# Patient Record
Sex: FEMALE | Race: WHITE | NOT HISPANIC OR LATINO | Employment: OTHER | URBAN - METROPOLITAN AREA
[De-identification: names, ages, dates, MRNs, and addresses within clinical notes are randomized per-mention and may not be internally consistent; named-entity substitution may affect disease eponyms.]

---

## 2017-01-02 ENCOUNTER — HOSPITAL ENCOUNTER (OUTPATIENT)
Dept: NUTRITION | Facility: CLINIC | Age: 62
Discharge: HOME | End: 2017-01-02
Attending: DIETITIAN, REGISTERED | Admitting: DIETITIAN, REGISTERED
Payer: COMMERCIAL

## 2017-01-02 PROCEDURE — 97802 MEDICAL NUTRITION INDIV IN: CPT | Performed by: DIETITIAN, REGISTERED

## 2017-01-03 NOTE — PROGRESS NOTES
"OUTPATIENT NUTRITION ASSESSMENT   REASON FOR ASSESSMENT  Mili Padilla referred by Dr. Márquez for MNT related to overweight.    Patient accompanied by self.      ASSESSMENT   Nutrition History: - Information obtained from patient.  Patient with history of long struggle with her weight.  Patient had sleeve gastrectomy 3 years ago.  Patient states she was able to maintain her 65 lbs weight loss for 2 years years.  Then patient struggled with depression and regained her weight.  Patient has been tracking her food intake since surgery.  Patient's calorie intake varies from 8421-8669 calories and 61-91 grams of protein.  Patient with history of multiple sclerosis which makes it difficult to prepare meals due to fatigue.  Patient has her groceries delivered from [employer].  Patient may grocery shop if she is not able to order specific foods from the grocery store.  Patient struggles with her eating pattern and is working with therapist at [facility].  Patient states she and her therapist developed list of alternatives to eating when not hungry.        Diet Recall:  Breakfast: 1/2 cup cottage cheese and 2 cantaloupe stoner   Lunch: 2 slices genoa salami, 1 sliced bread, 1 tsp I can't Believe it's not Butter, 3 watermelon stoner; 6\" BLT Subway sandwich; 4 oz pulled chicken   Dinner: 2 cup penne marinara with chicken, 14 oz cranberry juice, 2 oz bottled water  Snack: pear, chocolate mint and M&M's, 1 turkey jerky, 1 slice bernard, 1 serving roasted almonds (snacks all in one day); 1 fruit bag fruit snacks, 6 sour candies, 1/2 large macadamia cookie (snacks on another day)   Beverages: 12 oz Mayen's grape juice, water, 14 oz cranberry grape juice; diet Pepsi  Dining out: limited     PHYSICAL FINDINGS  Observed:  No nutrition-related physical findings observed  Obtained from Chart/Interdisciplinary Team:  None noted    LABS  Labs reviewed    MEDICATIONS  Medications reviewed    ANTHROPOMETRICS " "  Height: 5'7\"  Weight: 298 lbs  BMI (kg/m2): 46.6 kg/m2  Weight Status:  Obesity Grade III BMI >40  %IBW: 220%  Weight History:   Wt Readings from Last 5 Encounters:   12/07/16 135.263 kg (298 lb 3.2 oz)   10/31/16 137.667 kg (303 lb 8 oz)   08/26/16 142.021 kg (313 lb 1.6 oz)   08/16/16 143.201 kg (315 lb 11.2 oz)   05/31/16 146.194 kg (322 lb 4.8 oz)     ASSESSED NUTRITION NEEDS  Estimated Energy Needs: 8053-6474 kcals/day (11-14 Kcal/Kg) for weight loss  Estimated Protein Needs: 61-74 grams protein/day (1-1.2 g pro/Kg) for maintenance  Estimated Fluid Needs: 1023-3804 mL/day (25-30 mL/kg)    ASSESSED MALNUTRITION STATUS  % Weight Loss:  None noted  % Intake:  No decreased intake noted  Subcutaneous Fat Loss:  None observed  Loss of Muscle Mass:  None observed  Fluid Retention:  None noted    Malnutrition Diagnosis:  Patient does not meet two of the above criteria necessary for diagnosing malnutrition    DIAGNOSIS   Nutrition Diagnosis:  Disordered eating pattern related to excessive snacking as evidenced by weight regain after sleeve gastrectomy and BMI of 46.6 kg/m2      INTERVENTIONS   Nutrition Prescription   Recommend avoiding liquids with meals to reduce volume of meals for intended weight loss; practice alternatives to snacking    IMPLEMENTATION   Meals and Snacks: 3 meals + snacks if hungry  Diet Education:  Provided education on sleeve gastrectomy diet, weight loss strategies  Nutrition Education (Content):   a)  Discussed current eating pattern and patient's need for snacking.  Reviewed diet guidelines for sleeve gastrectomy.  Patient knows she is not following diet guidelines and is willing to re-evaluate her eating behaviors.  Supported patient in her struggle with food.     b)  Provided written meal plan for evening meals.  Nutrition Education (Application):   a)  Discussed current eating plans and recommended ways to reduce monthly budget.    b)  Patient verbalizes understanding of diet by stating " will change her timing of when she eats popcorn.   Anticipate fair compliance     GOALS  Change eating popcorn to after dinner  Choose frozen entree for meal when having appointments during the day    FOLLOW UP/MONITORING   Progress towards goals will be monitored and evaluated per protocol and Practice Guidelines  Patient to follow up in 2 weeks  RD name and number    Time Spent with Patient  55 minutes    Filiberto Becerril, RD, LD  [facility] Outpatient Dietitian  [phone] (office phone)

## 2017-01-11 ENCOUNTER — OFFICE VISIT (OUTPATIENT)
Dept: PSYCHOLOGY | Facility: CLINIC | Age: 62
End: 2017-01-11

## 2017-01-11 VITALS — WEIGHT: 293 LBS | BODY MASS INDEX: 46.04 KG/M2

## 2017-01-11 DIAGNOSIS — F33.41 RECURRENT MAJOR DEPRESSIVE DISORDER, IN PARTIAL REMISSION (H): Primary | ICD-10-CM

## 2017-01-11 DIAGNOSIS — E66.01 PSYCHOLOGICAL FACTORS AFFECTING MORBID OBESITY (H): ICD-10-CM

## 2017-01-11 DIAGNOSIS — F54 PSYCHOLOGICAL FACTORS AFFECTING MORBID OBESITY (H): ICD-10-CM

## 2017-01-11 NOTE — PROGRESS NOTES
Health Psychology                  Clinic    Department of Medicine  Sherrie Crowe, Ph.D., L.P. [phone]                          Clinics and Surgery Center  [facility] Vin Ward, Ph.D.,  L.P. [phone]                 3rd [facility] Mail Code 741   Ramin Olivo, Ph.D., WALTER, L.P. [phone]     [address] [place] [address] Kathie Galvan, Ph.D., L.P. [phone]  [place] [address]       Health Psychology Follow-Up Note     Ms. Padilla is a 61-year-old woman self-referred for psychological consultation because her therapist of the last 24 years is retiring.  She is seen for problem-solving and supportive therapy for depression and multiple health issues.     HISTORY OF PRESENTING CONCERN:  Ms. Padilla reports a lengthy history of depression.  She currently sees a psychiatrist, Ban Coleman at [facility], and is taking Abilify 7.5 mg, trazodone 500 mg, ripazepam 75 mg each day at bedtime, Tegretol 400 mg at bedtime and methylphenidate 10 mg b.i.d.  She has a history of hospitalizations including 3 at [facility] in the late 1990s, early 2000s when she had 2 courses of ECT lasting 7-10 sessions and approximately 3 other single session ECTs.  She stopped due to memory problems.  She kept with Dr. Coleman who she first met as an inpatient and has seen her for the past 18 years.  She had also been hospitalized psychiatrically at [facility] at age 23.  She previously worked with psychiatrist, Doug Sarabia for three years, stopping, in part, because she didn't feel he took her suicide attempt sufficiently seriously.  She reports her depression tends to vary.  Currently it is moderate, though it was more severe within the past year especially when she was having additional health problems.      MEDICAL HISTORY:  Ms. Padilla has a complex medical history.  She was  diagnosed with MS in 1985.  Her psychiatrist at [facility] in [place], Dr. 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  She has used a scooter since 1992, using it more in the last 6 months than she had previously.  She also can use a walker.  She was diagnosed with fibromyalgia in 1997.   She is also seen at the [facility] for her eye care.  She began to see an eating disorder therapist weekly and is losing weight.    WEIGHT at last session 298;  This session 294    Wt Readings from Last 4 Encounters:   01/11/17 133.358 kg (294 lb)   12/07/16 135.263 kg (298 lb 3.2 oz)   10/31/16 137.667 kg (303 lb 8 oz)   08/26/16 142.021 kg (313 lb 1.6 oz)     Past Medical History   Diagnosis Date     Multiple sclerosis, secondary progressive (H)      Fibromyalgia syndrome      Depression, major, in partial remission (H)      Vocal cord paralysis, unilateral complete      Sleep apnea      Gastro-oesophageal reflux disease      Diabetes mellitus (H)      pre-diabetic     Hyperlipemia      Lymphedema      Multiple sclerosis (H)      tremors with MS, all four limbs and head        Past Surgical History   Procedure Laterality Date     Orthopedic surgery       R wrist 2010     Ent surgery       throat 1969, vocal cord surgery with skin grafting     Laparoscopic gastric sleeve  5/30/2013     Procedure: LAPAROSCOPIC GASTRIC SLEEVE;  LAPAROSCOPIC SLEEVE GASTRECTOMY/ LAPARSCOPIC  APPENDECTOMY /LIVER BIOPSIES/LIVER CYST DRAINAGE;  Surgeon: Salvador Morris MD;  Location:  OR     Laparoscopic appendectomy  5/30/2013     Procedure: LAPAROSCOPIC APPENDECTOMY;;  Surgeon: Salvador Morris MD;  Location:  OR     Laparoscopic biopsy liver  5/30/2013     Procedure: LAPAROSCOPIC BIOPSY LIVER;;  Surgeon: Salvador Morris MD;  Location:  OR     Esophagoscopy, gastroscopy, duodenoscopy (egd), combined N/A 12/16/2014     Procedure: COMBINED ENDOSCOPIC ULTRASOUND, ESOPHAGOSCOPY, GASTROSCOPY, DUODENOSCOPY  (EGD), FINE NEEDLE ASPIRATE/BIOPSY;  Surgeon: Yessica Santana MD;  Location:  GI     Esophagoscopy, gastroscopy, duodenoscopy (egd), combined N/A 12/16/2014     Procedure: COMBINED ESOPHAGOSCOPY, GASTROSCOPY, DUODENOSCOPY (EGD), BIOPSY SINGLE OR MULTIPLE;  Surgeon: Yessica Santana MD;  Location:  GI     Current Outpatient Prescriptions   Medication     tolterodine (DETROL LA) 4 MG 24 hr capsule     furosemide (LASIX) 20 MG tablet     metroNIDAZOLE (METROGEL) 1 % gel     ranitidine (ZANTAC) 150 MG tablet     ketoconazole (NIZORAL) 2 % cream     Dextromethorphan-Guaifenesin (MUCINEX DM PO)     Melatonin 10 MG TABS     budesonide (PULMICORT) 0.25 MG/2ML nebulizer solution     Mirabegron (MYRBETRIQ PO)     docusate sodium 100 MG tablet     methylphenidate (RITALIN) 10 MG tablet     B Complex Vitamins (B-COMPLEX/B-12 SL)     carBAMazepine (TEGRETOL) 200 MG tablet     carBAMazepine (TEGRETOL) 200 MG tablet     Calcium Citrate-Vitamin D (CALCITRATE/VITAMIN D PO)     Cholecalciferol (VITAMIN D3 PO)     Cholecalciferol (VITAMIN D3 PO)     Mirtazapine (REMERON PO)     TRAZODONE HCL PO     Multiple Vitamin (MULTIVITAMINS PO)     atorvastatin (LIPITOR) 20 MG tablet     ARIPiprazole (ABILIFY) 5 MG tablet     escitalopram (LEXAPRO) 10 MG tablet     No current facility-administered medications for this visit.     Facility-Administered Medications Ordered in Other Visits   Medication     ondansetron (ZOFRAN) injection     Ms. Padilla does not smoke, use street drugs or caffeine.  She acknowledges a glass of vodka about twice a year.  She is not currently exercising.  She has gone to a program in [place] in the past for people with MS but has stopped going and would find it difficult to go there.  She is willing to consider it, however.  She has a history of trauma to the larynx at age 13 from a skiing accident.      SOCIAL HISTORY:  Ms. Padilla grew up in the [place] and is the oldest of 5 children in  her family of origin.  Her father was a dentist who she believes was bipolar.  He  of lung cancer at age 72.  Her mother  of sepsis at age 80.  She had been diagnosed with breast cancer when Ms. Padilla was 8.  She describes the marriage between her parents as misael.  She is 5 years older than her closest-aged sister and they are all within 4 years of each other.   Her daughter  12/3 and her mother   She tends to feel more depressed in winter.      Ms. Padilla attended [employer] for a year and later went to [employer] at the [employer].  She felt that she could not continue her education to the point of graduation because she was working full time and raising her daughter.  Her daughter  in  at age 31 of liver failure secondary to an accidental Tylenol overdose.  She had been recovering from a hysterectomy.      Ms. 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  She had to retire at the time secondary to her MS.  She has never .  She has not dated,  is interested in dating, and states that if she were she would be interested in a relationship with a woman.  There is no history of  service or legal problems.  She expresses concern about her increasing social isolation.  She does have at least 1 friend, Jael, who she met at a therapy group in .  She is active in facilitating groups for people with MS both in [place] and [place].  She lives alone and currently gets help from a home health aide, as well as home health nurse.      SESSION:  She had a positive experience with family the  and is looking forward to [facility]. She reports feeling more tired, having unusul sleep pattern of  Falling asleep on couch, then going to bed around 4.  We reviewed information about sleep to persuade her to better regulate her  sleep.  We discussed her weight loss, which is going well.  Thoughts are linear. We focused on her sense of more depression around the holidays.  She is getting a light box to try  We reinforced her for her weight loss.   Sheparticipated fully and appeared to derive benefit.  Rapport was excellent.  The treatment plan was reviewed with the patient at today s visit. The treatment plan remains current based on the patient s status and progress to date.  Extended session due to complexity of case and length of interval.  Time in:   4:02        Time out:   4:5       DIAGNOSES:   Major depression, recurrent, moderate (F33.1).   Behavioral factors affecting morbid obesity (E66.01).     PLAN:  Ms. Padilla will return to clinic on 2/1  @ 3 for problem-solving and supportive therapy consistent with treatment plan..   Tx plan 8/26/16;  Next review due  3/7          Ramin Olivo, PhD, A.B.P.P., L.P.   Director, Health Psychology

## 2017-01-16 ENCOUNTER — HOSPITAL ENCOUNTER (OUTPATIENT)
Dept: NUTRITION | Facility: CLINIC | Age: 62
Discharge: HOME | End: 2017-01-16
Attending: DIETITIAN, REGISTERED | Admitting: DIETITIAN, REGISTERED
Payer: COMMERCIAL

## 2017-01-16 PROCEDURE — 97803 MED NUTRITION INDIV SUBSEQ: CPT | Performed by: DIETITIAN, REGISTERED

## 2017-01-17 NOTE — PROGRESS NOTES
NUTRITION REASSESSMENT NOTE     REASON FOR ASSESSMENT  Mili Padilla referred by Dr. Márquez for MNT related to overweight.    Patient accompanied by self.      ASSESSMENT   Nutrition History:- Information obtained from patient.  Patient with history of long struggle with her weight.  Patient had sleeve gastrectomy 3 years ago.  Patient states she was able to maintain her 65 lbs weight loss for 2 years years.  Then patient struggled with depression and regained her weight.  Patient has been tracking her food intake since surgery.  Patient's calorie intake varies from 1771-3932 calories and  grams of protein.  Patient with history of multiple sclerosis which makes it difficult to prepare meals due to fatigue.  Patient has her groceries delivered from [employer].  Patient may grocery shop if she is not able to order specific foods from the grocery store.  Patient went to [employer] and was able to receive more foods.  Patient will be signing up for [employer] meals that she will receive 5 meals per week.  Patient struggles with her eating pattern and is working with therapist at [facility].  Patient states therapist suggested tallie system for reducing intake.      Diet Recall:  Breakfast: 1/2 cup cottage cheese and 2 cantaloupe stoner   Lunch: 1 serving Nut Thins, 3 chocolate muffins, 5 oz grapefruit pop; 2 cups capillini con lori, 1/16 slice key lime pie  Dinner: 4 oz pulled chicken, 1 banana, 2 chocolate chip cookies  Snack: pear, chocolate mint and M&M's, 1 turkey jerky, 1 slice bernard, 1 serving roasted almonds (snacks all in one day); 6 cups popped popcorn, 2 servings dark chocolate mint M&M's, 2 slices cheddar cheese, 10 slices bernard, Nut Thins crackers (snacks on another day)   Beverages: 12 oz Mayen's grape juice, water, 14 oz cranberry grape juice; diet Pepsi  Dining out: limited     PHYSICAL FINDINGS  Observed:  No nutrition-related physical findings observed  Obtained from  "Chart/Interdisciplinary Team:  None noted    LABS  Labs reviewed    MEDICATIONS  Medications reviewed    ANTHROPOMETRICS   Height: 5'7\"  Weight: 294 lbs  BMI (kg/m2): 46.0 kg/m2  Weight Status:  Obesity Grade III BMI >40  %IBW: 217%  Weight History:   Wt Readings from Last 5 Encounters:   01/11/17 133.358 kg (294 lb)   12/07/16 135.263 kg (298 lb 3.2 oz)   10/31/16 137.667 kg (303 lb 8 oz)   08/26/16 142.021 kg (313 lb 1.6 oz)   08/16/16 143.201 kg (315 lb 11.2 oz)     ASSESSED NUTRITION NEEDS  Estimated Energy Needs: 4353-2341 kcals/day (11-14 Kcal/Kg) for weight loss  Estimated Protein Needs: 61-74 grams protein/day (1-1.2 g pro/Kg) for maintenance  Estimated Fluid Needs: 5666-7272 mL/day (25-30 mL/kg)    EVALUATION/PROGRESS TOWARDS GOALS   Previous Goals:   Change eating popcorn to after dinner-not met  Choose frozen entree for meal when having appointments during the day-improving    Previous Nutrition Diagnosis: Disordered eating pattern related to excessive snacking as evidenced by weight regain after sleeve gastrectomy and BMI of 46.6 kg/m2 -no change    Current Nutrition Diagnosis: Disordered eating pattern related to excessive snacking as evidenced by weight regain after sleeve gastrectomy and BMI of 46.0 kg/m2      INTERVENTIONS   Nutrition Prescription   Recommend avoiding liquids with meals to reduce volume of meals for intended weight loss; practice alternatives to snacking    IMPLEMENTATION   Meals and Snacks: 3 meals + snacks if hungry  Nutrition Education (Content):   a)  Discussed progress towards goals.  Patient appears frustrated with herself since she did not change when she eats popcorn as she feels that makes her snack more during the day.  Reviewed patient's food logs and congratulated patient on her consistent intake of protein.  Suggested may be able to reduce tracking of calories as know patient consuming too many calories.  Brainstormed ways to reduce intake.  Patient feels she could try " eating one item at snack to reduce intake.  Supported patient in her struggle with food.    Nutrition Education (Application):   a)  Discussed current eating plans and recommended ways to reduce snacking.      b)  Patient verbalizes understanding of diet by stating will change her timing of when she eats popcorn.   Anticipate fair compliance     GOALS  Change eating popcorn to after dinner  Reduce snacks to 1 food item     FOLLOW UP/MONITORING   Progress towards goals will be monitored and evaluated per protocol and Practice Guidelines  Patient to follow up in 3 weeks  RD name and number    Time Spent with Patient  30 minutes    Filiberto Becerril, RD, LD  [facility] Outpatient Dietitian  [phone] (office phone)

## 2017-01-26 ENCOUNTER — HOSPITAL ENCOUNTER (OUTPATIENT)
Dept: OCCUPATIONAL THERAPY | Facility: CLINIC | Age: 62
Setting detail: THERAPIES SERIES
End: 2017-01-26
Attending: FAMILY MEDICINE
Payer: COMMERCIAL

## 2017-01-26 PROCEDURE — 40000445 ZZHC STATISTIC OT VISIT, LYMPHEDEMA: Performed by: OCCUPATIONAL THERAPIST

## 2017-01-26 PROCEDURE — 97140 MANUAL THERAPY 1/> REGIONS: CPT | Mod: GO | Performed by: OCCUPATIONAL THERAPIST

## 2017-01-26 NOTE — PROGRESS NOTES
"   01/26/17 1554   Signing Clinician's Name / Credentials   Signing clinician's name / credentials Diana Briseno, OTR/L CLT-[facility]  (DISCHARGE NOTE)   Session Number   Session Number 13   Goal 1   Goal identifier BLE volume   Goal description For decreased risk infection, skin breakdown/wounds and progressive soft-tissue fibrosis volume will be reduced at least 250ml in ea LE.  (Goal exceeded)   Target date 12/16/16   Date met 12/02/16  (Goal exceeded)   Goal 2   Goal identifier GCB   Goal description To reduce volume of lymphedema and soft-tissue fibrosis pt will tolerate up to 23hr/day wear gradient compression bandaging (GCB) BLE.   Target date 12/16/16   Date met 01/02/17  (Goal met)   Goal 3   Goal identifier Home program   Goal description For long-term home mgmt chronic lymphedema pt/caregiver independent in home program a. GCB/GCB alternative garment for night wear b. compression garment for day wear c. ex to incr lymph flow/self-MLD.   Target date 01/31/17   Date met (Goal met)   Subjective Report   Subjective Report \"I have had any leaking since I saw you last.   I've been wearing my binders at night and my socks during the day.\"   Pt approved for [employer] for meal delivery, cites no other changes vs 12.2.16.   Objective Measure 1   Objective Measure BLE msmts   Details vs last day intensive CDT RLE incr 508ml & LLE decr 135ml, vs prior to CDT RLE decr 1239ml & LLE decr 1169ml.   Manual Therapy   Minutes 37 Minutes   Skilled Intervention BLE msmts & exam   Patient Response Pt reports improved tolerance of wearing BBK velcro-strap extremity compression binders, 'I've figured out how to arrange the pillows, and how to get my legs up.\"   Treatment Detail Took BLE msmts, reviewed home program recommendations & assessement of BLE volume.   Multi diffusely scabbed non-draining lesions do not appear infected, hemosidarin staining BBK, 2+ pitting edema BBK with skin tight/shiny.   Progress Goals met   Plan "   Updates to plan of care Discharge Edema Treatment to home program.   Pt to return with new MD order if exacerbation of sxs or problems with home program; pt is in agreement with this POC.   Comments   Comments Mild rebound BLE volume, however BLE volume much reduced vs prior to intensive CDT.

## 2017-02-01 ENCOUNTER — OFFICE VISIT (OUTPATIENT)
Dept: PSYCHOLOGY | Facility: CLINIC | Age: 62
End: 2017-02-01

## 2017-02-01 VITALS — BODY MASS INDEX: 45.52 KG/M2 | WEIGHT: 290.7 LBS

## 2017-02-01 DIAGNOSIS — F54 PSYCHOLOGICAL FACTORS AFFECTING MORBID OBESITY (H): Primary | ICD-10-CM

## 2017-02-01 DIAGNOSIS — E66.01 PSYCHOLOGICAL FACTORS AFFECTING MORBID OBESITY (H): Primary | ICD-10-CM

## 2017-02-01 DIAGNOSIS — F33.1 MAJOR DEPRESSIVE DISORDER, RECURRENT EPISODE, MODERATE (H): ICD-10-CM

## 2017-02-01 NOTE — PROGRESS NOTES
Health Psychology                  Clinic    Department of Medicine  Sherrie Crowe, Ph.D., L.P. [phone]                          Clinics [facility] Center  [facility] Vin Ward, Ph.D.,  L.P. [phone]                 3rd [facility] Mail Code 741   Ramin Olivo, Ph.D., WALTER, L.P. [phone]     [address] [place] [address] Kathie Galvan, Ph.D., L.P. [phone]  [place] [address]       Health Psychology Follow-Up Note     Ms. Padilla is a 61-year-old woman self-referred for psychological consultation because her therapist of the last 24 years is retiring.  She is seen for problem-solving and supportive therapy for depression and multiple health issues.     HISTORY OF PRESENTING CONCERN:  Ms. Padilla reports a lengthy history of depression.  She currently sees a psychiatrist, Ban Coleman at [facility], and is taking Abilify 7.5 mg, trazodone 500 mg, ripazepam 75 mg each day at bedtime, Tegretol 400 mg at bedtime and methylphenidate 10 mg b.i.d.  She has a history of hospitalizations including 3 at [facility] in the late 1990s, early 2000s when she had 2 courses of ECT lasting 7-10 sessions and approximately 3 other single session ECTs.  She stopped due to memory problems.  She kept with Dr. Coleman who she first met as an inpatient and has seen her for the past 18 years.  She had also been hospitalized psychiatrically at [facility] at age 23.  She previously worked with psychiatrist, Doug Sarabia for three years, stopping, in part, because she didn't feel he took her suicide attempt sufficiently seriously.  She reports her depression tends to vary.  Currently it is moderate, though it was more severe within the past year especially when she was having additional health problems.      MEDICAL HISTORY:  Ms. Padilla has a complex medical history.  She was  diagnosed with MS in 1985.  Her psychiatrist at [facility] in [place], Dr. 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  She has used a scooter since 1992, using it more in the last 6 months than she had previously.  She also can use a walker.  She was diagnosed with fibromyalgia in 1997.   She is also seen at the [facility] for her eye care.  She began to see an eating disorder therapist weekly and is losing weight.    WEIGHT at last session 294;  This session 290.7.  She is attending .      Wt Readings from Last 4 Encounters:   02/01/17 131.861 kg (290 lb 11.2 oz)   01/11/17 133.358 kg (294 lb)   12/07/16 135.263 kg (298 lb 3.2 oz)   10/31/16 137.667 kg (303 lb 8 oz)     Past Medical History   Diagnosis Date     Multiple sclerosis, secondary progressive (H)      Fibromyalgia syndrome      Depression, major, in partial remission (H)      Vocal cord paralysis, unilateral complete      Sleep apnea      Gastro-oesophageal reflux disease      Diabetes mellitus (H)      pre-diabetic     Hyperlipemia      Lymphedema      Multiple sclerosis (H)      tremors with MS, all four limbs and head        Past Surgical History   Procedure Laterality Date     Orthopedic surgery       R wrist 2010     Ent surgery       throat 1969, vocal cord surgery with skin grafting     Laparoscopic gastric sleeve  5/30/2013     Procedure: LAPAROSCOPIC GASTRIC SLEEVE;  LAPAROSCOPIC SLEEVE GASTRECTOMY/ LAPARSCOPIC  APPENDECTOMY /LIVER BIOPSIES/LIVER CYST DRAINAGE;  Surgeon: Salvador Morris MD;  Location:  OR     Laparoscopic appendectomy  5/30/2013     Procedure: LAPAROSCOPIC APPENDECTOMY;;  Surgeon: Salvador Morris MD;  Location:  OR     Laparoscopic biopsy liver  5/30/2013     Procedure: LAPAROSCOPIC BIOPSY LIVER;;  Surgeon: Salvador Morris MD;  Location:  OR     Esophagoscopy, gastroscopy, duodenoscopy (egd), combined N/A 12/16/2014     Procedure: COMBINED ENDOSCOPIC ULTRASOUND,  ESOPHAGOSCOPY, GASTROSCOPY, DUODENOSCOPY (EGD), FINE NEEDLE ASPIRATE/BIOPSY;  Surgeon: Yessica Santana MD;  Location:  GI     Esophagoscopy, gastroscopy, duodenoscopy (egd), combined N/A 12/16/2014     Procedure: COMBINED ESOPHAGOSCOPY, GASTROSCOPY, DUODENOSCOPY (EGD), BIOPSY SINGLE OR MULTIPLE;  Surgeon: Yessica Santana MD;  Location:  GI     Current Outpatient Prescriptions   Medication     tolterodine (DETROL LA) 4 MG 24 hr capsule     furosemide (LASIX) 20 MG tablet     metroNIDAZOLE (METROGEL) 1 % gel     ranitidine (ZANTAC) 150 MG tablet     ketoconazole (NIZORAL) 2 % cream     Dextromethorphan-Guaifenesin (MUCINEX DM PO)     Melatonin 10 MG TABS     budesonide (PULMICORT) 0.25 MG/2ML nebulizer solution     Mirabegron (MYRBETRIQ PO)     docusate sodium 100 MG tablet     methylphenidate (RITALIN) 10 MG tablet     B Complex Vitamins (B-COMPLEX/B-12 SL)     carBAMazepine (TEGRETOL) 200 MG tablet     carBAMazepine (TEGRETOL) 200 MG tablet     Calcium Citrate-Vitamin D (CALCITRATE/VITAMIN D PO)     Cholecalciferol (VITAMIN D3 PO)     Cholecalciferol (VITAMIN D3 PO)     Mirtazapine (REMERON PO)     TRAZODONE HCL PO     Multiple Vitamin (MULTIVITAMINS PO)     atorvastatin (LIPITOR) 20 MG tablet     ARIPiprazole (ABILIFY) 5 MG tablet     escitalopram (LEXAPRO) 10 MG tablet     No current facility-administered medications for this visit.     Facility-Administered Medications Ordered in Other Visits   Medication     ondansetron (ZOFRAN) injection     Ms. Padilla does not smoke, use street drugs or caffeine.  She acknowledges a glass of vodka about twice a year.  She is not currently exercising.  She has gone to a program in [employer] in the past for people with MS but has stopped going and would find it difficult to go there.  She is willing to consider it, however.  She has a history of trauma to the larynx at age 13 from a skiing accident.      SOCIAL HISTORY:  Ms. Padilla grew up in the [place]  area and is the oldest of 5 children in her family of origin.  Her father was a dentist who she believes was bipolar.  He  of lung cancer at age 72.  Her mother  of sepsis at age 80.  She had been diagnosed with breast cancer when Ms. Padilla was 8.  She describes the marriage between her parents as misael.  She is 5 years older than her closest-aged sister and they are all within 4 years of each other.   Her daughter  12/3 and her mother   She tends to feel more depressed in winter.      Ms. Padilla attended [employer] for a year and later went to [employer] at the [employer].  She felt that she could not continue her education to the point of graduation because she was working full time and raising her daughter.  Her daughter  in  at age 31 of liver failure secondary to an accidental Tylenol overdose.  She had been recovering from a hysterectomy.      Ms. 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  She had to retire at the time secondary to her MS.  She has never .  She has not dated,  is interested in dating, and states that if she were she would be interested in a relationship with a woman.  There is no history of  service or legal problems.  She expresses concern about her increasing social isolation.  She does have at least 1 friend, Jael, who she met at a therapy group in .  She is active in facilitating groups for people with MS both in [place] and [place].  She lives alone and currently gets help from a home health aide, as well as home health nurse.      SESSION: We discussed her weight loss, which is going well.  She is at 1500 calories 2-3 days per week and about 34232 the rest.  We discussed gradual decrements.  She still is unregulated in her sleep and unstructured in her time awake.  We discussed  gradually harmonizing the hour of sleep and making it earlier.  She thinks it is reasonable.  She is now getting [facility].  Thoughts are linear. We focused on her sense of more depression despite medication changes.  She is getting a light box to try  We reinforced her for her weight loss.   She participated fully and appeared to derive benefit.  Rapport was excellent.   Extended session due to complexity of case and length of interval.  Time in:   3:00        Time out:   3:58       DIAGNOSES:   Major depression, recurrent, moderate (F33.1).   Behavioral factors affecting morbid obesity (E66.01).     PLAN:  Ms. Padilla will return to clinic on 2/27  @ 1 for problem-solving and supportive therapy consistent with treatment plan..   Tx plan 8/26/16;  Next review due  3/7          Ramin Olivo, PhD, A.B.P.P., L.P.   Director, Health Psychology

## 2017-02-06 ENCOUNTER — HOSPITAL ENCOUNTER (OUTPATIENT)
Dept: NUTRITION | Facility: CLINIC | Age: 62
Discharge: HOME | End: 2017-02-06
Attending: FAMILY MEDICINE | Admitting: FAMILY MEDICINE
Payer: COMMERCIAL

## 2017-02-06 DIAGNOSIS — E66.01 OBESITY, MORBID (MORE THAN 100 LBS OVER IDEAL WEIGHT OR BMI > 40) (H): Primary | ICD-10-CM

## 2017-02-06 PROCEDURE — 97803 MED NUTRITION INDIV SUBSEQ: CPT | Performed by: DIETITIAN, REGISTERED

## 2017-02-08 NOTE — PROGRESS NOTES
NUTRITION REASSESSMENT NOTE     REASON FOR ASSESSMENT  Mili Padilla referred by Dr. Márquez for MNT related to overweight.    Patient accompanied by self.      ASSESSMENT   Nutrition History:- Information obtained from patient.  Patient with long history of weight loss struggle.  Patient had sleeve gastrectomy 3 years ago.  Patient states she was able to maintain her 65 lbs weight loss for 2 years years.  Then patient struggled with depression and regained her weight.  Patient has been tracking her food intake since surgery.  Patient's calorie intake varies from 2007-7437 calories and  grams of protein.  Patient with history of multiple sclerosis which makes it difficult to prepare meals due to fatigue.  Patient has her groceries delivered from [employer].  Patient may grocery shop if she is not able to order specific foods from the grocery store.  Patient went to [employer] and was able to receive more foods.  Patient will be receiving 5 [employer] meals per week.  Patient struggles with her eating pattern and is working with therapist at [facility].    Diet Recall:  Breakfast: 1/2 cup cottage cheese and 2 cantaloupe stoner   Lunch: 1 serving Nut Thins, 3 chocolate muffins, 5 oz grapefruit pop; 2 cups capillini con lori, 1/16 slice key lime pie  Dinner: 4 oz pulled chicken, 1 banana, 2 chocolate chip cookies  Snack: pear, chocolate mint and M&M's, 1 turkey jerky, 1 slice bernard, 1 serving roasted almonds (snacks all in one day); 6 cups popped popcorn, 2 servings dark chocolate mint M&M's, 2 slices cheddar cheese, 10 slices bernard, Nut Thins crackers (snacks on another day)   Beverages: 12 oz Mayen's grape juice, water, 14 oz cranberry grape juice; diet Pepsi  Dining out: limited     PHYSICAL FINDINGS  Observed:  No nutrition-related physical findings observed  Obtained from Chart/Interdisciplinary Team:  None noted    LABS  Labs reviewed    MEDICATIONS  Medications  "reviewed    ANTHROPOMETRICS   Height: 5'7\"  Weight: 290 lbs  BMI (kg/m2): 45.4 kg/m2  Weight Status:  Obesity Grade III BMI >40  %IBW: 214%  Weight History:   Wt Readings from Last 5 Encounters:   02/01/17 131.861 kg (290 lb 11.2 oz)   01/11/17 133.358 kg (294 lb)   12/07/16 135.263 kg (298 lb 3.2 oz)   10/31/16 137.667 kg (303 lb 8 oz)   08/26/16 142.021 kg (313 lb 1.6 oz)     ASSESSED NUTRITION NEEDS  Estimated Energy Needs: 9961-9182 kcals/day (11-14 Kcal/Kg) for weight loss  Estimated Protein Needs: 61-74 grams protein/day (1-1.2 g pro/Kg) for maintenance  Estimated Fluid Needs: 0888-8176 mL/day (25-30 mL/kg)    EVALUATION/PROGRESS TOWARDS GOALS   Previous Goals:   Change eating popcorn to after dinner-not met  Reduce snacks to 1 food item-not met     Previous Nutrition Diagnosis: Disordered eating pattern related to excessive snacking as evidenced by weight regain after sleeve gastrectomy and BMI of 46 kg/m2 -no change    Current Nutrition Diagnosis: Disordered eating pattern related to excessive snacking as evidenced by weight regain after sleeve gastrectomy and BMI of 46.0 kg/m2      INTERVENTIONS   Nutrition Prescription   Recommend avoiding liquids with meals to reduce volume of meals for intended weight loss; practice alternatives to snacking    IMPLEMENTATION   Meals and Snacks: 3 meals + snacks if hungry  Nutrition Education (Content):   a)  Discussed progress towards goals.  Patient appears frustrated with herself since she did not change when she eats popcorn as she feels that makes her snack more during the day.  Reviewed patient's food logs and congratulated patient on her consistent intake of protein.  Brainstormed ways to reduce intake.  Supported patient in her struggle with food.    Nutrition Education (Application):   a)  Discussed current eating plans and recommended ways to reduce snacking.  Patient feels she cannot eliminate chocolate but was willing to reduce chocolate intake.      b)  " Patient verbalizes understanding of diet by stating will reduce M&M serving to 1/2 small bag.     Anticipate fair compliance     GOALS  Change eating popcorn to after dinner  Reduce frequency of buying M&M's to 3 small bags per week  Eat 1/2 small bag M&M portion per day  Trial of 8 oz. chocolate mid morning     FOLLOW UP/MONITORING   Progress towards goals will be monitored and evaluated per protocol and Practice Guidelines  Patient to follow up in 2 weeks  RD name and number    Time Spent with Patient  30 minutes    Filiberto Becerril, RD, LD  [facility] Outpatient Dietitian  [phone] (office phone)

## 2017-02-20 ENCOUNTER — HOSPITAL ENCOUNTER (OUTPATIENT)
Dept: NUTRITION | Facility: CLINIC | Age: 62
Discharge: HOME | End: 2017-02-20
Attending: PHYSICIAN ASSISTANT | Admitting: PHYSICIAN ASSISTANT
Payer: COMMERCIAL

## 2017-02-20 PROCEDURE — 97803 MED NUTRITION INDIV SUBSEQ: CPT | Performed by: DIETITIAN, REGISTERED

## 2017-02-20 NOTE — PROGRESS NOTES
"NUTRITION REASSESSMENT NOTE     REASON FOR ASSESSMENT  Mili Padilla referred by Dr. Márquez for MNT related to overweight.    Patient accompanied by self.      ASSESSMENT   Nutrition History:- Information obtained from patient.  Patient with long history of weight loss struggle.  Patient had sleeve gastrectomy 3 years ago.  Patient states she was able to maintain her 65 lbs weight loss for 2 years years.  Then patient struggled with depression and regained her weight.  Patient has been tracking her food intake since surgery.  Patient's calorie intake varies from 8641-8929 calories and  grams of protein.  Patient with history of multiple sclerosis which makes it difficult to prepare meals due to fatigue.  Patient has her groceries delivered from [employer].  Patient received 5 meals from [employer] in the past 2 weeks.  Patient has liked most meals but did not prefer the vegetarian options.       24 Diet Recall:  Breakfast: 1/2 cup cottage cheese and 2 cantaloupe stoner   Lunch: 4 oz chicken meatloaf, 1/2 cup mashed potatoes, 1/2 cup green beans  Dinner: 2 slices ham, 1 slice cheese, 1 t sharpe, salad with cherry tomatoes with 1 T dressing   Snack: 3 cups popcorn, 1 serving dark chocolate mint M&M's, 1 piece blueberry coffee cake, 3 cups popcorn, 1 chocolate cookie, 1 M&M cookie, 10 slices bernard, 1 servings Nut Thins crackers, 1 servings dark chocolate mint M&M's (snacks all in one day);  Beverages: water, Diet Dr. Pepper   Dining out: limited     PHYSICAL FINDINGS  Observed:  No nutrition-related physical findings observed  Obtained from Chart/Interdisciplinary Team:  None noted    LABS  Labs reviewed    MEDICATIONS  Medications reviewed    ANTHROPOMETRICS   Height: 5'7\"  Weight: 290 lbs  BMI (kg/m2): 45.4 kg/m2  Weight Status:  Obesity Grade III BMI >40  %IBW: 214%  Weight History:   Wt Readings from Last 5 Encounters:   08/16/16 (!) 143.2 kg (315 lb 11.2 oz)   05/31/16 (!) 146.2 kg (322 lb 4.8 oz)   08/06/15 (!) " 138.8 kg (306 lb)   12/25/14 133.8 kg (294 lb 15.6 oz)   12/16/14 133.8 kg (295 lb)     ASSESSED NUTRITION NEEDS  Estimated Energy Needs: 6172-0623 kcals/day (11-14 Kcal/Kg) for weight loss  Estimated Protein Needs: 61-74 grams protein/day (1-1.2 g pro/Kg) for maintenance  Estimated Fluid Needs: 1420-9159 mL/day (25-30 mL/kg)    EVALUATION/PROGRESS TOWARDS GOALS   Previous Goals:   Change eating popcorn to after dinner-not met  Reduce frequency of buying M&M's to 3 small bags per week-improving  Eat 1/2 small bag M&M portion per day-met   Trial of 8 oz. Chocolate milk mid morning-met     Previous Nutrition Diagnosis: Disordered eating pattern related to excessive snacking as evidenced by weight regain after sleeve gastrectomy and BMI of 46 kg/m2 -no change    Current Nutrition Diagnosis: Disordered eating pattern related to excessive snacking as evidenced by weight regain after sleeve gastrectomy and BMI of 46.0 kg/m2      INTERVENTIONS   Nutrition Prescription   Recommend avoiding liquids with meals to reduce volume of meals for intended weight loss; practice alternatives to snacking    IMPLEMENTATION   Meals and Snacks: 3 meals + snacks if hungry  Nutrition Education (Content):   a)  Discussed progress towards goals.  Patient's eating pattern consists mainly of snacks vs meals.  Patient consumes more calories from snacks than her meals.  Reviewed patient's food logs and congratulated patient on reduced intake of M&M's.  Supported patient in her struggle with food.    Nutrition Education (Application):   a)  Discussed current eating plans and recommended ways to plan meals.  Patient focused on needing to prepare new meals.  Suggest patient use week of meals that patient and RD determined in previous appointment which patient has not tried.          b)  Patient verbalizes understanding of diet by stating will eat meals at the dining table.      Anticipate fair compliance     GOALS  Eat meals and snacks at the dining  table  Reduce intake of M&M's to 1/2 small bag per day  Trial of 4 oz. chocolate milk and 4 oz skim milk mid morning   Eat 1 new meal per week    FOLLOW UP/MONITORING   Progress towards goals will be monitored and evaluated per protocol and Practice Guidelines  Patient to follow up in 2 weeks  RD name and number    Time Spent with Patient  30 minutes    Filiberto Becerril, RD, LD  [facility] Outpatient Dietitian  [phone] (office phone)

## 2017-02-27 ENCOUNTER — OFFICE VISIT (OUTPATIENT)
Dept: PSYCHOLOGY | Facility: CLINIC | Age: 62
End: 2017-02-27

## 2017-02-27 VITALS — WEIGHT: 290 LBS | BODY MASS INDEX: 45.42 KG/M2

## 2017-02-27 DIAGNOSIS — F54 PSYCHOLOGICAL FACTORS AFFECTING MORBID OBESITY (H): ICD-10-CM

## 2017-02-27 DIAGNOSIS — F33.1 MAJOR DEPRESSIVE DISORDER, RECURRENT EPISODE, MODERATE (H): Primary | ICD-10-CM

## 2017-02-27 DIAGNOSIS — E66.01 PSYCHOLOGICAL FACTORS AFFECTING MORBID OBESITY (H): ICD-10-CM

## 2017-02-27 NOTE — MR AVS SNAPSHOT
After Visit Summary   2/27/2017    Mili Padilla    MRN: [id number]           Patient Information     Date Of Birth          1955        Visit Information        Provider Department      2/27/2017 1:00 PM Ramin Olivo, PhD [facility]        Today's Diagnoses     Major depressive disorder, recurrent episode, moderate (H)    -  1    Psychological factors affecting morbid obesity (H)           Follow-ups after your visit        Your next 10 appointments already scheduled     Mar 08, 2017  1:30 PM CST   Follow Up with Neha Valadez RD, ONEL   [facility] ([facility])    [address]  [facility] [address]   [phone]            Mar 16, 2017 11:30 AM CDT   Evaluation with Radha Feliciano, PT   [facility] ([facility])    [address] [place] [address]  [address]  [facility] [address]   [phone]            Mar 20, 2017  5:00 PM CDT   Follow Up with Neha Valadez RD, ONEL   [facility] ([facility])    [address]  [facility] [address]   [phone]            Dec 12, 2017 11:15 AM CST   (Arrive by 10:45 AM)   RETURN THROAT with Clint Still MD   [facility] ([facility])    [address] [place] [id number] [phone]           - This is a multidisciplinary care team visit with an ENT physician and may include a speech language pathologist. - It is important to know that if you are evaluated and/or treated by both a physician and a speech pathologist during your visit, your billing will reflect the input that you receive from both providers as separate professionals. Although most insurance plans do cover these services, we encourage you to contact your insurance company prior to your visit to determine whether there are any  coverage limitations that might affect you financially. - Billing/procedure codes that are frequently associated with visits to our clinic include (but are not limited to) the ones listed below. Most patients will not need all of these items, but it may be useful to ask your insurance company's patient . 55869: Flexible fiberoptic laryngoscopy, 12788: Laryngoscopy; flexible or rigid fiberoptic, with stroboscopy, 41063: Flexible endoscopic evaluation of swallowing, 58384: Speech pathology evaluation, 28364: Speech pathology treatment for voice, speech, communication, 11511: Speech pathology treatment for swallowing/oral function for feeding - If you have any concerns or questions, or if you would prefer not to have a speech pathologist involved in your visit, please contact our Clinic Coordinator at [phone], at least 24 hours prior to your appointment.              Who to contact     Please call your clinic at [phone] to:    Ask questions about your health    Make or cancel appointments    Discuss your medicines    Learn about your test results    Speak to your doctor   If you have compliments or concerns about an experience at your clinic, or if you wish to file a complaint, please contact [facility] Patient Relations at [phone] or email us at [email or web address]         Additional Information About Your Visit        [employer] Information     [employer] gives you secure access to your electronic health record. If you see a primary care provider, you can also send messages to your care team and make appointments. If you have questions, please call your primary care clinic.  If you do not have a primary care provider, please call [phone] and they will assist you.      [employer] is an electronic gateway that provides easy, online access to your medical records. With [employer], you can request a clinic appointment, read your test results, renew a  prescription or communicate with your care team.     To access your existing account, please contact your [facility] or call [phone] for assistance.        Care EveryWhere ID     This is your Care EveryWhere ID. This could be used by other organizations to access your [facility] medical records  [id number]        Your Vitals Were     Last Period BMI (Body Mass Index)                12/10/2010 45.42 kg/m2           Blood Pressure from Last 3 Encounters:   08/16/16 135/63   05/31/16 130/67   08/06/15 153/67    Weight from Last 3 Encounters:   02/27/17 131.5 kg (290 lb)   02/01/17 131.9 kg (290 lb 11.2 oz)   01/11/17 133.4 kg (294 lb)              Today, you had the following     No orders found for display       Primary Care Provider Office Phone # Fax #    Jasmyn Márquez [phone]       [place] [employer] [facility] [address] [facility] [address]  [facility] [address]        Thank you!     Thank you for choosing [facility]  for your care. Our goal is always to provide you with excellent care. Hearing back from our patients is one way we can continue to improve our services. Please take a few minutes to complete the written survey that you may receive in the mail after your visit with us. Thank you!             Your Updated Medication List - Protect others around you: Learn how to safely use, store and throw away your medicines at www.disposemymeds.org.          This list is accurate as of: 2/27/17  2:02 PM.  Always use your most recent med list.                   Brand Name Dispense Instructions for use    ABILIFY 5 MG tablet   Generic drug:  ARIPiprazole      Take 1 tablet by mouth daily.       atorvastatin 20 MG tablet    LIPITOR     Take 20 mg by mouth daily.       B-COMPLEX/B-12 SL      Place 2,500 mcg under the tongue every other day       budesonide 0.25 MG/2ML neb solution    PULMICORT     Take 0.25 mg by nebulization 2 times daily        CALCITRATE/VITAMIN D PO      2 tabs w/meals.       * carBAMazepine 200 MG tablet    TEGretol     Take 300 mg by mouth every morning. 300mg = 1.5 tablets.       * carBAMazepine 200 MG tablet    TEGretol     Take 400 mg by mouth At Bedtime.       docusate sodium 100 MG tablet    COLACE     Take 100 mg by mouth 2 times daily       furosemide 20 MG tablet    LASIX         ketoconazole 2 % cream    NIZORAL     Apply topically daily       LEXAPRO 10 MG tablet   Generic drug:  escitalopram      Take 20 mg by mouth 2 times daily       Melatonin 10 MG Tabs tablet      Take 10 mg by mouth At Bedtime       methylphenidate 10 MG tablet    RITALIN     Take 10 mg by mouth 2 times daily       metroNIDAZOLE 1 % gel    METROGEL         MUCINEX DM PO      1200mg daily       MULTIVITAMINS PO      Take 2 tablets by mouth every evening. WITH IRON       MYRBETRIQ PO      Take 100 mg by mouth daily       ranitidine 150 MG tablet    ZANTAC    180 tablet    TAKE 1 TABLET BY MOUTH TWICE DAILY       REMERON PO      Take 75 mg by mouth At Bedtime. Takes ( 5)    15mg tablets=75mg       tolterodine 4 MG 24 hr capsule    DETROL LA         TRAZODONE HCL PO      Take 500 mg by mouth At Bedtime       * VITAMIN D3 PO      Take 3,000 Units by mouth daily In a.m.       * VITAMIN D3 PO      Take 2,000 Units by mouth At Bedtime.       * Notice:  This list has 4 medication(s) that are the same as other medications prescribed for you. Read the directions carefully, and ask your doctor or other care provider to review them with you.

## 2017-02-27 NOTE — PROGRESS NOTES
Health Psychology                  Clinic    Department of Medicine  Sherrie Crowe, Ph.D., L.P. [phone]                          Clinics and Surgery Center  [facility] Vin Ward, Ph.D.,  L.P. [phone]                 3rd [facility] Mail Code 741   Ramin Olivo, Ph.D., WALTER, L.P. [phone]     [address] [place] [address] Kathie Galvan, Ph.D., L.P. [phone]  [place] [address]       Health Psychology Follow-Up Note     Ms. Padilla is a 61-year-old woman self-referred for psychological consultation because her therapist of the last 24 years retired.  She is seen for problem-solving and supportive therapy for depression and multiple health issues.     HISTORY OF PRESENTING CONCERN:  Ms. Padilla reports a lengthy history of depression.  She currently sees a psychiatrist, Ban Coleman at [facility], and is taking Abilify 7.5 mg, trazodone 500 mg, ripazepam 75 mg each day at bedtime, Tegretol 400 mg at bedtime and methylphenidate 10 mg b.i.d.  She discontineud ability and is now taking Rexulti 2 mg.  She has a history of hospitalizations including 3 at [facility] in the late 1990s, early 2000s when she had 2 courses of ECT lasting 7-10 sessions and approximately 3 other single session ECTs.  She stopped due to memory problems.  She kept with Dr. Coleman who she first met as an inpatient and has seen her for the past 18 years.  She had also been hospitalized psychiatrically at [facility] at age 23.  She previously worked with psychiatrist, Doug Sarabia for three years, stopping, in part, because she didn't feel he took her suicide attempt sufficiently seriously.  She reports her depression tends to vary.  Currently it is moderate, though it was more severe within the past year especially when she was having additional health problems.      MEDICAL HISTORY:  Ms.  Randy has a complex medical history.  She was diagnosed with MS in 1985.  Her psychiatrist at [facility] in [place], Dr. 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  She has used a scooter since 1992, using it more in the last 6 months than she had previously.  She also can use a walker.  She was diagnosed with fibromyalgia in 1997.   She is also seen at the [facility] for her eye care.  She began to see an eating disorder therapist weekly and has been somewhat erratically losing weight.    WEIGHT at last session  290.7. Today is 290.    She is attending OA.    Wt Readings from Last 4 Encounters:   02/27/17 131.5 kg (290 lb)   02/01/17 131.9 kg (290 lb 11.2 oz)   01/11/17 133.4 kg (294 lb)   12/07/16 135.3 kg (298 lb 3.2 oz)       Past Medical History   Diagnosis Date     Depression, major, in partial remission (H)      Diabetes mellitus (H)      pre-diabetic     Fibromyalgia syndrome      Gastro-oesophageal reflux disease      Hyperlipemia      Lymphedema      Multiple sclerosis (H)      tremors with MS, all four limbs and head     Multiple sclerosis, secondary progressive (H)      Sleep apnea      Vocal cord paralysis, unilateral complete         Past Surgical History   Procedure Laterality Date     Orthopedic surgery       R wrist 2010     Ent surgery       throat 1969, vocal cord surgery with skin grafting     Laparoscopic gastric sleeve  5/30/2013     Procedure: LAPAROSCOPIC GASTRIC SLEEVE;  LAPAROSCOPIC SLEEVE GASTRECTOMY/ LAPARSCOPIC  APPENDECTOMY /LIVER BIOPSIES/LIVER CYST DRAINAGE;  Surgeon: Salvador Morris MD;  Location:  OR     Laparoscopic appendectomy  5/30/2013     Procedure: LAPAROSCOPIC APPENDECTOMY;;  Surgeon: Salvador Morris MD;  Location:  OR     Laparoscopic biopsy liver  5/30/2013     Procedure: LAPAROSCOPIC BIOPSY LIVER;;  Surgeon: Salvador Morris MD;  Location:  OR     Esophagoscopy, gastroscopy, duodenoscopy (egd), combined N/A 12/16/2014      Procedure: COMBINED ENDOSCOPIC ULTRASOUND, ESOPHAGOSCOPY, GASTROSCOPY, DUODENOSCOPY (EGD), FINE NEEDLE ASPIRATE/BIOPSY;  Surgeon: Yessica Santana MD;  Location:  GI     Esophagoscopy, gastroscopy, duodenoscopy (egd), combined N/A 12/16/2014     Procedure: COMBINED ESOPHAGOSCOPY, GASTROSCOPY, DUODENOSCOPY (EGD), BIOPSY SINGLE OR MULTIPLE;  Surgeon: Yessica Santana MD;  Location:  GI     Current Outpatient Prescriptions   Medication     tolterodine (DETROL LA) 4 MG 24 hr capsule     furosemide (LASIX) 20 MG tablet     metroNIDAZOLE (METROGEL) 1 % gel     ranitidine (ZANTAC) 150 MG tablet     ketoconazole (NIZORAL) 2 % cream     Dextromethorphan-Guaifenesin (MUCINEX DM PO)     Melatonin 10 MG TABS     budesonide (PULMICORT) 0.25 MG/2ML nebulizer solution     Mirabegron (MYRBETRIQ PO)     docusate sodium 100 MG tablet     methylphenidate (RITALIN) 10 MG tablet     B Complex Vitamins (B-COMPLEX/B-12 SL)     carBAMazepine (TEGRETOL) 200 MG tablet     carBAMazepine (TEGRETOL) 200 MG tablet     Calcium Citrate-Vitamin D (CALCITRATE/VITAMIN D PO)     Cholecalciferol (VITAMIN D3 PO)     Cholecalciferol (VITAMIN D3 PO)     Mirtazapine (REMERON PO)     TRAZODONE HCL PO     Multiple Vitamin (MULTIVITAMINS PO)     atorvastatin (LIPITOR) 20 MG tablet     ARIPiprazole (ABILIFY) 5 MG tablet     escitalopram (LEXAPRO) 10 MG tablet     No current facility-administered medications for this visit.      Facility-Administered Medications Ordered in Other Visits   Medication     ondansetron (ZOFRAN) injection     Ms. Padilla does not smoke, use street drugs or caffeine.  She acknowledges a glass of vodka about twice a year.  She is not currently exercising.  She has gone to a program in [place] in the past for people with MS but has stopped going and would find it difficult to go there.  She is willing to consider it, however.  She has a history of trauma to the larynx at age 13 from a skiing accident.       SOCIAL HISTORY:  Ms. Padilla grew up in the [facility] [facility] and is the oldest of 5 children in her family of origin.  Her father was a dentist who she believes was bipolar.  He  of lung cancer at age 72.  Her mother  of sepsis at age 80.  She had been diagnosed with breast cancer when Ms. Padilla was 8.  She describes the marriage between her parents as misael.  She is 5 years older than her closest-aged sister and they are all within 4 years of each other.   Her daughter  12/3 and her mother   She tends to feel more depressed in winter.      Ms. Padilla attended [employer] for a year and later went to [employer] at the [employer].  She felt that she could not continue her education to the point of graduation because she was working full time and raising her daughter.  Her daughter  in  at age 31 of liver failure secondary to an accidental Tylenol overdose.  She had been recovering from a hysterectomy.      Ms. 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  She had to retire at the time secondary to her MS.  She has never .  She has not dated,  is interested in dating, and states that if she were she would be interested in a relationship with a woman.  There is no history of  service or legal problems.  She expresses concern about her increasing social isolation.  She does have at least 1 friend, Jael, who she met at a therapy group in .  She is active in facilitating groups for people with MS both in [place] and [place].  She lives alone and currently gets help from a home health aide, as well as home health nurse.      SESSION: We discussed her weight loss, which is going less well due to increased snacking.  She is intending to be at 1500 calories 2-3 days per week and about 2000 the rest.  We discussed gradual  decrements.  She still is unregulated in her sleep and unstructured in her time awake.   For example, she has gone to bed at 4 AM x2 in the past week. We discussed gradually harmonizing the hour of sleep and making it earlier.  She thinks it is reasonable.  She is now getting [facility].  Thoughts are linear. We focused on her sense of more depression despite medication changes.  She is getting a light box to try  We reinforced her for her intention for weight loss.   She participated fully and appeared to derive benefit.  Rapport was excellent.   Extended session due to complexity of case and length of interval.  Time in:   3:00        Time out:   3:57       DIAGNOSES:   Major depression, recurrent, moderate (F33.1).   Behavioral factors affecting morbid obesity (E66.01).     PLAN:  Ms. Padilla will return to clinic on 3/29  @ 2  for problem-solving and supportive therapy consistent with treatment plan..   Tx plan 8/26/16;  Next review due  3/7 (next session)          Ramin Olivo, PhD, A.B.P.P., L.P.   Director, Health Psychology

## 2017-03-08 ENCOUNTER — HOSPITAL ENCOUNTER (OUTPATIENT)
Dept: NUTRITION | Facility: CLINIC | Age: 62
Discharge: HOME | End: 2017-03-08
Attending: DIETITIAN, REGISTERED | Admitting: DIETITIAN, REGISTERED
Payer: COMMERCIAL

## 2017-03-08 PROCEDURE — 97803 MED NUTRITION INDIV SUBSEQ: CPT | Performed by: DIETITIAN, REGISTERED

## 2017-03-08 NOTE — PROGRESS NOTES
"NUTRITION REASSESSMENT NOTE     REASON FOR ASSESSMENT  Mili Padilla referred by Dr. Márquez for MNT related to overweight.    Patient accompanied by self.      ASSESSMENT   Nutrition History:- Information obtained from patient.  Patient with long history of weight loss struggle.  Patient had sleeve gastrectomy 3 years ago.  Patient states she was able to maintain her 65 lbs weight loss for 2 years years.  Then patient struggled with depression and regained her weight.  Patient has been tracking her food intake since surgery.  Patient states if she tries to reduce calories to 9874-5474 calories per day, she will lose weight.  Patient with history of multiple sclerosis which makes it difficult to prepare meals due to fatigue.  Patient has her groceries delivered from [employer].  Patient has requested her [employer] contain meat and potatoes as did not like heart healthy meal as often had vegetarian or vegan options.         24 Diet Recall:  Breakfast: 1/2 cup cottage cheese and 2 cantaloupe stoner   Lunch: 2 cups Capillini Al Pom[place]oro, 1 oz grilled chicken, 2 slices Italian bread with 2 tsp I can't Believe it's not Butter  Dinner: 3 oz deli turkey, 1 cup coleslaw, 1/2 bad dark chocolate mint M&M's    Snack: 3 cups popcorn, 1 serving dark chocolate mint M&M's, 1 piece blueberry coffee cake, 3 cups popcorn, 1 chocolate cookie, 1 M&M cookie, 10 slices bernard, 1 servings Nut Thins crackers, 1 servings dark chocolate mint M&M's (snacks all in one day);  Beverages: water, Diet Dr. Pepper   Dining out: limited     PHYSICAL FINDINGS  Observed:  No nutrition-related physical findings observed  Obtained from Chart/Interdisciplinary Team:  None noted    LABS  Labs reviewed    MEDICATIONS  Medications reviewed    ANTHROPOMETRICS   Height: 5'7\"  Weight: 290 lbs  BMI (kg/m2): 45.4 kg/m2  Weight Status:  Obesity Grade III BMI >40  %IBW: 214%  Weight History:   Wt Readings from Last 5 Encounters:   08/16/16 (!) 143.2 kg (315 lb 11.2 " oz)   05/31/16 (!) 146.2 kg (322 lb 4.8 oz)   08/06/15 (!) 138.8 kg (306 lb)   12/25/14 133.8 kg (294 lb 15.6 oz)   12/16/14 133.8 kg (295 lb)     ASSESSED NUTRITION NEEDS  Estimated Energy Needs: 4760-4657 kcals/day (11-14 Kcal/Kg) for weight loss  Estimated Protein Needs: 61-74 grams protein/day (1-1.2 g pro/Kg) for maintenance  Estimated Fluid Needs: 1891-1610 mL/day (25-30 mL/kg)    EVALUATION/PROGRESS TOWARDS GOALS   Previous Goals:   Eat meals and snacks at the dining table-not met  Reduce intake of M&M's to 1/2 small bag per day-improving  Trial of 4 oz. chocolate milk and 4 oz skim milk mid morning-not met  Eat 1 new meal per week-not met    Previous Nutrition Diagnosis: Disordered eating pattern related to excessive snacking as evidenced by weight regain after sleeve gastrectomy and BMI of 46 kg/m2 -no change    Current Nutrition Diagnosis: Disordered eating pattern related to excessive snacking as evidenced by weight regain after sleeve gastrectomy and BMI of 46.0 kg/m2      INTERVENTIONS   Nutrition Prescription   Recommend avoiding liquids with meals to reduce volume of meals for intended weight loss; practice alternatives to snacking    IMPLEMENTATION   Meals and Snacks: 3 meals + snacks if hungry  Nutrition Education (Content):   a)  Discussed progress towards goals.  Reviewed patient's food logs.  Encouraged reduction in snacking to reduce calories for weight loss as patient consumes more calories from snacking than from meals.  Congratulated patient on avoiding liquids with breakfast and lunch.  Supported patient in her struggle with food.    Nutrition Education (Application):   a)  Discussed current eating plans and recommended ways to plan meals by using week of meals that patient and RD determined in previous appointment which patient has not tried.          b)  Patient verbalizes understanding of diet by stating will focus on reducing snacking.     Anticipate fair compliance     GOALS  Ad 1/2  english muffin at breakfast  Eliminate morning snack  Reduce evening snacks to 2 per evening    FOLLOW UP/MONITORING   Progress towards goals will be monitored and evaluated per protocol and Practice Guidelines  Patient to follow up in 2 weeks  RD name and number    Time Spent with Patient  30 minutes    Filiberto Becerril, RD, LD  [facility] Outpatient Dietitian  [phone] (office phone)

## 2017-03-09 ENCOUNTER — TRANSFERRED RECORDS (OUTPATIENT)
Dept: HEALTH INFORMATION MANAGEMENT | Facility: CLINIC | Age: 62
End: 2017-03-09

## 2017-03-20 ENCOUNTER — HOSPITAL ENCOUNTER (OUTPATIENT)
Dept: NUTRITION | Facility: CLINIC | Age: 62
Discharge: HOME | End: 2017-03-20
Attending: DIETITIAN, REGISTERED | Admitting: DIETITIAN, REGISTERED
Payer: COMMERCIAL

## 2017-03-20 PROCEDURE — 97803 MED NUTRITION INDIV SUBSEQ: CPT | Performed by: DIETITIAN, REGISTERED

## 2017-03-21 NOTE — PROGRESS NOTES
"NUTRITION REASSESSMENT NOTE     REASON FOR ASSESSMENT  Mili Padilla referred by Dr. Márquez for MNT related to overweight.    Patient accompanied by self.      ASSESSMENT   Nutrition History:- Information obtained from patient.  Patient with long history of weight loss struggle.  Patient had sleeve gastrectomy 3 years ago.  Patient states she was able to maintain her 65 lb weight loss for 2 years. Then patient struggled with depression and regained her weight.  Patient has been tracking her food intake since surgery.  Patient attempted to reduce calories to <2000 per day to help with weight loss.  Patient with history of multiple sclerosis which makes it difficult to prepare meals due to fatigue.  Patient has her groceries delivered from [employer].  Patient continues to receive 5 [employer] meals per week.           24 Diet Recall:  Breakfast: 1/2 cup cottage cheese and 2 cantaloupe stoner   Lunch: 1/8 pizza ans 1 marble brownie; lasagna, green beans, banana; 4 oz pulled chicken + 2 clementines   Dinner: 3 oz deli turkey, 1 cup coleslaw, 1/2 bad dark chocolate mint M&M's    Snack: 6 cups popcorn, 1 serving dark chocolate mint M&M's, 4 servings Nut Thins cheese crackers + 3 chocolate cookies + 7 chocolate doughnuts (snacks all in one day)  Beverages: water, Diet Dr. Pepper   Dining out: limited     PHYSICAL FINDINGS  Observed:  No nutrition-related physical findings observed  Obtained from Chart/Interdisciplinary Team:  None noted    LABS  Labs reviewed    MEDICATIONS  Medications reviewed    ANTHROPOMETRICS   Height: 5'7\"  Weight: 289.6 lbs  BMI (kg/m2): 45.4 kg/m2  Weight Status:  Obesity Grade III BMI >40  %IBW: 214%  Weight History:   Wt Readings from Last 5 Encounters:   08/16/16 (!) 143.2 kg (315 lb 11.2 oz)   05/31/16 (!) 146.2 kg (322 lb 4.8 oz)   08/06/15 (!) 138.8 kg (306 lb)   12/25/14 133.8 kg (294 lb 15.6 oz)   12/16/14 133.8 kg (295 lb)     ASSESSED NUTRITION NEEDS  Estimated Energy Needs: 9473-2353 " kcals/day (11-14 Kcal/Kg) for weight loss  Estimated Protein Needs: 61-74 grams protein/day (1-1.2 g pro/Kg) for maintenance  Estimated Fluid Needs: 8268-6557 mL/day (25-30 mL/kg)    EVALUATION/PROGRESS TOWARDS GOALS   Previous Goals:   Add 1/2 english muffin at breakfast-not met  Eliminate morning snack-not met  Reduce evening snacks to 2 per evening-not met    Previous Nutrition Diagnosis: Disordered eating pattern related to excessive snacking as evidenced by weight regain after sleeve gastrectomy and BMI of 46 kg/m2 -no change    Current Nutrition Diagnosis: Disordered eating pattern related to excessive snacking as evidenced by weight regain after sleeve gastrectomy and BMI of 46.0 kg/m2      INTERVENTIONS   Nutrition Prescription   Recommend avoiding liquids with meals to reduce volume of meals for intended weight loss; practice alternatives to snacking    IMPLEMENTATION   Meals and Snacks: 3 meals + snacks if hungry  Nutrition Education (Content):   a)  Discussed progress towards goals.  Reviewed patient's food logs.  Again, encouraged reduction in snacking to reduce calories for weight loss as patient consumed 2000 calories in snacks in one day which is entire calories patient is aiming for the whole day.  Congratulated patient on reducing calories to 2000 calories 8 of 14 days.  Supported patient in her struggle with food.    Nutrition Education (Application):   a)  Discussed current eating plans and recommended ways to reduce snacking by trying alternatives to emotional eating.            b)  Patient verbalizes understanding of diet by stating will focus on reducing snacking.     Anticipate fair compliance     GOALS  Limit calories < 2000 per day   Add calories mid day to assess remaining intake for the day   Track feelings when snacking   Practice alternatives to emotional eating    FOLLOW UP/MONITORING   Progress towards goals will be monitored and evaluated per protocol and Practice Guidelines  Patient  to follow up in 2 weeks  RD name and number    Time Spent with Patient  25 minutes    Filiberto Becerril, RD, LD  [facility] Outpatient Dietitian  [phone] (office phone)

## 2017-03-29 ENCOUNTER — OFFICE VISIT (OUTPATIENT)
Dept: PSYCHOLOGY | Facility: CLINIC | Age: 62
End: 2017-03-29

## 2017-03-29 VITALS — WEIGHT: 291.3 LBS | BODY MASS INDEX: 45.62 KG/M2

## 2017-03-29 DIAGNOSIS — F33.1 MAJOR DEPRESSIVE DISORDER, RECURRENT EPISODE, MODERATE (H): Primary | ICD-10-CM

## 2017-03-29 DIAGNOSIS — E66.01 PSYCHOLOGICAL FACTORS AFFECTING MORBID OBESITY (H): ICD-10-CM

## 2017-03-29 DIAGNOSIS — F54 PSYCHOLOGICAL FACTORS AFFECTING MORBID OBESITY (H): ICD-10-CM

## 2017-03-29 NOTE — MR AVS SNAPSHOT
After Visit Summary   3/29/2017    Mili Padilla    MRN: [id number]           Patient Information     Date Of Birth          1955        Visit Information        Provider Department      3/29/2017 2:00 PM Ramin Olivo, PhD [facility]        Today's Diagnoses     Major depressive disorder, recurrent episode, moderate (H)    -  1    Psychological factors affecting morbid obesity (H)           Follow-ups after your visit        Your next 10 appointments already scheduled     Apr 05, 2017  1:30 PM CDT   Follow Up with Neha Valadez RD, LD   [facility] ([facility])    [address]  [facility] [address]   [phone]            Dec 12, 2017 11:15 AM CST   (Arrive by 10:45 AM)   RETURN THROAT with Clint Still MD   [facility] ([facility])    [address] [place] [id number] [phone]           - This is a multidisciplinary care team visit with an ENT physician and may include a speech language pathologist. - It is important to know that if you are evaluated and/or treated by both a physician and a speech pathologist during your visit, your billing will reflect the input that you receive from both providers as separate professionals. Although most insurance plans do cover these services, we encourage you to contact your insurance company prior to your visit to determine whether there are any coverage limitations that might affect you financially. - Billing/procedure codes that are frequently associated with visits to our clinic include (but are not limited to) the ones listed below. Most patients will not need all of these items, but it may be useful to ask your insurance company's patient . 15477: Flexible fiberoptic laryngoscopy, 11394: Laryngoscopy; flexible or rigid fiberoptic, with  stroboscopy, 96767: Flexible endoscopic evaluation of swallowing, 83717: Evaluation of Voice and Resonance, 57870: Speech pathology treatment for voice, speech, communication, 34429: Speech pathology treatment for swallowing/oral function for feeding - If you have any concerns or questions, or if you would prefer not to have a speech pathologist involved in your visit, please contact our Clinic Coordinator at [phone], at least 24 hours prior to your appointment.              Who to contact     Please call your clinic at [phone] to:    Ask questions about your health    Make or cancel appointments    Discuss your medicines    Learn about your test results    Speak to your doctor   If you have compliments or concerns about an experience at your clinic, or if you wish to file a complaint, please contact [facility] Patient Relations at [phone] or email us at [email or web address]         Additional Information About Your Visit        Ticket ABCharSetred Information     [employer] gives you secure access to your electronic health record. If you see a primary care provider, you can also send messages to your care team and make appointments. If you have questions, please call your primary care clinic.  If you do not have a primary care provider, please call [phone] and they will assist you.      [employer] is an electronic gateway that provides easy, online access to your medical records. With [facility] [employer], you can request a clinic appointment, read your test results, renew a prescription or communicate with your care team.     To access your existing account, please contact your [facility] Clinic or call [phone] for assistance.        Care EveryWhere ID     This is your Care EveryWhere ID. This could be used by other organizations to access your [place] medical records  [id number]        Your Vitals Were     Last Period BMI (Body Mass Index)                 12/10/2010 45.62 kg/m2           Blood Pressure from Last 3 Encounters:   08/16/16 135/63   05/31/16 130/67   08/06/15 153/67    Weight from Last 3 Encounters:   03/29/17 132.1 kg (291 lb 4.8 oz)   02/27/17 131.5 kg (290 lb)   02/01/17 131.9 kg (290 lb 11.2 oz)              Today, you had the following     No orders found for display       Primary Care Provider Office Phone # Fax #    Jasmyn Márquez [phone]       [place] [facility] [address] [facility] [address]  [facility] [address]        Thank you!     Thank you for choosing [facility]  for your care. Our goal is always to provide you with excellent care. Hearing back from our patients is one way we can continue to improve our services. Please take a few minutes to complete the written survey that you may receive in the mail after your visit with us. Thank you!             Your Updated Medication List - Protect others around you: Learn how to safely use, store and throw away your medicines at www.disposemymeds.org.          This list is accurate as of: 3/29/17  2:58 PM.  Always use your most recent med list.                   Brand Name Dispense Instructions for use    ABILIFY 5 MG tablet   Generic drug:  ARIPiprazole      Take 1 tablet by mouth daily.       atorvastatin 20 MG tablet    LIPITOR     Take 20 mg by mouth daily.       B-COMPLEX/B-12 SL      Place 2,500 mcg under the tongue every other day       budesonide 0.25 MG/2ML neb solution    PULMICORT     Take 0.25 mg by nebulization 2 times daily       CALCITRATE/VITAMIN D PO      2 tabs w/meals.       * carBAMazepine 200 MG tablet    TEGretol     Take 300 mg by mouth every morning. 300mg = 1.5 tablets.       * carBAMazepine 200 MG tablet    TEGretol     Take 400 mg by mouth At Bedtime.       docusate sodium 100 MG tablet    COLACE     Take 100 mg by mouth 2 times daily       furosemide 20 MG tablet    LASIX         ketoconazole 2 % cream    NIZORAL     Apply topically  daily       LEXAPRO 10 MG tablet   Generic drug:  escitalopram      Take 20 mg by mouth 2 times daily       Melatonin 10 MG Tabs tablet      Take 10 mg by mouth At Bedtime       methylphenidate 10 MG tablet    RITALIN     Take 10 mg by mouth 2 times daily       metroNIDAZOLE 1 % gel    METROGEL         MUCINEX DM PO      1200mg daily       MULTIVITAMINS PO      Take 2 tablets by mouth every evening. WITH IRON       MYRBETRIQ PO      Take 100 mg by mouth daily       ranitidine 150 MG tablet    ZANTAC    180 tablet    TAKE 1 TABLET BY MOUTH TWICE DAILY       REMERON PO      Take 75 mg by mouth At Bedtime. Takes ( 5)    15mg tablets=75mg       tolterodine 4 MG 24 hr capsule    DETROL LA         TRAZODONE HCL PO      Take 500 mg by mouth At Bedtime       * VITAMIN D3 PO      Take 3,000 Units by mouth daily In a.m.       * VITAMIN D3 PO      Take 2,000 Units by mouth At Bedtime.       * Notice:  This list has 4 medication(s) that are the same as other medications prescribed for you. Read the directions carefully, and ask your doctor or other care provider to review them with you.

## 2017-03-29 NOTE — PROGRESS NOTES
Health Psychology                  Clinic    Department of Medicine  Sherrie Crowe, Ph.D., L.P. [phone]                          Clinics and Surgery Center  [facility] Vin Ward, Ph.D.,  L.P. [phone]                 [facility] Mail Code 741   Ramin Olivo, Ph.D., WALTER, L.P. [phone]     [address] [place] [address] Kahtie Galvan, Ph.D., L.P. [phone]  [place] [address]       Health Psychology Follow-Up Note     Ms. Padilla is a 61-year-old woman self-referred for psychological consultation because her therapist of the last 24 years retired.  She is seen for problem-solving and supportive therapy for depression and multiple health issues.     HISTORY OF PRESENTING CONCERN:  Ms. Padilla reports a lengthy history of depression.  She currently sees a psychiatrist, Ban Coleman at [facility], and is taking Abilify 7.5 mg, trazodone 500 mg, ripazepam 75 mg each day at bedtime, Tegretol 400 mg at bedtime and methylphenidate 10 mg b.i.d.  She discontineud ability and is now taking Rexulti 2 mg.  She has a history of hospitalizations including 3 at [facility] in the late 1990s, early 2000s when she had 2 courses of ECT lasting 7-10 sessions and approximately 3 other single session ECTs.  She stopped due to memory problems.  She kept with Dr. Coleman who she first met as an inpatient and has seen her for the past 18 years.  She had also been hospitalized psychiatrically at [facility] at age 23.  She previously worked with psychiatrist, Doug Sarabia for three years, stopping, in part, because she didn't feel he took her suicide attempt sufficiently seriously.  She reports her depression tends to vary.  Currently it is moderate, though it was more severe within the past year especially when she was having additional health problems.      MEDICAL HISTORY:  Ms.  Randy has a complex medical history.  She was diagnosed with MS in 1985.  Her psychiatrist at [facility] in [place], Dr. 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  She has used a scooter since 1992, using it more in the last 6 months than she had previously.  She also can use a walker.  She was diagnosed with fibromyalgia in 1997.   She is also seen at the [facility] for her eye care.  She began to see an eating disorder therapist weekly and has been somewhat erratically losing weight.    WEIGH Today is 291.2.    She is attending OA.    Wt Readings from Last 4 Encounters:   03/29/17 132.1 kg (291 lb 4.8 oz)   02/27/17 131.5 kg (290 lb)   02/01/17 131.9 kg (290 lb 11.2 oz)   01/11/17 133.4 kg (294 lb)     Past Medical History:   Diagnosis Date     Depression, major, in partial remission (H)      Diabetes mellitus (H)     pre-diabetic     Fibromyalgia syndrome      Gastro-oesophageal reflux disease      Hyperlipemia      Lymphedema      Multiple sclerosis (H)     tremors with MS, all four limbs and head     Multiple sclerosis, secondary progressive (H)      Sleep apnea      Vocal cord paralysis, unilateral complete         Past Surgical History:   Procedure Laterality Date     ENT SURGERY      throat 1969, vocal cord surgery with skin grafting     ESOPHAGOSCOPY, GASTROSCOPY, DUODENOSCOPY (EGD), COMBINED N/A 12/16/2014    Procedure: COMBINED ENDOSCOPIC ULTRASOUND, ESOPHAGOSCOPY, GASTROSCOPY, DUODENOSCOPY (EGD), FINE NEEDLE ASPIRATE/BIOPSY;  Surgeon: Yessica Santana MD;  Location:  GI     ESOPHAGOSCOPY, GASTROSCOPY, DUODENOSCOPY (EGD), COMBINED N/A 12/16/2014    Procedure: COMBINED ESOPHAGOSCOPY, GASTROSCOPY, DUODENOSCOPY (EGD), BIOPSY SINGLE OR MULTIPLE;  Surgeon: Yessica Santana MD;  Location:  GI     LAPAROSCOPIC APPENDECTOMY  5/30/2013    Procedure: LAPAROSCOPIC APPENDECTOMY;;  Surgeon: Salvador Morris MD;  Location:  OR     LAPAROSCOPIC  BIOPSY LIVER  2013    Procedure: LAPAROSCOPIC BIOPSY LIVER;;  Surgeon: Salvador Morris MD;  Location:  OR     LAPAROSCOPIC GASTRIC SLEEVE  2013    Procedure: LAPAROSCOPIC GASTRIC SLEEVE;  LAPAROSCOPIC SLEEVE GASTRECTOMY/ LAPARSCOPIC  APPENDECTOMY /LIVER BIOPSIES/LIVER CYST DRAINAGE;  Surgeon: Salvador Morris MD;  Location:  OR     ORTHOPEDIC SURGERY      R wrist      Current Outpatient Prescriptions   Medication     tolterodine (DETROL LA) 4 MG 24 hr capsule     furosemide (LASIX) 20 MG tablet     metroNIDAZOLE (METROGEL) 1 % gel     ranitidine (ZANTAC) 150 MG tablet     ketoconazole (NIZORAL) 2 % cream     Dextromethorphan-Guaifenesin (MUCINEX DM PO)     Melatonin 10 MG TABS     budesonide (PULMICORT) 0.25 MG/2ML nebulizer solution     Mirabegron (MYRBETRIQ PO)     docusate sodium 100 MG tablet     methylphenidate (RITALIN) 10 MG tablet     B Complex Vitamins (B-COMPLEX/B-12 SL)     carBAMazepine (TEGRETOL) 200 MG tablet     carBAMazepine (TEGRETOL) 200 MG tablet     Calcium Citrate-Vitamin D (CALCITRATE/VITAMIN D PO)     Cholecalciferol (VITAMIN D3 PO)     Cholecalciferol (VITAMIN D3 PO)     Mirtazapine (REMERON PO)     TRAZODONE HCL PO     Multiple Vitamin (MULTIVITAMINS PO)     atorvastatin (LIPITOR) 20 MG tablet     ARIPiprazole (ABILIFY) 5 MG tablet     escitalopram (LEXAPRO) 10 MG tablet     No current facility-administered medications for this visit.      Facility-Administered Medications Ordered in Other Visits   Medication     ondansetron (ZOFRAN) injection       SOCIAL HISTORY:  Ms. Padilla grew up in the [facility] and is the oldest of 5 children in her family of origin.  Her father was a dentist who she believes was bipolar.  He  of lung cancer at age 72.  Her mother  of sepsis at age 80.  She had been diagnosed with breast cancer when Ms. Padilla was 8.  She describes the marriage between her parents as misael.  She is 5 years older than her closest-aged sister and they are all  within 4 years of each other.   Her daughter  12/3 and her mother   She tends to feel more depressed in winter.      Ms. Padilla attended [employer] for a year and later went to [employer] at the [employer].  She felt that she could not continue her education to the point of graduation because she was working full time and raising her daughter.  Her daughter  in  at age 31 of liver failure secondary to an accidental Tylenol overdose.  She had been recovering from a hysterectomy.      Ms. 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  She had to retire at the time secondary to her MS.  She has never .  She has not dated,  is interested in dating, and states that if she were she would be interested in a relationship with a woman.  There is no history of  service or legal problems.  She expresses concern about her increasing social isolation.  She does have at least 1 friend, Jael, who she met at a therapy group in .  She is active in facilitating groups for people with MS both in [place] and [place].  She lives alone and currently gets help from a home health aide, as well as home health nurse.      SESSION: We discussed her weight loss, which was not successful during the interval. We discussed decreasing the goal to 1500 janice/day from 1700.  She is tracking calories, which have been as high as 2800 in a day.  She  is going less well due to increased snacking.  She feels demoralized, more depressed.  She read the [place] [facility] by Dr. Elias.  We discussed adherence to regimen at length. Thoughts are linear. We focused on her sense of more depression despite medication changes.  She is starting to use alight box but hasn't used it long enough to see results  She thinks her n current antidepressant is not helpful, so will discuss  with her psychiatrist.      We reinforced her for her intention for weight loss.   She participated fully and appeared to derive benefit.  Rapport was excellent.  Extended session due to complexity of case and length of interval.The treatment plan was reviewed with the patient at today s visit. The treatment plan remains current based on the patient s status and progress to date.  Time in:   2:00        Time out:   2:53     DIAGNOSES:   Major depression, recurrent, moderate (F33.1).   Behavioral factors affecting morbid obesity (E66.01).     PLAN:  Ms. Padilla will return to clinic on 5/3  @ 2  for problem-solving and supportive therapy consistent with treatment plan..   Tx plan 8/26/16;  Next review due  6/29        Ramin Olivo, PhD, A.B.P.P., L.P.   Director, Health Psychology

## 2017-04-05 ENCOUNTER — HOSPITAL ENCOUNTER (OUTPATIENT)
Dept: NUTRITION | Facility: CLINIC | Age: 62
Discharge: HOME | End: 2017-04-05
Attending: DIETITIAN, REGISTERED | Admitting: FAMILY MEDICINE
Payer: COMMERCIAL

## 2017-04-05 PROCEDURE — 97803 MED NUTRITION INDIV SUBSEQ: CPT | Performed by: DIETITIAN, REGISTERED

## 2017-04-05 NOTE — PROGRESS NOTES
"NUTRITION REASSESSMENT NOTE     REASON FOR ASSESSMENT  Mili Padilla referred by Dr. Márquez for MNT related to overweight.    Patient accompanied by self.      ASSESSMENT   Nutrition History:- Information obtained from patient.  Patient with long history of weight loss struggle.  Patient had sleeve gastrectomy 3 years ago.  Patient states she was able to maintain her 65 lb weight loss for 2 years. Then patient struggled with depression and regained her weight.  Patient has been tracking her food intake since surgery.  Patient states from last MD appointment that he wants her to limit calories to 1500 calories per day with no snacking.  Patient has not been able to reduce calories to 1500.  Patient feels she can reduce calories to 1700 calories per day.   Patient with history of multiple sclerosis which makes it difficult to prepare meals due to fatigue.  Patient continues to receive 5 [facility] meals per week.           24 Diet Recall:  Breakfast: 1/2 cup cottage cheese and 2 cantaloupe stoner   Lunch: 1/8 pizza , 1 marble brownie; lasagna, green beans, banana; 4 oz pulled chicken + 2 clementines   Dinner: 1 cheeseburger with no bun with dill pickle and 1 bag baked Lays potato chips    Snack: 6 cups popcorn, 1 serving dark chocolate mint M&M's, 3 Jacobsen nut roll bites, [place]'s dark chocolate bar, 2 Abdallah candies, 1 banana, 6 cups popcorn, 1 bag peanut M&M's (snacks all in one day)  Beverages: water, Diet Dr. Pepper   Dining out: limited     PHYSICAL FINDINGS  Observed:  No nutrition-related physical findings observed  Obtained from Chart/Interdisciplinary Team:  None noted    LABS  Labs reviewed    MEDICATIONS  Medications reviewed    ANTHROPOMETRICS   Height: 5'7\"  Weight: 289.6 lbs  BMI (kg/m2): 45.4 kg/m2  Weight Status:  Obesity Grade III BMI >40  %IBW: 214%  Weight History:   Wt Readings from Last 5 Encounters:   08/16/16 (!) 143.2 kg (315 lb 11.2 oz)   05/31/16 (!) 146.2 kg (322 lb 4.8 oz)   08/06/15 (!) " 138.8 kg (306 lb)   12/25/14 133.8 kg (294 lb 15.6 oz)   12/16/14 133.8 kg (295 lb)     ASSESSED NUTRITION NEEDS  Estimated Energy Needs: 9428-5442 kcals/day (11-14 Kcal/Kg) for weight loss  Estimated Protein Needs: 61-74 grams protein/day (1-1.2 g pro/Kg) for maintenance  Estimated Fluid Needs: 5006-6668 mL/day (25-30 mL/kg)    EVALUATION/PROGRESS TOWARDS GOALS   Previous Goals:   Limit calories < 2000 per day-improving  Add calories mid day to assess remaining intake for the day-improving  Track feelings when snacking-not met  Practice alternatives to emotional eating-not met     Previous Nutrition Diagnosis: Disordered eating pattern related to excessive snacking as evidenced by weight regain after sleeve gastrectomy and BMI of 46 kg/m2 -no change    Current Nutrition Diagnosis: Disordered eating pattern related to excessive snacking as evidenced by weight regain after sleeve gastrectomy and BMI of 46 kg/m2      INTERVENTIONS   Nutrition Prescription   Recommend avoiding liquids with meals to reduce volume of meals for intended weight loss; practice alternatives to snacking    IMPLEMENTATION   Meals and Snacks: 3 meals + snacks if hungry  Nutrition Education (Content):   a)  Discussed progress towards goals.  Reviewed patient's food logs which indicate 5088-4423 calories per day.  Encouraged reduction in snacking to reduce calories for weight loss as patient consumes more calories in snacks than she does at meal.  Congratulated patient on her continued effort to track food intake. Long discussion on realistic goals that patient can set.  Supported patient in her struggle with food.    Nutrition Education (Application):   a)  Discussed current eating plans and recommended ways to reduce snacking by trying alternatives to emotional eating.            b)  Patient verbalizes understanding of diet by stating will focus on reducing snacking.     Anticipate fair compliance     GOALS  Limit calories < 1700 per day   Add  calories midday to assess remaining intake for the day   Avoid liquids with snacks  Practice alternatives to emotional eating    FOLLOW UP/MONITORING   Progress towards goals will be monitored and evaluated per protocol and Practice Guidelines  Patient to follow up in 2 weeks  Patient has RD name and contact information    Time Spent with Patient  25 minutes    Filiberto Becerril, RD, LD  [facility] Outpatient Dietitian  [phone] (office phone)

## 2017-04-19 ENCOUNTER — HOSPITAL ENCOUNTER (OUTPATIENT)
Dept: NUTRITION | Facility: CLINIC | Age: 62
Discharge: HOME | End: 2017-04-19
Attending: DIETITIAN, REGISTERED | Admitting: DIETITIAN, REGISTERED
Payer: COMMERCIAL

## 2017-04-19 VITALS — BODY MASS INDEX: 45 KG/M2 | WEIGHT: 287.3 LBS

## 2017-04-19 PROCEDURE — 97803 MED NUTRITION INDIV SUBSEQ: CPT | Performed by: DIETITIAN, REGISTERED

## 2017-04-19 NOTE — PROGRESS NOTES
"NUTRITION REASSESSMENT NOTE     REASON FOR ASSESSMENT  Mili Padilla referred by Dr. Márquez for MNT related to overweight.    Patient accompanied by self.      ASSESSMENT   Nutrition History:- Information obtained from patient.  Patient with long history of weight loss struggle.  Patient had sleeve gastrectomy 3 years ago.  Patient states she was able to maintain her 65 lb weight loss for 2 years. Then patient struggled with depression and regained her weight.  Patient has been tracking her food intake since surgery.  Patient has been trying to reduce calorie intake to <1700 calories per day but has difficulty doing so.  Patient recognizes that if she went to bed earlier, she would reduce her calorie intake.  Patient with history of multiple sclerosis which makes it difficult to prepare meals due to fatigue.  Patient continues to receive 5 [facility] meals per week.  Patient states she often stress eats.           24 Diet Recall:  Breakfast: 1/2 cup cottage cheese and 2 cantaloupe stoner   Lunch: 1/8 pizza , 1 marble brownie; lasagna, green beans, banana; 4 oz pulled chicken + 2 clementines   Dinner: 2 chicken fingers with BBQ sauce and 3 salted nutroll bites; penne parma Amalia chicken     Snack: 6 cups popcorn, 1 serving dark chocolate mint M&M's, 3 Jacobsen nut roll bites, Marilou's dark chocolate bar, 2 Abdallah candies, 1 banana, 6 cups popcorn, 1 bag peanut M&M's (snacks all in one day)  Beverages: water, Diet Dr. Pepper   Dining out: limited     PHYSICAL FINDINGS  Observed:  No nutrition-related physical findings observed  Obtained from Chart/Interdisciplinary Team:  None noted    LABS  Labs reviewed    MEDICATIONS  Medications reviewed    ANTHROPOMETRICS   Height: 5'7\"  Weight: 287.3 lbs  BMI (kg/m2): 45 kg/m2  Weight Status:  Obesity Grade III BMI >40  %IBW: 212%  Weight History:   Wt Readings from Last 5 Encounters:   08/16/16 (!) 143.2 kg (315 lb 11.2 oz)   05/31/16 (!) 146.2 kg (322 lb 4.8 oz)   08/06/15 (!) " 138.8 kg (306 lb)   12/25/14 133.8 kg (294 lb 15.6 oz)   12/16/14 133.8 kg (295 lb)     ASSESSED NUTRITION NEEDS  Estimated Energy Needs: 3594-1694 kcals/day (11-14 Kcal/Kg) for weight loss  Estimated Protein Needs: 61-74 grams protein/day (1-1.2 g pro/Kg) for maintenance  Estimated Fluid Needs: 6378-2283 mL/day (25-30 mL/kg)    EVALUATION/PROGRESS TOWARDS GOALS   Previous Goals:   Limit calories < 1700 per day-improving  Add calories midday to assess remaining intake for the day-not met  Avoid liquids with snacks-improving  Practice alternatives to emotional eating-not met     Previous Nutrition Diagnosis: Disordered eating pattern related to excessive snacking as evidenced by weight regain after sleeve gastrectomy and BMI of 46 kg/m2 -no change    Current Nutrition Diagnosis: Disordered eating pattern related to excessive snacking as evidenced by weight regain after sleeve gastrectomy and BMI of 45 kg/m2      INTERVENTIONS   Nutrition Prescription   Recommend avoiding liquids with meals to reduce volume of meals for intended weight loss; practice alternatives to snacking    IMPLEMENTATION   Meals and Snacks: 3 meals + snacks if hungry  Nutrition Education (Content):   a)  Discussed progress towards goals.  Reviewed patient's food logs which indicate 1301-6957 calories per day.  Encouraged reduction in snacking to reduce calories for weight loss as patient consumes more calories in snacks than she does at meal.  Congratulated patient on her continued effort to track food intake.  Supported patient in her struggle with food.    Nutrition Education (Application):   a)  Discussed current eating plans and recommended ways to reduce snacking by trying going to bed earlier.        b)  Patient verbalizes understanding of diet by stating will focus on adding of calorie intake at middday to assess remaining calories for the day.       Anticipate fair compliance     GOALS  Add calories midday to assess remaining intake for  the day   Highlight snacks consumed during the day  Go to bed by 12:00 AM    FOLLOW UP/MONITORING   Progress towards goals will be monitored and evaluated per protocol and Practice Guidelines  Patient to follow up in 2 weeks  Patient has RD name and contact information    Time Spent with Patient  25 minutes    Filiberto Becerril, RD, LD  [facility] Outpatient Dietitian  [phone] (office phone)

## 2017-05-01 ENCOUNTER — HOSPITAL ENCOUNTER (OUTPATIENT)
Dept: NUTRITION | Facility: CLINIC | Age: 62
Discharge: HOME | End: 2017-05-01
Attending: FAMILY MEDICINE | Admitting: FAMILY MEDICINE
Payer: COMMERCIAL

## 2017-05-01 ENCOUNTER — HOSPITAL ENCOUNTER (OUTPATIENT)
Dept: PHYSICAL THERAPY | Facility: CLINIC | Age: 62
Setting detail: THERAPIES SERIES
End: 2017-05-01
Attending: FAMILY MEDICINE
Payer: COMMERCIAL

## 2017-05-01 VITALS — BODY MASS INDEX: 45.23 KG/M2 | WEIGHT: 288.8 LBS

## 2017-05-01 PROCEDURE — 40000840 ZZHC STATISTIC PT VESTIBULAR VISIT: Performed by: PHYSICAL THERAPIST

## 2017-05-01 PROCEDURE — 95992 CANALITH REPOSITIONING PROC: CPT | Mod: GP | Performed by: PHYSICAL THERAPIST

## 2017-05-01 PROCEDURE — 97803 MED NUTRITION INDIV SUBSEQ: CPT | Performed by: DIETITIAN, REGISTERED

## 2017-05-01 PROCEDURE — 97161 PT EVAL LOW COMPLEX 20 MIN: CPT | Mod: GP | Performed by: PHYSICAL THERAPIST

## 2017-05-01 NOTE — PROGRESS NOTES
"NUTRITION REASSESSMENT NOTE     REASON FOR ASSESSMENT  Mili Padilla referred by Dr. Márquez for MNT related to overweight.    Patient accompanied by self.      ASSESSMENT   Nutrition History:- Information obtained from patient.  Patient with long history of weight loss struggle.  Patient had sleeve gastrectomy 3 years ago.  Patient states she was able to maintain her 65 lb weight loss for 2 years. Then patient struggled with depression and regained her weight.  Patient has been tracking her food intake since surgery.  Patient has been trying to reduce calorie intake to <1700 calories per day but has difficulty doing so.  Patient recognizes that if she went to bed earlier, she would reduce her calorie intake.  Patient with history of multiple sclerosis which makes it difficult to prepare meals due to fatigue.  Patient continues to receive 5 [facility] meals per week.  Patient states her depression has increased which makes it more difficult to make healthy choices.    24 Diet Recall:  Breakfast: 1/2 cup cottage cheese and 2 cantaloupe stoner   Lunch: 1/8 pizza , 1 marble brownie; lasagna, green beans, banana; 4 oz pulled chicken + 2 clementines   Dinner: 2 chicken fingers with BBQ sauce and 3 salted nutroll bites; penne parma Amalia chicken     Snack: 6 cups popcorn, 1 serving dark chocolate mint M&M's, 3 Jacobsen nut roll bites, Marilou's dark chocolate bar, 2 Abdallah candies, 1 banana, 6 cups popcorn, 1 bag peanut M&M's (snacks all in one day); mini muffins; bars  Beverages: water, Diet Dr. Pepper   Dining out: limited     PHYSICAL FINDINGS  Observed:  No nutrition-related physical findings observed  Obtained from Chart/Interdisciplinary Team:  None noted    LABS  Labs reviewed    MEDICATIONS  Medications reviewed    ANTHROPOMETRICS   Height: 5'7\"  Weight: 288.8 lbs  BMI (kg/m2): 45.2 kg/m2  Weight Status:  Obesity Grade III BMI >40  %IBW: 214%  Weight History:   Wt Readings from Last 5 Encounters:   04/19/17 130.3 kg " (287 lb 4.8 oz)   08/16/16 (!) 143.2 kg (315 lb 11.2 oz)   05/31/16 (!) 146.2 kg (322 lb 4.8 oz)   08/06/15 (!) 138.8 kg (306 lb)   12/25/14 133.8 kg (294 lb 15.6 oz)     ASSESSED NUTRITION NEEDS  Estimated Energy Needs: 3575-3617 kcals/day (11-14 Kcal/Kg) for weight loss  Estimated Protein Needs: 61-74 grams protein/day (1-1.2 g pro/Kg) for maintenance  Estimated Fluid Needs: 8024-2297 mL/day (25-30 mL/kg)    EVALUATION/PROGRESS TOWARDS GOALS   Previous Goals:   Add calories midday to assess remaining intake for the day-improving  Highlight snacks consumed during the day-met  Go to bed by 12:00 AM-not met     Previous Nutrition Diagnosis: Disordered eating pattern related to excessive snacking as evidenced by weight regain after sleeve gastrectomy and BMI of 45 kg/m2 -no change    Current Nutrition Diagnosis: Disordered eating pattern related to excessive snacking as evidenced by weight regain after sleeve gastrectomy and BMI of 45.2 kg/m2      INTERVENTIONS   Nutrition Prescription   Recommend avoiding liquids with meals to reduce volume of meals for intended weight loss; practice alternatives to snacking    IMPLEMENTATION   Meals and Snacks: 3 meals + snacks if hungry  Nutrition Education (Content):   a)  Discussed progress towards goals.  Patient forgot food logs so unable to review.  Patient states her snack intake is greater than her meals.  Discussed snacking behaviors and ways to decrease intake by asking self if she is hungry.  Congratulated patient on her continued effort to track food intake.  Supported patient in her struggle with food.   Nutrition Education (Application):   a)  Discussed current eating plans and recommended ways to reduce intake by avoiding buying sweets at the grocery store when tired.  Also discussed eating all meals and snacks at table to reduce overall intake.       b)  Patient verbalizes understanding of diet by stating will not buy snack foods when she is tired.   Anticipate fair  compliance     GOALS  Highlight snacks consumed during the day on tracking log   Ask self if hungry   Avoid buying snacks when tired     FOLLOW UP/MONITORING   Progress towards goals will be monitored and evaluated per protocol and Practice Guidelines  Patient to follow up in 2 weeks  Patient has RD name and contact information    Time Spent with Patient  25 minutes    Filiberto Becerril, RD, LD  [facility] Outpatient Dietitian  [phone] (office phone)

## 2017-05-01 NOTE — PROGRESS NOTES
05/01/17 1100   Quick Adds   Quick Adds Vestibular Eval   General Information   Start of Care Date 05/01/17   Referring Physician Jasmyn Márquez MD    Orders Evaluate and Treat as Indicated   Additional Orders Vestibular Therapy    Order Date 02/08/17   Medical Diagnosis Vertigo    Onset of illness/injury or Date of Surgery 02/08/17   Surgical/Medical history reviewed Yes   Pertinent history of current problem (include personal factors and/or comorbidities that impact the POC) Patient has dizziness with turning head to left as well as laying in bed on left and looking up, lasts 1-2 minutes, room spinning sensation. Patient states she has experienced this before and it feels similar to last time. Patient having dizziness since last december and it has not getting better, states roughly one episode per day. Patient is not taking any medication or doing anything for the dizziness. PMH significant for major depression, MS (secondary progressive), morbid obesity. Patient was previously treated for L posterior canal BPPV in January 2012.    Pertinent Visual History  No changes in vision   Prior level of function comment Walks short distances with WW. Uses scooter for longer distances. Uses transportation service.    Improvement after PT Significant   Living environment Apartment/condo   Home/Community Accessibility Comments Lives alone   Assistive Devices Comments Use walker in home, uses scooter outside of home, with long distances   Patient/Family Goals Statement clear up dizziness    General Information Comments Patient states she is uninterested in working on balance or walking endurance at this time, would like to get rid of vertigo and focus on that only.    Fall Risk Screen   Fall screen completed by PT   Per patient - Fall 2 or more times in past year? No   Per patient - Fall with injury in past year? No   Timed Up and Go score (seconds) 33.56   Is patient a fall risk? Yes   Fall screen comments performed with 2     System Outcome Measures   Outcome Measures BPPV   Dizziness Handicap Inventory (score out of 100) A decrease in score by 17.18 or greater indicates a clinically significant change in symptoms. 22   Was BPPV resolved at end of session? No  (decreased in duration, however not completely resolved)   Cognitive Status Examination   Level of Consciousness alert   Range of Motion (ROM)   ROM Comment CROM WFL for vestibular testing.    Strength   Strength Comments not tested   Bed Mobility   Bed Mobility Comments Needed assist with bed mobility during vestibular testing.    Gait   Gait Comments ambulates with 2WW, slow gait speed, short step length . Can walk short distances wtihout WW   Balance Special Tests   Balance Special Tests Modified CTSIB Conditions   Balance Special Tests Modified CTSIB Conditions   Condition 1, seconds 30 Seconds   Condition 2, seconds 0 Seconds   Condition 4, seconds 0 Seconds   Condition 5, seconds 0 Seconds   Modified CTSIB Comments Did not feel comfortable trying conditions 2, 4, and 5. Tried to step on foam but was only there for a second and stepped off.    Sensory Examination   Sensory Perception Comments no recent change in sensation, reports numbness in both feet due to MS   Cervicogenic Screen   Neck ROM WNL with no pain    Oculomotor Exam   Smooth Pursuit Normal   Saccades Normal   VOR Normal   Rapid Head Thrust Normal   Infrared Goggle Exam or Frenzel Lense Exam   Vestibular Suppressant in Last 24 Hours? No   Exam completed with Infrared Goggles   Spontaneous Nystagmus Negative   Gaze Evoked Nystagmus Negative   Head Shake Horizontal Nystagmus Negative   Head Shake Horizontal Nystagmus comments Head tremor following head shake, due to MS    Pau-Hallpike (right) Negative   Pau-Hallpike (Left) Upbeating L torsional   [place]-Hallpike (left) comments lasting approx 30-45 seconds   BPPV Canal(s) L Posterior   BPPV Type Canalithasis   Planned Therapy Interventions   Planned Therapy  Interventions other (see comments)   Planned Therapy Interventions Comment Canalith repositioning technique    Clinical Impression   Criteria for Skilled Therapeutic Interventions Met yes, treatment indicated   PT Diagnosis BPPV    Influenced by the following impairments upbeating L torsional nystagmus, vertigo   Functional limitations due to impairments dizziness with turning over in bed, moving head to left and looking up   Clinical Presentation Stable/Uncomplicated   Clinical Presentation Rationale oculomotor exam, positional testing, mCTSIB    Clinical Decision Making (Complexity) Low complexity   Therapy Frequency 1 time/week   Predicted Duration of Therapy Intervention (days/wks) 30 days   Risk & Benefits of therapy have been explained Yes   Patient, Family & other staff in agreement with plan of care Yes   Goal 1   Goal Identifier BPPV   Goal Description Patient will report no symptoms of vertigo with head positions and will present with no nystagmus with positional testing to demonstrate safety and ease with getting out of bed or change in head positions.    Target Date 05/30/17   Total Evaluation Time   Total Evaluation Time (Minutes) 25

## 2017-05-15 ENCOUNTER — HOSPITAL ENCOUNTER (OUTPATIENT)
Dept: NUTRITION | Facility: CLINIC | Age: 62
Discharge: HOME | End: 2017-05-15
Attending: DIETITIAN, REGISTERED | Admitting: DIETITIAN, REGISTERED
Payer: COMMERCIAL

## 2017-05-15 VITALS — BODY MASS INDEX: 45.23 KG/M2 | WEIGHT: 288.8 LBS

## 2017-05-15 PROCEDURE — 97803 MED NUTRITION INDIV SUBSEQ: CPT | Performed by: DIETITIAN, REGISTERED

## 2017-05-15 NOTE — PROGRESS NOTES
"NUTRITION REASSESSMENT NOTE     REASON FOR ASSESSMENT  Mili Padilla referred by Dr. Márquez for MNT related to overweight.    Patient accompanied by self.      ASSESSMENT   Nutrition History:- Information obtained from patient.  Patient with long history of weight loss struggle.  Patient had sleeve gastrectomy 3 years ago.  Patient states she was able to maintain her 65 lb weight loss for 2 years. Then patient struggled with depression and regained her weight.  Patient has been tracking her food intake since surgery.  Patient has been trying to reduce calorie intake to <1700 calories per day but has difficulty doing so.  Patient recognizes that if she went to bed earlier, she would reduce her calorie intake.  Patient with history of multiple sclerosis which makes it difficult to prepare meals due to fatigue.  Patient continues to receive 5 [facility] meals per week.  Patient states her depression has increased which makes it more difficult to make healthy choices.    24 Diet Recall:  Breakfast: 1/2 cup cottage cheese and 2 cantaloupe stoner   Lunch: 1/8 pizza , 1 marble brownie; lasagna, green beans, banana; 4 oz pulled chicken + 2 clementines   Dinner: 2 chicken fingers with BBQ sauce and 3 salted nutroll bites; penne parma Amalia chicken     Snack: 6 cups popcorn, 1 serving dark chocolate mint M&M's, 3 Jacobsen nut roll bites, [place]'s dark chocolate bar, 2 Abdallah candies, 1 banana, 6 cups popcorn, 1 bag peanut M&M's (snacks all in one day); mini muffins; bars  Beverages: water, Diet Dr. Pepper   Dining out: limited     PHYSICAL FINDINGS  Observed:  No nutrition-related physical findings observed  Obtained from Chart/Interdisciplinary Team:  None noted    LABS  Labs reviewed    MEDICATIONS  Medications reviewed    ANTHROPOMETRICS   Height: 5'7\"  Weight: 288.8 lbs  BMI (kg/m2): 45.2 kg/m2  Weight Status:  Obesity Grade III BMI >40  %IBW: 214%  Weight History:   Wt Readings from Last 5 Encounters:   05/15/17 131 kg " (288 lb 12.8 oz)   05/01/17 131 kg (288 lb 12.8 oz)   04/19/17 130.3 kg (287 lb 4.8 oz)   08/16/16 (!) 143.2 kg (315 lb 11.2 oz)   05/31/16 (!) 146.2 kg (322 lb 4.8 oz)     ASSESSED NUTRITION NEEDS  Estimated Energy Needs: 8406-1137 kcals/day (11-14 Kcal/Kg) for weight loss  Estimated Protein Needs: 61-74 grams protein/day (1-1.2 g pro/Kg) for maintenance  Estimated Fluid Needs: 5828-0290 mL/day (25-30 mL/kg)    EVALUATION/PROGRESS TOWARDS GOALS   Previous Goals:   Highlight snacks consumed during the day on tracking log-met  Ask self if hungry-improving  Avoid buying snacks when tired-met     Previous Nutrition Diagnosis: Disordered eating pattern related to excessive snacking as evidenced by weight regain after sleeve gastrectomy and BMI of 45.2 kg/m2 -no change    Current Nutrition Diagnosis: Disordered eating pattern related to excessive snacking as evidenced by weight regain after sleeve gastrectomy and BMI of 45.2 kg/m2      INTERVENTIONS   Nutrition Prescription   Recommend avoiding liquids with meals to reduce volume of meals for intended weight loss; practice alternatives to snacking    IMPLEMENTATION   Meals and Snacks: 3 meals + snacks if hungry  Nutrition Education (Content):   a)  Discussed progress towards goals.  Reviewed food logs.  Discussed highlighted food items and caloric intake.  Patient states her snack intake is greater than her meals.  Discussed snacking behaviors and ways to decrease intake by asking self if she is hungry.  Congratulated patient on her continued effort to track food intake.  Supported patient in her struggle with food.   Nutrition Education (Application):   a)  Discussed current eating plans and recommended ways to reduce intake by tracking foods in the moment.  Also discussed eating all meals and snacks at table to reduce overall intake.       b)  Patient verbalizes understanding of diet by stating will practice alternatives to snacking while multi-tasking.  Anticipate  fair compliance     GOALS  Stop eating by 10:00 PM  Highlight snacks while consuming snack    Ask self if hungry   Practice alternatives to eating when on the phone or computer    FOLLOW UP/MONITORING   Progress towards goals will be monitored and evaluated per protocol and Practice Guidelines  Patient to follow up in 2 weeks  Patient has RD name and contact information    Time Spent with Patient  25 minutes    Filiberto Becerril, RD, LD  [facility] Outpatient Dietitian  [phone] (office phone)

## 2017-05-18 ENCOUNTER — HOSPITAL ENCOUNTER (OUTPATIENT)
Dept: PHYSICAL THERAPY | Facility: CLINIC | Age: 62
Setting detail: THERAPIES SERIES
End: 2017-05-18
Attending: FAMILY MEDICINE
Payer: COMMERCIAL

## 2017-05-18 PROCEDURE — 95992 CANALITH REPOSITIONING PROC: CPT | Mod: GP | Performed by: PHYSICAL THERAPIST

## 2017-05-18 PROCEDURE — 40000840 ZZHC STATISTIC PT VESTIBULAR VISIT: Performed by: PHYSICAL THERAPIST

## 2017-05-31 ENCOUNTER — HOSPITAL ENCOUNTER (OUTPATIENT)
Dept: NUTRITION | Facility: CLINIC | Age: 62
Discharge: HOME | End: 2017-05-31
Attending: DIETITIAN, REGISTERED | Admitting: DIETITIAN, REGISTERED
Payer: COMMERCIAL

## 2017-05-31 VITALS — BODY MASS INDEX: 45.3 KG/M2 | WEIGHT: 289.2 LBS

## 2017-05-31 PROCEDURE — 97803 MED NUTRITION INDIV SUBSEQ: CPT | Performed by: DIETITIAN, REGISTERED

## 2017-05-31 NOTE — PROGRESS NOTES
"NUTRITION REASSESSMENT NOTE     REASON FOR ASSESSMENT  Mili Padilla referred by Dr. Márquez for MNT related to overweight.    Patient accompanied by self.      ASSESSMENT   Nutrition History:- Information obtained from patient.  Patient with long history of weight loss struggle.  Patient had sleeve gastrectomy 3 years ago.  Patient states she was able to maintain her 65 lb weight loss for 2 years. Then patient struggled with depression and regained her weight.  Patient has been tracking her food intake since surgery.  Patient has been trying to reduce calorie intake to <1700 calories per day but has difficulty doing so.  Patient recognizes that if she went to bed earlier, she would reduce her calorie intake.  Patient with history of multiple sclerosis which makes it difficult to prepare meals due to fatigue.  Patient continues to receive 5 [facility] meals per week.  Patient states her depression has increased which makes it more difficult to make healthy choices. Patient states her therapist suggested taking probiotics and to eat meals every 2 hours.    24 Diet Recall:  Breakfast: 1/2 cup cottage cheese and 2 cantaloupe stoner   Lunch: 1/8 pizza , 1 marble brownie; lasagna, green beans, banana; 4 oz pulled chicken + 2 clementines   Dinner: 2 chicken fingers with BBQ sauce and 3 salted nutroll bites; penne parma Amalia chicken     Snack: 6 cups popcorn, 1 serving dark chocolate mint M&M's, 3 Jacobsen nut roll bites, [place]'s dark chocolate bar, 2 Abdallah candies, 1 banana, 6 cups popcorn, 1 bag peanut M&M's (snacks all in one day); mini muffins; bars  Beverages: water, Diet Dr. Pepper   Dining out: limited     PHYSICAL FINDINGS  Observed:  No nutrition-related physical findings observed  Obtained from Chart/Interdisciplinary Team:  None noted    LABS  Labs reviewed    MEDICATIONS  Medications reviewed    ANTHROPOMETRICS   Height: 5'7\"  Weight: 289.2 lbs  BMI (kg/m2): 45.2 kg/m2  Weight Status:  Obesity Grade III BMI " >40  %IBW: 214%  Weight History:   Wt Readings from Last 5 Encounters:   05/31/17 131.2 kg (289 lb 3.2 oz)   05/15/17 131 kg (288 lb 12.8 oz)   05/01/17 131 kg (288 lb 12.8 oz)   04/19/17 130.3 kg (287 lb 4.8 oz)   08/16/16 (!) 143.2 kg (315 lb 11.2 oz)     ASSESSED NUTRITION NEEDS  Estimated Energy Needs: 0768-4265 kcals/day (11-14 Kcal/Kg) for weight loss  Estimated Protein Needs: 61-74 grams protein/day (1-1.2 g pro/Kg) for maintenance  Estimated Fluid Needs: 0466-1245 mL/day (25-30 mL/kg)    EVALUATION/PROGRESS TOWARDS GOALS   Previous Goals:   Stop eating by 10:00 PM-improving  Highlight snacks while consuming snack-met  Ask self if hungry-not met  Practice alternatives to eating when on the phone or computer-not met     Previous Nutrition Diagnosis: Disordered eating pattern related to excessive snacking as evidenced by weight regain after sleeve gastrectomy and BMI of 45.2 kg/m2 -no change    Current Nutrition Diagnosis: Disordered eating pattern related to excessive snacking as evidenced by weight regain after sleeve gastrectomy and BMI of 45.2 kg/m2      INTERVENTIONS   Nutrition Prescription   Recommend avoiding liquids with meals to reduce volume of meals for intended weight loss; practice alternatives to snacking    IMPLEMENTATION   Meals and Snacks: 3 meals + snacks if hungry  Nutrition Education (Content):   a)  Discussed progress towards goals.  Reviewed food logs.  Discussed highlighted food items and caloric intake.  Encouraged patient to eat 3 food groups per meal and focus on nutrient density of food.  Reviewed sleeve gastrectomy diet guidelines with 3 meals per day and liquids into between meals.  Also suggest reducing sugar to reduce inflammation in body.  Congratulated patient on her continued effort to track food intake.  Supported patient in her struggle with food.   Nutrition Education (Application):   a)  Discussed current eating plans and recommended reduce calorie intake by tracking foods  in the moment.        b)  Patient verbalizes understanding of diet by stating will eat 3 food groups per meal.    Anticipate fair compliance     GOALS  Eat 3 food groups per meal  When snacking, choose nutrient dense food options    FOLLOW UP/MONITORING   Progress towards goals will be monitored and evaluated per protocol and Practice Guidelines  Patient to follow up in 2 weeks  Patient has RD name and contact information    Time Spent with Patient  25 minutes    Filiberto Becerril, RD, LD  [facility] Outpatient Dietitian  [phone] (office phone)

## 2017-06-02 ENCOUNTER — OFFICE VISIT (OUTPATIENT)
Dept: PSYCHOLOGY | Facility: CLINIC | Age: 62
End: 2017-06-02

## 2017-06-02 VITALS — BODY MASS INDEX: 45.39 KG/M2 | WEIGHT: 289.8 LBS

## 2017-06-02 DIAGNOSIS — E66.01 PSYCHOLOGICAL FACTORS AFFECTING MORBID OBESITY (H): ICD-10-CM

## 2017-06-02 DIAGNOSIS — F54 PSYCHOLOGICAL FACTORS AFFECTING MORBID OBESITY (H): ICD-10-CM

## 2017-06-02 DIAGNOSIS — F33.1 MAJOR DEPRESSIVE DISORDER, RECURRENT EPISODE, MODERATE (H): Primary | ICD-10-CM

## 2017-06-02 NOTE — PROGRESS NOTES
Health Psychology                  Clinic    Department of Medicine  Sherrie Crowe, Ph.D., L.P. [phone]                          Clinics and Surgery Center  [facility] Vin Ward, Ph.D.,  L.P. [phone]                 3rd [place] Mail Code 741   Ramin Olivo, Ph.D., WALTER, L.P. [phone]     [address] [place] [address] Kathie Galvan, Ph.D., L.P. [phone]  [place] [address]       Health Psychology Follow-Up Note     Ms. Padilla is a 61-year-old woman self-referred for psychological consultation because her therapist of the last 24 years retired.  She is seen for problem-solving and supportive therapy for depression and multiple health issues.     HISTORY OF PRESENTING CONCERN:  Ms. Padilla reports a lengthy history of depression.  She currently sees a psychiatrist, Ban Coleman at [facility], and is taking Abilify 7.5 mg, trazodone 500 mg, ripazepam 75 mg each day at bedtime, Tegretol 400 mg at bedtime and methylphenidate 10 mg b.i.d.  She discontineud ability and is now taking Rexulti 2 mg.  She has a history of hospitalizations including 3 at [facility] in the late 1990s, early 2000s when she had 2 courses of ECT lasting 7-10 sessions and approximately 3 other single session ECTs.  She stopped due to memory problems.  She kept with Dr. Coleman who she first met as an inpatient and has seen her for the past 18 years.  She had also been hospitalized psychiatrically at [facility] at age 23.  She previously worked with psychiatrist, Doug Sarabia for three years, stopping, in part, because she didn't feel he took her suicide attempt sufficiently seriously.  She reports her depression tends to vary.  Currently it is moderate, though it was more severe within the past year especially when she was having additional health problems.      MEDICAL HISTORY:  Ms.  Randy has a complex medical history.  She was diagnosed with MS in 1985.  Her psychiatrist at [facility] in [place], Dr. 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  She has used a scooter since 1992, using it more in the last 6 months than she had previously.  She also can use a walker.  She was diagnosed with fibromyalgia in 1997.   She is also seen at the [facility] for her eye care.  She began to see an eating disorder therapist weekly and has been somewhat erratically losing weight.    WEIGH Today is 291.2.    She is attending OA.    Wt Readings from Last 4 Encounters:   06/02/17 131.5 kg (289 lb 12.8 oz)   05/31/17 131.2 kg (289 lb 3.2 oz)   05/15/17 131 kg (288 lb 12.8 oz)   05/01/17 131 kg (288 lb 12.8 oz)     Past Medical History:   Diagnosis Date     Depression, major, in partial remission (H)      Diabetes mellitus (H)     pre-diabetic     Fibromyalgia syndrome      Gastro-oesophageal reflux disease      Hyperlipemia      Lymphedema      Multiple sclerosis (H)     tremors with MS, all four limbs and head     Multiple sclerosis, secondary progressive (H)      Sleep apnea      Vocal cord paralysis, unilateral complete         Past Surgical History:   Procedure Laterality Date     ENT SURGERY      throat 1969, vocal cord surgery with skin grafting     ESOPHAGOSCOPY, GASTROSCOPY, DUODENOSCOPY (EGD), COMBINED N/A 12/16/2014    Procedure: COMBINED ENDOSCOPIC ULTRASOUND, ESOPHAGOSCOPY, GASTROSCOPY, DUODENOSCOPY (EGD), FINE NEEDLE ASPIRATE/BIOPSY;  Surgeon: Yessica Santana MD;  Location:  GI     ESOPHAGOSCOPY, GASTROSCOPY, DUODENOSCOPY (EGD), COMBINED N/A 12/16/2014    Procedure: COMBINED ESOPHAGOSCOPY, GASTROSCOPY, DUODENOSCOPY (EGD), BIOPSY SINGLE OR MULTIPLE;  Surgeon: Yessica Santana MD;  Location:  GI     LAPAROSCOPIC APPENDECTOMY  5/30/2013    Procedure: LAPAROSCOPIC APPENDECTOMY;;  Surgeon: Salvador Morris MD;  Location:  OR      LAPAROSCOPIC BIOPSY LIVER  2013    Procedure: LAPAROSCOPIC BIOPSY LIVER;;  Surgeon: Salvador Morris MD;  Location:  OR     LAPAROSCOPIC GASTRIC SLEEVE  2013    Procedure: LAPAROSCOPIC GASTRIC SLEEVE;  LAPAROSCOPIC SLEEVE GASTRECTOMY/ LAPARSCOPIC  APPENDECTOMY /LIVER BIOPSIES/LIVER CYST DRAINAGE;  Surgeon: Salvador Morris MD;  Location:  OR     ORTHOPEDIC SURGERY      R wrist      Current Outpatient Prescriptions   Medication     ranitidine (ZANTAC) 150 MG tablet     tolterodine (DETROL LA) 4 MG 24 hr capsule     furosemide (LASIX) 20 MG tablet     metroNIDAZOLE (METROGEL) 1 % gel     ketoconazole (NIZORAL) 2 % cream     Dextromethorphan-Guaifenesin (MUCINEX DM PO)     Melatonin 10 MG TABS     budesonide (PULMICORT) 0.25 MG/2ML nebulizer solution     Mirabegron (MYRBETRIQ PO)     docusate sodium 100 MG tablet     methylphenidate (RITALIN) 10 MG tablet     B Complex Vitamins (B-COMPLEX/B-12 SL)     carBAMazepine (TEGRETOL) 200 MG tablet     carBAMazepine (TEGRETOL) 200 MG tablet     Calcium Citrate-Vitamin D (CALCITRATE/VITAMIN D PO)     Cholecalciferol (VITAMIN D3 PO)     Cholecalciferol (VITAMIN D3 PO)     Mirtazapine (REMERON PO)     TRAZODONE HCL PO     Multiple Vitamin (MULTIVITAMINS PO)     atorvastatin (LIPITOR) 20 MG tablet     ARIPiprazole (ABILIFY) 5 MG tablet     escitalopram (LEXAPRO) 10 MG tablet     No current facility-administered medications for this visit.      Facility-Administered Medications Ordered in Other Visits   Medication     ondansetron (ZOFRAN) injection       SOCIAL HISTORY:  Ms. Padilla grew up in the [facility] and is the oldest of 5 children in her family of origin.  Her father was a dentist who she believes was bipolar.  He  of lung cancer at age 72.  Her mother  of sepsis at age 80.  She had been diagnosed with breast cancer when Ms. Padilla was 8.  She describes the marriage between her parents as misael.  She is 5 years older than her closest-aged sister and  they are all within 4 years of each other.   Her daughter  12/3 and her mother   She tends to feel more depressed in winter.      Ms. Padilla attended [employer] for a year and later went to [employer] at the [employer].  She felt that she could not continue her education to the point of graduation because she was working full time and raising her daughter.  Her daughter  in  at age 31 of liver failure secondary to an accidental Tylenol overdose.  She had been recovering from a hysterectomy.      Ms. 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  She had to retire at the time secondary to her MS.  She has never .  She has not dated,  is interested in dating, and states that if she were she would be interested in a relationship with a woman.  There is no history of  service or legal problems.  She expresses concern about her increasing social isolation.  She does have at least 1 friend, Jael, who she met at a therapy group in .  She is active in facilitating groups for people with MS both in [place] and [place].  She lives alone and currently gets help from a home health aide, as well as home health nurse.      SESSION: We discussed her weight loss, which was not successful during the interval. We discussed decreasing the goal to 1500 janice/day from 1700 or 2000.  She states she is more depressed the last 2 months, tending to get more depressed in summer and winter.  As we discuss it, it seems that the heat may be more of a problem due to the MS>  We discussed ways of cooling herself but also the importance of getting out a bit.  She is tracking calories, which have been as high as 2800 in a day.  She  is doing less well due to increased snacking.  She feels demoralized, more depressed.  We reinforced her for her intention for  weight loss.   She participated fully and appeared to derive benefit.  Rapport was excellent.  Extended session due to complexity of case and length of interval.The treatment plan was reviewed with the patient at today s visit. The treatment plan remains current based on the patient s status and progress to date.  Time in:   3:03       Time out:   3:56     DIAGNOSES:   Major depression, recurrent, moderate (F33.1).   Behavioral factors affecting morbid obesity (E66.01).     PLAN:  Ms. Padilla will return to clinic on 7/10  @ 3  for problem-solving and supportive therapy consistent with treatment plan..   Tx plan 8/26/16;  Next review due  6/29 (next session)        Ramin Olivo, PhD, A.B.P.P., L.P.   Director, Health Psychology

## 2017-06-05 ENCOUNTER — OFFICE VISIT (OUTPATIENT)
Dept: SURGERY | Facility: CLINIC | Age: 62
End: 2017-06-05
Payer: COMMERCIAL

## 2017-06-05 ENCOUNTER — HOSPITAL ENCOUNTER (OUTPATIENT)
Dept: LAB | Facility: CLINIC | Age: 62
Discharge: HOME | End: 2017-06-05
Attending: PHYSICIAN ASSISTANT | Admitting: PHYSICIAN ASSISTANT
Payer: COMMERCIAL

## 2017-06-05 VITALS
BODY MASS INDEX: 45.31 KG/M2 | WEIGHT: 289.3 LBS | SYSTOLIC BLOOD PRESSURE: 110 MMHG | HEART RATE: 67 BPM | DIASTOLIC BLOOD PRESSURE: 62 MMHG

## 2017-06-05 DIAGNOSIS — R79.89 ELEVATED VITAMIN B12 LEVEL: ICD-10-CM

## 2017-06-05 DIAGNOSIS — Z98.84 S/P BARIATRIC SURGERY: ICD-10-CM

## 2017-06-05 DIAGNOSIS — M85.80 OSTEOPENIA DETERMINED BY X-RAY: ICD-10-CM

## 2017-06-05 DIAGNOSIS — Z98.84 BARIATRIC SURGERY STATUS: Primary | ICD-10-CM

## 2017-06-05 DIAGNOSIS — K21.9 GERD WITHOUT ESOPHAGITIS: ICD-10-CM

## 2017-06-05 DIAGNOSIS — K91.2 MALNUTRITION FOLLOWING GASTROINTESTINAL SURGERY: ICD-10-CM

## 2017-06-05 DIAGNOSIS — R63.2 BINGE EATING: ICD-10-CM

## 2017-06-05 DIAGNOSIS — E66.01 MORBID OBESITY WITH BMI OF 45.0-49.9, ADULT (H): ICD-10-CM

## 2017-06-05 DIAGNOSIS — K91.2 POSTSURGICAL NONABSORPTION: ICD-10-CM

## 2017-06-05 DIAGNOSIS — Z98.84 BARIATRIC SURGERY STATUS: ICD-10-CM

## 2017-06-05 DIAGNOSIS — R79.89 ELEVATED VITAMIN B12 LEVEL: Primary | ICD-10-CM

## 2017-06-05 LAB
ERYTHROCYTE [DISTWIDTH] IN BLOOD BY AUTOMATED COUNT: 12.7 % (ref 10–15)
FERRITIN SERPL-MCNC: 172 NG/ML (ref 8–252)
HCT VFR BLD AUTO: 39.4 % (ref 35–47)
HGB BLD-MCNC: 13.2 G/DL (ref 11.7–15.7)
IRON SATN MFR SERPL: 16 % (ref 15–46)
IRON SERPL-MCNC: 36 UG/DL (ref 35–180)
MCH RBC QN AUTO: 32.1 PG (ref 26.5–33)
MCHC RBC AUTO-ENTMCNC: 33.5 G/DL (ref 31.5–36.5)
MCV RBC AUTO: 96 FL (ref 78–100)
PLATELET # BLD AUTO: 131 10E9/L (ref 150–450)
PTH-INTACT SERPL-MCNC: 34 PG/ML (ref 12–72)
RBC # BLD AUTO: 4.11 10E12/L (ref 3.8–5.2)
TIBC SERPL-MCNC: 224 UG/DL (ref 240–430)
VIT B12 SERPL-MCNC: 3022 PG/ML (ref 193–986)
WBC # BLD AUTO: 9.7 10E9/L (ref 4–11)

## 2017-06-05 PROCEDURE — 85027 COMPLETE CBC AUTOMATED: CPT | Performed by: PHYSICIAN ASSISTANT

## 2017-06-05 PROCEDURE — 83540 ASSAY OF IRON: CPT | Performed by: PHYSICIAN ASSISTANT

## 2017-06-05 PROCEDURE — 82306 VITAMIN D 25 HYDROXY: CPT | Performed by: PHYSICIAN ASSISTANT

## 2017-06-05 PROCEDURE — 97803 MED NUTRITION INDIV SUBSEQ: CPT | Performed by: DIETITIAN, REGISTERED

## 2017-06-05 PROCEDURE — 83970 ASSAY OF PARATHORMONE: CPT | Performed by: PHYSICIAN ASSISTANT

## 2017-06-05 PROCEDURE — 36415 COLL VENOUS BLD VENIPUNCTURE: CPT | Performed by: PHYSICIAN ASSISTANT

## 2017-06-05 PROCEDURE — 99214 OFFICE O/P EST MOD 30 MIN: CPT | Performed by: PHYSICIAN ASSISTANT

## 2017-06-05 PROCEDURE — 82607 VITAMIN B-12: CPT | Performed by: PHYSICIAN ASSISTANT

## 2017-06-05 PROCEDURE — 82728 ASSAY OF FERRITIN: CPT | Performed by: PHYSICIAN ASSISTANT

## 2017-06-05 PROCEDURE — 83550 IRON BINDING TEST: CPT | Performed by: PHYSICIAN ASSISTANT

## 2017-06-05 RX ORDER — CYANOCOBALAMIN (VITAMIN B-12) 2500 MCG
2500 TABLET, SUBLINGUAL SUBLINGUAL EVERY OTHER DAY
COMMUNITY
End: 2019-01-08

## 2017-06-05 RX ORDER — TROSPIUM CHLORIDE 20 MG/1
20 TABLET, FILM COATED ORAL
COMMUNITY
End: 2020-02-04

## 2017-06-05 NOTE — MR AVS SNAPSHOT
MRN:[id number]                      After Visit Summary   6/5/2017    Mili Padilla    MRN: [id number]           Visit Information        Provider Department      6/5/2017 3:30 PM 2, Sh Rahel Guan RD [facility]      Your next 10 appointments already scheduled     Jun 07, 2017  2:00 PM CDT   MA SCREENING BILATERAL W/ SHIRLEY with SHBCMA2   [facility] ([facility])    [address], [address]  [place] [address]   [phone]           Do not use any powder, lotion or deodorant under your arms or on your breast. If you do, we will ask you to remove it before your exam.  Wear comfortable, two-piece clothing.  If you have any allergies, tell your care team.  Bring any previous mammograms from other facilities or have them mailed to the breast center.            Jun 14, 2017  1:30 PM CDT   Follow Up with Neha Valadez RD, LD   [facility] ([facility])    [address], [address]  [place] [address] [phone]            Jul 10, 2017  3:00 PM CDT   (Arrive by 2:45 PM)   Return Visit with Ramin Olivo, PhD [facility] ([facility] [place])    [address]  3rd Floor  [place] [id number] [phone]            Jul 17, 2017   Procedure with Wong Beaulieu MD   [facility] Endoscopy ([facility])    [address] [address]   [phone]           [facility] is located at [address] [place]            Dec 12, 2017 11:15 AM CST   (Arrive by 10:45 AM)   RETURN THROAT with Clint Still MD   [facility] ([facility] [place])    [address]  4th Floor  [place] [id number] [phone]           - This is a multidisciplinary care team visit  with an ENT physician and may include a speech language pathologist. - It is important to know that if you are evaluated and/or treated by both a physician and a speech pathologist during your visit, your billing will reflect the input that you receive from both providers as separate professionals. Although most insurance plans do cover these services, we encourage you to contact your insurance company prior to your visit to determine whether there are any coverage limitations that might affect you financially. - Billing/procedure codes that are frequently associated with visits to our clinic include (but are not limited to) the ones listed below. Most patients will not need all of these items, but it may be useful to ask your insurance company's patient . 91478: Flexible fiberoptic laryngoscopy, 07981: Laryngoscopy; flexible or rigid fiberoptic, with stroboscopy, 56233: Flexible endoscopic evaluation of swallowing, 89831: Evaluation of Voice and Resonance, 56615: Speech pathology treatment for voice, speech, communication, 16171: Speech pathology treatment for swallowing/oral function for feeding - If you have any concerns or questions, or if you would prefer not to have a speech pathologist involved in your visit, please contact our Clinic Coordinator at [phone], at least 24 hours prior to your appointment.              [employer] Information     [employer] gives you secure access to your electronic health record. If you see a primary care provider, you can also send messages to your care team and make appointments. If you have questions, please call your primary care clinic.  If you do not have a primary care provider, please call [phone] and they will assist you.        Care EveryWhere ID     This is your Care EveryWhere ID. This could be used by other organizations to access your [place] medical records  [id number]

## 2017-06-05 NOTE — PROGRESS NOTES
2017    RE: JEANNIE VILLARREAL  MR#: [id number]  : 1955    HISTORY OF PRESENT ILLNESS: JEANNIE VILLARREAL returns today for her follow-up appointment status post Laparoscopic Sleeve Gastrectomy surgery. She is down 14.7 lbs since starting our program.  She has been working very hard the past year.  She is 26.4 lbs down since her last visit a year ago.      She is seeing Venita Rodriguez, therapist, at [facility] and has been since Oct 2017. See is also seeing Neha Valadez RD regularly in the outpatient setting.  She continue to snack and can sometimes eat 3000 calories a day but her binging has improved.     She is doing the following exercise(s): none. Not ready commit to exercise at this time. (has done water aerobics for MS pts in the past.         Patient is taking the following bariatric postoperative vitamins:  2 Complete multivitamins with minerals (at different times than calcium)  5000 Int Units of Vitamin D daily   8068-6633 mg of Calcium daily in divided doses  2500 mcg of Vitamin B12 every other day    OBESITY RELATED CONDITIONS:    High Cholesterol: improved  GERD: present  OVIDIO: improved  Knee Pain: present  Back Pain: present  Incontinence: worsened  LE Edema: improved She is now wearing compression stockings daily. Lymphedema is better than last year.  Ulcers has been healed since lymphedema treatment.   Depression: worsened, still struggling with her mood. Basically winter and summer are her two bad times of the year.  Winter is dark and Summer is hot and she cant be outside without relapses of MS.  No suicidal ideation, intent, plan.  Has psychologist and psychiatrist.      SOCIAL HISTORY:  She does not smoke. She does not drink alcohol. Still involved in OA.  She feels safe in current environment.    REVIEW OF SYSTEMS:  GI:  Nausea-never  Vomiting-never  Diarrhea-seldom  Constipation-none, has BM daily, stool softener is working. Dysphagia-occasionally  Abdominal  Pains-never  Heartburn-rare  Skin:  Intertriginous Irritation-seldom    Psychiatric:  Depression frequently    Musc: Was dx with osteopenia three years ago following dexa scan.  Previously had osteoporosis and was treated with bisphosphonate's.    PHYSICAL EXAMINATION:   /69  Pulse 67  Wt 289 lb 4.8 oz (131.2 kg)  LMP 12/10/2010  BMI 45.31 kg/m2   /62 manual 1/2 hour later.    GENERAL: Alert and oriented x3. NAD  HEART: No murmurs, rubs or gallops, Regular rate and rhythm  LUNGS: Lung sounds clear to auscultation bilaterally  ABDOMEN: No hernias, Incisions well healed, soft and nontender  EXTREMITIES: Bilateral lower leg edema improved since last visit  Wearing compression socks today.   SKIN: no rash  ASSESSMENT:   4 years s/p Laparoscopic Sleeve Gastrectomy  Post surgical malabsorption  Lymphedema- improved  Venous Stasis- improved  Osteopenia  Binge Eating  Elevated vitamin B12  GERD    PLAN:   Ordered CBC, vitamin B12, vitamin D, PTH, ferritin, TIBC, and iron labs.  Ordered Dexa Scan.   Refilled ranitidine for 1 year.  Discussed a trial off of it to see if she still needs this.   Follow food plan per dietitian recommendations.  Continue taking recommended post-op vitamins.  Continue seeing therapist at [facility] and Neha Valadez outpatient dietician regularly.  Activity goal: Walk to get mail everyday instead of using scooter.   Return to clinic in 1 year.

## 2017-06-05 NOTE — PROGRESS NOTES
"POST-OPERATIVE NUTRITION APPOINTMENT  DATE OF VISIT: 2017  Name: JEANNIE VILLARREAL  : 1955  Gender: Female  MRN: [id number]  Age: 61    ASSESSMENT:  REASON FOR VISIT:  JEANNIE VILLARREAL is a 61 year old Female presents today for 4 years PO nutrition follow-up appointment.  DIAGNOSIS:  Status post Laparoscopic Sleeve Gastrectomy surgery.  Obesity Class III  ANTHROPOMETRICS:  Height: 67 inches  Weight: 289 lbs  BMI: 45.3 kg/m2  VITAMINS AND MINERALS:  2 flinstones chewable complete MVI,bed time  500 mg Calcium citrate+ vitamin D, 3 times per day with meals  5000 international units vitamin D  2500 mcg Vitamin B-12 oral every other day  No iron needed per clinic guidelines  NUTRITION HISTORY:  Breakfast: 1/2 cup 1% cottage cheese, cantaloupe (within 1 hour of waking)   Lunch: sliced turkey and miriam; boneless chicken (fried, from [place]), banana   Supper: Lean Cuisine [place]; Open Arms meals; 4 oz. pulled pork from [employer]; [employer]'s pizza and brownie  Snacks: mid morning: popcorn (6 cups) + 2 x chocolate cookies; 1/2 bag M&M's; mini box of sugary cereal + 1/2 cup half and half, mini pretzels; sweets/chocolate/ice cream (grazes all throughout the day)  Beverages:water, 32 oz diet decaf pop, occasional fruit juice (dilutes with water, 1x/week max), low fat chocolate milk   Consuming liquid calories: Fruit juice  Protein intake: 60-90 grams/day  Tolerate regular texture food: Yes  Any foods not tolerated details: none  Portion size: 1 cup or more  Take 30 minutes to consume each meal: No  Eat protein foods first: No  Fluids and meals separate by at least 30 minutes: No  Chew foods 20 plus times: No  Tolerating diet: bariatric regular diet  Drinking high protein supplements/shakes: No  Consuming meals per day: 3  Consuming snacks per day: 3  Comment: Patient states she has josette struggling with worse depression over the past 6 months. She also has had more stress which has triggered a daily \"grazing pattern\" and " not eating regular meals. Despite her irratic eating pattern pt admits she diligently keep track of her calories (using Calorie Marty) daily and is eating ~ 8559-4682 kcal /day.   6/6/17 Patient has been working with ED therapist and [facility] OP RD on her eating disorder. Patient continues to track intake daily. Patient consumes >calories from snacks than she does meals. Patient receives Open Arms meals due to MS which makes making meals difficult. Patient states she does not feel she was ready to have surgery when she did as she felt she was too focused on her food addiction at that time.     PHYSICAL ACTIVITY:  None  DIAGNOSIS:  Previous Nutrition Diagnosis: Altered gastrointestinal function related to alteration in gastrointestinal structure as evidenced by history of Laparoscopic Sleeve Gastrectomy surgery.-no change  Previous goals:  Follow up with psychology re: disordered eating/potential eating disorder (info provided and discussed, pt states she understands and feels this is an appropriate plan)-met  Plan ahead for 3 meals/day-not met  Drink 48-64 oz./day-not met  Avoid liquids 30 min before and after meals-not met  Eliminate carbonation-not met   Current Nutrition Diagnosis: Altered gastrointestinal function related to alteration in gastrointestinal structure as evidenced by history of Laparoscopic Sleeve Gastrectomy  INTERVENTION:  Nutrition Prescription: Eat 3 meals a day at regular intervals. Consume 60-90 grams of protein daily. Follow post-surgical vitamins and minerals protocol.  Goals:  Reduce carbonation with goal of eliminating  Decrease chocolate intake to 1/4 of current consumption   Implementation: Discussed progress toward previous goals; reinforced importance of following bariatric lifestyle changes  NUTRITION MONITORING AND EVALUATION:  Anticipated compliance: Fair  Verbalized understanding by will reduce carbonation     Follow up:  Patient to follow up in 1 year for 5 year post op  appointment  TIME SPENT WITH PATIENT:  15 minutes  Filiberto Becerril, RD, LD  [facility] Outpatient Dietitian  [phone] (office phone)

## 2017-06-05 NOTE — MR AVS SNAPSHOT
After Visit Summary   6/5/2017    Mili Padilla    MRN: [id number]           Patient Information     Date Of Birth          1955        Visit Information        Provider Department      6/5/2017 3:00 PM Adwoa Hollins PA-C [facility]      Today's Diagnoses     Bariatric surgery status    -  1    Morbid obesity with BMI of 45.0-49.9, adult (H)        GERD without esophagitis        Osteopenia determined by x-ray        Elevated vitamin B12 level        Binge eating        Postsurgical nonabsorption           Follow-ups after your visit        Follow-up notes from your care team     Return in 1 year (on 6/5/2018).      Your next 10 appointments already scheduled     Jun 07, 2017  2:00 PM CDT   MA SCREENING BILATERAL W/ SHIRLEY with SHBCMA2   [facility] ([facility])    [address] [place] [address], [address]  [place] [address] [phone]           Do not use any powder, lotion or deodorant under your arms or on your breast. If you do, we will ask you to remove it before your exam.  Wear comfortable, two-piece clothing.  If you have any allergies, tell your care team.  Bring any previous mammograms from other facilities or have them mailed to the breast center.            Jun 14, 2017  1:30 PM CDT   Follow Up with Neha Valadez RD, LD   [facility] ([facility])    [address]  [place] [address] [phone]            Jul 10, 2017  3:00 PM CDT   (Arrive by 2:45 PM)   Return Visit with Ramin Olivo, PhD [facility] ([facility])    [address] [facility]  3rd Floor  [facility] [address] [phone]            Jul 17, 2017   Procedure with Wong Beaulieu MD   [facility] ([facility])    [address]  [place] [address]   [phone]           [facility] is located at [address] [place]            Dec 12, 2017 11:15 AM CST   (Arrive by 10:45 AM)   RETURN THROAT with Clint Still MD   [facility] ([facility])    [address]  4th Floor  [facility] [id number] [phone]           - This is a multidisciplinary care team visit with an ENT physician and may include a speech language pathologist. - It is important to know that if you are evaluated and/or treated by both a physician and a speech pathologist during your visit, your billing will reflect the input that you receive from both providers as separate professionals. Although most insurance plans do cover these services, we encourage you to contact your insurance company prior to your visit to determine whether there are any coverage limitations that might affect you financially. - Billing/procedure codes that are frequently associated with visits to our clinic include (but are not limited to) the ones listed below. Most patients will not need all of these items, but it may be useful to ask your insurance company's patient . 67258: Flexible fiberoptic laryngoscopy, 31017: Laryngoscopy; flexible or rigid fiberoptic, with stroboscopy, 74054: Flexible endoscopic evaluation of swallowing, 24634: Evaluation of Voice and Resonance, 17182: Speech pathology treatment for voice, speech, communication, 52313: Speech pathology treatment for swallowing/oral function for feeding - If you have any concerns or questions, or if you would prefer not to have a speech pathologist involved in your visit, please contact our Clinic Coordinator at [phone], at least 24 hours prior to your appointment.              Future tests that were ordered for you today     Open Future Orders        Priority Expected Expires Ordered    Dexa Scan Routine  6/5/2018  6/5/2017    CBC with platelets Routine 6/5/2017 6/5/2018 6/5/2017    Vitamin B12 Routine 6/5/2017 6/5/2018 6/5/2017    Vitamin D Screen Routine 6/5/2017 6/5/2018 6/5/2017    Parathyroid Hormone Intact Routine 6/5/2017 6/5/2018 6/5/2017    Iron and Iron Binding Capacity Routine 6/5/2017 6/5/2018 6/5/2017    Ferritin Routine 6/5/2017 6/5/2018 6/5/2017            Who to contact     If you have questions or need follow up information about today's clinic visit or your schedule please contact [facility] directly at [phone].  Normal or non-critical lab and imaging results will be communicated to you by [facility], letter or phone within 4 business days after the clinic has received the results. If you do not hear from us within 7 days, please contact the clinic through [facility] or phone. If you have a critical or abnormal lab result, we will notify you by phone as soon as possible.  Submit refill requests through [employer] or call your pharmacy and they will forward the refill request to us. Please allow 3 business days for your refill to be completed.          Additional Information About Your Visit        [employer] Information     [employer] gives you secure access to your electronic health record. If you see a primary care provider, you can also send messages to your care team and make appointments. If you have questions, please call your primary care clinic.  If you do not have a primary care provider, please call [phone] and they will assist you.        Care EveryWhere ID     This is your Care EveryWhere ID. This could be used by other organizations to access your [facility] medical records  [id number]        Your Vitals Were     Pulse Last Period BMI (Body Mass Index)             67 12/10/2010 45.31 kg/m2          Blood Pressure from Last 3 Encounters:   06/05/17 110/62   08/16/16 135/63   05/31/16 130/67    Weight from Last 3 Encounters:   06/05/17 289 lb 4.8 oz (131.2 kg)    05/31/17 289 lb 3.2 oz (131.2 kg)   05/15/17 288 lb 12.8 oz (131 kg)              We Performed the Following     OP ROOMING NOTE TO RAUL          Today's Medication Changes          These changes are accurate as of: 6/5/17  4:45 PM.  If you have any questions, ask your nurse or doctor.               Stop taking these medicines if you haven't already. Please contact your care team if you have questions.     B-COMPLEX/B-12 SL   Stopped by:  Adwoa Hollins PA-C                Where to get your medicines      These medications were sent to [facility] [id number] [place] [address] [address] AT [facility] [address], [facility] [address]     Phone:  [phone]     ranitidine 150 MG tablet                Primary Care Provider Office Phone # Fax #    Jasmyn Márquez [phone]       [facility] [address]  [facility] [address]        Thank you!     Thank you for choosing [facility]  for your care. Our goal is always to provide you with excellent care. Hearing back from our patients is one way we can continue to improve our services. Please take a few minutes to complete the written survey that you may receive in the mail after your visit with us. Thank you!             Your Updated Medication List - Protect others around you: Learn how to safely use, store and throw away your medicines at www.disposemymeds.org.          This list is accurate as of: 6/5/17  4:45 PM.  Always use your most recent med list.                   Brand Name Dispense Instructions for use    ABILIFY 5 MG tablet   Generic drug:  ARIPiprazole      Take 1 tablet by mouth daily.       atorvastatin 20 MG tablet    LIPITOR     Take 20 mg by mouth daily.       brexpiprazole 2 MG tablet    REXULTI     Take 1 mg by mouth daily       budesonide 0.25 MG/2ML neb solution    PULMICORT     Take 0.25 mg by nebulization 2 times daily        CALCITRATE/VITAMIN D PO      2 tabs w/meals.       * carBAMazepine 200 MG tablet    TEGretol     Take 300 mg by mouth every morning. 300mg = 1.5 tablets.       * carBAMazepine 200 MG tablet    TEGretol     Take 400 mg by mouth At Bedtime.       cyabnocobalamin 2500 MCG sublingual tablet    VITAMIN B-12     Place 2,500 mcg under the tongue every other day       docusate sodium 100 MG tablet    COLACE     Take 100 mg by mouth daily       furosemide 20 MG tablet    LASIX         ketoconazole 2 % cream    NIZORAL     Apply topically daily       LEXAPRO 10 MG tablet   Generic drug:  escitalopram      Take 20 mg by mouth 2 times daily       Melatonin 10 MG Tabs tablet      Take 10 mg by mouth At Bedtime       methylphenidate 10 MG tablet    RITALIN     Take 10 mg by mouth 2 times daily       metroNIDAZOLE 1 % gel    METROGEL         MUCINEX DM PO      1200mg daily       MULTIVITAMINS PO      Take 2 tablets by mouth every evening. WITH IRON       MYRBETRIQ PO      Take 100 mg by mouth daily       ranitidine 150 MG tablet    ZANTAC    180 tablet    Take 1 tablet (150 mg) by mouth 2 times daily       REMERON PO      Take 75 mg by mouth At Bedtime. Takes ( 5)    15mg tablets=75mg       tolterodine 4 MG 24 hr capsule    DETROL LA         TRAZODONE HCL PO      Take 500 mg by mouth At Bedtime       trospium 20 MG tablet    SANCTURA     Take 20 mg by mouth 2 times daily       * VITAMIN D3 PO      Take 3,000 Units by mouth daily In a.m.       * VITAMIN D3 PO      Take 2,000 Units by mouth At Bedtime.       * Notice:  This list has 4 medication(s) that are the same as other medications prescribed for you. Read the directions carefully, and ask your doctor or other care provider to review them with you.

## 2017-06-06 LAB — DEPRECATED CALCIDIOL+CALCIFEROL SERPL-MC: 61 UG/L (ref 20–75)

## 2017-06-14 ENCOUNTER — HOSPITAL ENCOUNTER (OUTPATIENT)
Dept: NUTRITION | Facility: CLINIC | Age: 62
Discharge: HOME | End: 2017-06-14
Attending: DIETITIAN, REGISTERED | Admitting: DIETITIAN, REGISTERED
Payer: COMMERCIAL

## 2017-06-14 PROCEDURE — 97803 MED NUTRITION INDIV SUBSEQ: CPT | Performed by: DIETITIAN, REGISTERED

## 2017-06-14 NOTE — PROGRESS NOTES
"NUTRITION REASSESSMENT NOTE     REASON FOR ASSESSMENT  Mili Padilla referred by Dr. Márquez for MNT related to overweight.    Patient accompanied by self.      ASSESSMENT   Nutrition History:- Information obtained from patient.  Patient with long history of weight loss struggle.  Patient had sleeve gastrectomy 3 years ago.  Patient states she was able to maintain her 65 lb weight loss for 2 years. Then patient struggled with depression and regained her weight.  Patient has been tracking her food intake since surgery.  Patient has been trying to reduce calorie intake to <1700 calories per day but has difficulty doing so.  Patient recognizes that if she went to bed earlier, she would reduce her calorie intake.  Patient with history of multiple sclerosis which makes it difficult to prepare meals due to fatigue.  Patient continues to receive 5 Open Arms meals per week.  Patient states her depression has increased which makes it more difficult to make healthy choices. Patient states her therapist suggested taking probiotics and to eat meals every 2 hours.    24 Diet Recall:  Breakfast: 1/2 cup cottage cheese and 2 cantaloupe stoner; has been skipping breakfast is sleeping late   Lunch: 1/8 pizza , 1 marble brownie; lasagna, green beans, banana; 4 oz pulled chicken + 2 clementines   Dinner: 2 chicken fingers with BBQ sauce and 3 salted nutroll bites; penne parma Amalia chicken     Snack: 6 cups popcorn, 1 serving dark chocolate mint M&M's, 3 Jacobsen nut roll bites, McClelland's dark chocolate bar, 2 Abdallah candies, 1 banana, 6 cups popcorn, 1 bag peanut M&M's (snacks all in one day); mini muffins; bars  Beverages: water, Diet Dr. Pepper, Izze's drink    Dining out: limited     PHYSICAL FINDINGS  Observed:  No nutrition-related physical findings observed  Obtained from Chart/Interdisciplinary Team:  None noted    LABS  Labs reviewed    MEDICATIONS  Medications reviewed    ANTHROPOMETRICS   Height: 5'7\"  Weight: 289.4 lbs  BMI " (kg/m2): 45.2 kg/m2  Weight Status:  Obesity Grade III BMI >40  %IBW: 214%  Weight History:   Wt Readings from Last 5 Encounters:   06/05/17 131.2 kg (289 lb 4.8 oz)   05/31/17 131.2 kg (289 lb 3.2 oz)   05/15/17 131 kg (288 lb 12.8 oz)   05/01/17 131 kg (288 lb 12.8 oz)   04/19/17 130.3 kg (287 lb 4.8 oz)     ASSESSED NUTRITION NEEDS  Estimated Energy Needs: 8132-6628 kcals/day (11-14 Kcal/Kg) for weight loss  Estimated Protein Needs: 61-74 grams protein/day (1-1.2 g pro/Kg) for maintenance  Estimated Fluid Needs: 4894-4456 mL/day (25-30 mL/kg)    EVALUATION/PROGRESS TOWARDS GOALS   Previous Goals:   Eat 3 food groups per meal-not met   When snacking, choose nutrient dense food options-not met     Previous Nutrition Diagnosis: Disordered eating pattern related to excessive snacking as evidenced by weight regain after sleeve gastrectomy and BMI of 45.2 kg/m2 -no change    Current Nutrition Diagnosis: Disordered eating pattern related to excessive snacking as evidenced by weight regain after sleeve gastrectomy and BMI of 45.2 kg/m2      INTERVENTIONS   Nutrition Prescription   Recommend avoiding liquids with meals to reduce volume of meals for intended weight loss; practice alternatives to snacking    IMPLEMENTATION   Meals and Snacks: 3 meals + snacks if hungry  Nutrition Education (Content):   a)  Discussed progress towards goals.  Reviewed food logs.  Discussed highlighted food items and caloric intake.  Encouraged patient to eat 3 food groups per meal and focus on nutrient density of food.  Congratulated patient on her continued effort to track food intake.  Supported patient in her struggle with food.   Nutrition Education (Application):   a)  Discussed current eating plans and recommended ways to increase nutrient density of meals and snacks.        b)  Patient verbalizes understanding of diet by stating will eat 3 food groups per meal.    Anticipate fair compliance     GOALS  Eat 3 food groups per meal  When  snacking, choose nutrient dense food options    FOLLOW UP/MONITORING   Progress towards goals will be monitored and evaluated per protocol and Practice Guidelines  Patient to follow up in 2 weeks  Patient has RD name and contact information    Time Spent with Patient  25 minutes    Filiberto Becerril, RD, LD  [facility] Outpatient Dietitian  [phone] (office phone)

## 2017-06-19 ENCOUNTER — HOSPITAL ENCOUNTER (OUTPATIENT)
Dept: MAMMOGRAPHY | Facility: CLINIC | Age: 62
Discharge: HOME | End: 2017-06-19
Attending: FAMILY MEDICINE | Admitting: FAMILY MEDICINE
Payer: COMMERCIAL

## 2017-06-19 DIAGNOSIS — Z12.31 VISIT FOR SCREENING MAMMOGRAM: ICD-10-CM

## 2017-06-19 PROCEDURE — G0202 SCR MAMMO BI INCL CAD: HCPCS

## 2017-07-07 ASSESSMENT — PATIENT HEALTH QUESTIONNAIRE - PHQ9
SUM OF ALL RESPONSES TO PHQ QUESTIONS 1-9: 8
SUM OF ALL RESPONSES TO PHQ QUESTIONS 1-9: 8
10. IF YOU CHECKED OFF ANY PROBLEMS, HOW DIFFICULT HAVE THESE PROBLEMS MADE IT FOR YOU TO DO YOUR WORK, TAKE CARE OF THINGS AT HOME, OR GET ALONG WITH OTHER PEOPLE: SOMEWHAT DIFFICULT

## 2017-07-08 ASSESSMENT — PATIENT HEALTH QUESTIONNAIRE - PHQ9: SUM OF ALL RESPONSES TO PHQ QUESTIONS 1-9: 8

## 2017-07-10 ENCOUNTER — MYC MEDICAL ADVICE (OUTPATIENT)
Dept: SURGERY | Facility: CLINIC | Age: 62
End: 2017-07-10

## 2017-07-10 ENCOUNTER — OFFICE VISIT (OUTPATIENT)
Dept: PSYCHOLOGY | Facility: CLINIC | Age: 62
End: 2017-07-10

## 2017-07-10 VITALS — WEIGHT: 292.7 LBS | BODY MASS INDEX: 45.84 KG/M2

## 2017-07-10 DIAGNOSIS — R45.89 ANXIETY ABOUT HEALTH: ICD-10-CM

## 2017-07-10 DIAGNOSIS — Z56.0 UNEMPLOYMENT: ICD-10-CM

## 2017-07-10 DIAGNOSIS — F33.0 MAJOR DEPRESSIVE DISORDER, RECURRENT EPISODE, MILD (H): Primary | ICD-10-CM

## 2017-07-10 SDOH — ECONOMIC STABILITY - INCOME SECURITY: UNEMPLOYMENT, UNSPECIFIED: Z56.0

## 2017-07-10 NOTE — PROGRESS NOTES
Health Psychology                  Clinic    Department of Medicine  Sherrie Crowe, Ph.D., L.P. [phone]                          Clinics and Surgery Center  [facility] Vin Ward, Ph.D.,  L.P. [phone]                 [address] [facility] Mail Code 741   Ramin Olivo, Ph.D., WALTER, L.P. [phone]     [address] [place] [address] Kathie Galvan, Ph.D., L.P. [phone]  [place] [address]       Health Psychology Follow-Up Note     Ms. Padilla is a 62-year-old woman self-referred for psychological consultation because her therapist of the last 24 years retired.  She is seen for problem-solving and supportive therapy for depression and multiple health issues.     HISTORY OF PRESENTING CONCERN:  Ms. Padilla reports a lengthy history of depression.  She currently sees a psychiatrist, Ban Coleman at [facility], and is taking Abilify 7.5 mg, trazodone 500 mg, ripazepam 75 mg each day at bedtime, Tegretol 400 mg at bedtime and methylphenidate 10 mg b.i.d.  She discontineud ability and is now taking Rexulti 2 mg.  She has a history of hospitalizations including 3 at [facility] in the late 1990s, early 2000s when she had 2 courses of ECT lasting 7-10 sessions and approximately 3 other single session ECTs.  She stopped due to memory problems.  She kept with Dr. Coleman who she first met as an inpatient and has seen her for the past 18 years.  She had also been hospitalized psychiatrically at [facility] at age 23.  She previously worked with psychiatrist, Doug Sarabia for three years, stopping, in part, because she didn't feel he took her suicide attempt sufficiently seriously.  She reports her depression tends to vary.  Currently it is moderate, though it was more severe within the past year especially when she was having additional health problems.      MEDICAL HISTORY:  Ms.  Randy has a complex medical history.  She was diagnosed with MS in 1985.  Her psychiatrist at [facility] in [place], Dr. 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  She has used a scooter since 1992, using it more in the last 6 months than she had previously.  She also can use a walker.  She was diagnosed with fibromyalgia in 1997.   She is also seen at [facility] for her [facility] care.  She began to see an eating disorder therapist weekly and has been somewhat erratically losing weight.    WEIGH Today is 292.7 lbs.   She is attending OA.    Wt Readings from Last 4 Encounters:   07/10/17 132.8 kg (292 lb 11.2 oz)   06/05/17 131.2 kg (289 lb 4.8 oz)   06/02/17 131.5 kg (289 lb 12.8 oz)   05/31/17 131.2 kg (289 lb 3.2 oz)     Past Medical History:   Diagnosis Date     Depression, major, in partial remission (H)      Diabetes mellitus (H)     pre-diabetic     Fibromyalgia syndrome      Gastro-oesophageal reflux disease      Hyperlipemia      Lymphedema      Multiple sclerosis (H)     tremors with MS, all four limbs and head     Multiple sclerosis, secondary progressive (H)      Sleep apnea      Vocal cord paralysis, unilateral complete         Past Surgical History:   Procedure Laterality Date     ENT SURGERY      throat 1969, vocal cord surgery with skin grafting     ESOPHAGOSCOPY, GASTROSCOPY, DUODENOSCOPY (EGD), COMBINED N/A 12/16/2014    Procedure: COMBINED ENDOSCOPIC ULTRASOUND, ESOPHAGOSCOPY, GASTROSCOPY, DUODENOSCOPY (EGD), FINE NEEDLE ASPIRATE/BIOPSY;  Surgeon: Yessica Santana MD;  Location:  GI     ESOPHAGOSCOPY, GASTROSCOPY, DUODENOSCOPY (EGD), COMBINED N/A 12/16/2014    Procedure: COMBINED ESOPHAGOSCOPY, GASTROSCOPY, DUODENOSCOPY (EGD), BIOPSY SINGLE OR MULTIPLE;  Surgeon: Yessica Santana MD;  Location:  GI     LAPAROSCOPIC APPENDECTOMY  5/30/2013    Procedure: LAPAROSCOPIC APPENDECTOMY;;  Surgeon: Salvador Morris MD;  Location:  OR      LAPAROSCOPIC BIOPSY LIVER  2013    Procedure: LAPAROSCOPIC BIOPSY LIVER;;  Surgeon: Salvador Morris MD;  Location:  OR     LAPAROSCOPIC GASTRIC SLEEVE  2013    Procedure: LAPAROSCOPIC GASTRIC SLEEVE;  LAPAROSCOPIC SLEEVE GASTRECTOMY/ LAPARSCOPIC  APPENDECTOMY /LIVER BIOPSIES/LIVER CYST DRAINAGE;  Surgeon: Salvador Morris MD;  Location:  OR     ORTHOPEDIC SURGERY      R wrist      Current Outpatient Prescriptions   Medication     brexpiprazole (REXULTI) 2 MG tablet     trospium (SANCTURA) 20 MG tablet     cyabnocobalamin (VITAMIN B-12) 2500 MCG sublingual tablet     ranitidine (ZANTAC) 150 MG tablet     tolterodine (DETROL LA) 4 MG 24 hr capsule     furosemide (LASIX) 20 MG tablet     metroNIDAZOLE (METROGEL) 1 % gel     ketoconazole (NIZORAL) 2 % cream     Dextromethorphan-Guaifenesin (MUCINEX DM PO)     Melatonin 10 MG TABS     budesonide (PULMICORT) 0.25 MG/2ML nebulizer solution     Mirabegron (MYRBETRIQ PO)     docusate sodium 100 MG tablet     methylphenidate (RITALIN) 10 MG tablet     carBAMazepine (TEGRETOL) 200 MG tablet     carBAMazepine (TEGRETOL) 200 MG tablet     Calcium Citrate-Vitamin D (CALCITRATE/VITAMIN D PO)     Cholecalciferol (VITAMIN D3 PO)     Cholecalciferol (VITAMIN D3 PO)     Mirtazapine (REMERON PO)     TRAZODONE HCL PO     Multiple Vitamin (MULTIVITAMINS PO)     atorvastatin (LIPITOR) 20 MG tablet     ARIPiprazole (ABILIFY) 5 MG tablet     escitalopram (LEXAPRO) 10 MG tablet     No current facility-administered medications for this visit.      Facility-Administered Medications Ordered in Other Visits   Medication     ondansetron (ZOFRAN) injection     SOCIAL HISTORY:  Ms. Padilla grew up in the [place] and is the oldest of 5 children in her family of origin.  Her father was a dentist who she believes was bipolar.  He  of lung cancer at age 72.  Her mother  of sepsis at age 80.  She had been diagnosed with breast cancer when Ms. Padilla was 8.  She describes  the marriage between her parents as misael.  She is 5 years older than her closest-aged sister and they are all within 4 years of each other.   Her daughter  12/3 and her mother   She tends to feel more depressed in winter.      Ms. Padilla attended [employer] for a year and later went to regist[employer]ija vo[employer] school at the [employer].  She felt that she could not continue her education to the point of graduation because she was working full time and raising her daughter.  Her daughter  in  at age 31 of liver failure secondary to an accidental Tylenol overdose.  She had been recovering from a hysterectomy.      Ms. 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  She had to retire at the time secondary to her MS.  She has never .  She has not dated,  is interested in dating, and states that if she were she would be interested in a relationship with a woman.  There is no history of  service or legal problems.  She expresses concern about her increasing social isolation.  She does have at least 1 friend, Jael, who she met at a therapy group in .  She is active in facilitating groups for people with MS both in [place] and [place].  She lives alone and currently gets help from a home health aide, as well as home health nurse.      SESSION: We discussed her weight loss, which was not successful during the interval. We discussed decreasing the goal to 1500 janice/day but it has been about 2000.  When she was at 1700 she was losing. .  She states she is more depressed since Februrary, which might be due to inconsistent sleep.  We discussed gong to sleep by 11 pm.  Otherwise, she falls asleep watching TV, and often snacking for several more hours.   We pinpointed M&Ms and wheat thins as foods she could reduce from daily to 1/week.   A She feels a bit less  demoralized and less depressed the last 3 weeks, but is uncertain why. .  We reinforced her for her intention for weight loss.   She participated fully and appeared to derive benefit.  Rapport was excellent.  Extended session due to complexity of case and length of interval. The treatment plan was reviewed with the patient at today s visit. The treatment plan remains current based on the patient s status and progress to date.  Time in:  2:42   Time out:   3:45     DIAGNOSES:   Major depression, recurrent, mild (F33.o).   Behavioral factors affecting morbid obesity (E66.01).     PLAN:  Ms. Padilla will return to clinic on 8/11 @ 2  for problem-solving and supportive therapy consistent with treatment plan..   Tx plan 8/26/16;  Next review due  8/26/17        Ramin Olivo, PhD, A.B.P.P., L.P.   Director, Health Psychology         Answers for HPI/ROS submitted by the patient on 7/7/2017   If you checked off any problems, how difficult have these problems made it for you to do your work, take care of things at home, or get along with other people?: Somewhat difficult  PHQ9 TOTAL SCORE: 8

## 2017-07-10 NOTE — MR AVS SNAPSHOT
After Visit Summary   7/10/2017    Mili Padilla    MRN: [id number]           Patient Information     Date Of Birth          1955        Visit Information        Provider Department      7/10/2017 3:00 PM Ramin Olivo, PhD [facility]        Today's Diagnoses     Major depressive disorder, recurrent episode, mild (H)    -  1    Unemployment        Anxiety about health           Follow-ups after your visit        Your next 10 appointments already scheduled     Jul 17, 2017   Procedure with Wong Beaulieu MD   [facility] ([facility])    [address]  [place] [address]   [phone]           [facility] is located at [address] [place]            Jul 19, 2017 12:00 PM CDT   Follow Up with Neha Valadez RD, LD   [facility] ([facility])    [address]  [facility] [address]   [phone]            Dec 12, 2017 11:15 AM CST   (Arrive by 10:45 AM)   RETURN THROAT with Clint Still MD   [facility] ([facility])    [address] [place] [address] [phone]           - This is a multidisciplinary care team visit with an ENT physician and may include a speech language pathologist. - It is important to know that if you are evaluated and/or treated by both a physician and a speech pathologist during your visit, your billing will reflect the input that you receive from both providers as separate professionals. Although most insurance plans do cover these services, we encourage you to contact your insurance company prior to your visit to determine whether there are any coverage limitations that might affect you financially. - Billing/procedure codes that are frequently associated with visits to our clinic include (but are not limited to) the ones listed  below. Most patients will not need all of these items, but it may be useful to ask your insurance company's patient . 46713: Flexible fiberoptic laryngoscopy, 84635: Laryngoscopy; flexible or rigid fiberoptic, with stroboscopy, 42088: Flexible endoscopic evaluation of swallowing, 88252: Evaluation of Voice and Resonance, 87001: Speech pathology treatment for voice, speech, communication, 64557: Speech pathology treatment for swallowing/oral function for feeding - If you have any concerns or questions, or if you would prefer not to have a speech pathologist involved in your visit, please contact our Clinic Coordinator at [phone], at least 24 hours prior to your appointment.              Who to contact     Please call your clinic at [phone] to:    Ask questions about your health    Make or cancel appointments    Discuss your medicines    Learn about your test results    Speak to your doctor   If you have compliments or concerns about an experience at your clinic, or if you wish to file a complaint, please contact [facility] Patient Relations at [phone] or email us at [email or web address]         Additional Information About Your Visit        [employer] Information     gauzz gives you secure access to your electronic health record. If you see a primary care provider, you can also send messages to your care team and make appointments. If you have questions, please call your primary care clinic.  If you do not have a primary care provider, please call [phone] and they will assist you.      gauzz is an electronic gateway that provides easy, online access to your medical records. With gauzz, you can request a clinic appointment, read your test results, renew a prescription or communicate with your care team.     To access your existing account, please contact your [facility] or call [phone] for  assistance.        Care EveryWhere ID     This is your Care EveryWhere ID. This could be used by other organizations to access your [facility] medical records  [id number]        Your Vitals Were     Last Period BMI (Body Mass Index)                12/10/2010 45.84 kg/m2           Blood Pressure from Last 3 Encounters:   06/05/17 110/62   08/16/16 135/63   05/31/16 130/67    Weight from Last 3 Encounters:   07/10/17 132.8 kg (292 lb 11.2 oz)   06/05/17 131.2 kg (289 lb 4.8 oz)   06/02/17 131.5 kg (289 lb 12.8 oz)              Today, you had the following     No orders found for display       Primary Care Provider Office Phone # Fax #    Jasmyn Márquez [phone]       [facility] [address]  [facility] [address]        Equal Access to Services     [facility]: Hadii aad ku hadasho Soomaali, waaxda luqadaha, qaybta kaalmada adeegyada, waxay bayin haymaryn giovanny alberts . So [facility] [phone].    ATENCIÓN: Si habla español, tiene a abel disposición servicios gratuitos de asistencia lingüística. Rin al [phone].    We comply with applicable federal civil rights laws and [place] laws. We do not discriminate on the basis of race, color, national origin, age, disability sex, sexual orientation or gender identity.            Thank you!     Thank you for choosing [facility]  for your care. Our goal is always to provide you with excellent care. Hearing back from our patients is one way we can continue to improve our services. Please take a few minutes to complete the written survey that you may receive in the mail after your visit with us. Thank you!             Your Updated Medication List - Protect others around you: Learn how to safely use, store and throw away your medicines at www.disposemymeds.org.          This list is accurate as of: 7/10/17  3:49 PM.  Always use your most recent med list.                   Brand Name Dispense Instructions for  use Diagnosis    ABILIFY 5 MG tablet   Generic drug:  ARIPiprazole      Take 1 tablet by mouth daily.        atorvastatin 20 MG tablet    LIPITOR     Take 20 mg by mouth daily.        brexpiprazole 2 MG tablet    REXULTI     Take 1 mg by mouth daily        budesonide 0.25 MG/2ML neb solution    PULMICORT     Take 0.25 mg by nebulization 2 times daily        CALCITRATE/VITAMIN D PO      2 tabs w/meals.        * carBAMazepine 200 MG tablet    TEGretol     Take 300 mg by mouth every morning. 300mg = 1.5 tablets.        * carBAMazepine 200 MG tablet    TEGretol     Take 400 mg by mouth At Bedtime.        cyabnocobalamin 2500 MCG sublingual tablet    VITAMIN B-12     Place 2,500 mcg under the tongue every other day        docusate sodium 100 MG tablet    COLACE     Take 100 mg by mouth daily        furosemide 20 MG tablet    LASIX          ketoconazole 2 % cream    NIZORAL     Apply topically daily    Bariatric surgery status, Obesity, Class III, BMI 40-49.9 (morbid obesity) (H)       LEXAPRO 10 MG tablet   Generic drug:  escitalopram      Take 20 mg by mouth 2 times daily        Melatonin 10 MG Tabs tablet      Take 10 mg by mouth At Bedtime        methylphenidate 10 MG tablet    RITALIN     Take 10 mg by mouth 2 times daily        metroNIDAZOLE 1 % gel    METROGEL          MUCINEX DM PO      1200mg daily        MULTIVITAMINS PO      Take 2 tablets by mouth every evening. WITH IRON        MYRBETRIQ PO      Take 100 mg by mouth daily        ranitidine 150 MG tablet    ZANTAC    180 tablet    Take 1 tablet (150 mg) by mouth 2 times daily    GERD without esophagitis, Bariatric surgery status       REMERON PO      Take 75 mg by mouth At Bedtime. Takes ( 5)    15mg tablets=75mg        tolterodine 4 MG 24 hr capsule    DETROL LA          TRAZODONE HCL PO      Take 500 mg by mouth At Bedtime        trospium 20 MG tablet    SANCTURA     Take 20 mg by mouth 2 times daily        * VITAMIN D3 PO      Take 3,000 Units by mouth  daily In a.m.        * VITAMIN D3 PO      Take 2,000 Units by mouth At Bedtime.        * Notice:  This list has 4 medication(s) that are the same as other medications prescribed for you. Read the directions carefully, and ask your doctor or other care provider to review them with you.

## 2017-07-17 ENCOUNTER — HOSPITAL ENCOUNTER (OUTPATIENT)
Facility: CLINIC | Age: 62
Discharge: HOME | End: 2017-07-17
Attending: COLON & RECTAL SURGERY | Admitting: COLON & RECTAL SURGERY
Payer: COMMERCIAL

## 2017-07-17 ENCOUNTER — SURGERY (OUTPATIENT)
Age: 62
End: 2017-07-17

## 2017-07-17 VITALS
HEART RATE: 75 BPM | SYSTOLIC BLOOD PRESSURE: 114 MMHG | RESPIRATION RATE: 15 BRPM | HEIGHT: 67 IN | BODY MASS INDEX: 45.52 KG/M2 | DIASTOLIC BLOOD PRESSURE: 63 MMHG | WEIGHT: 290 LBS | OXYGEN SATURATION: 96 %

## 2017-07-17 LAB — COLONOSCOPY: NORMAL

## 2017-07-17 PROCEDURE — 88305 TISSUE EXAM BY PATHOLOGIST: CPT | Mod: 26 | Performed by: COLON & RECTAL SURGERY

## 2017-07-17 PROCEDURE — 45385 COLONOSCOPY W/LESION REMOVAL: CPT | Performed by: COLON & RECTAL SURGERY

## 2017-07-17 PROCEDURE — 45380 COLONOSCOPY AND BIOPSY: CPT | Performed by: COLON & RECTAL SURGERY

## 2017-07-17 PROCEDURE — 99153 MOD SED SAME PHYS/QHP EA: CPT | Performed by: COLON & RECTAL SURGERY

## 2017-07-17 PROCEDURE — 88305 TISSUE EXAM BY PATHOLOGIST: CPT | Performed by: COLON & RECTAL SURGERY

## 2017-07-17 PROCEDURE — 25000128 H RX IP 250 OP 636: Performed by: COLON & RECTAL SURGERY

## 2017-07-17 PROCEDURE — G0500 MOD SEDAT ENDO SERVICE >5YRS: HCPCS | Performed by: COLON & RECTAL SURGERY

## 2017-07-17 RX ORDER — FENTANYL CITRATE 50 UG/ML
INJECTION, SOLUTION INTRAMUSCULAR; INTRAVENOUS PRN
Status: DISCONTINUED | OUTPATIENT
Start: 2017-07-17 | End: 2017-07-17 | Stop reason: HOSPADM

## 2017-07-17 RX ORDER — LIDOCAINE 40 MG/G
CREAM TOPICAL
Status: DISCONTINUED | OUTPATIENT
Start: 2017-07-17 | End: 2017-07-17 | Stop reason: HOSPADM

## 2017-07-17 RX ORDER — DIPHENHYDRAMINE HYDROCHLORIDE 50 MG/ML
25 INJECTION INTRAMUSCULAR; INTRAVENOUS EVERY 4 HOURS PRN
Status: DISCONTINUED | OUTPATIENT
Start: 2017-07-17 | End: 2017-07-17 | Stop reason: HOSPADM

## 2017-07-17 RX ORDER — ONDANSETRON 2 MG/ML
4 INJECTION INTRAMUSCULAR; INTRAVENOUS
Status: DISCONTINUED | OUTPATIENT
Start: 2017-07-17 | End: 2017-07-17 | Stop reason: HOSPADM

## 2017-07-17 RX ORDER — ONDANSETRON 2 MG/ML
4 INJECTION INTRAMUSCULAR; INTRAVENOUS EVERY 6 HOURS PRN
Status: DISCONTINUED | OUTPATIENT
Start: 2017-07-17 | End: 2017-07-17 | Stop reason: HOSPADM

## 2017-07-17 RX ORDER — NALOXONE HYDROCHLORIDE 0.4 MG/ML
.1-.4 INJECTION, SOLUTION INTRAMUSCULAR; INTRAVENOUS; SUBCUTANEOUS
Status: DISCONTINUED | OUTPATIENT
Start: 2017-07-17 | End: 2017-07-17 | Stop reason: HOSPADM

## 2017-07-17 RX ORDER — DIPHENHYDRAMINE HCL 25 MG
25 CAPSULE ORAL EVERY 4 HOURS PRN
Status: DISCONTINUED | OUTPATIENT
Start: 2017-07-17 | End: 2017-07-17 | Stop reason: HOSPADM

## 2017-07-17 RX ORDER — PROCHLORPERAZINE MALEATE 5 MG
5-10 TABLET ORAL EVERY 6 HOURS PRN
Status: DISCONTINUED | OUTPATIENT
Start: 2017-07-17 | End: 2017-07-17 | Stop reason: HOSPADM

## 2017-07-17 RX ORDER — ONDANSETRON 4 MG/1
4 TABLET, ORALLY DISINTEGRATING ORAL EVERY 6 HOURS PRN
Status: DISCONTINUED | OUTPATIENT
Start: 2017-07-17 | End: 2017-07-17 | Stop reason: HOSPADM

## 2017-07-17 RX ORDER — FLUMAZENIL 0.1 MG/ML
0.2 INJECTION, SOLUTION INTRAVENOUS
Status: DISCONTINUED | OUTPATIENT
Start: 2017-07-17 | End: 2017-07-17 | Stop reason: HOSPADM

## 2017-07-17 RX ORDER — TRIAMCINOLONE ACETONIDE 5 MG/G
OINTMENT TOPICAL 2 TIMES DAILY PRN
COMMUNITY
End: 2022-10-03

## 2017-07-17 RX ADMIN — FENTANYL CITRATE 100 MCG: 50 INJECTION, SOLUTION INTRAMUSCULAR; INTRAVENOUS at 11:21

## 2017-07-17 RX ADMIN — MIDAZOLAM HYDROCHLORIDE 2 MG: 1 INJECTION, SOLUTION INTRAMUSCULAR; INTRAVENOUS at 11:22

## 2017-07-17 NOTE — H&P
Pre-Endoscopy History and Physical     Mili Padilla MRN# [id number]   YOB: 1955 Age: 62 year old     Date of Procedure: 7/17/2017  Primary care provider: Jasmyn Márquez  Type of Endoscopy: colonoscopy  Reason for Procedure: screening  Type of Anesthesia Anticipated: Moderate Sedation    HPI:    Mili is a 62 year old female who will be undergoing the above procedure.      A history and physical has been performed. The patient's medications and allergies have been reviewed. The risks and benefits of the procedure including the risk of bleeding, perforation, and missed lesions as well as the sedation options and risks were discussed with the patient.  All questions were answered and informed consent was obtained.      Allergies   Allergen Reactions     Augmentin      Sensitive,  Able to tolerate a few doses, otherwise hives on hands     Betaseron [Interferon Beta-1b]      Necrosis of skin at injection sites     Dust Mite Extract Unknown     Mold Unknown        Current Facility-Administered Medications   Medication     lidocaine 1 % 1 mL     lidocaine (LMX4) cream     sodium chloride (PF) 0.9% PF flush 3 mL     sodium chloride (PF) 0.9% PF flush 3 mL     sodium chloride (PF) 0.9% PF flush 3 mL     ondansetron (ZOFRAN) injection 4 mg     Facility-Administered Medications Ordered in Other Encounters   Medication     ondansetron (ZOFRAN) injection       Prescriptions Prior to Admission   Medication Sig Dispense Refill Last Dose     brexpiprazole (REXULTI) 3 MG tablet Take 1 mg by mouth daily   7/17/2017     MINOCYCLINE HCL PO    7/17/2017     triamcinolone (KENALOG) 0.5 % OINT ointment Apply topically 2 times daily   7/16/2017     trospium (SANCTURA) 20 MG tablet Take 20 mg by mouth 2 times daily   7/17/2017     ranitidine (ZANTAC) 150 MG tablet Take 1 tablet (150 mg) by mouth 2 times daily 180 tablet 3 7/17/2017     metroNIDAZOLE (METROGEL) 1 % gel    7/17/2017     ketoconazole (NIZORAL) 2 % cream Apply  topically daily   7/17/2017     Dextromethorphan-Guaifenesin (MUCINEX DM PO) 1200mg daily   7/16/2017     Melatonin 10 MG TABS Take 10 mg by mouth At Bedtime   Past Week     docusate sodium 100 MG tablet Take 100 mg by mouth daily    Past Week     methylphenidate (RITALIN) 10 MG tablet Take 10 mg by mouth 2 times daily   7/17/2017     carBAMazepine (TEGRETOL) 200 MG tablet Take 300 mg by mouth every morning. 300mg = 1.5 tablets.   7/17/2017     carBAMazepine (TEGRETOL) 200 MG tablet Take 400 mg by mouth At Bedtime.   7/17/2017     Calcium Citrate-Vitamin D (CALCITRATE/VITAMIN D PO) 2 tabs w/meals.    Past Week     Cholecalciferol (VITAMIN D3 PO) Take 3,000 Units by mouth daily In a.m.   Past Week     Cholecalciferol (VITAMIN D3 PO) Take 2,000 Units by mouth At Bedtime.   Past Week     Mirtazapine (REMERON PO) Take 75 mg by mouth At Bedtime. Takes ( 5)    15mg tablets=75mg   7/16/2017     TRAZODONE HCL PO Take 500 mg by mouth At Bedtime    7/16/2017     Multiple Vitamin (MULTIVITAMINS PO) Take 2 tablets by mouth every evening. WITH IRON   Past Week     atorvastatin (LIPITOR) 20 MG tablet Take 20 mg by mouth daily.   7/17/2017     escitalopram (LEXAPRO) 10 MG tablet Take 20 mg by mouth 2 times daily    7/17/2017     cyabnocobalamin (VITAMIN B-12) 2500 MCG sublingual tablet Place 2,500 mcg under the tongue every other day   Unknown       Patient Active Problem List   Diagnosis     Multiple sclerosis, secondary progressive (H)     Fibromyalgia syndrome     Depression, major, in partial remission (H)     Vocal cord paralysis, unilateral complete     S/P bariatric surgery     Candidiasis, intertrigo     Psychological factors affecting morbid obesity (H)     Major depressive disorder, recurrent episode, moderate (H)     Morbid obesity with BMI of 45.0-49.9, adult (H)        Past Medical History:   Diagnosis Date     Depression, major, in partial remission (H)      Diabetes mellitus (H)     pre-diabetic     Fibromyalgia  syndrome      Gastro-oesophageal reflux disease      Hyperlipemia      Lymphedema      Multiple sclerosis (H)     tremors with MS, all four limbs and head     Multiple sclerosis, secondary progressive (H)      Sleep apnea      Vocal cord paralysis, unilateral complete         Past Surgical History:   Procedure Laterality Date     ENT SURGERY      throat 1969, vocal cord surgery with skin grafting     ESOPHAGOSCOPY, GASTROSCOPY, DUODENOSCOPY (EGD), COMBINED N/A 12/16/2014    Procedure: COMBINED ENDOSCOPIC ULTRASOUND, ESOPHAGOSCOPY, GASTROSCOPY, DUODENOSCOPY (EGD), FINE NEEDLE ASPIRATE/BIOPSY;  Surgeon: Yessica Santana MD;  Location:  GI     ESOPHAGOSCOPY, GASTROSCOPY, DUODENOSCOPY (EGD), COMBINED N/A 12/16/2014    Procedure: COMBINED ESOPHAGOSCOPY, GASTROSCOPY, DUODENOSCOPY (EGD), BIOPSY SINGLE OR MULTIPLE;  Surgeon: Yessica Santana MD;  Location: [facility]     LAPAROSCOPIC APPENDECTOMY  5/30/2013    Procedure: LAPAROSCOPIC APPENDECTOMY;;  Surgeon: Salvador Morris MD;  Location:  OR     LAPAROSCOPIC BIOPSY LIVER  5/30/2013    Procedure: LAPAROSCOPIC BIOPSY LIVER;;  Surgeon: Salvador Morris MD;  Location:  OR     LAPAROSCOPIC GASTRIC SLEEVE  5/30/2013    Procedure: LAPAROSCOPIC GASTRIC SLEEVE;  LAPAROSCOPIC SLEEVE GASTRECTOMY/ LAPARSCOPIC  APPENDECTOMY /LIVER BIOPSIES/LIVER CYST DRAINAGE;  Surgeon: Salvador Morris MD;  Location:  OR     ORTHOPEDIC SURGERY      R wrist 2010       Social History   Substance Use Topics     Smoking status: Former Smoker     Types: Cigarettes     Quit date: 9/27/1974     Smokeless tobacco: Never Used     Alcohol use 0.0 oz/week     0 Standard drinks or equivalent per week      Comment: Once a month.       Family History   Problem Relation Age of Onset     Breast Cancer Mother      Other Cancer Father      Breast Cancer Maternal Aunt      Breast Cancer Maternal Aunt      Breast Cancer Maternal Aunt      Breast Cancer Maternal Aunt      Glaucoma No family hx of       "Macular Degeneration No family hx of      Amblyopia No family hx of          PHYSICAL EXAM:   /66  Pulse 75  Resp 18  Ht 1.689 m (5' 6.5\")  Wt 131.5 kg (290 lb)  LMP 12/10/2010  SpO2 94%  BMI 46.11 kg/m2 Estimated body mass index is 46.11 kg/(m^2) as calculated from the following:    Height as of this encounter: 1.689 m (5' 6.5\").    Weight as of this encounter: 131.5 kg (290 lb).   Mental status - alert and oriented  RESP: lungs clear  CV: RRR  AIRWAY EXAM: Mallampatti Class III (visualization of the soft palate and base of uvula)    IMPRESSION   ASA Class 2 - Mild systemic disease      Signed Electronically by: Wong Beaulieu  July 17, 2017    Colorectal Surgery  [phone] (office)  [phone] (pager)  [email or web address]          "

## 2017-07-18 LAB — COPATH REPORT: NORMAL

## 2017-08-07 ENCOUNTER — HOSPITAL ENCOUNTER (OUTPATIENT)
Dept: NUTRITION | Facility: CLINIC | Age: 62
Discharge: HOME | End: 2017-08-07
Attending: DIETITIAN, REGISTERED | Admitting: DIETITIAN, REGISTERED
Payer: COMMERCIAL

## 2017-08-07 VITALS — BODY MASS INDEX: 46.3 KG/M2 | WEIGHT: 291.2 LBS

## 2017-08-07 PROCEDURE — 97803 MED NUTRITION INDIV SUBSEQ: CPT | Performed by: DIETITIAN, REGISTERED

## 2017-08-07 NOTE — PROGRESS NOTES
"NUTRITION REASSESSMENT NOTE     REASON FOR ASSESSMENT  Mili Padilla referred by Dr. Márquez for MNT related to overweight.    Patient accompanied by self.      ASSESSMENT   Nutrition History:- Information obtained from patient.  Patient with long history of weight loss struggle.  Patient had sleeve gastrectomy 3 years ago.  Patient states she was able to maintain her 65 lb weight loss for 2 years. Then patient struggled with depression and regained her weight.  Patient has been tracking her food intake since surgery.  Patient has been trying to reduce calorie intake to <1700 calories per day but has difficulty doing so.  Patient recognizes that if she went to bed earlier, she would reduce her calorie intake.  Patient with history of multiple sclerosis which makes it difficult to prepare meals due to fatigue.  Patient continues to receive 5 [employer] meals per week.  Patient states her depression has increased which makes it more difficult to make healthy choices.     24 Diet Recall:  Breakfast: 1/2 cup cottage cheese and 2 cantaloupe stoner; has been skipping breakfast is sleeping late   Lunch: 1/8 pizza , 1 marble brownie; lasagna, green beans, banana; 4 oz pulled chicken + 2 clementines   Dinner: Macanese toast and sausage; chicken stew with broccoli florets ([employer] meals)  Snack: 6 cups popcorn, 1 serving dark chocolate mint M&M's, 3 Jacobsen nut roll bites, [place] dark chocolate bar, beef jerky  Beverages: water, Diet Dr. Pepper, Izze's drink    Dining out: limited     PHYSICAL FINDINGS  Observed:  No nutrition-related physical findings observed  Obtained from Chart/Interdisciplinary Team:  None noted    LABS  Labs reviewed    MEDICATIONS  Medications reviewed    ANTHROPOMETRICS   Height: 5'7\"  Weight: 291.2 lbs  BMI (kg/m2): 45.6 kg/m2  Weight Status:  Obesity Grade III BMI >40  %IBW: 215%  Weight History:   Wt Readings from Last 5 Encounters:   07/17/17 131.5 kg (290 lb)   06/05/17 131.2 kg (289 lb 4.8 oz) "   05/31/17 131.2 kg (289 lb 3.2 oz)   05/15/17 131 kg (288 lb 12.8 oz)   05/01/17 131 kg (288 lb 12.8 oz)     ASSESSED NUTRITION NEEDS  Estimated Energy Needs: 1916-3946 kcals/day (11-14 Kcal/Kg) for weight loss  Estimated Protein Needs: 61-74 grams protein/day (1-1.2 g pro/Kg) for maintenance  Estimated Fluid Needs: 0424-2236 mL/day (25-30 mL/kg)    EVALUATION/PROGRESS TOWARDS GOALS   Previous Goals:   Eat 3 food groups per meal-not met   When snacking, choose nutrient dense food options-not met     Previous Nutrition Diagnosis: Disordered eating pattern related to excessive snacking as evidenced by weight regain after sleeve gastrectomy and BMI of 45.2 kg/m2 -no change    Current Nutrition Diagnosis: Disordered eating pattern related to excessive snacking as evidenced by weight regain after sleeve gastrectomy and BMI of 45.6 kg/m2      INTERVENTIONS   Nutrition Prescription   Recommend avoiding liquids with meals to reduce volume of meals for intended weight loss; practice alternatives to snacking    IMPLEMENTATION   Meals and Snacks: 3 meals + snacks if hungry  Nutrition Education (Content):   a)  Discussed progress towards goals.  Reviewed food logs.  Discussed highlighted food items and caloric intake.  Encouraged patient to eat 3 food groups per meal and focus on nutrient density of food.  Congratulated patient on her continued effort to track food intake.  Supported patient in her struggle with food.   Nutrition Education (Application):   a)  Discussed current eating pattern and determined ways to eat at consistent times by establishing regular sleep pattern.          b)  Patient verbalizes understanding of diet by stating will eat leave food packages in the kitchen to prevent overeating.      Anticipate fair compliance     GOALS  Open food packages in the kitchen and leave package in the kitchen  Increase cottage cheese to 3/4 cup in the morning  Aim for regular sleep pattern on Friday night     FOLLOW  UP/MONITORING   Progress towards goals will be monitored and evaluated per protocol and Practice Guidelines  Patient to follow up in 3 weeks  Patient has RD name and contact information    Time Spent with Patient  30 minutes    Filiberto Becerril, RD, LD  [facility] Outpatient Dietitian  [phone] (office phone)

## 2017-08-09 ASSESSMENT — PATIENT HEALTH QUESTIONNAIRE - PHQ9: SUM OF ALL RESPONSES TO PHQ QUESTIONS 1-9: 8

## 2017-08-11 ENCOUNTER — OFFICE VISIT (OUTPATIENT)
Dept: PSYCHOLOGY | Facility: CLINIC | Age: 62
End: 2017-08-11

## 2017-08-11 VITALS — WEIGHT: 290.7 LBS | BODY MASS INDEX: 46.22 KG/M2

## 2017-08-11 DIAGNOSIS — E66.01 PSYCHOLOGICAL FACTORS AFFECTING MORBID OBESITY (H): ICD-10-CM

## 2017-08-11 DIAGNOSIS — Z56.0 UNEMPLOYMENT: ICD-10-CM

## 2017-08-11 DIAGNOSIS — F33.0 MAJOR DEPRESSIVE DISORDER, RECURRENT EPISODE, MILD (H): Primary | ICD-10-CM

## 2017-08-11 DIAGNOSIS — F54 PSYCHOLOGICAL FACTORS AFFECTING MORBID OBESITY (H): ICD-10-CM

## 2017-08-11 DIAGNOSIS — R45.89 ANXIETY ABOUT HEALTH: ICD-10-CM

## 2017-08-11 SDOH — ECONOMIC STABILITY - INCOME SECURITY: UNEMPLOYMENT, UNSPECIFIED: Z56.0

## 2017-08-11 NOTE — PROGRESS NOTES
Health Psychology                  Clinic    Department of Medicine  Sherrie Crowe, Ph.D., L.P. [phone]                          Clinics and Surgery Center  [facility] Vin Ward, Ph.D.,  L.P. [phone]                 [facility] Mail Code 741   Ramin Olivo, Ph.D., WALTER, L.P. [phone]     [address] [place] [address] Kathie Galvan, Ph.D., L.P. [phone]  [place] [address]       Health Psychology Follow-Up Note     Ms. Padilla is a 62-year-old woman self-referred for psychological consultation because her therapist of the last 24 years retired.  She is seen for problem-solving and supportive therapy for depression and multiple health issues.     HISTORY OF PRESENTING CONCERN:  Ms. Padilla reports a lengthy history of depression.  She currently sees a psychiatrist, Ban Coleman at [facility], and is taking Abilify 7.5 mg, trazodone 500 mg, ripazepam 75 mg each day at bedtime, Tegretol 400 mg at bedtime and methylphenidate 10 mg b.i.d.  She discontineud ability and is now taking Rexulti 2 mg.  She has a history of hospitalizations including 3 at [facility] in the late 1990s, early 2000s when she had 2 courses of ECT lasting 7-10 sessions and approximately 3 other single session ECTs.  She stopped due to memory problems.  She kept with Dr. Coleman who she first met as an inpatient and has seen her for the past 18 years.  She had also been hospitalized psychiatrically at [facility] at age 23.  She previously worked with psychiatrist, Doug Sarabia for three years, stopping, in part, because she didn't feel he took her suicide attempt sufficiently seriously.  She reports her depression tends to vary.  Currently it is moderate, though it was more severe within the past year especially when she was having additional health problems.      MEDICAL HISTORY:  Ms.  Randy has a complex medical history.  She was diagnosed with MS in 1985.  Her psychiatrist at [facility] in [place], Dr. 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  She has used a scooter since 1992, using it more in the last 6 months than she had previously.  She also can use a walker.  She was diagnosed with fibromyalgia in 1997.   She is also seen at the [facility] for her eye care.  She began to see an eating disorder therapist weekly and has been somewhat erratically losing weight.    WEIGH Today is 290.7 lbs.   She is attending OA.    Wt Readings from Last 4 Encounters:   08/11/17 131.9 kg (290 lb 11.2 oz)   08/07/17 132.1 kg (291 lb 3.2 oz)   07/17/17 131.5 kg (290 lb)   07/10/17 132.8 kg (292 lb 11.2 oz)     Past Medical History:   Diagnosis Date     Depression, major, in partial remission (H)      Diabetes mellitus (H)     pre-diabetic     Fibromyalgia syndrome      Gastro-oesophageal reflux disease      Hyperlipemia      Lymphedema      Multiple sclerosis (H)     tremors with MS, all four limbs and head     Multiple sclerosis, secondary progressive (H)      Sleep apnea      Vocal cord paralysis, unilateral complete         Past Surgical History:   Procedure Laterality Date     COLONOSCOPY N/A 7/17/2017    Procedure: COMBINED COLONOSCOPY, SINGLE OR MULTIPLE BIOPSY/POLYPECTOMY BY BIOPSY;;  Surgeon: Wong Beaulieu MD;  Location:  GI     ENT SURGERY      throat 1969, vocal cord surgery with skin grafting     ESOPHAGOSCOPY, GASTROSCOPY, DUODENOSCOPY (EGD), COMBINED N/A 12/16/2014    Procedure: COMBINED ENDOSCOPIC ULTRASOUND, ESOPHAGOSCOPY, GASTROSCOPY, DUODENOSCOPY (EGD), FINE NEEDLE ASPIRATE/BIOPSY;  Surgeon: Yessica Santana MD;  Location:  GI     ESOPHAGOSCOPY, GASTROSCOPY, DUODENOSCOPY (EGD), COMBINED N/A 12/16/2014    Procedure: COMBINED ESOPHAGOSCOPY, GASTROSCOPY, DUODENOSCOPY (EGD), BIOPSY SINGLE OR MULTIPLE;  Surgeon: Yessica Santana  MD Ria;  Location:  GI     LAPAROSCOPIC APPENDECTOMY  2013    Procedure: LAPAROSCOPIC APPENDECTOMY;;  Surgeon: Salvador Morris MD;  Location:  OR     LAPAROSCOPIC BIOPSY LIVER  2013    Procedure: LAPAROSCOPIC BIOPSY LIVER;;  Surgeon: Salvador Morris MD;  Location:  OR     LAPAROSCOPIC GASTRIC SLEEVE  2013    Procedure: LAPAROSCOPIC GASTRIC SLEEVE;  LAPAROSCOPIC SLEEVE GASTRECTOMY/ LAPARSCOPIC  APPENDECTOMY /LIVER BIOPSIES/LIVER CYST DRAINAGE;  Surgeon: Salvador Morris MD;  Location:  OR     ORTHOPEDIC SURGERY      R wrist      Current Outpatient Prescriptions   Medication     brexpiprazole (REXULTI) 3 MG tablet     MINOCYCLINE HCL PO     triamcinolone (KENALOG) 0.5 % OINT ointment     trospium (SANCTURA) 20 MG tablet     cyabnocobalamin (VITAMIN B-12) 2500 MCG sublingual tablet     ranitidine (ZANTAC) 150 MG tablet     metroNIDAZOLE (METROGEL) 1 % gel     ketoconazole (NIZORAL) 2 % cream     Dextromethorphan-Guaifenesin (MUCINEX DM PO)     Melatonin 10 MG TABS     docusate sodium 100 MG tablet     methylphenidate (RITALIN) 10 MG tablet     carBAMazepine (TEGRETOL) 200 MG tablet     carBAMazepine (TEGRETOL) 200 MG tablet     Calcium Citrate-Vitamin D (CALCITRATE/VITAMIN D PO)     Cholecalciferol (VITAMIN D3 PO)     Cholecalciferol (VITAMIN D3 PO)     Mirtazapine (REMERON PO)     TRAZODONE HCL PO     Multiple Vitamin (MULTIVITAMINS PO)     atorvastatin (LIPITOR) 20 MG tablet     escitalopram (LEXAPRO) 10 MG tablet     No current facility-administered medications for this visit.      Facility-Administered Medications Ordered in Other Visits   Medication     ondansetron (ZOFRAN) injection     SOCIAL HISTORY:  Ms. Padilla grew up in the [facility] and is the oldest of 5 children in her family of origin.  Her father was a dentist who she believes was bipolar.  He  of lung cancer at age 72.  Her mother  of sepsis at age 80.  She had been diagnosed with breast cancer when Ms. Padilla  was 8.  She describes the marriage between her parents as misael.  She is 5 years older than her closest-aged sister and they are all within 4 years of each other.   Her daughter  12/3 and her mother   She tends to feel more depressed in winter.      Ms. Padilla attended [employer] for a year and later went to night school at the [employer].  She felt that she could not continue her education to the point of graduation because she was working full time and raising her daughter.  Her daughter  in  at age 31 of liver failure secondary to an accidental Tylenol overdose.  She had been recovering from a hysterectomy.      Ms. 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  She had to retire at the time secondary to her MS.  She has never .  She has not dated,  is interested in dating, and states that if she were she would be interested in a relationship with a woman.  There is no history of  service or legal problems.  She expresses concern about her increasing social isolation.  She does have at least 1 friend, Jael, who she met at a therapy group in .  She is active in facilitating groups for people with MS both in [place] and [place].  She lives alone and currently gets help from a home health aide, as well as home health nurse.      SESSION:  Mili arrived late due to her transportation company and the general road constructions which is contributing to many patients arriving late. We discussed her weight loss, which was mildly successful during the interval. We discussed decreasing the goal to 1500 janice/day but it has been about .  We discussed adherence.  She acknowledges poor sleep hygiene with considerable variation in terms of the time she goes to bed.   We reinforced her for her intention for weight loss.   She  participated fully and appeared to derive benefit. She had a coloscopy and had some polyps removed with a second one scheduled for 3 months from the first.  She acknowledged delaying this firs tcolonoscopy by 13 years.  Rapport was excellent.  The treatment plan was reviewed with the patient at today s visit. The treatment plan remains current based on the patient s status and progress to date.  Time in:  2:15  Time out: 3:00     DIAGNOSES:   Major depression, recurrent, mild (F33.o).   Behavioral factors affecting morbid obesity (E66.01).     PLAN:  Ms. Padilla will return to clinic on  9/27@ 2  for problem-solving and supportive therapy consistent with treatment plan..   Tx plan 8/26/16;  Next review due  8/26/17  (next session)       Ramin Olivo, PhD, A.B.P.P., L.P.   Director, Health Psychology     Answers for HPI/ROS submitted by the patient on 8/8/2017   If you checked off any problems, how difficult have these problems made it for you to do your work, take care of things at home, or get along with other people?: Somewhat difficult  PHQ9 TOTAL SCORE: 8

## 2017-08-11 NOTE — MR AVS SNAPSHOT
After Visit Summary   8/11/2017    Mili Padilla    MRN: [id number]           Patient Information     Date Of Birth          1955        Visit Information        Provider Department      8/11/2017 2:00 PM Ramin Olivo, PhD [facility]        Today's Diagnoses     Major depressive disorder, recurrent episode, mild (H)    -  1    Anxiety about health        Psychological factors affecting morbid obesity (H)        Unemployment           Follow-ups after your visit        Your next 10 appointments already scheduled     Aug 30, 2017 12:00 PM CDT   Follow Up with Neha Valadez RD, LD   [facility] Services ([facility] [place])    [address]  [facility] [address]   [phone]            Sep 25, 2017   Procedure with Wong Beaulieu MD   [facility] Endoscopy ([facility])    [address]  [place] [address]   [phone]           [facility] is located at [address] [place]            Sep 28, 2017  2:30 PM CDT   RETURN NEURO with Zach Pak MD   Eye Clinic ([facility])    [address]  [address]  [address]  [place] [facility] [id number] [phone]            Dec 12, 2017 11:15 AM CST   (Arrive by 10:45 AM)   RETURN THROAT with Clint Still MD   [facility] Ear Nose and Throat ([facility])    [address]  4th Floor  [place] [id number] [phone]           - This is a multidisciplinary care team visit with an ENT physician and may include a speech language pathologist. - It is important to know that if you are evaluated and/or treated by both a physician and a speech pathologist during your visit, your billing will reflect the input that you receive from both providers as separate professionals. Although most insurance plans do cover these services, we encourage you to  contact your insurance company prior to your visit to determine whether there are any coverage limitations that might affect you financially. - Billing/procedure codes that are frequently associated with visits to our clinic include (but are not limited to) the ones listed below. Most patients will not need all of these items, but it may be useful to ask your insurance company's patient . 65624: Flexible fiberoptic laryngoscopy, 65876: Laryngoscopy; flexible or rigid fiberoptic, with stroboscopy, 75290: Flexible endoscopic evaluation of swallowing, 46947: Evaluation of Voice and Resonance, 08927: Speech pathology treatment for voice, speech, communication, 06393: Speech pathology treatment for swallowing/oral function for feeding - If you have any concerns or questions, or if you would prefer not to have a speech pathologist involved in your visit, please contact our Clinic Coordinator at [phone], at least 24 hours prior to your appointment.              Who to contact     Please call your clinic at [phone] to:    Ask questions about your health    Make or cancel appointments    Discuss your medicines    Learn about your test results    Speak to your doctor   If you have compliments or concerns about an experience at your clinic, or if you wish to file a complaint, please contact [facility] Patient Relations at [phone] or email us at [email or web address]         Additional Information About Your Visit        MyChart Information     [employer] gives you secure access to your electronic health record. If you see a primary care provider, you can also send messages to your care team and make appointments. If you have questions, please call your primary care clinic.  If you do not have a primary care provider, please call [phone] and they will assist you.      J[employer]note is an electronic gateway that provides easy, online access to your medical  records. With [employer], you can request a clinic appointment, read your test results, renew a prescription or communicate with your care team.     To access your existing account, please contact your [facility] or call [phone] for assistance.        Care EveryWhere ID     This is your Care EveryWhere ID. This could be used by other organizations to access your [place] medical records  [id number]        Your Vitals Were     Last Period BMI (Body Mass Index)                12/10/2010 46.22 kg/m2           Blood Pressure from Last 3 Encounters:   07/17/17 114/63   06/05/17 110/62   08/16/16 135/63    Weight from Last 3 Encounters:   08/11/17 131.9 kg (290 lb 11.2 oz)   08/07/17 132.1 kg (291 lb 3.2 oz)   07/17/17 131.5 kg (290 lb)              Today, you had the following     No orders found for display       Primary Care Provider Office Phone # Fax #    Jasmyn Márquez [phone]       [email or web address] [facility] [address]  [facility] [address]        Equal Access to Services     [facility]: Hadii aad ku hadasho Soomaali, waaxda luqadaha, qaybta kaalmada adethiernoyada, marcos alberts . So [facility] [phone].    ATENCIÓN: Si habla español, tiene a abel disposición servicios gratuitos de asistencia lingüística. [facility] [phone].    We comply with applicable federal civil rights laws and [place] laws. We do not discriminate on the basis of race, color, national origin, age, disability sex, sexual orientation or gender identity.            Thank you!     Thank you for choosing [facility]  for your care. Our goal is always to provide you with excellent care. Hearing back from our patients is one way we can continue to improve our services. Please take a few minutes to complete the written survey that you may receive in the mail after your visit with us. Thank you!             Your Updated Medication List -  Protect others around you: Learn how to safely use, store and throw away your medicines at www.disposemymeds.org.          This list is accurate as of: 8/11/17  4:06 PM.  Always use your most recent med list.                   Brand Name Dispense Instructions for use Diagnosis    atorvastatin 20 MG tablet    LIPITOR     Take 20 mg by mouth daily.        CALCITRATE/VITAMIN D PO      2 tabs w/meals.        * carBAMazepine 200 MG tablet    TEGretol     Take 300 mg by mouth every morning. 300mg = 1.5 tablets.        * carBAMazepine 200 MG tablet    TEGretol     Take 400 mg by mouth At Bedtime.        cyabnocobalamin 2500 MCG sublingual tablet    VITAMIN B-12     Place 2,500 mcg under the tongue every other day        docusate sodium 100 MG tablet    COLACE     Take 100 mg by mouth daily        ketoconazole 2 % cream    NIZORAL     Apply topically daily    Bariatric surgery status, Obesity, Class III, BMI 40-49.9 (morbid obesity) (H)       LEXAPRO 10 MG tablet   Generic drug:  escitalopram      Take 20 mg by mouth 2 times daily        Melatonin 10 MG Tabs tablet      Take 10 mg by mouth At Bedtime        methylphenidate 10 MG tablet    RITALIN     Take 10 mg by mouth 2 times daily        metroNIDAZOLE 1 % gel    METROGEL          MINOCYCLINE HCL PO           MUCINEX DM PO      1200mg daily        MULTIVITAMINS PO      Take 2 tablets by mouth every evening. WITH IRON        ranitidine 150 MG tablet    ZANTAC    180 tablet    Take 1 tablet (150 mg) by mouth 2 times daily    GERD without esophagitis, Bariatric surgery status       REMERON PO      Take 75 mg by mouth At Bedtime. Takes ( 5)    15mg tablets=75mg        REXULTI 3 MG tablet   Generic drug:  brexpiprazole      Take 1 mg by mouth daily        TRAZODONE HCL PO      Take 500 mg by mouth At Bedtime        triamcinolone 0.5 % Oint ointment    KENALOG     Apply topically 2 times daily        trospium 20 MG tablet    SANCTURA     Take 20 mg by mouth 2 times daily         * VITAMIN D3 PO      Take 3,000 Units by mouth daily In a.m.        * VITAMIN D3 PO      Take 2,000 Units by mouth At Bedtime.        * Notice:  This list has 4 medication(s) that are the same as other medications prescribed for you. Read the directions carefully, and ask your doctor or other care provider to review them with you.

## 2017-08-19 ENCOUNTER — MEDICAL CORRESPONDENCE (OUTPATIENT)
Dept: HEALTH INFORMATION MANAGEMENT | Facility: CLINIC | Age: 62
End: 2017-08-19

## 2017-09-06 ENCOUNTER — HOSPITAL ENCOUNTER (OUTPATIENT)
Dept: NUTRITION | Facility: CLINIC | Age: 62
Discharge: HOME | End: 2017-09-06
Attending: DIETITIAN, REGISTERED | Admitting: DIETITIAN, REGISTERED
Payer: COMMERCIAL

## 2017-09-06 PROCEDURE — 97803 MED NUTRITION INDIV SUBSEQ: CPT | Performed by: DIETITIAN, REGISTERED

## 2017-09-08 NOTE — PROGRESS NOTES
"NUTRITION REASSESSMENT NOTE     REASON FOR ASSESSMENT  Mili Padilla referred by Dr. Márquez for MNT related to overweight.    Patient accompanied by self.      ASSESSMENT   Nutrition History:- Information obtained from patient.  Patient with long history of weight loss struggle.  Patient had sleeve gastrectomy 3 years ago.  Patient states she was able to maintain her 65 lb weight loss for 2 years. Then patient struggled with depression and regained her weight.  Patient has been tracking her food intake since surgery.  Patient has been trying to reduce calorie intake to <1700 calories per day but has difficulty doing so.  Patient recognizes that if she went to bed earlier, she would reduce her calorie intake.  Patient with history of multiple sclerosis which makes it difficult to prepare meals due to fatigue.  Patient continues to receive 5 [facility] meals per week.  Patient states her depression has increased which makes it more difficult to make healthy choices.     24 Diet Recall:  Breakfast: 1/2-3/4 cup cottage cheese and 2 cantaloupe stoner (Patient feels she does not notice any difference in fullness if eats 1/2 cup or 3/4 cup cottage cheese)  Lunch: 1/8 pizza , 1 marble brownie; lasagna, green beans, banana; 4 oz pulled chicken + 2 clementines   Dinner: Salvadorean toast and sausage; chicken stew with broccoli florets ([facility] meals)  Snack: 6 cups popcorn, 1 serving dark chocolate mint M&M's, 3 Jacobsen nut roll bites, Ponce's dark chocolate bar, beef jerky (1 package per day)  Beverages: water, Diet Dr. Pepper, Izze's drink    Dining out: limited     PHYSICAL FINDINGS  Observed:  No nutrition-related physical findings observed  Obtained from Chart/Interdisciplinary Team:  None noted    LABS  Labs reviewed    MEDICATIONS  Medications reviewed    ANTHROPOMETRICS   Height: 5'7\"  Weight: 295.3 lbs  BMI (kg/m2): 46.2 kg/m2  Weight Status:  Obesity Grade III BMI >40  %IBW: 219%  Weight History:   Wt Readings from " Last 5 Encounters:   08/07/17 132.1 kg (291 lb 3.2 oz)   07/17/17 131.5 kg (290 lb)   06/05/17 131.2 kg (289 lb 4.8 oz)   05/31/17 131.2 kg (289 lb 3.2 oz)   05/15/17 131 kg (288 lb 12.8 oz)     ASSESSED NUTRITION NEEDS  Estimated Energy Needs: 1626-7433 kcals/day (11-14 Kcal/Kg) for weight loss  Estimated Protein Needs: 61-74 grams protein/day (1-1.2 g pro/Kg) for maintenance  Estimated Fluid Needs: 4203-1947 mL/day (25-30 mL/kg)    EVALUATION/PROGRESS TOWARDS GOALS   Previous Goals:   Open food packages in the kitchen and leave package in the kitchen-improving   Increase cottage cheese to 3/4 cup in the morning-improving  Aim for regular sleep pattern on Friday night-not met     Previous Nutrition Diagnosis: Disordered eating pattern related to excessive snacking as evidenced by weight regain after sleeve gastrectomy and BMI of 45.6 kg/m2 -no change    Current Nutrition Diagnosis: Disordered eating pattern related to excessive snacking as evidenced by weight regain after sleeve gastrectomy and BMI of 46.2 kg/m2      INTERVENTIONS   Nutrition Prescription   Recommend avoiding liquids with meals to reduce volume of meals for intended weight loss; determine alternatives to emotional eating     IMPLEMENTATION   Meals and Snacks: 3 meals + snacks if hungry  Nutrition Education (Content):   a)  Discussed progress towards goals.  Reviewed food logs.  Discussed highlighted food items and caloric intake.  Encouraged patient to eat 3 food groups per meal and focus on nutrient density of food.  Patient is not eating consistent meals which has increased her snacking on non-nutritive foods.  Congratulated patient on her continued effort to track food intake.  Supported patient in her struggle with food.   Nutrition Education (Application):   a)  Discussed current eating pattern and determined ways to eat at consistent times by establishing regular sleep pattern.          b)  Patient verbalizes understanding of diet by stating  will eat leave food packages in the kitchen to prevent overeating.      Anticipate fair compliance     GOALS  Open food packages in the kitchen and leave package in the kitchen  Eat dinner every night   Aim for 11:30 PM bedtime nightly     FOLLOW UP/MONITORING   Progress towards goals will be monitored and evaluated per protocol and Practice Guidelines  Patient to follow up in 2 weeks  Patient has RD name and contact information    Time Spent with Patient  25 minutes    Filiberto Becerril, RD, LD  [facility] Outpatient Dietitian  [phone] (office phone)

## 2017-09-20 ENCOUNTER — HOSPITAL ENCOUNTER (OUTPATIENT)
Dept: NUTRITION | Facility: CLINIC | Age: 62
Discharge: HOME | End: 2017-09-20
Attending: NURSE ANESTHETIST, CERTIFIED REGISTERED | Admitting: NURSE ANESTHETIST, CERTIFIED REGISTERED
Payer: COMMERCIAL

## 2017-09-20 PROCEDURE — 97803 MED NUTRITION INDIV SUBSEQ: CPT | Performed by: DIETITIAN, REGISTERED

## 2017-09-22 NOTE — PROGRESS NOTES
"NUTRITION REASSESSMENT NOTE     REASON FOR ASSESSMENT  Mili Padilla referred by Dr. Márquez for MNT related to overweight.    Patient accompanied by self.      ASSESSMENT   Nutrition History:- Information obtained from patient.  Patient with long history of weight loss struggle.  Patient had sleeve gastrectomy 3 years ago.  Patient states she was able to maintain her 65 lb weight loss for 2 years. Then patient struggled with depression and regained her weight.  Patient has been tracking her food intake since surgery.  Patient aiming to keep her calories to 2000 per day.    Patient recognizes that if she went to bed earlier, she would reduce her calorie intake which patient has been able to go to bed by midnight.  Patient with history of multiple sclerosis which makes it difficult to prepare meals due to fatigue.  Patient continues to receive 5 [facility] meals per week which she dislikes some meals so does not eat them.     24 Diet Recall:  Breakfast: 1/2 cup cottage cheese and 2 cantaloupe stoner  Lunch: 4 oz pulled chicken + 2 clementines   Dinner: chicken stew with broccoli florets ([facility] meals)  Snack: 6 cups popcorn, 1 serving dark chocolate mint M&M's, 6 chocolate crinkle cookies, beef jerky (1 package per day)  Beverages: water, Diet Dr. Pepper, Izze's drink    Dining out: [employer] 1 time per week (fish filet); Vince's -cinnamon twists      PHYSICAL FINDINGS  Observed:  No nutrition-related physical findings observed  Obtained from Chart/Interdisciplinary Team:  None noted    LABS  Labs reviewed    MEDICATIONS  Medications reviewed    ANTHROPOMETRICS   Height: 5'7\"  Weight: 294 lbs  BMI (kg/m2): 46 kg/m2  Weight Status:  Obesity Grade III BMI >40  %IBW: 219%  Weight History:   Wt Readings from Last 5 Encounters:   08/07/17 132.1 kg (291 lb 3.2 oz)   07/17/17 131.5 kg (290 lb)   06/05/17 131.2 kg (289 lb 4.8 oz)   05/31/17 131.2 kg (289 lb 3.2 oz)   05/15/17 131 kg (288 lb 12.8 oz)     ASSESSED NUTRITION " NEEDS  Estimated Energy Needs: 5863-4166 kcals/day (11-14 Kcal/Kg) for weight loss  Estimated Protein Needs: 61-74 grams protein/day (1-1.2 g pro/Kg) for maintenance  Estimated Fluid Needs: 5314-8176 mL/day (25-30 mL/kg)    EVALUATION/PROGRESS TOWARDS GOALS   Previous Goals:   Open food packages in the kitchen and leave package in the kitchen-not met  Eat dinner every night-improving   Aim for 11:30 PM bedtime nightly-not met     Previous Nutrition Diagnosis: Disordered eating pattern related to excessive snacking as evidenced by weight regain after sleeve gastrectomy and BMI of 46.2 kg/m2 -no change    Current Nutrition Diagnosis: Disordered eating pattern related to excessive snacking as evidenced by weight regain after sleeve gastrectomy and BMI of 46 kg/m2      INTERVENTIONS   Nutrition Prescription   Recommend avoiding liquids with meals to reduce volume of meals for intended weight loss; determine alternatives to emotional eating     IMPLEMENTATION   Meals and Snacks: 3 meals + snacks if hungry  Nutrition Education (Content):   a)  Discussed progress towards goals.  Reviewed food logs.  Discussed highlighted food items and caloric intake.  Patient eating 0780-7652 calories per day.  Encouraged patient to eat 3 food groups per meal and focus on nutrient density of food.  Patient is not eating consistent meals which has increased her snacking on non-nutritive foods.  Patient's meals can consist of dessert item.  Congratulated patient on her continued effort to track food intake.  Supported patient in her struggle with food.   Nutrition Education (Application):   a)  Discussed current eating pattern and determined ways to reduce portion sizes of snacks.  Also discussed calling [facility] for alternative food choices to replace foods she dislikes.            b)  Patient verbalizes understanding of diet by stating will buy 1 package of snack item.        Anticipate fair compliance     GOALS  Buy smaller bag of  M&M's  Reduce cookie intake by buying 1 cookie at a time  Choose lower calorie option when dining at [employer]   Call [facility] to change food items that she dislikes    FOLLOW UP/MONITORING   Progress towards goals will be monitored and evaluated per protocol and Practice Guidelines  Patient to follow up in 2 weeks  Patient has RD name and contact information    Time Spent with Patient  25 minutes    Filiberto Becerril, RD, LD  [facility] Outpatient Dietitian  [phone] (office phone)

## 2017-09-23 DIAGNOSIS — H53.10 SUBJECTIVE VISUAL DISTURBANCE: Primary | ICD-10-CM

## 2017-09-25 ENCOUNTER — HOSPITAL ENCOUNTER (OUTPATIENT)
Facility: CLINIC | Age: 62
Discharge: HOME | End: 2017-09-25
Attending: COLON & RECTAL SURGERY | Admitting: COLON & RECTAL SURGERY
Payer: COMMERCIAL

## 2017-09-25 ENCOUNTER — SURGERY (OUTPATIENT)
Age: 62
End: 2017-09-25

## 2017-09-25 VITALS
SYSTOLIC BLOOD PRESSURE: 111 MMHG | DIASTOLIC BLOOD PRESSURE: 61 MMHG | BODY MASS INDEX: 45.99 KG/M2 | HEIGHT: 67 IN | RESPIRATION RATE: 18 BRPM | WEIGHT: 293 LBS | OXYGEN SATURATION: 97 %

## 2017-09-25 LAB — COLONOSCOPY: NORMAL

## 2017-09-25 PROCEDURE — 99153 MOD SED SAME PHYS/QHP EA: CPT | Performed by: COLON & RECTAL SURGERY

## 2017-09-25 PROCEDURE — 88305 TISSUE EXAM BY PATHOLOGIST: CPT | Performed by: COLON & RECTAL SURGERY

## 2017-09-25 PROCEDURE — 45381 COLONOSCOPY SUBMUCOUS NJX: CPT | Performed by: COLON & RECTAL SURGERY

## 2017-09-25 PROCEDURE — 25000128 H RX IP 250 OP 636: Performed by: COLON & RECTAL SURGERY

## 2017-09-25 PROCEDURE — 45385 COLONOSCOPY W/LESION REMOVAL: CPT | Performed by: COLON & RECTAL SURGERY

## 2017-09-25 PROCEDURE — 88305 TISSUE EXAM BY PATHOLOGIST: CPT | Mod: 26 | Performed by: COLON & RECTAL SURGERY

## 2017-09-25 RX ORDER — LIDOCAINE 40 MG/G
CREAM TOPICAL
Status: DISCONTINUED | OUTPATIENT
Start: 2017-09-25 | End: 2017-09-25 | Stop reason: HOSPADM

## 2017-09-25 RX ORDER — ONDANSETRON 2 MG/ML
4 INJECTION INTRAMUSCULAR; INTRAVENOUS
Status: DISCONTINUED | OUTPATIENT
Start: 2017-09-25 | End: 2017-09-25 | Stop reason: HOSPADM

## 2017-09-25 RX ORDER — FENTANYL CITRATE 50 UG/ML
INJECTION, SOLUTION INTRAMUSCULAR; INTRAVENOUS PRN
Status: DISCONTINUED | OUTPATIENT
Start: 2017-09-25 | End: 2017-09-25 | Stop reason: HOSPADM

## 2017-09-25 RX ADMIN — MIDAZOLAM HYDROCHLORIDE 2 MG: 1 INJECTION, SOLUTION INTRAMUSCULAR; INTRAVENOUS at 11:24

## 2017-09-25 RX ADMIN — FENTANYL CITRATE 100 MCG: 50 INJECTION, SOLUTION INTRAMUSCULAR; INTRAVENOUS at 11:23

## 2017-09-25 RX ADMIN — MIDAZOLAM HYDROCHLORIDE 1 MG: 1 INJECTION, SOLUTION INTRAMUSCULAR; INTRAVENOUS at 12:10

## 2017-09-25 NOTE — H&P
Pre-Endoscopy History and Physical     Mili Padilla MRN# [id number]   YOB: 1955 Age: 62 year old     Date of Procedure: 9/25/2017  Primary care provider: Jasmyn Márquez  Type of Endoscopy: colonoscopy  Reason for Procedure: polypectomy  Type of Anesthesia Anticipated: Moderate Sedation    HPI:    Mili is a 62 year old female who will be undergoing the above procedure.      A history and physical has been performed. The patient's medications and allergies have been reviewed. The risks and benefits of the procedure including the risk of bleeding, perforation, and missed lesions as well as the sedation options and risks were discussed with the patient.  All questions were answered and informed consent was obtained.      Allergies   Allergen Reactions     Augmentin      Sensitive,  Able to tolerate a few doses, otherwise hives on hands     Betaseron [Interferon Beta-1b]      Necrosis of skin at injection sites     Dust Mite Extract Unknown     Mold Unknown        Current Facility-Administered Medications   Medication     lidocaine 1 % 1 mL     lidocaine (LMX4) cream     sodium chloride (PF) 0.9% PF flush 3 mL     sodium chloride (PF) 0.9% PF flush 3 mL     sodium chloride (PF) 0.9% PF flush 3 mL     ondansetron (ZOFRAN) injection 4 mg     Facility-Administered Medications Ordered in Other Encounters   Medication     ondansetron (ZOFRAN) injection       Prescriptions Prior to Admission   Medication Sig Dispense Refill Last Dose     brexpiprazole (REXULTI) 3 MG tablet Take 1 mg by mouth daily   9/25/2017     trospium (SANCTURA) 20 MG tablet Take 20 mg by mouth 2 times daily   9/25/2017     ranitidine (ZANTAC) 150 MG tablet Take 1 tablet (150 mg) by mouth 2 times daily 180 tablet 3 9/25/2017     Melatonin 10 MG TABS Take 10 mg by mouth At Bedtime   9/24/2017     docusate sodium 100 MG tablet Take 100 mg by mouth daily    9/24/2017     methylphenidate (RITALIN) 10 MG tablet Take 10 mg by mouth 2 times daily    9/25/2017     carBAMazepine (TEGRETOL) 200 MG tablet Take 300 mg by mouth every morning. 300mg = 1.5 tablets.   9/25/2017     carBAMazepine (TEGRETOL) 200 MG tablet Take 400 mg by mouth At Bedtime.   9/25/2017     Mirtazapine (REMERON PO) Take 75 mg by mouth At Bedtime. Takes ( 5)    15mg tablets=75mg   9/24/2017     TRAZODONE HCL PO Take 500 mg by mouth At Bedtime    9/24/2017     atorvastatin (LIPITOR) 20 MG tablet Take 20 mg by mouth daily.   9/25/2017     escitalopram (LEXAPRO) 10 MG tablet Take 20 mg by mouth 2 times daily    9/25/2017     MINOCYCLINE HCL PO    7/17/2017     triamcinolone (KENALOG) 0.5 % OINT ointment Apply topically 2 times daily   7/16/2017     cyabnocobalamin (VITAMIN B-12) 2500 MCG sublingual tablet Place 2,500 mcg under the tongue every other day   Unknown     metroNIDAZOLE (METROGEL) 1 % gel    7/17/2017     ketoconazole (NIZORAL) 2 % cream Apply topically daily   7/17/2017     Dextromethorphan-Guaifenesin (MUCINEX DM PO) 1200mg daily   7/16/2017     Calcium Citrate-Vitamin D (CALCITRATE/VITAMIN D PO) 2 tabs w/meals.    Past Week     Cholecalciferol (VITAMIN D3 PO) Take 3,000 Units by mouth daily In a.m.   Past Week     Cholecalciferol (VITAMIN D3 PO) Take 2,000 Units by mouth At Bedtime.   Past Week     Multiple Vitamin (MULTIVITAMINS PO) Take 2 tablets by mouth every evening. WITH IRON   Past Week       Patient Active Problem List   Diagnosis     Multiple sclerosis, secondary progressive (H)     Fibromyalgia syndrome     Depression, major, in partial remission (H)     Vocal cord paralysis, unilateral complete     S/P bariatric surgery     Candidiasis, intertrigo     Psychological factors affecting morbid obesity (H)     Major depressive disorder, recurrent episode, moderate (H)     Morbid obesity with BMI of 45.0-49.9, adult (H)        Past Medical History:   Diagnosis Date     Depression, major, in partial remission (H)      Fibromyalgia syndrome      Gastro-oesophageal reflux  disease      Hyperlipemia      Lymphedema      Multiple sclerosis (H)     tremors with MS, all four limbs and head     Multiple sclerosis, secondary progressive (H)      Sleep apnea      Vocal cord paralysis, unilateral complete         Past Surgical History:   Procedure Laterality Date     COLONOSCOPY N/A 7/17/2017    Procedure: COMBINED COLONOSCOPY, SINGLE OR MULTIPLE BIOPSY/POLYPECTOMY BY BIOPSY;;  Surgeon: Wong Beaulieu MD;  Location:  GI     ENT SURGERY      throat 1969, vocal cord surgery with skin grafting     ESOPHAGOSCOPY, GASTROSCOPY, DUODENOSCOPY (EGD), COMBINED N/A 12/16/2014    Procedure: COMBINED ENDOSCOPIC ULTRASOUND, ESOPHAGOSCOPY, GASTROSCOPY, DUODENOSCOPY (EGD), FINE NEEDLE ASPIRATE/BIOPSY;  Surgeon: Yessica Santana MD;  Location:  GI     ESOPHAGOSCOPY, GASTROSCOPY, DUODENOSCOPY (EGD), COMBINED N/A 12/16/2014    Procedure: COMBINED ESOPHAGOSCOPY, GASTROSCOPY, DUODENOSCOPY (EGD), BIOPSY SINGLE OR MULTIPLE;  Surgeon: Yessica Santana MD;  Location: [facility]     LAPAROSCOPIC APPENDECTOMY  5/30/2013    Procedure: LAPAROSCOPIC APPENDECTOMY;;  Surgeon: Salvador Morris MD;  Location:  OR     LAPAROSCOPIC BIOPSY LIVER  5/30/2013    Procedure: LAPAROSCOPIC BIOPSY LIVER;;  Surgeon: Salvador Morris MD;  Location:  OR     LAPAROSCOPIC GASTRIC SLEEVE  5/30/2013    Procedure: LAPAROSCOPIC GASTRIC SLEEVE;  LAPAROSCOPIC SLEEVE GASTRECTOMY/ LAPARSCOPIC  APPENDECTOMY /LIVER BIOPSIES/LIVER CYST DRAINAGE;  Surgeon: Salvador Morris MD;  Location:  OR     ORTHOPEDIC SURGERY      R wrist 2010       Social History   Substance Use Topics     Smoking status: Former Smoker     Types: Cigarettes     Quit date: 9/27/1974     Smokeless tobacco: Never Used     Alcohol use 0.0 oz/week     0 Standard drinks or equivalent per week      Comment: Once a month.       Family History   Problem Relation Age of Onset     Breast Cancer Mother      Other Cancer Father      Breast Cancer Maternal Aunt      Breast  "Cancer Maternal Aunt      Breast Cancer Maternal Aunt      Breast Cancer Maternal Aunt      Glaucoma No family hx of      Macular Degeneration No family hx of      Amblyopia No family hx of          PHYSICAL EXAM:   /58  Ht 1.689 m (5' 6.5\")  Wt 133.4 kg (294 lb)  LMP 12/10/2010  SpO2 96%  BMI 46.74 kg/m2 Estimated body mass index is 46.74 kg/(m^2) as calculated from the following:    Height as of this encounter: 1.689 m (5' 6.5\").    Weight as of this encounter: 133.4 kg (294 lb).   Mental status - alert and oriented  RESP: lungs clear  CV: RRR  AIRWAY EXAM: Mallampatti Class II (visualization of the soft palate, fauces, and uvula)    IMPRESSION   ASA Class 3 - Severe systemic disease, but not incapacitating      Signed Electronically by: Wong Beaulieu  September 25, 2017    Colorectal Surgery  [phone] (office)  [phone] (pager)  [email or web address]          "

## 2017-09-26 LAB — COPATH REPORT: NORMAL

## 2017-09-27 ENCOUNTER — OFFICE VISIT (OUTPATIENT)
Dept: PSYCHOLOGY | Facility: CLINIC | Age: 62
End: 2017-09-27

## 2017-09-27 VITALS — WEIGHT: 290.3 LBS | BODY MASS INDEX: 46.15 KG/M2

## 2017-09-27 DIAGNOSIS — Z56.0 UNEMPLOYMENT: ICD-10-CM

## 2017-09-27 DIAGNOSIS — F54 PSYCHOLOGICAL FACTORS AFFECTING MORBID OBESITY (H): ICD-10-CM

## 2017-09-27 DIAGNOSIS — R45.89 ANXIETY ABOUT HEALTH: ICD-10-CM

## 2017-09-27 DIAGNOSIS — E66.01 PSYCHOLOGICAL FACTORS AFFECTING MORBID OBESITY (H): ICD-10-CM

## 2017-09-27 DIAGNOSIS — F33.0 MAJOR DEPRESSIVE DISORDER, RECURRENT EPISODE, MILD (H): Primary | ICD-10-CM

## 2017-09-27 SDOH — ECONOMIC STABILITY - INCOME SECURITY: UNEMPLOYMENT, UNSPECIFIED: Z56.0

## 2017-09-27 NOTE — MR AVS SNAPSHOT
After Visit Summary   9/27/2017    Mili Padilla    MRN: [id number]           Patient Information     Date Of Birth          1955        Visit Information        Provider Department      9/27/2017 2:00 PM Ramin Olivo, PhD [facility]        Today's Diagnoses     Major depressive disorder, recurrent episode, mild (H)    -  1    Psychological factors affecting morbid obesity (H)        Anxiety about health        Unemployment           Follow-ups after your visit        Your next 10 appointments already scheduled     Sep 28, 2017  2:30 PM CDT   RETURN NEURO with Zach Pak MD   [facility] ([facility])    [address]  [address]  [address]  [place] [address]   [phone]            Oct 04, 2017  1:30 PM CDT   Follow Up with Neha Valadez RD, LD   [facility] ([facility])    [address], [address]  [facility] [id number] [phone]              Who to contact     Please call your clinic at [phone] to:    Ask questions about your health    Make or cancel appointments    Discuss your medicines    Learn about your test results    Speak to your doctor   If you have compliments or concerns about an experience at your clinic, or if you wish to file a complaint, please contact [facility] Patient Relations at [phone] or email us at [email or web address]         Additional Information About Your Visit        [facility] [employer] Information     [employer] gives you secure access to your electronic health record. If you see a primary care provider, you can also send messages to your care team and make appointments. If you have questions, please call your primary care clinic.  If you do not have a primary care provider, please call [phone] and they will assist you.      [employer] is an electronic gateway that provides easy, online  access to your medical records. With [email or web address], you can request a clinic appointment, read your test results, renew a prescription or communicate with your care team.     To access your existing account, please contact your [facility] or call [phone] for assistance.        Care EveryWhere ID     This is your Care EveryWhere ID. This could be used by other organizations to access your [facility] medical records  [id number]        Your Vitals Were     Last Period BMI (Body Mass Index)                12/10/2010 46.15 kg/m2           Blood Pressure from Last 3 Encounters:   09/25/17 111/61   07/17/17 114/63   06/05/17 110/62    Weight from Last 3 Encounters:   09/27/17 131.7 kg (290 lb 4.8 oz)   09/25/17 133.4 kg (294 lb)   08/11/17 131.9 kg (290 lb 11.2 oz)              Today, you had the following     No orders found for display       Primary Care Provider Office Phone # Fax #    Jasmyn Márquez [phone]       [place] [employer] Pending sale to [facility] [address]  [facility] [address]        Equal Access to Services     [facility]: Hadii aad ku hadasho Soomaali, waaxda luqadaha, qaybta kaalmada adeegyada, marcos singh haymaryn giovanny alberts . So [facility] [phone].    ATENCIÓN: Si habla español, tiene a abel disposición servicios gratuitos de asistencia lingüística. Llame al [phone].    We comply with applicable federal civil rights laws and [place] laws. We do not discriminate on the basis of race, color, national origin, age, disability sex, sexual orientation or gender identity.            Thank you!     Thank you for choosing [facility]  for your care. Our goal is always to provide you with excellent care. Hearing back from our patients is one way we can continue to improve our services. Please take a few minutes to complete the written survey that you may receive in the mail after your visit with us. Thank you!             Your Updated  Medication List - Protect others around you: Learn how to safely use, store and throw away your medicines at www.disposemymeds.org.          This list is accurate as of: 9/27/17  3:07 PM.  Always use your most recent med list.                   Brand Name Dispense Instructions for use Diagnosis    atorvastatin 20 MG tablet    LIPITOR     Take 20 mg by mouth daily.        CALCITRATE/VITAMIN D PO      2 tabs w/meals.        * carBAMazepine 200 MG tablet    TEGretol     Take 300 mg by mouth every morning. 300mg = 1.5 tablets.        * carBAMazepine 200 MG tablet    TEGretol     Take 400 mg by mouth At Bedtime.        cyabnocobalamin 2500 MCG sublingual tablet    VITAMIN B-12     Place 2,500 mcg under the tongue every other day        docusate sodium 100 MG tablet    COLACE     Take 100 mg by mouth daily        ketoconazole 2 % cream    NIZORAL     Apply topically daily    Bariatric surgery status, Obesity, Class III, BMI 40-49.9 (morbid obesity) (H)       LEXAPRO 10 MG tablet   Generic drug:  escitalopram      Take 20 mg by mouth 2 times daily        Melatonin 10 MG Tabs tablet      Take 10 mg by mouth At Bedtime        methylphenidate 10 MG tablet    RITALIN     Take 10 mg by mouth 2 times daily        metroNIDAZOLE 1 % gel    METROGEL          MINOCYCLINE HCL PO           MUCINEX DM PO      1200mg daily        MULTIVITAMINS PO      Take 2 tablets by mouth every evening. WITH IRON        ranitidine 150 MG tablet    ZANTAC    180 tablet    Take 1 tablet (150 mg) by mouth 2 times daily    GERD without esophagitis, Bariatric surgery status       REMERON PO      Take 75 mg by mouth At Bedtime. Takes ( 5)    15mg tablets=75mg        REXULTI 3 MG tablet   Generic drug:  brexpiprazole      Take 1 mg by mouth daily        TRAZODONE HCL PO      Take 500 mg by mouth At Bedtime        triamcinolone 0.5 % Oint ointment    KENALOG     Apply topically 2 times daily        trospium 20 MG tablet    SANCTURA     Take 20 mg by mouth 2  times daily        * VITAMIN D3 PO      Take 3,000 Units by mouth daily In a.m.        * VITAMIN D3 PO      Take 2,000 Units by mouth At Bedtime.        * Notice:  This list has 4 medication(s) that are the same as other medications prescribed for you. Read the directions carefully, and ask your doctor or other care provider to review them with you.

## 2017-09-27 NOTE — PROGRESS NOTES
Health Psychology                  Clinic    Department of Medicine  Sherrie Crowe, Ph.D., L.P. [phone]                          Clinics and Surgery Center  [facility] Vin Ward, Ph.D.,  L.P. [phone]                 3rd [facility] Mail Code 741   Ramin Olivo, Ph.D., WALTER, L.P. [phone]     [address] [place] [address] Kathie Galvan, Ph.D., L.P. [phone]  [place] [address]       Health Psychology Follow-Up Note     Ms. Padilla is a 62-year-old woman self-referred for psychological consultation because her therapist of the last 24 years retired.  She is seen for problem-solving and supportive therapy for depression and multiple health issues.     HISTORY OF PRESENTING CONCERN:  Ms. Padilla reports a lengthy history of depression.  She currently sees a psychiatrist, Ban Coleman at [facility], and is taking Abilify 7.5 mg, trazodone 500 mg, ripazepam 75 mg each day at bedtime, Tegretol 400 mg at bedtime and methylphenidate 10 mg b.i.d.  She discontineud ability and is now taking Rexulti 2 mg.  She has a history of hospitalizations including 3 at [facility] in the late 1990s, early 2000s when she had 2 courses of ECT lasting 7-10 sessions and approximately 3 other single session ECTs.  She stopped due to memory problems.  She kept with Dr. Coleman who she first met as an inpatient and has seen her for the past 18 years.  She had also been hospitalized psychiatrically at [facility] at age 23.  She previously worked with psychiatrist, Doug Sarabia for three years, stopping, in part, because she didn't feel he took her suicide attempt sufficiently seriously.  She reports her depression tends to vary.  Currently it is moderate, though it was more severe within the past year especially when she was having additional health problems.      MEDICAL HISTORY:  Ms.  Randy has a complex medical history.  She was diagnosed with MS in 1985.  Her psychiatrist at [facility] in [place], Dr. 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  She has used a scooter since 1992, using it more in the last 6 months than she had previously.  She also can use a walker.  She was diagnosed with fibromyalgia in 1997.   She is also seen at the [facility] for her eye care.  She began to see an eating disorder therapist weekly and has been somewhat erratically losing weight.    WEIGH Today is 290.7 lbs.   She is attending OA.    Wt Readings from Last 4 Encounters:   09/27/17 131.7 kg (290 lb 4.8 oz)   09/25/17 133.4 kg (294 lb)   08/11/17 131.9 kg (290 lb 11.2 oz)   08/07/17 132.1 kg (291 lb 3.2 oz)     Past Medical History:   Diagnosis Date     Depression, major, in partial remission (H)      Fibromyalgia syndrome      Gastro-oesophageal reflux disease      Hyperlipemia      Lymphedema      Multiple sclerosis (H)     tremors with MS, all four limbs and head     Multiple sclerosis, secondary progressive (H)      Sleep apnea      Vocal cord paralysis, unilateral complete         Past Surgical History:   Procedure Laterality Date     COLONOSCOPY N/A 7/17/2017    Procedure: COMBINED COLONOSCOPY, SINGLE OR MULTIPLE BIOPSY/POLYPECTOMY BY BIOPSY;;  Surgeon: Wong Beaulieu MD;  Location:  GI     ENT SURGERY      throat 1969, vocal cord surgery with skin grafting     ESOPHAGOSCOPY, GASTROSCOPY, DUODENOSCOPY (EGD), COMBINED N/A 12/16/2014    Procedure: COMBINED ENDOSCOPIC ULTRASOUND, ESOPHAGOSCOPY, GASTROSCOPY, DUODENOSCOPY (EGD), FINE NEEDLE ASPIRATE/BIOPSY;  Surgeon: Yessica Santana MD;  Location:  GI     ESOPHAGOSCOPY, GASTROSCOPY, DUODENOSCOPY (EGD), COMBINED N/A 12/16/2014    Procedure: COMBINED ESOPHAGOSCOPY, GASTROSCOPY, DUODENOSCOPY (EGD), BIOPSY SINGLE OR MULTIPLE;  Surgeon: Yessica Santana MD;  Location: [facility]     LAPAROSCOPIC  APPENDECTOMY  2013    Procedure: LAPAROSCOPIC APPENDECTOMY;;  Surgeon: Salvador Morris MD;  Location:  OR     LAPAROSCOPIC BIOPSY LIVER  2013    Procedure: LAPAROSCOPIC BIOPSY LIVER;;  Surgeon: Salvador Morris MD;  Location:  OR     LAPAROSCOPIC GASTRIC SLEEVE  2013    Procedure: LAPAROSCOPIC GASTRIC SLEEVE;  LAPAROSCOPIC SLEEVE GASTRECTOMY/ LAPARSCOPIC  APPENDECTOMY /LIVER BIOPSIES/LIVER CYST DRAINAGE;  Surgeon: Salvador Morris MD;  Location:  OR     ORTHOPEDIC SURGERY      R wrist      Current Outpatient Prescriptions   Medication     brexpiprazole (REXULTI) 3 MG tablet     MINOCYCLINE HCL PO     triamcinolone (KENALOG) 0.5 % OINT ointment     trospium (SANCTURA) 20 MG tablet     cyabnocobalamin (VITAMIN B-12) 2500 MCG sublingual tablet     ranitidine (ZANTAC) 150 MG tablet     metroNIDAZOLE (METROGEL) 1 % gel     ketoconazole (NIZORAL) 2 % cream     Dextromethorphan-Guaifenesin (MUCINEX DM PO)     Melatonin 10 MG TABS     docusate sodium 100 MG tablet     methylphenidate (RITALIN) 10 MG tablet     carBAMazepine (TEGRETOL) 200 MG tablet     carBAMazepine (TEGRETOL) 200 MG tablet     Calcium Citrate-Vitamin D (CALCITRATE/VITAMIN D PO)     Cholecalciferol (VITAMIN D3 PO)     Cholecalciferol (VITAMIN D3 PO)     Mirtazapine (REMERON PO)     TRAZODONE HCL PO     Multiple Vitamin (MULTIVITAMINS PO)     atorvastatin (LIPITOR) 20 MG tablet     escitalopram (LEXAPRO) 10 MG tablet     No current facility-administered medications for this visit.      Facility-Administered Medications Ordered in Other Visits   Medication     ondansetron (ZOFRAN) injection     SOCIAL HISTORY:  Ms. Padilla grew up in the [facility] and is the oldest of 5 children in her family of origin.  Her father was a dentist who she believes was bipolar.  He  of lung cancer at age 72.  Her mother  of sepsis at age 80.  She had been diagnosed with breast cancer when Ms. Padilla was 8.  She describes the marriage between her  parents as misael.  She is 5 years older than her closest-aged sister and they are all within 4 years of each other.   Her daughter  12/3 and her mother   She tends to feel more depressed in winter.      Ms. Padilla attended [employer] for a year and later went to [employer] at the [employer].  She felt that she could not continue her education to the point of graduation because she was working full time and raising her daughter.  Her daughter  in  at age 31 of liver failure secondary to an accidental Tylenol overdose.  She had been recovering from a hysterectomy.      Ms. 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  She had to retire at the time secondary to her MS.  She has never .  She has not dated,  is interested in dating, and states that if she were she would be interested in a relationship with a woman.  There is no history of  service or legal problems.  She expresses concern about her increasing social isolation.  She does have at least 1 friend, Jael, who she met at a therapy group in .  She is active in facilitating groups for people with MS both in [employer] and [place].  She lives alone and currently gets help from a home health aide, as well as home health nurse.      SESSION:  Mili arrived punctually for today's meeting. We discussed her weight loss, which was trivial during the interval.  I encouraged her to use MyFitnessPal, with goal of 1500 calories/day and gave her a reading.  She wished to discuss her antidepressants.  She had a genetic test which suggested that Lexapro wasn't helpful.  She will discuss with her psychiatrist.  She is frustrated by now having had 2 recent colonoscopies which revealed polyps.  She now is considering a CT.  We discussed her frustartion with the health situation.  She would  like to be seen more frequently.  She participated fully and appeared to derive benefit.  Rapport was excellent.  The treatment plan was signed.  Time in:  1:58  Time out: 2:55     DIAGNOSES:   Major depression, recurrent, mild (F33.o).   Behavioral factors affecting morbid obesity (E66.01).     PLAN:  Ms. Padilla will return to clinic on  10/24 @ 3  for problem-solving and supportive therapy consistent with treatment plan..   Tx plan 8/26/16;  Next review due  12/27/17     Ramin Olivo, PhD, A.B.P.P., L.P.   Director, Health Psychology

## 2017-10-04 ENCOUNTER — HOSPITAL ENCOUNTER (OUTPATIENT)
Dept: NUTRITION | Facility: CLINIC | Age: 62
Discharge: HOME | End: 2017-10-04
Attending: DIETITIAN, REGISTERED | Admitting: DIETITIAN, REGISTERED
Payer: COMMERCIAL

## 2017-10-04 VITALS — BODY MASS INDEX: 46.28 KG/M2 | WEIGHT: 291.1 LBS

## 2017-10-04 PROCEDURE — 97803 MED NUTRITION INDIV SUBSEQ: CPT | Performed by: DIETITIAN, REGISTERED

## 2017-10-04 NOTE — PROGRESS NOTES
"NUTRITION REASSESSMENT NOTE     REASON FOR ASSESSMENT  Mili Padilla referred by Dr. Márquez for MNT related to overweight.    Patient accompanied by self.      ASSESSMENT   Nutrition History:- Information obtained from patient.  Patient with long history of weight loss struggle.  Patient had sleeve gastrectomy 3 years ago.  Patient states she was able to maintain her 65 lb weight loss for 2 years. Then patient struggled with depression and regained her weight.  Patient has been tracking her food intake since surgery.  Patient was able to keep her calories closer to 2000 per day than she has in the past few weeks.  Patient recognizes that if she went to bed earlier, she would reduce her calorie intake.  Patient with history of multiple sclerosis which makes it difficult to prepare meals due to fatigue.  Patient continues to receive 5 [facility] meals per week.      24 Diet Recall:  Breakfast: 1/2 cup cottage cheese and 2 cantaloupe stoner  Lunch: 4 oz pulled chicken + 2 clementines   Dinner: chicken stew with broccoli florets ([facility] meals)  Snack: 6 cups popcorn, 1 serving dark chocolate mint M&M's, 6 chocolate crinkle cookies, beef jerky (1 package per day)  Beverages: water, Diet Dr. Pepper, Izze's drink    Dining out: [employer] 1 time per week (fish filet); [place] -cinnamon twists      PHYSICAL FINDINGS  Observed:  No nutrition-related physical findings observed  Obtained from Chart/Interdisciplinary Team:  None noted    LABS  Labs reviewed    MEDICATIONS  Medications reviewed    ANTHROPOMETRICS   Height: 5'7\"  Weight: 291.1 lbs  BMI (kg/m2): 45.5 kg/m2  Weight Status:  Obesity Grade III BMI >40  %IBW: 215%  Weight History:   Wt Readings from Last 5 Encounters:   10/04/17 132 kg (291 lb 1.6 oz)   09/25/17 133.4 kg (294 lb)   08/07/17 132.1 kg (291 lb 3.2 oz)   07/17/17 131.5 kg (290 lb)   06/05/17 131.2 kg (289 lb 4.8 oz)       ASSESSED NUTRITION NEEDS  Estimated Energy Needs: 6499-5197 kcals/day (11-14 " Kcal/Kg) for weight loss  Estimated Protein Needs: 61-74 grams protein/day (1-1.2 g pro/Kg) for maintenance  Estimated Fluid Needs: 9448-0493 mL/day (25-30 mL/kg)    EVALUATION/PROGRESS TOWARDS GOALS   Previous Goals:   Buy smaller bag of M&M's-not met   Reduce cookie intake by buying 1 cookie at a time-not met   Choose lower calorie option when dining at [employer]-improving  Call [facility] to change food items that she dislikes-not met     Previous Nutrition Diagnosis: Disordered eating pattern related to excessive snacking as evidenced by weight regain after sleeve gastrectomy and BMI of 46.2 kg/m2 -no change    Current Nutrition Diagnosis: Disordered eating pattern related to excessive snacking as evidenced by weight regain after sleeve gastrectomy and BMI of 46 kg/m2      INTERVENTIONS   Nutrition Prescription   Recommend avoiding liquids with meals to reduce volume of meals for intended weight loss; determine alternatives to emotional eating     IMPLEMENTATION   Meals and Snacks: 3 meals + snacks if hungry  Nutrition Education (Content):   a)  Discussed progress towards goals.  Reviewed food logs.  Discussed highlighted food items and caloric intake.  Patient eating 4479-5652 calories per day.  Encouraged patient to eat 3 food groups per meal and focus on nutrient density of food.  Patient is not eating consistent meals which has increased her snacking on non-nutritive foods.  Congratulated patient on her continued effort to track food intake.  Supported patient in her struggle with food.   Nutrition Education (Application):   a)  Discussed current eating pattern and determined ways to reduce portion sizes of snacks and meal consistency.  Also discussed calling [facility] for alternative food choices to replace foods she dislikes.            b)  Patient verbalizes understanding of diet by stating will eat lunch earlier.          Anticipate fair compliance     GOALS  Eat lunch between 11:00-11:30 when has  appointments in the afternoon  Aim for 1900 calories per day  Make space in the kitchen to allow for portioning snacks     FOLLOW UP/MONITORING   Progress towards goals will be monitored and evaluated per protocol and Practice Guidelines  Patient to follow up in 2 weeks  Patient has RD name and contact information    Time Spent with Patient  25 minutes    Filiberto Becerril, RD, LD  [facility] Outpatient Dietitian  [phone] (office phone)

## 2017-10-20 ENCOUNTER — HOSPITAL ENCOUNTER (OUTPATIENT)
Dept: NUTRITION | Facility: CLINIC | Age: 62
Discharge: HOME | End: 2017-10-20
Attending: DIETITIAN, REGISTERED | Admitting: DIETITIAN, REGISTERED
Payer: COMMERCIAL

## 2017-10-20 PROCEDURE — 97803 MED NUTRITION INDIV SUBSEQ: CPT | Performed by: DIETITIAN, REGISTERED

## 2017-10-20 NOTE — PROGRESS NOTES
"NUTRITION REASSESSMENT NOTE     REASON FOR ASSESSMENT  Mili Padilla referred by Dr. Márquez for MNT related to overweight.    Patient accompanied by self.      ASSESSMENT   Nutrition History:- Information obtained from patient.  Patient with long history of weight loss struggle.  Patient had sleeve gastrectomy 3 years ago.  Patient states she was able to maintain her 65 lb weight loss for 2 years. Then patient struggled with depression and regained her weight.  Patient has been tracking her food intake since surgery.  Patient aiming to keep her calories to 2000 per day.  Patient recognizes that if she went to bed earlier, she would reduce her calorie intake which patient has been able to go to bed by midnight.  Patient with history of multiple sclerosis which makes it difficult to prepare meals due to fatigue.  Patient continues to receive 5 [facility] meals per week.  Patient states her depression has increased so having more struggles with her food choices.      24 Diet Recall:  Breakfast: 1/2 cup cottage cheese and 2 cantaloupe stoner  Lunch: 4 oz pulled chicken + 2 chocolate crinkle cookies    Dinner: 2 oz salami, 2 oz cheese and crackers   Snack: 6 cups popcorn, 1 serving dark chocolate mint M&M's, 6 chocolate crinkle cookies, beef jerky (1 package per day)  Beverages: water, Diet Dr. Pepper, Izze's drink    Dining out: [employer] 1 time per week (eggs, sausage biscuit); [place]'s -cinnamon twists      PHYSICAL FINDINGS  Observed:  No nutrition-related physical findings observed  Obtained from Chart/Interdisciplinary Team:  None noted    LABS  Labs reviewed    MEDICATIONS  Medications reviewed    ANTHROPOMETRICS   Height: 5'7\"  Weight: 293.7 lbs  BMI (kg/m2): 46 kg/m2  Weight Status:  Obesity Grade III BMI >40  %IBW: 219%  Weight History:   Wt Readings from Last 5 Encounters:   10/04/17 132 kg (291 lb 1.6 oz)   09/25/17 133.4 kg (294 lb)   08/07/17 132.1 kg (291 lb 3.2 oz)   07/17/17 131.5 kg (290 lb)   06/05/17 " 131.2 kg (289 lb 4.8 oz)     ASSESSED NUTRITION NEEDS  Estimated Energy Needs: 5469-0151 kcals/day (11-14 Kcal/Kg) for weight loss  Estimated Protein Needs: 61-74 grams protein/day (1-1.2 g pro/Kg) for maintenance  Estimated Fluid Needs: 2030-9514 mL/day (25-30 mL/kg)    EVALUATION/PROGRESS TOWARDS GOALS   Previous Goals:   Buy smaller bag of M&M's-not met  Reduce cookie intake by buying 1 cookie at a time-not met  Choose lower calorie option when dining at [employer]-not met   Call [facility] to change food items that she dislikes-not met     Previous Nutrition Diagnosis: Disordered eating pattern related to excessive snacking as evidenced by weight regain after sleeve gastrectomy and BMI of 46 kg/m2 -no change    Current Nutrition Diagnosis: Disordered eating pattern related to excessive snacking as evidenced by weight regain after sleeve gastrectomy and BMI of 46 kg/m2      INTERVENTIONS   Nutrition Prescription   Recommend avoiding liquids with meals to reduce volume of meals for intended weight loss; determine alternatives to emotional eating     IMPLEMENTATION   Meals and Snacks: 3 meals + snacks if hungry  Nutrition Education (Content):   a)  Discussed progress towards goals.  Reviewed food logs.  Patient continues to consume more calories through snacks than her meals.  Encouraged patient to eat 3 food groups per meal and focus on nutrient density of food.  Congratulated patient on her continued effort to track food intake.  Supported patient in her struggle with food.   Nutrition Education (Application):   a)  Discussed current eating pattern and determined ways to reduce portion sizes of snacks.  Also discussed calling [facility] for alternative food choices to replace foods she dislikes.            b)  Patient verbalizes understanding of diet by stating will focus on eating balanced meals at lunch and dinner.  Anticipate fair compliance     GOALS  Eat 3 food groups per meal at lunch and dinner   Call [facility] to change food items that she dislikes  Choose lower calorie option at [facility]    FOLLOW UP/MONITORING   Progress towards goals will be monitored and evaluated per protocol and Practice Guidelines  Patient to follow up in 2 weeks  Patient has RD name and contact information    Time Spent with Patient  25 minutes    Filiberto Becerril, RD, LD  [facility] Outpatient Dietitian  [phone] (office phone)

## 2017-11-03 ENCOUNTER — HOSPITAL ENCOUNTER (OUTPATIENT)
Dept: NUTRITION | Facility: CLINIC | Age: 62
Discharge: HOME | End: 2017-11-03
Attending: FAMILY MEDICINE | Admitting: FAMILY MEDICINE
Payer: COMMERCIAL

## 2017-11-03 PROCEDURE — 97803 MED NUTRITION INDIV SUBSEQ: CPT | Performed by: DIETITIAN, REGISTERED

## 2017-11-03 NOTE — PROGRESS NOTES
"NUTRITION REASSESSMENT NOTE     REASON FOR ASSESSMENT  Mili Padilla referred by Dr. Márquez for MNT related to overweight.    Patient accompanied by self.      ASSESSMENT   Nutrition History:- Information obtained from patient.  Patient with long history of weight loss struggle.  Patient had sleeve gastrectomy 3 years ago.  Patient states she was able to maintain her 65 lb weight loss for 2 years. Then patient struggled with depression and regained her weight.  Patient has been tracking her food intake since surgery.  Patient aiming to keep her calories to 2000 per day.  Patient with history of multiple sclerosis which makes it difficult to prepare meals due to fatigue.  Patient continues to receive 5 [facility] meals per week.  Patient states she fell the other day and has made her want to focus on weight loss.    24 Diet Recall:  Breakfast: 1/2 cup cottage cheese and 2 cantaloupe stoner  Lunch: salami, cheese and banana     Dinner: pizza + brownies  Snack: 6 cups popcorn, 1 serving dark chocolate mint M&M's, 6 chocolate crinkle cookies, beef jerky (1 package per day)  Beverages: water, Diet Dr. Pepper, Izze's drink    Dining out: [employer] 1 time per week (eggs, sausage biscuit); [place]'s -cinnamon twists      PHYSICAL FINDINGS  Observed:  No nutrition-related physical findings observed  Obtained from Chart/Interdisciplinary Team:  None noted    LABS  Labs reviewed    MEDICATIONS  Medications reviewed    ANTHROPOMETRICS   Height: 5'7\"  Weight: 291.8 lbs  BMI (kg/m2): 45.6 kg/m2  Weight Status:  Obesity Grade III BMI >40  %IBW: 216%  Weight History:   Wt Readings from Last 5 Encounters:   10/04/17 132 kg (291 lb 1.6 oz)   09/25/17 133.4 kg (294 lb)   08/07/17 132.1 kg (291 lb 3.2 oz)   07/17/17 131.5 kg (290 lb)   06/05/17 131.2 kg (289 lb 4.8 oz)     ASSESSED NUTRITION NEEDS  Estimated Energy Needs: 3988-3128 kcals/day (11-14 Kcal/Kg) for weight loss  Estimated Protein Needs: 61-74 grams protein/day (1-1.2 g " pro/Kg) for maintenance  Estimated Fluid Needs: 8803-7967 mL/day (25-30 mL/kg)    EVALUATION/PROGRESS TOWARDS GOALS   Previous Goals:   Eat 3 food groups per meal at lunch and dinner-not met   Call [facility] to change food items that she dislikes-met  Choose lower calorie option at [facility]-met     Previous Nutrition Diagnosis: Disordered eating pattern related to excessive snacking as evidenced by weight regain after sleeve gastrectomy and BMI of 46 kg/m2 -no change    Current Nutrition Diagnosis: Disordered eating pattern related to excessive snacking as evidenced by weight regain after sleeve gastrectomy and BMI of 45.6 kg/m2      INTERVENTIONS   Nutrition Prescription   Recommend avoiding liquids with meals to reduce volume of meals for intended weight loss; determine alternatives to emotional eating     IMPLEMENTATION   Meals and Snacks: 3 meals + snacks if hungry  Nutrition Education (Content):   a)  Discussed progress towards goals.  Reviewed food logs.  Patient continues to consume more calories through snacks than her meals.  Patient consuming 6029-5411 calories per day.  Encouraged patient to eat 3 food groups per meal and focus on nutrient density of food.  Congratulated patient on her continued effort to track food intake.  Supported patient in her struggle with food.   Nutrition Education (Application):   a)  Discussed current food choices and determined vegetables patient was willing to eat as snacks.              b)  Patient verbalizes understanding of diet by stating will focus on replacing cookies with raw vegetables.  Anticipate fair compliance     GOALS  Avoid buying popcorn and chocolate cookies  Choose raw vegetables as snacks  Use timer for lunch and dinner to reduce speed of meal     FOLLOW UP/MONITORING   Progress towards goals will be monitored and evaluated per protocol and Practice Guidelines  Patient to follow up in 2 weeks  Patient has RD name and contact information    Time Spent with  Patient  25 minutes    Filiberto Becerril, RD, LD  [facility] Outpatient Dietitian  [phone] (office phone)

## 2017-11-17 ENCOUNTER — HOSPITAL ENCOUNTER (OUTPATIENT)
Dept: NUTRITION | Facility: CLINIC | Age: 62
Discharge: HOME | End: 2017-11-17
Attending: DIETITIAN, REGISTERED | Admitting: DIETITIAN, REGISTERED
Payer: COMMERCIAL

## 2017-11-17 VITALS — WEIGHT: 293 LBS | BODY MASS INDEX: 46.74 KG/M2

## 2017-11-17 PROCEDURE — 97803 MED NUTRITION INDIV SUBSEQ: CPT | Performed by: DIETITIAN, REGISTERED

## 2017-11-17 NOTE — PROGRESS NOTES
"NUTRITION REASSESSMENT NOTE     REASON FOR ASSESSMENT  Mili Padilla referred by Dr. Márquez for MNT related to overweight.    Patient accompanied by self.      ASSESSMENT   Nutrition History:- Information obtained from patient.  Patient with long history of weight loss struggle.  Patient had sleeve gastrectomy 3 years ago.  Patient states she was able to maintain her 65 lb weight loss for 2 years. Then patient struggled with depression and regained her weight.  Patient has been tracking her food intake since surgery.  Patient aiming to keep her calories to 2000 per day.  Patient with history of multiple sclerosis which makes it difficult to prepare meals due to fatigue.  Patient continues to receive 5 Open Arms meals per week.  Patient feels her depression has increased and is not motivated.  Patient frustrated that she is not losing weight.       24 Diet Recall:  Breakfast: 1/2 cup cottage cheese and 2 cantaloupe stoner  Lunch: beef jerky, cheese and banana     Dinner: pizza   Snack: 6 cups popcorn, 1 serving dark chocolate mint M&M's, 6 chocolate crinkle cookies, beef jerky (1 package per day)  Beverages: water, Diet Dr. Pepper, Izze's drink    Dining out: Biggs's 1 time per week (eggs, sausage biscuit); Vince's -cinnamon twists      PHYSICAL FINDINGS  Observed:  No nutrition-related physical findings observed  Obtained from Chart/Interdisciplinary Team:  None noted    LABS  Labs reviewed    MEDICATIONS  Medications reviewed    ANTHROPOMETRICS   Height: 5'7\"  Weight: 294 lbs  BMI (kg/m2): 46 kg/m2  Weight Status:  Obesity Grade III BMI >40  %IBW: 217%  Weight History:   Wt Readings from Last 5 Encounters:   11/17/17 133.4 kg (294 lb)   10/04/17 132 kg (291 lb 1.6 oz)   09/25/17 133.4 kg (294 lb)   08/07/17 132.1 kg (291 lb 3.2 oz)   07/17/17 131.5 kg (290 lb)     ASSESSED NUTRITION NEEDS  Estimated Energy Needs: 7569-3663 kcals/day (11-14 Kcal/Kg) for weight loss  Estimated Protein Needs: 61-74 grams " protein/day (1-1.2 g pro/Kg) for maintenance  Estimated Fluid Needs: 5940-2541 mL/day (25-30 mL/kg)    EVALUATION/PROGRESS TOWARDS GOALS   Previous Goals:   Avoid buying popcorn and chocolate cookies-improving  Choose raw vegetables as snacks-improving  Use timer for lunch and dinner to reduce speed of meal-not met     Previous Nutrition Diagnosis: Disordered eating pattern related to excessive snacking as evidenced by weight regain after sleeve gastrectomy and BMI of 45.6 kg/m2 -no change    Current Nutrition Diagnosis: Disordered eating pattern related to excessive snacking as evidenced by weight regain after sleeve gastrectomy and BMI of 46 kg/m2      INTERVENTIONS   Nutrition Prescription   Recommend avoiding liquids with meals to reduce volume of meals for intended weight loss; determine alternatives to emotional eating     IMPLEMENTATION   Meals and Snacks: 3 meals + snacks if hungry  Nutrition Education (Content):   a)  Discussed progress towards goals.  Reviewed food logs.  Patient continues to consume more calories through snacks than her meals.  Patient consuming 6229-6405 calories per day.  Patient wants to refocus on losing weight.  Congratulated patient on her continued effort to track food intake.  Supported patient in her struggle with food.   Nutrition Education (Application):   a)  Discussed current food choices and suggested hot entree options as patient has frozen meals she could use for quick meal prep.              b)  Patient verbalizes understanding of diet by stating will try eating hot meals for lunch and dinner.    Anticipate fair compliance     GOALS  Choose Open Arms meal and frozen entrees for lunch and dinner  Choose raw vegetables as snacks  Use timer for lunch and dinner to reduce speed of meal     FOLLOW UP/MONITORING   Progress towards goals will be monitored and evaluated per protocol and Practice Guidelines  Patient to follow up in 4 weeks  Patient has RD name and contact  information    Time Spent with Patient  25 minutes    Filiberto Becerril, RD, LD  [facility] Outpatient Dietitian  [phone] (office phone)

## 2017-11-28 ENCOUNTER — OFFICE VISIT (OUTPATIENT)
Dept: PSYCHOLOGY | Facility: CLINIC | Age: 62
End: 2017-11-28

## 2017-11-28 VITALS — WEIGHT: 293 LBS | BODY MASS INDEX: 46.89 KG/M2

## 2017-11-28 DIAGNOSIS — F54 PSYCHOLOGICAL FACTORS AFFECTING MORBID OBESITY (H): ICD-10-CM

## 2017-11-28 DIAGNOSIS — F33.1 MAJOR DEPRESSIVE DISORDER, RECURRENT EPISODE, MODERATE (H): Primary | ICD-10-CM

## 2017-11-28 DIAGNOSIS — E66.01 PSYCHOLOGICAL FACTORS AFFECTING MORBID OBESITY (H): ICD-10-CM

## 2017-11-28 DIAGNOSIS — Z56.0 UNEMPLOYMENT: ICD-10-CM

## 2017-11-28 DIAGNOSIS — R45.89 ANXIETY ABOUT HEALTH: ICD-10-CM

## 2017-11-28 SDOH — ECONOMIC STABILITY - INCOME SECURITY: UNEMPLOYMENT, UNSPECIFIED: Z56.0

## 2017-11-28 NOTE — PROGRESS NOTES
Health Psychology                  Clinic    Department of Medicine  Sherrie Crowe, Ph.D., L.P. [phone]                          Clinics and Surgery Center  [facility] Vin Ward, Ph.D.,  L.P. [phone]                 3rd [facility] Mail Code 741   Ramin Olivo, Ph.D., WALTER, L.P. [phone]     [address] [place] [address] Kathie Galvan, Ph.D., L.P. [phone]  [place] [address]       Health Psychology Follow-Up Note     Ms. Padilla is a 62-year-old woman self-referred for psychological consultation because her therapist of the last 24 years retired.  She is seen for problem-solving and supportive therapy for depression and multiple health issues.     HISTORY OF PRESENTING CONCERN:  Ms. Padilla reports a lengthy history of depression.  She currently sees a psychiatrist, Ban Coleman at [facility], and is taking Abilify 7.5 mg, trazodone 500 mg, ripazepam 75 mg each day at bedtime, Tegretol 400 mg at bedtime and methylphenidate 10 mg b.i.d.  She discontineud ability and is now taking Rexulti 2 mg.  She has a history of hospitalizations including 3 at [facility] in the late 1990s, early 2000s when she had 2 courses of ECT lasting 7-10 sessions and approximately 3 other single session ECTs.  She stopped due to memory problems.  She kept with Dr. Coleman who she first met as an inpatient and has seen her for the past 18 years.  She had also been hospitalized psychiatrically at [facility] at age 23.  She previously worked with psychiatrist, Doug Sarabia for three years, stopping, in part, because she didn't feel he took her suicide attempt sufficiently seriously.  She reports her depression tends to vary.  Currently it is moderate, though it was more severe within the past year especially when she was having additional health problems.      MEDICAL HISTORY:  Ms.  Randy has a complex medical history.  She was diagnosed with MS in 1985.  Her psychiatrist at [facility] in [place], Dr. 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  She has used a scooter since 1992, using it more in the last 6 months than she had previously.  She also can use a walker.  She was diagnosed with fibromyalgia in 1997.   She is also seen at the [facility] for her eye care.  She began to see an eating disorder therapist weekly and has been somewhat erratically losing weight.    WEIGH Today is 294.9 up from  290.7 lbs. last session with me.     She is attending OA.    Wt Readings from Last 4 Encounters:   11/28/17 133.8 kg (294 lb 14.4 oz)   11/17/17 133.4 kg (294 lb)   10/04/17 132 kg (291 lb 1.6 oz)   09/27/17 131.7 kg (290 lb 4.8 oz)     Past Medical History:   Diagnosis Date     Depression, major, in partial remission (H)      Fibromyalgia syndrome      Gastro-oesophageal reflux disease      Hyperlipemia      Lymphedema      Multiple sclerosis (H)     tremors with MS, all four limbs and head     Multiple sclerosis, secondary progressive (H)      Sleep apnea      Vocal cord paralysis, unilateral complete         Past Surgical History:   Procedure Laterality Date     COLONOSCOPY N/A 7/17/2017    Procedure: COMBINED COLONOSCOPY, SINGLE OR MULTIPLE BIOPSY/POLYPECTOMY BY BIOPSY;;  Surgeon: Wong Beaulieu MD;  Location:  GI     ENT SURGERY      throat 1969, vocal cord surgery with skin grafting     ESOPHAGOSCOPY, GASTROSCOPY, DUODENOSCOPY (EGD), COMBINED N/A 12/16/2014    Procedure: COMBINED ENDOSCOPIC ULTRASOUND, ESOPHAGOSCOPY, GASTROSCOPY, DUODENOSCOPY (EGD), FINE NEEDLE ASPIRATE/BIOPSY;  Surgeon: Yessica Santana MD;  Location:  GI     ESOPHAGOSCOPY, GASTROSCOPY, DUODENOSCOPY (EGD), COMBINED N/A 12/16/2014    Procedure: COMBINED ESOPHAGOSCOPY, GASTROSCOPY, DUODENOSCOPY (EGD), BIOPSY SINGLE OR MULTIPLE;  Surgeon: Yessica Santana MD;   Location:  GI     LAPAROSCOPIC APPENDECTOMY  2013    Procedure: LAPAROSCOPIC APPENDECTOMY;;  Surgeon: Salvador Morris MD;  Location:  OR     LAPAROSCOPIC BIOPSY LIVER  2013    Procedure: LAPAROSCOPIC BIOPSY LIVER;;  Surgeon: Salvador Morris MD;  Location:  OR     LAPAROSCOPIC GASTRIC SLEEVE  2013    Procedure: LAPAROSCOPIC GASTRIC SLEEVE;  LAPAROSCOPIC SLEEVE GASTRECTOMY/ LAPARSCOPIC  APPENDECTOMY /LIVER BIOPSIES/LIVER CYST DRAINAGE;  Surgeon: Salvador Morris MD;  Location:  OR     ORTHOPEDIC SURGERY      R wrist      Current Outpatient Prescriptions   Medication     brexpiprazole (REXULTI) 3 MG tablet     MINOCYCLINE HCL PO     triamcinolone (KENALOG) 0.5 % OINT ointment     trospium (SANCTURA) 20 MG tablet     cyabnocobalamin (VITAMIN B-12) 2500 MCG sublingual tablet     ranitidine (ZANTAC) 150 MG tablet     metroNIDAZOLE (METROGEL) 1 % gel     ketoconazole (NIZORAL) 2 % cream     Dextromethorphan-Guaifenesin (MUCINEX DM PO)     Melatonin 10 MG TABS     docusate sodium 100 MG tablet     methylphenidate (RITALIN) 10 MG tablet     carBAMazepine (TEGRETOL) 200 MG tablet     carBAMazepine (TEGRETOL) 200 MG tablet     Calcium Citrate-Vitamin D (CALCITRATE/VITAMIN D PO)     Cholecalciferol (VITAMIN D3 PO)     Cholecalciferol (VITAMIN D3 PO)     Mirtazapine (REMERON PO)     TRAZODONE HCL PO     Multiple Vitamin (MULTIVITAMINS PO)     atorvastatin (LIPITOR) 20 MG tablet     escitalopram (LEXAPRO) 10 MG tablet     No current facility-administered medications for this visit.      Facility-Administered Medications Ordered in Other Visits   Medication     ondansetron (ZOFRAN) injection     SOCIAL HISTORY:  Ms. Padilla grew up in the [place] [facility] and is the oldest of 5 children in her family of origin.  Her father was a dentist who she believes was bipolar.  He  of lung cancer at age 72.  Her mother  of sepsis at age 80.  She had been diagnosed with breast cancer when Ms. Padilla was 8.  She  describes the marriage between her parents as misael.  She is 5 years older than her closest-aged sister and they are all within 4 years of each other.   Her daughter  12/3 and her mother   She tends to feel more depressed in winter.      Ms. Padilla attended [employer] for a year and later went to night school at the [employer].  She felt that she could not continue her education to the point of graduation because she was working full time and raising her daughter.  Her daughter  in  at age 31 of liver failure secondary to an accidental Tylenol overdose.  She had been recovering from a hysterectomy.      Ms. 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  She had to retire at the time secondary to her MS.  She has never .  She has not dated,  is interested in dating, and states that if she were she would be interested in a relationship with a woman.  There is no history of  service or legal problems.  She expresses concern about her increasing social isolation.  She does have at least 1 friend, Jael, who she met at a therapy group in .  She is active in facilitating groups for people with MS both in [place] and [place].  She lives alone and currently gets help from a home health aide, as well as home health nurse.     She sees Dr. Coleman, psychiatrist and is trying to figure out best antidepressant regimen.  She brought in her genetic testing.     SESSION:  Mili arrived punctually for today's meeting. She cancelled our last appointment and her psychiatrist's and her worker's.  We discussed how that doesn't help.  She came behind on her meds as she picks up free meds at her psychiatrist's office.  She has been falling asleep watching tc, and then misses her meds and sleeps in a chair.  We discussed accepting her self-cares that  will enhance her functioning. We discussed her weight loss, which was trivial during the interval.  I again encouraged her to use MyFitnessPal, with goal of 1500 calories/day and gave her a reading. We reviewed her self-sabotaging.   She wished to discuss her antidepressants.  We reviewed the genetic test which suggested that Lexapro wasn't helpful.  She will discuss with her psychiatrist further.  She would like to be seen more frequently, but I don't have availability until January.  Interestingly, despite this belief, she had cancelled the last session.  She participated fully and appeared to derive benefit.  Rapport was excellent.    Time in:  3:04  Time out: 3:57     DIAGNOSES:   Major depression, recurrent, mild (F33.o).   Behavioral factors affecting morbid obesity (E66.01).     PLAN:  Ms. Padilla will return to clinic on  1/3 @ 2  for problem-solving and supportive therapy consistent with treatment plan..   Tx plan 8/26/16;  Next review due  12/27/17 (next session)     Ramin Olivo, PhD, A.B.P.P., L.P.   Director, Health Psychology

## 2017-11-28 NOTE — MR AVS SNAPSHOT
After Visit Summary   11/28/2017    Mili Padilla    MRN: [id number]           Patient Information     Date Of Birth          1955        Visit Information        Provider Department      11/28/2017 3:00 PM Ramin Olivo, PhD [facility]        Today's Diagnoses     Major depressive disorder, recurrent episode, moderate (H)    -  1    Psychological factors affecting morbid obesity (H)        Anxiety about health        Unemployment           Follow-ups after your visit        Your next 10 appointments already scheduled     Dec 13, 2017  1:30 PM CST   Follow Up with Neha Valadez RD, LD   [facility] ([facility])    [address]  [facility] [address]   [phone]            Dec 14, 2017  2:00 PM CST   RETURN NEURO with Zach Pak MD   Eye Clinic ([facility])    [place] [facility]  [address]  [facility] [id number] [phone]            Jan 03, 2018  2:00 PM CST   (Arrive by 1:45 PM)   Return Visit with Ramin Olivo, PhD [facility] ([facility])    [address]  [address] [facility] [address] [phone]              Who to contact     Please call your clinic at [phone] to:    Ask questions about your health    Make or cancel appointments    Discuss your medicines    Learn about your test results    Speak to your doctor   If you have compliments or concerns about an experience at your clinic, or if you wish to file a complaint, please contact [facility] Patient Relations at [phone] or email us at [email or web address]         Additional Information About Your Visit        MyChart Information     [employer]Mate [employer] gives you secure access to your electronic health record. If you see a primary care provider, you can also send messages to your  care team and make appointments. If you have questions, please call your primary care clinic.  If you do not have a primary care provider, please call [phone] and they will assist you.      TYT ([employer]) is an electronic gateway that provides easy, online access to your medical records. With TYT ([employer]), you can request a clinic appointment, read your test results, renew a prescription or communicate with your care team.     To access your existing account, please contact your [facility] or call [phone] for assistance.        Care EveryWhere ID     This is your Care EveryWhere ID. This could be used by other organizations to access your [facility] medical records  [id number]        Your Vitals Were     Last Period BMI (Body Mass Index)                12/10/2010 46.89 kg/m2           Blood Pressure from Last 3 Encounters:   09/25/17 111/61   07/17/17 114/63   06/05/17 110/62    Weight from Last 3 Encounters:   11/28/17 133.8 kg (294 lb 14.4 oz)   11/17/17 133.4 kg (294 lb)   10/04/17 132 kg (291 lb 1.6 oz)              Today, you had the following     No orders found for display       Primary Care Provider Office Phone # Fax #    Jasmyn Márquez [phone]       [facility] [address]  [facility] [address]        Equal Access to Services     MARLYS WAGNER : Hadii aad ku hadasho Soomaali, waaxda luqadaha, qaybta kaalmada mike, marcos loyd. So [facility] [phone].    ATENCIÓN: Si habla español, tiene a abel disposición servicios gratuitos de asistencia lingüística. Llame al [phone].    We comply with applicable federal civil rights laws and [place] laws. We do not discriminate on the basis of race, color, national origin, age, disability, sex, sexual orientation, or gender identity.            Thank you!     Thank you for choosing [facility]  for your care. Our goal is always to provide you  with excellent care. Hearing back from our patients is one way we can continue to improve our services. Please take a few minutes to complete the written survey that you may receive in the mail after your visit with us. Thank you!             Your Updated Medication List - Protect others around you: Learn how to safely use, store and throw away your medicines at www.disposemymeds.org.          This list is accurate as of: 11/28/17  4:01 PM.  Always use your most recent med list.                   Brand Name Dispense Instructions for use Diagnosis    atorvastatin 20 MG tablet    LIPITOR     Take 20 mg by mouth daily.        CALCITRATE/VITAMIN D PO      2 tabs w/meals.        * carBAMazepine 200 MG tablet    TEGretol     Take 300 mg by mouth every morning. 300mg = 1.5 tablets.        * carBAMazepine 200 MG tablet    TEGretol     Take 400 mg by mouth At Bedtime.        cyabnocobalamin 2500 MCG sublingual tablet    VITAMIN B-12     Place 2,500 mcg under the tongue every other day        docusate sodium 100 MG tablet    COLACE     Take 100 mg by mouth daily        ketoconazole 2 % cream    NIZORAL     Apply topically daily    Bariatric surgery status, Obesity, Class III, BMI 40-49.9 (morbid obesity) (H)       LEXAPRO 10 MG tablet   Generic drug:  escitalopram      Take 20 mg by mouth 2 times daily        Melatonin 10 MG Tabs tablet      Take 10 mg by mouth At Bedtime        methylphenidate 10 MG tablet    RITALIN     Take 10 mg by mouth 2 times daily        metroNIDAZOLE 1 % gel    METROGEL          MINOCYCLINE HCL PO           MUCINEX DM PO      1200mg daily        MULTIVITAMINS PO      Take 2 tablets by mouth every evening. WITH IRON        ranitidine 150 MG tablet    ZANTAC    180 tablet    Take 1 tablet (150 mg) by mouth 2 times daily    GERD without esophagitis, Bariatric surgery status       REMERON PO      Take 75 mg by mouth At Bedtime. Takes ( 5)    15mg tablets=75mg        REXULTI 3 MG tablet   Generic drug:   brexpiprazole      Take 1 mg by mouth daily        TRAZODONE HCL PO      Take 500 mg by mouth At Bedtime        triamcinolone 0.5 % Oint ointment    KENALOG     Apply topically 2 times daily        trospium 20 MG tablet    SANCTURA     Take 20 mg by mouth 2 times daily        * VITAMIN D3 PO      Take 3,000 Units by mouth daily In a.m.        * VITAMIN D3 PO      Take 2,000 Units by mouth At Bedtime.        * Notice:  This list has 4 medication(s) that are the same as other medications prescribed for you. Read the directions carefully, and ask your doctor or other care provider to review them with you.

## 2017-12-13 ENCOUNTER — HOSPITAL ENCOUNTER (OUTPATIENT)
Dept: NUTRITION | Facility: CLINIC | Age: 62
Discharge: HOME | End: 2017-12-13
Attending: DIETITIAN, REGISTERED | Admitting: DIETITIAN, REGISTERED
Payer: COMMERCIAL

## 2017-12-13 VITALS — WEIGHT: 293 LBS | BODY MASS INDEX: 46.96 KG/M2

## 2017-12-13 PROCEDURE — 97803 MED NUTRITION INDIV SUBSEQ: CPT | Performed by: DIETITIAN, REGISTERED

## 2017-12-13 NOTE — PROGRESS NOTES
"NUTRITION REASSESSMENT NOTE     REASON FOR ASSESSMENT  Mili Padilla referred by Dr. Márquez for MNT related to overweight.    Patient accompanied by self.      ASSESSMENT   Nutrition History:- Information obtained from patient.  Patient with long history of weight loss struggle.  Patient had sleeve gastrectomy 3 years ago.  Patient states she was able to maintain her 65 lb weight loss for 2 years. Then patient struggled with depression and regained her weight.  Patient has been tracking her food intake since surgery.  Patient aiming to keep her calories to 2000 per day.  Patient with history of multiple sclerosis which makes it difficult to prepare meals due to fatigue.  Patient continues to receive 5 Open Arms meals per week.  Patient feels her depression has increased and is not motivated to prepare meals.  Patient frustrated that she is not losing weight.       24 Diet Recall:  Breakfast: 1/2 cup cottage cheese and 2 cantaloupe stoner  Lunch: beef jerky, cheese and banana     Dinner: pizza   Snack: 6 cups popcorn, 1 serving dark chocolate mint M&M's, 6 chocolate crinkle cookies, beef jerky (1 package per day)  Beverages: water, Diet Dr. Pepper, Izze's drink    Dining out: Biggs's 1 time per week (eggs, sausage biscuit); [place]'s -cinnamon twists      PHYSICAL FINDINGS  Observed:  No nutrition-related physical findings observed  Obtained from Chart/Interdisciplinary Team:  None noted    LABS  Labs reviewed    MEDICATIONS  Medications reviewed    ANTHROPOMETRICS   Height: 5'7\"  Weight: 295.4 lbs  BMI (kg/m2): 46.2 kg/m2  Weight Status:  Obesity Grade III BMI >40  %IBW: 217%  Weight History:   Wt Readings from Last 5 Encounters:   11/17/17 133.4 kg (294 lb)   10/04/17 132 kg (291 lb 1.6 oz)   09/25/17 133.4 kg (294 lb)   08/07/17 132.1 kg (291 lb 3.2 oz)   07/17/17 131.5 kg (290 lb)     ASSESSED NUTRITION NEEDS  Estimated Energy Needs: 9191-9539 kcals/day (11-14 Kcal/Kg) for weight loss  Estimated Protein Needs: " 61-74 grams protein/day (1-1.2 g pro/Kg) for maintenance  Estimated Fluid Needs: 5903-8807 mL/day (25-30 mL/kg)    EVALUATION/PROGRESS TOWARDS GOALS   Previous Goals:   Choose Open Arms meal and frozen entrees for lunch and dinner-improving  Choose raw vegetables as snacks-not met  Use timer for lunch and dinner to reduce speed of meal-not met      Previous Nutrition Diagnosis: Disordered eating pattern related to excessive snacking as evidenced by weight regain after sleeve gastrectomy and BMI of 46 kg/m2 -no change    Current Nutrition Diagnosis: Disordered eating pattern related to excessive snacking as evidenced by weight regain after sleeve gastrectomy and BMI of 46.2 kg/m2      INTERVENTIONS   Nutrition Prescription   Recommend avoiding liquids with meals to reduce volume of meals for intended weight loss; determine alternatives to emotional eating     IMPLEMENTATION   Meals and Snacks: 3 meals + snacks if hungry  Nutrition Education (Content):   a)  Discussed progress towards goals.  Reviewed food logs.  Patient continues to consume more calories through snacks than her meals.  Patient also eating snack in the morning prior to eating breakfast.  Patient consuming 2924-1500 calories per day.  Patient wants to refocus on losing weight and frustrated that her weight increased in the past 4 weeks as patient thought she has lost weight.  Congratulated patient on her desire to lose weight and consistency of tracking food intake.  Supported patient in her struggle with food.   Nutrition Education (Application):   a)  Determined ways to reduce calorie intake by avoiding buying chocolate crinkle cookies. Discussed possibility of only eating Open Arms dessert options for sweets during the week.                b)  Patient verbalizes understanding of diet by stating will stop buying cookies at the grocery store.   Anticipated compliance: fair    Other: Patient to obtain new nutrition referral for the coming year.       GOALS  Avoid having bag of jerky as meal   Skip buying chocolate crinkle cookies at grocery store     FOLLOW UP/MONITORING   Progress towards goals will be monitored and evaluated per protocol and Practice Guidelines  Patient to follow up in 4 weeks  Patient has RD name and contact information    Time Spent with Patient  25 minutes    Filiberto Becerril, RD, LD  [facility] Outpatient Dietitian  [phone] (office phone)

## 2017-12-14 DIAGNOSIS — H53.10 SUBJECTIVE VISION DISTURBANCE: Primary | ICD-10-CM

## 2018-01-03 ENCOUNTER — OFFICE VISIT (OUTPATIENT)
Dept: PSYCHOLOGY | Facility: CLINIC | Age: 63
End: 2018-01-03
Payer: COMMERCIAL

## 2018-01-03 VITALS — WEIGHT: 291.2 LBS | BODY MASS INDEX: 46.3 KG/M2

## 2018-01-03 DIAGNOSIS — F33.1 MAJOR DEPRESSIVE DISORDER, RECURRENT EPISODE, MODERATE (H): Primary | ICD-10-CM

## 2018-01-03 DIAGNOSIS — E66.01 PSYCHOLOGICAL FACTORS AFFECTING MORBID OBESITY (H): ICD-10-CM

## 2018-01-03 DIAGNOSIS — F54 PSYCHOLOGICAL FACTORS AFFECTING MORBID OBESITY (H): ICD-10-CM

## 2018-01-03 DIAGNOSIS — R45.89 ANXIETY ABOUT HEALTH: ICD-10-CM

## 2018-01-03 NOTE — MR AVS SNAPSHOT
After Visit Summary   1/3/2018    Mili Padilla    MRN: [id number]           Patient Information     Date Of Birth          1955        Visit Information        Provider Department      1/3/2018 2:00 PM Ramin Olivo, PhD [facility]        Today's Diagnoses     Major depressive disorder, recurrent episode, moderate (H)    -  1    Psychological factors affecting morbid obesity (H)        Anxiety about health           Follow-ups after your visit        Your next 10 appointments already scheduled     Yury 10, 2018  1:30 PM CST   Follow Up with Neha Valadez RD, LD   [facility] ([facility])    [address]  [facility] [id number] [phone]            Jan 11, 2018  2:30 PM CST   RETURN NEURO with Zach Pak MD   [facility] ([facility])    [address]  [address]  [facility] [id number] [phone]              Who to contact     Please call your clinic at [phone] to:    Ask questions about your health    Make or cancel appointments    Discuss your medicines    Learn about your test results    Speak to your doctor   If you have compliments or concerns about an experience at your clinic, or if you wish to file a complaint, please contact [facility] Patient Relations at [phone] or email us at [email or web address]         Additional Information About Your Visit        MyChart Information     Sensiotechart gives you secure access to your electronic health record. If you see a primary care provider, you can also send messages to your care team and make appointments. If you have questions, please call your primary care clinic.  If you do not have a primary care provider, please call [phone] and they will assist you.      Unomy is an electronic gateway that provides easy, online access to your medical  records. With AGRIMAPS, you can request a clinic appointment, read your test results, renew a prescription or communicate with your care team.     To access your existing account, please contact your [facility] or call [phone] for assistance.        Care EveryWhere ID     This is your Care EveryWhere ID. This could be used by other organizations to access your [facility] medical records  [id number]        Your Vitals Were     Last Period BMI (Body Mass Index)                12/10/2010 46.3 kg/m2           Blood Pressure from Last 3 Encounters:   09/25/17 111/61   07/17/17 114/63   06/05/17 110/62    Weight from Last 3 Encounters:   01/03/18 132.1 kg (291 lb 3.2 oz)   12/13/17 134 kg (295 lb 6.4 oz)   11/28/17 133.8 kg (294 lb 14.4 oz)              Today, you had the following     No orders found for display       Primary Care Provider Office Phone # Fax #    Jasmyn Márquez [phone]       [facility] [address]  [facility] [address]        Equal Access to Services     [facility]: Hadii aad ku hadasho Soomaali, waaxda luqadaha, qaybta kaalmada adeegyada, marcos singh hayelaina alberts . So [facility] [phone].    ATENCIÓN: Si habla español, tiene a abel disposición servicios gratuitos de asistencia lingüística. Llame al [phone].    We comply with applicable federal civil rights laws and [place] laws. We do not discriminate on the basis of race, color, national origin, age, disability, sex, sexual orientation, or gender identity.            Thank you!     Thank you for choosing [facility]  for your care. Our goal is always to provide you with excellent care. Hearing back from our patients is one way we can continue to improve our services. Please take a few minutes to complete the written survey that you may receive in the mail after your visit with us. Thank you!             Your Updated Medication List -  Protect others around you: Learn how to safely use, store and throw away your medicines at www.disposemymeds.org.          This list is accurate as of: 1/3/18  3:00 PM.  Always use your most recent med list.                   Brand Name Dispense Instructions for use Diagnosis    atorvastatin 20 MG tablet    LIPITOR     Take 20 mg by mouth daily.        CALCITRATE/VITAMIN D PO      2 tabs w/meals.        * carBAMazepine 200 MG tablet    TEGretol     Take 300 mg by mouth every morning. 300mg = 1.5 tablets.        * carBAMazepine 200 MG tablet    TEGretol     Take 400 mg by mouth At Bedtime.        cyabnocobalamin 2500 MCG sublingual tablet    VITAMIN B-12     Place 2,500 mcg under the tongue every other day        docusate sodium 100 MG tablet    COLACE     Take 100 mg by mouth daily        ketoconazole 2 % cream    NIZORAL     Apply topically daily    Bariatric surgery status, Obesity, Class III, BMI 40-49.9 (morbid obesity) (H)       LEXAPRO 10 MG tablet   Generic drug:  escitalopram      Take 20 mg by mouth 2 times daily        Melatonin 10 MG Tabs tablet      Take 10 mg by mouth At Bedtime        methylphenidate 10 MG tablet    RITALIN     Take 10 mg by mouth 2 times daily        metroNIDAZOLE 1 % gel    METROGEL          MINOCYCLINE HCL PO           MUCINEX DM PO      1200mg daily        MULTIVITAMINS PO      Take 2 tablets by mouth every evening. WITH IRON        ranitidine 150 MG tablet    ZANTAC    180 tablet    Take 1 tablet (150 mg) by mouth 2 times daily    GERD without esophagitis, Bariatric surgery status       REMERON PO      Take 75 mg by mouth At Bedtime. Takes ( 5)    15mg tablets=75mg        REXULTI 3 MG tablet   Generic drug:  brexpiprazole      Take 1 mg by mouth daily        TRAZODONE HCL PO      Take 500 mg by mouth At Bedtime        triamcinolone 0.5 % Oint ointment    KENALOG     Apply topically 2 times daily        trospium 20 MG tablet    SANCTURA     Take 20 mg by mouth 2 times daily         * VITAMIN D3 PO      Take 3,000 Units by mouth daily In a.m.        * VITAMIN D3 PO      Take 2,000 Units by mouth At Bedtime.        * Notice:  This list has 4 medication(s) that are the same as other medications prescribed for you. Read the directions carefully, and ask your doctor or other care provider to review them with you.

## 2018-01-03 NOTE — PROGRESS NOTES
Health Psychology                  Clinic    Department of Medicine  Sherrie Crowe, Ph.D., L.P. [phone]                          Clinics and Surgery Center  [facility] Vin Ward, Ph.D.,  L.P. [phone]                 [facility] [facility] Mail Code 741   Ramin Olivo, Ph.D., WALTER, L.P. [phone]     [address] [place] [address] Kathie Galvan, Ph.D., L.P. [phone]  [place] [address]       Health Psychology Follow-Up Note     Ms. Padilla is a 62-year-old woman self-referred for psychological consultation because her therapist of the last 24 years retired.  She is seen for problem-solving and supportive therapy for depression and multiple health issues.     HISTORY OF PRESENTING CONCERN:  Ms. Padilla reports a lengthy history of depression.  She currently sees a psychiatrist, Ban Coleman at [facility], and is taking Abilify 7.5 mg, trazodone 500 mg, ripazepam 75 mg each day at bedtime, Tegretol 400 mg at bedtime and methylphenidate 10 mg b.i.d.  She discontineud ability and is now taking Rexulti 2 mg.  She has a history of hospitalizations including 3 at [facility] in the late 1990s, early 2000s when she had 2 courses of ECT lasting 7-10 sessions and approximately 3 other single session ECTs.  She stopped due to memory problems.  She kept with Dr. Coleman who she first met as an inpatient and has seen her for the past 18 years.  She had also been hospitalized psychiatrically at [facility] at age 23.  She previously worked with psychiatrist, Doug Sarabia for three years, stopping, in part, because she didn't feel he took her suicide attempt sufficiently seriously.  She reports her depression tends to vary.  Currently it is moderate, though it was more severe within the past year especially when she was having additional health problems.      MEDICAL HISTORY:  Ms.  Randy has a complex medical history.  She was diagnosed with MS in 1985.  Her psychiatrist at [facility] in [place], Dr. 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  She has used a scooter since 1992, using it more in the last 6 months than she had previously.  She also can use a walker.  She was diagnosed with fibromyalgia in 1997.   She is also seen at the [facility] for her eye care.  She began to see an eating disorder therapist weekly and has been somewhat erratically losing weight.    WEIGH Today is 291.2 down from  294.9 last session with me.     She is attending OA.    Wt Readings from Last 4 Encounters:   01/03/18 132.1 kg (291 lb 3.2 oz)   12/13/17 134 kg (295 lb 6.4 oz)   11/28/17 133.8 kg (294 lb 14.4 oz)   11/17/17 133.4 kg (294 lb)     Past Medical History:   Diagnosis Date     Depression, major, in partial remission (H)      Fibromyalgia syndrome      Gastro-oesophageal reflux disease      Hyperlipemia      Lymphedema      Multiple sclerosis (H)     tremors with MS, all four limbs and head     Multiple sclerosis, secondary progressive (H)      Sleep apnea      Vocal cord paralysis, unilateral complete         Past Surgical History:   Procedure Laterality Date     COLONOSCOPY N/A 7/17/2017    Procedure: COMBINED COLONOSCOPY, SINGLE OR MULTIPLE BIOPSY/POLYPECTOMY BY BIOPSY;;  Surgeon: Wong Beaulieu MD;  Location:  GI     ENT SURGERY      throat 1969, vocal cord surgery with skin grafting     ESOPHAGOSCOPY, GASTROSCOPY, DUODENOSCOPY (EGD), COMBINED N/A 12/16/2014    Procedure: COMBINED ENDOSCOPIC ULTRASOUND, ESOPHAGOSCOPY, GASTROSCOPY, DUODENOSCOPY (EGD), FINE NEEDLE ASPIRATE/BIOPSY;  Surgeon: Yessica Santana MD;  Location:  GI     ESOPHAGOSCOPY, GASTROSCOPY, DUODENOSCOPY (EGD), COMBINED N/A 12/16/2014    Procedure: COMBINED ESOPHAGOSCOPY, GASTROSCOPY, DUODENOSCOPY (EGD), BIOPSY SINGLE OR MULTIPLE;  Surgeon: Yessica Santana MD;   Location:  GI     LAPAROSCOPIC APPENDECTOMY  2013    Procedure: LAPAROSCOPIC APPENDECTOMY;;  Surgeon: Salvador Morris MD;  Location:  OR     LAPAROSCOPIC BIOPSY LIVER  2013    Procedure: LAPAROSCOPIC BIOPSY LIVER;;  Surgeon: Salvador Morris MD;  Location:  OR     LAPAROSCOPIC GASTRIC SLEEVE  2013    Procedure: LAPAROSCOPIC GASTRIC SLEEVE;  LAPAROSCOPIC SLEEVE GASTRECTOMY/ LAPARSCOPIC  APPENDECTOMY /LIVER BIOPSIES/LIVER CYST DRAINAGE;  Surgeon: Salvador Morris MD;  Location:  OR     ORTHOPEDIC SURGERY      R wrist      Current Outpatient Prescriptions   Medication     brexpiprazole (REXULTI) 3 MG tablet     MINOCYCLINE HCL PO     triamcinolone (KENALOG) 0.5 % OINT ointment     trospium (SANCTURA) 20 MG tablet     cyabnocobalamin (VITAMIN B-12) 2500 MCG sublingual tablet     ranitidine (ZANTAC) 150 MG tablet     metroNIDAZOLE (METROGEL) 1 % gel     ketoconazole (NIZORAL) 2 % cream     Dextromethorphan-Guaifenesin (MUCINEX DM PO)     Melatonin 10 MG TABS     docusate sodium 100 MG tablet     methylphenidate (RITALIN) 10 MG tablet     carBAMazepine (TEGRETOL) 200 MG tablet     carBAMazepine (TEGRETOL) 200 MG tablet     Calcium Citrate-Vitamin D (CALCITRATE/VITAMIN D PO)     Cholecalciferol (VITAMIN D3 PO)     Cholecalciferol (VITAMIN D3 PO)     Mirtazapine (REMERON PO)     TRAZODONE HCL PO     Multiple Vitamin (MULTIVITAMINS PO)     atorvastatin (LIPITOR) 20 MG tablet     escitalopram (LEXAPRO) 10 MG tablet     No current facility-administered medications for this visit.      Facility-Administered Medications Ordered in Other Visits   Medication     ondansetron (ZOFRAN) injection     New medication: Effexor 300 mon 17  per Dr. Marquise Coleman, her psychiatrist.  She discontinued Lexapro 17.    SOCIAL HISTORY:  Ms. Padilla grew up in the [facility] [place] [facility] and is the oldest of 5 children in her family of origin.  Her father was a dentist who she believes was bipolar.  He  of lung cancer  at age 72.  Her mother  of sepsis at age 80.  She had been diagnosed with breast cancer when Ms. Padilla was 8.  She describes the marriage between her parents as misael.  She is 5 years older than her closest-aged sister and they are all within 4 years of each other.   Her daughter  12/3 and her mother   She tends to feel more depressed in winter.      Ms. Padilla attended [employer] for a year and later went to [employer] at the [facility] [employer].  She felt that she could not continue her education to the point of graduation because she was working full time and raising her daughter.  Her daughter  in  at age 31 of liver failure secondary to an accidental Tylenol overdose.  She had been recovering from a hysterectomy.      Ms. 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  She had to retire at the time secondary to her MS.  She has never .  She has not dated,  is interested in dating, and states that if she were she would be interested in a relationship with a woman.  There is no history of  service or legal problems.  She expresses concern about her increasing social isolation.  She does have at least 1 friend, Jael, who she met at a therapy group in .  She is active in facilitating groups for people with MS both in [place] and [place].  She lives alone and currently gets help from a home health aide, as well as home health nurse.     She sees Dr. Coleman, psychiatrist and is trying to figure out best antidepressant regimen.  She brought in her genetic testing.     SESSION:  Mili arrived punctually for today's meeting. She is most concerned today about her 59 year old sister who was just diagnosed with breast cancer.  We discussed resources for her and ways she can be supportive.  She plans to get genetic testing.  She has still  been falling asleep watching tc, and then misses her meds and sleeps in a chair.  She reports awakening more when she sleeps in her bed.  We discussed positioning additional pillows.  We also discussed cutting back on water, especially at night so that she needs to go to the bathroom less frequently.  We discussed accepting her self-cares that will enhance her functioning. We discussed her weight loss, which was somewhat better.  She remains motivated.  The goal is 1500 calories/day while using MyFitnessPal.. We reviewed her self-sabotaging.  She would like to be seen more frequently, but I don't have availability until early February.   She participated fully and appeared to derive benefit.  Rapport was excellent.  Extended session due to complexity of case and length of interval.    The treatment plan was reviewed with the patient at today s visit. The treatment plan remains current based on the patient s status and progress to date.  Time in:  2:04  Time out: 2:57     DIAGNOSES:   Major depression, recurrent, mild (F33.o).   Behavioral factors affecting morbid obesity (E66.01).     PLAN:  Ms. Padilla will return to clinic on  1/3 @ 2  for problem-solving and supportive therapy consistent with treatment plan..   Tx plan 9/27/17;  Next review due  4/2/1     Ramin Olivo, PhD, A.B.P.P., L.P.   Director, Health Psychology

## 2018-01-10 ENCOUNTER — HOSPITAL ENCOUNTER (OUTPATIENT)
Dept: NUTRITION | Facility: CLINIC | Age: 63
Discharge: HOME | End: 2018-01-10
Attending: DIETITIAN, REGISTERED | Admitting: DIETITIAN, REGISTERED
Payer: COMMERCIAL

## 2018-01-10 VITALS — WEIGHT: 290 LBS | BODY MASS INDEX: 46.11 KG/M2

## 2018-01-10 PROCEDURE — 97803 MED NUTRITION INDIV SUBSEQ: CPT | Performed by: DIETITIAN, REGISTERED

## 2018-01-10 NOTE — PROGRESS NOTES
"NUTRITION REASSESSMENT NOTE     REASON FOR ASSESSMENT  Mili Padilla referred by Dr. Márquez for MNT related to overweight.    Patient accompanied by self.      ASSESSMENT   Nutrition History:- Information obtained from patient.  Patient with long history of weight loss struggle.  Patient had sleeve gastrectomy 3 years ago.  Patient states she was able to maintain her 65 lb weight loss for 2 years. Then patient struggled with depression and regained her weight.  Patient has been tracking her food intake since surgery.  Patient aiming to keep her calories to 2000 per day.  Patient with history of multiple sclerosis which makes it difficult to prepare meals due to fatigue.  Patient continues to receive 5 [employer] meals per week.  Patient pleased with her weight loss.  Patient trying to consume <1900 calories per day.       24 Diet Recall:  Breakfast: 1/2 cup cottage cheese and 2 cantaloupe stoner  Lunch: beef jerky, cheese and banana     Dinner: pizza   Snack: 6 cups popcorn, 1 serving dark chocolate mint M&M's, brownie, beef jerky (1 package per day)  Beverages: water, Diet Dr. Pepper, Izze's drink    Dining out: Biggs's 1 time per week (eggs, sausage biscuit); Vince's -cinnamon twists ; hamburger and french fries with lime phosphate    PHYSICAL FINDINGS  Observed:  No nutrition-related physical findings observed  Obtained from Chart/Interdisciplinary Team:  None noted    LABS  Labs reviewed    MEDICATIONS  Medications reviewed    ANTHROPOMETRICS   Height: 5'7\"  Weight: 290 lbs  BMI (kg/m2): 45.4 kg/m2  Weight Status:  Obesity Grade III BMI >40  %IBW: 214%  Weight History:   Wt Readings from Last 5 Encounters:   01/10/18 131.5 kg (290 lb)   12/13/17 134 kg (295 lb 6.4 oz)   11/17/17 133.4 kg (294 lb)   10/04/17 132 kg (291 lb 1.6 oz)   09/25/17 133.4 kg (294 lb)     ASSESSED NUTRITION NEEDS  Estimated Energy Needs: 3839-2864 kcals/day (11-14 Kcal/Kg) for weight loss  Estimated Protein Needs: 61-74 grams " protein/day (1-1.2 g pro/Kg) for maintenance  Estimated Fluid Needs: 9341-0557 mL/day (25-30 mL/kg)    EVALUATION/PROGRESS TOWARDS GOALS   Previous Goals:   Avoid having bag of jerky as meal-not met   Skip buying chocolate crinkle cookies at grocery store-met     Previous Nutrition Diagnosis: Disordered eating pattern related to excessive snacking as evidenced by weight regain after sleeve gastrectomy and BMI of 46.2 kg/m2 -no change    Current Nutrition Diagnosis: Disordered eating pattern related to excessive snacking as evidenced by weight regain after sleeve gastrectomy and BMI of 45.4 kg/m2      INTERVENTIONS   Nutrition Prescription   Recommend avoiding liquids with meals to reduce volume of meals for intended weight loss; determine alternatives to emotional eating     IMPLEMENTATION   Meals and Snacks: 3 meals + snacks if hungry  Nutrition Education (Content):   a)  Discussed progress towards goals.  Reviewed food logs.  Patient continues to consume more calories through snacks than her meals.  Patient also eating snack in the morning prior to eating breakfast.  Patient consuming 3326-1374 calories per day.  Patient feels she has increased how many days she consumes less than 1900 calories per day.  Congratulated patient on her weight loss and consistency of tracking food intake.  Supported patient in her struggle with food.   Nutrition Education (Application):   a)  Determined ways to continue reducing calories consumed.                  b)  Patient verbalizes understanding of diet by stating will continue avoiding buying cookies at the grocery store.   Anticipated compliance: fair    Other: Patient called health insurance company while in RD office.  Informed referral can take 30 days before getting authorization and may need to pay co-pay per insurance.      GOALS  Avoid having bag of jerky as meal   Skip buying chocolate crinkle cookies at grocery store   Consume <1900 calories per day  Calculate calories  consumed first half of day to make adjustments if needed to consume <1900 calories per day     FOLLOW UP/MONITORING   Progress towards goals will be monitored and evaluated per protocol and Practice Guidelines  Patient to follow up in 2 weeks  Patient has RD name and contact information    Time Spent with Patient  25 minutes    Filiberto Becerril, RD, LD  [facility] Outpatient Dietitian  [phone] (office phone)

## 2018-01-31 ENCOUNTER — HOSPITAL ENCOUNTER (OUTPATIENT)
Dept: NUTRITION | Facility: CLINIC | Age: 63
Discharge: HOME | End: 2018-01-31
Attending: FAMILY MEDICINE | Admitting: FAMILY MEDICINE
Payer: COMMERCIAL

## 2018-01-31 VITALS — WEIGHT: 289.2 LBS | BODY MASS INDEX: 45.98 KG/M2

## 2018-01-31 PROCEDURE — 97803 MED NUTRITION INDIV SUBSEQ: CPT | Mod: GA | Performed by: DIETITIAN, REGISTERED

## 2018-01-31 NOTE — PROGRESS NOTES
"NUTRITION REASSESSMENT NOTE     REASON FOR ASSESSMENT  Mili Padilla referred by Dr. Márquez for MNT related to overweight.    Patient accompanied by self.      ASSESSMENT   Nutrition History:- Information obtained from patient.  Patient with long history of weight loss struggle.  Patient had sleeve gastrectomy 3 years ago.  Patient states she was able to maintain her 65 lb weight loss for 2 years. Then patient struggled with depression and regained her weight.  Patient has been tracking her food intake since surgery.  Patient aiming to keep her calories to 2000 per day.  Patient with history of multiple sclerosis which makes it difficult to prepare meals due to fatigue.  Patient feels less satisfied with [employer] meals so has not been eating her meals.  Patient contacted [employer] to send low sodium ,low fat meal options which patient feels she may prefer the meals.  Patient states has not received referral for RD appointments so  will follow up with her insurance company.  Patient concerned with receiving any out of pocket expenses for nutrition coverage.       24 Diet Recall:  Breakfast: 1/2 cup cottage cheese and 2 cantaloupe sotner  Lunch: beef jerky, cheese and banana     Dinner: 2/8 slices pepperlee pizza + marble brownie   Snack: 6 cups popcorn, 1 serving dark chocolate mint M&M's, brownie, beef jerky (1 package per day)  Beverages: water, Diet Dr. Pepper, Izze's drink    Dining out: Biggs[employer] 1 time per week (eggs, sausage biscuit); [employer] -cinnamon twists ; hamburger and french fries with lime phosphate    PHYSICAL FINDINGS  Observed:  No nutrition-related physical findings observed  Obtained from Chart/Interdisciplinary Team:  None noted    LABS  Labs reviewed    MEDICATIONS  Medications reviewed    ANTHROPOMETRICS   Height: 5'7\"  Weight: 289.2 lbs  BMI (kg/m2): 45.2 kg/m2  Weight Status:  Obesity Grade III BMI >40  %IBW: 214%  Weight History:   Wt Readings from Last 5 Encounters:   01/31/18 " 131.2 kg (289 lb 3.2 oz)   01/10/18 131.5 kg (290 lb)   12/13/17 134 kg (295 lb 6.4 oz)   11/17/17 133.4 kg (294 lb)   10/04/17 132 kg (291 lb 1.6 oz)     ASSESSED NUTRITION NEEDS  Estimated Energy Needs: 6535-8430 kcals/day (11-14 Kcal/Kg) for weight loss  Estimated Protein Needs: 61-74 grams protein/day (1-1.2 g pro/Kg) for maintenance  Estimated Fluid Needs: 8061-4335 mL/day (25-30 mL/kg)    EVALUATION/PROGRESS TOWARDS GOALS   Previous Goals:   Avoid having bag of jerky as meal-improving  Skip buying chocolate crinkle cookies at grocery store-met  Consume <1900 calories per day-not met  Calculate calories consumed first half of day to make adjustments if needed to consume <1900 calories per day-not met     Previous Nutrition Diagnosis: Disordered eating pattern related to excessive snacking as evidenced by weight regain after sleeve gastrectomy and BMI of 45.4 kg/m2 -no change    Current Nutrition Diagnosis: Disordered eating pattern related to excessive snacking as evidenced by weight regain after sleeve gastrectomy and BMI of 45.2 kg/m2      INTERVENTIONS   Nutrition Prescription   Recommend avoiding liquids with meals to reduce volume of meals for intended weight loss; determine alternatives to emotional eating     IMPLEMENTATION   Meals and Snacks: 3 meals + snacks if hungry  Nutrition Education (Content):   a)  Discussed progress towards goals.  Reviewed food logs.  Patient continues to consume more calories through snacks than her meals.  Encouraged patient to determine nutrition goals for the year which patient wants to lose weight.  Determined ways patient can reduce calories.  Congratulated patient on her weight loss and consistency of tracking food intake.  Supported patient in her struggle with food.   Nutrition Education (Application):   a)  Determined ways to continue reducing calories consumed by limiting pizza to 1 time per month.  Suggest reduce M&M intake by choosing 3 squares chocolate per day  vs 1.5 bags of M&Ms.                 b)  Patient verbalizes understanding of diet by stating will limit pizza to 1 time per week.     Anticipated compliance: fair      GOALS  Reduce pizza to 1 time per month  Eat vegetables 3-4 times per week   If eating salami for lunch, make sandwich and add fruit    FOLLOW UP/MONITORING   Progress towards goals will be monitored and evaluated per protocol and Practice Guidelines  Patient to follow up in 3 weeks  Patient has RD name and contact information    Time Spent with Patient  25 minutes    Filiberto Becerril, RD, LD  [facility] Outpatient Dietitian  [phone] (office phone)

## 2018-02-07 ENCOUNTER — OFFICE VISIT (OUTPATIENT)
Dept: PSYCHOLOGY | Facility: CLINIC | Age: 63
End: 2018-02-07
Payer: COMMERCIAL

## 2018-02-07 VITALS — WEIGHT: 291.3 LBS | BODY MASS INDEX: 46.31 KG/M2

## 2018-02-07 DIAGNOSIS — E66.01 PSYCHOLOGICAL FACTORS AFFECTING MORBID OBESITY (H): ICD-10-CM

## 2018-02-07 DIAGNOSIS — R45.89 ANXIETY ABOUT HEALTH: ICD-10-CM

## 2018-02-07 DIAGNOSIS — F54 PSYCHOLOGICAL FACTORS AFFECTING MORBID OBESITY (H): ICD-10-CM

## 2018-02-07 DIAGNOSIS — F33.1 MAJOR DEPRESSIVE DISORDER, RECURRENT EPISODE, MODERATE (H): Primary | ICD-10-CM

## 2018-02-07 DIAGNOSIS — Z56.0 UNEMPLOYMENT: ICD-10-CM

## 2018-02-07 SDOH — ECONOMIC STABILITY - INCOME SECURITY: UNEMPLOYMENT, UNSPECIFIED: Z56.0

## 2018-02-07 NOTE — MR AVS SNAPSHOT
After Visit Summary   2/7/2018    Mili Padilla    MRN: [id number]           Patient Information     Date Of Birth          1955        Visit Information        Provider Department      2/7/2018 2:00 PM Ramin Olivo, PhD [facility]        Today's Diagnoses     Major depressive disorder, recurrent episode, moderate (H)    -  1    Psychological factors affecting morbid obesity (H)        Anxiety about health        Unemployment           Follow-ups after your visit        Your next 10 appointments already scheduled     Feb 19, 2018  4:30 PM CST   Follow Up with Neha Valadez RD, LD   [facility] ([facility] [place])    [address]  [place] [address]   [phone]            Feb 23, 2018   Procedure with Yessica Santana MD   [facility] ([facility] [place])    [address]  [place] [address]   [phone]           [facility] is located at [address] [place]            Mar 15, 2018  2:00 PM CDT   RETURN NEURO with Zach Pak MD   [facility] ([facility])    [address]  [facility] [address] [phone]              Who to contact     Please call your clinic at [phone] to:    Ask questions about your health    Make or cancel appointments    Discuss your medicines    Learn about your test results    Speak to your doctor   If you have compliments or concerns about an experience at your clinic, or if you wish to file a complaint, please contact [facility] Patient Relations at [phone] or email us at [email or web address]         Additional Information About Your Visit        MyChart Information     [employer] gives you secure access to your electronic health record. If you see a primary care provider, you can also send  messages to your care team and make appointments. If you have questions, please call your primary care clinic.  If you do not have a primary care provider, please call [phone] and they will assist you.      [employer] is an electronic gateway that provides easy, online access to your medical records. With [employer], you can request a clinic appointment, read your test results, renew a prescription or communicate with your care team.     To access your existing account, please contact your [facility] or call [phone] for assistance.        Care EveryWhere ID     This is your Care EveryWhere ID. This could be used by other organizations to access your [place] medical records  [id number]        Your Vitals Were     Last Period BMI (Body Mass Index)                12/10/2010 46.31 kg/m2           Blood Pressure from Last 3 Encounters:   09/25/17 111/61   07/17/17 114/63   06/05/17 110/62    Weight from Last 3 Encounters:   02/07/18 132.1 kg (291 lb 4.8 oz)   01/31/18 131.2 kg (289 lb 3.2 oz)   01/10/18 131.5 kg (290 lb)              Today, you had the following     No orders found for display       Primary Care Provider Office Phone # Fax #    Jasmyn Márquez [phone]       [facility] [address]  [facility] [address]        Equal Access to Services     [facility]: Hadii aad ku hadasho Soarikali, waaxda luqadaha, qaybta kaalmada giovannyyada, marcos alberts . So [facility] [phone].    ATENCIÓN: Si habla español, tiene a abel disposición servicios gratuitos de asistencia lingüística. Llame al [phone].    We comply with applicable federal civil rights laws and [place] laws. We do not discriminate on the basis of race, color, national origin, age, disability, sex, sexual orientation, or gender identity.            Thank you!     Thank you for choosing [facility]  for your care. Our goal is always  to provide you with excellent care. Hearing back from our patients is one way we can continue to improve our services. Please take a few minutes to complete the written survey that you may receive in the mail after your visit with us. Thank you!             Your Updated Medication List - Protect others around you: Learn how to safely use, store and throw away your medicines at www.disposemymeds.org.          This list is accurate as of 2/7/18  3:04 PM.  Always use your most recent med list.                   Brand Name Dispense Instructions for use Diagnosis    atorvastatin 20 MG tablet    LIPITOR     Take 20 mg by mouth daily.        CALCITRATE/VITAMIN D PO      2 tabs w/meals.        * carBAMazepine 200 MG tablet    TEGretol     Take 300 mg by mouth every morning. 300mg = 1.5 tablets.        * carBAMazepine 200 MG tablet    TEGretol     Take 400 mg by mouth At Bedtime.        cyanocobalamin 2500 MCG sublingual tablet    VITAMIN B-12     Place 2,500 mcg under the tongue every other day        docusate sodium 100 MG tablet    COLACE     Take 100 mg by mouth daily        ketoconazole 2 % cream    NIZORAL     Apply topically daily    Bariatric surgery status, Obesity, Class III, BMI 40-49.9 (morbid obesity) (H)       LEXAPRO 10 MG tablet   Generic drug:  escitalopram      Take 20 mg by mouth 2 times daily        Melatonin 10 MG Tabs tablet      Take 10 mg by mouth At Bedtime        methylphenidate 10 MG tablet    RITALIN     Take 10 mg by mouth 2 times daily        metroNIDAZOLE 1 % gel    METROGEL          MINOCYCLINE HCL PO           MUCINEX DM PO      1200mg daily        MULTIVITAMINS PO      Take 2 tablets by mouth every evening. WITH IRON        ranitidine 150 MG tablet    ZANTAC    180 tablet    Take 1 tablet (150 mg) by mouth 2 times daily    GERD without esophagitis, Bariatric surgery status       REMERON PO      Take 75 mg by mouth At Bedtime. Takes ( 5)    15mg tablets=75mg        REXULTI 3 MG tablet    Generic drug:  brexpiprazole      Take 1 mg by mouth daily        TRAZODONE HCL PO      Take 500 mg by mouth At Bedtime        triamcinolone 0.5 % Oint ointment    KENALOG     Apply topically 2 times daily        trospium 20 MG tablet    SANCTURA     Take 20 mg by mouth 2 times daily        * VITAMIN D3 PO      Take 3,000 Units by mouth daily In a.m.        * VITAMIN D3 PO      Take 2,000 Units by mouth At Bedtime.        * Notice:  This list has 4 medication(s) that are the same as other medications prescribed for you. Read the directions carefully, and ask your doctor or other care provider to review them with you.

## 2018-02-23 ENCOUNTER — ANESTHESIA (OUTPATIENT)
Dept: GASTROENTEROLOGY | Facility: CLINIC | Age: 63
End: 2018-02-23
Payer: COMMERCIAL

## 2018-02-23 ENCOUNTER — HOSPITAL ENCOUNTER (OUTPATIENT)
Facility: CLINIC | Age: 63
Discharge: HOME | End: 2018-02-23
Attending: INTERNAL MEDICINE | Admitting: INTERNAL MEDICINE
Payer: COMMERCIAL

## 2018-02-23 ENCOUNTER — ANESTHESIA EVENT (OUTPATIENT)
Dept: GASTROENTEROLOGY | Facility: CLINIC | Age: 63
End: 2018-02-23
Payer: COMMERCIAL

## 2018-02-23 ENCOUNTER — SURGERY (OUTPATIENT)
Age: 63
End: 2018-02-23

## 2018-02-23 VITALS
BODY MASS INDEX: 45.52 KG/M2 | DIASTOLIC BLOOD PRESSURE: 92 MMHG | SYSTOLIC BLOOD PRESSURE: 109 MMHG | RESPIRATION RATE: 16 BRPM | HEIGHT: 67 IN | WEIGHT: 290 LBS | OXYGEN SATURATION: 97 %

## 2018-02-23 LAB — COLONOSCOPY: NORMAL

## 2018-02-23 PROCEDURE — 88305 TISSUE EXAM BY PATHOLOGIST: CPT | Performed by: INTERNAL MEDICINE

## 2018-02-23 PROCEDURE — 88305 TISSUE EXAM BY PATHOLOGIST: CPT | Mod: 26 | Performed by: INTERNAL MEDICINE

## 2018-02-23 PROCEDURE — 37000009 ZZH ANESTHESIA TECHNICAL FEE, EACH ADDTL 15 MIN: Performed by: INTERNAL MEDICINE

## 2018-02-23 PROCEDURE — 25000128 H RX IP 250 OP 636: Performed by: NURSE ANESTHETIST, CERTIFIED REGISTERED

## 2018-02-23 PROCEDURE — 45385 COLONOSCOPY W/LESION REMOVAL: CPT | Performed by: INTERNAL MEDICINE

## 2018-02-23 PROCEDURE — 40000010 ZZH STATISTIC ANES STAT CODE-CRNA PER MINUTE: Performed by: INTERNAL MEDICINE

## 2018-02-23 PROCEDURE — 45380 COLONOSCOPY AND BIOPSY: CPT | Performed by: INTERNAL MEDICINE

## 2018-02-23 PROCEDURE — 37000008 ZZH ANESTHESIA TECHNICAL FEE, 1ST 30 MIN: Performed by: INTERNAL MEDICINE

## 2018-02-23 PROCEDURE — 25000125 ZZHC RX 250: Performed by: NURSE ANESTHETIST, CERTIFIED REGISTERED

## 2018-02-23 RX ORDER — FENTANYL CITRATE 50 UG/ML
INJECTION, SOLUTION INTRAMUSCULAR; INTRAVENOUS PRN
Status: DISCONTINUED | OUTPATIENT
Start: 2018-02-23 | End: 2018-02-23

## 2018-02-23 RX ORDER — ONDANSETRON 2 MG/ML
4 INJECTION INTRAMUSCULAR; INTRAVENOUS EVERY 6 HOURS PRN
Status: DISCONTINUED | OUTPATIENT
Start: 2018-02-23 | End: 2018-02-23 | Stop reason: HOSPADM

## 2018-02-23 RX ORDER — LIDOCAINE 40 MG/G
CREAM TOPICAL
Status: DISCONTINUED | OUTPATIENT
Start: 2018-02-23 | End: 2018-02-23 | Stop reason: HOSPADM

## 2018-02-23 RX ORDER — LIDOCAINE HYDROCHLORIDE 20 MG/ML
INJECTION, SOLUTION INFILTRATION; PERINEURAL PRN
Status: DISCONTINUED | OUTPATIENT
Start: 2018-02-23 | End: 2018-02-23

## 2018-02-23 RX ORDER — FLUMAZENIL 0.1 MG/ML
0.2 INJECTION, SOLUTION INTRAVENOUS
Status: DISCONTINUED | OUTPATIENT
Start: 2018-02-23 | End: 2018-02-23 | Stop reason: HOSPADM

## 2018-02-23 RX ORDER — PROPOFOL 10 MG/ML
INJECTION, EMULSION INTRAVENOUS CONTINUOUS PRN
Status: DISCONTINUED | OUTPATIENT
Start: 2018-02-23 | End: 2018-02-23

## 2018-02-23 RX ORDER — ONDANSETRON 2 MG/ML
INJECTION INTRAMUSCULAR; INTRAVENOUS PRN
Status: DISCONTINUED | OUTPATIENT
Start: 2018-02-23 | End: 2018-02-23

## 2018-02-23 RX ORDER — ONDANSETRON 4 MG/1
4 TABLET, ORALLY DISINTEGRATING ORAL EVERY 6 HOURS PRN
Status: DISCONTINUED | OUTPATIENT
Start: 2018-02-23 | End: 2018-02-23 | Stop reason: HOSPADM

## 2018-02-23 RX ORDER — NALOXONE HYDROCHLORIDE 0.4 MG/ML
.1-.4 INJECTION, SOLUTION INTRAMUSCULAR; INTRAVENOUS; SUBCUTANEOUS
Status: DISCONTINUED | OUTPATIENT
Start: 2018-02-23 | End: 2018-02-23 | Stop reason: HOSPADM

## 2018-02-23 RX ORDER — SODIUM CHLORIDE, SODIUM LACTATE, POTASSIUM CHLORIDE, CALCIUM CHLORIDE 600; 310; 30; 20 MG/100ML; MG/100ML; MG/100ML; MG/100ML
INJECTION, SOLUTION INTRAVENOUS CONTINUOUS PRN
Status: DISCONTINUED | OUTPATIENT
Start: 2018-02-23 | End: 2018-02-23

## 2018-02-23 RX ADMIN — FENTANYL CITRATE 25 MCG: 50 INJECTION, SOLUTION INTRAMUSCULAR; INTRAVENOUS at 10:03

## 2018-02-23 RX ADMIN — LIDOCAINE HYDROCHLORIDE 50 MG: 20 INJECTION, SOLUTION INFILTRATION; PERINEURAL at 10:03

## 2018-02-23 RX ADMIN — SODIUM CHLORIDE, POTASSIUM CHLORIDE, SODIUM LACTATE AND CALCIUM CHLORIDE: 600; 310; 30; 20 INJECTION, SOLUTION INTRAVENOUS at 10:01

## 2018-02-23 RX ADMIN — FENTANYL CITRATE 25 MCG: 50 INJECTION, SOLUTION INTRAMUSCULAR; INTRAVENOUS at 10:40

## 2018-02-23 RX ADMIN — FENTANYL CITRATE 25 MCG: 50 INJECTION, SOLUTION INTRAMUSCULAR; INTRAVENOUS at 10:53

## 2018-02-23 RX ADMIN — MIDAZOLAM 2 MG: 1 INJECTION INTRAMUSCULAR; INTRAVENOUS at 10:01

## 2018-02-23 RX ADMIN — ONDANSETRON 4 MG: 2 INJECTION INTRAMUSCULAR; INTRAVENOUS at 10:08

## 2018-02-23 RX ADMIN — PROPOFOL 100 MCG/KG/MIN: 10 INJECTION, EMULSION INTRAVENOUS at 10:05

## 2018-02-23 NOTE — ANESTHESIA CARE TRANSFER NOTE
Patient: Mili Padilla    Procedure(s):  COLONOSCOPY  - Wound Class: II-Clean Contaminated   - Wound Class: II-Clean Contaminated    Diagnosis: SCREENING  Diagnosis Additional Information: No value filed.    Anesthesia Type:   MAC     Note:  Airway :Nasal Cannula  Patient transferred to:Phase II  Comments: At end of procedure, spontaneous respirations, patient alert to voice, able to follow commands. Oxygen via nasal cannula at 2 liters per minute to PACU.  Oxygen tubing connected to wall O2 in PACU, SpO2, NiBP, and EKG monitors and alarms on and functioning, report on patient's clinical status given to PACU RN, RN questions answered.      Vitals: (Last set prior to Anesthesia Care Transfer)    CRNA VITALS  2/23/2018 1049 - 2/23/2018 1128      2/23/2018             Resp Rate (set): 10                Electronically Signed By: CHANCE Adame CRNA  February 23, 2018  11:28 AM

## 2018-02-23 NOTE — ANESTHESIA POSTPROCEDURE EVALUATION
Patient: Mili Padilla    Procedure(s):  COLONOSCOPY  - Wound Class: II-Clean Contaminated   - Wound Class: II-Clean Contaminated    Diagnosis:SCREENING  Diagnosis Additional Information: No value filed.    Anesthesia Type:  MAC    Note:  Anesthesia Post Evaluation    Patient location during evaluation: PACU  Patient participation: Able to fully participate in evaluation  Level of consciousness: awake  Pain management: adequate  Airway patency: patent  Cardiovascular status: acceptable  Respiratory status: acceptable  Hydration status: acceptable  PONV: none     Anesthetic complications: None          Last vitals:  Vitals:    02/23/18 1150 02/23/18 1200 02/23/18 1204   BP: 115/59 (!) 109/92    Resp: 10 19 16   SpO2: 99% 97% 97%         Electronically Signed By: Kizzy Ferreira  February 23, 2018  12:38 PM

## 2018-02-23 NOTE — ANESTHESIA PREPROCEDURE EVALUATION
Anesthesia Evaluation     . Pt has had prior anesthetic.     No history of anesthetic complications          ROS/MED HX    ENT/Pulmonary:     (+)sleep apnea, doesn't use CPAP , . .    Neurologic:     (+)Multiple Sclerosis limitations: bilateral lower extremity weakness,     Cardiovascular:         METS/Exercise Tolerance:     Hematologic:         Musculoskeletal:         GI/Hepatic:     (+) GERD Asymptomatic on medication,       Renal/Genitourinary:         Endo:     (+) Obesity, .      Psychiatric:         Infectious Disease:         Malignancy:         Other: Comment: Hx of larynx injury.  Vocal cord paralysis  Intubated in past with [id number]                    Physical Exam  Normal systems: dental    Airway   Mallampati: II  TM distance: >3 FB  Neck ROM: full    Dental   (+) implants    Cardiovascular   Rhythm and rate: regular      Pulmonary    breath sounds clear to auscultation                    Anesthesia Plan      History & Physical Review  History and physical reviewed and following examination; no interval change.    ASA Status:  3 .        Plan for MAC with Intravenous induction.   PONV prophylaxis:  Ondansetron (or other 5HT-3)       Postoperative Care  Postoperative pain management:  IV analgesics.      Consents  Anesthetic plan, risks, benefits and alternatives discussed with:  Patient..                          .

## 2018-02-23 NOTE — ANESTHESIA POSTPROCEDURE EVALUATION
Patient: Mili Padilla    Procedure(s):  COLONOSCOPY  - Wound Class: II-Clean Contaminated   - Wound Class: II-Clean Contaminated    Diagnosis:SCREENING  Diagnosis Additional Information: No value filed.    Anesthesia Type:  MAC    Note:  Anesthesia Post Evaluation    Patient location during evaluation: PACU  Patient participation: Able to fully participate in evaluation  Level of consciousness: awake  Pain management: adequate  Airway patency: patent  Cardiovascular status: acceptable  Respiratory status: acceptable  Hydration status: acceptable  PONV: none     Anesthetic complications: None          Last vitals:  Vitals:    02/23/18 0912   BP: 129/70   Resp: 16   SpO2: 98%         Electronically Signed By: Kizzy Ferreira  February 23, 2018  11:24 AM

## 2018-02-26 ENCOUNTER — HOSPITAL ENCOUNTER (OUTPATIENT)
Dept: NUTRITION | Facility: CLINIC | Age: 63
Discharge: HOME | End: 2018-02-26
Attending: DIETITIAN, REGISTERED | Admitting: DIETITIAN, REGISTERED
Payer: COMMERCIAL

## 2018-02-26 VITALS — WEIGHT: 287.5 LBS | BODY MASS INDEX: 45.71 KG/M2

## 2018-02-26 PROCEDURE — 97803 MED NUTRITION INDIV SUBSEQ: CPT | Performed by: DIETITIAN, REGISTERED

## 2018-02-26 NOTE — PROGRESS NOTES
"NUTRITION REASSESSMENT NOTE     REASON FOR ASSESSMENT  Mili Padilla referred by Dr. Márquez for MNT related to overweight.    Patient accompanied by self.      ASSESSMENT   Nutrition History:- Information obtained from patient.  Patient with long history of weight loss struggle.  Patient had sleeve gastrectomy 3 years ago.  Patient states she was able to maintain her 65 lb weight loss for 2 years. Then patient struggled with depression and regained her weight.  Patient has been tracking her food intake since surgery.  Patient aiming to keep her calories to 2000 per day.  Patient with history of multiple sclerosis which makes it difficult to prepare meals due to fatigue.  Patient feels less satisfied with [employer] meals so has not been eating her meals.  Patient contacted [employer] to send low sodium ,low fat meal options which patient feels she may prefer the meals.  Patient  has not received referral for RD appointments so  will follow up with her insurance company.  Patient concerned with receiving any out of pocket expenses for nutrition coverage.       24 Diet Recall:  Breakfast: 1/2 cup cottage cheese and 2 cantaloupe stoner  Lunch: beef jerky, cheese and banana     Dinner: 2/8 slices pepperlee pizza + marble brownie   Snack: 6 cups popcorn, 1 serving dark chocolate mint M&M's, brownie, beef jerky (1 package per day)  Beverages: water, Diet Dr. Pepper, Izze's drink    Dining out: [employer] 1 time per week (eggs, sausage biscuit); [employer] -cinnamon twists ; hamburger and french fries with lime phosphate    PHYSICAL FINDINGS  Observed:  No nutrition-related physical findings observed  Obtained from Chart/Interdisciplinary Team:  None noted    LABS  Labs reviewed    MEDICATIONS  Medications reviewed    ANTHROPOMETRICS   Height: 5'7\"  Weight: 287.5 lbs  BMI (kg/m2): 45 kg/m2  Weight Status:  Obesity Grade III BMI >40  %IBW: 213%  Weight History:   Wt Readings from Last 5 Encounters:   02/26/18 " 130.4 kg (287 lb 8 oz)   02/23/18 131.5 kg (290 lb)   01/31/18 131.2 kg (289 lb 3.2 oz)   01/10/18 131.5 kg (290 lb)   12/13/17 134 kg (295 lb 6.4 oz)     ASSESSED NUTRITION NEEDS  Estimated Energy Needs: 6518-8762 kcals/day (11-14 Kcal/Kg) for weight loss  Estimated Protein Needs: 61-74 grams protein/day (1-1.2 g pro/Kg) for maintenance  Estimated Fluid Needs: 9018-3862 mL/day (25-30 mL/kg)    EVALUATION/PROGRESS TOWARDS GOALS   Previous Goals:   Reduce pizza to 1 time per month-met  Eat vegetables 3-4 times per week-not met  If eating salami for lunch, make sandwich and add fruit-not met     Previous Nutrition Diagnosis: Disordered eating pattern related to excessive snacking as evidenced by weight regain after sleeve gastrectomy and BMI of 45.2 kg/m2 -no change    Current Nutrition Diagnosis: Disordered eating pattern related to excessive snacking as evidenced by weight regain after sleeve gastrectomy and BMI of 45 kg/m2      INTERVENTIONS   Nutrition Prescription   Recommend avoiding liquids with meals to reduce volume of meals for intended weight loss; determine alternatives to emotional eating     IMPLEMENTATION   Meals and Snacks: 3 meals + snacks if hungry  Nutrition Education (Content):   a)  Discussed progress towards goals.  Reviewed food logs.  Patient continues to consume more calories through snacks than her meals.  Encouraged patient to determine nutrition goals for the year which patient wants to lose weight.  Determined ways patient can reduce calories.  Congratulated patient on her weight loss and consistency of tracking food intake.  Supported patient in her struggle with food.   Nutrition Education (Application):   a)  Determined ways to continue reducing calories consumed by continuing to limit pizza to 1 time per month.                 b)  Patient verbalizes understanding of diet by stating will limit pizza to 1 time per week.     Anticipated compliance: fair      GOALS  Reduce pizza to 1 time  per month  Eat vegetables daily  Avoid eating jerky as meal     FOLLOW UP/MONITORING   Progress towards goals will be monitored and evaluated per protocol and Practice Guidelines  Patient to follow up in 3 weeks  Patient has RD name and contact information    Time Spent with Patient  25 minutes    Filiberto Becerril, RD, LD  [facility] Outpatient Dietitian  [phone] (office phone)

## 2018-02-27 LAB — COPATH REPORT: NORMAL

## 2018-03-05 ENCOUNTER — DOCUMENTATION ONLY (OUTPATIENT)
Dept: SURGERY | Facility: CLINIC | Age: 63
End: 2018-03-05

## 2018-03-07 ENCOUNTER — OFFICE VISIT (OUTPATIENT)
Dept: PSYCHOLOGY | Facility: CLINIC | Age: 63
End: 2018-03-07
Payer: COMMERCIAL

## 2018-03-07 VITALS — WEIGHT: 290.8 LBS | BODY MASS INDEX: 46.23 KG/M2

## 2018-03-07 DIAGNOSIS — R45.89 ANXIETY ABOUT HEALTH: ICD-10-CM

## 2018-03-07 DIAGNOSIS — F54 PSYCHOLOGICAL FACTORS AFFECTING MORBID OBESITY (H): ICD-10-CM

## 2018-03-07 DIAGNOSIS — Z56.0 UNEMPLOYMENT: ICD-10-CM

## 2018-03-07 DIAGNOSIS — E66.01 PSYCHOLOGICAL FACTORS AFFECTING MORBID OBESITY (H): ICD-10-CM

## 2018-03-07 DIAGNOSIS — F33.1 MAJOR DEPRESSIVE DISORDER, RECURRENT EPISODE, MODERATE (H): Primary | ICD-10-CM

## 2018-03-07 SDOH — ECONOMIC STABILITY - INCOME SECURITY: UNEMPLOYMENT, UNSPECIFIED: Z56.0

## 2018-03-07 NOTE — MR AVS SNAPSHOT
After Visit Summary   3/7/2018    Mili Padilla    MRN: [id number]           Patient Information     Date Of Birth          1955        Visit Information        Provider Department      3/7/2018 2:00 PM Ramin Olivo, PhD [facility]        Today's Diagnoses     Major depressive disorder, recurrent episode, moderate (H)    -  1    Psychological factors affecting morbid obesity (H)        Anxiety about health        Unemployment           Follow-ups after your visit        Your next 10 appointments already scheduled     Mar 15, 2018  2:00 PM CDT   RETURN NEURO with Zach Pak MD   Eye Clinic ([facility])    [place] [address]  [facility] [address]   [phone]            Mar 21, 2018  2:30 PM CDT   Follow Up with Neha Valadez RD, LD   [facility] ([facility])    [address], [address]  [facility] [address] [phone]              Who to contact     Please call your clinic at [phone] to:    Ask questions about your health    Make or cancel appointments    Discuss your medicines    Learn about your test results    Speak to your doctor            Additional Information About Your Visit        [employer] Information     Care[employer] gives you secure access to your electronic health record. If you see a primary care provider, you can also send messages to your care team and make appointments. If you have questions, please call your primary care clinic.  If you do not have a primary care provider, please call [phone] and they will assist you.      CareFlash is an electronic gateway that provides easy, online access to your medical records. With CareFlash, you can request a clinic appointment, read your test results, renew a prescription or communicate with your care team.     To access your existing account, please contact your [facility]  [facility] [facility] or call [phone] for assistance.        Care EveryWhere ID     This is your Care EveryWhere ID. This could be used by other organizations to access your [place] medical records  [id number]        Your Vitals Were     Last Period BMI (Body Mass Index)                12/10/2010 46.23 kg/m2           Blood Pressure from Last 3 Encounters:   02/23/18 (!) 109/92   09/25/17 111/61   07/17/17 114/63    Weight from Last 3 Encounters:   03/07/18 131.9 kg (290 lb 12.8 oz)   02/26/18 130.4 kg (287 lb 8 oz)   02/23/18 131.5 kg (290 lb)              Today, you had the following     No orders found for display       Primary Care Provider Office Phone # Fax #    Jasmyn Márquez [phone]       [place] [employer] [facility] [address] [facility] [address]  [facility] [address]        Equal Access to Services     [facility]: Hadii aad ku hadasho Soomaali, waaxda luqadaha, qaybta kaalmada adeegyada, waxay idiin haymaryn giovanny alberts . So [facility] [phone].    ATENCIÓN: Si iwonala espdiane, tiene a abel disposición servicios gratuitos de asistencia lingüística. Llame al [phone].    We comply with applicable federal civil rights laws and [place] laws. We do not discriminate on the basis of race, color, national origin, age, disability, sex, sexual orientation, or gender identity.            Thank you!     Thank you for choosing [facility]  for your care. Our goal is always to provide you with excellent care. Hearing back from our patients is one way we can continue to improve our services. Please take a few minutes to complete the written survey that you may receive in the mail after your visit with us. Thank you!             Your Updated Medication List - Protect others around you: Learn how to safely use, store and throw away your medicines at www.disposemymeds.org.          This list is accurate as of 3/7/18  3:02 PM.  Always use your most recent med list.                    Brand Name Dispense Instructions for use Diagnosis    atorvastatin 20 MG tablet    LIPITOR     Take 20 mg by mouth daily.        CALCITRATE/VITAMIN D PO      2 tabs w/meals.        * carBAMazepine 200 MG tablet    TEGretol     Take 300 mg by mouth every morning. 300mg = 1.5 tablets.        * carBAMazepine 200 MG tablet    TEGretol     Take 400 mg by mouth At Bedtime.        cyanocobalamin 2500 MCG sublingual tablet    VITAMIN B-12     Place 2,500 mcg under the tongue every other day        docusate sodium 100 MG tablet    COLACE     Take 100 mg by mouth daily        EFFEXOR PO      Take by mouth 3 times daily        ketoconazole 2 % cream    NIZORAL     Apply topically daily    Bariatric surgery status, Obesity, Class III, BMI 40-49.9 (morbid obesity) (H)       Melatonin 10 MG Tabs tablet      Take 10 mg by mouth At Bedtime        methylphenidate 10 MG tablet    RITALIN     Take 10 mg by mouth 2 times daily        metroNIDAZOLE 1 % gel    METROGEL          MINOCYCLINE HCL PO           MUCINEX DM PO      1200mg daily        MULTIVITAMINS PO      Take 2 tablets by mouth every evening. WITH IRON        ranitidine 150 MG tablet    ZANTAC    180 tablet    Take 1 tablet (150 mg) by mouth 2 times daily    GERD without esophagitis, Bariatric surgery status       REMERON PO      Take 75 mg by mouth At Bedtime. Takes ( 5)    15mg tablets=75mg        REXULTI 3 MG tablet   Generic drug:  brexpiprazole      Take 1 mg by mouth daily        TRAZODONE HCL PO      Take 500 mg by mouth At Bedtime        triamcinolone 0.5 % Oint ointment    KENALOG     Apply topically 2 times daily        trospium 20 MG tablet    SANCTURA     Take 20 mg by mouth 2 times daily        * VITAMIN D3 PO      Take 3,000 Units by mouth daily In a.m.        * VITAMIN D3 PO      Take 2,000 Units by mouth At Bedtime.        * Notice:  This list has 4 medication(s) that are the same as other medications prescribed for you. Read the directions carefully,  and ask your doctor or other care provider to review them with you.

## 2018-03-07 NOTE — PROGRESS NOTES
Health Psychology                  Clinic    Department of Medicine  Sherrie Crowe, Ph.D., L.P. [phone]                          Clinics and Surgery Center  [facility] Vin Ward, Ph.D.,  L.P. [phone]                 [facility] Mail Code 741   Ramin Olivo, Ph.D., WALTER, L.P. [phone]     [address] [place] [address] Kathie Galvan, Ph.D., L.P. [phone]  [place] [address]       Health Psychology Follow-Up Note     Ms. Padilla is a 62-year-old woman self-referred for psychological consultation because her therapist of the last 24 years retired.  She is seen for problem-solving and supportive therapy for depression and multiple health issues.     HISTORY OF PRESENTING CONCERN:  Ms. Padilla reports a lengthy history of depression.  She currently sees a psychiatrist, Ban Coleman at [facility], and is taking Abilify 7.5 mg, trazodone 500 mg, ripazepam 75 mg each day at bedtime, Tegretol 400 mg at bedtime and methylphenidate 10 mg b.i.d.  She discontineud ability and is now taking Rexulti 2 mg.  She has a history of hospitalizations including 3 at [facility] in the late 1990s, early 2000s when she had 2 courses of ECT lasting 7-10 sessions and approximately 3 other single session ECTs.  She stopped due to memory problems.  She kept with Dr. Coleman who she first met as an inpatient and has seen her for the past 18 years.  She had also been hospitalized psychiatrically at [facility] at age 23.  She previously worked with psychiatrist, Doug Sarabia for three years, stopping, in part, because she didn't feel he took her suicide attempt sufficiently seriously.  She reports her depression tends to vary.  Currently it is moderate, though it was more severe within the past year especially when she was having additional health problems.      MEDICAL HISTORY:  Ms.  Randy has a complex medical history.  She was diagnosed with MS in 1985.  Her psychiatrist at [facility] in [place], Dr. 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  She has used a scooter since 1992, using it more in the last 6 months than she had previously.  She also can use a walker.  She was diagnosed with fibromyalgia in 1997.   She is also seen at the [facility] for her eye care.  She began to see an eating disorder therapist weekly and has been somewhat erratically losing weight.    WEIGH Today is 291.2 down from  294.9 last session with me.     She is attending OA.    Wt Readings from Last 4 Encounters:   03/07/18 131.9 kg (290 lb 12.8 oz)   02/26/18 130.4 kg (287 lb 8 oz)   02/23/18 131.5 kg (290 lb)   02/07/18 132.1 kg (291 lb 4.8 oz)     Past Medical History:   Diagnosis Date     Depression, major, in partial remission (H)      Fibromyalgia syndrome      Gastro-oesophageal reflux disease      Hyperlipemia      Lymphedema      Multiple sclerosis (H)     tremors with MS, all four limbs and head     Multiple sclerosis, secondary progressive (H)      Sleep apnea      Vocal cord paralysis, unilateral complete         Past Surgical History:   Procedure Laterality Date     COLONOSCOPY N/A 7/17/2017    Procedure: COMBINED COLONOSCOPY, SINGLE OR MULTIPLE BIOPSY/POLYPECTOMY BY BIOPSY;;  Surgeon: Wong Beaulieu MD;  Location:  GI     COLONOSCOPY N/A 2/23/2018    Procedure: COMBINED COLONOSCOPY, SINGLE OR MULTIPLE BIOPSY/POLYPECTOMY BY BIOPSY;  COLONOSCOPY ;  Surgeon: Yessica Santana MD;  Location:  GI     ENT SURGERY      throat 1969, vocal cord surgery with skin grafting     ESOPHAGOSCOPY, GASTROSCOPY, DUODENOSCOPY (EGD), COMBINED N/A 12/16/2014    Procedure: COMBINED ENDOSCOPIC ULTRASOUND, ESOPHAGOSCOPY, GASTROSCOPY, DUODENOSCOPY (EGD), FINE NEEDLE ASPIRATE/BIOPSY;  Surgeon: Yessica Santana MD;  Location: [facility]     ESOPHAGOSCOPY,  GASTROSCOPY, DUODENOSCOPY (EGD), COMBINED N/A 12/16/2014    Procedure: COMBINED ESOPHAGOSCOPY, GASTROSCOPY, DUODENOSCOPY (EGD), BIOPSY SINGLE OR MULTIPLE;  Surgeon: Yessica Santana MD;  Location:  GI     LAPAROSCOPIC APPENDECTOMY  5/30/2013    Procedure: LAPAROSCOPIC APPENDECTOMY;;  Surgeon: Salvador Morris MD;  Location:  OR     LAPAROSCOPIC BIOPSY LIVER  5/30/2013    Procedure: LAPAROSCOPIC BIOPSY LIVER;;  Surgeon: Salvador Morris MD;  Location:  OR     LAPAROSCOPIC GASTRIC SLEEVE  5/30/2013    Procedure: LAPAROSCOPIC GASTRIC SLEEVE;  LAPAROSCOPIC SLEEVE GASTRECTOMY/ LAPARSCOPIC  APPENDECTOMY /LIVER BIOPSIES/LIVER CYST DRAINAGE;  Surgeon: Salvador Morris MD;  Location:  OR     ORTHOPEDIC SURGERY      R wrist 2010     Current Outpatient Prescriptions   Medication     Venlafaxine HCl (EFFEXOR PO)     brexpiprazole (REXULTI) 3 MG tablet     MINOCYCLINE HCL PO     triamcinolone (KENALOG) 0.5 % OINT ointment     trospium (SANCTURA) 20 MG tablet     cyabnocobalamin (VITAMIN B-12) 2500 MCG sublingual tablet     ranitidine (ZANTAC) 150 MG tablet     metroNIDAZOLE (METROGEL) 1 % gel     ketoconazole (NIZORAL) 2 % cream     Dextromethorphan-Guaifenesin (MUCINEX DM PO)     Melatonin 10 MG TABS     docusate sodium 100 MG tablet     methylphenidate (RITALIN) 10 MG tablet     carBAMazepine (TEGRETOL) 200 MG tablet     carBAMazepine (TEGRETOL) 200 MG tablet     Calcium Citrate-Vitamin D (CALCITRATE/VITAMIN D PO)     Cholecalciferol (VITAMIN D3 PO)     Cholecalciferol (VITAMIN D3 PO)     Mirtazapine (REMERON PO)     TRAZODONE HCL PO     Multiple Vitamin (MULTIVITAMINS PO)     atorvastatin (LIPITOR) 20 MG tablet     No current facility-administered medications for this visit.      Facility-Administered Medications Ordered in Other Visits   Medication     ondansetron (ZOFRAN) injection     New medication: Effexor 300 mon 12/17/17  per Dr. Marquise Coleman, her psychiatrist.  She discontinued Lexapro  17.    SOCIAL HISTORY:  Ms. Padilla grew up in the [facility] and is the oldest of 5 children in her family of origin.  Her father was a dentist who she believes was bipolar.  He  of lung cancer at age 72.  Her mother  of sepsis at age 80.  She had been diagnosed with breast cancer when Ms. Padilla was 8.  She describes the marriage between her parents as misael.  She is 5 years older than her closest-aged sister and they are all within 4 years of each other.   Her daughter  12/3 and her mother   She tends to feel more depressed in winter.      Ms. Padilla attended [employer] for a year and later went to [employer] school at the [employer].  She felt that she could not continue her education to the point of graduation because she was working full time and raising her daughter.  Her daughter  in  at age 31 of liver failure secondary to an accidental Tylenol overdose.  She had been recovering from a hysterectomy.      Ms. 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  She had to retire at the time secondary to her MS.  She has never .  She has not dated,  is interested in dating, and states that if she were she would be interested in a relationship with a woman.  There is no history of  service or legal problems.  She expresses concern about her increasing social isolation.  She does have at least 1 friend, Jael, who she met at a therapy group in .  She is active in facilitating groups for people with MS both in [place] and [place].  She lives alone and currently gets help from a home health aide, as well as home health nurse.     She sees Dr. Coleman, psychiatrist and is trying to figure out best antidepressant regimen.  She brought in her genetic testing.Effexor reduced to 300 per Dr. Coleman.     SESSION:  Mili lebron  punctually for today's meeting  And is greeted in the waiting room. She is  concerned today about her 59 year old sister who is being treated for breast cancer.  She will have genetic testing done this week.  She is frustrated by the results of her recent colonoscopy.  It will be repeated in 2-3 months and it, like the other recent ones, resulted in removal of multiple polyps (8).    We discussed her progress with weight and going to bed earlier.  She typically falls asleep in her chair 1 night/week without taking her medications.  The rest of the focus was on medication adherence.   She viewed a video about it. The goal is 1500 calories/day while using [employer]. We reviewed her self-sabotaging.    She participated fully and appeared to derive benefit.  Rapport was excellent.  Extended session due to complexity of case and length of interval.  Extended session due to complexity of case and length of interval.  Time in:  2:05  Time out: 2:59     DIAGNOSES:   Major depression, recurrent, mild (F33.o).   Behavioral factors affecting morbid obesity (E66.01).     PLAN:  Ms. Padilla will return to clinic on  4/4 @ 2  for problem-solving and supportive therapy consistent with treatment plan..   Tx plan 9/27/17;  Next review due  4/2/1     Ramin Olivo, PhD, A.B.P.P., L.P.   Director, Health Psychology

## 2018-03-14 DIAGNOSIS — H53.10 SUBJECTIVE VISUAL DISTURBANCE: Primary | ICD-10-CM

## 2018-04-04 ENCOUNTER — OFFICE VISIT (OUTPATIENT)
Dept: PSYCHOLOGY | Facility: CLINIC | Age: 63
End: 2018-04-04
Payer: COMMERCIAL

## 2018-04-04 VITALS — BODY MASS INDEX: 46.87 KG/M2 | WEIGHT: 293 LBS

## 2018-04-04 DIAGNOSIS — R45.89 ANXIETY ABOUT HEALTH: ICD-10-CM

## 2018-04-04 DIAGNOSIS — F54 PSYCHOLOGICAL FACTORS AFFECTING MORBID OBESITY (H): ICD-10-CM

## 2018-04-04 DIAGNOSIS — Z56.0 UNEMPLOYMENT: ICD-10-CM

## 2018-04-04 DIAGNOSIS — F33.1 MAJOR DEPRESSIVE DISORDER, RECURRENT EPISODE, MODERATE (H): Primary | ICD-10-CM

## 2018-04-04 DIAGNOSIS — E66.01 PSYCHOLOGICAL FACTORS AFFECTING MORBID OBESITY (H): ICD-10-CM

## 2018-04-04 SDOH — ECONOMIC STABILITY - INCOME SECURITY: UNEMPLOYMENT, UNSPECIFIED: Z56.0

## 2018-04-04 NOTE — PROGRESS NOTES
Health Psychology                  Clinic    Department of Medicine  Sherrie Crowe, Ph.D., L.P. [phone]                          Clinics and Surgery Center  [facility] Vin Ward, Ph.D.,  L.P. [phone]                 3rd [facility] Mail Code 741   Ramin Olivo, Ph.D., WALTER, L.P. [phone]     [address] [place] [address] Kathie Galvan, Ph.D., L.P. [phone]  [place] [address]       Health Psychology Follow-Up Note     Ms. Padilla is a 62-year-old woman self-referred for psychological consultation because her therapist of the last 24 years retired.  She is seen for problem-solving and supportive therapy for depression and multiple health issues.     HISTORY OF PRESENTING CONCERN:  Ms. Padilla reports a lengthy history of depression.  She currently sees a psychiatrist, Ban Coleman at [facility], and is taking Abilify 7.5 mg, trazodone 500 mg, ripazepam 75 mg each day at bedtime, Tegretol 400 mg at bedtime and methylphenidate 10 mg b.i.d.  She discontineud ability and is now taking Rexulti 2 mg.  She has a history of hospitalizations including 3 at [facility] in the late 1990s, early 2000s when she had 2 courses of ECT lasting 7-10 sessions and approximately 3 other single session ECTs.  She stopped due to memory problems.  She kept with Dr. Coleman who she first met as an inpatient and has seen her for the past 18 years.  She had also been hospitalized psychiatrically at [facility] at age 23.  She previously worked with psychiatrist, Doug Sarabia for three years, stopping, in part, because she didn't feel he took her suicide attempt sufficiently seriously.  She reports her depression tends to vary.  Currently it is moderate, though it was more severe within the past year especially when she was having additional health problems.      MEDICAL HISTORY:  Ms.  Randy has a complex medical history.  She was diagnosed with MS in 1985.  Her psychiatrist at [facility] in [place], Dr. 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  She has used a scooter since 1992, using it more in the last 6 months than she had previously.  She also can use a walker.  She was diagnosed with fibromyalgia in 1997.   [facility].  She began to see an eating disorder therapist weekly and has been somewhat erratically losing weight.    WEIGH Today is 294.8down from  290.8 last session with me.     She is attending OA.    Wt Readings from Last 4 Encounters:   04/04/18 133.7 kg (294 lb 12.8 oz)   03/07/18 131.9 kg (290 lb 12.8 oz)   02/26/18 130.4 kg (287 lb 8 oz)   02/23/18 131.5 kg (290 lb)     Past Medical History:   Diagnosis Date     Depression, major, in partial remission (H)      Fibromyalgia syndrome      Gastro-oesophageal reflux disease      Hyperlipemia      Lymphedema      Multiple sclerosis (H)     tremors with MS, all four limbs and head     Multiple sclerosis, secondary progressive (H)      Sleep apnea      Vocal cord paralysis, unilateral complete         Past Surgical History:   Procedure Laterality Date     COLONOSCOPY N/A 7/17/2017    Procedure: COMBINED COLONOSCOPY, SINGLE OR MULTIPLE BIOPSY/POLYPECTOMY BY BIOPSY;;  Surgeon: Wong Beaulieu MD;  Location:  GI     COLONOSCOPY N/A 2/23/2018    Procedure: COMBINED COLONOSCOPY, SINGLE OR MULTIPLE BIOPSY/POLYPECTOMY BY BIOPSY;  COLONOSCOPY ;  Surgeon: Yessica Santana MD;  Location:  GI     ENT SURGERY      throat 1969, vocal cord surgery with skin grafting     ESOPHAGOSCOPY, GASTROSCOPY, DUODENOSCOPY (EGD), COMBINED N/A 12/16/2014    Procedure: COMBINED ENDOSCOPIC ULTRASOUND, ESOPHAGOSCOPY, GASTROSCOPY, DUODENOSCOPY (EGD), FINE NEEDLE ASPIRATE/BIOPSY;  Surgeon: Yessica Santana MD;  Location: [facility]     ESOPHAGOSCOPY,  GASTROSCOPY, DUODENOSCOPY (EGD), COMBINED N/A 12/16/2014    Procedure: COMBINED ESOPHAGOSCOPY, GASTROSCOPY, DUODENOSCOPY (EGD), BIOPSY SINGLE OR MULTIPLE;  Surgeon: Yessica Santana MD;  Location:  GI     LAPAROSCOPIC APPENDECTOMY  5/30/2013    Procedure: LAPAROSCOPIC APPENDECTOMY;;  Surgeon: Salvador Morris MD;  Location:  OR     LAPAROSCOPIC BIOPSY LIVER  5/30/2013    Procedure: LAPAROSCOPIC BIOPSY LIVER;;  Surgeon: Salvadro Morris MD;  Location:  OR     LAPAROSCOPIC GASTRIC SLEEVE  5/30/2013    Procedure: LAPAROSCOPIC GASTRIC SLEEVE;  LAPAROSCOPIC SLEEVE GASTRECTOMY/ LAPARSCOPIC  APPENDECTOMY /LIVER BIOPSIES/LIVER CYST DRAINAGE;  Surgeon: Salvador Morris MD;  Location:  OR     ORTHOPEDIC SURGERY      R wrist 2010     Current Outpatient Prescriptions   Medication     Venlafaxine HCl (EFFEXOR PO)     brexpiprazole (REXULTI) 3 MG tablet     MINOCYCLINE HCL PO     triamcinolone (KENALOG) 0.5 % OINT ointment     trospium (SANCTURA) 20 MG tablet     cyabnocobalamin (VITAMIN B-12) 2500 MCG sublingual tablet     ranitidine (ZANTAC) 150 MG tablet     metroNIDAZOLE (METROGEL) 1 % gel     ketoconazole (NIZORAL) 2 % cream     Dextromethorphan-Guaifenesin (MUCINEX DM PO)     Melatonin 10 MG TABS     docusate sodium 100 MG tablet     methylphenidate (RITALIN) 10 MG tablet     carBAMazepine (TEGRETOL) 200 MG tablet     carBAMazepine (TEGRETOL) 200 MG tablet     Calcium Citrate-Vitamin D (CALCITRATE/VITAMIN D PO)     Cholecalciferol (VITAMIN D3 PO)     Cholecalciferol (VITAMIN D3 PO)     Mirtazapine (REMERON PO)     TRAZODONE HCL PO     Multiple Vitamin (MULTIVITAMINS PO)     atorvastatin (LIPITOR) 20 MG tablet     No current facility-administered medications for this visit.      Facility-Administered Medications Ordered in Other Visits   Medication     ondansetron (ZOFRAN) injection     New medication: Effexor 300 mon 12/17/17  per Dr. Marquise Coleman, her psychiatrist.  She discontinued Lexapro  17.    SOCIAL HISTORY:  Ms. Padilla grew up in the [facility] [place] and is the oldest of 5 children in her family of origin.  Her father was a dentist who she believes was bipolar.  He  of lung cancer at age 72.  Her mother  of sepsis at age 80.  She had been diagnosed with breast cancer when Ms. Padilla was 8.  She describes the marriage between her parents as misael.  She is 5 years older than her closest-aged sister and they are all within 4 years of each other.   Her daughter  12/3 and her mother   She tends to feel more depressed in winter.      Ms. Padilla attended [employer] for a year and later went to [employer] school at the [employer].  She felt that she could not continue her education to the point of graduation because she was working full time and raising her daughter.  Her daughter  in  at age 31 of liver failure secondary to an accidental Tylenol overdose.  She had been recovering from a hysterectomy.      Ms. 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  She had to retire at the time secondary to her MS.  She has never .  She has not dated,  is interested in dating, and states that if she were she would be interested in a relationship with a woman.  There is no history of  service or legal problems.  She expresses concern about her increasing social isolation.  She does have at least 1 friend, Jael, who she met at a therapy group in .  She is active in facilitating groups for people with MS both in [place] and [place].  She lives alone and currently gets help from a home health aide, as well as home health nurse.     She sees Dr. Coleman, psychiatrist and is trying to figure out best antidepressant regimen.  She brought in her genetic testing.Effexor reduced to 300 per Dr. Coleman and is now substituting  Cymbalta for it.   She feels she has been more depressed since the Fall, but doesn't think it is SAD.       SESSION:  Mili arrived punctually for today's meeting and is greeted in the waiting room. She is  concerned today about her 59 year old sister who is being treated for breast cancer.  She is pleased with the results of her genetic testing which revealed she didn't have an elevated breast cancer risk.   She is frustrated by the results of her recent colonoscopy.  It will be repeated in May.  We discussed her progress with weight and going to bed earlier.  She has back tracked on both of these.  We discussed at length.  She is making progress on falling asleep in bed.  She states she is taking her medications properly.   We reviewed her self-sabotaging.  She is upset she is losing her home health aid.  We discussed contacting the [facility] to see if other mechanisms of support are possible.  She doesn't feel she could afford the spend down on Medicare.   She participated fully and appeared to derive benefit.  Rapport was excellent.  Extended session due to complexity of case and length of interval.  Extended session due to complexity of case and length of interval.The treatment plan was reviewed with the patient at today s visit. The treatment plan remains current based on the patient s status and progress to date.  Time in:  2:04  Time out: 2:58     DIAGNOSES:   Major depression, recurrent, mild (F33.o).   Behavioral factors affecting morbid obesity (E66.01).     PLAN:  Ms. Padilla will return to clinic on  5/2 @ 3  for problem-solving and supportive therapy consistent with treatment plan..   Tx plan 9/27/17;  Next review due  7/4/18     Ramin Olivo, PhD, A.B.P.P., L.P.   Director, Health Psychology

## 2018-04-04 NOTE — MR AVS SNAPSHOT
After Visit Summary   4/4/2018    Mili Padilla    MRN: [id number]           Patient Information     Date Of Birth          1955        Visit Information        Provider Department      4/4/2018 2:00 PM Ramin Olivo, PhD [facility]        Today's Diagnoses     Major depressive disorder, recurrent episode, moderate (H)    -  1    Psychological factors affecting morbid obesity (H)        Anxiety about health        Unemployment           Follow-ups after your visit        Your next 10 appointments already scheduled     Apr 18, 2018  2:30 PM CDT   Follow Up with Neha Valadez RD, LD   [facility] ([facility])    [address] [place] [address], [address]  [facility] [id number] [phone]            Apr 19, 2018  2:30 PM CDT   RETURN NEURO with Zach Pak MD   Eye Clinic ([facility])    [address]  [facility] [address]   [phone]            Jun 07, 2018  1:00 PM CDT   Annual Visit with Adwoa Hollins PA-C   [facility] ([facility])    [address]  [place] [id number] [phone]            Jun 07, 2018  1:30 PM CDT   Annual Visit with Mitch Richmond, MARLYN   [facility] ([facility])    [address]  [address]  [facility] [id number] [phone]              Who to contact     Please call your clinic at [phone] to:    Ask questions about your health    Make or cancel appointments    Discuss your medicines    Learn about your test results    Speak to your doctor            Additional Information About Your Visit        MyChart Information     [employer] gives you secure access to your electronic health record. If you see a primary care provider, you can also send messages to your care  team and make appointments. If you have questions, please call your primary care clinic.  If you do not have a primary care provider, please call [phone] and they will assist you.      [employer] is an electronic gateway that provides easy, online access to your medical records. With [employer], you can request a clinic appointment, read your test results, renew a prescription or communicate with your care team.     To access your existing account, please contact your [facility] or call [phone] for assistance.        Care EveryWhere ID     This is your Care EveryWhere ID. This could be used by other organizations to access your [place] medical records  [id number]        Your Vitals Were     Last Period BMI (Body Mass Index)                12/10/2010 46.87 kg/m2           Blood Pressure from Last 3 Encounters:   02/23/18 (!) 109/92   09/25/17 111/61   07/17/17 114/63    Weight from Last 3 Encounters:   04/04/18 133.7 kg (294 lb 12.8 oz)   03/07/18 131.9 kg (290 lb 12.8 oz)   02/26/18 130.4 kg (287 lb 8 oz)              Today, you had the following     No orders found for display       Primary Care Provider Office Phone # Fax #    Jasmyn Márquez [phone]       [facility] [address]  [facility] [address]        Equal Access to Services     [facility]: Hadii aad ku hadasho Soarikali, waaxda luqadaha, qaybta kaalmada adethiernoyada, marcos alberts . So [facility] [phone].    ATENCIÓN: Si habla español, tiene a abel disposición servicios gratuitos de asistencia lingüística. Llame al [phone].    We comply with applicable federal civil rights laws and [place] laws. We do not discriminate on the basis of race, color, national origin, age, disability, sex, sexual orientation, or gender identity.            Thank you!     Thank you for choosing [facility] PRIMARY CARE CLINIC  for your care. Our goal is always to provide  you with excellent care. Hearing back from our patients is one way we can continue to improve our services. Please take a few minutes to complete the written survey that you may receive in the mail after your visit with us. Thank you!             Your Updated Medication List - Protect others around you: Learn how to safely use, store and throw away your medicines at www.disposemymeds.org.          This list is accurate as of 4/4/18  3:03 PM.  Always use your most recent med list.                   Brand Name Dispense Instructions for use Diagnosis    atorvastatin 20 MG tablet    LIPITOR     Take 20 mg by mouth daily.        CALCITRATE/VITAMIN D PO      2 tabs w/meals.        * carBAMazepine 200 MG tablet    TEGretol     Take 300 mg by mouth every morning. 300mg = 1.5 tablets.        * carBAMazepine 200 MG tablet    TEGretol     Take 400 mg by mouth At Bedtime.        cyanocobalamin 2500 MCG sublingual tablet    VITAMIN B-12     Place 2,500 mcg under the tongue every other day        docusate sodium 100 MG tablet    COLACE     Take 100 mg by mouth daily        EFFEXOR PO      Take by mouth 3 times daily        ketoconazole 2 % cream    NIZORAL     Apply topically daily    Bariatric surgery status, Obesity, Class III, BMI 40-49.9 (morbid obesity) (H)       Melatonin 10 MG Tabs tablet      Take 10 mg by mouth At Bedtime        methylphenidate 10 MG tablet    RITALIN     Take 10 mg by mouth 2 times daily        metroNIDAZOLE 1 % gel    METROGEL          MINOCYCLINE HCL PO           MUCINEX DM PO      1200mg daily        MULTIVITAMINS PO      Take 2 tablets by mouth every evening. WITH IRON        ranitidine 150 MG tablet    ZANTAC    180 tablet    Take 1 tablet (150 mg) by mouth 2 times daily    GERD without esophagitis, Bariatric surgery status       REMERON PO      Take 75 mg by mouth At Bedtime. Takes ( 5)    15mg tablets=75mg        REXULTI 3 MG tablet   Generic drug:  brexpiprazole      Take 1 mg by mouth daily         TRAZODONE HCL PO      Take 500 mg by mouth At Bedtime        triamcinolone 0.5 % Oint ointment    KENALOG     Apply topically 2 times daily        trospium 20 MG tablet    SANCTURA     Take 20 mg by mouth 2 times daily        * VITAMIN D3 PO      Take 3,000 Units by mouth daily In a.m.        * VITAMIN D3 PO      Take 2,000 Units by mouth At Bedtime.        * Notice:  This list has 4 medication(s) that are the same as other medications prescribed for you. Read the directions carefully, and ask your doctor or other care provider to review them with you.

## 2018-04-29 ASSESSMENT — PATIENT HEALTH QUESTIONNAIRE - PHQ9
SUM OF ALL RESPONSES TO PHQ QUESTIONS 1-9: 10
10. IF YOU CHECKED OFF ANY PROBLEMS, HOW DIFFICULT HAVE THESE PROBLEMS MADE IT FOR YOU TO DO YOUR WORK, TAKE CARE OF THINGS AT HOME, OR GET ALONG WITH OTHER PEOPLE: VERY DIFFICULT
SUM OF ALL RESPONSES TO PHQ QUESTIONS 1-9: 10

## 2018-04-30 ASSESSMENT — PATIENT HEALTH QUESTIONNAIRE - PHQ9: SUM OF ALL RESPONSES TO PHQ QUESTIONS 1-9: 10

## 2018-05-02 ENCOUNTER — OFFICE VISIT (OUTPATIENT)
Dept: PSYCHOLOGY | Facility: CLINIC | Age: 63
End: 2018-05-02
Payer: COMMERCIAL

## 2018-05-02 VITALS — WEIGHT: 293 LBS | BODY MASS INDEX: 46.82 KG/M2

## 2018-05-02 DIAGNOSIS — E66.01 PSYCHOLOGICAL FACTORS AFFECTING MORBID OBESITY (H): ICD-10-CM

## 2018-05-02 DIAGNOSIS — R45.89 ANXIETY ABOUT HEALTH: ICD-10-CM

## 2018-05-02 DIAGNOSIS — F33.1 MAJOR DEPRESSIVE DISORDER, RECURRENT EPISODE, MODERATE (H): Primary | ICD-10-CM

## 2018-05-02 DIAGNOSIS — F54 PSYCHOLOGICAL FACTORS AFFECTING MORBID OBESITY (H): ICD-10-CM

## 2018-05-02 NOTE — MR AVS SNAPSHOT
After Visit Summary   5/2/2018    Mili Padilla    MRN: [id number]           Patient Information     Date Of Birth          1955        Visit Information        Provider Department      5/2/2018 3:00 PM Ramin Olivo, PhD [facility]        Today's Diagnoses     Major depressive disorder, recurrent episode, moderate (H)    -  1    Psychological factors affecting morbid obesity (H)        Anxiety about health           Follow-ups after your visit        Your next 10 appointments already scheduled     May 16, 2018  2:30 PM CDT   Follow Up with Neha Valadez RD, LD   [facility] ([facility])    [address] [place], [place] [address], [address]  [facility] [address] [phone]            May 17, 2018  2:30 PM CDT   RETURN NEURO with Zach Pak MD   Eye Clinic ([facility])    [address]  [address] [address]  [facility] [address]   [phone]            May 30, 2018  1:00 PM CDT   (Arrive by 12:45 PM)   Return Visit with Ramin Olivo, PhD [facility] ([facility])    [address] [facility] [address]  [address] [facility] [address] [phone]            Jun 07, 2018  1:00 PM CDT   Annual Visit with Adwoa Hollins PA-C   [facility] ([facility])    [address]  [address]  [facility] [address] [phone]            Jun 07, 2018  1:30 PM CDT   Annual Visit with Mitch Guan 3, MARLYN   [facility] ([facility])    [address]  [address]  [facility] [address] [phone]              Who to contact     Please call your clinic at [phone] to:    Ask questions about your health    Make or cancel appointments    Discuss your medicines    Learn about your test results    Speak to your  doctor            Additional Information About Your Visit        [employer] Information     [employer] gives you secure access to your electronic health record. If you see a primary care provider, you can also send messages to your care team and make appointments. If you have questions, please call your primary care clinic.  If you do not have a primary care provider, please call [phone] and they will assist you.      [employer] is an electronic gateway that provides easy, online access to your medical records. With [employer], you can request a clinic appointment, read your test results, renew a prescription or communicate with your care team.     To access your existing account, please contact your [facility] or call [phone] for assistance.        Care EveryWhere ID     This is your Care EveryWhere ID. This could be used by other organizations to access your [place] medical records  [id number]        Your Vitals Were     Last Period BMI (Body Mass Index)                12/10/2010 46.82 kg/m2           Blood Pressure from Last 3 Encounters:   02/23/18 (!) 109/92   09/25/17 111/61   07/17/17 114/63    Weight from Last 3 Encounters:   05/02/18 133.6 kg (294 lb 8 oz)   04/04/18 133.7 kg (294 lb 12.8 oz)   03/07/18 131.9 kg (290 lb 12.8 oz)              Today, you had the following     No orders found for display       Primary Care Provider Office Phone # Fax #    Jasmyn Márquez [phone]       [facility] [address] [facility] [address]  [facility] [address]        Equal Access to Services     MARL[facility]AG[facility] : Hadii aad ku hadasho Soomaali, waaxda luqadaha, qaybta kaalmada adeegyada, marcos alberts . So [facility] [phone].    ATENCIÓN: Si habla español, tiene a abel disposición servicios gratuitos de asistencia lingüística. Llame al [phone].    We comply with applicable federal civil rights laws and [place] laws. We do not discriminate  on the basis of race, color, national origin, age, disability, sex, sexual orientation, or gender identity.            Thank you!     Thank you for choosing [facility] CL[facility]  for your care. Our goal is always to provide you with excellent care. Hearing back from our patients is one way we can continue to improve our services. Please take a few minutes to complete the written survey that you may receive in the mail after your visit with us. Thank you!             Your Updated Medication List - Protect others around you: Learn how to safely use, store and throw away your medicines at www.disposemymeds.org.          This list is accurate as of 5/2/18  4:04 PM.  Always use your most recent med list.                   Brand Name Dispense Instructions for use Diagnosis    atorvastatin 20 MG tablet    LIPITOR     Take 20 mg by mouth daily.        CALCITRATE/VITAMIN D PO      2 tabs w/meals.        * carBAMazepine 200 MG tablet    TEGretol     Take 300 mg by mouth every morning. 300mg = 1.5 tablets.        * carBAMazepine 200 MG tablet    TEGretol     Take 400 mg by mouth At Bedtime.        cyanocobalamin 2500 MCG sublingual tablet    VITAMIN B-12     Place 2,500 mcg under the tongue every other day        docusate sodium 100 MG tablet    COLACE     Take 100 mg by mouth daily        EFFEXOR PO      Take by mouth 3 times daily        ketoconazole 2 % cream    NIZORAL     Apply topically daily    Bariatric surgery status, Obesity, Class III, BMI 40-49.9 (morbid obesity) (H)       Melatonin 10 MG Tabs tablet      Take 10 mg by mouth At Bedtime        methylphenidate 10 MG tablet    RITALIN     Take 10 mg by mouth 2 times daily        metroNIDAZOLE 1 % gel    METROGEL          MINOCYCLINE HCL PO           MUCINEX DM PO      1200mg daily        MULTIVITAMINS PO      Take 2 tablets by mouth every evening. WITH IRON        ranitidine 150 MG tablet    ZANTAC    180 tablet    Take 1 tablet (150 mg) by mouth 2 times daily     GERD without esophagitis, Bariatric surgery status       REMERON PO      Take 75 mg by mouth At Bedtime. Takes ( 5)    15mg tablets=75mg        REXULTI 3 MG tablet   Generic drug:  brexpiprazole      Take 1 mg by mouth daily        TRAZODONE HCL PO      Take 500 mg by mouth At Bedtime        triamcinolone 0.5 % Oint ointment    KENALOG     Apply topically 2 times daily        trospium 20 MG tablet    SANCTURA     Take 20 mg by mouth 2 times daily        * VITAMIN D3 PO      Take 3,000 Units by mouth daily In a.m.        * VITAMIN D3 PO      Take 2,000 Units by mouth At Bedtime.        * Notice:  This list has 4 medication(s) that are the same as other medications prescribed for you. Read the directions carefully, and ask your doctor or other care provider to review them with you.

## 2018-05-02 NOTE — PROGRESS NOTES
Health Psychology                  Clinic    Department of Medicine  Sherrie Crowe, Ph.D., L.P. [phone]                          Clinics and Surgery Center  [facility] Vin Ward, Ph.D.,  L.P. [phone]                 3rd [facility] Mail Code 741   Ramin Olivo, Ph.D., WALTER, L.P. [phone]     [address] [place] [address] Kathie Galvan, Ph.D., L.P. [phone]  [place] [address]       Health Psychology Follow-Up Note     Ms. Padilla is a 62-year-old woman self-referred for psychological consultation because her therapist of the last 24 years retired.  She is seen for problem-solving and supportive therapy for depression and multiple health issues.     HISTORY OF PRESENTING CONCERN:  Ms. Padilla reports a lengthy history of depression.  She currently sees a psychiatrist, Ban Coleman at [facility], and is taking Abilify 7.5 mg, trazodone 500 mg, ripazepam 75 mg each day at bedtime, Tegretol 400 mg at bedtime and methylphenidate 10 mg b.i.d.  She discontineud ability and is now taking Rexulti 2 mg.  She has a history of hospitalizations including 3 at [facility] in the late 1990s, early 2000s when she had 2 courses of ECT lasting 7-10 sessions and approximately 3 other single session ECTs.  She stopped due to memory problems.  She kept with Dr. Coleman who she first met as an inpatient and has seen her for the past 18 years.  She had also been hospitalized psychiatrically at [facility] at age 23.  She previously worked with psychiatrist, Doug Sarabia for three years, stopping, in part, because she didn't feel he took her suicide attempt sufficiently seriously.  She reports her depression tends to vary.  Currently it is moderate, though it was more severe within the past year especially when she was having additional health problems.      MEDICAL HISTORY:  Ms.  Randy has a complex medical history.  She was diagnosed with MS in 1985.  Her psychiatrist at [facility] in [place], Dr. 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  She has used a scooter since 1992, using it more in the last 6 months than she had previously.  She also can use a walker.  She was diagnosed with fibromyalgia in 1997.   She is also seen at the [facility] for her eye care.  She began to see an eating disorder therapist weekly and has been somewhat erratically losing weight.    WEIGH Today is 294.8down from  290.8 last session with me.     She is attending OA.    Wt Readings from Last 4 Encounters:   05/02/18 133.6 kg (294 lb 8 oz)   04/04/18 133.7 kg (294 lb 12.8 oz)   03/07/18 131.9 kg (290 lb 12.8 oz)   02/26/18 130.4 kg (287 lb 8 oz)     Past Medical History:   Diagnosis Date     Depression, major, in partial remission (H)      Fibromyalgia syndrome      Gastro-oesophageal reflux disease      Hyperlipemia      Lymphedema      Multiple sclerosis (H)     tremors with MS, all four limbs and head     Multiple sclerosis, secondary progressive (H)      Sleep apnea      Vocal cord paralysis, unilateral complete         Past Surgical History:   Procedure Laterality Date     COLONOSCOPY N/A 7/17/2017    Procedure: COMBINED COLONOSCOPY, SINGLE OR MULTIPLE BIOPSY/POLYPECTOMY BY BIOPSY;;  Surgeon: Wong Beaulieu MD;  Location:  GI     COLONOSCOPY N/A 2/23/2018    Procedure: COMBINED COLONOSCOPY, SINGLE OR MULTIPLE BIOPSY/POLYPECTOMY BY BIOPSY;  COLONOSCOPY ;  Surgeon: Yessica Santana MD;  Location:  GI     ENT SURGERY      throat 1969, vocal cord surgery with skin grafting     ESOPHAGOSCOPY, GASTROSCOPY, DUODENOSCOPY (EGD), COMBINED N/A 12/16/2014    Procedure: COMBINED ENDOSCOPIC ULTRASOUND, ESOPHAGOSCOPY, GASTROSCOPY, DUODENOSCOPY (EGD), FINE NEEDLE ASPIRATE/BIOPSY;  Surgeon: Yessica Santana MD;  Location: [facility]     ESOPHAGOSCOPY,  GASTROSCOPY, DUODENOSCOPY (EGD), COMBINED N/A 12/16/2014    Procedure: COMBINED ESOPHAGOSCOPY, GASTROSCOPY, DUODENOSCOPY (EGD), BIOPSY SINGLE OR MULTIPLE;  Surgeon: Yessica Santana MD;  Location:  GI     LAPAROSCOPIC APPENDECTOMY  5/30/2013    Procedure: LAPAROSCOPIC APPENDECTOMY;;  Surgeon: Salvador Morris MD;  Location:  OR     LAPAROSCOPIC BIOPSY LIVER  5/30/2013    Procedure: LAPAROSCOPIC BIOPSY LIVER;;  Surgeon: Salvador Morris MD;  Location:  OR     LAPAROSCOPIC GASTRIC SLEEVE  5/30/2013    Procedure: LAPAROSCOPIC GASTRIC SLEEVE;  LAPAROSCOPIC SLEEVE GASTRECTOMY/ LAPARSCOPIC  APPENDECTOMY /LIVER BIOPSIES/LIVER CYST DRAINAGE;  Surgeon: Salvador Morris MD;  Location:  OR     ORTHOPEDIC SURGERY      R wrist 2010     Current Outpatient Prescriptions   Medication     atorvastatin (LIPITOR) 20 MG tablet     brexpiprazole (REXULTI) 3 MG tablet     Calcium Citrate-Vitamin D (CALCITRATE/VITAMIN D PO)     carBAMazepine (TEGRETOL) 200 MG tablet     carBAMazepine (TEGRETOL) 200 MG tablet     Cholecalciferol (VITAMIN D3 PO)     Cholecalciferol (VITAMIN D3 PO)     cyabnocobalamin (VITAMIN B-12) 2500 MCG sublingual tablet     Dextromethorphan-Guaifenesin (MUCINEX DM PO)     docusate sodium 100 MG tablet     ketoconazole (NIZORAL) 2 % cream     Melatonin 10 MG TABS     methylphenidate (RITALIN) 10 MG tablet     metroNIDAZOLE (METROGEL) 1 % gel     MINOCYCLINE HCL PO     Mirtazapine (REMERON PO)     Multiple Vitamin (MULTIVITAMINS PO)     ranitidine (ZANTAC) 150 MG tablet     TRAZODONE HCL PO     triamcinolone (KENALOG) 0.5 % OINT ointment     trospium (SANCTURA) 20 MG tablet     Venlafaxine HCl (EFFEXOR PO)     No current facility-administered medications for this visit.      Facility-Administered Medications Ordered in Other Visits   Medication     ondansetron (ZOFRAN) injection     New medication: Effexor 300 mon 12/17/17  per Dr. Marquise Coleman, her psychiatrist.  She discontinued Lexapro  17.    SOCIAL HISTORY:  Ms. Padilla grew up in the [facility] and is the oldest of 5 children in her family of origin.  Her father was a dentist who she believes was bipolar.  He  of lung cancer at age 72.  Her mother  of sepsis at age 80.  She had been diagnosed with breast cancer when Ms. Padilla was 8.  She describes the marriage between her parents as misael.  She is 5 years older than her closest-aged sister and they are all within 4 years of each other.   Her daughter  12/3 and her mother   She tends to feel more depressed in winter.      Ms. Padilla attended [employer] for a year and later went to [employer] school at the [employer].  She felt that she could not continue her education to the point of graduation because she was working full time and raising her daughter.  Her daughter  in  at age 31 of liver failure secondary to an accidental Tylenol overdose.  She had been recovering from a hysterectomy.      Ms. 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  She had to retire at the time secondary to her MS.  She has never .  She has not dated,  is interested in dating, and states that if she were she would be interested in a relationship with a woman.  There is no history of  service or legal problems.  She expresses concern about her increasing social isolation.  She does have at least 1 friend, Jael, who she met at a therapy group in .  She is active in facilitating groups for people with MS both in [place] and [place].  She lives alone and currently gets help from a home health aide, as well as home health nurse.     She sees Dr. Coleman, psychiatrist and is trying to figure out best antidepressant regimen.  She brought in her genetic testing.Effexor reduced to 300 per Dr. Coleman and is now substituting  Cymbalta for it.   She feels she has been more depressed since the Fall, but doesn't think it is SAD.       SESSION:  Mili arrived punctually for today's meeting and is greeted in the waiting room. She is  concerned today about her 59 year old sister who is being treated for breast cancer.  She is pleased with the results of her genetic testing which revealed she didn't have an elevated breast cancer risk.   She is frustrated by the results of her recent colonoscopy.  It will be repeated  Next week using a baloon procedure.  She is mildly apprehensive.  We discussed her progress with weight, which has been negligible.  She confided she is eating 315 janice/day of M&M, and 125 janice/day of popcorn.  We discussed the long term implications of it and she seems willing to change.   We did not discuss sleep.  She reports continuing to be depressed about the termination of her home health aid services.  We discussed the challenges and she will discuss it this Friday with Dr. Márquez to appeal the nurse's decision.  Jovita is concerned about safety, loss of sensitivity in her legs, obesity, MS< fearing falling.  She is attending OA, and has been going during the past year with no real change in weight.  She participated fully and appeared to derive benefit.  Rapport was excellent.  Extended session due to complexity of case and length of interval.  Extended session due to complexity of case and length of interval.  Time in:  3:03  Time out: 4:     DIAGNOSES:   Major depression, recurrent, mild (F33.o).   Behavioral factors affecting morbid obesity (E66.01).     PLAN:  Ms. Padilla will return to clinic on  5/30 @ 1  for problem-solving and supportive therapy consistent with treatment plan..   Tx plan 9/27/17;  Next review due  7/4/18     Ramin Olivo, PhD, A.B.P.P., L.P.   Director, Health Psychology     Answers for HPI/ROS submitted by the patient on 4/29/2018   If you checked off any problems, how difficult have these  problems made it for you to do your work, take care of things at home, or get along with other people?: Very difficult  PHQ9 TOTAL SCORE: 10

## 2018-05-30 ENCOUNTER — OFFICE VISIT (OUTPATIENT)
Dept: PSYCHOLOGY | Facility: CLINIC | Age: 63
End: 2018-05-30
Payer: COMMERCIAL

## 2018-05-30 VITALS — WEIGHT: 290.4 LBS | BODY MASS INDEX: 46.17 KG/M2

## 2018-05-30 DIAGNOSIS — F54 PSYCHOLOGICAL FACTORS AFFECTING MORBID OBESITY (H): ICD-10-CM

## 2018-05-30 DIAGNOSIS — R45.89 ANXIETY ABOUT HEALTH: ICD-10-CM

## 2018-05-30 DIAGNOSIS — Z56.0 UNEMPLOYMENT: ICD-10-CM

## 2018-05-30 DIAGNOSIS — F33.1 MAJOR DEPRESSIVE DISORDER, RECURRENT EPISODE, MODERATE (H): Primary | ICD-10-CM

## 2018-05-30 DIAGNOSIS — E66.01 PSYCHOLOGICAL FACTORS AFFECTING MORBID OBESITY (H): ICD-10-CM

## 2018-05-30 SDOH — ECONOMIC STABILITY - INCOME SECURITY: UNEMPLOYMENT, UNSPECIFIED: Z56.0

## 2018-05-30 NOTE — PROGRESS NOTES
Health Psychology                  Clinic    Department of Medicine  Sherrie Crowe, Ph.D., L.P. [phone]                          Clinics and Surgery Center  [facility] Vin Ward, Ph.D.,  L.P. [phone]                 3rd [facility] Mail Code 741   Ramin Olivo, Ph.D., WALTER, L.P. [phone]     [address] [place] [address] Kathie Galvan, Ph.D., L.P. [phone]  [place] [address]       Health Psychology Follow-Up Note     Ms. Padilla is a 62-year-old woman self-referred for psychological consultation because her therapist of the last 24 years retired.  She is seen for problem-solving and supportive therapy for depression and multiple health issues.     HISTORY OF PRESENTING CONCERN:  Ms. Padilla reports a lengthy history of depression.  She currently sees a psychiatrist, Ban Coleman at [facility], and is taking Abilify 7.5 mg, trazodone 500 mg, ripazepam 75 mg each day at bedtime, Tegretol 400 mg at bedtime and methylphenidate 10 mg b.i.d.  She discontineud ability and is now taking Rexulti 2 mg.  She has a history of hospitalizations including 3 at [facility] in the late 1990s, early 2000s when she had 2 courses of ECT lasting 7-10 sessions and approximately 3 other single session ECTs.  She stopped due to memory problems.  She kept with Dr. Coleman who she first met as an inpatient and has seen her for the past 18 years.  She had also been hospitalized psychiatrically at [facility] at age 23.  She previously worked with psychiatrist, Doug Sarabia for three years, stopping, in part, because she didn't feel he took her suicide attempt sufficiently seriously.  She reports her depression tends to vary.  Currently it is moderate, though it was more severe within the past year especially when she was having additional health problems.      MEDICAL HISTORY:  Ms.  Radny has a complex medical history.  She was diagnosed with MS in 1985.  Her psychiatrist at [facility] in [place], Dr. 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  She has used a scooter since 1992, using it more in the last 6 months than she had previously.  She also can use a walker.  She was diagnosed with fibromyalgia in 1997.   She is also seen at the [facility] for her eye care.  She began to see an eating disorder therapist weekly and has been somewhat erratically losing weight.    WEIGH Today is 294.8down from  290.8 last session with me.     She is attending OA.    Wt Readings from Last 4 Encounters:   05/30/18 131.7 kg (290 lb 6.4 oz)   05/02/18 133.6 kg (294 lb 8 oz)   04/04/18 133.7 kg (294 lb 12.8 oz)   03/07/18 131.9 kg (290 lb 12.8 oz)     Past Medical History:   Diagnosis Date     Depression, major, in partial remission (H)      Fibromyalgia syndrome      Gastro-oesophageal reflux disease      Hyperlipemia      Lymphedema      Multiple sclerosis (H)     tremors with MS, all four limbs and head     Multiple sclerosis, secondary progressive (H)      Sleep apnea      Vocal cord paralysis, unilateral complete         Past Surgical History:   Procedure Laterality Date     COLONOSCOPY N/A 7/17/2017    Procedure: COMBINED COLONOSCOPY, SINGLE OR MULTIPLE BIOPSY/POLYPECTOMY BY BIOPSY;;  Surgeon: Wong Beaulieu MD;  Location: [facility]     COLONOSCOPY N/A 2/23/2018    Procedure: COMBINED COLONOSCOPY, SINGLE OR MULTIPLE BIOPSY/POLYPECTOMY BY BIOPSY;  COLONOSCOPY ;  Surgeon: Yessica Santana MD;  Location:  GI     ENT SURGERY      throat 1969, vocal cord surgery with skin grafting     ESOPHAGOSCOPY, GASTROSCOPY, DUODENOSCOPY (EGD), COMBINED N/A 12/16/2014    Procedure: COMBINED ENDOSCOPIC ULTRASOUND, ESOPHAGOSCOPY, GASTROSCOPY, DUODENOSCOPY (EGD), FINE NEEDLE ASPIRATE/BIOPSY;  Surgeon: Yessica Santana MD;  Location: [facility]     ESOPHAGOSCOPY,  GASTROSCOPY, DUODENOSCOPY (EGD), COMBINED N/A 12/16/2014    Procedure: COMBINED ESOPHAGOSCOPY, GASTROSCOPY, DUODENOSCOPY (EGD), BIOPSY SINGLE OR MULTIPLE;  Surgeon: Yessica Santana MD;  Location:  GI     LAPAROSCOPIC APPENDECTOMY  5/30/2013    Procedure: LAPAROSCOPIC APPENDECTOMY;;  Surgeon: Salvador Morris MD;  Location:  OR     LAPAROSCOPIC BIOPSY LIVER  5/30/2013    Procedure: LAPAROSCOPIC BIOPSY LIVER;;  Surgeon: Salvador Morris MD;  Location:  OR     LAPAROSCOPIC GASTRIC SLEEVE  5/30/2013    Procedure: LAPAROSCOPIC GASTRIC SLEEVE;  LAPAROSCOPIC SLEEVE GASTRECTOMY/ LAPARSCOPIC  APPENDECTOMY /LIVER BIOPSIES/LIVER CYST DRAINAGE;  Surgeon: Salvador Morris MD;  Location:  OR     ORTHOPEDIC SURGERY      R wrist 2010     Current Outpatient Prescriptions   Medication     atorvastatin (LIPITOR) 20 MG tablet     brexpiprazole (REXULTI) 3 MG tablet     Calcium Citrate-Vitamin D (CALCITRATE/VITAMIN D PO)     carBAMazepine (TEGRETOL) 200 MG tablet     carBAMazepine (TEGRETOL) 200 MG tablet     Cholecalciferol (VITAMIN D3 PO)     Cholecalciferol (VITAMIN D3 PO)     cyabnocobalamin (VITAMIN B-12) 2500 MCG sublingual tablet     Dextromethorphan-Guaifenesin (MUCINEX DM PO)     docusate sodium 100 MG tablet     ketoconazole (NIZORAL) 2 % cream     Melatonin 10 MG TABS     methylphenidate (RITALIN) 10 MG tablet     metroNIDAZOLE (METROGEL) 1 % gel     MINOCYCLINE HCL PO     Mirtazapine (REMERON PO)     Multiple Vitamin (MULTIVITAMINS PO)     ranitidine (ZANTAC) 150 MG tablet     TRAZODONE HCL PO     triamcinolone (KENALOG) 0.5 % OINT ointment     trospium (SANCTURA) 20 MG tablet     Venlafaxine HCl (EFFEXOR PO)     No current facility-administered medications for this visit.      Facility-Administered Medications Ordered in Other Visits   Medication     ondansetron (ZOFRAN) injection     New medication: On Duloxetine and Mirtazpine and Trazodone and Ritalin and Rexulti.  She discontinued  Effexor and Abilify   per Dr. Marquise Coleman, her psychiatrist.      SOCIAL HISTORY:  Ms. Padilla grew up in the [facility] and is the oldest of 5 children in her family of origin.  Her father was a dentist who she believes was bipolar.  He  of lung cancer at age 72.  Her mother  of sepsis at age 80.  She had been diagnosed with breast cancer when Ms. Padilla was 8.  She describes the marriage between her parents as misael.  She is 5 years older than her closest-aged sister and they are all within 4 years of each other.   Her daughter  12/3 and her mother   She tends to feel more depressed in winter.      Ms. Padilla attended [employer] for a year and later went to [email or web address] school at the [employer].  She felt that she could not continue her education to the point of graduation because she was working full time and raising her daughter.  Her daughter  in  at age 31 of liver failure secondary to an accidental Tylenol overdose.  She had been recovering from a hysterectomy.      Ms. 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  She had to retire at the time secondary to her MS.  She has never .  She has not dated,  is interested in dating, and states that if she were she would be interested in a relationship with a woman.  There is no history of  service or legal problems.  She expresses concern about her increasing social isolation.  She does have at least 1 friend, Jael, who she met at a therapy group in .  She is active in facilitating groups for people with MS both in [place] and [place].  She lives alone and currently gets help from a home health aide, as well as home health nurse.     She sees Dr. Coleman, psychiatrist and is trying to figure out best antidepressant regimen.  She brought in her genetic testing.Effexor reduced to 300 per Dr. Coleman  and is now substituting Cymbalta for it.   She feels she has been more depressed since the Fall, but doesn't think it is SAD.       SESSION:  Mili arrived punctually for today's meeting and is greeted in the waiting room. She is  concerned today about her 59 year old sister who is being treated for breast cancer.  She is pleased with the results of her genetic testing which revealed she didn't have an elevated breast cancer risk.   She is frustrated by the results of her recent balloon colonoscopy.   She now with have a CT.  She is trying to decide between being seen at [facility], or [facility].  We discussed her progress with weight, which has been about the best we have seen. She is surprised she lost 4 lbs in the past month.    We discussed the long term implications of it and she seems willing to change.   We did not discuss sleep.  She requested that I write a letter to her therapist, Aury Matthew at [facility].  I did so and she will deliver it.   She is attending , and has been going during the past year with no real change in weight.  She participated fully and appeared to derive benefit.  Rapport was excellent.  Extended session due to complexity of case and length of interval.  Time in: 1:16  Time out: 2:03     DIAGNOSES:   Major depression, recurrent, mild (F33.o).   Behavioral factors affecting morbid obesity (E66.01).     PLAN:  Ms. Padilla will return to clinic on  6 /27 @ 1  for problem-solving and supportive therapy consistent with treatment plan..   Tx plan 9/27/17;  Next review due  7/4/18     Ramin Olivo, PhD, A.B.P.P., L.P.   Director, Health Psychology

## 2018-05-30 NOTE — MR AVS SNAPSHOT
After Visit Summary   5/30/2018    Mili Padilla    MRN: [id number]           Patient Information     Date Of Birth          1955        Visit Information        Provider Department      5/30/2018 1:00 PM Ramin Olivo, PhD [facility]        Today's Diagnoses     Major depressive disorder, recurrent episode, moderate (H)    -  1    Psychological factors affecting morbid obesity (H)        Anxiety about health        Unemployment           Follow-ups after your visit        Your next 10 appointments already scheduled     May 31, 2018 10:45 AM CDT   Vestibular Eval with Radha Feliciano, PT   [facility] ([facility])    [address]  [address]  [facility] [address]   [phone]            Jun 01, 2018  1:00 PM CDT   Follow Up with Neha Valadez RD, LD   [facility] ([facility])    [address]  [facility] [address]   [phone]            Jun 07, 2018  1:00 PM CDT   Annual Visit with REUBEN CrooksC   [facility] ([facility])    [address]  [address]  [facility] [address] [phone]            Jun 07, 2018  1:30 PM CDT   Annual Visit with Mitch Guan 3, MARLYN   [facility] ([facility])    [address]  [address]  [facility] [address] [phone]            Jun 25, 2018  1:00 PM CDT   RETURN NEURO with Zach Pak MD   [facility] ([facility])    [address]  [facility] [id number] [phone]              Who to contact     Please call your clinic at [phone] to:    Ask questions about your health    Make or cancel appointments    Discuss your medicines    Learn about your test results    Speak to your doctor             Additional Information About Your Visit        [employer] Information     [employer] gives you secure access to your electronic health record. If you see a primary care provider, you can also send messages to your care team and make appointments. If you have questions, please call your primary care clinic.  If you do not have a primary care provider, please call [phone] and they will assist you.      [employer] is an electronic gateway that provides easy, online access to your medical records. With [employer], you can request a clinic appointment, read your test results, renew a prescription or communicate with your care team.     To access your existing account, please contact your [facility] or call [phone] for assistance.        Care EveryWhere ID     This is your Care EveryWhere ID. This could be used by other organizations to access your [facility] medical records  [id number]        Your Vitals Were     Last Period BMI (Body Mass Index)                12/10/2010 46.17 kg/m2           Blood Pressure from Last 3 Encounters:   02/23/18 (!) 109/92   09/25/17 111/61   07/17/17 114/63    Weight from Last 3 Encounters:   05/30/18 131.7 kg (290 lb 6.4 oz)   05/02/18 133.6 kg (294 lb 8 oz)   04/04/18 133.7 kg (294 lb 12.8 oz)              Today, you had the following     No orders found for display       Primary Care Provider Office Phone # Fax #    Jasmyn Márquez [phone]       [facility] [address]  [facility] [address]        Equal Access to Services     MARLYS WAGNER : Hadii aad ku hadasho Soomaali, waaxda luqadaha, qaybta kaalmada adeegyada, marcos alberts . So [facility] [phone].    ATENCIÓN: Si habla español, tiene a abel disposición servicios gratuitos de asistencia lingüística. Llame al [phone].    We comply with applicable federal civil rights laws and [place] laws. We do not discriminate on the basis of  race, color, national origin, age, disability, sex, sexual orientation, or gender identity.            Thank you!     Thank you for choosing [facility]  for your care. Our goal is always to provide you with excellent care. Hearing back from our patients is one way we can continue to improve our services. Please take a few minutes to complete the written survey that you may receive in the mail after your visit with us. Thank you!             Your Updated Medication List - Protect others around you: Learn how to safely use, store and throw away your medicines at www.disposemymeds.org.          This list is accurate as of 5/30/18  2:10 PM.  Always use your most recent med list.                   Brand Name Dispense Instructions for use Diagnosis    atorvastatin 20 MG tablet    LIPITOR     Take 20 mg by mouth daily.        CALCITRATE/VITAMIN D PO      2 tabs w/meals.        * carBAMazepine 200 MG tablet    TEGretol     Take 300 mg by mouth every morning. 300mg = 1.5 tablets.        * carBAMazepine 200 MG tablet    TEGretol     Take 400 mg by mouth At Bedtime.        cyanocobalamin 2500 MCG sublingual tablet    VITAMIN B-12     Place 2,500 mcg under the tongue every other day        docusate sodium 100 MG tablet    COLACE     Take 100 mg by mouth daily        EFFEXOR PO      Take by mouth 3 times daily        ketoconazole 2 % cream    NIZORAL     Apply topically daily    Bariatric surgery status, Obesity, Class III, BMI 40-49.9 (morbid obesity) (H)       Melatonin 10 MG Tabs tablet      Take 10 mg by mouth At Bedtime        methylphenidate 10 MG tablet    RITALIN     Take 10 mg by mouth 2 times daily        metroNIDAZOLE 1 % gel    METROGEL          MINOCYCLINE HCL PO           MUCINEX DM PO      1200mg daily        MULTIVITAMINS PO      Take 2 tablets by mouth every evening. WITH IRON        ranitidine 150 MG tablet    ZANTAC    180 tablet    Take 1 tablet (150 mg) by mouth 2 times daily    GERD  without esophagitis, Bariatric surgery status       REMERON PO      Take 75 mg by mouth At Bedtime. Takes ( 5)    15mg tablets=75mg        REXULTI 3 MG tablet   Generic drug:  brexpiprazole      Take 1 mg by mouth daily        TRAZODONE HCL PO      Take 500 mg by mouth At Bedtime        triamcinolone 0.5 % Oint ointment    KENALOG     Apply topically 2 times daily        trospium 20 MG tablet    SANCTURA     Take 20 mg by mouth 2 times daily        * VITAMIN D3 PO      Take 3,000 Units by mouth daily In a.m.        * VITAMIN D3 PO      Take 2,000 Units by mouth At Bedtime.        * Notice:  This list has 4 medication(s) that are the same as other medications prescribed for you. Read the directions carefully, and ask your doctor or other care provider to review them with you.

## 2018-05-30 NOTE — LETTER
May 30, 2018    Aury Matthew, EMILY, [facility]  [facility]  [address] [place] [address]      Re: Mili Padilla  [address] [address]  : 1955  MRN:  [id number]       During this Vipul:    It is my understanding that you  are continuing to mental health support services to Ms. Padilla.  It is also my impression that she continues to benefit from the combination of your services and my meetings with her.  Ms. Padilla and I meet approximately once per month, though sometimes more frequently, and that you see her approximately every 2-3 weeks.    We continue to address multiple issues including her major depression and the behavioral factors affecting her obesity, aw well as other health issues. She is making progress and so we will continue to work together for the foreseeable future.    Please let me know if you have any questions or concerns. Thank you for your efforts which also are important in promoting her most positive functioning.        Sincerely,    Ramin Olivo, Ph.D., A.B.P.P., L.P.  Director, Health Psychology  [phone]

## 2018-06-01 ENCOUNTER — HOSPITAL ENCOUNTER (OUTPATIENT)
Dept: NUTRITION | Facility: CLINIC | Age: 63
Discharge: HOME | End: 2018-06-01
Attending: FAMILY MEDICINE | Admitting: FAMILY MEDICINE
Payer: COMMERCIAL

## 2018-06-01 VITALS — WEIGHT: 288.9 LBS | BODY MASS INDEX: 45.93 KG/M2

## 2018-06-01 PROCEDURE — 97803 MED NUTRITION INDIV SUBSEQ: CPT | Performed by: DIETITIAN, REGISTERED

## 2018-06-01 NOTE — PROGRESS NOTES
"NUTRITION REASSESSMENT NOTE     REASON FOR ASSESSMENT  Mili Padilla referred by Dr. Márquez for MNT related to overweight.    Patient accompanied by self.      ASSESSMENT   Nutrition History:- Information obtained from patient.  Patient with long history of weight loss struggle.  Patient had sleeve gastrectomy 3 years ago.  Patient states she was able to maintain her 65 lb weight loss for 2 years. Then patient struggled with depression and regained her weight.  Patient has been tracking her food intake since surgery.  Patient aiming to keep her calories to 2000 per day.  Patient with history of multiple sclerosis which makes it difficult to prepare meals due to fatigue.  Patient has not been eating [employer] meals and had meals changed to low sodium, low fiber diet.  Patient feels she has had binge episodes with Cheetos and potato chips.  Patient feels her weight has gone up during the past 3 months due to stress of having 2 colonoscopies.  Patient also no longer has home health aide.  Patient with limited finances and depends on 4 outside resources for food.    Typical Diet Intake:   Breakfast: 1/2 cup cottage cheese and 2 cantaloupe stoner  Lunch: beef jerky, cheese and banana     Dinner: skipping dinner or beef jerky   Snack: 6 cups popcorn, 1 serving dark chocolate mint M&M's, brownie, beef jerky (1 package per day), mini rolls from [employer], Cheetos and Ruffles potato chips   Beverages: water, Diet Dr. Pepper, Izze's drink    Dining out: Biggs's 1 time per week (eggs, sausage biscuit); Tasley's -cinnamon twists ; hamburger and french fries with lime phosphate    PHYSICAL FINDINGS  Observed:  No nutrition-related physical findings observed  Obtained from Chart/Interdisciplinary Team:  None noted    LABS  Labs reviewed    MEDICATIONS  Medications reviewed    ANTHROPOMETRICS   Height: 5'7\"  Weight: 288.9 lbs  BMI (kg/m2): 459 kg/m2  Weight Status:  Obesity Grade III BMI >40  %IBW: 214%  Weight History:   Wt " Readings from Last 5 Encounters:   06/01/18 131 kg (288 lb 14.4 oz)   02/26/18 130.4 kg (287 lb 8 oz)   02/23/18 131.5 kg (290 lb)   01/31/18 131.2 kg (289 lb 3.2 oz)   01/10/18 131.5 kg (290 lb)     ASSESSED NUTRITION NEEDS  Estimated Energy Needs: 6942-2930 kcals/day (11-14 Kcal/Kg) for weight loss  Estimated Protein Needs: 61-74 grams protein/day (1-1.2 g pro/Kg) for maintenance  Estimated Fluid Needs: 1202-8469 mL/day (25-30 mL/kg)    EVALUATION/PROGRESS TOWARDS GOALS   Previous Goals:  Reduce pizza to 1 time per month-met  Eat vegetables daily-improving  Avoid eating jerky as meal-improving     Previous Nutrition Diagnosis: Disordered eating pattern related to excessive snacking as evidenced by weight regain after sleeve gastrectomy and BMI of 45 kg/m2 -no change    Current Nutrition Diagnosis: Disordered eating pattern related to excessive snacking as evidenced by weight regain after sleeve gastrectomy and BMI of 45.9 kg/m2      INTERVENTIONS   Nutrition Prescription   Recommend avoiding liquids with meals to reduce volume of meals for intended weight loss; determine alternatives to emotional eating     IMPLEMENTATION   Meals and Snacks: 3 meals + snacks if hungry  Nutrition Education (Content):   a)  Discussed progress towards goals.  Reviewed food logs.  Patient continues to consume more calories through snacks than her meals.  Suggest patient consume Open Arms meals for dinner to conserve her energy and use available food resources to reduce food budget.  Discussed eating meals 4-5 hours apart per sleeve gastrectomy diet guidelines.  Congratulated patient on her effort to reduce intake in the past 4 weeks and consistent tracking of food intake.  Supported patient in her struggle with food.   Nutrition Education (Application):   a)  Determined ways to eat consistent meals by utilizing [employer] meals 5 days per week.                   b)  Patient verbalizes understanding of diet by stating will eat dinner  daily.  Anticipated compliance: fair    Other: Patient's health insurance has approved follow up appointments with RD for 1 year.    GOALS  Eat dinner daily  Eat meals 4-5 hours apart    FOLLOW UP/MONITORING   Progress towards goals will be monitored and evaluated per protocol and Practice Guidelines  Patient to follow up in 3 weeks  Patient has RD name and contact information    Time Spent with Patient  25 minutes    Filiberto Becerril, RD, LD  [facility] Outpatient Dietitian  [phone] (office phone)

## 2018-06-18 ENCOUNTER — HOSPITAL ENCOUNTER (OUTPATIENT)
Dept: LAB | Facility: CLINIC | Age: 63
Discharge: HOME | End: 2018-06-18
Attending: PHYSICIAN ASSISTANT | Admitting: PHYSICIAN ASSISTANT
Payer: COMMERCIAL

## 2018-06-18 ENCOUNTER — OFFICE VISIT (OUTPATIENT)
Dept: SURGERY | Facility: CLINIC | Age: 63
End: 2018-06-18
Payer: COMMERCIAL

## 2018-06-18 VITALS
DIASTOLIC BLOOD PRESSURE: 70 MMHG | SYSTOLIC BLOOD PRESSURE: 100 MMHG | RESPIRATION RATE: 12 BRPM | WEIGHT: 290.7 LBS | OXYGEN SATURATION: 96 % | BODY MASS INDEX: 46.22 KG/M2 | HEART RATE: 86 BPM

## 2018-06-18 VITALS — HEIGHT: 67 IN | WEIGHT: 290.7 LBS | BODY MASS INDEX: 45.63 KG/M2

## 2018-06-18 DIAGNOSIS — F50.9 EATING DISORDER: ICD-10-CM

## 2018-06-18 DIAGNOSIS — Z98.84 BARIATRIC SURGERY STATUS: ICD-10-CM

## 2018-06-18 DIAGNOSIS — B37.2 CANDIDIASIS, INTERTRIGO: ICD-10-CM

## 2018-06-18 DIAGNOSIS — K21.9 GASTROESOPHAGEAL REFLUX DISEASE WITHOUT ESOPHAGITIS: ICD-10-CM

## 2018-06-18 DIAGNOSIS — G35 MULTIPLE SCLEROSIS, SECONDARY PROGRESSIVE (H): ICD-10-CM

## 2018-06-18 DIAGNOSIS — R13.19 OTHER DYSPHAGIA: ICD-10-CM

## 2018-06-18 DIAGNOSIS — K91.2 POSTSURGICAL MALABSORPTION: ICD-10-CM

## 2018-06-18 DIAGNOSIS — Z98.84 S/P BARIATRIC SURGERY: ICD-10-CM

## 2018-06-18 DIAGNOSIS — K59.09 OTHER CONSTIPATION: ICD-10-CM

## 2018-06-18 DIAGNOSIS — E66.01 MORBID OBESITY WITH BMI OF 45.0-49.9, ADULT (H): ICD-10-CM

## 2018-06-18 DIAGNOSIS — E66.01 MORBID OBESITY WITH BMI OF 45.0-49.9, ADULT (H): Primary | ICD-10-CM

## 2018-06-18 DIAGNOSIS — J38.01 VOCAL CORD PARALYSIS, UNILATERAL COMPLETE: ICD-10-CM

## 2018-06-18 LAB
ERYTHROCYTE [DISTWIDTH] IN BLOOD BY AUTOMATED COUNT: 12.6 % (ref 10–15)
FERRITIN SERPL-MCNC: 310 NG/ML (ref 8–252)
HCT VFR BLD AUTO: 38.7 % (ref 35–47)
HGB BLD-MCNC: 12.7 G/DL (ref 11.7–15.7)
IRON SATN MFR SERPL: 21 % (ref 15–46)
IRON SERPL-MCNC: 50 UG/DL (ref 35–180)
MCH RBC QN AUTO: 31.4 PG (ref 26.5–33)
MCHC RBC AUTO-ENTMCNC: 32.8 G/DL (ref 31.5–36.5)
MCV RBC AUTO: 96 FL (ref 78–100)
PLATELET # BLD AUTO: 130 10E9/L (ref 150–450)
RBC # BLD AUTO: 4.05 10E12/L (ref 3.8–5.2)
TIBC SERPL-MCNC: 234 UG/DL (ref 240–430)
VIT B12 SERPL-MCNC: 904 PG/ML (ref 193–986)
WBC # BLD AUTO: 10.1 10E9/L (ref 4–11)

## 2018-06-18 PROCEDURE — 36415 COLL VENOUS BLD VENIPUNCTURE: CPT | Performed by: PHYSICIAN ASSISTANT

## 2018-06-18 PROCEDURE — 83970 ASSAY OF PARATHORMONE: CPT | Performed by: PHYSICIAN ASSISTANT

## 2018-06-18 PROCEDURE — 97803 MED NUTRITION INDIV SUBSEQ: CPT | Performed by: DIETITIAN, REGISTERED

## 2018-06-18 PROCEDURE — 83550 IRON BINDING TEST: CPT | Performed by: PHYSICIAN ASSISTANT

## 2018-06-18 PROCEDURE — 85027 COMPLETE CBC AUTOMATED: CPT | Performed by: PHYSICIAN ASSISTANT

## 2018-06-18 PROCEDURE — 99214 OFFICE O/P EST MOD 30 MIN: CPT | Performed by: PHYSICIAN ASSISTANT

## 2018-06-18 PROCEDURE — 82607 VITAMIN B-12: CPT | Performed by: PHYSICIAN ASSISTANT

## 2018-06-18 PROCEDURE — 82728 ASSAY OF FERRITIN: CPT | Performed by: PHYSICIAN ASSISTANT

## 2018-06-18 PROCEDURE — 82306 VITAMIN D 25 HYDROXY: CPT | Performed by: PHYSICIAN ASSISTANT

## 2018-06-18 PROCEDURE — 83540 ASSAY OF IRON: CPT | Performed by: PHYSICIAN ASSISTANT

## 2018-06-18 RX ORDER — ASPIRIN 81 MG
100 TABLET, DELAYED RELEASE (ENTERIC COATED) ORAL DAILY
Qty: 180 TABLET | Refills: 3 | Status: SHIPPED | OUTPATIENT
Start: 2018-06-18 | End: 2020-02-04

## 2018-06-18 NOTE — PROGRESS NOTES
"NUTRITION POST OP APPOINTMENT  DATE OF VISIT: June 18, 2018    Mili Padilla  1955  female  [id number]  62 year old     ASSESSMENT:    REASON FOR VISIT:  Mili is a 62 year old year old female presents today for 5 year PO nutrition follow-up appointment. Patient is accompanied by self.    DIAGNOSIS:  Status post gastric bypass surgery.  Obesity Grade III BMI >40     ANTHROPOMETRICS:  Initial Weight: 137.9 kg (304 lb)  Height: 170.2 cm (5' 7\")  Current Weight: 131.9 kg (290 lb 11.2 oz)   BMI: 45.63 kg/(m^2).    VITAMINS AND MINERALS:  PAYASIR reviewed and made recommendations and prescribed vitamins/ minerals as needed  2 Multivitamin with Minerals  500 mg Calcium With Vitamin D BID  5000 International units Vitamin D  None needed based on labs- Vitamin B-12   No iron needed per clinic guidelines      NUTRITION HISTORY:  Breakfast: 1/2 cup cottage cheese, 6 pieces of cantaloupe  Lunch: 1/2-3/4 cup pulled chicken, fresh fruit  Supper: frozen meals from open arms or 1/3 of medium  Pepperoni pizza or salami, cheddar cheese, fruit  Snacks: microwave light popcorn, 1/2 $1 bag of Delisa M's, 12 Nut Thins or Wheat thins (3-4X per day)  Fluids consumed: Water and Diet Dr. Pepper (15 oz)  Consuming liquid calories: No  Protein intake: 50-55 grams/day  Tolerate regular texture food: Yes  Any foods not tolerated details: Yes  If any food not tolerated: peppers, mushrooms  Portion size: 1 cup or more  Take 20-30 minutes to consume each meal: No   Eat protein foods first: Yes  Fluids and meals separate by at least 30 minutes: No  Chew foods thoroughly: Yes  Tolerating diet: Yes  Drinking high protein supplements: No  Consuming snacks per day: 3-4  Additional Information: Patient works with a  at [employer] and RD at [facility]. Patient also attends Overeaters Anonymous meetings weekly, but does not follow a meal plan. Pateint's biggest concern is her snacking. Patient is having some difficulty with " swallowing (spoke with SHARON about this).        PHYSICAL ACTIVITY:   none    DIAGNOSIS:  Previous Nutrition Diagnosis: Altered gastrointestinal function related to alteration in gastrointestinal structure as evidenced by history of gastric sleeve surgery.- no change    Previous goals:  Follow up with psychology re: disordered eating/potential eating disorder (info provided and discussed, pt states she understands and feels this is an appropriate plan) -met working with RD at [facility] and  at [facility]  Plan ahead for 3 meals/day-met   Drink 48-64 oz./day -met  Avoid liquids 30 min before and after meals-not met   Eliminate carbonation-not met    Current Nutrition Diagnosis: Altered gastrointestinal function related to alteration in gastrointestinal structure as evidenced by history of gastric sleeve surgery.    INTERVENTION:   Nutrition Prescription: Eat 3 meals a day at regular intervals. Consume 60-90 grams of protein daily. Follow post-surgical vitamin and mineral protocol.  Assessed learning needs and learning preferences.    GOALS:  Relating To Eating:  Eat 1 serving of vegetables per day  Reduce snacking to 2-3X per day   Relating to beverages:  Reduce Dr. Pepper intake to 8 oz per day  Relating to activity:  Pateint to do chair exercises 2X per week      Implementation: Discussed progress toward previous goals; reinforced importance of following bariatric lifestyle changes.    NUTRITION MONITORING AND EVALUATION:  Anticipated compliance: fair-poor  Verbalized good understanding.    Follow up: Patient to follow up in 12 months.    TIME SPENT WITH PATIENT:  25 minutes    Ric Clement RD, LD  [facility]  [phone]

## 2018-06-18 NOTE — PROGRESS NOTES
2018    Return Bariatric Surgery Note    RE: Mili Padilla  MR#: [id number]  : 1955  VISIT DATE: 2018    Dear Jasmyn Márquez,    I had the pleasure of seeing your patient, Mili Padilla, in my post-bariatric surgery assessment clinic.    CHIEF COMPLAINT: Post-bariatric surgery follow-up    HISTORY OF PRESENT ILLNESS:  Questions Regarding Prior Weight Loss Surgery Reviewed With Patient 2018   I had the following weight loss procedure: Sleeve Gastrectomy   What year was your surgery?    How has your weight changed since your last visit? I have lost weight   Are you currently taking any weight loss medications? No   Do you currently have any of the following: Sleep Apnea, Heartburn, acid reflux, or GERD (acid reflux disease)?, Hyperlipidemia (high cholesterol, triglycerides)?   Have you been to the Emergency room since your last visit with us? No   Were you in the hospital since your last visit with us? No   Do you have any concerns today? No   Last Sleep consult was before surgery.  Stopped CPAP 1 year after surgery.  Bladder wakes her up at night. Otherwise gets good sleep.     Weight History:     2018   What is your highest lifetime weight? 318   What is your lowest weight since surgery? (In pounds) 245     Initial Weight: 305 lb (138.3 kg)  Current Weight: Weight: 290 lb 11.2 oz (131.9 kg)  Cumulative weight loss (lbs): 14.3     Patient is taking the following bariatric postoperative vitamins:  2 Complete multivitamins with minerals (at different times than calcium)  5000 Int Units of Vitamin D daily   1000 mg of Calcium daily in divided doses States the pills are very big and she has trouble swallowing them.  They get stuck often.     Is working with and ED therapist with [facility] and Neha Valadez for the dietician.   Questions Regarding Co-Morbidities and Health Concerns Reviewed With Patient 2018   Pre-diabetes: Gone away   Diabetes II: Never   High Blood  Pressure: Never   High cholesterol: Improved   Heartburn/Reflux: Improved   Are you taking daily medication for heartburn, acid reflux, or GERD (acid reflux disease)? Yes   Sleep apnea: Improved   Do you use a CPAP? No   PCOS: Never   Back pain: Stayed the same   Joint pain: Stayed the same   Lower leg swelling: Worsened   On Ranitidine 150 PO BID has no heartburn.    Eating Habits 6/18/2018   How many meals do you eat per day? 3   Do you snack between meals? Yes   How much food are you eating at each meal? 1/2 cup to 1 cup   Are you able to separate your meals and liquids by at least 30 minutes? Sometimes   Are you able to avoid liquid calories? Sometimes       Exercise Questions Reviewed With Patient 6/18/2018   How often do you exercise? Never   What keeps you from being more active?  Pain, My ability to walk or move around is limited, Shortness of breath, Too tired   Ability to stand and walk is limited due to MS and fatigue related depression.    Social History:      6/18/2018   Are you smoking? No   Are you drinking alcohol? No   Is on fixed income.  Docusate sodium and supplements can get costly.      Medications:  Current Outpatient Prescriptions   Medication     atorvastatin (LIPITOR) 20 MG tablet     brexpiprazole (REXULTI) 3 MG tablet     Calcium Citrate-Vitamin D (CALCITRATE/VITAMIN D PO)     carBAMazepine (TEGRETOL) 200 MG tablet     carBAMazepine (TEGRETOL) 200 MG tablet     Cholecalciferol (VITAMIN D3 PO)     Cholecalciferol (VITAMIN D3 PO)     denosumab (PROLIA) 60 MG/ML SOLN injection     Dextromethorphan-Guaifenesin (MUCINEX DM PO)     docusate sodium 100 MG tablet     DULOXETINE HCL PO     FOLIC ACID PO     ketoconazole (NIZORAL) 2 % cream     Melatonin 10 MG TABS     methylphenidate (RITALIN) 10 MG tablet     Mirtazapine (REMERON PO)     Multiple Vitamin (MULTIVITAMINS PO)     ranitidine (ZANTAC) 150 MG tablet     TRAZODONE HCL PO     triamcinolone (KENALOG) 0.5 % OINT ointment     trospium  (SANCTURA) 20 MG tablet     cyabnocobalamin (VITAMIN B-12) 2500 MCG sublingual tablet     metroNIDAZOLE (METROGEL) 1 % gel     MINOCYCLINE HCL PO     Venlafaxine HCl (EFFEXOR PO)     No current facility-administered medications for this visit.      Facility-Administered Medications Ordered in Other Visits   Medication     ondansetron (ZOFRAN) injection         6/18/2018   Do you avoid NSAIDs such as (Ibuprofen, Aleve, Naproxen, Advil)?   Yes       ROS:  GI:      6/18/2018   Vomiting: No   Diarrhea: No   Constipation: Yes   Swallowing trouble: Yes   Abdominal pain: No   Heartburn: No   Rash in skin folds: Yes   Depression: Yes   Stress urinary incontinence Yes   On Ranitidine 150 PO BID has no heartburn.    Having dysphagia with pills that is progressively worsening. Used to only be with large pills.  Pt has paralyzed vocal cord.  Sees ENT physician once a year.   If she turns to the right when swallowing then she is able to swallow better. However, notices even now when turning to right smaller pills are starting to get stuck.     Has BM every 3 or 4 days if she is monitoring her symptoms.  Takes docusate sodium 100 mg daily.     Skin:   ANNE-MARIE KUMAR ROS - SKIN 6/18/2018   Rash in skin folds: Yes   Genital folds gets irritated.  Uses ketoconazole cream as needed.     Psych:      6/18/2018   Depression: Yes   Anxiety: Yes   Depression improved since starting Cymbalta about 2 months ago.     Female Only:   BAR RBS ROS - FEMALE ONLY 6/18/2018   Female only: Post-menopausal       PHYSICAL EXAMINATION:  /73 (BP Location: Left arm, Patient Position: Sitting, Cuff Size: Adult Large)  Pulse 86  Resp 12  Wt 290 lb 11.2 oz (131.9 kg)  LMP 12/10/2010  SpO2 96%  BMI 46.22 kg/m2   GENERAL Pt in NAD.  HEART: No JVD  LUNGS: Breathing unlabored.  MUSC: Gait normal  NEURO: Alert and oriented x3.     ASSESSMENT AND PLAN:      5 years status laparoscopic gastric sleeve  Morbid Obesity III current BMI: Body mass index is 46.22  kg/(m^2).  Post surgical malabsorption:   Labs ordered per protocol.   Follow food plan per dietitian recommendations.   Continue taking recommended post-op vitamins. Start calcium 600 mg chew twice daily. Ordered calcium and vitamin D to pharmacy.  Constipation   Increase docusate sodium to twice daily for constipation Ordered to pharmacy.  GERD   Continue ranitidine  150 mg twice daily for GERD  Dysphagia/vocal cord paralysis   Have GI consult for progressively worsening dysphagia.   Eating Disorder, NOS   Continue working with therapist at [employer] [facility] and Dietician Neha Valadez  Candidiasis   Continue using nystatin cream twice daily prn rash in groin.   Follow up in 3 months to check in regarding dysphagia.      Sincerely,    Adwoa Hollins PA-C    I spent a total of 25 minutes face to face with Mili during today's office visit. Over 50% of this time was spent counseling the patient and/or coordinating care.

## 2018-06-18 NOTE — PATIENT INSTRUCTIONS
Patient to follow up with Primary Care provider regarding elevated blood pressure if still elevated after MA checks it manually after visit today.   Increase docusate sodium to twice daily for constipation  Continue ranitidine  150 mg twice daily for GERD  Start calcium 600 mg chew twice daily.  Have GI consult for dysphagia.   Have labs drawn.  Follow up in 3 months to check in regarding dysphagia.

## 2018-06-18 NOTE — MR AVS SNAPSHOT
"                  MRN:[id number]                      After Visit Summary   6/18/2018    Mili Padilla    MRN: [id number]           Visit Information        Provider Department      6/18/2018 2:30 PM 1, Mitch Guan RD [facility] - [facility]      Your next 10 appointments already scheduled     Jun 20, 2018 11:30 AM CDT   Follow Up with Neha Valadez RD, LD   [facility] ([facility])    [address], [address]  [facility] [address]   [phone]            Jun 25, 2018  1:00 PM CDT   RETURN NEURO with Zach Pak MD   Eye Clinic ([facility])    [address]  [facility] [address]   [phone]            Jun 27, 2018  1:00 PM CDT   (Arrive by 12:45 PM)   Return Visit with Ramin Olivo, PhD [facility] ([facility])    [address]  3rd Floor  [facility] [address] [phone]            Jun 29, 2018  2:45 PM CDT   MA SCREENING BILATERAL W/ SHIRLEY with SHBCMA2   [facility] ([facility])    [address]  [place] [facility] [address]   [phone]           Three-dimensional (3D) mammograms are available at [facility] locations in [facility], [place], [place], [facility], [place], [place], and [place]. [facility] locations include [place] and Clinic [facility] Center in [place]. Benefits of 3D mammograms include: - Improved rate of cancer detection - Decreases your chance of having to go back for more tests, which means fewer: - \"False-positive\" results (This means that there is an abnormal area but it isn't cancer.) - Invasive testing procedures, such as a biopsy or surgery - Can provide clearer images of the breast if you have dense breast tissue. 3D mammography is an optional exam " that anyone can have with a 2D mammogram. It doesn't replace or take the place of a 2D mammogram. 2D mammograms remain an effective screening test for all women.  Not all insurance companies cover the cost of a 3D mammogram. Check with your insurance.              [employer] Information     [employer] gives you secure access to your electronic health record. If you see a primary care provider, you can also send messages to your care team and make appointments. If you have questions, please call your primary care clinic.  If you do not have a primary care provider, please call [phone] and they will assist you.        Care EveryWhere ID     This is your Care EveryWhere ID. This could be used by other organizations to access your [place] medical records  [id number]        Equal Access to Services     MARLYS WAGNER : Lupis Torres, dg rico, calvin mckeon, marcos loyd. So [facility] [phone].    ATENCIÓN: Si habla español, tiene a abel disposición servicios gratuitos de asistencia lingüística. Llame al [phone].    We comply with applicable federal civil rights laws and [place] laws. We do not discriminate on the basis of race, color, national origin, age, disability, sex, sexual orientation, or gender identity.

## 2018-06-18 NOTE — MR AVS SNAPSHOT
After Visit Summary   6/18/2018    Mili Padilla    MRN: [id number]           Patient Information     Date Of Birth          1955        Visit Information        Provider Department      6/18/2018 2:00 PM Adwoa Hollins PA-C [facility] - [facility]      Today's Diagnoses     Other constipation    -  1    Bariatric surgery status        Malnutrition following gastrointestinal surgery        Other dysphagia        Postsurgical malabsorption        Multiple sclerosis, secondary progressive (H)        Morbid obesity with BMI of 45.0-49.9, adult (H)        Candidiasis, intertrigo          Care Instructions    Patient to follow up with Primary Care provider regarding elevated blood pressure if still elevated after MA checks it manually after visit today.   Increase docusate sodium to twice daily for constipation  Continue ranitidine  150 mg twice daily for GERD  Start calcium 600 mg chew twice daily.  Have GI consult for dysphagia.   Have labs drawn.  Follow up in 3 months to check in regarding dysphagia.              Follow-ups after your visit        Additional Services     GASTROENTEROLOGY ADULT REF CONSULT ONLY       Preferred Location: [facility] [phone]  Pt is s/p sleeve 2013 and has MS and Left vocal cord paralysis   Having dysphagia with pills that is progressively worsening.     Please be aware that coverage of these services is subject to the terms and limitations of your health insurance plan.  Call member services at your health plan with any benefit or coverage questions.  Any procedures must be performed at a [facility] facility OR coordinated by your clinic's referral office.    Please bring the following with you to your appointment:    (1) Any X-Rays, CTs or MRIs which have been performed.  Contact the facility where they were done to arrange for  prior to your scheduled appointment.    (2) List  "of current medications   (3) This referral request   (4) Any documents/labs given to you for this referral                  Follow-up notes from your care team     Return in 1 year (on 6/18/2019).      Your next 10 appointments already scheduled     Jun 20, 2018 11:30 AM CDT   Follow Up with Neha Valadez RD, LD   [facility] ([facility])    [address]  [facility] [address] [phone]            Jun 25, 2018  1:00 PM CDT   RETURN NEURO with Zach Pak MD   Eye Clinic ([facility])    [facility]  [address] [place] [address]  [address]  [facility] [address] [phone]            Jun 27, 2018  1:00 PM CDT   (Arrive by 12:45 PM)   Return Visit with Ramin Olivo, PhD [facility] ([facility])    [address]  3rd Floor  [facility] [id number] [phone]            Jun 29, 2018  2:45 PM CDT   MA SCREENING BILATERAL W/ SHIRLEY with SHBCMA2   [facility] ([facility])    [address]  [facility] [address] [phone]           Three-dimensional (3D) mammograms are available at [facility] locations in [place], [place], [place], [place], [facility], [place], [place], and [place]. [facility] locations include [place] and Clinic & Surgery Center in [place]. Benefits of 3D mammograms include: - Improved rate of cancer detection - Decreases your chance of having to go back for more tests, which means fewer: - \"False-positive\" results (This means that there is an abnormal area but it isn't cancer.) - Invasive testing procedures, such as a biopsy or surgery - Can provide clearer images of the breast if you have dense breast tissue. 3D mammography is an optional exam that anyone can have with a 2D mammogram. It doesn't replace or take the place of a 2D mammogram. 2D mammograms " remain an effective screening test for all women.  Not all insurance companies cover the cost of a 3D mammogram. Check with your insurance.              Future tests that were ordered for you today     Open Future Orders        Priority Expected Expires Ordered    Iron and Iron Binding Capacity Routine 6/18/2018 6/18/2019 6/18/2018    Ferritin Routine 6/18/2018 6/18/2019 6/18/2018    CBC with platelets Routine 6/18/2018 6/18/2019 6/18/2018    Vitamin B12 Routine 6/18/2018 6/18/2019 6/18/2018    Vitamin D Screen Routine 6/18/2018 6/18/2019 6/18/2018    Parathyroid Hormone Intact Routine 6/18/2018 6/18/2019 6/18/2018            Who to contact     If you have questions or need follow up information about today's clinic visit or your schedule please contact [facility] directly at [phone].  Normal or non-critical lab and imaging results will be communicated to you by [employer], letter or phone within 4 business days after the clinic has received the results. If you do not hear from us within 7 days, please contact the clinic through [facility] or phone. If you have a critical or abnormal lab result, we will notify you by phone as soon as possible.  Submit refill requests through [employer] or call your pharmacy and they will forward the refill request to us. Please allow 3 business days for your refill to be completed.          Additional Information About Your Visit        [employer] Information     [employer] gives you secure access to your electronic health record. If you see a primary care provider, you can also send messages to your care team and make appointments. If you have questions, please call your primary care clinic.  If you do not have a primary care provider, please call [phone] and they will assist you.        Care EveryWhere ID     This is your Care EveryWhere ID. This could be used by other organizations to access your [facility] medical records  [id number]        Your  Vitals Were     Pulse Respirations Last Period Pulse Oximetry BMI (Body Mass Index)       86 12 12/10/2010 96% 46.22 kg/m2        Blood Pressure from Last 3 Encounters:   06/18/18 155/73   02/23/18 (!) 109/92   09/25/17 111/61    Weight from Last 3 Encounters:   06/18/18 290 lb 11.2 oz (131.9 kg)   06/18/18 290 lb 11.2 oz (131.9 kg)   06/01/18 288 lb 14.4 oz (131 kg)              We Performed the Following     GASTROENTEROLOGY ADULT REF CONSULT ONLY          Today's Medication Changes          These changes are accurate as of 6/18/18  2:55 PM.  If you have any questions, ask your nurse or doctor.               Start taking these medicines.        Dose/Directions    calcium carbonate-vitamin D 600-400 MG-UNIT Chew   Commonly known as:  CALCIUM 600/VITAMIN D   Used for:  Bariatric surgery status, Postsurgical malabsorption   Started by:  Adwoa Hollins PA-C        Take one twice daily and at least 2 hours apart from iron.   Quantity:  180 tablet   Refills:  3         These medicines have changed or have updated prescriptions.        Dose/Directions    * docusate sodium 100 MG tablet   Commonly known as:  COLACE   This may have changed:  Another medication with the same name was added. Make sure you understand how and when to take each.   Changed by:  Adwoa Hollins PA-C        Dose:  100 mg   Take 100 mg by mouth daily   Refills:  0       * docusate sodium 100 MG tablet   Commonly known as:  COLACE   This may have changed:  You were already taking a medication with the same name, and this prescription was added. Make sure you understand how and when to take each.   Used for:  Other constipation, Bariatric surgery status, Malnutrition following gastrointestinal surgery   Changed by:  Adwoa Hollins PA-C        Dose:  100 mg   Take 100 mg by mouth daily   Quantity:  180 tablet   Refills:  3       * VITAMIN D3 PO   This may have changed:  Another medication with the same name was added.  Make sure you understand how and when to take each.   Changed by:  Adwoa Hollins PA-C        Dose:  3000 Units   Take 3,000 Units by mouth daily In a.m.   Refills:  0       * VITAMIN D3 PO   This may have changed:  Another medication with the same name was added. Make sure you understand how and when to take each.   Changed by:  Adwoa Hollins PA-C        Dose:  2000 Units   Take 2,000 Units by mouth At Bedtime.   Refills:  0       * cholecalciferol 5000 units Caps   This may have changed:  You were already taking a medication with the same name, and this prescription was added. Make sure you understand how and when to take each.   Used for:  Bariatric surgery status, Postsurgical malabsorption   Changed by:  Adwoa Hollins PA-C        Dose:  1 capsule   Take 1 capsule (5,000 Units) by mouth daily   Quantity:  90 capsule   Refills:  3       * Notice:  This list has 5 medication(s) that are the same as other medications prescribed for you. Read the directions carefully, and ask your doctor or other care provider to review them with you.         Where to get your medicines      These medications were sent to [email or web address] [address] [place] [address] [address] AT [facility] [address], [facility] [address]     Phone:  [phone]     calcium carbonate-vitamin D 600-400 MG-UNIT Chew    cholecalciferol 5000 units Caps    docusate sodium 100 MG tablet                Primary Care Provider Office Phone # Fax #    Jasmyn Márquez [phone]       [facility] [address]  [facility] [address]        Equal Access to Services     ARUNA WAGNER AH: Hadii darion purvis Solarissa, waaxda luqadaha, qaybta kaalmada mike, marcos loyd. So [facility] [phone].    ATENCIÓN: Si habla español, tiene a abel disposición servicios gratuitos de asistencia lingüística. Llame al [phone].    We  comply with applicable federal civil rights laws and [place] laws. We do not discriminate on the basis of race, color, national origin, age, disability, sex, sexual orientation, or gender identity.            Thank you!     Thank you for choosing [facility]  for your care. Our goal is always to provide you with excellent care. Hearing back from our patients is one way we can continue to improve our services. Please take a few minutes to complete the written survey that you may receive in the mail after your visit with us. Thank you!             Your Updated Medication List - Protect others around you: Learn how to safely use, store and throw away your medicines at www.disposemymeds.org.          This list is accurate as of 6/18/18  2:55 PM.  Always use your most recent med list.                   Brand Name Dispense Instructions for use Diagnosis    atorvastatin 20 MG tablet    LIPITOR     Take 20 mg by mouth daily.        CALCITRATE/VITAMIN D PO      2 tabs w/meals.        calcium carbonate-vitamin D 600-400 MG-UNIT Chew    CALCIUM 600/VITAMIN D    180 tablet    Take one twice daily and at least 2 hours apart from iron.    Bariatric surgery status, Postsurgical malabsorption       * carBAMazepine 200 MG tablet    TEGretol     Take 300 mg by mouth every morning. 300mg = 1.5 tablets.        * carBAMazepine 200 MG tablet    TEGretol     Take 400 mg by mouth At Bedtime.        cyanocobalamin 2500 MCG sublingual tablet    VITAMIN B-12     Place 2,500 mcg under the tongue every other day        denosumab 60 MG/ML Soln injection    PROLIA     Inject 60 mg Subcutaneous every 6 months        * docusate sodium 100 MG tablet    COLACE     Take 100 mg by mouth daily        * docusate sodium 100 MG tablet    COLACE    180 tablet    Take 100 mg by mouth daily    Other constipation, Bariatric surgery status, Malnutrition following gastrointestinal surgery       DULOXETINE HCL PO      Take 60 mg  by mouth 2 times daily        EFFEXOR PO      Take by mouth 3 times daily        FOLIC ACID PO      Take 1 mg by mouth 2 times daily        ketoconazole 2 % cream    NIZORAL     Apply topically daily    Bariatric surgery status, Obesity, Class III, BMI 40-49.9 (morbid obesity) (H)       Melatonin 10 MG Tabs tablet      Take 10 mg by mouth At Bedtime        methylphenidate 10 MG tablet    RITALIN     Take 10 mg by mouth 2 times daily        metroNIDAZOLE 1 % gel    METROGEL          MINOCYCLINE HCL PO           MUCINEX DM PO      1200mg daily        MULTIVITAMINS PO      Take 2 tablets by mouth every evening. WITH IRON        ranitidine 150 MG tablet    ZANTAC    180 tablet    Take 1 tablet (150 mg) by mouth 2 times daily    GERD without esophagitis, Bariatric surgery status       REMERON PO      Take 75 mg by mouth At Bedtime. Takes ( 5)    15mg tablets=75mg        REXULTI 3 MG tablet   Generic drug:  brexpiprazole      Take 1 mg by mouth daily        TRAZODONE HCL PO      Take 500 mg by mouth At Bedtime        triamcinolone 0.5 % Oint ointment    KENALOG     Apply topically 2 times daily        trospium 20 MG tablet    SANCTURA     Take 20 mg by mouth 2 times daily        * VITAMIN D3 PO      Take 3,000 Units by mouth daily In a.m.        * VITAMIN D3 PO      Take 2,000 Units by mouth At Bedtime.        * cholecalciferol 5000 units Caps     90 capsule    Take 1 capsule (5,000 Units) by mouth daily    Bariatric surgery status, Postsurgical malabsorption       * Notice:  This list has 7 medication(s) that are the same as other medications prescribed for you. Read the directions carefully, and ask your doctor or other care provider to review them with you.

## 2018-06-19 LAB
DEPRECATED CALCIDIOL+CALCIFEROL SERPL-MC: 61 UG/L (ref 20–75)
PTH-INTACT SERPL-MCNC: 23 PG/ML (ref 18–80)

## 2018-06-25 ENCOUNTER — PATIENT OUTREACH (OUTPATIENT)
Dept: CARE COORDINATION | Facility: CLINIC | Age: 63
End: 2018-06-25

## 2018-06-25 DIAGNOSIS — Z53.9 ERRONEOUS ENCOUNTER--DISREGARD: Primary | ICD-10-CM

## 2018-06-25 NOTE — PROGRESS NOTES
Clinic Care Coordination Contact  Care Team Conversations    [facility] [employer] Referral:     NP that filled out referral: Anne Walker    Reason for referral: Member no longer has home health aid. She reports difficulty with baths/showers due to multiple sclerosis with weakness and peripheral neuropathy. Has poor balance. Fall risk assessment indicates she is at risk for falls. Would like to know if she qualifies for a PCA.    Writer will send to CC PHILIPPE Mayorga and have her reach out to the patient.     ARTURO Herzog  Care Navigator  [facility]  [phone]

## 2018-06-26 NOTE — PROGRESS NOTES
Clinic Care Coordination Contact  [facility]/Voicemail    Clinical Data: Care Coordinator Outreach - [employer] referral  Outreach attempted x 1.  Left message on voicemail with call back information and requested return call.  Plan: Care Coordinator will try to reach patient again in 1-2 business days.    Denia Mayorga, [facility]  Social Work Care Coordinator   [facility]  [phone]

## 2018-07-12 DIAGNOSIS — H46.9 OPTIC NEURITIS: Primary | ICD-10-CM

## 2018-07-27 ENCOUNTER — OFFICE VISIT (OUTPATIENT)
Dept: PSYCHOLOGY | Facility: CLINIC | Age: 63
End: 2018-07-27
Payer: COMMERCIAL

## 2018-07-27 VITALS — WEIGHT: 293 LBS | BODY MASS INDEX: 45.91 KG/M2

## 2018-07-27 DIAGNOSIS — F33.1 MAJOR DEPRESSIVE DISORDER, RECURRENT EPISODE, MODERATE (H): Primary | ICD-10-CM

## 2018-07-27 DIAGNOSIS — F54 PSYCHOLOGICAL FACTORS AFFECTING MORBID OBESITY (H): ICD-10-CM

## 2018-07-27 DIAGNOSIS — E66.01 PSYCHOLOGICAL FACTORS AFFECTING MORBID OBESITY (H): ICD-10-CM

## 2018-07-27 NOTE — PROGRESS NOTES
Health Psychology                  Clinic    Department of Medicine  Sherrie Crowe, Ph.D., L.P. [phone]                          Clinics and Surgery Center  [facility] Vin Ward, Ph.D.,  L.P. [phone]                 [address] [facility] Mail Code 741   Ramin Olivo, Ph.D., WALTER, L.P. [phone]     [address] [place] [address] Kathie Galvan, Ph.D., L.P. [phone]  [place] [address]       Health Psychology Follow-Up Note     Ms. Padilla is a 62-year-old woman self-referred for psychological consultation because her therapist of the last 24 years retired.  She is seen for problem-solving and supportive therapy for depression and multiple health issues.     HISTORY OF PRESENTING CONCERN:  Ms. Padilla reports a lengthy history of depression.  She currently sees a psychiatrist, Ban Coleman at [facility], and is taking Abilify 7.5 mg, trazodone 500 mg, ripazepam 75 mg each day at bedtime, Tegretol 400 mg at bedtime and methylphenidate 10 mg b.i.d.  She discontineud ability and is now taking Rexulti 2 mg.  She has a history of hospitalizations including 3 at [facility] in the late 1990s, early 2000s when she had 2 courses of ECT lasting 7-10 sessions and approximately 3 other single session ECTs.  She stopped due to memory problems.  She kept with Dr. Coleman who she first met as an inpatient and has seen her for the past 18 years.  She had also been hospitalized psychiatrically at [facility] at age 23.  She previously worked with psychiatrist, Doug Sarabia for three years, stopping, in part, because she didn't feel he took her suicide attempt sufficiently seriously.  She reports her depression tends to vary.  Currently it is moderate, though it was more severe within the past year especially when she was having additional health problems.      MEDICAL HISTORY:  Ms.  Randy has a complex medical history.  She was diagnosed with MS in 1985.  Her psychiatrist at [facility] in [place], Dr. 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  She has used a scooter since 1992, using it more in the last 6 months than she had previously.  She also can use a walker.  She was diagnosed with fibromyalgia in 1997.   She is also seen at the [facility] for her eye care.  She began to see an eating disorder therapist weekly and has been somewhat erratically losing weight.    WEIGH Today is 293.1     She is attending OA.    Wt Readings from Last 4 Encounters:   07/27/18 132.9 kg (293 lb 1.6 oz)   06/18/18 131.9 kg (290 lb 11.2 oz)   06/18/18 131.9 kg (290 lb 11.2 oz)   06/01/18 131 kg (288 lb 14.4 oz)     Past Medical History:   Diagnosis Date     Depression, major, in partial remission (H)      Fibromyalgia syndrome      Gastro-oesophageal reflux disease      Hyperlipemia      Lymphedema      Multiple sclerosis (H)     tremors with MS, all four limbs and head     Multiple sclerosis, secondary progressive (H)      Sleep apnea      Vocal cord paralysis, unilateral complete         Past Surgical History:   Procedure Laterality Date     COLONOSCOPY N/A 7/17/2017    Procedure: COMBINED COLONOSCOPY, SINGLE OR MULTIPLE BIOPSY/POLYPECTOMY BY BIOPSY;;  Surgeon: Wong Beaulieu MD;  Location:  GI     COLONOSCOPY N/A 2/23/2018    Procedure: COMBINED COLONOSCOPY, SINGLE OR MULTIPLE BIOPSY/POLYPECTOMY BY BIOPSY;  COLONOSCOPY ;  Surgeon: Yessica Santana MD;  Location:  GI     ENT SURGERY      throat 1969, vocal cord surgery with skin grafting     ESOPHAGOSCOPY, GASTROSCOPY, DUODENOSCOPY (EGD), COMBINED N/A 12/16/2014    Procedure: COMBINED ENDOSCOPIC ULTRASOUND, ESOPHAGOSCOPY, GASTROSCOPY, DUODENOSCOPY (EGD), FINE NEEDLE ASPIRATE/BIOPSY;  Surgeon: Yessica Santana MD;  Location: [facility]     ESOPHAGOSCOPY, GASTROSCOPY, DUODENOSCOPY (EGD),  COMBINED N/A 12/16/2014    Procedure: COMBINED ESOPHAGOSCOPY, GASTROSCOPY, DUODENOSCOPY (EGD), BIOPSY SINGLE OR MULTIPLE;  Surgeon: Yessica Santana MD;  Location:  GI     LAPAROSCOPIC APPENDECTOMY  5/30/2013    Procedure: LAPAROSCOPIC APPENDECTOMY;;  Surgeon: Salvador Morris MD;  Location:  OR     LAPAROSCOPIC BIOPSY LIVER  5/30/2013    Procedure: LAPAROSCOPIC BIOPSY LIVER;;  Surgeon: Salvador Morris MD;  Location:  OR     LAPAROSCOPIC GASTRIC SLEEVE  5/30/2013    Procedure: LAPAROSCOPIC GASTRIC SLEEVE;  LAPAROSCOPIC SLEEVE GASTRECTOMY/ LAPARSCOPIC  APPENDECTOMY /LIVER BIOPSIES/LIVER CYST DRAINAGE;  Surgeon: Salvador Morris MD;  Location:  OR     ORTHOPEDIC SURGERY      R wrist 2010     Current Outpatient Prescriptions   Medication     atorvastatin (LIPITOR) 20 MG tablet     brexpiprazole (REXULTI) 3 MG tablet     calcium carbonate-vitamin D (CALCIUM 600/VITAMIN D) 600-400 MG-UNIT CHEW     Calcium Citrate-Vitamin D (CALCITRATE/VITAMIN D PO)     carBAMazepine (TEGRETOL) 200 MG tablet     carBAMazepine (TEGRETOL) 200 MG tablet     Cholecalciferol (VITAMIN D3 PO)     Cholecalciferol (VITAMIN D3 PO)     cholecalciferol 5000 units CAPS     cyabnocobalamin (VITAMIN B-12) 2500 MCG sublingual tablet     denosumab (PROLIA) 60 MG/ML SOLN injection     Dextromethorphan-Guaifenesin (MUCINEX DM PO)     docusate sodium (COLACE) 100 MG tablet     docusate sodium 100 MG tablet     DULOXETINE HCL PO     FOLIC ACID PO     ketoconazole (NIZORAL) 2 % cream     Melatonin 10 MG TABS     methylphenidate (RITALIN) 10 MG tablet     metroNIDAZOLE (METROGEL) 1 % gel     MINOCYCLINE HCL PO     Mirtazapine (REMERON PO)     Multiple Vitamin (MULTIVITAMINS PO)     ranitidine (ZANTAC) 150 MG tablet     TRAZODONE HCL PO     triamcinolone (KENALOG) 0.5 % OINT ointment     trospium (SANCTURA) 20 MG tablet     Venlafaxine HCl (EFFEXOR PO)     No current facility-administered medications for this visit.      Facility-Administered  Medications Ordered in Other Visits   Medication     ondansetron (ZOFRAN) injection     New medication: On Duloxetine and Mirtazpine and Trazodone and Ritalin and Rexulti.  She discontinued  Effexor and Abilify  per Dr. Marquise Coleman, her psychiatrist.      SOCIAL HISTORY:  Ms. Padilla grew up in the [place] and is the oldest of 5 children in her family of origin.  Her father was a dentist who she believes was bipolar.  He  of lung cancer at age 72.  Her mother  of sepsis at age 80.  She had been diagnosed with breast cancer when Ms. Padilla was 8.  She describes the marriage between her parents as misael.  She is 5 years older than her closest-aged sister and they are all within 4 years of each other.   Her daughter  12/3 and her mother   She tends to feel more depressed in winter.      Ms. Padilla attended [employer] for a year and later went to night school at the [employer].  She felt that she could not continue her education to the point of graduation because she was working full time and raising her daughter.  Her daughter  in  at age 31 of liver failure secondary to an accidental Tylenol overdose.  She had been recovering from a hysterectomy.      Ms. 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  She had to retire at the time secondary to her MS.  She has never .  She has not dated,  is interested in dating, and states that if she were she would be interested in a relationship with a woman.  There is no history of  service or legal problems.  She expresses concern about her increasing social isolation.  She does have at least 1 friend, Jael, who she met at a therapy group in .  She is active in facilitating groups for people with MS both in [place] and [place].  She lives alone and currently gets help from a  home health aide, as well as home health nurse.     She sees Dr. Coleman, psychiatrist and is trying to figure out best antidepressant regimen.  She brought in her genetic testing.Effexor reduced to 300 per Dr. Coleman and is now substituting Cymbalta for it.   She feels she has been more depressed since the Fall, but doesn't think it is SAD.       SESSION:  Mili arrived punctually for today's meeting and is greeted in the waiting room.  We discussed her regression with weight management, since last seen. She is frustrated and thinks when she is stressed she eats to soothe herself.  We discussed it at length.    We discussed the long term implications of it and she seems willing to change.   We did not discuss sleep.  She has not been attending OA, and has been going during the past year with no real change in weight.  She discussed multiple pain points (knnees, should, arm, back).  I asked her to speak with her physicians about what kindo f exercise she might be able to do and whether a referral to PT might help.  She takes notes and will f/u.  She participated fully and appeared to derive benefit.  Rapport was excellent.  Extended session due to complexity of case and length of interval.  The treatment plan was reviewed with the patient at today s visit. The treatment plan remains current based on the patient s status and progress to date.  Time in: 1:03  Time out: 1:56     DIAGNOSES:   Major depression, recurrent, mild (F33.o).   Behavioral factors affecting morbid obesity (E66.01).     PLAN:  Ms. Padilla will return to clinic on  8/29 @ 2  for problem-solving and supportive therapy consistent with treatment plan..   Tx plan 9/27/17;  Next review due  9/27/18     Ramin Olivo, PhD, A.B.P.P., L.P.   Director, Health Psychology

## 2018-07-27 NOTE — MR AVS SNAPSHOT
"              After Visit Summary   7/27/2018    Mili Padilla    MRN: [id number]           Patient Information     Date Of Birth          1955        Visit Information        Provider Department      7/27/2018 1:00 PM Ramin Olivo, PhD [facility]        Today's Diagnoses     Major depressive disorder, recurrent episode, moderate (H)    -  1    Psychological factors affecting morbid obesity (H)           Follow-ups after your visit        Your next 10 appointments already scheduled     Jul 30, 2018  4:30 PM CDT   Follow Up with Neha Valadez RD, LD   [facility] ([facility])    [address], [address]  [facility] [id number] [phone]            Aug 02, 2018  2:30 PM CDT   MA SCREENING BILATERAL W/ SHIRLEY with [id number]   [facility] ([facility])    [address], [address]  [place] [address] [phone]           Three-dimensional (3D) mammograms are available at [place] locations in [facility], [place], [place], [facility], [place], [place], and [place]. [facility] locations include [place] and [facility] in [place]. Benefits of 3D mammograms include: - Improved rate of cancer detection - Decreases your chance of having to go back for more tests, which means fewer: - \"False-positive\" results (This means that there is an abnormal area but it isn't cancer.) - Invasive testing procedures, such as a biopsy or surgery - Can provide clearer images of the breast if you have dense breast tissue. 3D mammography is an optional exam that anyone can have with a 2D mammogram. It doesn't replace or take the place of a 2D mammogram. 2D mammograms remain an effective screening test for all women.  Not all insurance companies cover the cost of a 3D mammogram. Check with your insurance.            Aug 23, 2018  2:30 PM CDT   RETURN NEURO with Zach" Valencia Pak MD   [facility] ([facility])    [place] [address]  9[address] Clin [address]  [facility] [id number] [phone]            Oct 17, 2018  1:40 PM CDT   (Arrive by 1:25 PM)   New Patient Visit with Iris Flores MD   [facility] ([facility] [place])    [address]  4th Floor  [facility] [id number] [phone]              Who to contact     Please call your clinic at [phone] to:    Ask questions about your health    Make or cancel appointments    Discuss your medicines    Learn about your test results    Speak to your doctor            Additional Information About Your Visit        Leeo Information     Leeo gives you secure access to your electronic health record. If you see a primary care provider, you can also send messages to your care team and make appointments. If you have questions, please call your primary care clinic.  If you do not have a primary care provider, please call [phone] and they will assist you.      Leeo is an electronic gateway that provides easy, online access to your medical records. With Leeo, you can request a clinic appointment, read your test results, renew a prescription or communicate with your care team.     To access your existing account, please contact your [facility] or call [phone] for assistance.        Care EveryWhere ID     This is your Care EveryWhere ID. This could be used by other organizations to access your [facility] medical records  [id number]        Your Vitals Were     Last Period BMI (Body Mass Index)                12/10/2010 45.91 kg/m2           Blood Pressure from Last 3 Encounters:   06/18/18 100/70   02/23/18 (!) 109/92   09/25/17 111/61    Weight from Last 3 Encounters:   07/27/18 132.9 kg (293 lb 1.6 oz)   06/18/18 131.9 kg (290 lb 11.2 oz)   06/18/18 131.9 kg (290 lb 11.2 oz)               Today, you had the following     No orders found for display       Primary Care Provider Office Phone # Fax #    Jasmyn Márquez [phone]       [facility] [address]  [facility] [address]        Equal Access to Services     DAXARUNA BERNARD : Hadii aad ku hadashleeo Soomaali, waaxda luqadaha, qaybta kaalmada adeegyada, waxbirgit francisco xiomarajennifer chairezthierno macias aristeo . So [facility] [phone].    ATENCIÓN: Si habla español, tiene a abel disposición servicios gratuitos de asistencia lingüística. Llame al [phone].    We comply with applicable federal civil rights laws and [place] laws. We do not discriminate on the basis of race, color, national origin, age, disability, sex, sexual orientation, or gender identity.            Thank you!     Thank you for choosing [facility]  for your care. Our goal is always to provide you with excellent care. Hearing back from our patients is one way we can continue to improve our services. Please take a few minutes to complete the written survey that you may receive in the mail after your visit with us. Thank you!             Your Updated Medication List - Protect others around you: Learn how to safely use, store and throw away your medicines at www.disposemymeds.org.          This list is accurate as of 7/27/18  2:04 PM.  Always use your most recent med list.                   Brand Name Dispense Instructions for use Diagnosis    atorvastatin 20 MG tablet    LIPITOR     Take 20 mg by mouth daily.        CALCITRATE/VITAMIN D PO      2 tabs w/meals.        calcium carbonate-vitamin D 600-400 MG-UNIT Chew    CALCIUM 600/VITAMIN D    180 tablet    Take one twice daily and at least 2 hours apart from iron.    Bariatric surgery status, Postsurgical malabsorption       * carBAMazepine 200 MG tablet    TEGretol     Take 300 mg by mouth every morning. 300mg = 1.5 tablets.        * carBAMazepine 200 MG tablet    TEGretol     Take 400  mg by mouth At Bedtime.        cyanocobalamin 2500 MCG sublingual tablet    VITAMIN B-12     Place 2,500 mcg under the tongue every other day        denosumab 60 MG/ML Soln injection    PROLIA     Inject 60 mg Subcutaneous every 6 months        * docusate sodium 100 MG tablet    COLACE     Take 100 mg by mouth daily        * docusate sodium 100 MG tablet    COLACE    180 tablet    Take 100 mg by mouth daily    Other constipation, Bariatric surgery status       DULOXETINE HCL PO      Take 60 mg by mouth 2 times daily        EFFEXOR PO      Take by mouth 3 times daily        FOLIC ACID PO      Take 1 mg by mouth 2 times daily        ketoconazole 2 % cream    NIZORAL     Apply topically daily    Bariatric surgery status, Obesity, Class III, BMI 40-49.9 (morbid obesity) (H)       Melatonin 10 MG Tabs tablet      Take 10 mg by mouth At Bedtime        methylphenidate 10 MG tablet    RITALIN     Take 10 mg by mouth 2 times daily        metroNIDAZOLE 1 % gel    METROGEL          MINOCYCLINE HCL PO           MUCINEX DM PO      1200mg daily        MULTIVITAMINS PO      Take 2 tablets by mouth every evening. WITH IRON        ranitidine 150 MG tablet    ZANTAC    180 tablet    Take 1 tablet (150 mg) by mouth 2 times daily    GERD without esophagitis, Bariatric surgery status       REMERON PO      Take 75 mg by mouth At Bedtime. Takes ( 5)    15mg tablets=75mg        REXULTI 3 MG tablet   Generic drug:  brexpiprazole      Take 1 mg by mouth daily        TRAZODONE HCL PO      Take 500 mg by mouth At Bedtime        triamcinolone 0.5 % Oint ointment    KENALOG     Apply topically 2 times daily        trospium 20 MG tablet    SANCTURA     Take 20 mg by mouth 2 times daily        * VITAMIN D3 PO      Take 3,000 Units by mouth daily In a.m.        * VITAMIN D3 PO      Take 2,000 Units by mouth At Bedtime.        * cholecalciferol 5000 units Caps     90 capsule    Take 1 capsule (5,000 Units) by mouth daily    Bariatric surgery status,  Postsurgical malabsorption       * Notice:  This list has 7 medication(s) that are the same as other medications prescribed for you. Read the directions carefully, and ask your doctor or other care provider to review them with you.

## 2018-07-30 ENCOUNTER — HOSPITAL ENCOUNTER (OUTPATIENT)
Dept: NUTRITION | Facility: CLINIC | Age: 63
Discharge: HOME | End: 2018-07-30
Attending: DIETITIAN, REGISTERED | Admitting: DIETITIAN, REGISTERED
Payer: COMMERCIAL

## 2018-07-30 PROCEDURE — 97803 MED NUTRITION INDIV SUBSEQ: CPT | Performed by: DIETITIAN, REGISTERED

## 2018-07-31 VITALS — WEIGHT: 293 LBS | BODY MASS INDEX: 46.02 KG/M2

## 2018-07-31 NOTE — PROGRESS NOTES
NUTRITION REASSESSMENT NOTE     REASON FOR ASSESSMENT  Mili Padilla referred by Dr. Márquez for MNT related to overweight.    Patient accompanied by self.      ASSESSMENT   Nutrition History:- Information obtained from patient.  Patient with long history of weight loss struggle.  Patient had sleeve gastrectomy 3 years ago.  Patient states she was able to maintain her 65 lb weight loss for 2 years. Then patient struggled with depression and regained her weight.  Patient has been tracking her food intake since surgery.  Patient aiming to keep her calories to 2000 per day.  Patient with history of multiple sclerosis which makes it difficult to prepare meals due to fatigue.  Patient has not been eating [employer] meals and had meals changed to low sodium, low fiber diet.  [employer] meals provide 5 meals, 1 ham or turkey sandwich,1 container potato/deli salad, salad + salad fixing, 2 pureed soups and 5 pieces of fruit per week.  Patient with limited finances and depends on outside resources for food.  Patient feels her frustration from lack of funds makes her choose poor food choices.  Patient states she had nurse assess her home situation and she wrote that patient was malnourished.  Patient rides scooter to [employer] and [employer] for grocery shopping as both stores are 2-3 blocks away from her home.  Patient states is unable to order items online for delivery as states she could not carry large box using her scooter.      Typical Diet Intake:   Breakfast: 1/2 cup cottage cheese and 2 cantaloupe stoner  Lunch: beef jerky, cheese and banana; salami and cheese sandwich   Dinner: skipping dinner or beef jerky or cereal   Snack: 6 cups popcorn, 1 serving dark chocolate mint M&M's, brownie, beef jerky (1 package per day), mini rolls from [employer], Cheetos and [employer] potato chips   Beverages: water, Diet Dr. Pepper, Izze's drink    Dining out: [employer] 1 time per week (eggs, sausage biscuit); [employer] -cinnamon twists ;  "hamburger and french fries with lime phosphate    PHYSICAL FINDINGS  Observed:  No nutrition-related physical findings observed  Obtained from Chart/Interdisciplinary Team:  None noted    LABS  Labs reviewed    MEDICATIONS  Medications reviewed    ANTHROPOMETRICS   Height: 5'7\"  Weight: 293.8 lbs  BMI (kg/m2): 46 kg/m2  Weight Status:  Obesity Grade III BMI >40  %IBW: 217%  Weight History:   Wt Readings from Last 5 Encounters:   07/30/18 133.3 kg (293 lb 12.8 oz)   06/18/18 131.9 kg (290 lb 11.2 oz)   06/18/18 131.9 kg (290 lb 11.2 oz)   06/01/18 131 kg (288 lb 14.4 oz)   02/26/18 130.4 kg (287 lb 8 oz)     ASSESSED NUTRITION NEEDS  Estimated Energy Needs: 3068-8909 kcals/day (11-14 Kcal/Kg) for weight loss  Estimated Protein Needs: 61-74 grams protein/day (1-1.2 g pro/Kg) for maintenance  Estimated Fluid Needs: 8189-1673 mL/day (25-30 mL/kg)    EVALUATION/PROGRESS TOWARDS GOALS   Previous Goals:  Eat dinner daily-not met  Eat meals 4-5 hours apart-not met     Previous Nutrition Diagnosis: Disordered eating pattern related to excessive snacking as evidenced by weight regain after sleeve gastrectomy and BMI of 45.9 kg/m2 -no change    Current Nutrition Diagnosis: Disordered eating pattern related to excessive snacking as evidenced by weight regain after sleeve gastrectomy and BMI of 46 kg/m2      INTERVENTIONS   Nutrition Prescription   Recommend avoiding liquids with meals to reduce volume of meals for intended weight loss; determine alternatives to emotional eating     IMPLEMENTATION   Meals and Snacks: 3 meals + snacks if hungry  Nutrition Education (Content):   a)  Discussed progress towards goals.  Reviewed food logs.  Patient continues to consume more calories through snacks than her meals.  Suggest patient consume [employer] meals for dinner to conserve her energy and use available food resources to reduce food budget.   Congratulated patient on her continued effort to track intake.  Patient has noticed that " she has very few days in which she consumed less than 2000 calories per day.  Supported patient in her struggle with food.   Nutrition Education (Application):   a)  Determined ways to reduce food budget by reducing snack items and by utilizing [employer] meals 5 days per week.  Discussed additional budget friendly food options.  Reviewed typical groceries purchased and price.                    b)  Patient verbalizes understanding of diet by stating will eat [employer] meals.  Anticipated compliance: fair    Other: Patient's health insurance has approved follow up appointments with RD for 1 year.    GOALS  Call [employer] to change to regular diet  Inquire about Fare for All through [employer]   Look at grocery store ad for sale items  Reduce snacking items when shopping by $5 per week     FOLLOW UP/MONITORING   Progress towards goals will be monitored and evaluated per protocol and Practice Guidelines  Patient to follow up in 3 weeks  Patient has RD name and contact information    Time Spent with Patient  30 minutes    Filiberto Becerril, RD, LD  [facility] Outpatient Dietitian  [phone] (office phone)

## 2018-08-22 ENCOUNTER — HOSPITAL ENCOUNTER (OUTPATIENT)
Dept: NUTRITION | Facility: CLINIC | Age: 63
Discharge: HOME | End: 2018-08-22
Attending: DIETITIAN, REGISTERED | Admitting: DIETITIAN, REGISTERED
Payer: COMMERCIAL

## 2018-08-22 VITALS — BODY MASS INDEX: 46.14 KG/M2 | WEIGHT: 293 LBS

## 2018-08-22 PROCEDURE — 97803 MED NUTRITION INDIV SUBSEQ: CPT | Performed by: DIETITIAN, REGISTERED

## 2018-08-22 NOTE — PROGRESS NOTES
NUTRITION REASSESSMENT NOTE     REASON FOR ASSESSMENT  Mili Padilla referred by Dr. Márquez for MNT related to overweight.    Patient accompanied by self.      ASSESSMENT   Nutrition History:- Information obtained from patient.  Patient with long history of weight loss struggle.  Patient had sleeve gastrectomy 3 years ago.  Patient states she was able to maintain her 65 lb weight loss for 2 years. Then patient struggled with depression and regained her weight.  Patient has been tracking her food intake since surgery.  Patient aiming to keep her calories to 2000 per day.  Patient with history of multiple sclerosis which makes it difficult to prepare meals due to fatigue.  Patient has not been eating [facility] meals and had meals changed to low sodium, low fiber diet.  [facility] meals provide 5 meals, 1 ham or turkey sandwich,1 container potato/deli salad, salad + salad fixing, 2 pureed soups and 5 pieces of fruit per week.  Patient with limited finances and depends on outside resources for food.  Patient feels her frustration from lack of funds makes her choose poor food choices.  Patient rides scooter to [employer] and [employer] for grocery shopping as both stores are 2-3 blocks away from her home.  Patient states is unable to order items online for delivery as states she could not carry large box using her scooter.      Typical Diet Intake:   Breakfast: 1/2 cup cottage cheese and 2 cantaloupe stoner  Lunch: beef jerky, cheese and banana; salami and cheese sandwich   Dinner: skipping dinner or beef jerky or cereal; [employer] pizza   Snack: 6 cups popcorn, 1 serving dark chocolate mint M&M's, brownie, beef jerky (1 package per day), mini rolls from [employer], Cheetos and [employer]uffles potato chips   Beverages: water, Diet Marlena Quiroz's drink (not willing to reduce)  Dining out: [employer] 1 time per week (eggs, sausage biscuit); [employer] -cinnamon twists ; hamburger and french fries with lime phosphate    PHYSICAL  "FINDINGS  Observed:  No nutrition-related physical findings observed  Obtained from Chart/Interdisciplinary Team:  None noted    LABS  Labs reviewed    MEDICATIONS  Medications reviewed    ANTHROPOMETRICS   Height: 5'7\"  Weight: 294.6  lbs  BMI (kg/m2): 46.1 kg/m2  Weight Status:  Obesity Grade III BMI >40  %IBW: 217%  Weight History:   Wt Readings from Last 5 Encounters:   08/22/18 133.6 kg (294 lb 9.6 oz)   07/30/18 133.3 kg (293 lb 12.8 oz)   06/18/18 131.9 kg (290 lb 11.2 oz)   06/18/18 131.9 kg (290 lb 11.2 oz)   06/01/18 131 kg (288 lb 14.4 oz)     ASSESSED NUTRITION NEEDS  Estimated Energy Needs: 7071-4852 kcals/day (11-14 Kcal/Kg) for weight loss  Estimated Protein Needs: 61-74 grams protein/day (1-1.2 g pro/Kg) for maintenance  Estimated Fluid Needs: 5501-2310 mL/day (25-30 mL/kg)    EVALUATION/PROGRESS TOWARDS GOALS   Previous Goals:  Call [facility] to change to regular diet-met  Inquire about Fare for All through [facility]-not met   Look at grocery store ad for sale items-met   Reduce snacking items when shopping by $5 per week-not met      Previous Nutrition Diagnosis: Disordered eating pattern related to excessive snacking as evidenced by weight regain after sleeve gastrectomy and BMI of 46 kg/m2 -no change    Current Nutrition Diagnosis: Disordered eating pattern related to excessive snacking as evidenced by weight regain after sleeve gastrectomy and BMI of 46.1 kg/m2      INTERVENTIONS   Nutrition Prescription   Recommend avoiding liquids with meals to reduce volume of meals for intended weight loss; determine alternatives to emotional eating     IMPLEMENTATION   Meals and Snacks: 3 meals + snacks if hungry  Nutrition Education (Content):   a)  Discussed progress towards goals.  Reviewed food logs.  Patient continues to consume more calories through snacks than her meals.  Suggest patient consume [facility] meals for dinner to conserve her energy and use available food resources to reduce food budget.  "  Congratulated patient on her continued effort to track intake.  Patient has noticed that she has very few days in which she consumed less than 2000 calories per day.  Supported patient in her struggle with food.   Nutrition Education (Application):   a)  Determined ways to reduce food budget by reducing snack items and by utilizing [facility] meals 5 days per week.  Discussed additional budget friendly food options.                   b)  Patient verbalizes understanding of diet by stating will eat [facility] meals.  Anticipated compliance: fair    Other: Patient's health insurance has approved follow up appointments with RD for 1 year.    GOALS  Eat 5 [facility] meals per week   Use different aisle at [employer] to avoid bakery items    FOLLOW UP/MONITORING   Progress towards goals will be monitored and evaluated per protocol and Practice Guidelines  Patient to follow up in 3 weeks  Patient has RD name and contact information    Time Spent with Patient  25 minutes    Filiberto Becerril, RD, LD  [facility] Outpatient Dietitian  [phone] (office phone)

## 2018-08-23 DIAGNOSIS — G35 MULTIPLE SCLEROSIS (H): Primary | ICD-10-CM

## 2018-08-29 ENCOUNTER — OFFICE VISIT (OUTPATIENT)
Dept: PSYCHOLOGY | Facility: CLINIC | Age: 63
End: 2018-08-29
Payer: COMMERCIAL

## 2018-08-29 VITALS — BODY MASS INDEX: 45.76 KG/M2 | WEIGHT: 292.2 LBS

## 2018-08-29 DIAGNOSIS — F54 PSYCHOLOGICAL FACTORS AFFECTING MORBID OBESITY (H): ICD-10-CM

## 2018-08-29 DIAGNOSIS — E66.01 PSYCHOLOGICAL FACTORS AFFECTING MORBID OBESITY (H): ICD-10-CM

## 2018-08-29 DIAGNOSIS — Z56.0 UNEMPLOYMENT: ICD-10-CM

## 2018-08-29 DIAGNOSIS — F33.1 MAJOR DEPRESSIVE DISORDER, RECURRENT EPISODE, MODERATE (H): Primary | ICD-10-CM

## 2018-08-29 SDOH — ECONOMIC STABILITY - INCOME SECURITY: UNEMPLOYMENT, UNSPECIFIED: Z56.0

## 2018-08-29 NOTE — PROGRESS NOTES
Health Psychology                  Clinic    Department of Medicine  Sherrie Crowe, Ph.D., L.P. [phone]                          Cl[facility] Center  [facility] Vin Ward, Ph.D.,  L.P. [phone]                 3rd [facility] Mail Code 741   Ramin Olivo, Ph.D., WALTER, L.P. [phone]     [address] [place] [address] Kathie Galvan, Ph.D., L.P. [phone]  [place] [address]       Health Psychology Follow-Up Note     Ms. Padilla is a 62-year-old woman self-referred for psychological consultation because her therapist of the last 24 years retired.  She is seen for problem-solving and supportive therapy for depression and multiple health issues.     HISTORY OF PRESENTING CONCERN:  Ms. Padilla reports a lengthy history of depression.  She currently sees a psychiatrist, Ban Coleman at [facility], and is taking Abilify 7.5 mg, trazodone 500 mg, ripazepam 75 mg each day at bedtime, Tegretol 400 mg at bedtime and methylphenidate 10 mg b.i.d.  She discontineud ability and is now taking Rexulti 2 mg.  She has a history of hospitalizations including 3 at [facility] in the late 1990s, early 2000s when she had 2 courses of ECT lasting 7-10 sessions and approximately 3 other single session ECTs.  She stopped due to memory problems.  She kept with Dr. Coleman who she first met as an inpatient and has seen her for the past 18 years.  She had also been hospitalized psychiatrically at [facility] at age 23.  She previously worked with psychiatrist, Dogu Sarabia for three years, stopping, in part, because she didn't feel he took her suicide attempt sufficiently seriously.  She reports her depression tends to vary.  Currently it is moderate, though it was more severe within the past year especially when she was having additional health problems.      MEDICAL HISTORY:  Ms.  Randy has a complex medical history.  She was diagnosed with MS in 1985.  Her psychiatrist at [facility] in [place], Dr. 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  She has used a scooter since 1992, using it more in the last 6 months than she had previously.  She also can use a walker.  She was diagnosed with fibromyalgia in 1997.   She is also seen at the [facility] for her eye care.  She began to see an eating disorder therapist weekly and has been somewhat erratically losing weight.    WEIGH Today is 292.2 down from 293.1 last month.     She is attending OA.    Wt Readings from Last 4 Encounters:   08/29/18 132.5 kg (292 lb 3.2 oz)   08/22/18 133.6 kg (294 lb 9.6 oz)   07/30/18 133.3 kg (293 lb 12.8 oz)   07/27/18 132.9 kg (293 lb 1.6 oz)     Past Medical History:   Diagnosis Date     Depression, major, in partial remission (H)      Fibromyalgia syndrome      Gastro-oesophageal reflux disease      Hyperlipemia      Lymphedema      Multiple sclerosis (H)     tremors with MS, all four limbs and head     Multiple sclerosis, secondary progressive (H)      Sleep apnea      Vocal cord paralysis, unilateral complete         Past Surgical History:   Procedure Laterality Date     COLONOSCOPY N/A 7/17/2017    Procedure: COMBINED COLONOSCOPY, SINGLE OR MULTIPLE BIOPSY/POLYPECTOMY BY BIOPSY;;  Surgeon: Wong Beaulieu MD;  Location:  GI     COLONOSCOPY N/A 2/23/2018    Procedure: COMBINED COLONOSCOPY, SINGLE OR MULTIPLE BIOPSY/POLYPECTOMY BY BIOPSY;  COLONOSCOPY ;  Surgeon: Yessica Santana MD;  Location:  GI     ENT SURGERY      throat 1969, vocal cord surgery with skin grafting     ESOPHAGOSCOPY, GASTROSCOPY, DUODENOSCOPY (EGD), COMBINED N/A 12/16/2014    Procedure: COMBINED ENDOSCOPIC ULTRASOUND, ESOPHAGOSCOPY, GASTROSCOPY, DUODENOSCOPY (EGD), FINE NEEDLE ASPIRATE/BIOPSY;  Surgeon: Yessica Santana MD;  Location: [facility]     ESOPHAGOSCOPY, GASTROSCOPY,  DUODENOSCOPY (EGD), COMBINED N/A 12/16/2014    Procedure: COMBINED ESOPHAGOSCOPY, GASTROSCOPY, DUODENOSCOPY (EGD), BIOPSY SINGLE OR MULTIPLE;  Surgeon: Yessica Santana MD;  Location:  GI     LAPAROSCOPIC APPENDECTOMY  5/30/2013    Procedure: LAPAROSCOPIC APPENDECTOMY;;  Surgeon: Salvador Morris MD;  Location:  OR     LAPAROSCOPIC BIOPSY LIVER  5/30/2013    Procedure: LAPAROSCOPIC BIOPSY LIVER;;  Surgeon: Salvador Morris MD;  Location:  OR     LAPAROSCOPIC GASTRIC SLEEVE  5/30/2013    Procedure: LAPAROSCOPIC GASTRIC SLEEVE;  LAPAROSCOPIC SLEEVE GASTRECTOMY/ LAPARSCOPIC  APPENDECTOMY /LIVER BIOPSIES/LIVER CYST DRAINAGE;  Surgeon: Salvador Mroris MD;  Location:  OR     ORTHOPEDIC SURGERY      R wrist 2010     Current Outpatient Prescriptions   Medication     atorvastatin (LIPITOR) 20 MG tablet     brexpiprazole (REXULTI) 3 MG tablet     calcium carbonate-vitamin D (CALCIUM 600/VITAMIN D) 600-400 MG-UNIT CHEW     Calcium Citrate-Vitamin D (CALCITRATE/VITAMIN D PO)     carBAMazepine (TEGRETOL) 200 MG tablet     carBAMazepine (TEGRETOL) 200 MG tablet     Cholecalciferol (VITAMIN D3 PO)     Cholecalciferol (VITAMIN D3 PO)     cholecalciferol 5000 units CAPS     cyabnocobalamin (VITAMIN B-12) 2500 MCG sublingual tablet     denosumab (PROLIA) 60 MG/ML SOLN injection     Dextromethorphan-Guaifenesin (MUCINEX DM PO)     docusate sodium (COLACE) 100 MG tablet     docusate sodium 100 MG tablet     DULOXETINE HCL PO     FOLIC ACID PO     ketoconazole (NIZORAL) 2 % cream     Melatonin 10 MG TABS     methylphenidate (RITALIN) 10 MG tablet     metroNIDAZOLE (METROGEL) 1 % gel     MINOCYCLINE HCL PO     Mirtazapine (REMERON PO)     Multiple Vitamin (MULTIVITAMINS PO)     ranitidine (ZANTAC) 150 MG tablet     TRAZODONE HCL PO     triamcinolone (KENALOG) 0.5 % OINT ointment     trospium (SANCTURA) 20 MG tablet     Venlafaxine HCl (EFFEXOR PO)     No current facility-administered medications for this visit.       Facility-Administered Medications Ordered in Other Visits   Medication     ondansetron (ZOFRAN) injection     New medication: On Duloxetine and Mirtazpine and Trazodone and Ritalin and Rexulti.  She discontinued  Effexor and Abilify  per Dr. Marquise Coleman, her psychiatrist.      SOCIAL HISTORY:  Ms. Padilla grew up in the [facility] and is the oldest of 5 children in her family of origin.  Her father was a dentist who she believes was bipolar.  He  of lung cancer at age 72.  Her mother  of sepsis at age 80.  She had been diagnosed with breast cancer when Ms. Padilla was 8.  She describes the marriage between her parents as misael.  She is 5 years older than her closest-aged sister and they are all within 4 years of each other.   Her daughter  12/3 and her mother   She tends to feel more depressed in winter.      Ms. Padilla attended [employer] for a year and later went to night school at the [employer].  She felt that she could not continue her education to the point of graduation because she was working full time and raising her daughter.  Her daughter  in  at age 31 of liver failure secondary to an accidental Tylenol overdose.  She had been recovering from a hysterectomy.      Ms. 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  She had to retire at the time secondary to her MS.  She has never .  She has not dated,  is interested in dating, and states that if she were she would be interested in a relationship with a woman.  There is no history of  service or legal problems.  She expresses concern about her increasing social isolation.  She does have at least 1 friend, Jael, who she met at a therapy group in .  She is active in facilitating groups for people with MS both in [place] and [place].  She lives alone and  currently gets help from a home health aide, as well as home health nurse.     She sees Dr. Coleman, psychiatrist and is trying to figure out best antidepressant regimen.  She brought in her genetic testing.Effexor reduced to 300 per Dr. Coleman and is now substituting Cymbalta for it.   She feels she has been more depressed since the Fall, but doesn't think it is SAD.       SESSION:  Mili arrived punctually for today's meeting and is greeted in the waiting room.  We discussed weight management, since last seen, losing about a pound. She is frustrated and thinks when she is stressed she eats to soothe herself.  She is aware she needs to keep working hard at making better choices.      We did not discussed sleep.  She wakes at night and then worries.  We reviewed slides and I emailed them to her so she can understand sleep architecture better.  She has good sleep hygiene.  She is now going to bed around midnight.  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  She is wondering about how to conserve her funds (e.g., get rid of cable, land line, car).  We discussed at length given her anxiety about it.  She participated fully and appeared to derive benefit. Affect is positive.  Rapport was excellent.  Extended session due to complexity of case and length of interval.  The treatment plan was reviewed with the patient at today s visit. The treatment plan remains current based on the patient s status and progress to date.  Time in: 2:01  Time out 2:54     DIAGNOSES:   Major depression, recurrent, mild (F33.0).   Behavioral factors affecting morbid obesity (E66.01).     PLAN:  Ms. Padilla will return to clinic on  9/26 @ 3  for problem-solving and supportive therapy consistent with treatment plan..   Tx plan 9/27/17;  Next review due  9/27/18     Ramin Olivo, PhD, A.B.P.P., L.P.   Director, Health  Psychology

## 2018-08-29 NOTE — MR AVS SNAPSHOT
"              After Visit Summary   8/29/2018    Mili Padilla    MRN: [id number]           Patient Information     Date Of Birth          1955        Visit Information        Provider Department      8/29/2018 2:00 PM Ramin Olivo, PhD [facility]        Today's Diagnoses     Major depressive disorder, recurrent episode, moderate (H)    -  1    Psychological factors affecting morbid obesity (H)        Unemployment           Follow-ups after your visit        Your next 10 appointments already scheduled     Aug 30, 2018  3:00 PM CDT   MA SCREENING BILATERAL W/ SHIRLEY with SHBCMA2   [facility] ([facility])    [address] [place] [address], [address]  [facility] [address]   [phone]           Three-dimensional (3D) mammograms are available at [place] locations in [facility], [place], [facility], [place], [place], and [place]. [facility] locations include [place] [facility] in [place]. Benefits of 3D mammograms include: - Improved rate of cancer detection - Decreases your chance of having to go back for more tests, which means fewer: - \"False-positive\" results (This means that there is an abnormal area but it isn't cancer.) - Invasive testing procedures, such as a biopsy or surgery - Can provide clearer images of the breast if you have dense breast tissue. 3D mammography is an optional exam that anyone can have with a 2D mammogram. It doesn't replace or take the place of a 2D mammogram. 2D mammograms remain an effective screening test for all women.  Not all insurance companies cover the cost of a 3D mammogram. Check with your insurance.            Sep 05, 2018  3:30 PM CDT   (Arrive by 3:15 PM)   RODY Extremity with Aníbal Crooks PT   [facility] ([facility] [place]  )    [address]  [facility] [address] [phone]            Sep 11, 2018  4:30 PM " CDT   RODY Extremity with Aníbal Crooks PT   [facility] ([facility] [place]  )    [address]  [place] [address]   [phone]            Sep 14, 2018  3:00 PM CDT   Follow Up with Neha Valadez RD, LD   [facility] ([facility])    [address]  [facility] [address]   [phone]            Sep 20, 2018  2:00 PM CDT   RETURN NEURO with Zach Pak MD   [facility] ([facility] [address] [facility])    [address] [address]   [phone]            Oct 03, 2018  1:30 PM CDT   Follow Up with Neha Valadez RD, LD   [facility] ([facility])    [address]  [place] [address]   [phone]            Oct 17, 2018  1:40 PM CDT   (Arrive by 1:25 PM)   New Patient Visit with Iris lFores MD   [facility] ([facility])    [address] [place] [address] [phone]            Dec 13, 2018  2:30 PM CST   (Arrive by 2:15 PM)   Return Visit with Clint Still MD   [facility] ([facility])    [address] [place] [id number] [phone]           This is a multi-disciplinary care team visit as patients with your type of problem are usually seen by a team of an MD and a Speech-Language Pathologist (who is a specialist in disorders of the voice, throat, and breathing).  Please plan about 2 hours for your visit, which will likely include Laryngeal Function Studies, a Voice/Swallow/Breathing Evaluation, and an Endoscopic Laryngeal Examination to provide a comprehensive evaluation.  Please check with your insurance company to ensure you are covered for these services. - It is  important to know that if you are evaluated and/or treated by both a physician and a speech pathologist during your visit, your billing will reflect the input that you receive from both providers as separate professionals. Although most insurance plans do cover these services, we encourage you to contact your insurance company prior to your visit to determine whether there are any coverage limitations that might affect you financially. - Billing/procedure codes that are frequently associated with visits to our clinic include (but are not limited to) the ones listed below. Most patients will not need all of these items, but it may be useful to ask your insurance company's patient . 04447: Flexible fiberoptic laryngoscopy, 44777: Laryngoscopy; flexible or rigid fiberoptic, with stroboscopy, 29562: Flexible endoscopic evaluation of swallowing, 20561: Laryngeal function aerodynamic evaluation, 68605: Evaluation of Voice and Resonance, 41444: Speech pathology treatment for voice, speech, communication, 90413: Speech pathology treatment for swallowing/oral function for feeding - If you have any concerns or questions, or if you would prefer not to have a speech pathologist involved in your visit, please contact our Clinic Coordinator at [phone], at least 24 hours prior to your appointment.              Who to contact     Please call your clinic at [phone] to:    Ask questions about your health    Make or cancel appointments    Discuss your medicines    Learn about your test results    Speak to your doctor            Additional Information About Your Visit        [employer] Information     [employer] gives you secure access to your electronic health record. If you see a primary care provider, you can also send messages to your care team and make appointments. If you have questions, please call your primary care clinic.  If you do not have a primary care provider, please call [phone] and  they will assist you.      [employer] is an electronic gateway that provides easy, online access to your medical records. With [employer], you can request a clinic appointment, read your test results, renew a prescription or communicate with your care team.     To access your existing account, please contact your [facility] or call [phone] for assistance.        Care EveryWhere ID     This is your Care EveryWhere ID. This could be used by other organizations to access your [place] medical records  [id number]        Your Vitals Were     Last Period BMI (Body Mass Index)                12/10/2010 45.76 kg/m2           Blood Pressure from Last 3 Encounters:   06/18/18 100/70   02/23/18 (!) 109/92   09/25/17 111/61    Weight from Last 3 Encounters:   08/29/18 132.5 kg (292 lb 3.2 oz)   08/22/18 133.6 kg (294 lb 9.6 oz)   07/30/18 133.3 kg (293 lb 12.8 oz)              Today, you had the following     No orders found for display       Primary Care Provider Office Phone # Fax #    Jasmyn Márquez [phone]       [place] [facility] [address]  [facility] [address]        Equal Access to Services     MARLYS WAGNER : Hadii aad ku hadasho Soomaali, waaxda luqadaha, qaybta kaalmada adeegyada, waxay bayin haymaryn giovanny alberts . So [facility] [phone].    ATENCIÓN: Si habla español, tiene a abel disposición servicios gratuitos de asistencia lingüística. Llame al [phone].    We comply with applicable federal civil rights laws and [place] laws. We do not discriminate on the basis of race, color, national origin, age, disability, sex, sexual orientation, or gender identity.            Thank you!     Thank you for choosing [facility]  for your care. Our goal is always to provide you with excellent care. Hearing back from our patients is one way we can continue to improve our services. Please take a few minutes to complete the written  survey that you may receive in the mail after your visit with us. Thank you!             Your Updated Medication List - Protect others around you: Learn how to safely use, store and throw away your medicines at www.disposemymeds.org.          This list is accurate as of 8/29/18  2:58 PM.  Always use your most recent med list.                   Brand Name Dispense Instructions for use Diagnosis    atorvastatin 20 MG tablet    LIPITOR     Take 20 mg by mouth daily.        CALCITRATE/VITAMIN D PO      2 tabs w/meals.        calcium carbonate-vitamin D 600-400 MG-UNIT Chew    CALCIUM 600/VITAMIN D    180 tablet    Take one twice daily and at least 2 hours apart from iron.    Bariatric surgery status, Postsurgical malabsorption       * carBAMazepine 200 MG tablet    TEGretol     Take 300 mg by mouth every morning. 300mg = 1.5 tablets.        * carBAMazepine 200 MG tablet    TEGretol     Take 400 mg by mouth At Bedtime.        cyanocobalamin 2500 MCG sublingual tablet    VITAMIN B-12     Place 2,500 mcg under the tongue every other day        denosumab 60 MG/ML Soln injection    PROLIA     Inject 60 mg Subcutaneous every 6 months        * docusate sodium 100 MG tablet    COLACE     Take 100 mg by mouth daily        * docusate sodium 100 MG tablet    COLACE    180 tablet    Take 100 mg by mouth daily    Other constipation, Bariatric surgery status       DULOXETINE HCL PO      Take 60 mg by mouth 2 times daily        EFFEXOR PO      Take by mouth 3 times daily        FOLIC ACID PO      Take 1 mg by mouth 2 times daily        ketoconazole 2 % cream    NIZORAL     Apply topically daily    Bariatric surgery status, Obesity, Class III, BMI 40-49.9 (morbid obesity) (H)       Melatonin 10 MG Tabs tablet      Take 10 mg by mouth At Bedtime        methylphenidate 10 MG tablet    RITALIN     Take 10 mg by mouth 2 times daily        metroNIDAZOLE 1 % gel    METROGEL          MINOCYCLINE HCL PO           MUCINEX DM PO      1200mg  daily        MULTIVITAMINS PO      Take 2 tablets by mouth every evening. WITH IRON        ranitidine 150 MG tablet    ZANTAC    180 tablet    Take 1 tablet (150 mg) by mouth 2 times daily    GERD without esophagitis, Bariatric surgery status       REMERON PO      Take 75 mg by mouth At Bedtime. Takes ( 5)    15mg tablets=75mg        REXULTI 3 MG tablet   Generic drug:  brexpiprazole      Take 1 mg by mouth daily        TRAZODONE HCL PO      Take 500 mg by mouth At Bedtime        triamcinolone 0.5 % Oint ointment    KENALOG     Apply topically 2 times daily        trospium 20 MG tablet    SANCTURA     Take 20 mg by mouth 2 times daily        * VITAMIN D3 PO      Take 3,000 Units by mouth daily In a.m.        * VITAMIN D3 PO      Take 2,000 Units by mouth At Bedtime.        * cholecalciferol 5000 units Caps     90 capsule    Take 1 capsule (5,000 Units) by mouth daily    Bariatric surgery status, Postsurgical malabsorption       * Notice:  This list has 7 medication(s) that are the same as other medications prescribed for you. Read the directions carefully, and ask your doctor or other care provider to review them with you.

## 2018-08-30 ENCOUNTER — HOSPITAL ENCOUNTER (OUTPATIENT)
Dept: MAMMOGRAPHY | Facility: CLINIC | Age: 63
Discharge: HOME | End: 2018-08-30
Attending: FAMILY MEDICINE | Admitting: FAMILY MEDICINE
Payer: COMMERCIAL

## 2018-08-30 DIAGNOSIS — Z12.31 VISIT FOR SCREENING MAMMOGRAM: ICD-10-CM

## 2018-08-30 PROCEDURE — 77063 BREAST TOMOSYNTHESIS BI: CPT

## 2018-09-05 ENCOUNTER — THERAPY VISIT (OUTPATIENT)
Dept: PHYSICAL THERAPY | Facility: CLINIC | Age: 63
End: 2018-09-05
Payer: COMMERCIAL

## 2018-09-05 DIAGNOSIS — M25.511 ACUTE PAIN OF RIGHT SHOULDER: ICD-10-CM

## 2018-09-05 PROCEDURE — 97161 PT EVAL LOW COMPLEX 20 MIN: CPT | Mod: GP | Performed by: PHYSICAL THERAPIST

## 2018-09-05 PROCEDURE — 97110 THERAPEUTIC EXERCISES: CPT | Mod: GP | Performed by: PHYSICAL THERAPIST

## 2018-09-05 PROCEDURE — G8985 CARRY GOAL STATUS: HCPCS | Mod: GP | Performed by: PHYSICAL THERAPIST

## 2018-09-05 PROCEDURE — G8984 CARRY CURRENT STATUS: HCPCS | Mod: GP | Performed by: PHYSICAL THERAPIST

## 2018-09-05 NOTE — PROGRESS NOTES
[facility] Initial Evaluation    Subjective:  Mili Padilla is a 63 year old female.    Patient s chief complaints: R shoulder and arm pain, occasionally feels pain in the right neck.  Condition occurred due to no particular event.  Date of Onset: May 2018  Location of symptoms is lateral deltoid into upper arm, occasionally right neck.  Symptoms other than pain include: None   Quality of pain is sharp and aching and frequency is intermittent.    Pain dependence on time of day is: worse in evening.   Pain rating is: 4/10.    Symptoms are exacerbated by: excessive movement and lifting.    Symptoms are relieved by:  Rest and sleeping in a good position.    Progression of symptoms is that symptoms are:  Has been improving since July.  Imaging/Special tests include: Imaging revealed arthritis and that the GH joint is flattened.   Previous treatments include: None.   Patient reports that general health is: Fair, affected by MS.   Pertinent medical history includes:  MS, Fibromyalgia, depression, lymphedema, vocal cord paralysis, difficulty walking.  Surgical history includes: None pertinent to therapy.  Current medications include: None pertinent to therapy  Current occupation is: Does not work, on disability.  Primary job tasks include: Has help with ADLs and chores, meals delivered.  Barriers include: Standing for long periods of time is difficult, exercises to be modified to seated position.  Patient is R handed.    Patient's expectations for therapy include: Reduce overall pain and reduce pain with lifting.    HPI                    Objective:  SHOULDER:    Cervical Screen: Discomfort with extension, left side bending.    Spurling's: Negative bilaterally    Shoulder: * indicates pain   PROM L PROM R AROM L AROM R MMT L MMT R   Flex  Approx. 125 * 5 5   Abd   114* 142* 5 4*   Full Can   Approx. 140 Approx. 110 5 5   Empty Can   Approx. 140 Approx 100 5 4*   IR   T10 L1* 5 5   ER   WNL 55 5  5*   Ext/IR           Scapulothoraic Rhythm: Decreased upward rotation in right scapula with shoulder flexion.    Palpation: Moderate tenderness to palpation in right AC joint, right deltoid, anterior right shoulder.  Increased tenderness to palpation in posterior right shoulder.    Special tests:  Velazquez Gregg: + on L  AC Shear: + on L  Horizontal Abduction: + on L  Empty Can: + on L      System    Physical Exam    General     ROS    Assessment/Plan:    Patient is a 63 year old female with right side shoulder complaints.    Patient has the following significant findings with corresponding treatment plan.                Diagnosis 1:  Right Shoulder Pain    Pain -  hot/cold therapy, manual therapy and home program  Decreased ROM/flexibility - manual therapy and therapeutic exercise  Decreased strength - therapeutic exercise and therapeutic activities  Decreased proprioception - neuro re-education and therapeutic activities  Impaired muscle performance - neuro re-education  Decreased function - therapeutic activities  Impaired posture - neuro re-education    Therapy Evaluation Codes:   1) History comprised of:   Personal factors that impact the plan of care:      None.    Comorbidity factors that impact the plan of care are:      Depression, Fibromyalgia, Multiple sclerosis and Lymphedema and Tremors.     Medications impacting care: None.  2) Examination of Body Systems comprised of:   Body structures and functions that impact the plan of care:      Shoulder.   Activity limitations that impact the plan of care are:      Bathing, Cooking, Dressing, Lifting and Reading/Computer work.  3) Clinical presentation characteristics are:   Stable/Uncomplicated.  4) Decision-Making    Low complexity using standardized patient assessment instrument and/or measureable assessment of functional outcome.  Cumulative Therapy Evaluation is: Low complexity.    Previous and current functional limitations:  (See Goal Flow Sheet for  this information)    Short term and Long term goals: (See Goal Flow Sheet for this information)     Communication ability:  Patient appears to be able to clearly communicate and understand verbal and written communication and follow directions correctly.  Treatment Explanation - The following has been discussed with the patient:   RX ordered/plan of care  Anticipated outcomes  Possible risks and side effects  This patient would benefit from PT intervention to resume normal activities.   Rehab potential is good.    Frequency:  1 X week, once daily  Duration:  for 6 weeks  Discharge Plan:  Achieve all LTG.  Independent in home treatment program.  Reach maximal therapeutic benefit.    Please refer to the daily flowsheet for treatment today, total treatment time and time spent performing 1:1 timed codes.

## 2018-09-05 NOTE — LETTER
[facility]  [address]  [facility] [address]  [phone]    2018    Re: Mili Padilla   :   1955  MRN:  [id number]   REFERRING PHYSICIAN:   Jasmyn Márquez    [facility]    Date of Initial Evaluation:  18  Visits:  Rxs Used: 1  Reason for Referral:  Acute pain of right shoulder    EVALUATION SUMMARY    [facility] Initial Evaluation    Subjective:  Mili Padilla is a 63 year old female.    Patient s chief complaints: R shoulder and arm pain, occasionally feels pain in the right neck.  Condition occurred due to no particular event.  Date of Onset: May 2018  Location of symptoms is lateral deltoid into upper arm, occasionally right neck.  Symptoms other than pain include: None   Quality of pain is sharp and aching and frequency is intermittent.    Pain dependence on time of day is: worse in evening.   Pain rating is: 4/10.    Symptoms are exacerbated by: excessive movement and lifting.    Symptoms are relieved by:  Rest and sleeping in a good position.    Progression of symptoms is that symptoms are:  Has been improving since July.  Imaging/Special tests include: Imaging revealed arthritis and that the GH joint is flattened.   Previous treatments include: None.   Patient reports that general health is: Fair, affected by MS.   Pertinent medical history includes:  MS, Fibromyalgia, depression, lymphedema, vocal cord paralysis, difficulty walking.  Surgical history includes: None pertinent to therapy.  Current medications include: None pertinent to therapy  Current occupation is: Does not work, on disability.  Primary job tasks include: Has help with ADLs and chores, meals delivered.  Barriers include: Standing for long periods of time is difficult, exercises to be modified to seated position.  Patient is R handed.    Patient's expectations for therapy include:  Reduce overall pain and reduce pain with lifting.    HPI                  Re: Mili Padilla   :   1955    Objective:  SHOULDER:    Cervical Screen: Discomfort with extension, left side bending.    Spurling's: Negative bilaterally    Shoulder: * indicates pain   PROM L PROM R AROM L AROM R MMT L MMT R   Flex  Approx. 125 * 5 5   Abd   114* 142* 5 4*   Full Can   Approx. 140 Approx. 110 5 5   Empty Can   Approx. 140 Approx 100 5 4*   IR   T10 L1* 5 5   ER   WNL 55 5 5*   Ext/IR           Scapulothoraic Rhythm: Decreased upward rotation in right scapula with shoulder flexion.    Palpation: Moderate tenderness to palpation in right AC joint, right deltoid, anterior right shoulder.  Increased tenderness to palpation in posterior right shoulder.    Special tests:  Marcus Gregg: + on L  AC Shear: + on L  Horizontal Abduction: + on L  Empty Can: + on L  System  Physical Exam  General   ROS    Assessment/Plan:    Patient is a 63 year old female with right side shoulder complaints.    Patient has the following significant findings with corresponding treatment plan.                Diagnosis 1:  Right Shoulder Pain    Pain -  hot/cold therapy, manual therapy and home program  Decreased ROM/flexibility - manual therapy and therapeutic exercise  Decreased strength - therapeutic exercise and therapeutic activities  Decreased proprioception - neuro re-education and therapeutic activities  Impaired muscle performance - neuro re-education  Decreased function - therapeutic activities  Impaired posture - neuro re-education                Re: Mili Padilla   :   1955    Therapy Evaluation Codes:   1) History comprised of:   Personal factors that impact the plan of care:      None.    Comorbidity factors that impact the plan of care are:      Depression, Fibromyalgia, Multiple sclerosis and Lymphedema and Tremors.     Medications impacting care: None.  2) Examination of Body Systems comprised of:   Body  structures and functions that impact the plan of care:      Shoulder.   Activity limitations that impact the plan of care are:      Bathing, Cooking, Dressing, Lifting and Reading/Computer work.  3) Clinical presentation characteristics are:   Stable/Uncomplicated.  4) Decision-Making    Low complexity using standardized patient assessment instrument and/or measureable assessment of functional outcome.  Cumulative Therapy Evaluation is: Low complexity.    Communication ability:  Patient appears to be able to clearly communicate and understand verbal and written communication and follow directions correctly.  Treatment Explanation - The following has been discussed with the patient:   RX ordered/plan of care  Anticipated outcomes  Possible risks and side effects  This patient would benefit from PT intervention to resume normal activities.   Rehab potential is good.  Frequency:  1 X week, once daily  Duration:  for 6 weeks  Discharge Plan:  Achieve all LTG.  Independent in home treatment program.  Reach maximal therapeutic benefit.    Thank you for your referral.    INQUIRIES  Therapist: Jaron Crooks, DPT, SCS, Cert, MDT   [facility]  [address] [place] [address]  Phone: [phone]  Fax: [phone]

## 2018-09-17 ENCOUNTER — TELEPHONE (OUTPATIENT)
Dept: GASTROENTEROLOGY | Facility: CLINIC | Age: 63
End: 2018-09-17

## 2018-09-17 NOTE — TELEPHONE ENCOUNTER
LVM for patient in regards to rescheduling upcoming appointment with Dr. Flores on 10/17/18. Left call back number so patient can call and reschedule that appointment.

## 2018-09-26 ENCOUNTER — OFFICE VISIT (OUTPATIENT)
Dept: PSYCHOLOGY | Facility: CLINIC | Age: 63
End: 2018-09-26
Payer: COMMERCIAL

## 2018-09-26 VITALS — WEIGHT: 292.9 LBS | BODY MASS INDEX: 45.87 KG/M2

## 2018-09-26 DIAGNOSIS — F54 PSYCHOLOGICAL FACTORS AFFECTING MORBID OBESITY (H): ICD-10-CM

## 2018-09-26 DIAGNOSIS — F33.1 MAJOR DEPRESSIVE DISORDER, RECURRENT EPISODE, MODERATE (H): Primary | ICD-10-CM

## 2018-09-26 DIAGNOSIS — E66.01 PSYCHOLOGICAL FACTORS AFFECTING MORBID OBESITY (H): ICD-10-CM

## 2018-09-26 DIAGNOSIS — Z56.0 UNEMPLOYMENT: ICD-10-CM

## 2018-09-26 SDOH — ECONOMIC STABILITY - INCOME SECURITY: UNEMPLOYMENT, UNSPECIFIED: Z56.0

## 2018-09-26 NOTE — PROGRESS NOTES
Health Psychology                  Clinic    Department of Medicine  Sherrie Crowe, Ph.D., L.P. [phone]                          Clinics and Surgery Center  [facility] Vin Ward, Ph.D.,  L.P. [phone]                 3rd [facility] Mail Code 741   Ramin Olivo, Ph.D., WALTER, L.P. [phone]     [address] [place] [address] Kathie Galvan, Ph.D., L.P. [phone]  [place] [address]       Health Psychology Follow-Up Note     Ms. Padilla is a 62-year-old woman self-referred for psychological consultation because her therapist of the last 24 years retired.  She is seen for problem-solving and supportive therapy for depression and multiple health issues.     HISTORY OF PRESENTING CONCERN:  Ms. Padilla reports a lengthy history of depression.  She currently sees a psychiatrist, Ban Coleman at [facility], and is taking Abilify 7.5 mg, trazodone 500 mg, ripazepam 75 mg each day at bedtime, Tegretol 400 mg at bedtime and methylphenidate 10 mg b.i.d.  She discontineud ability and is now taking Rexulti 2 mg.  She has a history of hospitalizations including 3 at [facility] in the late 1990s, early 2000s when she had 2 courses of ECT lasting 7-10 sessions and approximately 3 other single session ECTs.  She stopped due to memory problems.  She kept with Dr. Coleman who she first met as an inpatient and has seen her for the past 18 years.  She had also been hospitalized psychiatrically at [facility] at age 23.  She previously worked with psychiatrist, Doug Sarabia for three years, stopping, in part, because she didn't feel he took her suicide attempt sufficiently seriously.  She reports her depression tends to vary.  Currently it is moderate, though it was more severe within the past year especially when she was having additional health problems.      MEDICAL HISTORY:  Ms.  Randy has a complex medical history.  She was diagnosed with MS in 1985.  Her psychiatrist at [facility] in [place], Dr. 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  She has used a scooter since 1992, using it more in the last 6 months than she had previously.  She also can use a walker.  She was diagnosed with fibromyalgia in 1997.   She is also seen at the [facility] for her eye care.  She began to see an eating disorder therapist weekly and has been somewhat erratically losing weight.    WEIGH Today is 292.9 up from 292.2 last month.     She is attending OA.    Wt Readings from Last 4 Encounters:   09/26/18 132.9 kg (292 lb 14.4 oz)   08/29/18 132.5 kg (292 lb 3.2 oz)   08/22/18 133.6 kg (294 lb 9.6 oz)   07/30/18 133.3 kg (293 lb 12.8 oz)     Past Medical History:   Diagnosis Date     Depression, major, in partial remission (H)      Fibromyalgia syndrome      Gastro-oesophageal reflux disease      Hyperlipemia      Lymphedema      Multiple sclerosis (H)     tremors with MS, all four limbs and head     Multiple sclerosis, secondary progressive (H)      Sleep apnea      Vocal cord paralysis, unilateral complete         Past Surgical History:   Procedure Laterality Date     COLONOSCOPY N/A 7/17/2017    Procedure: COMBINED COLONOSCOPY, SINGLE OR MULTIPLE BIOPSY/POLYPECTOMY BY BIOPSY;;  Surgeon: Wong Beaulieu MD;  Location:  GI     COLONOSCOPY N/A 2/23/2018    Procedure: COMBINED COLONOSCOPY, SINGLE OR MULTIPLE BIOPSY/POLYPECTOMY BY BIOPSY;  COLONOSCOPY ;  Surgeon: Yessica Santana MD;  Location:  GI     ENT SURGERY      throat 1969, vocal cord surgery with skin grafting     ESOPHAGOSCOPY, GASTROSCOPY, DUODENOSCOPY (EGD), COMBINED N/A 12/16/2014    Procedure: COMBINED ENDOSCOPIC ULTRASOUND, ESOPHAGOSCOPY, GASTROSCOPY, DUODENOSCOPY (EGD), FINE NEEDLE ASPIRATE/BIOPSY;  Surgeon: Yessica Santana MD;  Location: [facility]     ESOPHAGOSCOPY, GASTROSCOPY,  DUODENOSCOPY (EGD), COMBINED N/A 12/16/2014    Procedure: COMBINED ESOPHAGOSCOPY, GASTROSCOPY, DUODENOSCOPY (EGD), BIOPSY SINGLE OR MULTIPLE;  Surgeon: Yessica Santana MD;  Location:  GI     LAPAROSCOPIC APPENDECTOMY  5/30/2013    Procedure: LAPAROSCOPIC APPENDECTOMY;;  Surgeon: Salvador Morris MD;  Location:  OR     LAPAROSCOPIC BIOPSY LIVER  5/30/2013    Procedure: LAPAROSCOPIC BIOPSY LIVER;;  Surgeon: Salvador Morris MD;  Location:  OR     LAPAROSCOPIC GASTRIC SLEEVE  5/30/2013    Procedure: LAPAROSCOPIC GASTRIC SLEEVE;  LAPAROSCOPIC SLEEVE GASTRECTOMY/ LAPARSCOPIC  APPENDECTOMY /LIVER BIOPSIES/LIVER CYST DRAINAGE;  Surgeon: Salvador Morris MD;  Location:  OR     ORTHOPEDIC SURGERY      R wrist 2010     Current Outpatient Prescriptions   Medication     atorvastatin (LIPITOR) 20 MG tablet     brexpiprazole (REXULTI) 3 MG tablet     calcium carbonate-vitamin D (CALCIUM 600/VITAMIN D) 600-400 MG-UNIT CHEW     Calcium Citrate-Vitamin D (CALCITRATE/VITAMIN D PO)     carBAMazepine (TEGRETOL) 200 MG tablet     carBAMazepine (TEGRETOL) 200 MG tablet     Cholecalciferol (VITAMIN D3 PO)     Cholecalciferol (VITAMIN D3 PO)     cholecalciferol 5000 units CAPS     cyabnocobalamin (VITAMIN B-12) 2500 MCG sublingual tablet     denosumab (PROLIA) 60 MG/ML SOLN injection     Dextromethorphan-Guaifenesin (MUCINEX DM PO)     docusate sodium (COLACE) 100 MG tablet     docusate sodium 100 MG tablet     DULOXETINE HCL PO     FOLIC ACID PO     ketoconazole (NIZORAL) 2 % cream     Melatonin 10 MG TABS     methylphenidate (RITALIN) 10 MG tablet     metroNIDAZOLE (METROGEL) 1 % gel     MINOCYCLINE HCL PO     Mirtazapine (REMERON PO)     Multiple Vitamin (MULTIVITAMINS PO)     ranitidine (ZANTAC) 150 MG tablet     TRAZODONE HCL PO     triamcinolone (KENALOG) 0.5 % OINT ointment     trospium (SANCTURA) 20 MG tablet     Venlafaxine HCl (EFFEXOR PO)     No current facility-administered medications for this visit.       Facility-Administered Medications Ordered in Other Visits   Medication     ondansetron (ZOFRAN) injection     New medication: On Duloxetine and Mirtazpine and Trazodone and Ritalin and Rexulti.  She discontinued  Effexor and Abilify  per Dr. Marquise Coleman, her psychiatrist.      SOCIAL HISTORY:  Ms. Padilla grew up in the [place] [facility] and is the oldest of 5 children in her family of origin.  Her father was a dentist who she believes was bipolar.  He  of lung cancer at age 72.  Her mother  of sepsis at age 80.  She had been diagnosed with breast cancer when Ms. Padilla was 8.  She describes the marriage between her parents as misael.  She is 5 years older than her closest-aged sister and they are all within 4 years of each other.   Her daughter  12/3 and her mother   She tends to feel more depressed in winter.      Ms. Padilla attended [employer] for a year and later went to night school at the [employer].  She felt that she could not continue her education to the point of graduation because she was working full time and raising her daughter.  Her daughter  in  at age 31 of liver failure secondary to an accidental Tylenol overdose.  She had been recovering from a hysterectomy.      Ms. 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  She had to retire at the time secondary to her MS.  She has never .  She has not dated,  is interested in dating, and states that if she were she would be interested in a relationship with a woman.  There is no history of  service or legal problems.  She expresses concern about her increasing social isolation.  She does have at least 1 friend, Jael, who she met at a therapy group in .  She is active in facilitating groups for people with MS both in [place] and [place].  She lives alone and  currently gets help from a home health aide, as well as home health nurse.     She sees Dr. Coleman, psychiatrist and is trying to figure out best antidepressant regimen.  She brought in her genetic testing.Effexor reduced to 300 per Dr. Coleman and is now substituting Cymbalta for it.   She feels she has been more depressed since the Fall, but doesn't think it is SAD.       SESSION:  Mili arrived late for today's meeting and is greeted in the waiting room.  Her biggest concern is incteased depression, with isolating, staying in [facility] and in her apartment.    It is not clear what is precipitating it other than the fact she did not get one of her medications.  She states she will go to pick it up tomorrow from her psychiatrist.  It is not sure why the pharmacy states they didn't get the prescription.  She will address with her psychiatrist.  She took a shower yesterday, did some chores, and feels a bit better today.  We discussed weight management, since last seen basically stable.   She is frustrated and thinks when she is stressed she eats to soothe herself.  She is aware she needs to keep working hard at making better choices.      We discussed sleep. She is sometimes going to bed as late as 6 AM.  We discussed the need for more discipline.  She thinks the sleep is in part a function of increased depression. 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  She is wondering about how to conserve her funds (e.g., get rid of cable, land line, car).  We discussed at length given her anxiety about it.  She participated fully and appeared to derive benefit. Affect is positive.  Rapport was excellent.    Time in: 3:12  Time out 3:55     DIAGNOSES:   Major depression, recurrent, mild (F33.0).   Behavioral factors affecting morbid obesity (E66.01).     PLAN:  Ms. Padilla will return to clinic on  10/22 @ 1  for problem-solving and supportive therapy consistent with treatment plan..   Tx plan 9/27/17;   Next review due  9/27/18 (next session)     Ramin Olivo, PhD, A.B.P.P., L.P.   Director, Health Psychology

## 2018-09-26 NOTE — MR AVS SNAPSHOT
After Visit Summary   9/26/2018    Mili Padilla    MRN: [id number]           Patient Information     Date Of Birth          1955        Visit Information        Provider Department      9/26/2018 3:00 PM Ramin Olivo, PhD [facility]        Today's Diagnoses     Major depressive disorder, recurrent episode, moderate (H)    -  1    Psychological factors affecting morbid obesity (H)        Unemployment           Follow-ups after your visit        Your next 10 appointments already scheduled     Oct 01, 2018  1:30 PM CDT   RODY Extremity with Aníbal Crooks, PT   [facility] ([facility]  )    [address]  [place] [address]   [phone]            Oct 03, 2018  1:30 PM CDT   Follow Up with Neha Valadez RD, LD   [facility] ([facility])    [address]  [place] [address]   [phone]            Oct 16, 2018  4:00 PM CDT   (Arrive by 3:45 PM)   New Patient Visit with Louisa Richmond MD   [facility] ([facility])    [address] [place] [address]  [address] [facility] [address]   [phone]            Oct 22, 2018  1:00 PM CDT   (Arrive by 12:45 PM)   Return Visit with Ramin Olivo, PhD [facility] ([facility])    [address]  [address] [facility] [address]   [phone]            Nov 15, 2018  2:00 PM CST   RETURN NEURO with Zach Pak MD   [facility] ([facility])    [address]  [address] [address]  [place] [address]   [phone]            Dec 13, 2018  2:30 PM CST   (Arrive by 2:15 PM)   Return Visit with Clint Still MD   [facility] ([facility])    [address] [place] [address]  [address] [facility]  [place] [id number] [phone]           This is a multi-disciplinary care team visit as patients with your type of problem are usually seen by a team of an MD and a Speech-Language Pathologist (who is a specialist in disorders of the voice, throat, and breathing).  Please plan about 2 hours for your visit, which will likely include Laryngeal Function Studies, a Voice/Swallow/Breathing Evaluation, and an Endoscopic Laryngeal Examination to provide a comprehensive evaluation.  Please check with your insurance company to ensure you are covered for these services. - It is important to know that if you are evaluated and/or treated by both a physician and a speech pathologist during your visit, your billing will reflect the input that you receive from both providers as separate professionals. Although most insurance plans do cover these services, we encourage you to contact your insurance company prior to your visit to determine whether there are any coverage limitations that might affect you financially. - Billing/procedure codes that are frequently associated with visits to our clinic include (but are not limited to) the ones listed below. Most patients will not need all of these items, but it may be useful to ask your insurance company's patient . 54102: Flexible fiberoptic laryngoscopy, 07772: Laryngoscopy; flexible or rigid fiberoptic, with stroboscopy, 42918: Flexible endoscopic evaluation of swallowing, 15211: Laryngeal function aerodynamic evaluation, 56814: Evaluation of Voice and Resonance, 20857: Speech pathology treatment for voice, speech, communication, 67919: Speech pathology treatment for swallowing/oral function for feeding - If you have any concerns or questions, or if you would prefer not to have a speech pathologist involved in your visit, please contact our Clinic Coordinator at [phone], at least 24 hours prior to your appointment.              Who to contact     Please  call your clinic at [phone] to:    Ask questions about your health    Make or cancel appointments    Discuss your medicines    Learn about your test results    Speak to your doctor            Additional Information About Your Visit        [place] [facility] Information     [employer] gives you secure access to your electronic health record. If you see a primary care provider, you can also send messages to your care team and make appointments. If you have questions, please call your primary care clinic.  If you do not have a primary care provider, please call [phone] and they will assist you.      [employer] is an electronic gateway that provides easy, online access to your medical records. With [employer], you can request a clinic appointment, read your test results, renew a prescription or communicate with your care team.     To access your existing account, please contact your [facility] or call [phone] for assistance.        Care EveryWhere ID     This is your Care EveryWhere ID. This could be used by other organizations to access your [facility] medical records  [id number]        Your Vitals Were     Last Period BMI (Body Mass Index)                12/10/2010 45.87 kg/m2           Blood Pressure from Last 3 Encounters:   06/18/18 100/70   02/23/18 (!) 109/92   09/25/17 111/61    Weight from Last 3 Encounters:   09/26/18 132.9 kg (292 lb 14.4 oz)   08/29/18 132.5 kg (292 lb 3.2 oz)   08/22/18 133.6 kg (294 lb 9.6 oz)              Today, you had the following     No orders found for display       Primary Care Provider Office Phone # Fax #    Jasmyn Márquez [phone]       [place] [facility] [address]  [facility] [address]        Equal Access to Services     ARUNA WAGNER : Hadii darion Torres, dg rico, qaybmarcos wong. So [facility] [phone].    ATENCIÓN: Si maribel lockhart abel  disposición servicios gratuitos de asistencia lingüística. Rin velasquez [phone].    We comply with applicable federal civil rights laws and [place] laws. We do not discriminate on the basis of race, color, national origin, age, disability, sex, sexual orientation, or gender identity.            Thank you!     Thank you for choosing [facility]  for your care. Our goal is always to provide you with excellent care. Hearing back from our patients is one way we can continue to improve our services. Please take a few minutes to complete the written survey that you may receive in the mail after your visit with us. Thank you!             Your Updated Medication List - Protect others around you: Learn how to safely use, store and throw away your medicines at www.disposemymeds.org.          This list is accurate as of 9/26/18  5:19 PM.  Always use your most recent med list.                   Brand Name Dispense Instructions for use Diagnosis    atorvastatin 20 MG tablet    LIPITOR     Take 20 mg by mouth daily.        CALCITRATE/VITAMIN D PO      2 tabs w/meals.        calcium carbonate-vitamin D 600-400 MG-UNIT Chew    CALCIUM 600/VITAMIN D    180 tablet    Take one twice daily and at least 2 hours apart from iron.    Bariatric surgery status, Postsurgical malabsorption       * carBAMazepine 200 MG tablet    TEGretol     Take 300 mg by mouth every morning. 300mg = 1.5 tablets.        * carBAMazepine 200 MG tablet    TEGretol     Take 400 mg by mouth At Bedtime.        cyanocobalamin 2500 MCG sublingual tablet    VITAMIN B-12     Place 2,500 mcg under the tongue every other day        denosumab 60 MG/ML Soln injection    PROLIA     Inject 60 mg Subcutaneous every 6 months        * docusate sodium 100 MG tablet    COLACE     Take 100 mg by mouth daily        * docusate sodium 100 MG tablet    COLACE    180 tablet    Take 100 mg by mouth daily    Other constipation, Bariatric surgery status       DULOXETINE  HCL PO      Take 60 mg by mouth 2 times daily        EFFEXOR PO      Take by mouth 3 times daily        FOLIC ACID PO      Take 1 mg by mouth 2 times daily        ketoconazole 2 % cream    NIZORAL     Apply topically daily    Bariatric surgery status, Obesity, Class III, BMI 40-49.9 (morbid obesity) (H)       Melatonin 10 MG Tabs tablet      Take 10 mg by mouth At Bedtime        methylphenidate 10 MG tablet    RITALIN     Take 10 mg by mouth 2 times daily        metroNIDAZOLE 1 % gel    METROGEL          MINOCYCLINE HCL PO           MUCINEX DM PO      1200mg daily        MULTIVITAMINS PO      Take 2 tablets by mouth every evening. WITH IRON        ranitidine 150 MG tablet    ZANTAC    180 tablet    TAKE 1 TABLET(150 MG) BY MOUTH TWICE DAILY    GERD without esophagitis, Bariatric surgery status       REMERON PO      Take 75 mg by mouth At Bedtime. Takes ( 5)    15mg tablets=75mg        REXULTI 3 MG tablet   Generic drug:  brexpiprazole      Take 1 mg by mouth daily        TRAZODONE HCL PO      Take 500 mg by mouth At Bedtime        triamcinolone 0.5 % Oint ointment    KENALOG     Apply topically 2 times daily        trospium 20 MG tablet    SANCTURA     Take 20 mg by mouth 2 times daily        * VITAMIN D3 PO      Take 3,000 Units by mouth daily In a.m.        * VITAMIN D3 PO      Take 2,000 Units by mouth At Bedtime.        * cholecalciferol 5000 units Caps     90 capsule    Take 1 capsule (5,000 Units) by mouth daily    Bariatric surgery status, Postsurgical malabsorption       * Notice:  This list has 7 medication(s) that are the same as other medications prescribed for you. Read the directions carefully, and ask your doctor or other care provider to review them with you.

## 2018-10-03 ENCOUNTER — HOSPITAL ENCOUNTER (OUTPATIENT)
Dept: NUTRITION | Facility: CLINIC | Age: 63
Discharge: HOME | End: 2018-10-03
Attending: DIETITIAN, REGISTERED | Admitting: DIETITIAN, REGISTERED
Payer: COMMERCIAL

## 2018-10-03 PROCEDURE — 97803 MED NUTRITION INDIV SUBSEQ: CPT | Performed by: DIETITIAN, REGISTERED

## 2018-10-04 NOTE — PROGRESS NOTES
NUTRITION REASSESSMENT NOTE     REASON FOR ASSESSMENT  Mili Padilla referred by Dr. Márquez for MNT related to overweight.    Patient accompanied by self.      ASSESSMENT   Nutrition History:- Information obtained from patient.  Patient with long history of weight loss struggle.  Patient had sleeve gastrectomy 3 years ago.  Patient states she was able to maintain her 65 lb weight loss for 2 years. Then patient struggled with depression and regained her weight.  Patient has been tracking her food intake since surgery.  Patient aiming to keep her calories to 2000 per day.  Patient with history of multiple sclerosis which makes it difficult to prepare meals due to fatigue.  Patient has not been eating [employer] Arms meals and had meals changed to low sodium, low fiber diet.  Open Arms meals provide 5 meals, 1 ham or turkey sandwich,1 container potato/deli salad, salad + salad fixing, 2 pureed soups and 5 pieces of fruit per week.  Patient with limited finances and depends on outside resources for food.  Patient feels her frustration from lack of funds makes her choose poor food choices.  Patient rides scooter to [employer] and [employer] for grocery shopping as both stores are 2-3 blocks away from her home.  Patient states is unable to order items online for delivery as states she could not carry large box using her scooter.  Patient states her depression has been severe and has not been able to focus on her nutrition.      Typical Diet Intake:   Breakfast: 1/2 cup cottage cheese and 2 cantaloupe stoner  Lunch: ham on bun (switched from salami sandwich)  Dinner: skipping dinner or beef jerky or cereal; [employer] pizza; pulled chicken   Snack: 6 cups popcorn, 1 serving dark chocolate mint M&M's, brownie, beef jerky (1 package per day), mini rolls from [employer], Cheetos and Ruffles potato chips   Beverages: water, Diet Marlena Quiroz's drink (not willing to reduce)  Dining out: [employer] 1 time per week (eggs, sausage  "biscuit); Vince's -cinnamon twists ; hamburger and french fries with lime phosphate    PHYSICAL FINDINGS  Observed:  No nutrition-related physical findings observed  Obtained from Chart/Interdisciplinary Team:  None noted    LABS  Labs reviewed    MEDICATIONS  Medications reviewed    ANTHROPOMETRICS   Height: 5'7\"  Weight: 294.7  lbs  BMI (kg/m2): 46.1 kg/m2  Weight Status:  Obesity Grade III BMI >40  %IBW: 217%  Weight History:   Wt Readings from Last 5 Encounters:   08/22/18 133.6 kg (294 lb 9.6 oz)   07/30/18 133.3 kg (293 lb 12.8 oz)   06/18/18 131.9 kg (290 lb 11.2 oz)   06/18/18 131.9 kg (290 lb 11.2 oz)   06/01/18 131 kg (288 lb 14.4 oz)     ASSESSED NUTRITION NEEDS  Estimated Energy Needs: 1429-1647 kcals/day (11-14 Kcal/Kg) for weight loss  Estimated Protein Needs: 61-74 grams protein/day (1-1.2 g pro/Kg) for maintenance  Estimated Fluid Needs: 5783-4933 mL/day (25-30 mL/kg)    EVALUATION/PROGRESS TOWARDS GOALS   Previous Goals:  Eat 5 Open Arms meals per week-not met (eating 2-3 per week)   Use different aisle at [employer] to avoid bakery items-not met     Previous Nutrition Diagnosis: Disordered eating pattern related to excessive snacking as evidenced by weight regain after sleeve gastrectomy and BMI of 46 kg/m2 -no change    Current Nutrition Diagnosis: Disordered eating pattern related to excessive snacking as evidenced by weight regain after sleeve gastrectomy and BMI of 46.1 kg/m2      INTERVENTIONS   Nutrition Prescription   Recommend avoiding liquids with meals to reduce volume of meals for intended weight loss; determine alternatives to emotional eating     IMPLEMENTATION   Meals and Snacks: 3 meals + snacks if hungry  Nutrition Education (Content):   a)  Discussed progress towards goals.  Reviewed food logs.  Patient continues to consume more calories through snacks than her meals.  Suggest patient consume Open Arms meals for dinner to conserve her energy and use available food resources to " reduce food budget.  Congratulated patient on switching to lower calorie protein at lunch.  Supported patient in her struggle with food.   Nutrition Education (Application):   a)  Determined ways to reduce food budget by reducing snack items and by utilizing Open Arms meals 5 days per week.  Discussed additional budget friendly food options.                   b)  Patient verbalizes understanding of diet by stating will eat dinner daily by utilizing Open Arms meals.  Anticipated compliance: fair    Other: Patient's health insurance has approved follow up appointments with RD for 1 year.    GOALS  Eat dinner daily    Pay attention with snacks in the evening     FOLLOW UP/MONITORING   Progress towards goals will be monitored and evaluated per protocol and Practice Guidelines  Patient to follow up in 2 weeks  Patient has RD name and contact information    Time Spent with Patient  25 minutes    Filiberto Becerril, RD, LD  [facility] Outpatient Dietitian  [phone] (office phone)

## 2018-10-17 ENCOUNTER — HOSPITAL ENCOUNTER (OUTPATIENT)
Dept: NUTRITION | Facility: CLINIC | Age: 63
Discharge: HOME | End: 2018-10-17
Attending: DIETITIAN, REGISTERED | Admitting: DIETITIAN, REGISTERED
Payer: COMMERCIAL

## 2018-10-17 VITALS — BODY MASS INDEX: 45.83 KG/M2 | WEIGHT: 292.6 LBS

## 2018-10-17 PROCEDURE — 97803 MED NUTRITION INDIV SUBSEQ: CPT | Performed by: DIETITIAN, REGISTERED

## 2018-10-17 NOTE — PROGRESS NOTES
NUTRITION REASSESSMENT NOTE     REASON FOR ASSESSMENT  Mili Padilla referred by Dr. Márquez for MNT related to overweight.    Patient accompanied by self.      ASSESSMENT   Nutrition History:- Information obtained from patient.  Patient with long history of weight loss struggle.  Patient had sleeve gastrectomy 3 years ago.  Patient states she was able to maintain her 65 lb weight loss for 2 years. Then patient struggled with depression and regained her weight.  Patient has been tracking her food intake since surgery.  Patient aiming to keep her calories to 2000 per day.  Patient with history of multiple sclerosis which makes it difficult to prepare meals due to fatigue.  Patient has not been eating Open Arms meals and had meals changed to low sodium, low fiber diet.  Open Arms meals provide 5 meals, 1 ham or turkey sandwich,1 container potato/deli salad, salad + salad fixing, 2 pureed soups and 5 pieces of fruit per week.  Patient with limited finances and depends on outside resources for food.  Patient feels her frustration from lack of funds makes her choose poor food choices.  Patient rides scooter to [employer] and [employer] for grocery shopping as both stores are 2-3 blocks away from her home.  Patient states is unable to order items online for delivery as states she could not carry large box using her scooter.  Patient states her depression has been severe and has not been able to focus on her nutrition.  Patient struggling with funds for food.      Typical Diet Intake:   Breakfast: 1/2 cup cottage cheese and 2 cantaloupe stoner; [employer]ani yogurt and canned pears   Lunch: ham on bun (switched from salami sandwich)  Dinner: Open Arms meals or beef jerky or cereal; [employer]'s pizza; pulled chicken   Snack: 6 cups popcorn, 1 serving dark chocolate mint M&M's, brownie, beef jerky (1 package per day), mini rolls from [employer], Cheetos and Ruffles potato chips   Beverages: water, Diet Marlena Quiroz's drink (not  "willing to reduce)  Dining out: [employer] 1 time per week (eggs, sausage biscuit); [employer]'s -cinnamon twists ; hamburger and french fries with lime phosphate    PHYSICAL FINDINGS  Observed:  No nutrition-related physical findings observed  Obtained from Chart/Interdisciplinary Team:  None noted    LABS  Labs reviewed    MEDICATIONS  Medications reviewed    ANTHROPOMETRICS   Height: 5'7\"  Weight: 292.6 lbs  BMI (kg/m2): 45.8 kg/m2  Weight Status:  Obesity Grade III BMI >40  %IBW: 217%  Weight History:   Wt Readings from Last 5 Encounters:   10/17/18 132.7 kg (292 lb 9.6 oz)   08/22/18 133.6 kg (294 lb 9.6 oz)   07/30/18 133.3 kg (293 lb 12.8 oz)   06/18/18 131.9 kg (290 lb 11.2 oz)   06/18/18 131.9 kg (290 lb 11.2 oz)     ASSESSED NUTRITION NEEDS  Estimated Energy Needs: 1194-2061 kcals/day (11-14 Kcal/Kg) for weight loss  Estimated Protein Needs: 61-74 grams protein/day (1-1.2 g pro/Kg) for maintenance  Estimated Fluid Needs: 0287-5166 mL/day (25-30 mL/kg)    EVALUATION/PROGRESS TOWARDS GOALS   Previous Goals:  Eat dinner daily-improving    Pay attention to evening snacking-improving      Previous Nutrition Diagnosis: Disordered eating pattern related to excessive snacking as evidenced by weight regain after sleeve gastrectomy and BMI of 46.1 kg/m2 -no change    Current Nutrition Diagnosis: Disordered eating pattern related to excessive snacking as evidenced by weight regain after sleeve gastrectomy and BMI of 45.8 kg/m2      INTERVENTIONS   Nutrition Prescription   Recommend avoiding liquids with meals to reduce volume of meals for intended weight loss; determine alternatives to emotional eating     IMPLEMENTATION   Meals and Snacks: 3 meals + snacks if hungry  Nutrition Education (Content):   a)  Discussed progress towards goals.  Reviewed food logs.  Patient continues to consume more calories through snacks than her meals.  Congratulated patient on reducing snacks in evening and choosing Open Arms meals for " dinner to conserve her energy and use available food resources to reduce food budget.  Supported patient in her struggle with food.   Nutrition Education (Application):   a)  Determined ways to reduce food budget by reducing snack items and by utilizing Open Arms meals 5 days per week.  Discussed additional budget friendly food options.  Offered resources available for patient to contact for food sources.                   b)  Patient verbalizes understanding of diet by stating will limit snacks to 2 times per evening.    Anticipated compliance: fair    Other: Patient's health insurance has approved follow up appointments with RD for 1 year.    GOALS  Eat dinner daily    Pay attention with snacks in the evening by reducing to 2 times per night     FOLLOW UP/MONITORING   Progress towards goals will be monitored and evaluated per protocol and Practice Guidelines  Patient to follow up in 2 weeks  Patient has RD name and contact information    Time Spent with Patient  30 minutes    Filiberto Becerril, RD, LD  [facility] Outpatient Dietitian  [phone] (office phone)

## 2018-10-22 ENCOUNTER — TELEPHONE (OUTPATIENT)
Dept: GASTROENTEROLOGY | Facility: CLINIC | Age: 63
End: 2018-10-22

## 2018-10-22 ENCOUNTER — OFFICE VISIT (OUTPATIENT)
Dept: PSYCHOLOGY | Facility: CLINIC | Age: 63
End: 2018-10-22
Payer: COMMERCIAL

## 2018-10-22 VITALS — BODY MASS INDEX: 46.11 KG/M2 | WEIGHT: 293 LBS

## 2018-10-22 DIAGNOSIS — F33.1 MAJOR DEPRESSIVE DISORDER, RECURRENT EPISODE, MODERATE (H): Primary | ICD-10-CM

## 2018-10-22 DIAGNOSIS — Z56.0 UNEMPLOYMENT: ICD-10-CM

## 2018-10-22 DIAGNOSIS — F54 PSYCHOLOGICAL FACTORS AFFECTING MORBID OBESITY (H): ICD-10-CM

## 2018-10-22 DIAGNOSIS — E66.01 PSYCHOLOGICAL FACTORS AFFECTING MORBID OBESITY (H): ICD-10-CM

## 2018-10-22 SDOH — ECONOMIC STABILITY - INCOME SECURITY: UNEMPLOYMENT, UNSPECIFIED: Z56.0

## 2018-10-22 NOTE — TELEPHONE ENCOUNTER
LVM for patient in regards to rescheduling with Dr. Richmond that patient had to cancel. Left call back number for patient to call and schedule appointment.

## 2018-10-22 NOTE — PROGRESS NOTES
Health Psychology                  Clinic    Department of Medicine  Sherrie Crowe, Ph.D., L.P. [phone]                          Clinics and Surgery Center  [facility] Vin Ward, Ph.D.,  L.P. [phone]                 [facility] Mail Code 741   Raimn Olivo, Ph.D., WALTER, L.P. [phone]     [address] [place] [address] Kathie Galvan, Ph.D., L.P. [phone]  [place] [address]       Health Psychology Follow-Up Note     Ms. Padilla is a 62-year-old woman self-referred for psychological consultation because her therapist of the last 24 years retired.  She is seen for problem-solving and supportive therapy for depression and multiple health issues.     HISTORY OF PRESENTING CONCERN:  Ms. Padilla reports a lengthy history of depression.  She currently sees a psychiatrist, Ban Colemna at [facility], and is taking Abilify 7.5 mg, trazodone 500 mg, ripazepam 75 mg each day at bedtime, Tegretol 400 mg at bedtime and methylphenidate 10 mg b.i.d.  She discontineud ability and is now taking Rexulti 2 mg.  She has a history of hospitalizations including 3 at [facility] in the late 1990s, early 2000s when she had 2 courses of ECT lasting 7-10 sessions and approximately 3 other single session ECTs.  She stopped due to memory problems.  She kept with Dr. Coleman who she first met as an inpatient and has seen her for the past 18 years.  She had also been hospitalized psychiatrically at [facility] at age 23.  She previously worked with psychiatrist, Doug Sarabia for three years, stopping, in part, because she didn't feel he took her suicide attempt sufficiently seriously.  She reports her depression tends to vary.  Currently it is moderate, though it was more severe within the past year especially when she was having additional health problems.      MEDICAL HISTORY:  Ms.  Randy has a complex medical history.  She was diagnosed with MS in 1985.  Her psychiatrist at [facility] in [place], Dr. 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  She has used a scooter since 1992, using it more in the last 6 months than she had previously.  She also can use a walker.  She was diagnosed with fibromyalgia in 1997.   She is also seen at the [facility] for her eye care.  She began to see an eating disorder therapist weekly and has been somewhat erratically losing weight.    WEIGH Today is 294.4 up from  292.9 last month.     She is attending OA.    Wt Readings from Last 4 Encounters:   10/22/18 133.5 kg (294 lb 6.4 oz)   10/17/18 132.7 kg (292 lb 9.6 oz)   09/26/18 132.9 kg (292 lb 14.4 oz)   08/29/18 132.5 kg (292 lb 3.2 oz)     Past Medical History:   Diagnosis Date     Depression, major, in partial remission (H)      Fibromyalgia syndrome      Gastro-oesophageal reflux disease      Hyperlipemia      Lymphedema      Multiple sclerosis (H)     tremors with MS, all four limbs and head     Multiple sclerosis, secondary progressive (H)      Sleep apnea      Vocal cord paralysis, unilateral complete         Past Surgical History:   Procedure Laterality Date     COLONOSCOPY N/A 7/17/2017    Procedure: COMBINED COLONOSCOPY, SINGLE OR MULTIPLE BIOPSY/POLYPECTOMY BY BIOPSY;;  Surgeon: Wong Beaulieu MD;  Location:  GI     COLONOSCOPY N/A 2/23/2018    Procedure: COMBINED COLONOSCOPY, SINGLE OR MULTIPLE BIOPSY/POLYPECTOMY BY BIOPSY;  COLONOSCOPY ;  Surgeon: Yessica Santana MD;  Location:  GI     ENT SURGERY      throat 1969, vocal cord surgery with skin grafting     ESOPHAGOSCOPY, GASTROSCOPY, DUODENOSCOPY (EGD), COMBINED N/A 12/16/2014    Procedure: COMBINED ENDOSCOPIC ULTRASOUND, ESOPHAGOSCOPY, GASTROSCOPY, DUODENOSCOPY (EGD), FINE NEEDLE ASPIRATE/BIOPSY;  Surgeon: Yessica Santana MD;  Location: [facility]     ESOPHAGOSCOPY, GASTROSCOPY,  DUODENOSCOPY (EGD), COMBINED N/A 12/16/2014    Procedure: COMBINED ESOPHAGOSCOPY, GASTROSCOPY, DUODENOSCOPY (EGD), BIOPSY SINGLE OR MULTIPLE;  Surgeon: Yessica Santana MD;  Location:  GI     LAPAROSCOPIC APPENDECTOMY  5/30/2013    Procedure: LAPAROSCOPIC APPENDECTOMY;;  Surgeon: Salvador Morris MD;  Location:  OR     LAPAROSCOPIC BIOPSY LIVER  5/30/2013    Procedure: LAPAROSCOPIC BIOPSY LIVER;;  Surgeon: Salvador Morris MD;  Location:  OR     LAPAROSCOPIC GASTRIC SLEEVE  5/30/2013    Procedure: LAPAROSCOPIC GASTRIC SLEEVE;  LAPAROSCOPIC SLEEVE GASTRECTOMY/ LAPARSCOPIC  APPENDECTOMY /LIVER BIOPSIES/LIVER CYST DRAINAGE;  Surgeon: Salvador Morris MD;  Location:  OR     ORTHOPEDIC SURGERY      R wrist 2010     Current Outpatient Prescriptions   Medication     atorvastatin (LIPITOR) 20 MG tablet     brexpiprazole (REXULTI) 3 MG tablet     calcium carbonate-vitamin D (CALCIUM 600/VITAMIN D) 600-400 MG-UNIT CHEW     Calcium Citrate-Vitamin D (CALCITRATE/VITAMIN D PO)     carBAMazepine (TEGRETOL) 200 MG tablet     carBAMazepine (TEGRETOL) 200 MG tablet     Cholecalciferol (VITAMIN D3 PO)     Cholecalciferol (VITAMIN D3 PO)     cholecalciferol 5000 units CAPS     cyabnocobalamin (VITAMIN B-12) 2500 MCG sublingual tablet     denosumab (PROLIA) 60 MG/ML SOLN injection     Dextromethorphan-Guaifenesin (MUCINEX DM PO)     docusate sodium (COLACE) 100 MG tablet     docusate sodium 100 MG tablet     DULOXETINE HCL PO     FOLIC ACID PO     ketoconazole (NIZORAL) 2 % cream     Melatonin 10 MG TABS     methylphenidate (RITALIN) 10 MG tablet     metroNIDAZOLE (METROGEL) 1 % gel     MINOCYCLINE HCL PO     Mirtazapine (REMERON PO)     Multiple Vitamin (MULTIVITAMINS PO)     ranitidine (ZANTAC) 150 MG tablet     TRAZODONE HCL PO     triamcinolone (KENALOG) 0.5 % OINT ointment     trospium (SANCTURA) 20 MG tablet     Venlafaxine HCl (EFFEXOR PO)     No current facility-administered medications for this visit.       Facility-Administered Medications Ordered in Other Visits   Medication     ondansetron (ZOFRAN) injection     New medication: On Duloxetine and Mirtazpine and Trazodone and Ritalin and Rexulti.  She discontinued  Effexor and Abilify  per Dr. Marquise Coleman, her psychiatrist.      SOCIAL HISTORY:  Ms. Padilla grew up in the [facility] and is the oldest of 5 children in her family of origin.  Her father was a dentist who she believes was bipolar.  He  of lung cancer at age 72.  Her mother  of sepsis at age 80.  She had been diagnosed with breast cancer when Ms. Padilla was 8.  She describes the marriage between her parents as misael.  She is 5 years older than her closest-aged sister and they are all within 4 years of each other.   Her daughter  12/3 and her mother   She tends to feel more depressed in winter.      Ms. Padilla attended [employer] for a year and later went to night school at the [employer].  She felt that she could not continue her education to the point of graduation because she was working full time and raising her daughter.  Her daughter  in  at age 31 of liver failure secondary to an accidental Tylenol overdose.  She had been recovering from a hysterectomy.      Ms. 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  She had to retire at the time secondary to her MS.  She has never .  She has not dated,  is interested in dating, and states that if she were she would be interested in a relationship with a woman.  There is no history of  service or legal problems.  She expresses concern about her increasing social isolation.  She does have at least 1 friend, Jael, who she met at a therapy group in .  She is active in facilitating groups for people with MS both in [place] and [place].  She lives alone and  currently gets help from a home health aide, as well as home health nurse.     She sees Dr.Sushila Coleman, psychiatrist and is trying to figure out best antidepressant regimen.  She brought in her genetic testing.Effexor reduced to 300 per Dr. Coleman and is now substituting Cymbalta for it.   She feels she has been more depressed since the Fall, but doesn't think it is SAD.       SESSION:  Mili arrived on time for today's meeting and is greeted in the waiting room.  Her biggest concern is incteased depression, with isolating,.  She is making more of an effort to get out of the house, and spent a few days at a meeting related to MS, which energized her.  She has been depressed for a few months.  We discussed weight management, since last seen basically stable, slightly up.   She is frustrated and thinks when she is stressed she eats to soothe herself.  She is aware she needs to keep working hard at making better choices.      We discussed sleep. She is going to sleep more regularly around 11.  We discussed the need for more discipline.  She thinks the sleep is in part a function of increased depression.  Wakes up and has difficulty getting back to sleep. She informs me she is taking 500 mg of trazodone and will discuss with Dr. Coleman. She participated fully and appeared to derive benefit. Affect is positive.  Rapport was excellent.    Time in: 1:02  Time out 1:58     DIAGNOSES:   Major depression, recurrent, mild (F33.0).   Behavioral factors affecting morbid obesity (E66.01).     PLAN:  Ms. Padilla will return to clinic on  11/28 @ 1  for problem-solving and supportive therapy consistent with treatment plan..     Last treatment plan signed:10/22/2018  Treatment plan review due: 1/22/2019                             Ramin Olivo, PhD, A.B.P.P., L.P.   Director, Health Psychology

## 2018-10-22 NOTE — MR AVS SNAPSHOT
After Visit Summary   10/22/2018    Mili Padilla    MRN: [id number]           Patient Information     Date Of Birth          1955        Visit Information        Provider Department      10/22/2018 1:00 PM Ramin Olivo, PhD [facility]        Today's Diagnoses     Major depressive disorder, recurrent episode, moderate (H)    -  1    Psychological factors affecting morbid obesity (H)        Unemployment           Follow-ups after your visit        Your next 10 appointments already scheduled     Nov 02, 2018  2:00 PM CDT   Follow Up with Neha Valadez RD, LD   [facility] ([facility])    [address], [address]  [facility] [address]   [phone]            Nov 14, 2018 12:15 PM CST   CT COLONOGRAPHY W IMAGE PROC DIAGNOSTIC W/O with SHCT1   [facility] CT ([facility])    [address] [facility] [address] [phone]           How do I prepare for my exam? (Food and drink instructions) 2 days prior: Light breakfast (clear, fat-free soups, small portions skinless chicken/turkey, fish, white bread (no butter) Light lunch (same as above) Light supper at 6pm (same as above)  Day before: Drink clear liquids throughout morning (water, no-pulp juices, clear broth/bouillon, coffee/tea (no cream/milk), Gatorade, soft drinks (carbonated or not), Yvon-Aid, plain Jell-O, popsicles Noon: Drink 1/2 bottle Redi-Cat 1-5 pm: Each hour, on the hour, drink at least 8 oz clear liquids 5:30 pm: Drink 10 oz bottle Magnesium Citrate (expect bowel movement in 30 min to 6 hours) 6 pm: Drink 1/2 bottle Redi-Cat plus clear liquids 7 pm: Take 4 Bisacodyl tablets 8 pm: Drink at least 8 oz clear liquids 9 pm: Drink both bottles of Gastroview (DO NOT DILUTE) After 9:30 pm until after procedure: No food or drinks  How do I prepare for my exam? (Other instructions) To prepare for your exam you will be  given 2 - 30mL bottles of Gastroview, Bisacodyl tablets   4, as well as detailed preparation instructions from the Imaging department. You will need to visit the pharmacy at either the [facility] or the [facility] to obtain 1 container of MiraLAX , 450mL bottle of Readi-CAT, and Gatorade.  What should I wear: Please wear loose clothing, such as a sweat suit or jogging clothes. Avoid snaps, zippers and other metal. We may ask you to undress and put on a hospital gown. How long does the exam take: The entire exam will take about 30 minutes or less.  What should I bring: Please bring any scans or X-rays taken at other hospitals, if similar tests were done. Also bring a list of your medicines, including vitamins, minerals and over-the-counter drugs. It is safest to leave personal items at home.  Do I need a : No  is needed.  What do I need to tell my doctor? Be sure to tell your doctor: * If you have any allergies. * If there s any chance you are pregnant. * If you are breastfeeding.  What should I do after the exam: No restrictions, You may resume normal activities.  What is this test: Computed tomography (CT) colonography or virtual colonoscopy uses CT to examine the large intestine for cancer and growths called polyps. During the exam, a small tube is inserted a short distance into the rectum to allow for inflation with gas while CT images of the colon and the rectum are taken.  Who should I call with questions: If you have any questions, please call the Imaging Department where you will have your exam. Directions, parking instructions, and other information is available on our website, [email or web address].            Nov 15, 2018  2:00 PM CST   RETURN NEURO with Zach Pak MD   [facility] ([facility])    [address] [address]   [phone]            Dec  13, 2018  2:30 PM CST   (Arrive by 2:15 PM)   Return Visit with Clint Still MD   [facility] ([facility])    [address] [address] [phone]           This is a multi-disciplinary care team visit as patients with your type of problem are usually seen by a team of an MD and a Speech-Language Pathologist (who is a specialist in disorders of the voice, throat, and breathing).  Please plan about 2 hours for your visit, which will likely include Laryngeal Function Studies, a Voice/Swallow/Breathing Evaluation, and an Endoscopic Laryngeal Examination to provide a comprehensive evaluation.  Please check with your insurance company to ensure you are covered for these services. - It is important to know that if you are evaluated and/or treated by both a physician and a speech pathologist during your visit, your billing will reflect the input that you receive from both providers as separate professionals. Although most insurance plans do cover these services, we encourage you to contact your insurance company prior to your visit to determine whether there are any coverage limitations that might affect you financially. - Billing/procedure codes that are frequently associated with visits to our clinic include (but are not limited to) the ones listed below. Most patients will not need all of these items, but it may be useful to ask your insurance company's patient . 02409: Flexible fiberoptic laryngoscopy, 72397: Laryngoscopy; flexible or rigid fiberoptic, with stroboscopy, 41452: Flexible endoscopic evaluation of swallowing, 34448: Laryngeal function aerodynamic evaluation, 30997: Evaluation of Voice and Resonance, 42756: Speech pathology treatment for voice, speech, communication, 91339: Speech pathology treatment for swallowing/oral function for feeding - If you have any concerns or questions, or if you  would prefer not to have a speech pathologist involved in your visit, please contact our Clinic Coordinator at [phone], at least 24 hours prior to your appointment.              Who to contact     Please call your clinic at [phone] to:    Ask questions about your health    Make or cancel appointments    Discuss your medicines    Learn about your test results    Speak to your doctor            Additional Information About Your Visit        [employer] Information     [employer] gives you secure access to your electronic health record. If you see a primary care provider, you can also send messages to your care team and make appointments. If you have questions, please call your primary care clinic.  If you do not have a primary care provider, please call [phone] and they will assist you.      [employer] is an electronic gateway that provides easy, online access to your medical records. With [employer], you can request a clinic appointment, read your test results, renew a prescription or communicate with your care team.     To access your existing account, please contact your [facility] or call [phone] for assistance.        Care EveryWhere ID     This is your Care EveryWhere ID. This could be used by other organizations to access your [place] medical records  [id number]        Your Vitals Were     Last Period BMI (Body Mass Index)                12/10/2010 46.11 kg/m2           Blood Pressure from Last 3 Encounters:   06/18/18 100/70   02/23/18 (!) 109/92   09/25/17 111/61    Weight from Last 3 Encounters:   10/22/18 133.5 kg (294 lb 6.4 oz)   10/17/18 132.7 kg (292 lb 9.6 oz)   09/26/18 132.9 kg (292 lb 14.4 oz)              Today, you had the following     No orders found for display       Primary Care Provider Office Phone # Fax #    Jasmyn Márquez [phone]       [facility] [address] [facility] [address]  [facility] [address]        Equal Access to  Services     [facility]: Hadii aad ku hadashleejames Torres, wabernyda luqadaha, qaybta kaeladioadela mckeon, marcos alberts . So [facility] [phone].    ATENCIÓN: Si iwonala jhon, tiene a abel disposición servicios gratuitos de asistencia lingüística. Llame al [phone].    We comply with applicable federal civil rights laws and [place] laws. We do not discriminate on the basis of race, color, national origin, age, disability, sex, sexual orientation, or gender identity.            Thank you!     Thank you for choosing [facility]  for your care. Our goal is always to provide you with excellent care. Hearing back from our patients is one way we can continue to improve our services. Please take a few minutes to complete the written survey that you may receive in the mail after your visit with us. Thank you!             Your Updated Medication List - Protect others around you: Learn how to safely use, store and throw away your medicines at www.disposemymeds.org.          This list is accurate as of 10/22/18  2:12 PM.  Always use your most recent med list.                   Brand Name Dispense Instructions for use Diagnosis    atorvastatin 20 MG tablet    LIPITOR     Take 20 mg by mouth daily.        CALCITRATE/VITAMIN D PO      2 tabs w/meals.        calcium carbonate-vitamin D 600-400 MG-UNIT Chew    CALCIUM 600/VITAMIN D    180 tablet    Take one twice daily and at least 2 hours apart from iron.    Bariatric surgery status, Postsurgical malabsorption       * carBAMazepine 200 MG tablet    TEGretol     Take 300 mg by mouth every morning. 300mg = 1.5 tablets.        * carBAMazepine 200 MG tablet    TEGretol     Take 400 mg by mouth At Bedtime.        cyanocobalamin 2500 MCG sublingual tablet    VITAMIN B-12     Place 2,500 mcg under the tongue every other day        denosumab 60 MG/ML Soln injection    PROLIA     Inject 60 mg Subcutaneous every 6 months        * docusate sodium  100 MG tablet    COLACE     Take 100 mg by mouth daily        * docusate sodium 100 MG tablet    COLACE    180 tablet    Take 100 mg by mouth daily    Other constipation, Bariatric surgery status       DULOXETINE HCL PO      Take 60 mg by mouth 2 times daily        EFFEXOR PO      Take by mouth 3 times daily        FOLIC ACID PO      Take 1 mg by mouth 2 times daily        ketoconazole 2 % cream    NIZORAL     Apply topically daily    Bariatric surgery status, Obesity, Class III, BMI 40-49.9 (morbid obesity) (H)       Melatonin 10 MG Tabs tablet      Take 10 mg by mouth At Bedtime        methylphenidate 10 MG tablet    RITALIN     Take 10 mg by mouth 2 times daily        metroNIDAZOLE 1 % gel    METROGEL          MINOCYCLINE HCL PO           MUCINEX DM PO      1200mg daily        MULTIVITAMINS PO      Take 2 tablets by mouth every evening. WITH IRON        ranitidine 150 MG tablet    ZANTAC    180 tablet    TAKE 1 TABLET(150 MG) BY MOUTH TWICE DAILY    GERD without esophagitis, Bariatric surgery status       REMERON PO      Take 75 mg by mouth At Bedtime. Takes ( 5)    15mg tablets=75mg        REXULTI 3 MG tablet   Generic drug:  brexpiprazole      Take 1 mg by mouth daily        TRAZODONE HCL PO      Take 500 mg by mouth At Bedtime        triamcinolone 0.5 % Oint ointment    KENALOG     Apply topically 2 times daily        trospium 20 MG tablet    SANCTURA     Take 20 mg by mouth 2 times daily        * VITAMIN D3 PO      Take 3,000 Units by mouth daily In a.m.        * VITAMIN D3 PO      Take 2,000 Units by mouth At Bedtime.        * cholecalciferol 5000 units Caps     90 capsule    Take 1 capsule (5,000 Units) by mouth daily    Bariatric surgery status, Postsurgical malabsorption       * Notice:  This list has 7 medication(s) that are the same as other medications prescribed for you. Read the directions carefully, and ask your doctor or other care provider to review them with you.

## 2018-11-02 ENCOUNTER — HOSPITAL ENCOUNTER (OUTPATIENT)
Dept: NUTRITION | Facility: CLINIC | Age: 63
Discharge: HOME | End: 2018-11-02
Attending: DIETITIAN, REGISTERED | Admitting: DIETITIAN, REGISTERED
Payer: COMMERCIAL

## 2018-11-02 VITALS — WEIGHT: 293 LBS | BODY MASS INDEX: 46.17 KG/M2

## 2018-11-02 PROCEDURE — 97803 MED NUTRITION INDIV SUBSEQ: CPT | Performed by: DIETITIAN, REGISTERED

## 2018-11-02 NOTE — PROGRESS NOTES
NUTRITION REASSESSMENT NOTE     REASON FOR ASSESSMENT  Mili Padilla referred by Dr. Márquez for MNT related to overweight.    Patient accompanied by self.      ASSESSMENT   Nutrition History:- Information obtained from patient.  Patient with long history of weight loss struggle.  Patient had sleeve gastrectomy 3 years ago.  Patient states she was able to maintain her 65 lb weight loss for 2 years. Then patient struggled with depression and regained her weight.  Patient has been tracking her food intake since surgery.  Patient aiming to keep her calories to 2000 per day.  Patient with history of multiple sclerosis which makes it difficult to prepare meals due to fatigue.  Patient has not been eating [employer] meals and had meals changed to low sodium, low fiber diet.  [employer] meals provide 5 meals, 1 ham or turkey sandwich,1 container potato/deli salad, salad + salad fixing, 2 pureed soups and 5 pieces of fruit per week.  Patient with limited finances and depends on outside resources for food.  Patient feels her frustration from lack of funds makes her choose poor food choices.  Patient rides scooter to [employer] and [employer] for grocery shopping as both stores are 2-3 blocks away from her home.  Patient states is unable to order items online for delivery as states she could not carry large box using her scooter.  Patient states her depression has been severe and has not been able to focus on her nutrition.  Patient struggling with chocolate cravings.       Typical Diet Intake:   Breakfast: 1/2 cup cottage cheese and 2 cantaloupe stoner; Ch[employer]ani yogurt and canned pears   Lunch: ham on bun (switched from salami sandwich)  Dinner: [employer] meals or beef jerky or cereal; [employer] pizza; pulled chicken   Snack: 6 cups popcorn, 1 serving dark chocolate mint M&M's, brownie, beef jerky (1 package per day), mini rolls from [employer], [employer] and [employer] potato chips   Beverages: water, Diet Dr. Pepper, Izze's drink  "(not willing to reduce)  Dining out: [employer] 1 time per week (eggs, sausage biscuit); [employer] -cinnamon twists ; hamburger and french fries with lime phosphate    PHYSICAL FINDINGS  Observed:  No nutrition-related physical findings observed  Obtained from Chart/Interdisciplinary Team:  None noted    LABS  Labs reviewed    MEDICATIONS  Medications reviewed    ANTHROPOMETRICS   Height: 5'7\"  Weight: 294.8 lbs  BMI (kg/m2): 45.8 kg/m2  Weight Status:  Obesity Grade III BMI >40  %IBW: 217%  Weight History:   Wt Readings from Last 5 Encounters:   11/02/18 133.7 kg (294 lb 12.8 oz)   10/17/18 132.7 kg (292 lb 9.6 oz)   08/22/18 133.6 kg (294 lb 9.6 oz)   07/30/18 133.3 kg (293 lb 12.8 oz)   06/18/18 131.9 kg (290 lb 11.2 oz)     ASSESSED NUTRITION NEEDS  Estimated Energy Needs: 4853-7129 kcals/day (11-14 Kcal/Kg) for weight loss  Estimated Protein Needs: 61-74 grams protein/day (1-1.2 g pro/Kg) for maintenance  Estimated Fluid Needs: 4083-1563 mL/day (25-30 mL/kg)    EVALUATION/PROGRESS TOWARDS GOALS   Previous Goals:  Eat dinner daily-improving   Pay attention with snacks in the evening by reducing to 2 times per night-improving     Previous Nutrition Diagnosis: Disordered eating pattern related to excessive snacking as evidenced by weight regain after sleeve gastrectomy and BMI of 45.8 kg/m2 -no change    Current Nutrition Diagnosis: Disordered eating pattern related to excessive snacking as evidenced by weight regain after sleeve gastrectomy and BMI of 46.1 kg/m2      INTERVENTIONS   Nutrition Prescription   Recommend avoiding liquids with meals to reduce volume of meals for intended weight loss; determine alternatives to emotional eating     IMPLEMENTATION   Meals and Snacks: 3 meals + snacks if hungry  Nutrition Education (Content):   a)  Discussed progress towards goals.  Reviewed food logs.  Patient with increased urge for chocolate.  Reviewed timing of meals and encouraged consistent meals to reduce food " cravings.  Conngratulated patient on attempting to reduce snacking in the evening.  Supported patient in her struggle with food.   Nutrition Education (Application):   a)  Determined ways to reduce snacking at night by limiting snacks to 2 per night and to stop eating by 10:30 PM. Also determined healthy menu choices when dining out.                     b)  Patient verbalizes understanding of diet by stating will limit snacks to 2 times per evening.    Anticipated compliance: fair    Other: Patient's health insurance has approved follow up appointments with RD for 1 year.    GOALS  Eat 3 meals per day     Limit evening snacking to 1-2 snacks per night  Stop eating by 10:30 PM  Choose healthy options when dining out      FOLLOW UP/MONITORING   Progress towards goals will be monitored and evaluated per protocol and Practice Guidelines  Patient to follow up in 4 weeks  Patient has RD name and contact information    Time Spent with Patient  25 minutes    Filiberto Becerril, RD, LD  [facility] Outpatient Dietitian  [phone] (office phone)

## 2018-11-06 ENCOUNTER — DOCUMENTATION ONLY (OUTPATIENT)
Dept: OTOLARYNGOLOGY | Facility: CLINIC | Age: 63
End: 2018-11-06

## 2018-11-13 NOTE — PROGRESS NOTES
Clinic Care Coordination Contact    Situation: Patient chart reviewed by care coordinator.    Background: SW had received a [employer] referral as patient was wondering about a home health aide. SW second outreach is overdue.    Assessment: SW completed chart review and in provider notes it indicated that she is currently receiving home health aide services.    Plan/Recommendations: SW will do no further outreach or follow-up at this time as main reason for referral has been resolved.    Denia Mayorga [facility]  Social Work Care Coordinator   [facility]  [phone]

## 2018-11-15 ENCOUNTER — OFFICE VISIT (OUTPATIENT)
Dept: OPHTHALMOLOGY | Facility: CLINIC | Age: 63
End: 2018-11-15
Attending: OPHTHALMOLOGY
Payer: COMMERCIAL

## 2018-11-15 DIAGNOSIS — G35 SECONDARY PROGRESSIVE MULTIPLE SCLEROSIS (H): ICD-10-CM

## 2018-11-15 DIAGNOSIS — H47.20 OPTIC ATROPHY: ICD-10-CM

## 2018-11-15 DIAGNOSIS — G35 SECONDARY PROGRESSIVE MULTIPLE SCLEROSIS (H): Primary | ICD-10-CM

## 2018-11-15 PROCEDURE — 92015 DETERMINE REFRACTIVE STATE: CPT | Mod: ZF

## 2018-11-15 PROCEDURE — 92133 CPTRZD OPH DX IMG PST SGM ON: CPT | Mod: ZF | Performed by: OPHTHALMOLOGY

## 2018-11-15 PROCEDURE — 92083 EXTENDED VISUAL FIELD XM: CPT | Mod: ZF | Performed by: OPHTHALMOLOGY

## 2018-11-15 PROCEDURE — G0463 HOSPITAL OUTPT CLINIC VISIT: HCPCS | Mod: 25,ZF

## 2018-11-15 ASSESSMENT — REFRACTION_WEARINGRX
OS_SPHERE: -5.25
OD_ADD: +2.25
OS_AXIS: 103
OD_AXIS: 085
OS_CYLINDER: +0.50
OD_CYLINDER: +2.00
OS_ADD: +2.25
OD_SPHERE: -6.50

## 2018-11-15 ASSESSMENT — CUP TO DISC RATIO
OD_RATIO: 0.7
OS_RATIO: 0.6

## 2018-11-15 ASSESSMENT — TONOMETRY
OS_IOP_MMHG: 12
OD_IOP_MMHG: 11
IOP_METHOD: ICARE

## 2018-11-15 ASSESSMENT — CONF VISUAL FIELD
OS_NORMAL: 1
OD_NORMAL: 1

## 2018-11-15 ASSESSMENT — SLIT LAMP EXAM - LIDS
COMMENTS: NORMAL
COMMENTS: NORMAL

## 2018-11-15 ASSESSMENT — REFRACTION_MANIFEST
OD_SPHERE: -6.25
OS_SPHERE: -4.75
OS_AXIS: 004
OS_ADD: +1.75
OD_AXIS: 095
OD_ADD: +1.75
OS_CYLINDER: +0.50
OD_CYLINDER: +1.75

## 2018-11-15 ASSESSMENT — VISUAL ACUITY
OS_CC: 20/30
METHOD: SNELLEN - LINEAR
OD_CC: 20/25
OS_CC+: +2
OD_CC+: +2

## 2018-11-15 ASSESSMENT — EXTERNAL EXAM - LEFT EYE: OS_EXAM: NORMAL

## 2018-11-15 ASSESSMENT — EXTERNAL EXAM - RIGHT EYE: OD_EXAM: NORMAL

## 2018-11-15 NOTE — MR AVS SNAPSHOT
After Visit Summary   11/15/2018    Mili Padilla    MRN: [id number]           Patient Information     Date Of Birth          1955        Visit Information        Provider Department      11/15/2018 2:00 PM [facility]        Today's Diagnoses     Optic atrophy        Secondary progressive multiple sclerosis (H)          Care Instructions    Future Appointments  Date Time Provider Department Center   11/30/2018 2:00 PM Neha Milner RD, LD [facility]   12/13/2018 2:30 PM Clint Still MD [facility]   1/22/2019 3:00 PM Louisa Richmond MD [facility]   2/4/2019 11:30 AM SHCT1 SHCT [facility]   11/21/2019 2:30 PM Zach Pak MD [facility]               Follow-ups after your visit        Your next 10 appointments already scheduled     Nov 30, 2018  2:00 PM CST   Follow Up with Neha Valadez RD, LD   [facility] ([facility])    [address], [address]  [facility] [address]   [phone]            Dec 13, 2018  2:30 PM CST   (Arrive by 2:15 PM)   Return Visit with Clint Still MD   [facility] ([facility])    [address] [place] [id number] [phone]           This is a multi-disciplinary care team visit as patients with your type of problem are usually seen by a team of an MD and a Speech-Language Pathologist (who is a specialist in disorders of the voice, throat, and breathing).  Please plan about 2 hours for your visit, which will likely include Laryngeal Function Studies, a Voice/Swallow/Breathing Evaluation, and an Endoscopic Laryngeal Examination to provide a comprehensive evaluation.  Please check with your insurance company to ensure you are covered for these services. - It is important to know that if you are evaluated and/or treated by both a  physician and a speech pathologist during your visit, your billing will reflect the input that you receive from both providers as separate professionals. Although most insurance plans do cover these services, we encourage you to contact your insurance company prior to your visit to determine whether there are any coverage limitations that might affect you financially. - Billing/procedure codes that are frequently associated with visits to our clinic include (but are not limited to) the ones listed below. Most patients will not need all of these items, but it may be useful to ask your insurance company's patient . 76176: Flexible fiberoptic laryngoscopy, 82042: Laryngoscopy; flexible or rigid fiberoptic, with stroboscopy, 44885: Flexible endoscopic evaluation of swallowing, 05454: Laryngeal function aerodynamic evaluation, 18224: Evaluation of Voice and Resonance, 06942: Speech pathology treatment for voice, speech, communication, 25780: Speech pathology treatment for swallowing/oral function for feeding - If you have any concerns or questions, or if you would prefer not to have a speech pathologist involved in your visit, please contact our Clinic Coordinator at [phone], at least 24 hours prior to your appointment.            Jan 22, 2019  3:00 PM CST   (Arrive by 2:45 PM)   New Patient Visit with Louisa Richmond MD   [facility] ([facility])    [address] [place] [id number] [phone]            Feb 04, 2019 11:30 AM CST   CT COLONOGRAPHY W IMAGE PROC DIAGNOSTIC W/O with SHCT1   [facility] ([facility])    [address] [place] [id number] [phone]           How do I prepare for my exam? (Food and drink instructions) 2 days prior: Light breakfast (clear, fat-free soups, small portions skinless chicken/turkey, fish, white bread (no butter) Light lunch  (same as above) Light supper at 6pm (same as above)  Day before: Drink clear liquids throughout morning (water, no-pulp juices, clear broth/bouillon, coffee/tea (no cream/milk), Gatorade, soft drinks (carbonated or not), Yvon-Aid, plain Jell-O, popsicles Noon: Drink 1/2 bottle Redi-Cat 1-5 pm: Each hour, on the hour, drink at least 8 oz clear liquids 5:30 pm: Drink 10 oz bottle Magnesium Citrate (expect bowel movement in 30 min to 6 hours) 6 pm: Drink 1/2 bottle Redi-Cat plus clear liquids 7 pm: Take 4 Bisacodyl tablets 8 pm: Drink at least 8 oz clear liquids 9 pm: Drink both bottles of Gastroview (DO NOT DILUTE) After 9:30 pm until after procedure: No food or drinks  How do I prepare for my exam? (Other instructions) To prepare for your exam you will be given 2 - 30mL bottles of Gastroview, Bisacodyl tablets   4, as well as detailed preparation instructions from the Imaging department. You will need to visit the pharmacy at either the [facility] or the [facility] to obtain 1 container of MiraLAX , 450mL bottle of Readi-CAT, and Gatorade.  What should I wear: Please wear loose clothing, such as a sweat suit or jogging clothes. Avoid snaps, zippers and other metal. We may ask you to undress and put on a hospital gown. How long does the exam take: The entire exam will take about 30 minutes or less.  What should I bring: Please bring any scans or X-rays taken at other hospitals, if similar tests were done. Also bring a list of your medicines, including vitamins, minerals and over-the-counter drugs. It is safest to leave personal items at home.  Do I need a : No  is needed.  What do I need to tell my doctor? Be sure to tell your doctor: * If you have any allergies. * If there s any chance you are pregnant. * If you are breastfeeding.  What should I do after the exam: No restrictions, You may resume normal activities.  What is this test: Computed  tomography (CT) colonography or virtual colonoscopy uses CT to examine the large intestine for cancer and growths called polyps. During the exam, a small tube is inserted a short distance into the rectum to allow for inflation with gas while CT images of the colon and the rectum are taken.  Who should I call with questions: If you have any questions, please call the Imaging Department where you will have your exam. Directions, parking instructions, and other information is available on our website, [email or web address].              Who to contact     Please call your clinic at [phone] to:    Ask questions about your health    Make or cancel appointments    Discuss your medicines    Learn about your test results    Speak to your doctor            Additional Information About Your Visit        [email or web address] Information     [employer] gives you secure access to your electronic health record. If you see a primary care provider, you can also send messages to your care team and make appointments. If you have questions, please call your primary care clinic.  If you do not have a primary care provider, please call [phone] and they will assist you.      [employer] is an electronic gateway that provides easy, online access to your medical records. With [employer], you can request a clinic appointment, read your test results, renew a prescription or communicate with your care team.     To access your existing account, please contact your [facility] or call [phone] for assistance.        Care EveryWhere ID     This is your Care EveryWhere ID. This could be used by other organizations to access your [facility] medical records  [id number]        Your Vitals Were     Last Period                   12/10/2010            Blood Pressure from Last 3 Encounters:   06/18/18 100/70   02/23/18 (!) 109/92   09/25/17 111/61    Weight from Last 3 Encounters:   11/02/18 133.7 kg (294 lb 12.8 oz)   10/17/18 132.7  kg (292 lb 9.6 oz)   08/22/18 133.6 kg (294 lb 9.6 oz)              We Performed the Following     DILATED FUNDUS EXAM     Glaucoma Top OU     IOP Measurement     OCT Optic Nerve RNFL Spectralis OU (both eyes)        Primary Care Provider Office Phone # Fax #    Jasmyn Márquez [phone]       [facility] [address]  [facility] [address]        Equal Access to Services     ARUNA [facility]AN[facility] : Hadii aad ku hadasho Soomaali, waaxda luqadaha, qaybta kaalmada adeegyada, waxay idiin hayaan adeeg khjujujhoana ladiandra . So [facility] [phone].    ATENCIÓN: Si arlette ferreira, tiene a abel disposición servicios gratuitos de asistencia lingüística. [facility] [phone].    We comply with applicable federal civil rights laws and [place] laws. We do not discriminate on the basis of race, color, national origin, age, disability, sex, sexual orientation, or gender identity.            Thank you!     Thank you for choosing [facility]  for your care. Our goal is always to provide you with excellent care. Hearing back from our patients is one way we can continue to improve our services. Please take a few minutes to complete the written survey that you may receive in the mail after your visit with us. Thank you!             Your Updated Medication List - Protect others around you: Learn how to safely use, store and throw away your medicines at www.disposemymeds.org.          This list is accurate as of 11/15/18  3:36 PM.  Always use your most recent med list.                   Brand Name Dispense Instructions for use Diagnosis    atorvastatin 20 MG tablet    LIPITOR     Take 20 mg by mouth daily.        CALCITRATE/VITAMIN D PO      2 tabs w/meals.        calcium carbonate-vitamin D 600-400 MG-UNIT Chew    CALCIUM 600/VITAMIN D    180 tablet    Take one twice daily and at least 2 hours apart from iron.    Bariatric surgery status, Postsurgical malabsorption       * carBAMazepine 200 MG tablet    TEGretol      Take 300 mg by mouth every morning. 300mg = 1.5 tablets.        * carBAMazepine 200 MG tablet    TEGretol     Take 400 mg by mouth At Bedtime.        cyanocobalamin 2500 MCG sublingual tablet    VITAMIN B-12     Place 2,500 mcg under the tongue every other day        denosumab 60 MG/ML Soln injection    PROLIA     Inject 60 mg Subcutaneous every 6 months        * docusate sodium 100 MG tablet    COLACE     Take 100 mg by mouth daily        * docusate sodium 100 MG tablet    COLACE    180 tablet    Take 100 mg by mouth daily    Other constipation, Bariatric surgery status       DULOXETINE HCL PO      Take 60 mg by mouth 2 times daily        EFFEXOR PO      Take by mouth 3 times daily        FOLIC ACID PO      Take 1 mg by mouth 2 times daily        ketoconazole 2 % cream    NIZORAL     Apply topically daily    Bariatric surgery status, Obesity, Class III, BMI 40-49.9 (morbid obesity) (H)       Melatonin 10 MG Tabs tablet      Take 10 mg by mouth At Bedtime        methylphenidate 10 MG tablet    RITALIN     Take 10 mg by mouth 2 times daily        metroNIDAZOLE 1 % gel    METROGEL          MINOCYCLINE HCL PO           MUCINEX DM PO      1200mg daily        MULTIVITAMINS PO      Take 2 tablets by mouth every evening. WITH IRON        ranitidine 150 MG tablet    ZANTAC    180 tablet    TAKE 1 TABLET(150 MG) BY MOUTH TWICE DAILY    GERD without esophagitis, Bariatric surgery status       REMERON PO      Take 75 mg by mouth At Bedtime. Takes ( 5)    15mg tablets=75mg        REXULTI 3 MG tablet   Generic drug:  brexpiprazole      Take 1 mg by mouth daily        TRAZODONE HCL PO      Take 500 mg by mouth At Bedtime        triamcinolone 0.5 % Oint ointment    KENALOG     Apply topically 2 times daily        trospium 20 MG tablet    SANCTURA     Take 20 mg by mouth 2 times daily        * VITAMIN D3 PO      Take 3,000 Units by mouth daily In a.m.        * VITAMIN D3 PO      Take 2,000 Units by mouth At Bedtime.        *  cholecalciferol 5000 units Caps     90 capsule    Take 1 capsule (5,000 Units) by mouth daily    Bariatric surgery status, Postsurgical malabsorption       * Notice:  This list has 7 medication(s) that are the same as other medications prescribed for you. Read the directions carefully, and ask your doctor or other care provider to review them with you.

## 2018-11-15 NOTE — NURSING NOTE
Chief Complaints and History of Present Illnesses   Patient presents with     Neurologic Problem     f/u optic neuropathy      HPI    Symptoms:              Comments:  Mili Padilla is a 63 year old female who presents today for    1. Secondary progressive multiple sclerosis (H)   2. Optic neuropathy - Left Eye   3. Subjective visual disturbance   4. Optic atrophy     C/o increased blur since the last visit.   Did not update Rx since the last visit.     Yuly WAITE 2:12 PM November 15, 2018

## 2018-11-15 NOTE — PATIENT INSTRUCTIONS
Future Appointments  Date Time Provider Department Center   11/30/2018 2:00 PM Neha Milner RD, LD [facility]   12/13/2018 2:30 PM Clint Still MD [facility]   1/22/2019 3:00 PM Louisa Richmond MD [facility]   2/4/2019 11:30 AM SHCT1 SHCT [facility]   11/21/2019 2:30 PM Zach Pak MD [facility]

## 2018-11-28 ENCOUNTER — OFFICE VISIT (OUTPATIENT)
Dept: PSYCHOLOGY | Facility: CLINIC | Age: 63
End: 2018-11-28
Payer: COMMERCIAL

## 2018-11-28 VITALS — WEIGHT: 293 LBS | BODY MASS INDEX: 46.09 KG/M2

## 2018-11-28 DIAGNOSIS — Z56.0 UNEMPLOYMENT: ICD-10-CM

## 2018-11-28 DIAGNOSIS — F54 PSYCHOLOGICAL FACTORS AFFECTING MORBID OBESITY (H): ICD-10-CM

## 2018-11-28 DIAGNOSIS — F33.1 MAJOR DEPRESSIVE DISORDER, RECURRENT EPISODE, MODERATE (H): Primary | ICD-10-CM

## 2018-11-28 DIAGNOSIS — E66.01 PSYCHOLOGICAL FACTORS AFFECTING MORBID OBESITY (H): ICD-10-CM

## 2018-11-28 SDOH — ECONOMIC STABILITY - INCOME SECURITY: UNEMPLOYMENT, UNSPECIFIED: Z56.0

## 2018-11-28 NOTE — MR AVS SNAPSHOT
After Visit Summary   11/28/2018    Mili Padilla    MRN: [id number]           Patient Information     Date Of Birth          1955        Visit Information        Provider Department      11/28/2018 1:00 PM Ramin Olivo, PhD [facility]        Today's Diagnoses     Major depressive disorder, recurrent episode, moderate (H)    -  1    Psychological factors affecting morbid obesity (H)        Unemployment           Follow-ups after your visit        Your next 10 appointments already scheduled     Nov 30, 2018  2:00 PM CST   Follow Up with Neha Valadez RD, LD   [facility] ([facility])    [address]  [facility] [address]   [phone]            Dec 13, 2018  2:30 PM CST   (Arrive by 2:15 PM)   Return Visit with Clint Still MD   [facility] ([facility])    [address] [address] [phone]           This is a multi-disciplinary care team visit as patients with your type of problem are usually seen by a team of an MD and a Speech-Language Pathologist (who is a specialist in disorders of the voice, throat, and breathing).  Please plan about 2 hours for your visit, which will likely include Laryngeal Function Studies, a Voice/Swallow/Breathing Evaluation, and an Endoscopic Laryngeal Examination to provide a comprehensive evaluation.  Please check with your insurance company to ensure you are covered for these services. - It is important to know that if you are evaluated and/or treated by both a physician and a speech pathologist during your visit, your billing will reflect the input that you receive from both providers as separate professionals. Although most insurance plans do cover these services, we encourage you to contact your insurance company prior to your visit to determine whether there are any  coverage limitations that might affect you financially. - Billing/procedure codes that are frequently associated with visits to our clinic include (but are not limited to) the ones listed below. Most patients will not need all of these items, but it may be useful to ask your insurance company's patient . 59975: Flexible fiberoptic laryngoscopy, 88062: Laryngoscopy; flexible or rigid fiberoptic, with stroboscopy, 82696: Flexible endoscopic evaluation of swallowing, 48034: Laryngeal function aerodynamic evaluation, 10227: Evaluation of Voice and Resonance, 15500: Speech pathology treatment for voice, speech, communication, 67745: Speech pathology treatment for swallowing/oral function for feeding - If you have any concerns or questions, or if you would prefer not to have a speech pathologist involved in your visit, please contact our Clinic Coordinator at [phone], at least 24 hours prior to your appointment.            Jan 02, 2019  2:00 PM CST   (Arrive by 1:45 PM)   Return Visit with Ramin Olivo, PhD [facility] ([facility])    [address] [place] [address]  3rd [facility] [address] [phone]            Jan 22, 2019  3:00 PM CST   (Arrive by 2:45 PM)   New Patient Visit with Louisa Richmond MD   [facility] ([facility])    [address] [place] [address]  4th [facility] [id number] [phone]            Feb 04, 2019 11:30 AM CST   CT COLONOGRAPHY W IMAGE PROC DIAGNOSTIC W/O with SHCT1   [facility] ([facility])    [address] [place] [address] [phone]           How do I prepare for my exam? (Food and drink instructions) 2 days prior: Light breakfast (clear, fat-free soups, small portions skinless chicken/turkey, fish, white bread (no butter) Light lunch (same as above) Light supper at 6pm (same as above)  Day before:  Drink clear liquids throughout morning (water, no-pulp juices, clear broth/bouillon, coffee/tea (no cream/milk), Gatorade, soft drinks (carbonated or not), Yvon-Aid, plain Jell-O, popsicles Noon: Drink 1/2 bottle Redi-Cat 1-5 pm: Each hour, on the hour, drink at least 8 oz clear liquids 5:30 pm: Drink 10 oz bottle Magnesium Citrate (expect bowel movement in 30 min to 6 hours) 6 pm: Drink 1/2 bottle Redi-Cat plus clear liquids 7 pm: Take 4 Bisacodyl tablets 8 pm: Drink at least 8 oz clear liquids 9 pm: Drink both bottles of Gastroview (DO NOT DILUTE) After 9:30 pm until after procedure: No food or drinks  How do I prepare for my exam? (Other instructions) To prepare for your exam you will be given 2 - 30mL bottles of Gastroview, Bisacodyl tablets   4, as well as detailed preparation instructions from the Imaging department. You will need to visit the pharmacy at either the [facility] or the [facility] to obtain 1 container of MiraLAX , 450mL bottle of Readi-CAT, and Gatorade.  What should I wear: Please wear loose clothing, such as a sweat suit or jogging clothes. Avoid snaps, zippers and other metal. We may ask you to undress and put on a hospital gown. How long does the exam take: The entire exam will take about 30 minutes or less.  What should I bring: Please bring any scans or X-rays taken at other hospitals, if similar tests were done. Also bring a list of your medicines, including vitamins, minerals and over-the-counter drugs. It is safest to leave personal items at home.  Do I need a : No  is needed.  What do I need to tell my doctor? Be sure to tell your doctor: * If you have any allergies. * If there s any chance you are pregnant. * If you are breastfeeding.  What should I do after the exam: No restrictions, You may resume normal activities.  What is this test: Computed tomography (CT) colonography or virtual colonoscopy uses CT to  examine the large intestine for cancer and growths called polyps. During the exam, a small tube is inserted a short distance into the rectum to allow for inflation with gas while CT images of the colon and the rectum are taken.  Who should I call with questions: If you have any questions, please call the Imaging Department where you will have your exam. Directions, parking instructions, and other information is available on our website, [email or web address].            Nov 21, 2019  2:30 PM CST   RETURN NEURO with Zach Pak MD   [facility] Clinic ([facility])    [address]  [facility] [id number] [phone]              Who to contact     Please call your clinic at [phone] to:    Ask questions about your health    Make or cancel appointments    Discuss your medicines    Learn about your test results    Speak to your doctor            Additional Information About Your Visit        [employer] Information     [employer] gives you secure access to your electronic health record. If you see a primary care provider, you can also send messages to your care team and make appointments. If you have questions, please call your primary care clinic.  If you do not have a primary care provider, please call [phone] and they will assist you.      [employer] is an electronic gateway that provides easy, online access to your medical records. With [employer], you can request a clinic appointment, read your test results, renew a prescription or communicate with your care team.     To access your existing account, please contact your [facility] or call [phone] for assistance.        Care EveryWhere ID     This is your Care EveryWhere ID. This could be used by other organizations to access your [place] medical records  [id number]        Your Vitals Were     Last Period BMI (Body Mass Index)                12/10/2010 46.09 kg/m2            Blood Pressure from Last 3 Encounters:   06/18/18 100/70   02/23/18 (!) 109/92   09/25/17 111/61    Weight from Last 3 Encounters:   11/28/18 133.5 kg (294 lb 4.8 oz)   11/02/18 133.7 kg (294 lb 12.8 oz)   10/22/18 133.5 kg (294 lb 6.4 oz)              Today, you had the following     No orders found for display       Primary Care Provider Office Phone # Fax #    Jasmyn Márquez [phone]       [place] [employer] [facility] [address] [facility] [address]  [facility] [address]        Equal Access to Services     [facility] Center[facility]AN[facility]FO : Hadii darion Torres, wafede rico, qajevon kaalmada mike, marcos loyd. So [facility] [phone].    ATENCIÓN: Si habla español, tiene a abel disposición servicios gratuitos de asistencia lingüística. Llame al [phone].    We comply with applicable federal civil rights laws and [place] laws. We do not discriminate on the basis of race, color, national origin, age, disability, sex, sexual orientation, or gender identity.            Thank you!     Thank you for choosing [facility]  for your care. Our goal is always to provide you with excellent care. Hearing back from our patients is one way we can continue to improve our services. Please take a few minutes to complete the written survey that you may receive in the mail after your visit with us. Thank you!             Your Updated Medication List - Protect others around you: Learn how to safely use, store and throw away your medicines at www.disposemymeds.org.          This list is accurate as of 11/28/18  2:00 PM.  Always use your most recent med list.                   Brand Name Dispense Instructions for use Diagnosis    atorvastatin 20 MG tablet    LIPITOR     Take 20 mg by mouth daily.        CALCITRATE/VITAMIN D PO      2 tabs w/meals.        calcium carbonate-vitamin D 600-400 MG-UNIT chewable tablet    CALCIUM 600/VITAMIN D    180 tablet    Take one twice daily  and at least 2 hours apart from iron.    Bariatric surgery status, Postsurgical malabsorption       * carBAMazepine 200 MG tablet    TEGretol     Take 300 mg by mouth every morning. 300mg = 1.5 tablets.        * carBAMazepine 200 MG tablet    TEGretol     Take 400 mg by mouth At Bedtime.        denosumab 60 MG/ML Soln injection    PROLIA     Inject 60 mg Subcutaneous every 6 months        * docusate sodium 100 MG tablet    COLACE     Take 100 mg by mouth daily        * docusate sodium 100 MG tablet    COLACE    180 tablet    Take 100 mg by mouth daily    Other constipation, Bariatric surgery status       DULOXETINE HCL PO      Take 60 mg by mouth 2 times daily        EFFEXOR PO      Take by mouth 3 times daily        FOLIC ACID PO      Take 1 mg by mouth 2 times daily        ketoconazole 2 % external cream    NIZORAL     Apply topically daily    Bariatric surgery status, Obesity, Class III, BMI 40-49.9 (morbid obesity) (H)       Melatonin 10 MG Tabs tablet      Take 10 mg by mouth At Bedtime        methylphenidate 10 MG tablet    RITALIN     Take 10 mg by mouth 2 times daily        metroNIDAZOLE 1 % external gel    METROGEL          MINOCYCLINE HCL PO           MUCINEX DM PO      1200mg daily        MULTIVITAMINS PO      Take 2 tablets by mouth every evening. WITH IRON        ranitidine 150 MG tablet    ZANTAC    180 tablet    TAKE 1 TABLET(150 MG) BY MOUTH TWICE DAILY    GERD without esophagitis, Bariatric surgery status       REMERON PO      Take 75 mg by mouth At Bedtime. Takes ( 5)    15mg tablets=75mg        REXULTI 3 MG tablet   Generic drug:  brexpiprazole      Take 1 mg by mouth daily        TRAZODONE HCL PO      Take 500 mg by mouth At Bedtime        triamcinolone 0.5 % external ointment    KENALOG     Apply topically 2 times daily        trospium 20 MG tablet    SANCTURA     Take 20 mg by mouth 2 times daily        vitamin B-12 2500 MCG sublingual tablet    CYANOCOBALAMIN     Place 2,500 mcg under the  tongue every other day        * VITAMIN D3 PO      Take 3,000 Units by mouth daily In a.m.        * VITAMIN D3 PO      Take 2,000 Units by mouth At Bedtime.        * cholecalciferol 5000 units Caps     90 capsule    Take 1 capsule (5,000 Units) by mouth daily    Bariatric surgery status, Postsurgical malabsorption       * Notice:  This list has 7 medication(s) that are the same as other medications prescribed for you. Read the directions carefully, and ask your doctor or other care provider to review them with you.

## 2018-11-28 NOTE — PROGRESS NOTES
Health Psychology                  Clinic    Department of Medicine  Sherrie Crowe, Ph.D., L.P. [phone]                          Clinics and Surgery Center  [facility] Vin Ward, Ph.D.,  L.P. [phone]                 3rd [facility] Mail Code 741   Ramin Olivo, Ph.D., WALTER, L.P. [phone]     [address] [place] [address] Kathie Galvan, Ph.D., L.P. [phone]  [place] [address]       Health Psychology Follow-Up Note     Ms. Padilla is a 62-year-old woman self-referred for psychological consultation because her therapist of the last 24 years retired.  She is seen for problem-solving and supportive therapy for depression and multiple health issues.     HISTORY OF PRESENTING CONCERN:  Ms. Padilla reports a lengthy history of depression.  She currently sees a psychiatrist, Ban Coleman at [facility], and is taking Abilify 7.5 mg, trazodone 500 mg, ripazepam 75 mg each day at bedtime, Tegretol 400 mg at bedtime and methylphenidate 10 mg b.i.d.  She discontineud ability and is now taking Rexulti 2 mg.  She has a history of hospitalizations including 3 at [facility] in the late 1990s, early 2000s when she had 2 courses of ECT lasting 7-10 sessions and approximately 3 other single session ECTs.  She stopped due to memory problems.  She kept with Dr. Coleman who she first met as an inpatient and has seen her for the past 18 years.  She had also been hospitalized psychiatrically at [facility] at age 23.  She previously worked with psychiatrist, Doug Sarabia for three years, stopping, in part, because she didn't feel he took her suicide attempt sufficiently seriously.  She reports her depression tends to vary.  Currently it is moderate, though it was more severe within the past year especially when she was having additional health problems.      MEDICAL HISTORY:  Ms.  Randy has a complex medical history.  She was diagnosed with MS in 1985.  Her psychiatrist at [facility] in [place], Dr. 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  She has used a scooter since 1992, using it more in the last 6 months than she had previously.  She also can use a walker.  She was diagnosed with fibromyalgia in 1997.   She is also seen at the [facility] for her eye care.  She began to see an eating disorder therapist weekly and has been somewhat erratically losing weight.    WEIGH Today is 294.3 down from  294.4     She is attending OA.    Wt Readings from Last 4 Encounters:   11/28/18 133.5 kg (294 lb 4.8 oz)   11/02/18 133.7 kg (294 lb 12.8 oz)   10/22/18 133.5 kg (294 lb 6.4 oz)   10/17/18 132.7 kg (292 lb 9.6 oz)     Past Medical History:   Diagnosis Date     Depression, major, in partial remission (H)      Fibromyalgia syndrome      Gastro-oesophageal reflux disease      Hyperlipemia      Lymphedema      Multiple sclerosis (H)     tremors with MS, all four limbs and head     Multiple sclerosis, secondary progressive (H)      Sleep apnea      Vocal cord paralysis, unilateral complete         Past Surgical History:   Procedure Laterality Date     COLONOSCOPY N/A 7/17/2017    Procedure: COMBINED COLONOSCOPY, SINGLE OR MULTIPLE BIOPSY/POLYPECTOMY BY BIOPSY;;  Surgeon: Wong Beaulieu MD;  Location: [facility]     COLONOSCOPY N/A 2/23/2018    Procedure: COMBINED COLONOSCOPY, SINGLE OR MULTIPLE BIOPSY/POLYPECTOMY BY BIOPSY;  COLONOSCOPY ;  Surgeon: Yessica Santana MD;  Location:  GI     ENT SURGERY      throat 1969, vocal cord surgery with skin grafting     ESOPHAGOSCOPY, GASTROSCOPY, DUODENOSCOPY (EGD), COMBINED N/A 12/16/2014    Procedure: COMBINED ENDOSCOPIC ULTRASOUND, ESOPHAGOSCOPY, GASTROSCOPY, DUODENOSCOPY (EGD), FINE NEEDLE ASPIRATE/BIOPSY;  Surgeon: Yessica Santana MD;  Location: [facility]     ESOPHAGOSCOPY, GASTROSCOPY,  DUODENOSCOPY (EGD), COMBINED N/A 12/16/2014    Procedure: COMBINED ESOPHAGOSCOPY, GASTROSCOPY, DUODENOSCOPY (EGD), BIOPSY SINGLE OR MULTIPLE;  Surgeon: Yessica Santana MD;  Location:  GI     LAPAROSCOPIC APPENDECTOMY  5/30/2013    Procedure: LAPAROSCOPIC APPENDECTOMY;;  Surgeon: Salvador Morris MD;  Location:  OR     LAPAROSCOPIC BIOPSY LIVER  5/30/2013    Procedure: LAPAROSCOPIC BIOPSY LIVER;;  Surgeon: Salvador Morris MD;  Location:  OR     LAPAROSCOPIC GASTRIC SLEEVE  5/30/2013    Procedure: LAPAROSCOPIC GASTRIC SLEEVE;  LAPAROSCOPIC SLEEVE GASTRECTOMY/ LAPARSCOPIC  APPENDECTOMY /LIVER BIOPSIES/LIVER CYST DRAINAGE;  Surgeon: Salvador Morris MD;  Location:  OR     ORTHOPEDIC SURGERY      R wrist 2010     Current Outpatient Prescriptions   Medication     atorvastatin (LIPITOR) 20 MG tablet     brexpiprazole (REXULTI) 3 MG tablet     calcium carbonate-vitamin D (CALCIUM 600/VITAMIN D) 600-400 MG-UNIT CHEW     Calcium Citrate-Vitamin D (CALCITRATE/VITAMIN D PO)     carBAMazepine (TEGRETOL) 200 MG tablet     carBAMazepine (TEGRETOL) 200 MG tablet     Cholecalciferol (VITAMIN D3 PO)     Cholecalciferol (VITAMIN D3 PO)     cholecalciferol 5000 units CAPS     cyabnocobalamin (VITAMIN B-12) 2500 MCG sublingual tablet     denosumab (PROLIA) 60 MG/ML SOLN injection     Dextromethorphan-Guaifenesin (MUCINEX DM PO)     docusate sodium (COLACE) 100 MG tablet     docusate sodium 100 MG tablet     DULOXETINE HCL PO     FOLIC ACID PO     ketoconazole (NIZORAL) 2 % cream     Melatonin 10 MG TABS     methylphenidate (RITALIN) 10 MG tablet     metroNIDAZOLE (METROGEL) 1 % gel     MINOCYCLINE HCL PO     Mirtazapine (REMERON PO)     Multiple Vitamin (MULTIVITAMINS PO)     ranitidine (ZANTAC) 150 MG tablet     TRAZODONE HCL PO     triamcinolone (KENALOG) 0.5 % OINT ointment     trospium (SANCTURA) 20 MG tablet     Venlafaxine HCl (EFFEXOR PO)     No current facility-administered medications for this visit.       Facility-Administered Medications Ordered in Other Visits   Medication     ondansetron (ZOFRAN) injection     New medication: On Duloxetine and Mirtazpine and Trazodone and Ritalin and Rexulti.  She discontinued  Effexor and Abilify  per Dr. Marquise Coleman, her psychiatrist.      SOCIAL HISTORY:  Ms. Padilla grew up in the [place] and is the oldest of 5 children in her family of origin.  Her father was a dentist who she believes was bipolar.  He  of lung cancer at age 72.  Her mother  of sepsis at age 80.  She had been diagnosed with breast cancer when Ms. Padilla was 8.  She describes the marriage between her parents as misael.  She is 5 years older than her closest-aged sister and they are all within 4 years of each other.   Her daughter  12/3 and her mother   She tends to feel more depressed in winter.      Ms. Padilla attended [employer] for a year and later went to night school at the [employer].  She felt that she could not continue her education to the point of graduation because she was working full time and raising her daughter.  Her daughter  in  at age 31 of liver failure secondary to an accidental Tylenol overdose.  She had been recovering from a hysterectomy.      Ms. 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  She had to retire at the time secondary to her MS.  She has never .  She has not dated,  is interested in dating, and states that if she were she would be interested in a relationship with a woman.  There is no history of  service or legal problems.  She expresses concern about her increasing social isolation.  She does have at least 1 friend, Jael, who she met at a therapy group in .  She is active in facilitating groups for people with MS both in [place] and [place].  She lives alone and  currently gets help from a home health aide, as well as home health nurse.     She sees Dr.Sushila Coleman, psychiatrist and is trying to figure out best antidepressant regimen.  She brought in her genetic testing.Effexor reduced to 300 per Dr. Coleman and is now substituting Cymbalta for it.   She feels she has been more depressed since the Fall, but doesn't think it is SAD.       SESSION:  Mili arrived on time for today's meeting and is greeted in the waiting room.  Her depression is easing.  She is making more of an effort to get out of the house, and spent a few days at a meeting related to MS, which energized her.  She has been depressed for a few months.  We discussed weight management, since last seen basically stable, slightly down   She is frustrated and thinks when she is stressed she eats to soothe herself.  She is aware she needs to keep working hard at making better choices.      We discussed strategies for eating less, the importance of  Spreading out junk food (e.g., chips q2/month), getting smaller fruits (e.g., bananas), limiting herself to one miriam/day.  She found it helpful to be this granular.  She would like to look at a gym near her home or the [facility], wondering about the MS program there.  She brought up her guilti raising her daughter (e.g., leaving her alone at age 1 to be able to go to work.).  The anniversary of  Her daughter's and her mother's deaths are approaching next week.  We discussed her plans for  Celebrating life and dealing with loss.  We discussed sleep. She is going to sleep more regularly around 11, but still is variable.  She is wiling to keep working on it..  We discussed the need for more discipline.  She increased her antidepressant, which she thinks is helping.  She will get it updated in the EHR.She participated fully and appeared to derive benefit. Affect is positive.  Rapport was excellent.    Time in: 1:02  Time out 1:58     DIAGNOSES:   Major depression,  recurrent, mild (F33.0).   Behavioral factors affecting morbid obesity (E66.01).     PLAN:  Ms. Padilla will return to clinic on  1/2 @ 2  for problem-solving and supportive therapy consistent with treatment plan..     Last treatment plan signed:10/22/2018  Treatment plan review due: 1/22/2019                             Ramin Olivo, PhD, A.B.P.P., L.P.   Director, Health Psychology

## 2018-11-30 ENCOUNTER — HOSPITAL ENCOUNTER (OUTPATIENT)
Dept: NUTRITION | Facility: CLINIC | Age: 63
Discharge: HOME | End: 2018-11-30
Admitting: DIETITIAN, REGISTERED
Payer: COMMERCIAL

## 2018-11-30 VITALS — WEIGHT: 292.8 LBS | BODY MASS INDEX: 45.86 KG/M2

## 2018-11-30 PROCEDURE — 97803 MED NUTRITION INDIV SUBSEQ: CPT | Performed by: DIETITIAN, REGISTERED

## 2018-11-30 NOTE — ADDENDUM NOTE
Encounter addended by: Neha Milner, RD, LD on: 11/30/2018  2:59 PM<BR>     Actions taken: Charge Capture section accepted

## 2018-11-30 NOTE — PROGRESS NOTES
NUTRITION REASSESSMENT NOTE     REASON FOR ASSESSMENT  Mili Padilla referred by Dr. Márquez for MNT related to overweight.    Patient accompanied by self.      ASSESSMENT   Nutrition History:- Information obtained from patient.  Patient with long history of weight loss struggle.  Patient had sleeve gastrectomy 3 years ago.  Patient states she was able to maintain her 65 lb weight loss for 2 years. Then patient struggled with depression and regained her weight.  Patient has been tracking her food intake since surgery.  Patient aiming to keep her calories to 2000 per day.  Patient with history of multiple sclerosis which makes it difficult to prepare meals due to fatigue.  Patient eating 2 of 5 meals from [employer].  [employer] meals provide 5 meals, 1 ham or turkey sandwich,1 container potato/deli salad, salad + salad fixing, 2 pureed soups and 5 pieces of fruit per week.  Patient with limited finances and depends on outside resources for food.  Patient feels her frustration from lack of funds makes her choose poor food choices.  Patient rides scooter to [employer] and [employer] for grocery shopping as both stores are 2-3 blocks away from her home.  Patient states is unable to order items online for delivery as states she could not carry large box using her scooter.  Patient continues to struggle with chocolate cravings.       Typical Diet Intake:   Breakfast: 1/2 cup cottage cheese and 2 cantaloupe stoner; Chobani yogurt and canned pears   Lunch: ham on bun (switched from salami sandwich)  Dinner: [employer] meals or beef jerky or cereal; [employer] pizza; pulled chicken   Snack: 6 cups popcorn, 1 serving dark chocolate mint M&M's, brownie, beef jerky (1 package per day), mini rolls from [employer], Cheetos and Ruffles potato chips   Beverages: water, Diet Marlena Quiroz's drink (not willing to reduce)  Dining out: [employer] 1 time per week (eggs, sausage biscuit); [employer] -cinnamon twists ; hamburger and Georgian  "fries with lime phosphate    PHYSICAL FINDINGS  Observed:  No nutrition-related physical findings observed  Obtained from Chart/Interdisciplinary Team:  None noted    LABS  Labs reviewed    MEDICATIONS  Medications reviewed    ANTHROPOMETRICS   Height: 5'7\"  Weight: 292.8 lbs  BMI (kg/m2): 45.8 kg/m2  Weight Status:  Obesity Grade III BMI >40  %IBW: 217%  Weight History:   Wt Readings from Last 5 Encounters:   11/30/18 132.8 kg (292 lb 12.8 oz)   11/02/18 133.7 kg (294 lb 12.8 oz)   10/17/18 132.7 kg (292 lb 9.6 oz)   08/22/18 133.6 kg (294 lb 9.6 oz)   07/30/18 133.3 kg (293 lb 12.8 oz)     ASSESSED NUTRITION NEEDS  Estimated Energy Needs: 0131-2819 kcals/day (11-14 Kcal/Kg) for weight loss  Estimated Protein Needs: 61-74 grams protein/day (1-1.2 g pro/Kg) for maintenance  Estimated Fluid Needs: 9789-5034 mL/day (25-30 mL/kg)    EVALUATION/PROGRESS TOWARDS GOALS   Previous Goals:  Eat 3 meals per day-improving   Limit evening snacking to 1-2 snacks per night-not met  Stop eating by 10:30 PM-improving  Choose healthy options when dining out-not met     Previous Nutrition Diagnosis: Disordered eating pattern related to excessive snacking as evidenced by weight regain after sleeve gastrectomy and BMI of 45.8 kg/m2 -no change    Current Nutrition Diagnosis: Disordered eating pattern related to excessive snacking as evidenced by weight regain after sleeve gastrectomy and BMI of 45.8 kg/m2      INTERVENTIONS   Nutrition Prescription   Recommend avoiding liquids with meals to reduce volume of meals for intended weight loss; determine alternatives to emotional eating     IMPLEMENTATION   Meals and Snacks: 3 meals + snacks if hungry  Nutrition Education (Content):   a)  Discussed progress towards goals.  Reviewed food logs.  Suggest patient rate foods on scale of 1-10 in order to pick treats she enjoys vs treats patient just eats.  Conngratulated patient on attempting to reduce snacking in the evening.  Supported patient in " her struggle with food.   Nutrition Education (Application):   a)  Determined ways to reduce snacking at night by limiting snacks to 2 per night and to stop eating by 10:30 PM.                      b)  Patient verbalizes understanding of diet by stating will limit snacks to 2 times per evening.    Anticipated compliance: fair    Other: Patient's health insurance has approved follow up appointments with RD for 1 year.    GOALS  Buy raw cauliflower and broccoli bag  Eat 3 of 5 [employer] meals per week   Stop eating by 10:30 PM  Eliminate marble brownie    FOLLOW UP/MONITORING   Progress towards goals will be monitored and evaluated per protocol and Practice Guidelines  Patient to follow up in 2 weeks  Patient has RD name and contact information    Time Spent with Patient  30 minutes    Filiberto Becerril, RD, LD  [facility] Outpatient Dietitian  [phone] (office phone)

## 2018-12-28 ENCOUNTER — HOSPITAL ENCOUNTER (OUTPATIENT)
Dept: NUTRITION | Facility: CLINIC | Age: 63
Discharge: HOME | End: 2018-12-28
Attending: FAMILY MEDICINE | Admitting: FAMILY MEDICINE
Payer: COMMERCIAL

## 2018-12-28 VITALS — WEIGHT: 293 LBS | BODY MASS INDEX: 46.5 KG/M2

## 2018-12-28 PROCEDURE — 97803 MED NUTRITION INDIV SUBSEQ: CPT | Mod: GY | Performed by: DIETITIAN, REGISTERED

## 2018-12-28 NOTE — PROGRESS NOTES
NUTRITION REASSESSMENT NOTE      REASON FOR ASSESSMENT  Mili Padilla referred by Dr. Márquez for MNT related to overweight.    Patient accompanied by self.       ASSESSMENT   Nutrition History:- Information obtained from patient.  Patient with long history of weight loss struggle.  Patient had sleeve gastrectomy 3 years ago.  Patient states she was able to maintain her 65 lb weight loss for 2 years. Then patient struggled with depression and regained her weight. Patient has been tracking her food intake since surgery.  Patient aiming to keep her calories to 2000 per day.  Patient with history of multiple sclerosis which makes it difficult to prepare meals due to fatigue.  Patient eating 3 of 5 meals from [employer].  [employer] meals provide 5 meals, 1 ham or turkey sandwich,1 container potato/deli salad, salad + salad fixing, 2 pureed soups and 5 pieces of fruit per week.  Patient with limited finances and depends on outside resources for food.  Patient feels her frustration from lack of funds makes her choose poor food choices.  Patient rides scooter to [employer] and [employer] for grocery shopping as both stores are 2-3 blocks away from her home.  Patient states is unable to order items online for delivery as states she could not carry large box using her scooter.  Patient continues to struggle with chocolate cravings.        Typical Diet Intake:   Breakfast: 1/2 cup cottage cheese and 2 cantaloupe stoner; Chobani yogurt and canned pears   Lunch: ham on bun (switched from salami sandwich)  Dinner: [employer] meals or beef jerky or cereal; [employer] pizza; pulled chicken   Snack: 6 cups popcorn, 1 serving dark chocolate mint M&M's, brownie, beef jerky (1 package per day), mini rolls from [employer], Cheetos and Ruffles potato chips   Beverages: water, Diet Marlena Quiroz's drink (not willing to reduce)  Dining out: [employer] 1 time per week (eggs, sausage biscuit); Vince's -cinnamon twists ; hamburger and Armenian  "fries with lime phosphate     PHYSICAL FINDINGS  Observed:  No nutrition-related physical findings observed  Obtained from Chart/Interdisciplinary Team:  None noted     LABS  Labs reviewed     MEDICATIONS  Medications reviewed     ANTHROPOMETRICS   Height: 5'7\"  Weight: 296.9 lbs  BMI (kg/m2): 46.5 kg/m2  Weight Status:  Obesity Grade III BMI >40  %IBW: 219%  Weight History:   Wt Readings from Last 5 Encounters:   12/28/18 134.7 kg (296 lb 14.4 oz)   11/30/18 132.8 kg (292 lb 12.8 oz)   11/02/18 133.7 kg (294 lb 12.8 oz)   10/17/18 132.7 kg (292 lb 9.6 oz)   08/22/18 133.6 kg (294 lb 9.6 oz)      ASSESSED NUTRITION NEEDS  Estimated Energy Needs: 2921-8791 kcals/day (11-14 Kcal/Kg) for weight loss  Estimated Protein Needs: 61-74 grams protein/day (1-1.2 g pro/Kg) for maintenance  Estimated Fluid Needs: 5463-4557 mL/day (25-30 mL/kg)     EVALUATION/PROGRESS TOWARDS GOALS   Previous Goals:  Buy raw cauliflower and broccoli bag-met  Eat 3 of 5 [employer] meals per week-met  Stop eating by 10:30 PM-not met  Eliminate marble brownie-not met      Previous Nutrition Diagnosis: Disordered eating pattern related to excessive snacking as evidenced by weight regain after sleeve gastrectomy and BMI of 45.8 kg/m2 -no change     Current Nutrition Diagnosis: Disordered eating pattern related to excessive snacking as evidenced by weight regain after sleeve gastrectomy and BMI of 46.5 kg/m2       INTERVENTIONS   Nutrition Prescription   Recommend avoiding liquids with meals to reduce volume of meals for intended weight loss; determine alternatives to emotional eating      IMPLEMENTATION   Meals and Snacks: 3 meals + snacks if hungry  Nutrition Education (Content):              a)  Discussed progress towards goals.  Reviewed food logs and determined achievable goals for the next session.  Conngratulated patient on eating 3 of 5 [employer] meals.  Supported patient in her struggle with food.   Nutrition Education (Application):           "    a)  Determined ways to reduce snacking at night by limiting snacks to 2 per night and to stop eating by 10:30 PM.                             b)  Patient verbalizes understanding of diet by stating will stop snacking by 10:30 PM.      Anticipated compliance: fair    Other: Patient's health insurance has approved follow up appointments with RD for 1 year.     GOALS  Buy smaller bag of chips  Work on sleep pattern   Choose snacks from the refrigerator   Stop eating by 10:30 PM  Resume calorie counting      FOLLOW UP/MONITORING   Progress towards goals will be monitored and evaluated per protocol and Practice Guidelines  Patient to follow up in 3 weeks  Patient has RD name and contact information     Time Spent with Patient  25 minutes     Filiberto Becerril, RD, LD  [facility] Outpatient Dietitian  [phone] (office phone)

## 2019-01-08 ENCOUNTER — OFFICE VISIT (OUTPATIENT)
Dept: SURGERY | Facility: CLINIC | Age: 64
End: 2019-01-08
Payer: COMMERCIAL

## 2019-01-08 VITALS
BODY MASS INDEX: 45.99 KG/M2 | WEIGHT: 293 LBS | HEIGHT: 67 IN | HEART RATE: 85 BPM | DIASTOLIC BLOOD PRESSURE: 67 MMHG | OXYGEN SATURATION: 93 % | SYSTOLIC BLOOD PRESSURE: 130 MMHG

## 2019-01-08 DIAGNOSIS — R13.19 OTHER DYSPHAGIA: ICD-10-CM

## 2019-01-08 DIAGNOSIS — K91.2 MALNUTRITION FOLLOWING GASTROINTESTINAL SURGERY: ICD-10-CM

## 2019-01-08 DIAGNOSIS — K21.9 GERD WITHOUT ESOPHAGITIS: ICD-10-CM

## 2019-01-08 DIAGNOSIS — Z98.84 BARIATRIC SURGERY STATUS: Primary | ICD-10-CM

## 2019-01-08 DIAGNOSIS — E66.01 MORBID OBESITY (H): ICD-10-CM

## 2019-01-08 DIAGNOSIS — M85.80 OSTEOPENIA DETERMINED BY X-RAY: ICD-10-CM

## 2019-01-08 PROCEDURE — 99213 OFFICE O/P EST LOW 20 MIN: CPT | Performed by: PHYSICIAN ASSISTANT

## 2019-01-08 RX ORDER — SULFACETAMIDE SODIUM 100 MG/ML
LOTION TOPICAL
Refills: 0 | COMMUNITY
Start: 2018-12-03 | End: 2020-12-25

## 2019-01-08 ASSESSMENT — MIFFLIN-ST. JEOR: SCORE: 1933.9

## 2019-01-08 NOTE — PROGRESS NOTES
2019    Return Bariatric Surgery Note    RE: Mili Padilla  MR#: [id number]  : 1955  VISIT DATE: 2019    Dear Jasmyn Márquez,    CHIEF COMPLAINT: Post-bariatric surgery follow-up    I had the pleasure of seeing your patient, Mili Padilla, in my post-bariatric surgery assessment clinic.  She has some questions today about her medications and also about if she still should have her swallowing study.    Since seeing her last she has had trouble getting in for an appointment with the [facility].  Has appointment coming up but not sure she needs it.  Dysphagia improved with slowing down and concentrating when swallowing. She always turns her head  To side per ENT as well.  Her trazodone pill increased in size and is easier to swallow.    Saw her PCP in August and at end of visit, PCP prescribed Omeprazole for pt.  She wanted to know why or if she should be taking this instead of the ranitidine.  States her symptoms are well controlled with the ranitidine.  Has occasional heartburn 1x a month.  PCP also wanted her to decrease her vitamin D to 5000 international unit(s) weekly instead of daily.  She has been stable with her labs at 5000 international unit(s) daily for last 2 years.  She brought in labs from PCP clinic today.  No Vit D lab was drawn at visit in August.     HISTORY OF PRESENT ILLNESS:  Questions Regarding Prior Weight Loss Surgery Reviewed With Patient 2018   I had the following weight loss procedure: Sleeve Gastrectomy   What year was your surgery? 2013   How has your weight changed since your last visit? I have stayed about the same   Are you currently taking any weight loss medications? Yes   Do you currently have any of the following: Sleep Apnea, Heartburn, acid reflux, or GERD (acid reflux disease)?, Hyperlipidemia (high cholesterol, triglycerides)?   Have you been to the Emergency room since your last visit with us? No   Were you in the hospital since your last visit  with us? No   Do you have any concerns today? Questions about meds.       Weight History:     12/18/2018   What is your highest lifetime weight? 325   What is your lowest weight since surgery? (In pounds) 238     Initial Weight: 304 lb (137.9 kg)  Current Weight: Weight: 297 lb 14.4 oz (135.1 kg)  Cumulative weight loss (lbs): 6.1  Last Visits Weight: 297 lb 2.2 oz (134.8 kg)    Questions Regarding Co-Morbidities and Health Concerns Reviewed With Patient 12/18/2018   Pre-diabetes: Gone away   Diabetes II: Never   High Blood Pressure: Never   High cholesterol: Improved   Heartburn/Reflux: Improved   Are you taking daily medication for heartburn, acid reflux, or GERD (acid reflux disease)? Yes   Sleep apnea: Worsened   Do you use a CPAP? No   PCOS: Never   Back pain: Stayed the same   Joint pain: Stayed the same   Lower leg swelling: Worsened     Eating Habits 12/18/2018   How many meals do you eat per day? 3   Do you snack between meals? Yes   How much food are you eating at each meal? Greater than 1 cup   Are you able to separate your meals and liquids by at least 30 minutes? Sometimes   Are you able to avoid liquid calories? Yes     Patient is taking the following bariatric postoperative vitamins:  2 Complete multivitamins with minerals (at different times than calcium)  5000 Int Units of Vitamin D daily   630 mg of Calcium BID      Is working with Neha Valadez dietician.    Exercise Questions Reviewed With Patient 12/18/2018   How often do you exercise? Less than 1 time per week   What is the duration of your exercise (in minutes)? 10 Minutes   What types of exercise do you do? walking   What keeps you from being more active?  I am as active as I can possbily be, My ability to walk or move around is limited, Shortness of breath, Too tired       Social History:      12/18/2018   Are you smoking? No   Are you drinking alcohol? No       Medications:  Current Outpatient Medications   Medication     atorvastatin  (LIPITOR) 20 MG tablet     brexpiprazole (REXULTI) 4 MG tablet     calcium carbonate-vitamin D (CALCIUM 600/VITAMIN D) 600-400 MG-UNIT CHEW     Calcium Citrate-Vitamin D (CALCITRATE/VITAMIN D PO)     carBAMazepine (TEGRETOL) 200 MG tablet     carBAMazepine (TEGRETOL) 200 MG tablet     Cholecalciferol (VITAMIN D3 PO)     Cholecalciferol (VITAMIN D3 PO)     denosumab (PROLIA) 60 MG/ML SOLN injection     Dextromethorphan-Guaifenesin (MUCINEX DM PO)     docusate sodium (COLACE) 100 MG tablet     DULOXETINE HCL PO     FOLIC ACID PO     ketoconazole (NIZORAL) 2 % cream     Melatonin 10 MG TABS     methylphenidate (RITALIN) 10 MG tablet     Mirtazapine (REMERON PO)     Multiple Vitamin (MULTIVITAMINS PO)     ranitidine (ZANTAC) 150 MG tablet     sulfacetamide sodium, Acne, 10 % lotion     TRAZODONE HCL PO     triamcinolone (KENALOG) 0.5 % OINT ointment     trospium (SANCTURA) 20 MG tablet     cholecalciferol 5000 units CAPS     cyabnocobalamin (VITAMIN B-12) 2500 MCG sublingual tablet     docusate sodium 100 MG tablet     metroNIDAZOLE (METROGEL) 1 % gel     MINOCYCLINE HCL PO     Venlafaxine HCl (EFFEXOR PO)     No current facility-administered medications for this visit.      Facility-Administered Medications Ordered in Other Visits   Medication     ondansetron (ZOFRAN) injection         12/18/2018   Do you avoid NSAIDs such as (Ibuprofen, Aleve, Naproxen, Advil)?   Yes       ROS:  GI:      12/18/2018   Vomiting: No   Diarrhea: No   Constipation: Yes   Swallowing trouble: Yes   Abdominal pain: No   Heartburn: Yes   Rash in skin folds: No   Depression: Yes   Stress urinary incontinence Yes     Skin:   ANNE-MARIE KUMAR ROS - SKIN 12/18/2018   Rash in skin folds: No     Psych:      12/18/2018   Depression: Yes   Anxiety: No     Female Only:   BAR RBS ROS - FEMALE ONLY 12/18/2018   Female only: Loss of menstrual cycles, Post-menopausal       LABS/IMAGING/MEDICAL RECORDS REVIEW:   Component      Latest Ref Rng & Units 6/5/2017  "6/18/2018   Vitamin D Deficiency screening      20 - 75 ug/L 61 61   Parathyroid Hormone Intact      18 - 80 pg/mL 34 23       PHYSICAL EXAMINATION:  /67 (BP Location: Right arm, Patient Position: Sitting, Cuff Size: Adult Regular)   Pulse 85   Ht 5' 7\" (1.702 m)   Wt 297 lb 14.4 oz (135.1 kg)   LMP 12/10/2010   SpO2 93%   BMI 46.66 kg/m     GENERAL Pt in NAD.  HEART: No JVD  LUNGS: Breathing unlabored.  MUSC: Gait normal  NEURO: Alert and oriented x3.       ASSESSMENT AND PLAN:      1. Bariatric surgery status  5 years status laparoscopic gastric sleeve      2. GERD without esophagitis  Continue ranitidine 150 mg BID since this is working well to control symptoms of GERD.  Can use additional ranitidine or TUMS prn.   Reviewed relative risks with long term use of PPI.     Ordered  - ranitidine (ZANTAC) 150 MG tablet; Take 1 tablet (150 mg) by mouth 2 times daily  Dispense: 180 tablet; Refill: 1    3. Other dysphagia  Dysphagia improved with slowing down and concentrating when swallowing. She always turns her head  To side per ENT as well.  Her trazodone pill increased in size and is easier to swallow.  Will cancel referral  Pt will call to cancel upcoming appt.  Will keep annual ENT appt.      4. Osteopenia determined by x-ray  Continue management of osteopenia through PCP.  Pt had last BM testing 2 years ago.  I will manage vitamin D supplementation.    Continue 5000 Int Units of Vitamin D daily  Will continue to order PTH and Vit D level annually.    5. Malnutrition following gastrointestinal surgery  Continue taking recommended post-op vitamins.  Will order labs ordered per protocol in 6 months.    6. Morbid obesity (H)  Follow food plan per dietitian recommendations.    Follow up: Return to clinic in 6 months    Sincerely,    Adwoa Hollins PA-C    I spent a total of 20 minutes face to face with Mili during today's office visit. Over 50% of this time was spent counseling the patient and/or " coordinating care.

## 2019-01-28 ASSESSMENT — PATIENT HEALTH QUESTIONNAIRE - PHQ9
SUM OF ALL RESPONSES TO PHQ QUESTIONS 1-9: 7
10. IF YOU CHECKED OFF ANY PROBLEMS, HOW DIFFICULT HAVE THESE PROBLEMS MADE IT FOR YOU TO DO YOUR WORK, TAKE CARE OF THINGS AT HOME, OR GET ALONG WITH OTHER PEOPLE: SOMEWHAT DIFFICULT
SUM OF ALL RESPONSES TO PHQ QUESTIONS 1-9: 7

## 2019-01-29 ASSESSMENT — PATIENT HEALTH QUESTIONNAIRE - PHQ9: SUM OF ALL RESPONSES TO PHQ QUESTIONS 1-9: 7

## 2019-01-30 ENCOUNTER — OFFICE VISIT (OUTPATIENT)
Dept: PSYCHOLOGY | Facility: CLINIC | Age: 64
End: 2019-01-30
Payer: COMMERCIAL

## 2019-01-30 VITALS — WEIGHT: 293 LBS | BODY MASS INDEX: 46.06 KG/M2

## 2019-01-30 DIAGNOSIS — F33.1 MAJOR DEPRESSIVE DISORDER, RECURRENT EPISODE, MODERATE (H): Primary | ICD-10-CM

## 2019-01-30 DIAGNOSIS — Z56.0 UNEMPLOYMENT: ICD-10-CM

## 2019-01-30 DIAGNOSIS — E66.01 PSYCHOLOGICAL FACTORS AFFECTING MORBID OBESITY (H): ICD-10-CM

## 2019-01-30 DIAGNOSIS — F54 PSYCHOLOGICAL FACTORS AFFECTING MORBID OBESITY (H): ICD-10-CM

## 2019-01-30 SDOH — ECONOMIC STABILITY - INCOME SECURITY: UNEMPLOYMENT, UNSPECIFIED: Z56.0

## 2019-01-30 NOTE — PROGRESS NOTES
Health Psychology                  Clinic    Department of Medicine  Sherrie Crowe, Ph.D., L.P. [phone]                          Clinics [facility] Center  [facility] Vin Ward, Ph.D.,  L.P. [phone]                 3rd Floor  [place] Mail Code 741   Ramin Olivo, Ph.D., ARIADNA., L.P. [phone]     [address] Enriqueta Sorto, Ph.D., L.P. [phone]  [place] [address] Kathie Galvan, Ph.D., L.P. [phone]      Health Psychology Follow-Up Note     Ms. Padilla is a 63-year-old woman self-referred for psychological consultation because her therapist of the last 24 years retired.  She is seen for problem-solving and supportive therapy for depression and multiple health issues.     HISTORY OF PRESENTING CONCERN:  Ms. Padilla reports a lengthy history of depression.  She currently sees a psychiatrist, Ban Coleman at [facility], and is taking Abilify 7.5 mg, trazodone 500 mg, ripazepam 75 mg each day at bedtime, Tegretol 400 mg at bedtime and methylphenidate 10 mg b.i.d.  She discontineud ability and is now taking Rexulti 2 mg.  She has a history of hospitalizations including 3 at [facility] in the late 1990s, early 2000s when she had 2 courses of ECT lasting 7-10 sessions and approximately 3 other single session ECTs.  She stopped due to memory problems.  She kept with Dr. Coleman who she first met as an inpatient and has seen her for the past 18 years.  She had also been hospitalized psychiatrically at [facility] at age 23.  She previously worked with psychiatrist, Doug Sarabia for three years, stopping, in part, because she didn't feel he took her suicide attempt sufficiently seriously.  She reports her depression tends to vary.  Currently it is moderate, though it was more severe within the past year especially when she was having additional  health problems.      MEDICAL HISTORY:  Ms. Padilla has a complex medical history.  She was diagnosed with MS in 1985.  Her psychiatrist at [facility] in [place], Dr. 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  She has used a scooter since 1992, using it more in the last 6 months than she had previously.  She also can use a walker.  She was diagnosed with fibromyalgia in 1997.   She is also seen at the [facility] for her eye care.  She began to see an eating disorder therapist weekly and has been somewhat erratically losing weight.    WEIGHT Today is 294.1 down from 294.3 down from  294.4     She is attending OA.    Wt Readings from Last 4 Encounters:   01/30/19 133.4 kg (294 lb 1.6 oz)   01/08/19 135.1 kg (297 lb 14.4 oz)   12/28/18 134.7 kg (296 lb 14.4 oz)   11/30/18 132.8 kg (292 lb 12.8 oz)     Past Medical History:   Diagnosis Date     Depression, major, in partial remission (H)      Fibromyalgia syndrome      Gastro-oesophageal reflux disease      Hyperlipemia      Lymphedema      Multiple sclerosis (H)     tremors with MS, all four limbs and head     Multiple sclerosis, secondary progressive (H)      Sleep apnea      Vocal cord paralysis, unilateral complete         Past Surgical History:   Procedure Laterality Date     COLONOSCOPY N/A 7/17/2017    Procedure: COMBINED COLONOSCOPY, SINGLE OR MULTIPLE BIOPSY/POLYPECTOMY BY BIOPSY;;  Surgeon: Wong Beaulieu MD;  Location:  GI     COLONOSCOPY N/A 2/23/2018    Procedure: COMBINED COLONOSCOPY, SINGLE OR MULTIPLE BIOPSY/POLYPECTOMY BY BIOPSY;  COLONOSCOPY ;  Surgeon: Yessica Santana MD;  Location:  GI     ENT SURGERY      throat 1969, vocal cord surgery with skin grafting     ESOPHAGOSCOPY, GASTROSCOPY, DUODENOSCOPY (EGD), COMBINED N/A 12/16/2014    Procedure: COMBINED ENDOSCOPIC ULTRASOUND, ESOPHAGOSCOPY, GASTROSCOPY, DUODENOSCOPY (EGD), FINE NEEDLE ASPIRATE/BIOPSY;  Surgeon: Yessica Santana,  MD;  Location:  GI     ESOPHAGOSCOPY, GASTROSCOPY, DUODENOSCOPY (EGD), COMBINED N/A 12/16/2014    Procedure: COMBINED ESOPHAGOSCOPY, GASTROSCOPY, DUODENOSCOPY (EGD), BIOPSY SINGLE OR MULTIPLE;  Surgeon: Yessica Santana MD;  Location:  GI     LAPAROSCOPIC APPENDECTOMY  5/30/2013    Procedure: LAPAROSCOPIC APPENDECTOMY;;  Surgeon: Salvador Morris MD;  Location:  OR     LAPAROSCOPIC BIOPSY LIVER  5/30/2013    Procedure: LAPAROSCOPIC BIOPSY LIVER;;  Surgeon: Salvador Morris MD;  Location:  OR     LAPAROSCOPIC GASTRIC SLEEVE  5/30/2013    Procedure: LAPAROSCOPIC GASTRIC SLEEVE;  LAPAROSCOPIC SLEEVE GASTRECTOMY/ LAPARSCOPIC  APPENDECTOMY /LIVER BIOPSIES/LIVER CYST DRAINAGE;  Surgeon: Salvador Morris MD;  Location:  OR     ORTHOPEDIC SURGERY      R wrist 2010     Current Outpatient Medications   Medication     atorvastatin (LIPITOR) 20 MG tablet     brexpiprazole (REXULTI) 4 MG tablet     calcium carbonate-vitamin D (CALCIUM 600/VITAMIN D) 600-400 MG-UNIT CHEW     carBAMazepine (TEGRETOL) 200 MG tablet     carBAMazepine (TEGRETOL) 200 MG tablet     Cholecalciferol (VITAMIN D3 PO)     Cholecalciferol (VITAMIN D3 PO)     denosumab (PROLIA) 60 MG/ML SOLN injection     Dextromethorphan-Guaifenesin (MUCINEX DM PO)     docusate sodium (COLACE) 100 MG tablet     DULOXETINE HCL PO     FOLIC ACID PO     ketoconazole (NIZORAL) 2 % cream     Melatonin 10 MG TABS     methylphenidate (RITALIN) 10 MG tablet     metroNIDAZOLE (METROGEL) 1 % gel     MINOCYCLINE HCL PO     Mirtazapine (REMERON PO)     Multiple Vitamin (MULTIVITAMINS PO)     ranitidine (ZANTAC) 150 MG tablet     sulfacetamide sodium, Acne, 10 % lotion     TRAZODONE HCL PO     triamcinolone (KENALOG) 0.5 % OINT ointment     trospium (SANCTURA) 20 MG tablet     Venlafaxine HCl (EFFEXOR PO)     No current facility-administered medications for this visit.      Facility-Administered Medications Ordered in Other Visits   Medication     ondansetron (ZOFRAN)  injection     New medication: On Duloxetine and Mirtazpine and Trazodone and Ritalin and Rexulti.  She discontinued  Effexor and Abilify  per Dr. Marquise Coleman, her psychiatrist.    PHQ-9 SCORE 2017   PHQ-9 Total Score MyChart 8 (Mild depression) 10 (Moderate depression) 7 (Mild depression)   PHQ-9 Total Score 8 10 7     NAS-7 SCORE 2016   Total Score - 6 (mild anxiety)   Total Score 3 -     SOCIAL HISTORY:  Ms. Padilla grew up in the [place] area and is the oldest of 5 children in her family of origin.  Her father was a dentist who she believes was bipolar.  He  of lung cancer at age 72.  Her mother  of sepsis at age 80.  She had been diagnosed with breast cancer when Ms. Padilla was 8.  She describes the marriage between her parents as misael.  She is 5 years older than her closest-aged sister and they are all within 4 years of each other.   Her daughter  12/3 and her mother   She tends to feel more depressed in winter.      Ms. Padilla attended [employer] for a year and later went to night school at the [employer].  She felt that she could not continue her education to the point of graduation because she was working full time and raising her daughter.  Her daughter  in  at age 31 of liver failure secondary to an accidental Tylenol overdose.  She had been recovering from a hysterectomy.      Ms. 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  She had to retire at the time secondary to her MS.  She has never .  She has not dated,  is interested in dating, and states that if she were she would be interested in a relationship with a woman.  There is no history of  service or legal problems.  She expresses concern about her increasing social isolation.  She does have at least 1 friend, Jael, who  she met at a therapy group in 1984.  She is active in facilitating groups for people with MS both in [place] and [place].  She lives alone and currently gets help from a home health aide, as well as home health nurse.     She sees Dr.Sushila Coleman, psychiatrist and is trying to figure out best antidepressant regimen.  She brought in her genetic testing.Effexor reduced to 300 per Dr. Coleman and is now substituting Cymbalta for it.   She feels she has been more depressed since the Fall, but doesn't think it is SAD.       SESSION:  Mili arrived early for today's meeting despite the frigid temperature and is greeted in the waiting room.  Her depression is easing.  She has been depressed for months due to anticipating losing theresa eof oher finances.  Today she donated her car, which willl help with monthly expenses.  We discussed her need to change her eating habits (e.g., popcorn, M&Ms)..  We discussed weight management, since last seen basically stable, so T[place] discontinued her ongoing meetings with her dietitian.   She is frustrated and thinks when she is stressed she eats to soothe herself.   We discussed ppocorn as not being a treatment for depression.  She is aware she needs to keep working hard at making better choices.      We discussed strategies for eating less, the importance of  Spreading out junk food (e.g., chips q2/month), getting smaller fruits (e.g., bananas), limiting herself to one miriam/day.    We did not discuss sleep today. She participated fully and appeared to derive benefit. Affect is positive.  Rapport was excellent.    The treatment plan was reviewed with the patient at today s visit. The treatment plan remains current based on the patient s status and progress to date.    Extended session due to complexity of case and length of interval.    Time in: 3:42  Time out 4:42     DIAGNOSES:   Major depression, recurrent, mild (F33.0).   Behavioral factors affecting morbid obesity (E66.01).      PLAN:  Ms. Padilla will return to clinic on  3/6  @ 3  for problem-solving and supportive therapy consistent with treatment plan..     Last treatment plan signed:10/22/2018  Treatment plan review due: 4/30/2019                             Ramin Olivo, PhD, A.B.P.P., L.P.   Director, Health Psychology     Answers for HPI/ROS submitted by the patient on 1/28/2019   If you checked off any problems, how difficult have these problems made it for you to do your work, take care of things at home, or get along with other people?: Somewhat difficult  PHQ9 TOTAL SCORE: 7

## 2019-01-31 ENCOUNTER — OFFICE VISIT (OUTPATIENT)
Dept: OTOLARYNGOLOGY | Facility: CLINIC | Age: 64
End: 2019-01-31
Payer: COMMERCIAL

## 2019-01-31 DIAGNOSIS — J38.01 VOCAL CORD PARALYSIS, UNILATERAL COMPLETE: Primary | ICD-10-CM

## 2019-01-31 NOTE — PATIENT INSTRUCTIONS
1.  You were seen in the ENT Clinic today by Dr. Still.  If you have any questions or concerns after your appointment, please call [phone].  Press option #1 for scheduling related needs.  Press option #3 for Nurse advice.  2.  Plan is to return to clinic in 1 year.

## 2019-01-31 NOTE — LETTER
1/31/2019       RE: Mili Padilla  [address]  [facility] [address]     Dear Colleague,    Thank you for referring your patient, Mili Padilla, to the [facility] EAR NOSE AND THROAT at [facility]. Please see a copy of my visit note below.      HISTORY OF PRESENT ILLNESS: Mili Padilla is a 63 year old female with a history of trauma to the larynx at age 13 from a skiing accident.  She was reconstructed by Dr. Hammer in the past and subsequently followed by Dr. Day.  I had seen her initially in 2008 and I have been following her since then.  Her airway has been stable since last seen her on 12/8/2016.  She has had no difficulties since her last visit.  She notes that she swallows liquids without any difficulties.  Solid and dry foods sometimes will get slightly increased difficulty but all her swallowing well.  She has no changes in her voice no new breathing issues.  She has a long-standing left vocal fold immobility.    Last 2 Scores for Patient-Answered VHI Questionnaire  VHI Total Score 12/8/2016   VHI Total Score 21       Last 2 Scores for Patient-Answered CSI Questionnaire  CSI Total Score 12/8/2016 1/28/2019   CSI Total Score 7 4         Last 2 Scores for Patient-Answered EAT Questionnaire  EAT Total Score 12/8/2016 1/28/2019   EAT Total Score 10 5           PAST MEDICAL HISTORY:   Past Medical History:   Diagnosis Date     Depression, major, in partial remission (H)      Fibromyalgia syndrome      Gastro-oesophageal reflux disease      Hyperlipemia      Lymphedema      Multiple sclerosis (H)     tremors with MS, all four limbs and head     Multiple sclerosis, secondary progressive (H)      Sleep apnea      Vocal cord paralysis, unilateral complete        PAST SURGICAL HISTORY:   Past Surgical History:   Procedure Laterality Date     COLONOSCOPY N/A 7/17/2017    Procedure: COMBINED COLONOSCOPY, SINGLE OR MULTIPLE BIOPSY/POLYPECTOMY BY BIOPSY;;  Surgeon:  Wong Beaulieu MD;  Location:  GI     COLONOSCOPY N/A 2018    Procedure: COMBINED COLONOSCOPY, SINGLE OR MULTIPLE BIOPSY/POLYPECTOMY BY BIOPSY;  COLONOSCOPY ;  Surgeon: Yessica Santana MD;  Location:  GI     ENT SURGERY      throat 1969, vocal cord surgery with skin grafting     ESOPHAGOSCOPY, GASTROSCOPY, DUODENOSCOPY (EGD), COMBINED N/A 2014    Procedure: COMBINED ENDOSCOPIC ULTRASOUND, ESOPHAGOSCOPY, GASTROSCOPY, DUODENOSCOPY (EGD), FINE NEEDLE ASPIRATE/BIOPSY;  Surgeon: Yessica Santana MD;  Location:  GI     ESOPHAGOSCOPY, GASTROSCOPY, DUODENOSCOPY (EGD), COMBINED N/A 2014    Procedure: COMBINED ESOPHAGOSCOPY, GASTROSCOPY, DUODENOSCOPY (EGD), BIOPSY SINGLE OR MULTIPLE;  Surgeon: Yessica Santana MD;  Location: [facility]     LAPAROSCOPIC APPENDECTOMY  2013    Procedure: LAPAROSCOPIC APPENDECTOMY;;  Surgeon: Salvador Morris MD;  Location:  OR     LAPAROSCOPIC BIOPSY LIVER  2013    Procedure: LAPAROSCOPIC BIOPSY LIVER;;  Surgeon: Salvador Morris MD;  Location:  OR     LAPAROSCOPIC GASTRIC SLEEVE  2013    Procedure: LAPAROSCOPIC GASTRIC SLEEVE;  LAPAROSCOPIC SLEEVE GASTRECTOMY/ LAPARSCOPIC  APPENDECTOMY /LIVER BIOPSIES/LIVER CYST DRAINAGE;  Surgeon: Salvador Morris MD;  Location:  OR     ORTHOPEDIC SURGERY      R wrist        FAMILY HISTORY:   Family History   Problem Relation Age of Onset     Breast Cancer Mother      Other Cancer Father      Breast Cancer Maternal Aunt      Breast Cancer Maternal Aunt      Breast Cancer Maternal Aunt      Breast Cancer Maternal Aunt      Glaucoma No family hx of      Macular Degeneration No family hx of      Amblyopia No family hx of        SOCIAL HISTORY:   Social History     Tobacco Use     Smoking status: Former Smoker     Types: Cigarettes     Last attempt to quit: 1974     Years since quittin.3     Smokeless tobacco: Never Used   Substance Use Topics     Alcohol use: Yes     Alcohol/week: 0.0  oz     Comment: Once a month.       REVIEW OF SYSTEMS: Ten point review of systems was performed and is negative except for:   [facility] ROS 1/28/2019   Constitutional -   Neurology Numbness   Psychology Frequently feeling depressed or sad, Frequently feeling anxious   Eyes Visual loss   Ears, Nose, Throat Trouble swallowing   Cardiopulmonary Breathing problems   Gastrointestinal/Genitourinary Heartburn/indigestion, Constipation   Musculoskeletal Sore or stiff joints, Back pain, Swollen legs/feet   Endocrine Thirst, Frequent urination        ALLERGIES: Augmentin; Betaseron [interferon beta-1b]; Dust mite extract; and Mold    MEDICATIONS:   Current Outpatient Medications   Medication Sig Dispense Refill     atorvastatin (LIPITOR) 20 MG tablet Take 20 mg by mouth daily.       brexpiprazole (REXULTI) 4 MG tablet Take 4.5 mg by mouth daily        calcium carbonate-vitamin D (CALCIUM 600/VITAMIN D) 600-400 MG-UNIT CHEW Take one twice daily and at least 2 hours apart from iron. 180 tablet 3     carBAMazepine (TEGRETOL) 200 MG tablet Take 300 mg by mouth every morning. 300mg = 1.5 tablets.       carBAMazepine (TEGRETOL) 200 MG tablet Take 400 mg by mouth At Bedtime.       Cholecalciferol (VITAMIN D3 PO) Take 3,000 Units by mouth daily In a.m.       Cholecalciferol (VITAMIN D3 PO) Take 2,000 Units by mouth At Bedtime.       denosumab (PROLIA) 60 MG/ML SOLN injection Inject 60 mg Subcutaneous every 6 months       Dextromethorphan-Guaifenesin (MUCINEX DM PO) 1200mg daily       docusate sodium (COLACE) 100 MG tablet Take 100 mg by mouth daily (Patient taking differently: Take 100 mg by mouth 2 times daily ) 180 tablet 3     DULOXETINE HCL PO Take 60 mg by mouth 2 times daily       FOLIC ACID PO Take 1 mg by mouth 2 times daily       ketoconazole (NIZORAL) 2 % cream Apply topically daily       Melatonin 10 MG TABS Take 10 mg by mouth At Bedtime       methylphenidate (RITALIN) 10 MG tablet Take 10 mg by mouth 2 times daily        metroNIDAZOLE (METROGEL) 1 % gel        MINOCYCLINE HCL PO        Mirtazapine (REMERON PO) Take 75 mg by mouth At Bedtime. Takes ( 5)    15mg tablets=75mg       Multiple Vitamin (MULTIVITAMINS PO) Take 2 tablets by mouth every evening. WITH IRON       ranitidine (ZANTAC) 150 MG tablet Take 1 tablet (150 mg) by mouth 2 times daily 180 tablet 1     sulfacetamide sodium, Acne, 10 % lotion APPLY TO FACE QAM  0     triamcinolone (KENALOG) 0.5 % OINT ointment Apply topically 2 times daily       trospium (SANCTURA) 20 MG tablet Take 20 mg by mouth 2 times daily       Venlafaxine HCl (EFFEXOR PO) Take by mouth 3 times daily       TRAZODONE HCL PO Take 500 mg by mouth At Bedtime            PHYSICAL EXAMINATION: On examination today she is awake, alert, in no apparent distress. Her tympanic membrane is clear on the left and the EAC is occluded with cerumen on the right. Nasal exam shows a mild septal deviation, without obstruction. Examination of the oral cavity shows no suspicious lesions. There is symmetric movement of the tongue and soft palate. Her neck is supple without significant adenopathy. There is a well-healed tracheotomy scar. Pulse is regular. Upper airway is clear. Cranial nerves II-XII are grossly intact.     FLEXIBLE LARYNGOSCOPY was performed following informed consent and a timeout. Then 3% lidocaine plus 0.25% phenylephrine was sprayed in each nostril. Scope was passed through the right nostril. This showed the right vocal fold to be fully mobile. The left arytenoid is missing and the aryepiglottic fold is draped over the larynx. However, there is an excellent glottic airway. The subglottis also appears to be patent. No other suspicious lesions are seen.     Respiration    Phonation           IMPRESSION/PLAN: My impression is that she has a stable laryngeal exam.  This was compared to her exam from 2015 and 2016.  She continues to do well in terms of her airway.  We discussed the possibility of following up  in 1 or 2 years.  She prefers yearly followup and I will be happy to see her at that time.     Again, thank you for allowing me to participate in the care of your patient.      Sincerely,    Clint Still MD

## 2019-01-31 NOTE — PROGRESS NOTES
HISTORY OF PRESENT ILLNESS: Mili Padilla is a 63 year old female with a history of trauma to the larynx at age 13 from a skiing accident.  She was reconstructed by Dr. Hammer in the past and subsequently followed by Dr. Day.  I had seen her initially in 2008 and I have been following her since then.  Her airway has been stable since last seen her on 12/8/2016.  She has had no difficulties since her last visit.  She notes that she swallows liquids without any difficulties.  Solid and dry foods sometimes will get slightly increased difficulty but all her swallowing well.  She has no changes in her voice no new breathing issues.  She has a long-standing left vocal fold immobility.    Last 2 Scores for Patient-Answered VHI Questionnaire  VHI Total Score 12/8/2016   VHI Total Score 21       Last 2 Scores for Patient-Answered CSI Questionnaire  CSI Total Score 12/8/2016 1/28/2019   CSI Total Score 7 4         Last 2 Scores for Patient-Answered EAT Questionnaire  EAT Total Score 12/8/2016 1/28/2019   EAT Total Score 10 5           PAST MEDICAL HISTORY:   Past Medical History:   Diagnosis Date     Depression, major, in partial remission (H)      Fibromyalgia syndrome      Gastro-oesophageal reflux disease      Hyperlipemia      Lymphedema      Multiple sclerosis (H)     tremors with MS, all four limbs and head     Multiple sclerosis, secondary progressive (H)      Sleep apnea      Vocal cord paralysis, unilateral complete        PAST SURGICAL HISTORY:   Past Surgical History:   Procedure Laterality Date     COLONOSCOPY N/A 7/17/2017    Procedure: COMBINED COLONOSCOPY, SINGLE OR MULTIPLE BIOPSY/POLYPECTOMY BY BIOPSY;;  Surgeon: Wong Beaulieu MD;  Location:  GI     COLONOSCOPY N/A 2/23/2018    Procedure: COMBINED COLONOSCOPY, SINGLE OR MULTIPLE BIOPSY/POLYPECTOMY BY BIOPSY;  COLONOSCOPY ;  Surgeon: Yessica Santana MD;  Location:  GI     ENT SURGERY      throat 1969, vocal cord surgery with skin grafting      ESOPHAGOSCOPY, GASTROSCOPY, DUODENOSCOPY (EGD), COMBINED N/A 2014    Procedure: COMBINED ENDOSCOPIC ULTRASOUND, ESOPHAGOSCOPY, GASTROSCOPY, DUODENOSCOPY (EGD), FINE NEEDLE ASPIRATE/BIOPSY;  Surgeon: Yessica Santana MD;  Location:  GI     ESOPHAGOSCOPY, GASTROSCOPY, DUODENOSCOPY (EGD), COMBINED N/A 2014    Procedure: COMBINED ESOPHAGOSCOPY, GASTROSCOPY, DUODENOSCOPY (EGD), BIOPSY SINGLE OR MULTIPLE;  Surgeon: Yessica Santana MD;  Location:  GI     LAPAROSCOPIC APPENDECTOMY  2013    Procedure: LAPAROSCOPIC APPENDECTOMY;;  Surgeon: Salvador Morris MD;  Location:  OR     LAPAROSCOPIC BIOPSY LIVER  2013    Procedure: LAPAROSCOPIC BIOPSY LIVER;;  Surgeon: Salvador Morris MD;  Location:  OR     LAPAROSCOPIC GASTRIC SLEEVE  2013    Procedure: LAPAROSCOPIC GASTRIC SLEEVE;  LAPAROSCOPIC SLEEVE GASTRECTOMY/ LAPARSCOPIC  APPENDECTOMY /LIVER BIOPSIES/LIVER CYST DRAINAGE;  Surgeon: Salvador Morris MD;  Location:  OR     ORTHOPEDIC SURGERY      R wrist        FAMILY HISTORY:   Family History   Problem Relation Age of Onset     Breast Cancer Mother      Other Cancer Father      Breast Cancer Maternal Aunt      Breast Cancer Maternal Aunt      Breast Cancer Maternal Aunt      Breast Cancer Maternal Aunt      Glaucoma No family hx of      Macular Degeneration No family hx of      Amblyopia No family hx of        SOCIAL HISTORY:   Social History     Tobacco Use     Smoking status: Former Smoker     Types: Cigarettes     Last attempt to quit: 1974     Years since quittin.3     Smokeless tobacco: Never Used   Substance Use Topics     Alcohol use: Yes     Alcohol/week: 0.0 oz     Comment: Once a month.       REVIEW OF SYSTEMS: Ten point review of systems was performed and is negative except for:   [facility] ROS 2019   Constitutional -   Neurology Numbness   Psychology Frequently feeling depressed or sad, Frequently feeling anxious   Eyes Visual loss   Ears,  Nose, Throat Trouble swallowing   Cardiopulmonary Breathing problems   Gastrointestinal/Genitourinary Heartburn/indigestion, Constipation   Musculoskeletal Sore or stiff joints, Back pain, Swollen legs/feet   Endocrine Thirst, Frequent urination        ALLERGIES: Augmentin; Betaseron [interferon beta-1b]; Dust mite extract; and Mold    MEDICATIONS:   Current Outpatient Medications   Medication Sig Dispense Refill     atorvastatin (LIPITOR) 20 MG tablet Take 20 mg by mouth daily.       brexpiprazole (REXULTI) 4 MG tablet Take 4.5 mg by mouth daily        calcium carbonate-vitamin D (CALCIUM 600/VITAMIN D) 600-400 MG-UNIT CHEW Take one twice daily and at least 2 hours apart from iron. 180 tablet 3     carBAMazepine (TEGRETOL) 200 MG tablet Take 300 mg by mouth every morning. 300mg = 1.5 tablets.       carBAMazepine (TEGRETOL) 200 MG tablet Take 400 mg by mouth At Bedtime.       Cholecalciferol (VITAMIN D3 PO) Take 3,000 Units by mouth daily In a.m.       Cholecalciferol (VITAMIN D3 PO) Take 2,000 Units by mouth At Bedtime.       denosumab (PROLIA) 60 MG/ML SOLN injection Inject 60 mg Subcutaneous every 6 months       Dextromethorphan-Guaifenesin (MUCINEX DM PO) 1200mg daily       docusate sodium (COLACE) 100 MG tablet Take 100 mg by mouth daily (Patient taking differently: Take 100 mg by mouth 2 times daily ) 180 tablet 3     DULOXETINE HCL PO Take 60 mg by mouth 2 times daily       FOLIC ACID PO Take 1 mg by mouth 2 times daily       ketoconazole (NIZORAL) 2 % cream Apply topically daily       Melatonin 10 MG TABS Take 10 mg by mouth At Bedtime       methylphenidate (RITALIN) 10 MG tablet Take 10 mg by mouth 2 times daily       metroNIDAZOLE (METROGEL) 1 % gel        MINOCYCLINE HCL PO        Mirtazapine (REMERON PO) Take 75 mg by mouth At Bedtime. Takes ( 5)    15mg tablets=75mg       Multiple Vitamin (MULTIVITAMINS PO) Take 2 tablets by mouth every evening. WITH IRON       ranitidine (ZANTAC) 150 MG tablet Take 1  tablet (150 mg) by mouth 2 times daily 180 tablet 1     sulfacetamide sodium, Acne, 10 % lotion APPLY TO FACE QAM  0     triamcinolone (KENALOG) 0.5 % OINT ointment Apply topically 2 times daily       trospium (SANCTURA) 20 MG tablet Take 20 mg by mouth 2 times daily       Venlafaxine HCl (EFFEXOR PO) Take by mouth 3 times daily       TRAZODONE HCL PO Take 500 mg by mouth At Bedtime            PHYSICAL EXAMINATION: On examination today she is awake, alert, in no apparent distress. Her tympanic membrane is clear on the left and the EAC is occluded with cerumen on the right. Nasal exam shows a mild septal deviation, without obstruction. Examination of the oral cavity shows no suspicious lesions. There is symmetric movement of the tongue and soft palate. Her neck is supple without significant adenopathy. There is a well-healed tracheotomy scar. Pulse is regular. Upper airway is clear. Cranial nerves II-XII are grossly intact.     FLEXIBLE LARYNGOSCOPY was performed following informed consent and a timeout. Then 3% lidocaine plus 0.25% phenylephrine was sprayed in each nostril. Scope was passed through the right nostril. This showed the right vocal fold to be fully mobile. The left arytenoid is missing and the aryepiglottic fold is draped over the larynx. However, there is an excellent glottic airway. The subglottis also appears to be patent. No other suspicious lesions are seen.     Respiration    Phonation           IMPRESSION/PLAN: My impression is that she has a stable laryngeal exam.  This was compared to her exam from 2015 and 2016.  She continues to do well in terms of her airway.  We discussed the possibility of following up in 1 or 2 years.  She prefers yearly followup and I will be happy to see her at that time.

## 2019-03-06 ENCOUNTER — OFFICE VISIT (OUTPATIENT)
Dept: PSYCHOLOGY | Facility: CLINIC | Age: 64
End: 2019-03-06
Payer: COMMERCIAL

## 2019-03-06 VITALS — WEIGHT: 293 LBS | BODY MASS INDEX: 47.13 KG/M2

## 2019-03-06 DIAGNOSIS — F54 PSYCHOLOGICAL FACTORS AFFECTING MORBID OBESITY (H): ICD-10-CM

## 2019-03-06 DIAGNOSIS — F33.1 MAJOR DEPRESSIVE DISORDER, RECURRENT EPISODE, MODERATE (H): Primary | ICD-10-CM

## 2019-03-06 DIAGNOSIS — Z56.0 UNEMPLOYMENT: ICD-10-CM

## 2019-03-06 DIAGNOSIS — E66.01 PSYCHOLOGICAL FACTORS AFFECTING MORBID OBESITY (H): ICD-10-CM

## 2019-03-06 SDOH — ECONOMIC STABILITY - INCOME SECURITY: UNEMPLOYMENT, UNSPECIFIED: Z56.0

## 2019-03-06 NOTE — PROGRESS NOTES
Health Psychology                  Clinic    Department of Medicine  Sherrie Crowe, Ph.D., L.P. [phone]                          [facility]  [facility] Vin Ward, Ph.D.,  L.P. [phone]                 3rd Floor  [place] Mail Code 741   Ramin Olivo, Ph.D., ARIADNA., L.P. [phone]     [address] Enriqueta Sorto, Ph.D., L.P. [phone]  [place] [address] Kathie Galvan, Ph.D., L.P. [phone]      Health Psychology Follow-Up Note     Ms. Padilla is a 63-year-old woman self-referred for psychological consultation because her therapist of the last 24 years retired.  She is seen for problem-solving and supportive therapy for depression and multiple health issues.     HISTORY OF PRESENTING CONCERN:  Ms. Padilla reports a lengthy history of depression.  She currently sees a psychiatrist, Ban Coleman at [facility], and is taking Abilify 7.5 mg, trazodone 500 mg, ripazepam 75 mg each day at bedtime, Tegretol 400 mg at bedtime and methylphenidate 10 mg b.i.d.  She discontineud ability and is now taking Rexulti 2 mg.  She has a history of hospitalizations including 3 at [facility] in the late 1990s, early 2000s when she had 2 courses of ECT lasting 7-10 sessions and approximately 3 other single session ECTs.  She stopped due to memory problems.  She kept with Dr. Coleman who she first met as an inpatient and has seen her for the past 18 years.  She had also been hospitalized psychiatrically at [facility] at age 23.  She previously worked with psychiatrist, Doug Sarabia for three years, stopping, in part, because she didn't feel he took her suicide attempt sufficiently seriously.  She reports her depression tends to vary.  Currently it is moderate, though it was more severe within the past year especially when she was having additional  health problems.      MEDICAL HISTORY:  Ms. Padilla has a complex medical history.  She was diagnosed with MS in 1985.  Her psychiatrist at [facility] in [place], Dr. 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  She has used a scooter since 1992, using it more in the last 6 months than she had previously.  She also can use a walker.  She was diagnosed with fibromyalgia in 1997.   She is also seen at [facility] for her eye care.  She began to see an eating disorder therapist weekly and has been somewhat erratically losing weight.    WEIGHT Today is 300.9 up from 294.1 last session     She is attending OA, but it has been hard to get there this month.    Wt Readings from Last 4 Encounters:   03/06/19 136.5 kg (300 lb 14.4 oz)   01/30/19 133.4 kg (294 lb 1.6 oz)   01/08/19 135.1 kg (297 lb 14.4 oz)   12/28/18 134.7 kg (296 lb 14.4 oz)     Past Medical History:   Diagnosis Date     Depression, major, in partial remission (H)      Fibromyalgia syndrome      Gastro-oesophageal reflux disease      Hyperlipemia      Lymphedema      Multiple sclerosis (H)     tremors with MS, all four limbs and head     Multiple sclerosis, secondary progressive (H)      Sleep apnea      Vocal cord paralysis, unilateral complete         Past Surgical History:   Procedure Laterality Date     COLONOSCOPY N/A 7/17/2017    Procedure: COMBINED COLONOSCOPY, SINGLE OR MULTIPLE BIOPSY/POLYPECTOMY BY BIOPSY;;  Surgeon: Wong Beaulieu MD;  Location:  GI     COLONOSCOPY N/A 2/23/2018    Procedure: COMBINED COLONOSCOPY, SINGLE OR MULTIPLE BIOPSY/POLYPECTOMY BY BIOPSY;  COLONOSCOPY ;  Surgeon: Yessica Santana MD;  Location:  GI     ENT SURGERY      throat 1969, vocal cord surgery with skin grafting     ESOPHAGOSCOPY, GASTROSCOPY, DUODENOSCOPY (EGD), COMBINED N/A 12/16/2014    Procedure: COMBINED ENDOSCOPIC ULTRASOUND, ESOPHAGOSCOPY, GASTROSCOPY, DUODENOSCOPY (EGD), FINE NEEDLE  ASPIRATE/BIOPSY;  Surgeon: Yessica Santana MD;  Location:  GI     ESOPHAGOSCOPY, GASTROSCOPY, DUODENOSCOPY (EGD), COMBINED N/A 12/16/2014    Procedure: COMBINED ESOPHAGOSCOPY, GASTROSCOPY, DUODENOSCOPY (EGD), BIOPSY SINGLE OR MULTIPLE;  Surgeon: Yessica Santana MD;  Location:  GI     LAPAROSCOPIC APPENDECTOMY  5/30/2013    Procedure: LAPAROSCOPIC APPENDECTOMY;;  Surgeon: Salvador Morris MD;  Location:  OR     LAPAROSCOPIC BIOPSY LIVER  5/30/2013    Procedure: LAPAROSCOPIC BIOPSY LIVER;;  Surgeon: Salvador Morris MD;  Location:  OR     LAPAROSCOPIC GASTRIC SLEEVE  5/30/2013    Procedure: LAPAROSCOPIC GASTRIC SLEEVE;  LAPAROSCOPIC SLEEVE GASTRECTOMY/ LAPARSCOPIC  APPENDECTOMY /LIVER BIOPSIES/LIVER CYST DRAINAGE;  Surgeon: Salvador Morris MD;  Location:  OR     ORTHOPEDIC SURGERY      R wrist 2010     Current Outpatient Medications   Medication     atorvastatin (LIPITOR) 20 MG tablet     brexpiprazole (REXULTI) 4 MG tablet     calcium carbonate-vitamin D (CALCIUM 600/VITAMIN D) 600-400 MG-UNIT CHEW     carBAMazepine (TEGRETOL) 200 MG tablet     carBAMazepine (TEGRETOL) 200 MG tablet     Cholecalciferol (VITAMIN D3 PO)     Cholecalciferol (VITAMIN D3 PO)     denosumab (PROLIA) 60 MG/ML SOLN injection     Dextromethorphan-Guaifenesin (MUCINEX DM PO)     docusate sodium (COLACE) 100 MG tablet     DULOXETINE HCL PO     FOLIC ACID PO     ketoconazole (NIZORAL) 2 % cream     Melatonin 10 MG TABS     methylphenidate (RITALIN) 10 MG tablet     metroNIDAZOLE (METROGEL) 1 % gel     MINOCYCLINE HCL PO     Mirtazapine (REMERON PO)     Multiple Vitamin (MULTIVITAMINS PO)     ranitidine (ZANTAC) 150 MG tablet     sulfacetamide sodium, Acne, 10 % lotion     TRAZODONE HCL PO     triamcinolone (KENALOG) 0.5 % OINT ointment     trospium (SANCTURA) 20 MG tablet     Venlafaxine HCl (EFFEXOR PO)     No current facility-administered medications for this visit.      Facility-Administered Medications Ordered in  Other Visits   Medication     ondansetron (ZOFRAN) injection     New medication: On Duloxetine and Mirtazpine and Trazodone and Ritalin and Rexulti.  She discontinued  Effexor and Abilify  per Dr. Marquise Coleman, her psychiatrist.    PHQ-9 SCORE 2017   PHQ-9 Total Score MyChart 8 (Mild depression) 10 (Moderate depression) 7 (Mild depression)   PHQ-9 Total Score 8 10 7     NAS-7 SCORE 2016   Total Score - 6 (mild anxiety)   Total Score 3 -     SOCIAL HISTORY:  Ms. Padilla grew up in the [place] area and is the oldest of 5 children in her family of origin.  Her father was a dentist who she believes was bipolar.  He  of lung cancer at age 72.  Her mother  of sepsis at age 80.  She had been diagnosed with breast cancer when Ms. Padilla was 8.  She describes the marriage between her parents as misael.  She is 5 years older than her closest-aged sister and they are all within 4 years of each other.   Her daughter  12/3 and her mother   She tends to feel more depressed in winter.      Ms. Padilla attended [employer] for a year and later went to night school at the [employer].  She felt that she could not continue her education to the point of graduation because she was working full time and raising her daughter.  Her daughter  in  at age 31 of liver failure secondary to an accidental Tylenol overdose.  She had been recovering from a hysterectomy.      Ms. 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  She had to retire at the time secondary to her MS.  She has never .  She has not dated,  is interested in dating, and states that if she were she would be interested in a relationship with a woman.  There is no history of  service or legal problems.  She expresses concern about her increasing social  isolation.  She does have at least 1 friend, Jael, who she met at a therapy group in 1984.  She is active in facilitating groups for people with MS both in [place] and [place].  She lives alone and currently gets help from a home health aide, as well as home health nurse.     She sees Dr. Ban Coleman, psychiatrist and is trying to figure out best antidepressant regimen.  .Effexor reduced to 300 per Dr. Coleman and is now substituting Cymbalta for it.  She feels she has been more depressed since the Fall, but doesn't think it is SAD.   She states that she is recommending case management.     SESSION:  Mili arrived early for today's meetinge and is greeted in the waiting room.  Her depression is easing a little.  She has been depressed for months due to anticipating losing theresa eof other finances.  Her frustration has been high because of all of the snow.   She feels good about having donated her car 5 weeks, which helps with monthly expenses.    Given her large weight gain we spent most of the discussion addressing her need to change her eating habits (e.g., popcorn, M&Ms, chocolate, crakers)..   She is frustrated and thinks when she is stressed she eats to soothe herself.   We discussed popcorn as not being a treatment for depression and brain-stormed other ways of soothing herself. We also discussed  The importance of increasing her uplifts while decreasing her hassles.  She thinks she focuses on the hassles an largely ignores uplifts.  She is aware she needs to keep working hard at making better choices.      We discussed strategies for eating less, the importance of spreading out junk food (e.g., chips q2/month), getting smaller fruits (e.g., bananas), limiting herself to one miriam/day.    We did not discuss sleep today. She participated fully and appeared to derive benefit. Affect is positive.  Rapport was excellent.    Extended session due to complexity of case and length of interval.  Time in:  3:05  Time out 3:58     DIAGNOSES:   Major depression, recurrent, mild (F33.0).   Behavioral factors affecting morbid obesity (E66.01).     PLAN:  Ms. Padilla will return to clinic on  4/10  @ 1  for problem-solving and supportive therapy consistent with treatment plan..     Last treatment plan signed:10/22/2018  Treatment plan review due: 4/30/2019                             Ramin Olivo, PhD, A.B.P.P., L.P.   Director, Health Psychology     Answers for HPI/ROS submitted by the patient on 1/28/2019   If you checked off any problems, how difficult have these problems made it for you to do your work, take care of things at home, or get along with other people?: Somewhat difficult  PHQ9 TOTAL SCORE: 7

## 2019-04-10 ENCOUNTER — OFFICE VISIT (OUTPATIENT)
Dept: PSYCHOLOGY | Facility: CLINIC | Age: 64
End: 2019-04-10
Payer: COMMERCIAL

## 2019-04-10 VITALS — WEIGHT: 293 LBS | BODY MASS INDEX: 47.03 KG/M2

## 2019-04-10 DIAGNOSIS — F54 PSYCHOLOGICAL FACTORS AFFECTING MORBID OBESITY (H): ICD-10-CM

## 2019-04-10 DIAGNOSIS — F33.0 MAJOR DEPRESSIVE DISORDER, RECURRENT EPISODE, MILD (H): Primary | ICD-10-CM

## 2019-04-10 DIAGNOSIS — E66.01 PSYCHOLOGICAL FACTORS AFFECTING MORBID OBESITY (H): ICD-10-CM

## 2019-04-10 NOTE — PROGRESS NOTES
Health Psychology                  Clinic    Department of Medicine  Sherrie Crowe, Ph.D., L.P. [phone]                          Cl[facility]  [facility] Vin Ward, Ph.D.,  L.P. [phone]                 3rd Floor  [place] Mail Code 741   Ramin Olivo, Ph.D., ARIADNA., L.P. [phone]      [address] Enriqueta Sorto, Ph.D., L.P. [phone]  [place] [address] Kathie Galvan, Ph.D., L.P. [phone]      Health Psychology Follow-Up Note     Ms. Padilla is a 63-year-old woman self-referred for psychological consultation because her therapist of the last 24 years retired.  She is seen for problem-solving and supportive therapy for depression and multiple health issues.     HISTORY OF PRESENTING CONCERN:  Ms. Padilla reports a lengthy history of depression.  She currently sees a psychiatrist, Ban Coleman at [facility], and is taking Abilify 7.5 mg, trazodone 500 mg, ripazepam 75 mg each day at bedtime, Tegretol 400 mg at bedtime and methylphenidate 10 mg b.i.d.  She discontineud ability and is now taking Rexulti 2 mg.  She has a history of hospitalizations including 3 at [facility] in the late 1990s, early 2000s when she had 2 courses of ECT lasting 7-10 sessions and approximately 3 other single session ECTs.  She stopped due to memory problems.  She kept with Dr. Coleman who she first met as an inpatient and has seen her for the past 18 years.  She had also been hospitalized psychiatrically at [facility] at age 23.  She previously worked with psychiatrist, Doug Sarabia for three years, stopping, in part, because she didn't feel he took her suicide attempt sufficiently seriously.  She reports her depression tends to vary.  Currently it is moderate, though it was more severe within the past year especially when she was having additional  health problems.      MEDICAL HISTORY:  Ms. Padilla has a complex medical history.  She was diagnosed with MS in 1985.  Her psychiatrist at [facility] in [place], Dr. 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  She has used a scooter since 1992, using it more in the last 6 months than she had previously.  She also can use a walker.  She was diagnosed with fibromyalgia in 1997.   She is also seen at the [facility] for her eye care.  She began to see an eating disorder therapist weekly and has been somewhat erratically losing weight.    WEIGHT Today is 300.3 down from 300.9 up from 294.1 last session     She is attending OA, but it has been hard to get there this month.    Wt Readings from Last 4 Encounters:   04/10/19 136.2 kg (300 lb 4.8 oz)   03/06/19 136.5 kg (300 lb 14.4 oz)   01/30/19 133.4 kg (294 lb 1.6 oz)   01/08/19 135.1 kg (297 lb 14.4 oz)     Past Medical History:   Diagnosis Date     Depression, major, in partial remission (H)      Fibromyalgia syndrome      Gastro-oesophageal reflux disease      Hyperlipemia      Lymphedema      Multiple sclerosis (H)     tremors with MS, all four limbs and head     Multiple sclerosis, secondary progressive (H)      Sleep apnea      Vocal cord paralysis, unilateral complete         Past Surgical History:   Procedure Laterality Date     COLONOSCOPY N/A 7/17/2017    Procedure: COMBINED COLONOSCOPY, SINGLE OR MULTIPLE BIOPSY/POLYPECTOMY BY BIOPSY;;  Surgeon: Wong Beaulieu MD;  Location:  GI     COLONOSCOPY N/A 2/23/2018    Procedure: COMBINED COLONOSCOPY, SINGLE OR MULTIPLE BIOPSY/POLYPECTOMY BY BIOPSY;  COLONOSCOPY ;  Surgeon: Yessica Santana MD;  Location:  GI     ENT SURGERY      throat 1969, vocal cord surgery with skin grafting     ESOPHAGOSCOPY, GASTROSCOPY, DUODENOSCOPY (EGD), COMBINED N/A 12/16/2014    Procedure: COMBINED ENDOSCOPIC ULTRASOUND, ESOPHAGOSCOPY, GASTROSCOPY, DUODENOSCOPY (EGD), FINE  NEEDLE ASPIRATE/BIOPSY;  Surgeon: Yessica Santana MD;  Location:  GI     ESOPHAGOSCOPY, GASTROSCOPY, DUODENOSCOPY (EGD), COMBINED N/A 12/16/2014    Procedure: COMBINED ESOPHAGOSCOPY, GASTROSCOPY, DUODENOSCOPY (EGD), BIOPSY SINGLE OR MULTIPLE;  Surgeon: Yessica Santana MD;  Location:  GI     LAPAROSCOPIC APPENDECTOMY  5/30/2013    Procedure: LAPAROSCOPIC APPENDECTOMY;;  Surgeon: Salvador Morris MD;  Location:  OR     LAPAROSCOPIC BIOPSY LIVER  5/30/2013    Procedure: LAPAROSCOPIC BIOPSY LIVER;;  Surgeon: Salvador Morris MD;  Location:  OR     LAPAROSCOPIC GASTRIC SLEEVE  5/30/2013    Procedure: LAPAROSCOPIC GASTRIC SLEEVE;  LAPAROSCOPIC SLEEVE GASTRECTOMY/ LAPARSCOPIC  APPENDECTOMY /LIVER BIOPSIES/LIVER CYST DRAINAGE;  Surgeon: Salvador Morris MD;  Location:  OR     ORTHOPEDIC SURGERY      R wrist 2010       Current Outpatient Medications   Medication     atorvastatin (LIPITOR) 20 MG tablet     brexpiprazole (REXULTI) 4 MG tablet     calcium carbonate-vitamin D (CALCIUM 600/VITAMIN D) 600-400 MG-UNIT CHEW     carBAMazepine (TEGRETOL) 200 MG tablet     carBAMazepine (TEGRETOL) 200 MG tablet     Cholecalciferol (VITAMIN D3 PO)     Cholecalciferol (VITAMIN D3 PO)     denosumab (PROLIA) 60 MG/ML SOLN injection     Dextromethorphan-Guaifenesin (MUCINEX DM PO)     docusate sodium (COLACE) 100 MG tablet     DULOXETINE HCL PO     FOLIC ACID PO     ketoconazole (NIZORAL) 2 % cream     Melatonin 10 MG TABS     methylphenidate (RITALIN) 10 MG tablet     metroNIDAZOLE (METROGEL) 1 % gel     MINOCYCLINE HCL PO     Mirtazapine (REMERON PO)     Multiple Vitamin (MULTIVITAMINS PO)     ranitidine (ZANTAC) 150 MG tablet     sulfacetamide sodium, Acne, 10 % lotion     TRAZODONE HCL PO     triamcinolone (KENALOG) 0.5 % OINT ointment     trospium (SANCTURA) 20 MG tablet     Venlafaxine HCl (EFFEXOR PO)     No current facility-administered medications for this visit.      New medication: On Duloxetine and  Mirtazpine and Trazodone and Ritalin. She discontinued Effexor and Abilify previously  per Dr. Marquise Coleman, her psychiatrist.  On 4/10/19 she informed me that she started Abilify and stopped  Rexulti.    PHQ-9 SCORE 2017   PHQ-9 Total Score MyChart 8 (Mild depression) 10 (Moderate depression) 7 (Mild depression)   PHQ-9 Total Score 8 10 7     NAS-7 SCORE 2016   Total Score - 6 (mild anxiety)   Total Score 3 -     SOCIAL HISTORY:  Ms. Padilla grew up in the [place] area and is the oldest of 5 children in her family of origin.  Her father was a dentist who she believes was bipolar.  He  of lung cancer at age 72.  Her mother  of sepsis at age 80.  She had been diagnosed with breast cancer when Ms. Padilla was 8.  She describes the marriage between her parents as misael.  She is 5 years older than her closest-aged sister and they are all within 4 years of each other.   Her daughter  12/3 and her mother   She tends to feel more depressed in winter.      Ms. Padilla attended [employer] for a year and later went to night school at the [employer].  She felt that she could not continue her education to the point of graduation because she was working full time and raising her daughter.  Her daughter  in  at age 31 of liver failure secondary to an accidental Tylenol overdose.  She had been recovering from a hysterectomy.      Ms. 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  She had to retire at the time secondary to her MS.  She has never .  She has not dated,  is interested in dating, and states that if she were she would be interested in a relationship with a woman.  There is no history of  service or legal problems.  She expresses concern about her increasing social isolation.  She does have at  least 1 friend, Jael, who she met at a therapy group in 1984.  She is active in facilitating groups for people with MS both in [place] and [place].  She lives alone and currently gets help from a home health aide, as well as home health nurse.     She sees Dr. Ban Coleman, psychiatrist and is trying to figure out best antidepressant regimen.  .Effexor reduced to 300 per Dr. Coleman and is now substituting Cymbalta for it.  She feels she has been more depressed since the Fall, but doesn't think it is SAD.   She states that she is recommending case management.     SESSION:  Mili arrived early for today's meetinge and is greeted in the waiting room.  Her depression is easing a little.   Her frustration has been high because of all of the snow.    She has started to be more active (e.g., going to [employer], going to [employer] getting together with friend, going to MS support group and other MS-related activities).  She went back on Abilify due to financial issues and so far thinks it is working again.    Given her large weight gain we spent most of the discussion addressing her need to change her eating habits, especially snacks.  We discussed the importance of changing purchasing. She lost about half a pound in the past month. We discussed a goal of 2-4 lbs./month, and discussed her earlier success with losing 35-65 lbs.She is frustrated and thinks when she is stressed she eats to soothe herself.   We discussed popcorn as not being a treatment for depression and brain-stormed other ways of soothing herself. We also discussed   We did not discuss sleep today other than the hx of having a sleep study and feeling it went well, awakening only once in a night.. She participated fully and appeared to derive benefit. Affect is positive.  Rapport was excellent.    Time in: 1:13  Time out 2:00     DIAGNOSES:   Major depression, recurrent, mild (F33.0).   Behavioral factors affecting morbid obesity (E66.01).     PLAN:  Ms. Padilla  will return to clinic on 5/8  @ 1  for problem-solving and supportive therapy consistent with treatment plan..     Last treatment plan signed:10/22/2018  Treatment plan review due: 4/30/2019  (next session)                            Ramin Olivo, PhD, A.B.P.P., L.P.   Director, Health Psychology     Answers for HPI/ROS submitted by the patient on 1/28/2019   If you checked off any problems, how difficult have these problems made it for you to do your work, take care of things at home, or get along with other people?: Somewhat difficult  PHQ9 TOTAL SCORE: 7

## 2019-05-15 ENCOUNTER — TELEPHONE (OUTPATIENT)
Dept: NUTRITION | Facility: CLINIC | Age: 64
End: 2019-05-15

## 2019-05-15 NOTE — PROGRESS NOTES
Called and spoke with patient regarding notice of dismissal of appeal request.  Informed patient authorization of release necessary for provider to file an appeal on the member's behalf.  Patient states she will follow up with insurance.  Patient states struggling with weight gain.  Suggest patient choose one previous goal that she was working on for medical nutrition therapy to refocus on eating behaviors.  Patient verbalized understanding.      Filiberto Becerril, RD, LD  [facility] Outpatient Dietitian  [phone] (office phone)

## 2019-05-31 ENCOUNTER — OFFICE VISIT (OUTPATIENT)
Dept: PSYCHOLOGY | Facility: CLINIC | Age: 64
End: 2019-05-31
Payer: COMMERCIAL

## 2019-05-31 VITALS — WEIGHT: 293 LBS | BODY MASS INDEX: 47.16 KG/M2

## 2019-05-31 DIAGNOSIS — F33.0 MAJOR DEPRESSIVE DISORDER, RECURRENT EPISODE, MILD (H): Primary | ICD-10-CM

## 2019-05-31 DIAGNOSIS — E66.01 PSYCHOLOGICAL FACTORS AFFECTING MORBID OBESITY (H): ICD-10-CM

## 2019-05-31 DIAGNOSIS — F54 PSYCHOLOGICAL FACTORS AFFECTING MORBID OBESITY (H): ICD-10-CM

## 2019-05-31 NOTE — PROGRESS NOTES
Health Psychology                  Clinic    Department of Medicine  Sherrie Crowe, Ph.D., L.P. [phone]                          Clinics and Surgery Center  [facility] Vin Ward, Ph.D.,  L.P. [phone]                 3rd Floor  [place] Mail Code 741   Ramin Olivo, Ph.D., ARIADNA., L.P. [phone]     [address] Enriqueta Sorto, Ph.D., L.P. [phone]  [place] [address] Kathie Galvan, Ph.D., L.P. [phone]      Health Psychology Follow-Up Note     Ms. Padilla is a 63-year-old woman self-referred for psychological consultation because her therapist of the last 24 years retired.  She is seen for problem-solving and supportive therapy for depression and multiple health issues.     HISTORY OF PRESENTING CONCERN:  Ms. Padilla reports a lengthy history of depression.  She currently sees a psychiatrist, Ban Coleman at [facility], and is taking Abilify 7.5 mg, trazodone 500 mg, ripazepam 75 mg each day at bedtime, Tegretol 400 mg at bedtime and methylphenidate 10 mg b.i.d.  She discontineud ability and is now taking Rexulti 2 mg.  She has a history of hospitalizations including 3 at [facility] in the late 1990s, early 2000s when she had 2 courses of ECT lasting 7-10 sessions and approximately 3 other single session ECTs.  She stopped due to memory problems.  She kept with Dr. Coleman who she first met as an inpatient and has seen her for the past 18 years.  She had also been hospitalized psychiatrically at [facility] at age 23.  She previously worked with psychiatrist, Doug Sarabia for three years, stopping, in part, because she didn't feel he took her suicide attempt sufficiently seriously.  She reports her depression tends to vary.  Currently it is moderate, though it was more severe within the past year especially when she was having additional  health problems.      MEDICAL HISTORY:  Ms. Padilla has a complex medical history.  She was diagnosed with MS in 1985.  Her psychiatrist at [facility] in [place], Dr. 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  She has used a scooter since 1992, using it more in the last 6 months than she had previously.  She also can use a walker.  She was diagnosed with fibromyalgia in 1997.   She is also seen at the [facility] for her eye care.  She began to see an eating disorder therapist weekly and has been somewhat erratically losing weight.     WEIGHT Today is 301.1 up from 300.3 last session     She is attending OA, but it has been hard to get there this month.    Wt Readings from Last 4 Encounters:   05/31/19 136.6 kg (301 lb 1.6 oz)   04/10/19 136.2 kg (300 lb 4.8 oz)   03/06/19 136.5 kg (300 lb 14.4 oz)   01/30/19 133.4 kg (294 lb 1.6 oz)     Past Medical History:   Diagnosis Date     Depression, major, in partial remission (H)      Fibromyalgia syndrome      Gastro-oesophageal reflux disease      Hyperlipemia      Lymphedema      Multiple sclerosis (H)     tremors with MS, all four limbs and head     Multiple sclerosis, secondary progressive (H)      Sleep apnea      Vocal cord paralysis, unilateral complete         Past Surgical History:   Procedure Laterality Date     COLONOSCOPY N/A 7/17/2017    Procedure: COMBINED COLONOSCOPY, SINGLE OR MULTIPLE BIOPSY/POLYPECTOMY BY BIOPSY;;  Surgeon: Wong Beaulieu MD;  Location:  GI     COLONOSCOPY N/A 2/23/2018    Procedure: COMBINED COLONOSCOPY, SINGLE OR MULTIPLE BIOPSY/POLYPECTOMY BY BIOPSY;  COLONOSCOPY ;  Surgeon: Yessica Santana MD;  Location:  GI     ENT SURGERY      throat 1969, vocal cord surgery with skin grafting     ESOPHAGOSCOPY, GASTROSCOPY, DUODENOSCOPY (EGD), COMBINED N/A 12/16/2014    Procedure: COMBINED ENDOSCOPIC ULTRASOUND, ESOPHAGOSCOPY, GASTROSCOPY, DUODENOSCOPY (EGD), FINE NEEDLE  ASPIRATE/BIOPSY;  Surgeon: Yessica Santana MD;  Location:  GI     ESOPHAGOSCOPY, GASTROSCOPY, DUODENOSCOPY (EGD), COMBINED N/A 12/16/2014    Procedure: COMBINED ESOPHAGOSCOPY, GASTROSCOPY, DUODENOSCOPY (EGD), BIOPSY SINGLE OR MULTIPLE;  Surgeon: Yessica Santana MD;  Location:  GI     LAPAROSCOPIC APPENDECTOMY  5/30/2013    Procedure: LAPAROSCOPIC APPENDECTOMY;;  Surgeon: Salvador Morris MD;  Location:  OR     LAPAROSCOPIC BIOPSY LIVER  5/30/2013    Procedure: LAPAROSCOPIC BIOPSY LIVER;;  Surgeon: Salvador Morris MD;  Location:  OR     LAPAROSCOPIC GASTRIC SLEEVE  5/30/2013    Procedure: LAPAROSCOPIC GASTRIC SLEEVE;  LAPAROSCOPIC SLEEVE GASTRECTOMY/ LAPARSCOPIC  APPENDECTOMY /LIVER BIOPSIES/LIVER CYST DRAINAGE;  Surgeon: Salvador Morris MD;  Location:  OR     ORTHOPEDIC SURGERY      R wrist 2010       Current Outpatient Medications   Medication     atorvastatin (LIPITOR) 20 MG tablet     brexpiprazole (REXULTI) 4 MG tablet     calcium carbonate-vitamin D (CALCIUM 600/VITAMIN D) 600-400 MG-UNIT CHEW     carBAMazepine (TEGRETOL) 200 MG tablet     carBAMazepine (TEGRETOL) 200 MG tablet     Cholecalciferol (VITAMIN D3 PO)     Cholecalciferol (VITAMIN D3 PO)     denosumab (PROLIA) 60 MG/ML SOLN injection     Dextromethorphan-Guaifenesin (MUCINEX DM PO)     docusate sodium (COLACE) 100 MG tablet     DULOXETINE HCL PO     FOLIC ACID PO     ketoconazole (NIZORAL) 2 % cream     Melatonin 10 MG TABS     methylphenidate (RITALIN) 10 MG tablet     metroNIDAZOLE (METROGEL) 1 % gel     MINOCYCLINE HCL PO     Mirtazapine (REMERON PO)     Multiple Vitamin (MULTIVITAMINS PO)     ranitidine (ZANTAC) 150 MG tablet     sulfacetamide sodium, Acne, 10 % lotion     TRAZODONE HCL PO     triamcinolone (KENALOG) 0.5 % OINT ointment     trospium (SANCTURA) 20 MG tablet     Venlafaxine HCl (EFFEXOR PO)     No current facility-administered medications for this visit.      New medication: On Duloxetine and Mirtazpine  and Trazodone and Ritalin. She discontinued Effexor and Abilify previously  per Dr. Marquise Coleman, her psychiatrist.  On 4/10/19 she informed me that she started Abilify and stopped  Rexulti.    PHQ-9 SCORE 2017   PHQ-9 Total Score MyChart 8 (Mild depression) 10 (Moderate depression) 7 (Mild depression)   PHQ-9 Total Score 8 10 7     NAS-7 SCORE 2016   Total Score - 6 (mild anxiety)   Total Score 3 -     SOCIAL HISTORY:  Ms. Padilla grew up in the [place] area and is the oldest of 5 children in her family of origin.  Her father was a dentist who she believes was bipolar.  He  of lung cancer at age 72.  Her mother  of sepsis at age 80.  She had been diagnosed with breast cancer when Ms. Padilla was 8.  She describes the marriage between her parents as misael.  She is 5 years older than her closest-aged sister and they are all within 4 years of each other.   Her daughter  12/3 and her mother   She tends to feel more depressed in winter.      Ms. Padilla attended [employer] for a year and later went to night school at the [employer].  She felt that she could not continue her education to the point of graduation because she was working full time and raising her daughter.  Her daughter  in  at age 31 of liver failure secondary to an accidental Tylenol overdose.  She had been recovering from a hysterectomy.      Ms. 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  She had to retire at the time secondary to her MS.  She has never .  She has not dated,  is interested in dating, and states that if she were she would be interested in a relationship with a woman.  There is no history of  service or legal problems.  She expresses concern about her increasing social isolation.  She does have at least 1  friend, Jael, who she met at a therapy group in 1984.  She is active in facilitating groups for people with MS both in [place] and [place].  She lives alone and currently gets help from a home health aide, as well as home health nurse.     She sees Dr. Ban Coleman, psychiatrist and is trying to figure out best antidepressant regimen.  .Effexor reduced to 300 per Dr. Coleman and is now substituting Cymbalta for it.  She feels she has been more depressed since the Fall, but doesn't think it is SAD.   She states that she is recommending case management.     SESSION:  Mili arrived punctually for today's meetinge and is greeted in the waiting room.  Her depression is easing a little.   Her frustration has been high because of all of the snow.   She thinks the Abilify is helping.  She is frustrated that her authorization for meeting with her  Dietitian has been turned down.  We discussed appeals and complaints she might make.  She is encouraged to be assertive about getting the service and is given contact information.  Given her large weight gain we spent most of the discussion addressing her need to change her eating habits, especially snacks.  We discussed the importance of changing purchasing. She gained nearly a pound in the past month. We discussed a goal of 2-4 lbs./month, and discussed her earlier success with losing 35-65 lbs.She is frustrated and thinks when she is stressed she eats to soothe herself.  She cut down her popcorn by half. We also discussed goals of 1700 janice/day down from 2000.  I encouraged her to think about 1500, but will reassess next month.   She participated fully and appeared to derive benefit. Affect is positive.  Rapport was excellent.    The treatment plan was reviewed with the patient at today s visit. The treatment plan remains current based on the patient s status and progress to date.  Time in: 2:12  Time out:  3:03     DIAGNOSES:   Major depression, recurrent, mild (F33.0).    Behavioral factors affecting morbid obesity (E66.01).     PLAN:  Ms. Padilla will return to clinic on 6/28  @ 11  for problem-solving and supportive therapy consistent with treatment plan..  Goal per day now that she is tracking will change from 2000/day to 1750 consistently.  This is a compromise from 1500.    Last treatment plan signed:10/22/2018  Treatment plan review due: 8/31/2019                           Ramin Olivo, PhD, A.B.P.P., L.P.   Director, Health Psychology

## 2019-06-17 ENCOUNTER — OFFICE VISIT (OUTPATIENT)
Dept: SURGERY | Facility: CLINIC | Age: 64
End: 2019-06-17
Payer: COMMERCIAL

## 2019-06-17 ENCOUNTER — HOSPITAL ENCOUNTER (OUTPATIENT)
Dept: LAB | Facility: CLINIC | Age: 64
Discharge: HOME | End: 2019-06-17
Attending: PHYSICIAN ASSISTANT | Admitting: PHYSICIAN ASSISTANT
Payer: COMMERCIAL

## 2019-06-17 VITALS
SYSTOLIC BLOOD PRESSURE: 132 MMHG | HEART RATE: 90 BPM | DIASTOLIC BLOOD PRESSURE: 84 MMHG | WEIGHT: 293 LBS | OXYGEN SATURATION: 97 % | HEIGHT: 67 IN | BODY MASS INDEX: 45.99 KG/M2

## 2019-06-17 DIAGNOSIS — R13.10 DYSPHAGIA, UNSPECIFIED TYPE: ICD-10-CM

## 2019-06-17 DIAGNOSIS — K91.2 POSTSURGICAL MALABSORPTION: ICD-10-CM

## 2019-06-17 DIAGNOSIS — Z98.84 BARIATRIC SURGERY STATUS: ICD-10-CM

## 2019-06-17 DIAGNOSIS — M85.80 OSTEOPENIA DETERMINED BY X-RAY: ICD-10-CM

## 2019-06-17 DIAGNOSIS — E66.01 MORBID OBESITY (H): ICD-10-CM

## 2019-06-17 DIAGNOSIS — K21.9 GERD WITHOUT ESOPHAGITIS: Primary | ICD-10-CM

## 2019-06-17 LAB
ERYTHROCYTE [DISTWIDTH] IN BLOOD BY AUTOMATED COUNT: 12.8 % (ref 10–15)
FERRITIN SERPL-MCNC: 263 NG/ML (ref 8–252)
HCT VFR BLD AUTO: 38.8 % (ref 35–47)
HGB BLD-MCNC: 12.9 G/DL (ref 11.7–15.7)
IRON SATN MFR SERPL: 22 % (ref 15–46)
IRON SERPL-MCNC: 53 UG/DL (ref 35–180)
MCH RBC QN AUTO: 31.5 PG (ref 26.5–33)
MCHC RBC AUTO-ENTMCNC: 33.2 G/DL (ref 31.5–36.5)
MCV RBC AUTO: 95 FL (ref 78–100)
PLATELET # BLD AUTO: 165 10E9/L (ref 150–450)
PTH-INTACT SERPL-MCNC: 28 PG/ML (ref 12–64)
RBC # BLD AUTO: 4.1 10E12/L (ref 3.8–5.2)
TIBC SERPL-MCNC: 244 UG/DL (ref 240–430)
VIT B12 SERPL-MCNC: 734 PG/ML (ref 193–986)
WBC # BLD AUTO: 8.9 10E9/L (ref 4–11)

## 2019-06-17 PROCEDURE — 99214 OFFICE O/P EST MOD 30 MIN: CPT | Performed by: PHYSICIAN ASSISTANT

## 2019-06-17 PROCEDURE — 85027 COMPLETE CBC AUTOMATED: CPT | Performed by: PHYSICIAN ASSISTANT

## 2019-06-17 PROCEDURE — 82306 VITAMIN D 25 HYDROXY: CPT | Performed by: PHYSICIAN ASSISTANT

## 2019-06-17 PROCEDURE — 82728 ASSAY OF FERRITIN: CPT | Performed by: PHYSICIAN ASSISTANT

## 2019-06-17 PROCEDURE — 83550 IRON BINDING TEST: CPT | Performed by: PHYSICIAN ASSISTANT

## 2019-06-17 PROCEDURE — 36415 COLL VENOUS BLD VENIPUNCTURE: CPT | Performed by: PHYSICIAN ASSISTANT

## 2019-06-17 PROCEDURE — 83540 ASSAY OF IRON: CPT | Performed by: PHYSICIAN ASSISTANT

## 2019-06-17 PROCEDURE — 83970 ASSAY OF PARATHORMONE: CPT | Performed by: PHYSICIAN ASSISTANT

## 2019-06-17 PROCEDURE — 82607 VITAMIN B-12: CPT | Performed by: PHYSICIAN ASSISTANT

## 2019-06-17 RX ORDER — ARIPIPRAZOLE 5 MG/1
7.5 TABLET ORAL DAILY
Refills: 2 | COMMUNITY
Start: 2019-05-12

## 2019-06-17 RX ORDER — TROSPIUM CHLORIDE 20 MG/1
20 TABLET, FILM COATED ORAL
COMMUNITY
Start: 2019-06-17 | End: 2023-09-12

## 2019-06-17 ASSESSMENT — MIFFLIN-ST. JEOR: SCORE: 1955.67

## 2019-06-17 NOTE — PROGRESS NOTES
Return Bariatric Surgery Note    RE: Mili Padilla  MR#: [id number]  : 1955    VISIT DATE: 2019    Dear Jasmyn Márquez,    I had the pleasure of seeing your patient, Mili Padilla, in my post-bariatric surgery assessment clinic.    CHIEF COMPLAINT: Post-bariatric surgery follow-up    HISTORY OF PRESENT ILLNESS:  Questions Regarding Prior Weight Loss Surgery Reviewed With Patient 2019   I had the following weight loss procedure: Sleeve Gastrectomy   What year was your surgery?    How has your weight changed since your last visit? I have gained weight   Are you currently taking any weight loss medications? No   Do you currently have any of the following: Sleep Apnea, Heartburn, acid reflux, or GERD (acid reflux disease)?, Hyperlipidemia (high cholesterol, triglycerides)?   Have you been to the Emergency room since your last visit with us? No   Were you in the hospital since your last visit with us? No   Do you have any concerns today? I want to discuss the blood test results   Wants to know if she needs to start the B complex and/ or the iron supplement.      Weight History:     2019   What is your highest lifetime weight? 322   What is your lowest weight since surgery? (In pounds) 238     Initial Weight: 305 lb 1.6 oz (138.4 kg)  Current Weight: Weight: 302 lb 11.2 oz (137.3 kg)  Cumulative weight loss (lbs): 2.4  Last Visits Weight: 297 lb 14.4 oz (135.1 kg)     Patient is taking the following bariatric postoperative vitamins:  2 Complete multivitamins with minerals (at different times than calcium)  5000 Int Units of Vitamin D daily   630/500 mg of Calcium daily BID  Vitamin B12  (None)    Pt is up about 5 lbs since last visit 6 months ago.  Had a tough winter.  Dealt with a major depressive episode and was homebound for about 8 weeks in wheelchair due to weather.  Stopped seeing Neha Valadez RD in  because she gained 10 lbs last year so [facility] would not cover. Is  planning on appealing.  This was helping her keep her weight stable.   Not seeing anyone at [facility]. Does have 2 other therapists she sees regularly for depression/MS.    Questions Regarding Co-Morbidities and Health Concerns Reviewed With Patient 6/16/2019   Pre-diabetes: Stayed the same   Diabetes II: Never   High Blood Pressure: Never   High cholesterol: Stayed the same   Heartburn/Reflux: Stayed the same   Are you taking daily medication for heartburn, acid reflux, or GERD (acid reflux disease)? Yes   Sleep apnea: Stayed the same   Do you use a CPAP? Yes   PCOS: Never   Back pain: Stayed the same   Joint pain: Stayed the same   Lower leg swelling: Improved       Eating Habits 6/16/2019   How many meals do you eat per day? 3   Do you snack between meals? Yes   How much food are you eating at each meal? Greater than 1 cup   Are you able to separate your meals and liquids by at least 30 minutes? Sometimes   Are you able to avoid liquid calories? Yes   Is currently at 6008-7413 calories daily.  Continues to track daily. Has tried to cut back to 1750 but is still hungry.     Exercise Questions Reviewed With Patient 6/16/2019   How often do you exercise? Never   What is the duration of your exercise (in minutes)? -   What types of exercise do you do? -   What keeps you from being more active?  Pain, My ability to walk or move around is limited, Shortness of breath, Too tired     Is planning on starting a Step PT program seated exercise class. Only costs $5 a time.  Feels the movement might help promote weight loss.     Social History:      6/16/2019   Are you smoking? No   Are you drinking alcohol? No       Medications:  Current Outpatient Medications   Medication     ARIPiprazole (ABILIFY) 5 MG tablet     atorvastatin (LIPITOR) 20 MG tablet     calcium carbonate-vitamin D (CALCIUM 600/VITAMIN D) 600-400 MG-UNIT CHEW     carBAMazepine (TEGRETOL) 200 MG tablet     carBAMazepine (TEGRETOL) 200 MG tablet      Cholecalciferol (VITAMIN D3 PO)     Cholecalciferol (VITAMIN D3 PO)     denosumab (PROLIA) 60 MG/ML SOLN injection     Dextromethorphan-Guaifenesin (MUCINEX DM PO)     docusate sodium (COLACE) 100 MG tablet     DULOXETINE HCL PO     FOLIC ACID PO     ketoconazole (NIZORAL) 2 % cream     Melatonin 10 MG TABS     methylphenidate (RITALIN) 10 MG tablet     Mirtazapine (REMERON PO)     Multiple Vitamin (MULTIVITAMINS PO)     ranitidine (ZANTAC) 150 MG tablet     sulfacetamide sodium, Acne, 10 % lotion     TRAZODONE HCL PO     brexpiprazole (REXULTI) 4 MG tablet     metroNIDAZOLE (METROGEL) 1 % gel     MINOCYCLINE HCL PO     triamcinolone (KENALOG) 0.5 % OINT ointment     trospium (SANCTURA) 20 MG tablet     Venlafaxine HCl (EFFEXOR PO)     No current facility-administered medications for this visit.          6/16/2019   Do you avoid NSAIDs such as (Ibuprofen, Aleve, Naproxen, Advil)?   Yes       ROS:  GI:      6/16/2019   Vomiting: No   Diarrhea: No   Constipation: Yes   Swallowing trouble: Yes   Dysphagia has been bothering her again.  Can work around it with slowing down and concentrating when swallowing. She always turns her head to side per ENT as well.  Her trazodone pill increased in size and is easier to swallow.  Will cancel referral  Pt will call to cancel upcoming appt.  Will keep annual ENT appt.     Abdominal pain: No   Heartburn: Yes, ranitidine 150 BID working well    Rash in skin folds: No   Depression: Yes   Stress urinary incontinence Yes     Skin:   ANNE-MARIE KUMAR ROS - SKIN 6/16/2019   Rash in skin folds: No     Psych:      6/16/2019   Depression: Yes   Anxiety: No     Female Only:   BAR RBS ROS - FEMALE ONLY 6/16/2019   Female only: Post menopausal       LABS/IMAGING/MEDICAL RECORDS REVIEW:   Component      Latest Ref Rng & Units 6/18/2018   WBC      4.0 - 11.0 10e9/L 10.1   RBC Count      3.8 - 5.2 10e12/L 4.05   Hemoglobin      11.7 - 15.7 g/dL 12.7   Hematocrit      35.0 - 47.0 % 38.7   MCV      78 - 100  "fl 96   MCH      26.5 - 33.0 pg 31.4   MCHC      31.5 - 36.5 g/dL 32.8   RDW      10.0 - 15.0 % 12.6   Platelet Count      150 - 450 10e9/L 130 (L)   Iron      35 - 180 ug/dL 50   Iron Binding Cap      240 - 430 ug/dL 234 (L)   Iron Saturation Index      15 - 46 % 21   Vitamin B12      193 - 986 pg/mL 904   Vitamin D Deficiency screening      20 - 75 ug/L 61   Parathyroid Hormone Intact      18 - 80 pg/mL 23   Ferritin      8 - 252 ng/mL 310 (H)       PHYSICAL EXAMINATION:  /84 (BP Location: Left arm, Patient Position: Sitting, Cuff Size: Adult Large)   Pulse 90   Ht 5' 7\" (1.702 m)   Wt 302 lb 11.2 oz (137.3 kg)   LMP 12/10/2010   SpO2 97%   BMI 47.41 kg/m     GENERAL Pt in NAD.  HEART: No JVD  LUNGS: Breathing unlabored.  MUSC: Gait normal  NEURO: Alert and oriented x3.     1. Bariatric surgery status  5 years status laparoscopic gastric sleeve     2. GERD without esophagitis  Continue ranitidine 150 mg BID since this is working well to control symptoms of GERD.  Can use additional ranitidine or TUMS prn.      Refilled   - ranitidine (ZANTAC) 150 MG tablet; Take 1 tablet (150 mg) by mouth 2 times daily  Dispense: 180 tablet; Refill: 3     3. Other dysphagia  Dysphagia stable with slowing down and concentrating when swallowing. Continue annual  ENT appt.       4. Osteopenia determined by x-ray  Continue management of osteopenia through PCP. Pt had last BM testing 2 years ago.  I will manage vitamin D supplementation.    Continue 5000 Int Units of Vitamin D daily  - Vitamin D Screen; Future  - Parathyroid Hormone Intact; Future    5. Malnutrition following gastrointestinal surgery  Continue taking recommended post-op vitamins.  No need start an iron supplement.  Will need to start a B Complex.  - CBC with platelets; Future  - Vitamin B12; Future  - Vitamin D Screen; Future  - Parathyroid Hormone Intact; Future  - Iron and Iron Binding Capacity; Future  - Ferritin; Future    6. Morbid obesity (H)  Pt will " file appeal to get dietician coverage again.     Follow up: Return to clinic in 1 year.      Sincerely,     Adwoa Hollins PA-C     I spent a total of 20 minutes face to face with Mili during today's office visit. Over 50% of this time was spent counseling the patient and/or coordinating care.

## 2019-06-18 LAB — DEPRECATED CALCIDIOL+CALCIFEROL SERPL-MC: 58 UG/L (ref 20–75)

## 2019-06-28 ENCOUNTER — OFFICE VISIT (OUTPATIENT)
Dept: PSYCHOLOGY | Facility: CLINIC | Age: 64
End: 2019-06-28
Payer: COMMERCIAL

## 2019-06-28 VITALS — WEIGHT: 293 LBS | BODY MASS INDEX: 47.72 KG/M2

## 2019-06-28 DIAGNOSIS — F33.0 MAJOR DEPRESSIVE DISORDER, RECURRENT EPISODE, MILD (H): Primary | ICD-10-CM

## 2019-06-28 DIAGNOSIS — F54 PSYCHOLOGICAL FACTORS AFFECTING MORBID OBESITY (H): ICD-10-CM

## 2019-06-28 DIAGNOSIS — E66.01 PSYCHOLOGICAL FACTORS AFFECTING MORBID OBESITY (H): ICD-10-CM

## 2019-06-28 DIAGNOSIS — Z56.0 UNEMPLOYMENT: ICD-10-CM

## 2019-06-28 SDOH — ECONOMIC STABILITY - INCOME SECURITY: UNEMPLOYMENT, UNSPECIFIED: Z56.0

## 2019-06-28 NOTE — PROGRESS NOTES
Health Psychology                  Clinic    Department of Medicine  Sherrie Crowe, Ph.D., L.P. [phone]                          Cl[facility] and [facility] Center  [facility] Vin Ward, Ph.D.,  L.P. [phone]                 3rd Floor  [place] Mail [address]41   Ramin Olivo, Ph.D., ARIADNA., L.P. [phone]     [address] Enriqueta Sorto, Ph.D., L.P. [phone]  [place] [address] Kathie Galvan, Ph.D., L.P. [phone]      Health Psychology Follow-Up Note     Ms. Padilla is a 63-year-old woman self-referred for psychological consultation because her therapist of the last 24 years retired.  She is seen for problem-solving and supportive therapy for depression and multiple health issues.     HISTORY OF PRESENTING CONCERN:  Ms. Padilla reports a lengthy history of depression.  She currently sees a psychiatrist, Ban Coleman at [facility], and is taking Abilify 7.5 mg, trazodone 500 mg, ripazepam 75 mg each day at bedtime, Tegretol 400 mg at bedtime and methylphenidate 10 mg b.i.d.  She discontineud ability and is now taking Rexulti 2 mg.  She has a history of hospitalizations including 3 at [facility] in the late 1990s, early 2000s when she had 2 courses of ECT lasting 7-10 sessions and approximately 3 other single session ECTs.  She stopped due to memory problems.  She kept with Dr. Coleman who she first met as an inpatient and has seen her for the past 18 years.  She had also been hospitalized psychiatrically at [facility] at age 23.  She previously worked with psychiatrist, Doug Sarabia for three years, stopping, in part, because she didn't feel he took her suicide attempt sufficiently seriously.  She reports her depression tends to vary.  Currently it is moderate, though it was more severe within the past year especially when she was having additional  health problems.      MEDICAL HISTORY:  Ms. Padilla has a complex medical history.  She was diagnosed with MS in 1985.  Her psychiatrist at [facility] in [place], Dr. 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  She has used a scooter since 1992, using it more in the last 6 months than she had previously.  She also can use a walker.  She was diagnosed with fibromyalgia in 1997.   She is also seen at [facility] for her eye care.  She began to see an eating disorder therapist weekly and has been somewhat erratically losing weight.     WEIGHT Today is 304.7# up from  301.1 up from 300.3 last session     She is attending OA, but it has been hard to get there this month.    Wt Readings from Last 4 Encounters:   06/28/19 138.2 kg (304 lb 11.2 oz)   06/17/19 137.3 kg (302 lb 11.2 oz)   05/31/19 136.6 kg (301 lb 1.6 oz)   04/10/19 136.2 kg (300 lb 4.8 oz)     Past Medical History:   Diagnosis Date     Depression, major, in partial remission (H)      Fibromyalgia syndrome      Gastro-oesophageal reflux disease      Hyperlipemia      Lymphedema      Multiple sclerosis (H)     tremors with MS, all four limbs and head     Multiple sclerosis, secondary progressive (H)      Sleep apnea      Vocal cord paralysis, unilateral complete         Past Surgical History:   Procedure Laterality Date     COLONOSCOPY N/A 7/17/2017    Procedure: COMBINED COLONOSCOPY, SINGLE OR MULTIPLE BIOPSY/POLYPECTOMY BY BIOPSY;;  Surgeon: Wong Beaulieu MD;  Location:  GI     COLONOSCOPY N/A 2/23/2018    Procedure: COMBINED COLONOSCOPY, SINGLE OR MULTIPLE BIOPSY/POLYPECTOMY BY BIOPSY;  COLONOSCOPY ;  Surgeon: Yessica Santana MD;  Location:  GI     ENT SURGERY      throat 1969, vocal cord surgery with skin grafting     ESOPHAGOSCOPY, GASTROSCOPY, DUODENOSCOPY (EGD), COMBINED N/A 12/16/2014    Procedure: COMBINED ENDOSCOPIC ULTRASOUND, ESOPHAGOSCOPY, GASTROSCOPY, DUODENOSCOPY (EGD), FINE  NEEDLE ASPIRATE/BIOPSY;  Surgeon: Yessica Santana MD;  Location:  GI     ESOPHAGOSCOPY, GASTROSCOPY, DUODENOSCOPY (EGD), COMBINED N/A 12/16/2014    Procedure: COMBINED ESOPHAGOSCOPY, GASTROSCOPY, DUODENOSCOPY (EGD), BIOPSY SINGLE OR MULTIPLE;  Surgeon: Yessica Santana MD;  Location:  GI     LAPAROSCOPIC APPENDECTOMY  5/30/2013    Procedure: LAPAROSCOPIC APPENDECTOMY;;  Surgeon: Salvador Morris MD;  Location:  OR     LAPAROSCOPIC BIOPSY LIVER  5/30/2013    Procedure: LAPAROSCOPIC BIOPSY LIVER;;  Surgeon: Salvador Morris MD;  Location:  OR     LAPAROSCOPIC GASTRIC SLEEVE  5/30/2013    Procedure: LAPAROSCOPIC GASTRIC SLEEVE;  LAPAROSCOPIC SLEEVE GASTRECTOMY/ LAPARSCOPIC  APPENDECTOMY /LIVER BIOPSIES/LIVER CYST DRAINAGE;  Surgeon: Salvador Morris MD;  Location:  OR     ORTHOPEDIC SURGERY      R wrist 2010       Current Outpatient Medications   Medication     ARIPiprazole (ABILIFY) 5 MG tablet     atorvastatin (LIPITOR) 20 MG tablet     brexpiprazole (REXULTI) 4 MG tablet     calcium carbonate-vitamin D (CALCIUM 600/VITAMIN D) 600-400 MG-UNIT CHEW     carBAMazepine (TEGRETOL) 200 MG tablet     carBAMazepine (TEGRETOL) 200 MG tablet     Cholecalciferol (VITAMIN D3 PO)     Cholecalciferol (VITAMIN D3 PO)     denosumab (PROLIA) 60 MG/ML SOLN injection     Dextromethorphan-Guaifenesin (MUCINEX DM PO)     docusate sodium (COLACE) 100 MG tablet     DULOXETINE HCL PO     FOLIC ACID PO     ketoconazole (NIZORAL) 2 % cream     Melatonin 10 MG TABS     methylphenidate (RITALIN) 10 MG tablet     metroNIDAZOLE (METROGEL) 1 % gel     MINOCYCLINE HCL PO     Mirtazapine (REMERON PO)     Multiple Vitamin (MULTIVITAMINS PO)     ranitidine (ZANTAC) 150 MG tablet     ranitidine (ZANTAC) 150 MG tablet     sulfacetamide sodium, Acne, 10 % lotion     TRAZODONE HCL PO     triamcinolone (KENALOG) 0.5 % OINT ointment     trospium (SANCTURA) 20 MG tablet     trospium (SANCTURA) 20 MG tablet     Venlafaxine HCl  (EFFEXOR PO)     No current facility-administered medications for this visit.      New medication: On Duloxetine and Mirtazpine and Trazodone and Ritalin. She discontinued Effexor and Abilify previously  per Dr. Marquise Coleman, her psychiatrist.  On 4/10/19 she informed me that she started Abilify and stopped  Rexulti.    PHQ-9 SCORE 2017   PHQ-9 Total Score MyChart 8 (Mild depression) 10 (Moderate depression) 7 (Mild depression)   PHQ-9 Total Score 8 10 7     NAS-7 SCORE 2016   Total Score - 6 (mild anxiety)   Total Score 3 -     SOCIAL HISTORY:  Ms. Padilla grew up in the [place] area and is the oldest of 5 children in her family of origin.  Her father was a dentist who she believes was bipolar.  He  of lung cancer at age 72.  Her mother  of sepsis at age 80.  She had been diagnosed with breast cancer when Ms. Padilla was 8.  She describes the marriage between her parents as misael.  She is 5 years older than her closest-aged sister and they are all within 4 years of each other.   Her daughter  12/3 and her mother   She tends to feel more depressed in winter.      Ms. Padilla attended [employer] for a year and later went to night school at the [employer].  She felt that she could not continue her education to the point of graduation because she was working full time and raising her daughter.  Her daughter  in  at age 31 of liver failure secondary to an accidental Tylenol overdose.  She had been recovering from a hysterectomy.      Ms. 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  She had to retire at the time secondary to her MS.  She has never .  She has not dated,  is interested in dating, and states that if she were she would be interested in a relationship with a woman.  There is no history of   service or legal problems.  She expresses concern about her increasing social isolation.  She does have at least 1 friend, Jael, who she met at a therapy group in 1984.  She is active in facilitating groups for people with MS both in [place] and [place].  She lives alone and currently gets help from a home health aide, as well as home health nurse.     She sees Dr. Ban Coleman, psychiatrist and is trying to figure out best antidepressant regimen.  .Effexor reduced to 300 per Dr. Coleman and is now substituting Cymbalta for it.  She feels she has been more depressed since the Fall, but doesn't think it is SAD.   She states that she is recommending case management.     SESSION:  Mili arrived punctually for today's meetinge and is greeted in the waiting room.  Her depression is easing a little.   She is upset today about her enuresis.  She had an accident coming over today.  We discussed  Steps she could take to decrease risk (e.g., use timer to go to bathroom hourly).  She may address it as a topic in onre of the MS groups she runsl.  She is turning 64 and realizes she will proably be spending more time I using a scooter as her disease advances.  She is losing someo f the disability funding she has been getting.  She is taking steps to decrease her expenditures.  She estimates spending $50/week on snacks.  We targeted that as a change that would help both financially and weight management.  We reviewed her failure to meet goal of <1750 janice/day.  We discussed cutting back eating to a goal of 25 lbs in the next year.  We discussed the importance of changing the eating and no longer eating for stress or because she realizes foods taste goo  Given her large weight gain we spent most of the discussion addressing her need to change her eating habits, especially snacks.  We discussed the importance of changing purchasing.   I encouraged her to think about 1500, and will reassess next month.   She participated  fully and appeared to derive benefit. Affect is positive.  Rapport was excellent.    Extended session due to complexity of case and length of interval.  Time in: 11:03  Time out:  12:03     DIAGNOSES:   Major depression, recurrent, mild (F33.0).   Behavioral factors affecting morbid obesity (E66.01).     PLAN:  Ms. Padilla will return to clinic on 8/1 @ 12  for problem-solving and supportive therapy consistent with treatment plan..  Goal per day now that she is tracking will change from 2000/day to 1750 consistently.  This is a compromise from 1500.    Last treatment plan signed:10/22/2018  Treatment plan review due: 8/31/2019                           Ramin Olivo, PhD, A.B.P.P., L.P.   Director, Health Psychology

## 2019-09-15 ASSESSMENT — PATIENT HEALTH QUESTIONNAIRE - PHQ9
SUM OF ALL RESPONSES TO PHQ QUESTIONS 1-9: 5
SUM OF ALL RESPONSES TO PHQ QUESTIONS 1-9: 5

## 2019-09-16 ASSESSMENT — PATIENT HEALTH QUESTIONNAIRE - PHQ9: SUM OF ALL RESPONSES TO PHQ QUESTIONS 1-9: 5

## 2019-09-18 ENCOUNTER — OFFICE VISIT (OUTPATIENT)
Dept: PSYCHOLOGY | Facility: CLINIC | Age: 64
End: 2019-09-18
Payer: COMMERCIAL

## 2019-09-18 VITALS — BODY MASS INDEX: 47.69 KG/M2 | WEIGHT: 293 LBS

## 2019-09-18 DIAGNOSIS — F54 PSYCHOLOGICAL FACTORS AFFECTING MORBID OBESITY (H): Primary | ICD-10-CM

## 2019-09-18 DIAGNOSIS — E66.01 PSYCHOLOGICAL FACTORS AFFECTING MORBID OBESITY (H): Primary | ICD-10-CM

## 2019-09-18 DIAGNOSIS — Z56.0 UNEMPLOYMENT: ICD-10-CM

## 2019-09-18 SDOH — ECONOMIC STABILITY - INCOME SECURITY: UNEMPLOYMENT, UNSPECIFIED: Z56.0

## 2019-09-18 NOTE — PROGRESS NOTES
Health Psychology                  Clinic    Department of Medicine  Sherrie Crowe, Ph.D., L.P. [phone]                          Clin[facility] Center  [facility] Vin Ward, Ph.D.,  L.P. [phone]                 3rd Floor  [place] Mail Code 741   Ramin Olivo, Ph.D., ARIADNA., L.P. [phone]     [address] Enriqueta Sorto, Ph.D., L.P. [phone]  [place] [address] Kathie Galvan, Ph.D., L.P. [phone]      Health Psychology Follow-Up Note     Ms. Padilla is a 63-year-old woman self-referred for psychological consultation because her therapist of the last 24 years retired.  She is seen for problem-solving and supportive therapy for depression and multiple health issues.     HISTORY OF PRESENTING CONCERN:  Ms. Padilla reports a lengthy history of depression.  She currently sees a psychiatrist, Ban Coleman at [facility], and is taking Abilify 7.5 mg, trazodone 500 mg, ripazepam 75 mg each day at bedtime, Tegretol 400 mg at bedtime and methylphenidate 10 mg b.i.d.  She discontineud ability and is now taking Rexulti 2 mg.  She has a history of hospitalizations including 3 at [facility] in the late 1990s, early 2000s when she had 2 courses of ECT lasting 7-10 sessions and approximately 3 other single session ECTs.  She stopped due to memory problems.  She kept with Dr. Coleman who she first met as an inpatient and has seen her for the past 18 years.  She had also been hospitalized psychiatrically at [facility] at age 23.  She previously worked with psychiatrist, Doug Sarabia for three years, stopping, in part, because she didn't feel he took her suicide attempt sufficiently seriously.  She reports her depression tends to vary.  Currently it is moderate, though it was more severe within the past year especially when she was having additional  health problems.      MEDICAL HISTORY:  Ms. Padilla has a complex medical history.  She was diagnosed with MS in 1985.  Her psychiatrist at [facility] in [place], Dr. 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  She has used a scooter since 1992, using it more in the last 6 months than she had previously.  She also can use a walker.  She was diagnosed with fibromyalgia in 1997.   She is also seen at the [facility] for her eye care.  She began to see an eating disorder therapist weekly and has been somewhat erratically losing weight.     WEIGHT Today is 305.5 down from 304.7# in June.  New goal is 9239-1652 calories/day.  Discussed exercise options , e.g., MS aerobics.     She stopped attending OA.    Wt Readings from Last 4 Encounters:   09/18/19 138.1 kg (304 lb 8 oz)   06/28/19 138.2 kg (304 lb 11.2 oz)   06/17/19 137.3 kg (302 lb 11.2 oz)   05/31/19 136.6 kg (301 lb 1.6 oz)     Past Medical History:   Diagnosis Date     Depression, major, in partial remission (H)      Fibromyalgia syndrome      Gastro-oesophageal reflux disease      Hyperlipemia      Lymphedema      Multiple sclerosis (H)     tremors with MS, all four limbs and head     Multiple sclerosis, secondary progressive (H)      Sleep apnea      Vocal cord paralysis, unilateral complete         Past Surgical History:   Procedure Laterality Date     COLONOSCOPY N/A 7/17/2017    Procedure: COMBINED COLONOSCOPY, SINGLE OR MULTIPLE BIOPSY/POLYPECTOMY BY BIOPSY;;  Surgeon: Wong Beaulieu MD;  Location:  GI     COLONOSCOPY N/A 2/23/2018    Procedure: COMBINED COLONOSCOPY, SINGLE OR MULTIPLE BIOPSY/POLYPECTOMY BY BIOPSY;  COLONOSCOPY ;  Surgeon: Yessica Santana MD;  Location:  GI     ENT SURGERY      throat 1969, vocal cord surgery with skin grafting     ESOPHAGOSCOPY, GASTROSCOPY, DUODENOSCOPY (EGD), COMBINED N/A 12/16/2014    Procedure: COMBINED ENDOSCOPIC ULTRASOUND, ESOPHAGOSCOPY, GASTROSCOPY,  DUODENOSCOPY (EGD), FINE NEEDLE ASPIRATE/BIOPSY;  Surgeon: Yessica Santana MD;  Location:  GI     ESOPHAGOSCOPY, GASTROSCOPY, DUODENOSCOPY (EGD), COMBINED N/A 12/16/2014    Procedure: COMBINED ESOPHAGOSCOPY, GASTROSCOPY, DUODENOSCOPY (EGD), BIOPSY SINGLE OR MULTIPLE;  Surgeon: Yessica Santana MD;  Location:  GI     LAPAROSCOPIC APPENDECTOMY  5/30/2013    Procedure: LAPAROSCOPIC APPENDECTOMY;;  Surgeon: Salvador Morris MD;  Location:  OR     LAPAROSCOPIC BIOPSY LIVER  5/30/2013    Procedure: LAPAROSCOPIC BIOPSY LIVER;;  Surgeon: Salvador Morris MD;  Location:  OR     LAPAROSCOPIC GASTRIC SLEEVE  5/30/2013    Procedure: LAPAROSCOPIC GASTRIC SLEEVE;  LAPAROSCOPIC SLEEVE GASTRECTOMY/ LAPARSCOPIC  APPENDECTOMY /LIVER BIOPSIES/LIVER CYST DRAINAGE;  Surgeon: Salvador Morris MD;  Location:  OR     ORTHOPEDIC SURGERY      R wrist 2010       Current Outpatient Medications   Medication     ARIPiprazole (ABILIFY) 5 MG tablet     atorvastatin (LIPITOR) 20 MG tablet     brexpiprazole (REXULTI) 4 MG tablet     calcium carbonate-vitamin D (CALCIUM 600/VITAMIN D) 600-400 MG-UNIT CHEW     carBAMazepine (TEGRETOL) 200 MG tablet     carBAMazepine (TEGRETOL) 200 MG tablet     Cholecalciferol (VITAMIN D3 PO)     Cholecalciferol (VITAMIN D3 PO)     denosumab (PROLIA) 60 MG/ML SOLN injection     Dextromethorphan-Guaifenesin (MUCINEX DM PO)     docusate sodium (COLACE) 100 MG tablet     DULOXETINE HCL PO     FOLIC ACID PO     ketoconazole (NIZORAL) 2 % cream     Melatonin 10 MG TABS     methylphenidate (RITALIN) 10 MG tablet     metroNIDAZOLE (METROGEL) 1 % gel     MINOCYCLINE HCL PO     Mirtazapine (REMERON PO)     Multiple Vitamin (MULTIVITAMINS PO)     ranitidine (ZANTAC) 150 MG tablet     ranitidine (ZANTAC) 150 MG tablet     sulfacetamide sodium, Acne, 10 % lotion     TRAZODONE HCL PO     triamcinolone (KENALOG) 0.5 % OINT ointment     trospium (SANCTURA) 20 MG tablet     trospium (SANCTURA) 20 MG tablet      Venlafaxine HCl (EFFEXOR PO)     No current facility-administered medications for this visit.      New medication: On Duloxetine and Mirtazpine and Trazodone and Ritalin. She discontinued Effexor and Abilify previously  per Dr. Marquise Coleman, her psychiatrist.  On 4/10/19 she informed me that she started Abilify and stopped  Rexulti.    PHQ-9 SCORE 2018 2019 9/15/2019   PHQ-9 Total Score MyChart 10 (Moderate depression) 7 (Mild depression) 5 (Mild depression)   PHQ-9 Total Score 10 7 5     NAS-7 SCORE 2016   Total Score - 6 (mild anxiety)   Total Score 3 -     SOCIAL HISTORY:  Ms. Padilla grew up in the [place] area and is the oldest of 5 children in her family of origin.  Her father was a dentist who she believes was bipolar.  He  of lung cancer at age 72.  Her mother  of sepsis at age 80.  She had been diagnosed with breast cancer when Ms. Padilla was 8.  She describes the marriage between her parents as misael.  She is 5 years older than her closest-aged sister and they are all within 4 years of each other.   Her daughter  12/3 and her mother   She tends to feel more depressed in winter.      Ms. Padilla attended [employer] for a year and later went to night school at the [employer].  She felt that she could not continue her education to the point of graduation because she was working full time and raising her daughter.  Her daughter  in  at age 31 of liver failure secondary to an accidental Tylenol overdose.  She had been recovering from a hysterectomy.      Ms. 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  She had to retire at the time secondary to her MS.  She has never .  She has not dated,  is interested in dating, and states that if she were she would be interested in a relationship with a woman.   There is no history of  service or legal problems.  She expresses concern about her increasing social isolation.  She does have at least 1 friend, Jael, who she met at a therapy group in 1984.  She is active in facilitating groups for people with MS both in [place] and [place].  She lives alone and currently gets help from a home health aide, as well as home health nurse.     She sees Dr. Ban Coleman, psychiatrist and is trying to figure out best antidepressant regimen.  .Effexor reduced to 300 per Dr. Coleman and is now substituting Cymbalta for it.  She feels she has been more depressed since the Fall, but doesn't think it is SAD.   She states that she is recommending case management.     SESSION:  Mili arrived punctually for today's meeting and is greeted in the waiting room.  She cancelled her last appointment. Her depression is easing a little.  She turned 64 and realizes she will proably be spending more time using a scooter as her disease advances, and that if her weight increases and/or her disease progresses she will have less independence..  She states she has been eating snacks,  But having more days under 2,000 calories.  She hadn't been tracking amounts.  We explored options for more exercise.  She is considering a program n [place]/ We discussed the importance of changing the eating and no longer eating for stress or because she realizes foods taste goo  She continues to keep late hours.  She is falling asleep in the reclner more.  She is not using the bipap.  We disucssed the importance of sleep-related changes again.  She participated fully and appeared to derive benefit. Affect is positive.  Rapport was excellent.    The treatment plan was reviewed with the patient at today s visit. The treatment plan remains current based on the patient s status and progress to date.  Extended session due to complexity of case and length of interval.  Time in: 2:05  Time out:  2:58     DIAGNOSES:    Major depression, recurrent, mild (F33.0).   Behavioral factors affecting morbid obesity (E66.01).     PLAN:  Ms. Padilla will return to clinic on 10/16 @ 3  for problem-solving and supportive therapy consistent with treatment plan..  Goal per day now that she is tracking will change from 2000/day to 1750 consistently.  This is a compromise from 1500.    Last treatment plan signed:10/22/2018  Treatment plan review due: 10/22/2018                        Ramin Olivo, PhD, A.B.P.P., L.P.   Director, Health Psychology

## 2019-09-30 ENCOUNTER — HEALTH MAINTENANCE LETTER (OUTPATIENT)
Age: 64
End: 2019-09-30

## 2019-10-14 ENCOUNTER — HOSPITAL ENCOUNTER (OUTPATIENT)
Dept: MAMMOGRAPHY | Facility: CLINIC | Age: 64
Discharge: HOME | End: 2019-10-14
Attending: FAMILY MEDICINE | Admitting: FAMILY MEDICINE
Payer: COMMERCIAL

## 2019-10-14 DIAGNOSIS — Z12.31 VISIT FOR SCREENING MAMMOGRAM: ICD-10-CM

## 2019-10-14 PROCEDURE — 77063 BREAST TOMOSYNTHESIS BI: CPT

## 2019-10-16 ENCOUNTER — OFFICE VISIT (OUTPATIENT)
Dept: PSYCHOLOGY | Facility: CLINIC | Age: 64
End: 2019-10-16
Payer: COMMERCIAL

## 2019-10-16 VITALS — BODY MASS INDEX: 47.52 KG/M2 | WEIGHT: 293 LBS

## 2019-10-16 DIAGNOSIS — E66.01 PSYCHOLOGICAL FACTORS AFFECTING MORBID OBESITY (H): Primary | ICD-10-CM

## 2019-10-16 DIAGNOSIS — F33.0 MAJOR DEPRESSIVE DISORDER, RECURRENT EPISODE, MILD (H): ICD-10-CM

## 2019-10-16 DIAGNOSIS — F54 PSYCHOLOGICAL FACTORS AFFECTING MORBID OBESITY (H): Primary | ICD-10-CM

## 2019-10-16 NOTE — PROGRESS NOTES
Health Psychology                  Clinic    Department of Medicine  Sherrie Crowe, Ph.D., L.P. [phone]                          Clinics and Surgery Center  [facility] Vin Ward, Ph.D.,  L.P. [phone]                 3rd Floor  [place] Mail Code 741   Ramin Olivo, Ph.D., ARIADNA., L.P. [phone]     [address] Enriqueta Sorto, Ph.D., L.P. [phone]  [place] [address] Kathie Galvan, Ph.D., L.P. [phone]      Health Psychology Follow-Up Note     Ms. Padilla is a 63-year-old woman self-referred for psychological consultation because her therapist of the last 24 years retired.  She is seen for problem-solving and supportive therapy for depression and multiple health issues.     HISTORY OF PRESENTING CONCERN:  Ms. Padilla reports a lengthy history of depression.  She currently sees a psychiatrist, Ban Coleman at [facility], and is taking Abilify 7.5 mg, trazodone 500 mg, ripazepam 75 mg each day at bedtime, Tegretol 400 mg at bedtime and methylphenidate 10 mg b.i.d.  She discontineud ability and is now taking Rexulti 2 mg.  She has a history of hospitalizations including 3 at [facility] in the late 1990s, early 2000s when she had 2 courses of ECT lasting 7-10 sessions and approximately 3 other single session ECTs.  She stopped due to memory problems.  She kept with Dr. Coleman who she first met as an inpatient and has seen her for the past 18 years.  She had also been hospitalized psychiatrically at [facility] at age 23.  She previously worked with psychiatrist, Doug Sarabia for three years, stopping, in part, because she didn't feel he took her suicide attempt sufficiently seriously.  She reports her depression tends to vary.  Currently it is moderate, though it was more severe within the past year especially when she was having additional  health problems.      MEDICAL HISTORY:  Ms. Padilla has a complex medical history.  She was diagnosed with MS in 1985.  Her psychiatrist at [facility] in [place], Dr. 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  She has used a scooter since 1992, using it more in the last 6 months than she had previously.  She also can use a walker.  She was diagnosed with fibromyalgia in 1997.   She is also seen at the [facility] for her eye care.  She began to see an eating disorder therapist weekly and has been somewhat erratically losing weight.     WEIGHT Today is 303.4  Down from 304.5.  New goal is 9230-6258 calories/day.  Discussed exercise options , e.g., MS aerobics.  Discussed goal of cutting back by 125 calories per day.   That would get her losing 2 lbs./month.      She stopped attending OA.    Wt Readings from Last 4 Encounters:   10/16/19 137.6 kg (303 lb 6.4 oz)   09/18/19 138.1 kg (304 lb 8 oz)   06/28/19 138.2 kg (304 lb 11.2 oz)   06/17/19 137.3 kg (302 lb 11.2 oz)     Past Medical History:   Diagnosis Date     Depression, major, in partial remission (H)      Fibromyalgia syndrome      Gastro-oesophageal reflux disease      Hyperlipemia      Lymphedema      Multiple sclerosis (H)     tremors with MS, all four limbs and head     Multiple sclerosis, secondary progressive (H)      Sleep apnea      Vocal cord paralysis, unilateral complete         Past Surgical History:   Procedure Laterality Date     COLONOSCOPY N/A 7/17/2017    Procedure: COMBINED COLONOSCOPY, SINGLE OR MULTIPLE BIOPSY/POLYPECTOMY BY BIOPSY;;  Surgeon: Wong Beaulieu MD;  Location:  GI     COLONOSCOPY N/A 2/23/2018    Procedure: COMBINED COLONOSCOPY, SINGLE OR MULTIPLE BIOPSY/POLYPECTOMY BY BIOPSY;  COLONOSCOPY ;  Surgeon: Yessica Santana MD;  Location:  GI     ENT SURGERY      throat 1969, vocal cord surgery with skin grafting     ESOPHAGOSCOPY, GASTROSCOPY, DUODENOSCOPY (EGD), COMBINED  N/A 12/16/2014    Procedure: COMBINED ENDOSCOPIC ULTRASOUND, ESOPHAGOSCOPY, GASTROSCOPY, DUODENOSCOPY (EGD), FINE NEEDLE ASPIRATE/BIOPSY;  Surgeon: Yessica Santana MD;  Location:  GI     ESOPHAGOSCOPY, GASTROSCOPY, DUODENOSCOPY (EGD), COMBINED N/A 12/16/2014    Procedure: COMBINED ESOPHAGOSCOPY, GASTROSCOPY, DUODENOSCOPY (EGD), BIOPSY SINGLE OR MULTIPLE;  Surgeon: Yessica Santana MD;  Location:  GI     LAPAROSCOPIC APPENDECTOMY  5/30/2013    Procedure: LAPAROSCOPIC APPENDECTOMY;;  Surgeon: Salvador Morris MD;  Location:  OR     LAPAROSCOPIC BIOPSY LIVER  5/30/2013    Procedure: LAPAROSCOPIC BIOPSY LIVER;;  Surgeon: Salvador Morris MD;  Location:  OR     LAPAROSCOPIC GASTRIC SLEEVE  5/30/2013    Procedure: LAPAROSCOPIC GASTRIC SLEEVE;  LAPAROSCOPIC SLEEVE GASTRECTOMY/ LAPARSCOPIC  APPENDECTOMY /LIVER BIOPSIES/LIVER CYST DRAINAGE;  Surgeon: Salvador Morris MD;  Location:  OR     ORTHOPEDIC SURGERY      R wrist 2010       Current Outpatient Medications   Medication     ARIPiprazole (ABILIFY) 5 MG tablet     atorvastatin (LIPITOR) 20 MG tablet     brexpiprazole (REXULTI) 4 MG tablet     calcium carbonate-vitamin D (CALCIUM 600/VITAMIN D) 600-400 MG-UNIT CHEW     carBAMazepine (TEGRETOL) 200 MG tablet     carBAMazepine (TEGRETOL) 200 MG tablet     Cholecalciferol (VITAMIN D3 PO)     Cholecalciferol (VITAMIN D3 PO)     denosumab (PROLIA) 60 MG/ML SOLN injection     Dextromethorphan-Guaifenesin (MUCINEX DM PO)     docusate sodium (COLACE) 100 MG tablet     DULOXETINE HCL PO     FOLIC ACID PO     ketoconazole (NIZORAL) 2 % cream     Melatonin 10 MG TABS     methylphenidate (RITALIN) 10 MG tablet     metroNIDAZOLE (METROGEL) 1 % gel     MINOCYCLINE HCL PO     Mirtazapine (REMERON PO)     Multiple Vitamin (MULTIVITAMINS PO)     ranitidine (ZANTAC) 150 MG tablet     ranitidine (ZANTAC) 150 MG tablet     sulfacetamide sodium, Acne, 10 % lotion     TRAZODONE HCL PO     triamcinolone (KENALOG) 0.5 %  OINT ointment     trospium (SANCTURA) 20 MG tablet     trospium (SANCTURA) 20 MG tablet     Venlafaxine HCl (EFFEXOR PO)     No current facility-administered medications for this visit.      New medication: On Duloxetine and Mirtazpine and Trazodone and Ritalin. She discontinued Effexor and Abilify previously  per Dr. Marquise Coleman, her psychiatrist.  On 4/10/19 she informed me that she started Abilify and stopped  Rexulti.    PHQ-9 SCORE 2018 2019 9/15/2019   PHQ-9 Total Score MyChart 10 (Moderate depression) 7 (Mild depression) 5 (Mild depression)   PHQ-9 Total Score 10 7 5     NAS-7 SCORE 2016   Total Score - 6 (mild anxiety)   Total Score 3 -     SOCIAL HISTORY:  Ms. Padilla grew up in the [place] area and is the oldest of 5 children in her family of origin.  Her father was a dentist who she believes was bipolar.  He  of lung cancer at age 72.  Her mother  of sepsis at age 80.  She had been diagnosed with breast cancer when Ms. Padilla was 8.  She describes the marriage between her parents as misael.  She is 5 years older than her closest-aged sister and they are all within 4 years of each other.   Her daughter  12/3 and her mother   She tends to feel more depressed in winter.      Ms. Padilla attended [employer] for a year and later went to night school at the [employer].  She felt that she could not continue her education to the point of graduation because she was working full time and raising her daughter.  Her daughter  in  at age 31 of liver failure secondary to an accidental Tylenol overdose.  She had been recovering from a hysterectomy.      Ms. 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  She had to retire at the time secondary to her MS.  She has never .  She has not dated,  is interested in dating,  and states that if she were she would be interested in a relationship with a woman.  There is no history of  service or legal problems.  She expresses concern about her increasing social isolation.  She does have at least 1 friend, Jael, who she met at a therapy group in 1984.  She is active in facilitating groups for people with MS both in [place] and [place].  She lives alone and currently gets help from a home health aide, as well as home health nurse.     She sees Dr. Ban Coleman, psychiatrist and is trying to figure out best antidepressant regimen.  .Effexor reduced to 300 per Dr. Coleman and is now substituting Cymbalta for it.  She feels she has been more depressed since the Fall, but doesn't think it is SAD.   She states that she is recommending case management.     SESSION:  Mili arrived punctually for today's meeting and is greeted in the waiting room.Her depression is easing a little.  We discussed her progress with weight and how she would feel if it was to be accelerated. We discussed variuos strategies for avoiding extra calories, especially snacks.  She states she has been eating snacks and some of the time having more days under 2,000 calories.  She hadn't been tracking amounts but again is encouraged to.    We explored options for more exercise.  She is considering a program n [place].  There are logistical issues of getting there given her helper arrives at or after 9.  We problem-solved about it.   She continues to keep late hours.  She gets frustrated when we discuss an earlyer start in the morning or going to bed earlier.     She participated fully and appeared to derive benefit. Affect is positive.  Rapport was excellent.    Time in: 3:00  Time out:  3:51     DIAGNOSES:   Major depression, recurrent, mild (F33.0).   Behavioral factors affecting morbid obesity (E66.01).     PLAN:  Ms. Padilla will return to clinic on 11/20  @ 3  for problem-solving and supportive therapy consistent  with treatment plan..  Goal per day now that she is tracking will change from 2000/day to 1750 consistently.  This is a compromise from 1500.    Last treatment plan signed:10/22/2018  Treatment plan review due: 10/22/2018 (next session)                       Ramin Olivo, PhD, A.B.P.P., L.P.   Director, Health Psychology

## 2019-11-20 ENCOUNTER — OFFICE VISIT (OUTPATIENT)
Dept: PSYCHOLOGY | Facility: CLINIC | Age: 64
End: 2019-11-20
Payer: COMMERCIAL

## 2019-11-20 VITALS — WEIGHT: 293 LBS | BODY MASS INDEX: 47.71 KG/M2

## 2019-11-20 DIAGNOSIS — F54 PSYCHOLOGICAL FACTORS AFFECTING MORBID OBESITY (H): Primary | ICD-10-CM

## 2019-11-20 DIAGNOSIS — E66.01 PSYCHOLOGICAL FACTORS AFFECTING MORBID OBESITY (H): Primary | ICD-10-CM

## 2019-11-20 DIAGNOSIS — F33.0 MAJOR DEPRESSIVE DISORDER, RECURRENT EPISODE, MILD (H): ICD-10-CM

## 2019-11-20 DIAGNOSIS — Z56.0 UNEMPLOYMENT: ICD-10-CM

## 2019-11-20 SDOH — ECONOMIC STABILITY - INCOME SECURITY: UNEMPLOYMENT, UNSPECIFIED: Z56.0

## 2019-11-20 NOTE — PROGRESS NOTES
Health Psychology                  Clinic    Department of Medicine  Sherrie Crowe, Ph.D., L.P. [phone]                          Clinics and Surgery Center  [facility] Vin Ward, Ph.D.,  L.P. [phone]                 3rd Floor  [place] Mail Code 741   Ramin Olivo, Ph.D., ARIADNA., L.P. [phone]      [address] Enriqueta Sorto, Ph.D., L.P. [phone]  [place] [address] Kathie Galvan, Ph.D., L.P. [phone]      Health Psychology Follow-Up Note     Ms. Padilla is a 63-year-old woman self-referred for psychological consultation because her therapist of the last 24 years retired.  She is seen for problem-solving and supportive therapy for depression and multiple health issues.     HISTORY OF PRESENTING CONCERN:  Ms. Padilla reports a lengthy history of depression.  She currently sees a psychiatrist, Ban Coleman at [facility], and is taking Abilify 7.5 mg, trazodone 500 mg, ripazepam 75 mg each day at bedtime, Tegretol 400 mg at bedtime and methylphenidate 10 mg b.i.d.  She discontineud ability and is now taking Rexulti 2 mg.  She has a history of hospitalizations including 3 at [facility] in the late 1990s, early 2000s when she had 2 courses of ECT lasting 7-10 sessions and approximately 3 other single session ECTs.  She stopped due to memory problems.  She kept with Dr. Coleman who she first met as an inpatient and has seen her for the past 18 years.  She had also been hospitalized psychiatrically at [facility] at age 23.  She previously worked with psychiatrist, Doug Sarabia for three years, stopping, in part, because she didn't feel he took her suicide attempt sufficiently seriously.  She reports her depression tends to vary.  Currently it is moderate, though it was more severe within the past year especially when she was having additional  health problems.      MEDICAL HISTORY:  Ms. Padilla has a complex medical history.  She was diagnosed with MS in 1985.  Her psychiatrist at [facility] in [place], Dr. 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  She has used a scooter since 1992, using it more in the last 6 months than she had previously.  She also can use a walker.  She was diagnosed with fibromyalgia in 1997.   She is also seen at the [facility] for her eye care.  She began to see an eating disorder therapist weekly and has been somewhat erratically losing weight.     WEIGHT Today is 304.6# up from 303.4  Goal is 9313-0841 calories/day.  Discussed exercise options , e.g., MS aerobics.  Discussed goal of cutting back by 125 calories per day.   That would get her losing 2 lbs./month.      She stopped attending OA.    Wt Readings from Last 4 Encounters:   11/20/19 138.2 kg (304 lb 9.6 oz)   10/16/19 137.6 kg (303 lb 6.4 oz)   09/18/19 138.1 kg (304 lb 8 oz)   06/28/19 138.2 kg (304 lb 11.2 oz)     Past Medical History:   Diagnosis Date     Depression, major, in partial remission (H)      Fibromyalgia syndrome      Gastro-oesophageal reflux disease      Hyperlipemia      Lymphedema      Multiple sclerosis (H)     tremors with MS, all four limbs and head     Multiple sclerosis, secondary progressive (H)      Sleep apnea      Vocal cord paralysis, unilateral complete         Past Surgical History:   Procedure Laterality Date     COLONOSCOPY N/A 7/17/2017    Procedure: COMBINED COLONOSCOPY, SINGLE OR MULTIPLE BIOPSY/POLYPECTOMY BY BIOPSY;;  Surgeon: Wong Beaulieu MD;  Location:  GI     COLONOSCOPY N/A 2/23/2018    Procedure: COMBINED COLONOSCOPY, SINGLE OR MULTIPLE BIOPSY/POLYPECTOMY BY BIOPSY;  COLONOSCOPY ;  Surgeon: Yessica Santana MD;  Location:  GI     ENT SURGERY      throat 1969, vocal cord surgery with skin grafting     ESOPHAGOSCOPY, GASTROSCOPY, DUODENOSCOPY (EGD), COMBINED N/A  12/16/2014    Procedure: COMBINED ENDOSCOPIC ULTRASOUND, ESOPHAGOSCOPY, GASTROSCOPY, DUODENOSCOPY (EGD), FINE NEEDLE ASPIRATE/BIOPSY;  Surgeon: Yessica Santana MD;  Location:  GI     ESOPHAGOSCOPY, GASTROSCOPY, DUODENOSCOPY (EGD), COMBINED N/A 12/16/2014    Procedure: COMBINED ESOPHAGOSCOPY, GASTROSCOPY, DUODENOSCOPY (EGD), BIOPSY SINGLE OR MULTIPLE;  Surgeon: Yessica Santana MD;  Location:  GI     LAPAROSCOPIC APPENDECTOMY  5/30/2013    Procedure: LAPAROSCOPIC APPENDECTOMY;;  Surgeon: Salvador Morris MD;  Location:  OR     LAPAROSCOPIC BIOPSY LIVER  5/30/2013    Procedure: LAPAROSCOPIC BIOPSY LIVER;;  Surgeon: Salvador Morris MD;  Location:  OR     LAPAROSCOPIC GASTRIC SLEEVE  5/30/2013    Procedure: LAPAROSCOPIC GASTRIC SLEEVE;  LAPAROSCOPIC SLEEVE GASTRECTOMY/ LAPARSCOPIC  APPENDECTOMY /LIVER BIOPSIES/LIVER CYST DRAINAGE;  Surgeon: Salvador Morris MD;  Location:  OR     ORTHOPEDIC SURGERY      R wrist 2010       Current Outpatient Medications   Medication     ARIPiprazole (ABILIFY) 5 MG tablet     atorvastatin (LIPITOR) 20 MG tablet     brexpiprazole (REXULTI) 4 MG tablet     calcium carbonate-vitamin D (CALCIUM 600/VITAMIN D) 600-400 MG-UNIT CHEW     carBAMazepine (TEGRETOL) 200 MG tablet     carBAMazepine (TEGRETOL) 200 MG tablet     Cholecalciferol (VITAMIN D3 PO)     Cholecalciferol (VITAMIN D3 PO)     denosumab (PROLIA) 60 MG/ML SOLN injection     Dextromethorphan-Guaifenesin (MUCINEX DM PO)     docusate sodium (COLACE) 100 MG tablet     DULOXETINE HCL PO     FOLIC ACID PO     ketoconazole (NIZORAL) 2 % cream     Melatonin 10 MG TABS     methylphenidate (RITALIN) 10 MG tablet     metroNIDAZOLE (METROGEL) 1 % gel     MINOCYCLINE HCL PO     Mirtazapine (REMERON PO)     Multiple Vitamin (MULTIVITAMINS PO)     ranitidine (ZANTAC) 150 MG tablet     ranitidine (ZANTAC) 150 MG tablet     sulfacetamide sodium, Acne, 10 % lotion     TRAZODONE HCL PO     triamcinolone (KENALOG) 0.5 % OINT  ointment     trospium (SANCTURA) 20 MG tablet     trospium (SANCTURA) 20 MG tablet     Venlafaxine HCl (EFFEXOR PO)     No current facility-administered medications for this visit.      New medication: On Duloxetine and Mirtazpine and Trazodone and Ritalin. She discontinued Effexor and Abilify previously  per Dr. Marquise Coleman, her psychiatrist.  On 4/10/19 she informed me that she started Abilify and stopped  Rexulti.    PHQ-9 SCORE 2018 2019 9/15/2019   PHQ-9 Total Score MyChart 10 (Moderate depression) 7 (Mild depression) 5 (Mild depression)   PHQ-9 Total Score 10 7 5     NAS-7 SCORE 2016   Total Score - 6 (mild anxiety)   Total Score 3 -     SOCIAL HISTORY:  Ms. Padilla grew up in the [place] area and is the oldest of 5 children in her family of origin.  Her father was a dentist who she believes was bipolar.  He  of lung cancer at age 72.  Her mother  of sepsis at age 80.  She had been diagnosed with breast cancer when Ms. Padilla was 8.  She describes the marriage between her parents as misael.  She is 5 years older than her closest-aged sister and they are all within 4 years of each other.   Her daughter  12/3 and her mother   She tends to feel more depressed in winter.      Ms. Padilla attended [employer] for a year and later went to night school at the [employer].  She felt that she could not continue her education to the point of graduation because she was working full time and raising her daughter.  Her daughter  in  at age 31 of liver failure secondary to an accidental Tylenol overdose.  She had been recovering from a hysterectomy.      Ms. 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  She had to retire at the time secondary to her MS.  She has never .  She has not dated,  is interested in dating, and  states that if she were she would be interested in a relationship with a woman.  There is no history of  service or legal problems.  She expresses concern about her increasing social isolation.  She does have at least 1 friend, Jael, who she met at a therapy group in 1984.  She is active in facilitating groups for people with MS both in [place] and [place].  She lives alone and currently gets help from a home health aide, as well as home health nurse.     She sees Dr. Ban Coleman, psychiatrist and is trying to figure out best antidepressant regimen.  .Effexor reduced to 300 per Dr. Coleman and is now substituting Cymbalta for it.  She feels she has been more depressed since the Fall, but doesn't think it is SAD.   She states that she is recommending case management.     SESSION:  Mili arrived punctually for today's meeting and is greeted in the waiting room.Her depression is easing a little.  We discussed her progress with weight which is erratic.  She seems more motivated though and is willing to add to counting up her calories per day.   We discussed variuos strategies for avoiding extra calories, especially snacks.  She realizes now she can't purchase the snacks.  We explored options for more exercise.  She was considering a program in [place], but can't make it so instead will see if [facility] a program for her in [place].   Previously the MS society had a program, but they dropped it, which is a source of frustration.  We discussed using her walker more, getting out of the house more, sponge bathing herself the 4 days she doesn't get help with a shower.   We discussed her mood as not getting worse, so she should have more energy to work on her health goals.  She is also volunteering at least 25 days/year with some activities with the MS Society.  She knows she has to have more activities as [facility]  We also discussed an earlier bedtime (11:30-12:30 from 12-1).    There are logistical issues of getting there given her helper arrives at or after 9.  We problem-solved about it.   She continues to keep late hours.  She gets frustrated when we discuss an earlyer start in the morning or going to bed earlier.     A treatment plan was completed.  She participated fully and appeared to derive benefit. Affect is positive.  Rapport was excellent.    Time in: 3:09  Time out:  4:02     DIAGNOSES:   Major depression, recurrent, mild (F33.0).   Behavioral factors affecting morbid obesity (E66.01).     PLAN:  Ms. Padilla will return to clinic on 12/18  @ 12  for problem-solving and supportive therapy consistent with treatment plan..  Goal per day now that she is tracking will change from 2000/day to 1750 consistently.  This is a compromise from 1500.    Last treatment plan signed:11/20/2019  Last Treatment plan review: 11/20/2019 ([facility])  Treatment plan review due:  2/20/2020                     Ramin Olivo, PhD, A.B.P.P., L.P.   Director, Health Psychology

## 2019-12-05 DIAGNOSIS — G35 SECONDARY PROGRESSIVE MULTIPLE SCLEROSIS (H): ICD-10-CM

## 2019-12-05 DIAGNOSIS — H47.20 OPTIC ATROPHY OF BOTH EYES: Primary | ICD-10-CM

## 2019-12-15 ENCOUNTER — HEALTH MAINTENANCE LETTER (OUTPATIENT)
Age: 64
End: 2019-12-15

## 2020-01-02 ENCOUNTER — OFFICE VISIT (OUTPATIENT)
Dept: OPHTHALMOLOGY | Facility: CLINIC | Age: 65
End: 2020-01-02
Attending: OPHTHALMOLOGY
Payer: COMMERCIAL

## 2020-01-02 DIAGNOSIS — G35 SECONDARY PROGRESSIVE MULTIPLE SCLEROSIS (H): ICD-10-CM

## 2020-01-02 DIAGNOSIS — H47.20 OPTIC ATROPHY OF BOTH EYES: Primary | ICD-10-CM

## 2020-01-02 PROCEDURE — G0463 HOSPITAL OUTPT CLINIC VISIT: HCPCS | Mod: ZF

## 2020-01-02 PROCEDURE — 92133 CPTRZD OPH DX IMG PST SGM ON: CPT | Mod: ZF | Performed by: OPHTHALMOLOGY

## 2020-01-02 PROCEDURE — 92083 EXTENDED VISUAL FIELD XM: CPT | Mod: ZF | Performed by: OPHTHALMOLOGY

## 2020-01-02 ASSESSMENT — SLIT LAMP EXAM - LIDS
COMMENTS: NORMAL
COMMENTS: NORMAL

## 2020-01-02 ASSESSMENT — TONOMETRY
IOP_METHOD: ICARE
OD_IOP_MMHG: 10
OS_IOP_MMHG: 9

## 2020-01-02 ASSESSMENT — VISUAL ACUITY
OD_CC: 20/25
CORRECTION_TYPE: GLASSES
METHOD: SNELLEN - LINEAR
OS_CC: 20/25

## 2020-01-02 ASSESSMENT — REFRACTION_WEARINGRX
OS_ADD: +1.75
OD_AXIS: 95
OD_ADD: +1.75
OS_SPHERE: -4.75
OS_AXIS: 004
OD_CYLINDER: +1.75
OD_SPHERE: -6.25
OS_CYLINDER: +0.50

## 2020-01-02 ASSESSMENT — CUP TO DISC RATIO
OS_RATIO: 0.6
OD_RATIO: 0.7

## 2020-01-02 ASSESSMENT — CONF VISUAL FIELD
OD_NORMAL: 1
OS_NORMAL: 1

## 2020-01-02 ASSESSMENT — EXTERNAL EXAM - RIGHT EYE: OD_EXAM: NORMAL

## 2020-01-02 ASSESSMENT — EXTERNAL EXAM - LEFT EYE: OS_EXAM: NORMAL

## 2020-01-02 NOTE — NURSING NOTE
Chief Complaints and History of Present Illnesses   Patient presents with     Blurred Vision Follow-Up     Chief Complaint(s) and History of Present Illness(es)     Blurred Vision Follow-Up               Comments     Mili Padilla is a 63 year old female with the following diagnoses:   1. Optic atrophy   2. Secondary progressive multiple sclerosis (H)   Updated glasses since the last visit.   Patient has noticed that vision at night is getting worse, even in the house. Needs a lot of light to see well.     Yuly WAITE 1:49 PM January 2, 2020

## 2020-01-02 NOTE — PROGRESS NOTES
"       Assessment & Plan     Mili Padilla is a 64 year old female with the following diagnoses:   1. Optic atrophy of both eyes    2. Secondary progressive multiple sclerosis (H)           Here for routine eye examination. Last visit November 2018. She notes that she is having more difficulty seeing under dim light conditions.     Secondary progressive MS (follows [facility]). She states that she is \"stable\". She is not on a MS medication, not since 2005. She just saw her MS specialist 6 months ago.     H/o optic neuritis left eye 1986. No episodes of painful vision loss. Vision overall good.     Relatively stable RNFL OCT OU, visual fields stable. No ARTEMIO.     Visually significant cataracts in both eyes. Has cupping both eyes. This is due to her multiple sclerosis and not low tension glaucoma (LTG).       Will give new MRx. Will think about cataract surgery.    RTC 1-2 years, sooner if needed.     Zach Leavitt MD  Ophthalmology, PGY-5  Neuro-Ophthalmology Fellow             Attending Physician Attestation:  Complete documentation of historical and exam elements from today's encounter can be found in the full encounter summary report (not reduplicated in this progress note).  I personally obtained the chief complaint(s) and history of present illness.  I confirmed and edited as necessary the review of systems, past medical/surgical history, family history, social history, and examination findings as documented by others; and I examined the patient myself.  I personally reviewed the relevant tests, images, and reports as documented above.  I formulated and edited as necessary the assessment and plan and discussed the findings and management plan with the patient and family. - Zach Pak MD      "

## 2020-01-29 ENCOUNTER — OFFICE VISIT (OUTPATIENT)
Dept: PSYCHOLOGY | Facility: CLINIC | Age: 65
End: 2020-01-29
Payer: COMMERCIAL

## 2020-01-29 VITALS — WEIGHT: 293 LBS | BODY MASS INDEX: 47.53 KG/M2

## 2020-01-29 DIAGNOSIS — F33.0 MAJOR DEPRESSIVE DISORDER, RECURRENT EPISODE, MILD (H): ICD-10-CM

## 2020-01-29 DIAGNOSIS — F54 PSYCHOLOGICAL FACTORS AFFECTING MORBID OBESITY (H): Primary | ICD-10-CM

## 2020-01-29 DIAGNOSIS — Z56.0 UNEMPLOYMENT: ICD-10-CM

## 2020-01-29 DIAGNOSIS — E66.01 PSYCHOLOGICAL FACTORS AFFECTING MORBID OBESITY (H): Primary | ICD-10-CM

## 2020-01-29 SDOH — ECONOMIC STABILITY - INCOME SECURITY: UNEMPLOYMENT, UNSPECIFIED: Z56.0

## 2020-01-29 NOTE — PROGRESS NOTES
Health Psychology                   Clinic    Department of Medicine  Izzy Montaño, Ph.D., L.P. [phone]                        Clinics and Surgery Center  [facility] Sherrie Crowe, Ph.D., L.P. [phone]                 3rd Floor  [place] Mail Code 262   Radha Lorenzana, Ph.D.  [phone]     [address] Vin Ward, Ph.D.,  L.P. [phone]      [place]   [address]  [place] [address]           Ranulfo Whipple, Ph.D. [phone]      Enriqueta Sorto, Ph.D., L.P. [phone]    Ramin Olivo, Ph.D., A.B.P.P., L.P. [phone]     Kathie Galvan, Ph.D., L.P. [phone]     Health Psychology Follow-Up Note     Ms. Padilla is a 64-year-old woman self-referred for psychological consultation because her therapist of the last 24 years retired.  She is seen for problem-solving and supportive therapy for depression and multiple health issues.     HISTORY OF PRESENTING CONCERN:  Ms. Padilla reports a lengthy history of depression.  She currently sees a psychiatrist, Ban Coleman at [facility], and is taking Abilify 7.5 mg, trazodone 500 mg, ripazepam 75 mg each day at bedtime, Tegretol 400 mg at bedtime and methylphenidate 10 mg b.i.d.  She discontineud ability and is now taking Rexulti 2 mg.  She has a history of hospitalizations including 3 at [facility] in the late 1990s, early 2000s when she had 2 courses of ECT lasting 7-10 sessions and approximately 3 other single session ECTs.  She stopped due to memory problems.  She kept with Dr. Coleman who she first met as an inpatient and has seen her for the past 18 years.  She had also been hospitalized psychiatrically at [facility] at age 23.  She previously worked with psychiatrist, Doug Sarabia for three years, stopping, in part, because she didn't feel he took her suicide attempt sufficiently seriously.  She reports her  depression tends to vary.  Currently it is moderate, though it was more severe within the past year especially when she was having additional health problems.      MEDICAL HISTORY:  Ms. Padilla has a complex medical history.  She was diagnosed with MS in 1985.  Her psychiatrist at [facility] in [place], Dr. 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  She has used a scooter since 1992, using it more in the last 6 months than she had previously.  She also can use a walker.  She was diagnosed with fibromyalgia in 1997.   She is also seen at the [facility] for her eye care.  She began to see an eating disorder therapist weekly and has been somewhat erratically losing weight.     WEIGHT Today is 303.5# down from 304.6# Goal is 9214-4734 calories/day.  Discussed exercise options , e.g., MS aerobics.  Discussed goal of cutting back by 125 calories per day.   That would get her losing 2 lbs./month.      She stopped attending OA.    Wt Readings from Last 4 Encounters:   01/29/20 137.7 kg (303 lb 8 oz)   11/20/19 138.2 kg (304 lb 9.6 oz)   10/16/19 137.6 kg (303 lb 6.4 oz)   09/18/19 138.1 kg (304 lb 8 oz)     Past Medical History:   Diagnosis Date     Depression, major, in partial remission (H)      Fibromyalgia syndrome      Gastro-oesophageal reflux disease      Hyperlipemia      Lymphedema      Multiple sclerosis (H)     tremors with MS, all four limbs and head     Multiple sclerosis, secondary progressive (H)      Sleep apnea      Vocal cord paralysis, unilateral complete         Past Surgical History:   Procedure Laterality Date     COLONOSCOPY N/A 7/17/2017    Procedure: COMBINED COLONOSCOPY, SINGLE OR MULTIPLE BIOPSY/POLYPECTOMY BY BIOPSY;;  Surgeon: Wong Beaulieu MD;  Location:  GI     COLONOSCOPY N/A 2/23/2018    Procedure: COMBINED COLONOSCOPY, SINGLE OR MULTIPLE BIOPSY/POLYPECTOMY BY BIOPSY;  COLONOSCOPY ;  Surgeon: Yessica Santana MD;  Location:  GI      ENT SURGERY      throat 1969, vocal cord surgery with skin grafting     ESOPHAGOSCOPY, GASTROSCOPY, DUODENOSCOPY (EGD), COMBINED N/A 12/16/2014    Procedure: COMBINED ENDOSCOPIC ULTRASOUND, ESOPHAGOSCOPY, GASTROSCOPY, DUODENOSCOPY (EGD), FINE NEEDLE ASPIRATE/BIOPSY;  Surgeon: Yessica Santana MD;  Location:  GI     ESOPHAGOSCOPY, GASTROSCOPY, DUODENOSCOPY (EGD), COMBINED N/A 12/16/2014    Procedure: COMBINED ESOPHAGOSCOPY, GASTROSCOPY, DUODENOSCOPY (EGD), BIOPSY SINGLE OR MULTIPLE;  Surgeon: Yessica Santana MD;  Location:  GI     LAPAROSCOPIC APPENDECTOMY  5/30/2013    Procedure: LAPAROSCOPIC APPENDECTOMY;;  Surgeon: Salvador Morris MD;  Location:  OR     LAPAROSCOPIC BIOPSY LIVER  5/30/2013    Procedure: LAPAROSCOPIC BIOPSY LIVER;;  Surgeon: Salvador Morris MD;  Location:  OR     LAPAROSCOPIC GASTRIC SLEEVE  5/30/2013    Procedure: LAPAROSCOPIC GASTRIC SLEEVE;  LAPAROSCOPIC SLEEVE GASTRECTOMY/ LAPARSCOPIC  APPENDECTOMY /LIVER BIOPSIES/LIVER CYST DRAINAGE;  Surgeon: Salvador Morris MD;  Location:  OR     ORTHOPEDIC SURGERY      R wrist 2010       Current Outpatient Medications   Medication     ARIPiprazole (ABILIFY) 5 MG tablet     atorvastatin (LIPITOR) 20 MG tablet     brexpiprazole (REXULTI) 4 MG tablet     calcium carbonate-vitamin D (CALCIUM 600/VITAMIN D) 600-400 MG-UNIT CHEW     carBAMazepine (TEGRETOL) 200 MG tablet     carBAMazepine (TEGRETOL) 200 MG tablet     Cholecalciferol (VITAMIN D3 PO)     Cholecalciferol (VITAMIN D3 PO)     denosumab (PROLIA) 60 MG/ML SOLN injection     Dextromethorphan-Guaifenesin (MUCINEX DM PO)     docusate sodium (COLACE) 100 MG tablet     DULOXETINE HCL PO     FOLIC ACID PO     ketoconazole (NIZORAL) 2 % cream     Melatonin 10 MG TABS     methylphenidate (RITALIN) 10 MG tablet     metroNIDAZOLE (METROGEL) 1 % gel     MINOCYCLINE HCL PO     Mirtazapine (REMERON PO)     Multiple Vitamin (MULTIVITAMINS PO)     ranitidine (ZANTAC) 150 MG tablet      ranitidine (ZANTAC) 150 MG tablet     sulfacetamide sodium, Acne, 10 % lotion     TRAZODONE HCL PO     triamcinolone (KENALOG) 0.5 % OINT ointment     trospium (SANCTURA) 20 MG tablet     trospium (SANCTURA) 20 MG tablet     Venlafaxine HCl (EFFEXOR PO)     No current facility-administered medications for this visit.      New medication: On Duloxetine and Mirtazpine and Trazodone and Ritalin. She discontinued Effexor and Abilify previously  per Dr. Marquise Coleman, her psychiatrist.  On 4/10/19 she informed me that she started Abilify and stopped  Rexulti.    PHQ-9 SCORE 2018 2019 9/15/2019   PHQ-9 Total Score MyChart 10 (Moderate depression) 7 (Mild depression) 5 (Mild depression)   PHQ-9 Total Score 10 7 5     NAS-7 SCORE 2016   Total Score - 6 (mild anxiety)   Total Score 3 -     SOCIAL HISTORY:  Ms. Padilla grew up in the [place] and is the oldest of 5 children in her family of origin.  Her father was a dentist who she believes was bipolar.  He  of lung cancer at age 72.  Her mother  of sepsis at age 80.  She had been diagnosed with breast cancer when Ms. Padilla was 8.  She describes the marriage between her parents as misael.  She is 5 years older than her closest-aged sister and they are all within 4 years of each other.   Her daughter  12/3 and her mother   She tends to feel more depressed in winter.      Ms. Padilla attended [employer] for a year and later went to night school at the [employer].  She felt that she could not continue her education to the point of graduation because she was working full time and raising her daughter.  Her daughter  in  at age 31 of liver failure secondary to an accidental Tylenol overdose.  She had been recovering from a hysterectomy.      Ms. 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  She had to retire at the time secondary to her MS.  She has never .  She has not dated,  is interested in dating, and states that if she were she would be interested in a relationship with a woman.  There is no history of  service or legal problems.  She expresses concern about her increasing social isolation.  She does have at least 1 friend, Jael, who she met at a therapy group in 1984.  She is active in facilitating groups for people with MS both in [place] and [place].  She lives alone and currently gets help from a home health aide, as well as home health nurse.     She sees Dr. Ban Coleman, psychiatrist and is trying to figure out best antidepressant regimen.  .Effexor reduced to 300 per Dr. Coleman and is now substituting Cymbalta for it.  She feels she has been more depressed since the Fall, but doesn't think it is SAD.   She states that she is recommending case management.     SESSION:  Mili arrived punctually for today's meeting and is greeted in the waiting room.Her depression is easing a little. She cancelled her last appointment due to back issues or cold.    We discussed her progress with weight which is erratic.  She lost 1 lb.   We discussed goal of 2/month.  She seems more motivated though and is willing to add to counting up her calories per day.   We discussed variuos strategies for avoiding extra calories, especially snacks.  She realizes now she can't purchase the snacks.  She discussed getting back on to a free food program in light of [employer] cutting her support.  She will plan to get 5 free meals/week and fruit and reduce her [employer] budget.  She acknowledged 5 days of 3,000+ calories.  We discussed other ways she could cut expenses including checking with utilities and Internet provider  about scholarships for fixed income individuals.  We discussed her mood as not getting worse.  It had gotten better until learning about loss of support. She is  seeking financial support from her sisters.   Overall the focus was on what she could do with the problems she has, rather than worry about them.  She found it helpful and encouraging.  She participated fully and appeared to derive benefit. Affect is positive.  Rapport was excellent.      Extended session due to complexity of case and length of interval.    Time in: 3:03  Time out:  3:58     DIAGNOSES:   Major depression, recurrent, mild (F33.0).   Behavioral factors affecting morbid obesity (E66.01).     PLAN:  Ms. Padilla will return to clinic on 2/25 @ 2  for problem-solving and supportive therapy consistent with treatment plan..  Goal per day now that she is tracking will change from 2000/day to 1750 consistently.  This is a compromise from 1500.    Last treatment plan signed:11/20/2019  Last Treatment [facility] review: [facility]20/2019 ([facility]  Treatment plan review due:  2/20/2020  (next session)                  Ramin Olivo, PhD, A.B.P.P., L.P.   Director, Health Psychology

## 2020-02-04 ENCOUNTER — OFFICE VISIT (OUTPATIENT)
Dept: OTOLARYNGOLOGY | Facility: CLINIC | Age: 65
End: 2020-02-04
Payer: COMMERCIAL

## 2020-02-04 VITALS
SYSTOLIC BLOOD PRESSURE: 141 MMHG | DIASTOLIC BLOOD PRESSURE: 68 MMHG | BODY MASS INDEX: 45.99 KG/M2 | HEIGHT: 67 IN | WEIGHT: 293 LBS | HEART RATE: 80 BPM

## 2020-02-04 DIAGNOSIS — R13.19 OTHER DYSPHAGIA: ICD-10-CM

## 2020-02-04 DIAGNOSIS — J38.01 VOCAL CORD PARALYSIS, UNILATERAL COMPLETE: Primary | ICD-10-CM

## 2020-02-04 RX ORDER — TRIAMCINOLONE ACETONIDE 5 MG/G
CREAM TOPICAL AT BEDTIME
COMMUNITY
Start: 2020-01-06 | End: 2023-09-12

## 2020-02-04 RX ORDER — INFLUENZA A VIRUS A/VICTORIA/4897/2022 IVR-238 (H1N1) ANTIGEN (FORMALDEHYDE INACTIVATED), INFLUENZA A VIRUS A/CALIFORNIA/122/2022 SAN-022 (H3N2) ANTIGEN (FORMALDEHYDE INACTIVATED), AND INFLUENZA B VIRUS B/MICHIGAN/01/2021 ANTIGEN (FORMALDEHYDE INACTIVATED) 60; 60; 60 UG/.5ML; UG/.5ML; UG/.5ML
INJECTION, SUSPENSION INTRAMUSCULAR
COMMUNITY
Start: 2019-11-01 | End: 2020-12-25

## 2020-02-04 ASSESSMENT — MIFFLIN-ST. JEOR: SCORE: 1961.1

## 2020-02-04 ASSESSMENT — PAIN SCALES - GENERAL: PAINLEVEL: NO PAIN (0)

## 2020-02-04 NOTE — LETTER
2/4/2020     RE: Mili Padilla  [address]  [facility] [address]     Dear Colleague,    Thank you for referring your patient, Mili Padilla, to the [facility] at [facility]. Please see a copy of my visit note below.      HISTORY OF PRESENT ILLNESS: Mili Padilla is a 64 year old female with a history of trauma to the larynx at age 13 from a skiing accident.  She was reconstructed by Dr. Hammer in the past and subsequently followed by Dr. Day.  I had seen her initially in 2008 and I have been following her since then.  Her airway has been stable since last seen her on 1/31/2019.  She has had no difficulties since her last visit.  She notes that she swallows liquids without any difficulties.  Solid and dry foods sometimes will get slightly increased difficulty but all her swallowing well.  She has no changes in her voice no new breathing issues.  She has a long-standing left vocal fold immobility.  She also notes she has had some slightly increased in congestion and drainage.  She is taking no nasal medication for this.    Last 2 Scores for Patient-Answered VHI Questionnaire  VHI Total Score 12/8/2016   VHI Total Score 21       Last 2 Scores for Patient-Answered CSI Questionnaire  CSI Total Score 12/8/2016 1/28/2019   CSI Total Score 7 4         Last 2 Scores for Patient-Answered EAT Questionnaire  EAT Total Score 12/8/2016 1/28/2019   EAT Total Score 10 5           PAST MEDICAL HISTORY:   Past Medical History:   Diagnosis Date     Depression, major, in partial remission (H)      Fibromyalgia syndrome      Gastro-oesophageal reflux disease      Hyperlipemia      Lymphedema      Multiple sclerosis (H)     tremors with MS, all four limbs and head     Multiple sclerosis, secondary progressive (H)      Sleep apnea      Vocal cord paralysis, unilateral complete        PAST SURGICAL HISTORY:   Past Surgical History:   Procedure Laterality Date      COLONOSCOPY N/A 7/17/2017    Procedure: COMBINED COLONOSCOPY, SINGLE OR MULTIPLE BIOPSY/POLYPECTOMY BY BIOPSY;;  Surgeon: Wong Beaulieu MD;  Location:  GI     COLONOSCOPY N/A 2/23/2018    Procedure: COMBINED COLONOSCOPY, SINGLE OR MULTIPLE BIOPSY/POLYPECTOMY BY BIOPSY;  COLONOSCOPY ;  Surgeon: Yessica Santana MD;  Location:  GI     ENT SURGERY      throat 1969, vocal cord surgery with skin grafting     ESOPHAGOSCOPY, GASTROSCOPY, DUODENOSCOPY (EGD), COMBINED N/A 12/16/2014    Procedure: COMBINED ENDOSCOPIC ULTRASOUND, ESOPHAGOSCOPY, GASTROSCOPY, DUODENOSCOPY (EGD), FINE NEEDLE ASPIRATE/BIOPSY;  Surgeon: Yessica Santana MD;  Location:  GI     ESOPHAGOSCOPY, GASTROSCOPY, DUODENOSCOPY (EGD), COMBINED N/A 12/16/2014    Procedure: COMBINED ESOPHAGOSCOPY, GASTROSCOPY, DUODENOSCOPY (EGD), BIOPSY SINGLE OR MULTIPLE;  Surgeon: Yessica Santana MD;  Location: [facility]     LAPAROSCOPIC APPENDECTOMY  5/30/2013    Procedure: LAPAROSCOPIC APPENDECTOMY;;  Surgeon: Salvador Morris MD;  Location:  OR     LAPAROSCOPIC BIOPSY LIVER  5/30/2013    Procedure: LAPAROSCOPIC BIOPSY LIVER;;  Surgeon: Salvador Morris MD;  Location:  OR     LAPAROSCOPIC GASTRIC SLEEVE  5/30/2013    Procedure: LAPAROSCOPIC GASTRIC SLEEVE;  LAPAROSCOPIC SLEEVE GASTRECTOMY/ LAPARSCOPIC  APPENDECTOMY /LIVER BIOPSIES/LIVER CYST DRAINAGE;  Surgeon: Salvador Morris MD;  Location:  OR     ORTHOPEDIC SURGERY      R wrist 2010       FAMILY HISTORY:   Family History   Problem Relation Age of Onset     Breast Cancer Mother      Other Cancer Father      Breast Cancer Maternal Aunt      Breast Cancer Maternal Aunt      Breast Cancer Maternal Aunt      Breast Cancer Maternal Aunt      Glaucoma No family hx of      Macular Degeneration No family hx of      Amblyopia No family hx of        SOCIAL HISTORY:   Social History     Tobacco Use     Smoking status: Former Smoker     Types: Cigarettes     Last attempt to quit: 9/27/1974      Years since quittin.3     Smokeless tobacco: Never Used   Substance Use Topics     Alcohol use: Yes     Alcohol/week: 0.0 standard drinks     Comment: Once a month.       REVIEW OF SYSTEMS: Ten point review of systems was performed and is negative except for:   [facility] 2020   Constitutional -   Neurology Dizzy spells, Numbness   Psychology Frequently feeling depressed or sad   Eyes -   Ears, Nose, Throat Hoarseness   Cardiopulmonary Cough   Gastrointestinal/Genitourinary Heartburn/indigestion, Constipation   Musculoskeletal Swollen legs/feet   Endocrine Heat or cold intolerance        ALLERGIES: Augmentin; Betaseron [interferon beta-1b]; Dust mite extract; and Mold    MEDICATIONS:   Current Outpatient Medications   Medication Sig Dispense Refill     ARIPiprazole (ABILIFY) 5 MG tablet TK 1 T PO D  2     atorvastatin (LIPITOR) 20 MG tablet Take 20 mg by mouth daily.       Calcium Citrate-Vitamin D 250-200 MG-UNIT TABS Take 2 tablets by mouth       carBAMazepine (TEGRETOL) 200 MG tablet Take 300 mg by mouth every morning. 300mg = 1.5 tablets.       carBAMazepine (TEGRETOL) 200 MG tablet Take 400 mg by mouth At Bedtime.       denosumab (PROLIA) 60 MG/ML SOLN injection Inject 60 mg Subcutaneous every 6 months       Dextromethorphan-Guaifenesin (MUCINEX DM PO) 1200mg daily       DULOXETINE HCL PO Take 60 mg by mouth 2 times daily       FLUZONE HIGH-DOSE 0.5 ML injection ADMINISTER 0.5ML IN THE MUSCLE AS DIRECTED       ketoconazole (NIZORAL) 2 % cream Apply topically daily       Melatonin 10 MG TABS Take 10 mg by mouth At Bedtime       menthol-zinc oxide (CALMOSEPTINE) 0.44-20.6 % OINT ointment        methylphenidate (RITALIN) 10 MG tablet Take 10 mg by mouth 2 times daily       Mirtazapine (REMERON PO) Take 75 mg by mouth At Bedtime. Takes ( 5)    15mg tablets=75mg       Multiple Vitamin (MULTIVITAMINS PO) Take 2 tablets by mouth every evening. WITH IRON       ranitidine (ZANTAC) 150 MG tablet Take 1 tablet  (150 mg) by mouth 2 times daily 180 tablet 3     sulfacetamide sodium, Acne, 10 % lotion APPLY TO FACE QAM  0     TRAZODONE HCL PO Take 500 mg by mouth At Bedtime        triamcinolone (ARISTOCORT HP) 0.5 % external cream APPLY TOPICALLY AA BID       triamcinolone (KENALOG) 0.5 % OINT ointment Apply topically 2 times daily       trospium (SANCTURA) 20 MG tablet Take 1 tablet (20 mg) by mouth 2 times daily (before meals)       vitamin B-Complex            PHYSICAL EXAMINATION:  She  is awake, alert and in no apparent distress.    Her tympanic membranes are clear and intact bilaterally. External auditory canals are clear.  Nasal exam shows a mild septal deviation without obstruction.  Examination of the oral cavity shows no suspicious lesions.  There is symmetric movement of the tongue and soft palate.    The oropharynx is clear.  Her neck is supple without significant adenopathy.  Pulse is regular.  Upper airway is clear.  Cranial nerves II-XII are grossly intact.           FLEXIBLE LARYNGOSCOPY was performed following informed consent and a timeout. Then 3% lidocaine plus 0.25% phenylephrine was sprayed in each nostril. Scope was passed through the right nostril. This showed the right vocal fold to be fully mobile. The left arytenoid is missing and the aryepiglottic fold is draped over the larynx. However, there is an excellent glottic airway. The subglottis also appears to be patent.  The hypopharynx is clear and well visualized with some for rotation of the larynx.  The post cricoid space is well visualized and no pooling of saliva or other material collection is seen.  No other suspicious lesions are seen.     Larngeal exam        Hypopharyngeal  And phonation exam        IMPRESSION/PLAN: Stable laryngeal exam with some new onset of dysphasia over the last 5 years.  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  We discussed antireflux diet and continuing antireflux  medications.  She will talk to her pharmacist about certain pills that I believe can likely be reduced in size.  For her rhinitis I will have her use a saline nasal spray.  All questions were answered I will have her follow-up in approximately 1 year.    Again, thank you for allowing me to participate in the care of your patient.      Sincerely,    Clint Still MD

## 2020-02-04 NOTE — PATIENT INSTRUCTIONS
Try saline nasal spray as needed for dry or runny nose  Don't need DM for Mucinex unless you are fighting a cough.  Ask pharmacist about cutting pills.        GERD (Gastro Esophageal Reflux Disorder)  Other names    reflux     Laryngopharyngeal Reflux Disorder (LPRD)   Symptoms    dry, choking sensation, especially during the night     voice quality that is worst in the morning     raw, burning sensation in the throat     pain in throat, neck, or running from back of chin along neck     frequent coughing or desire to clear throat     rough, gritty voice quality     decline in voice quality or comfort with continued voice use     a sour taste in your mouth upon waking up       Interestingly, the majority of the patients in the [facility] who have laryngeal symptoms of GERD do not have any stomach discomfort or sensation of heartburn.   Cause  Acid from the stomach refluxes back up through the esophagus and spills over onto the larynx. This irritates the vocal folds (also called vocal cords; see the explanation of this terminology) and creates inflammation, which causes the vocal folds to vibrate unevenly. Coughing and throat clearing from the irritation can make the inflammation worse. The resulting voice disorder is often related to the poor vibratory quality of the inflamed vocal folds and the muscle tension created by effortful attempts at compensation.  Treatment  Anti-reflux medication and dietary precautions are the first line of defense. Functional voice therapy is useful to teach techniques for reducing effortful compensation and instruct the individual in improved vocal hygiene.  At the [facility], we do not hand out a list of foods and beverages that must be avoided. Rather, we educate about types of foods and beverages known to cause reflux and encourage the individual to systematically investigate which foods stimulate their own reflux. Also, the individual is encouraged to manage reflux  under the care of a Gastroenterologist (gastrointestinal specialist).  Interested readers are encouraged to look at the website of the [facility] at [facility] for a more in depth discussion of GERD and the voice (see our Links page).  Lifestyle changes that may help reduce symptoms of GERD/LPRD    eat smaller meals more frequently throughout the day, rather than three large meals     elevate the head of your bed 2-3 inches (don't just use extra pillows for your head)     avoid clothes that fit tightly around the waist     avoid lying down within 2-3 hours of eating (don't eat dinner late at night)   Types of foods known to trigger increased stomach acid    spicy food (such as chili or jalapeño peppers, [place]an or Szechuan spices)     acidic foods such as tomato products or citrus products     greasy foods     caffeine     alcohol     carbonation     roughage, such as popcorn and peanuts, or raw vegetables     dairy products     strong mint such as peppermint candies     chocolate     Reading this list might make you think you can only eat bread and oatmeal for the rest of your life. Fortunately, most individuals are not triggered by everything on this list. For example, for every person who is lactose intolerant and has problems with dairy products, there may be someone else whose digestive system is calmed by milk. Also, in our experience, few persons are triggered by peppermints or chocolate. Therefore, we recommend that you experiment with your own dietary habits, changing one food class at a time. Also, if you have recently started taking anti-reflux medications, you may want to wait for several months, to see how the medication works without any change in your dietary habits.

## 2020-02-04 NOTE — PROGRESS NOTES
Speech services were not required. Please disregard this note.    Shane Cedeño M.M., M.A., CCC-SLP  Speech-Language Pathologist  Certificate of Vocology  [phone]

## 2020-02-04 NOTE — NURSING NOTE
"Chief Complaint   Patient presents with     RECHECK     1 yr follow-up     BP (!) 141/68   Pulse 80   Ht 1.702 m (5' 7\")   Wt 138.3 kg (305 lb)   LMP 12/10/2010   BMI 47.77 kg/m      Cindy Valadez LPN    "

## 2020-02-04 NOTE — PROGRESS NOTES
HISTORY OF PRESENT ILLNESS: Mili Padilla is a 64 year old female with a history of trauma to the larynx at age 13 from a skiing accident.  She was reconstructed by Dr. Hammer in the past and subsequently followed by Dr. Day.  I had seen her initially in 2008 and I have been following her since then.  Her airway has been stable since last seen her on 1/31/2019.  She has had no difficulties since her last visit.  She notes that she swallows liquids without any difficulties.  Solid and dry foods sometimes will get slightly increased difficulty but all her swallowing well.  She has no changes in her voice no new breathing issues.  She has a long-standing left vocal fold immobility.  She also notes she has had some slightly increased in congestion and drainage.  She is taking no nasal medication for this.    Last 2 Scores for Patient-Answered VHI Questionnaire  VHI Total Score 12/8/2016   VHI Total Score 21       Last 2 Scores for Patient-Answered CSI Questionnaire  CSI Total Score 12/8/2016 1/28/2019   CSI Total Score 7 4         Last 2 Scores for Patient-Answered EAT Questionnaire  EAT Total Score 12/8/2016 1/28/2019   EAT Total Score 10 5           PAST MEDICAL HISTORY:   Past Medical History:   Diagnosis Date     Depression, major, in partial remission (H)      Fibromyalgia syndrome      Gastro-oesophageal reflux disease      Hyperlipemia      Lymphedema      Multiple sclerosis (H)     tremors with MS, all four limbs and head     Multiple sclerosis, secondary progressive (H)      Sleep apnea      Vocal cord paralysis, unilateral complete        PAST SURGICAL HISTORY:   Past Surgical History:   Procedure Laterality Date     COLONOSCOPY N/A 7/17/2017    Procedure: COMBINED COLONOSCOPY, SINGLE OR MULTIPLE BIOPSY/POLYPECTOMY BY BIOPSY;;  Surgeon: Wong Beaulieu MD;  Location:  GI     COLONOSCOPY N/A 2/23/2018    Procedure: COMBINED COLONOSCOPY, SINGLE OR MULTIPLE BIOPSY/POLYPECTOMY BY BIOPSY;  COLONOSCOPY ;   Surgeon: Yessica Santana MD;  Location:  GI     ENT SURGERY      throat 1969, vocal cord surgery with skin grafting     ESOPHAGOSCOPY, GASTROSCOPY, DUODENOSCOPY (EGD), COMBINED N/A 2014    Procedure: COMBINED ENDOSCOPIC ULTRASOUND, ESOPHAGOSCOPY, GASTROSCOPY, DUODENOSCOPY (EGD), FINE NEEDLE ASPIRATE/BIOPSY;  Surgeon: Yessica Santana MD;  Location:  GI     ESOPHAGOSCOPY, GASTROSCOPY, DUODENOSCOPY (EGD), COMBINED N/A 2014    Procedure: COMBINED ESOPHAGOSCOPY, GASTROSCOPY, DUODENOSCOPY (EGD), BIOPSY SINGLE OR MULTIPLE;  Surgeon: Yessica Santana MD;  Location: [facility]     LAPAROSCOPIC APPENDECTOMY  2013    Procedure: LAPAROSCOPIC APPENDECTOMY;;  Surgeon: Salvador Morris MD;  Location:  OR     LAPAROSCOPIC BIOPSY LIVER  2013    Procedure: LAPAROSCOPIC BIOPSY LIVER;;  Surgeon: Salvador Morris MD;  Location:  OR     LAPAROSCOPIC GASTRIC SLEEVE  2013    Procedure: LAPAROSCOPIC GASTRIC SLEEVE;  LAPAROSCOPIC SLEEVE GASTRECTOMY/ LAPARSCOPIC  APPENDECTOMY /LIVER BIOPSIES/LIVER CYST DRAINAGE;  Surgeon: Salvador Morris MD;  Location:  OR     ORTHOPEDIC SURGERY      R wrist        FAMILY HISTORY:   Family History   Problem Relation Age of Onset     Breast Cancer Mother      Other Cancer Father      Breast Cancer Maternal Aunt      Breast Cancer Maternal Aunt      Breast Cancer Maternal Aunt      Breast Cancer Maternal Aunt      Glaucoma No family hx of      Macular Degeneration No family hx of      Amblyopia No family hx of        SOCIAL HISTORY:   Social History     Tobacco Use     Smoking status: Former Smoker     Types: Cigarettes     Last attempt to quit: 1974     Years since quittin.3     Smokeless tobacco: Never Used   Substance Use Topics     Alcohol use: Yes     Alcohol/week: 0.0 standard drinks     Comment: Once a month.       REVIEW OF SYSTEMS: Ten point review of systems was performed and is negative except for:   [facility] ROS 2020    Constitutional -   Neurology Dizzy spells, Numbness   Psychology Frequently feeling depressed or sad   Eyes -   Ears, Nose, Throat Hoarseness   Cardiopulmonary Cough   Gastrointestinal/Genitourinary Heartburn/indigestion, Constipation   Musculoskeletal Swollen legs/feet   Endocrine Heat or cold intolerance        ALLERGIES: Augmentin; Betaseron [interferon beta-1b]; Dust mite extract; and Mold    MEDICATIONS:   Current Outpatient Medications   Medication Sig Dispense Refill     ARIPiprazole (ABILIFY) 5 MG tablet TK 1 T PO D  2     atorvastatin (LIPITOR) 20 MG tablet Take 20 mg by mouth daily.       Calcium Citrate-Vitamin D 250-200 MG-UNIT TABS Take 2 tablets by mouth       carBAMazepine (TEGRETOL) 200 MG tablet Take 300 mg by mouth every morning. 300mg = 1.5 tablets.       carBAMazepine (TEGRETOL) 200 MG tablet Take 400 mg by mouth At Bedtime.       denosumab (PROLIA) 60 MG/ML SOLN injection Inject 60 mg Subcutaneous every 6 months       Dextromethorphan-Guaifenesin (MUCINEX DM PO) 1200mg daily       DULOXETINE HCL PO Take 60 mg by mouth 2 times daily       FLUZONE HIGH-DOSE 0.5 ML injection ADMINISTER 0.5ML IN THE MUSCLE AS DIRECTED       ketoconazole (NIZORAL) 2 % cream Apply topically daily       Melatonin 10 MG TABS Take 10 mg by mouth At Bedtime       menthol-zinc oxide (CALMOSEPTINE) 0.44-20.6 % OINT ointment        methylphenidate (RITALIN) 10 MG tablet Take 10 mg by mouth 2 times daily       Mirtazapine (REMERON PO) Take 75 mg by mouth At Bedtime. Takes ( 5)    15mg tablets=75mg       Multiple Vitamin (MULTIVITAMINS PO) Take 2 tablets by mouth every evening. WITH IRON       ranitidine (ZANTAC) 150 MG tablet Take 1 tablet (150 mg) by mouth 2 times daily 180 tablet 3     sulfacetamide sodium, Acne, 10 % lotion APPLY TO FACE QAM  0     TRAZODONE HCL PO Take 500 mg by mouth At Bedtime        triamcinolone (ARISTOCORT HP) 0.5 % external cream APPLY TOPICALLY AA BID       triamcinolone (KENALOG) 0.5 % OINT  ointment Apply topically 2 times daily       trospium (SANCTURA) 20 MG tablet Take 1 tablet (20 mg) by mouth 2 times daily (before meals)       vitamin B-Complex            PHYSICAL EXAMINATION:  She  is awake, alert and in no apparent distress.    Her tympanic membranes are clear and intact bilaterally. External auditory canals are clear.  Nasal exam shows a mild septal deviation without obstruction.  Examination of the oral cavity shows no suspicious lesions.  There is symmetric movement of the tongue and soft palate.    The oropharynx is clear.  Her neck is supple without significant adenopathy.  Pulse is regular.  Upper airway is clear.  Cranial nerves II-XII are grossly intact.           FLEXIBLE LARYNGOSCOPY was performed following informed consent and a timeout. Then 3% lidocaine plus 0.25% phenylephrine was sprayed in each nostril. Scope was passed through the right nostril. This showed the right vocal fold to be fully mobile. The left arytenoid is missing and the aryepiglottic fold is draped over the larynx. However, there is an excellent glottic airway. The subglottis also appears to be patent.  The hypopharynx is clear and well visualized with some for rotation of the larynx.  The post cricoid space is well visualized and no pooling of saliva or other material collection is seen.  No other suspicious lesions are seen.     Larngeal exam        Hypopharyngeal  And phonation exam        IMPRESSION/PLAN: Stable laryngeal exam with some new onset of dysphasia over the last 5 years.  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  We discussed antireflux diet and continuing antireflux medications.  She will talk to her pharmacist about certain pills that I believe can likely be reduced in size.  For her rhinitis I will have her use a saline nasal spray.  All questions were answered I will have her follow-up in approximately 1 year.

## 2020-02-25 ENCOUNTER — TELEPHONE (OUTPATIENT)
Dept: PSYCHOLOGY | Facility: CLINIC | Age: 65
End: 2020-02-25

## 2020-02-25 NOTE — TELEPHONE ENCOUNTER
Health Psychology                    Clinic    Department of Medicine  Izzy Montaño, Ph.D., L.P. [phone]                        [facility]  [facility] Sherrie Crowe, Ph.D., L.P. [phone]                 [address] [facility] Mail Code 330   Radha Lorenzana, Ph.D.  [phone]     [address] [place] [address] Vin Ward, Ph.D.,  L.P. [phone]      [address] [address]           Ranulfo Whipple, Ph.D. [phone]      Enriqueta Sorto, Ph.D., L.P. [phone]    Ramin Olivo, Ph.D., A.B.P.P., L.P. [phone]     Kathie Galvan, Ph.D., L.P. [phone]       Telephone Note      Patient called about 2 hours before today's appointment to cancel stating that she has no energy.  She would like to reschedule.    Ramin Olivo, PhD ,  A.B.P.P.  Director, Health Psychology  [phone]

## 2020-03-15 ENCOUNTER — TELEPHONE (OUTPATIENT)
Dept: PSYCHOLOGY | Facility: CLINIC | Age: 65
End: 2020-03-15

## 2020-03-15 NOTE — TELEPHONE ENCOUNTER
Health Psychology      Clinic    Department of Medicine  Izzy Montaño, Ph.D., L.P. [phone]            [facility]  [facility] Sherrie Crowe, Ph.D., L.P. [phone] 3rd Floor  [place] Mail Code [address]   Radha Lorenzana, Ph.D.  [phone]    [address] [place] [address] Vin Ward, Ph.D.,  L.P. [phone]     [address] [address]           Ranulfo Whipple, Ph.D. [phone]      Enriqueta Sorto, Ph.D., L.P. [phone]    Ramin Olivo, Ph.D., A.B.P.P., L.P. [phone]     Kathie Galvan, Ph.D., L.P. [phone]       Telephone Note      I spoke with patient cancelling in-person meeting 3/17 and informing her I would call her at home.  She is agreeable to this in light of the Coronvirus state of emergency.    Ramin Olivo, PhD LP,  A.B.P.P.  Director, Health Psychology  [phone]

## 2020-04-02 ENCOUNTER — MYC MEDICAL ADVICE (OUTPATIENT)
Dept: SURGERY | Facility: CLINIC | Age: 65
End: 2020-04-02

## 2020-04-02 DIAGNOSIS — Z98.84 BARIATRIC SURGERY STATUS: ICD-10-CM

## 2020-04-02 DIAGNOSIS — K21.9 GERD WITHOUT ESOPHAGITIS: Primary | ICD-10-CM

## 2020-04-03 RX ORDER — FAMOTIDINE 20 MG/1
20 TABLET, FILM COATED ORAL 2 TIMES DAILY
Qty: 60 TABLET | Refills: 2 | Status: SHIPPED | OUTPATIENT
Start: 2020-04-03 | End: 2020-07-06

## 2020-04-03 NOTE — TELEPHONE ENCOUNTER
Will try famotidine since pt has hx of osteopenia.  Give 30 day supply with 2 RF. IF it is working and she wants 90 day supply she can let us know.    ADRIA

## 2020-04-08 ENCOUNTER — VIRTUAL VISIT (OUTPATIENT)
Dept: PSYCHOLOGY | Facility: CLINIC | Age: 65
End: 2020-04-08
Payer: COMMERCIAL

## 2020-04-08 DIAGNOSIS — F54 PSYCHOLOGICAL FACTORS AFFECTING MORBID OBESITY (H): ICD-10-CM

## 2020-04-08 DIAGNOSIS — E66.01 PSYCHOLOGICAL FACTORS AFFECTING MORBID OBESITY (H): ICD-10-CM

## 2020-04-08 DIAGNOSIS — F33.1 MAJOR DEPRESSIVE DISORDER, RECURRENT EPISODE, MODERATE (H): Primary | ICD-10-CM

## 2020-04-08 NOTE — PROGRESS NOTES
Health Psychology                   Clinic    Department of Medicine  Izzy Montaño, Ph.D., L.P. [phone]                        [facility]  [facility] Sherrie Crowe, Ph.D., L.P. [phone]                 3rd Floor  [place] Mail Code [address]   Radha Lorenzana, Ph.D.  [phone]     [address] Vin Ward, Ph.D.,  L.P. [phone]      [place]   [address]  [place] [address]           Ranulfo Whipple, Ph.D. [phone]      Enriqueta Sorto, Ph.D., L.P. [phone]    Ramin Olivo, Ph.D., A.B.P.P., L.P. [phone]     Kathie Galvan, Ph.D., L.P. [phone]     Health Psychology [facility] Note     Ms. Padilla is a 64-year-old woman self-referred for psychological consultation because her therapist of the last 24 years retired.  She is seen for problem-solving and supportive therapy for depression and multiple health issues.     HISTORY OF PRESENTING CONCERN:  Ms. Padilla reported at intake(7/28/16) a  lengthy history of depression.  She currently sees a psychiatrist, Ban Coleman at [facility], and is taking Abilify 7.5 mg, trazodone 500 mg, ripazepam 75 mg each day at bedtime, Tegretol 400 mg at bedtime and methylphenidate 10 mg b.i.d.  She discontineud ability and is now taking Rexulti 2 mg.  She has a history of hospitalizations including 3 at [facility] in the late 1990s, early 2000s when she had 2 courses of ECT lasting 7-10 sessions and approximately 3 other single session ECTs.  She stopped due to memory problems.  She kept with Dr. Coleman who she first met as an inpatient and has seen her for the past 18 years.  She had also been hospitalized psychiatrically at [facility] at age 23.  She previously worked with psychiatrist, Doug Sarabia for three years, stopping, in part, because she didn't feel he took her suicide attempt sufficiently  seriously.  She reports her depression tends to vary.  Currently it is moderate, though it was more severe within the past year especially when she was having additional health problems.      MEDICAL HISTORY:  Ms. Padilla has a complex medical history.  She was diagnosed with MS in 1985.  Her psychiatrist at [facility] in [place], Dr. 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  She has used a scooter since 1992, using it more in the last 6 months than she had previously.  She also can use a walker.  She was diagnosed with fibromyalgia in 1997.   She is also seen at the [facility] for her eye care.  She began to see an eating disorder therapist weekly and has been somewhat erratically losing weight.     WEIGHT Today is 303.5# down from 304.6# Goal is 8560-3056 calories/day.  Discussed exercise options , e.g., MS aerobics.  Discussed goal of cutting back by 125 calories per day.   That would get her losing 2 lbs./month.      She stopped attending OA.    Wt Readings from Last 4 Encounters:   02/04/20 138.3 kg (305 lb)   01/29/20 137.7 kg (303 lb 8 oz)   11/20/19 138.2 kg (304 lb 9.6 oz)   10/16/19 137.6 kg (303 lb 6.4 oz)     Past Medical History:   Diagnosis Date     Depression, major, in partial remission (H)      Fibromyalgia syndrome      Gastro-oesophageal reflux disease      Hyperlipemia      Lymphedema      Multiple sclerosis (H)     tremors with MS, all four limbs and head     Multiple sclerosis, secondary progressive (H)      Sleep apnea      Vocal cord paralysis, unilateral complete         Past Surgical History:   Procedure Laterality Date     COLONOSCOPY N/A 7/17/2017    Procedure: COMBINED COLONOSCOPY, SINGLE OR MULTIPLE BIOPSY/POLYPECTOMY BY BIOPSY;;  Surgeon: Wong Beaulieu MD;  Location:  GI     COLONOSCOPY N/A 2/23/2018    Procedure: COMBINED COLONOSCOPY, SINGLE OR MULTIPLE BIOPSY/POLYPECTOMY BY BIOPSY;  COLONOSCOPY ;  Surgeon: Yessica Santana  MD Ria;  Location:  GI     ENT SURGERY      throat 1969, vocal cord surgery with skin grafting     ESOPHAGOSCOPY, GASTROSCOPY, DUODENOSCOPY (EGD), COMBINED N/A 12/16/2014    Procedure: COMBINED ENDOSCOPIC ULTRASOUND, ESOPHAGOSCOPY, GASTROSCOPY, DUODENOSCOPY (EGD), FINE NEEDLE ASPIRATE/BIOPSY;  Surgeon: Yessica Santana MD;  Location:  GI     ESOPHAGOSCOPY, GASTROSCOPY, DUODENOSCOPY (EGD), COMBINED N/A 12/16/2014    Procedure: COMBINED ESOPHAGOSCOPY, GASTROSCOPY, DUODENOSCOPY (EGD), BIOPSY SINGLE OR MULTIPLE;  Surgeon: Yessica Santana MD;  Location:  GI     LAPAROSCOPIC APPENDECTOMY  5/30/2013    Procedure: LAPAROSCOPIC APPENDECTOMY;;  Surgeon: Salvador Morris MD;  Location:  OR     LAPAROSCOPIC BIOPSY LIVER  5/30/2013    Procedure: LAPAROSCOPIC BIOPSY LIVER;;  Surgeon: Salvador Morris MD;  Location:  OR     LAPAROSCOPIC GASTRIC SLEEVE  5/30/2013    Procedure: LAPAROSCOPIC GASTRIC SLEEVE;  LAPAROSCOPIC SLEEVE GASTRECTOMY/ LAPARSCOPIC  APPENDECTOMY /LIVER BIOPSIES/LIVER CYST DRAINAGE;  Surgeon: Salvador Morris MD;  Location:  OR     ORTHOPEDIC SURGERY      R wrist 2010       Current Outpatient Medications   Medication     ARIPiprazole (ABILIFY) 5 MG tablet     atorvastatin (LIPITOR) 20 MG tablet     Calcium Citrate-Vitamin D 250-200 MG-UNIT TABS     carBAMazepine (TEGRETOL) 200 MG tablet     carBAMazepine (TEGRETOL) 200 MG tablet     denosumab (PROLIA) 60 MG/ML SOLN injection     Dextromethorphan-Guaifenesin (MUCINEX DM PO)     DULOXETINE HCL PO     famotidine (PEPCID) 20 MG tablet     FLUZONE HIGH-DOSE 0.5 ML injection     ketoconazole (NIZORAL) 2 % cream     Melatonin 10 MG TABS     menthol-zinc oxide (CALMOSEPTINE) 0.44-20.6 % OINT ointment     methylphenidate (RITALIN) 10 MG tablet     Mirtazapine (REMERON PO)     Multiple Vitamin (MULTIVITAMINS PO)     ranitidine (ZANTAC) 150 MG tablet     sulfacetamide sodium, Acne, 10 % lotion     TRAZODONE HCL PO     triamcinolone (ARISTOCORT  HP) 0.5 % external cream     triamcinolone (KENALOG) 0.5 % OINT ointment     trospium (SANCTURA) 20 MG tablet     vitamin B-Complex     No current facility-administered medications for this visit.      New medication: On Duloxetine and Mirtazpine and Trazodone and Ritalin. She discontinued Effexor and Abilify previously  per Dr. Marquise Coleman, her psychiatrist.  On 4/10/19 she informed me that she started Abilify and stopped  Rexulti.    PHQ-9 SCORE 2018 2019 9/15/2019   PHQ-9 Total Score MyChart 10 (Moderate depression) 7 (Mild depression) 5 (Mild depression)   PHQ-9 Total Score 10 7 5     NAS-7 SCORE 2016   Total Score - 6 (mild anxiety)   Total Score 3 -     SOCIAL HISTORY:  Ms. Padilla grew up in the [place] area and is the oldest of 5 children in her family of origin.  Her father was a dentist who she believes was bipolar.  He  of lung cancer at age 72.  Her mother  of sepsis at age 80.  She had been diagnosed with breast cancer when Ms. Padilla was 8.  She describes the marriage between her parents as misael.  She is 5 years older than her closest-aged sister and they are all within 4 years of each other.   Her daughter  12/3 and her mother   She tends to feel more depressed in winter.      Ms. Padilla attended [employer] for a year and later went to night school at the [employer].  She felt that she could not continue her education to the point of graduation because she was working full time and raising her daughter.  Her daughter  in  at age 31 of liver failure secondary to an accidental Tylenol overdose.  She had been recovering from a hysterectomy.      Ms. 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  She had to retire at the time secondary to her MS.  She has never .  She has not dated,  is interested  in dating, and states that if she were she would be interested in a relationship with a woman.  There is no history of  service or legal problems.  She expresses concern about her increasing social isolation.  She does have at least 1 friend, Jael, who she met at a therapy group in 1984.  She is active in facilitating groups for people with MS both in [place] and [place].  She lives alone and currently gets help from a home health aide, as well as home health nurse.     She sees Dr. Ban Coleman, psychiatrist and is trying to figure out best antidepressant regimen.  .Effexor reduced to 300 per Dr. Coleman and is now substituting Cymbalta for it.  She feels she has been more depressed since the Fall, but doesn't think it is SAD.   She states that she is recommending case management.     SESSION:  Mili participated in a telehealth session with her consent in light of the COVID-19 pandemic. Her depression is significant, as she is feeling frustrted staing mainly at home.  She goes out every other day to grocery or pharmacy  We discussed decreasng those trips by picking up ore provisions/trip, but continuing to get some fresh air.  We discussed startegies for overcoming her social isolation and anxiety.  We discussed her cognitive approach to the social distancing She is encouraged to be generous in her estimate of how long it will take so as not to set herself up for frustration. .  She found it helpful to strategize around it and other issues.  She was pleased to talk and would like to talk again in  3 weeks.   She participated fully and appeared to derive benefit. Affect is positive.  Rapport was excellent.    This telehealth service is appropriate and effective for delivering services in light of the necessity for social distancing to mitigate the COVID-19 epidemic. Patient has agreed to receiving telehealth services.    Time service started: 3:01  Time service ended: 3:56  Extended session due to  complexity of case and length of interval.      Mode of transmission: [email or web address].me    Location of originating:  Home of the patient    Distance site:  Home office of provider  The treatment plan was reviewed with the patient at today s visit. The treatment plan remains current based on the patient s status and progress to date.    DIAGNOSES:   Major depression, recurrent, mild (F33.0).   Behavioral factors affecting morbid obesity (E66.01).     PLAN:  Ms. Padilla will return to clinic on 4/29 @ 3  for problem-solving and supportive therapy consistent with treatment plan..  Goal per day now that she is tracking will change from 2000/day to 1750 consistently.  This is a compromise from 1500.    Last treatment plan signed:11/20/2019  Last Treatment plan review: 4/8/20 ([facility])  Treatment plan review due: 7/8/20             Ramin Olivo, PhD, A.B.P.P., L.P.   Director, Health Psychology

## 2020-04-29 ENCOUNTER — VIRTUAL VISIT (OUTPATIENT)
Dept: PSYCHOLOGY | Facility: CLINIC | Age: 65
End: 2020-04-29
Payer: COMMERCIAL

## 2020-04-29 DIAGNOSIS — F54 PSYCHOLOGICAL FACTORS AFFECTING MORBID OBESITY (H): ICD-10-CM

## 2020-04-29 DIAGNOSIS — E66.01 PSYCHOLOGICAL FACTORS AFFECTING MORBID OBESITY (H): ICD-10-CM

## 2020-04-29 DIAGNOSIS — F33.1 MAJOR DEPRESSIVE DISORDER, RECURRENT EPISODE, MODERATE (H): Primary | ICD-10-CM

## 2020-04-29 NOTE — PROGRESS NOTES
Health Psychology                   Clinic    Department of Medicine  Izzy Montaño, Ph.D., L.P. [phone]                        Clinics and Surgery Center  [facility] Sherrie Crowe, Ph.D., L.P. [phone]                 3rd Floor  [place] Mail [address] 236   Radha Lorenzana, Ph.D.  [phone]     [address] Vin Ward, Ph.D.,  L.P. [phone]      [place]   [address]  [place] [address]           Ranulfo Whipple, Ph.D. [phone]      Enriqueta Sorto, Ph.D., L.P. [phone]    Ramin Olivo, Ph.D., A.B.P.P., L.P. [phone]     Kathie Galvan, Ph.D., L.P. [phone]     Health Psychology [facility] Note    Ms. Padilla is a 64-year-old woman self-referred for psychological consultation because her therapist of the last 24 years retired.  She is seen for problem-solving and supportive therapy for depression and multiple health issues.     HISTORY OF PRESENTING CONCERN:  Ms. Padilla reported at intake (7/28/16) a  lengthy history of depression.  She sees a psychiatrist, Ban Coleman, at [facility], and is taking Abilify 7.5 mg, trazodone 500 mg, ripazepam 75 mg each day at bedtime, Tegretol 400 mg at bedtime and methylphenidate 10 mg b.i.d.  She discontineud ability and is now taking Rexulti 2 mg.  She has a history of hospitalizations including 3 at [facility] in the late 1990s, early 2000s when she had 2 courses of ECT lasting 7-10 sessions and approximately 3 other single session ECTs.  She stopped due to memory problems.  She kept with Dr. Coleman who she first met as an inpatient and has seen her for the past 18 years.  She had also been hospitalized psychiatrically at [facility] at age 23.  She previously worked with psychiatrist, Doug Sarabia for three years, stopping, in part, because she didn't feel he took her suicide attempt sufficiently seriously.  She  reports her depression tends to vary.     MEDICAL HISTORY:  Ms. Padilla has a complex medical history.  She was diagnosed with MS in 1985.  Her psychiatrist at [facility] in [place], Dr. 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  She has used a scooter since 1992, using it more  than she had previously.  She also can use a walker.  She was diagnosed with fibromyalgia in 1997.   She is seen at the [facility] for her eye care. During 8th grade, she fell on a ski lift, injuring her larynx.     WEIGHT not taken.  Discussed goal of cutting back by 125 calories per day.   That would get her losing 2 lbs./month.      She stopped attending OA.    Wt Readings from Last 4 Encounters:   02/04/20 138.3 kg (305 lb)   01/29/20 137.7 kg (303 lb 8 oz)   11/20/19 138.2 kg (304 lb 9.6 oz)   10/16/19 137.6 kg (303 lb 6.4 oz)     Past Medical History:   Diagnosis Date     Depression, major, in partial remission (H)      Fibromyalgia syndrome      Gastro-oesophageal reflux disease      Hyperlipemia      Lymphedema      Multiple sclerosis (H)     tremors with MS, all four limbs and head     Multiple sclerosis, secondary progressive (H)      Sleep apnea      Vocal cord paralysis, unilateral complete         Past Surgical History:   Procedure Laterality Date     COLONOSCOPY N/A 7/17/2017    Procedure: COMBINED COLONOSCOPY, SINGLE OR MULTIPLE BIOPSY/POLYPECTOMY BY BIOPSY;;  Surgeon: Wong Beaulieu MD;  Location:  GI     COLONOSCOPY N/A 2/23/2018    Procedure: COMBINED COLONOSCOPY, SINGLE OR MULTIPLE BIOPSY/POLYPECTOMY BY BIOPSY;  COLONOSCOPY ;  Surgeon: Yessica Santana MD;  Location:  GI     ENT SURGERY      throat 1969, vocal cord surgery with skin grafting     ESOPHAGOSCOPY, GASTROSCOPY, DUODENOSCOPY (EGD), COMBINED N/A 12/16/2014    Procedure: COMBINED ENDOSCOPIC ULTRASOUND, ESOPHAGOSCOPY, GASTROSCOPY, DUODENOSCOPY (EGD), FINE NEEDLE ASPIRATE/BIOPSY;  Surgeon: Max  Yessica Rollins MD;  Location:  GI     ESOPHAGOSCOPY, GASTROSCOPY, DUODENOSCOPY (EGD), COMBINED N/A 12/16/2014    Procedure: COMBINED ESOPHAGOSCOPY, GASTROSCOPY, DUODENOSCOPY (EGD), BIOPSY SINGLE OR MULTIPLE;  Surgeon: Yessica Santana MD;  Location:  GI     LAPAROSCOPIC APPENDECTOMY  5/30/2013    Procedure: LAPAROSCOPIC APPENDECTOMY;;  Surgeon: Salvador Morris MD;  Location:  OR     LAPAROSCOPIC BIOPSY LIVER  5/30/2013    Procedure: LAPAROSCOPIC BIOPSY LIVER;;  Surgeon: Salvador Morris MD;  Location:  OR     LAPAROSCOPIC GASTRIC SLEEVE  5/30/2013    Procedure: LAPAROSCOPIC GASTRIC SLEEVE;  LAPAROSCOPIC SLEEVE GASTRECTOMY/ LAPARSCOPIC  APPENDECTOMY /LIVER BIOPSIES/LIVER CYST DRAINAGE;  Surgeon: Salvador Morris MD;  Location:  OR     ORTHOPEDIC SURGERY      R wrist 2010       Current Outpatient Medications   Medication     ARIPiprazole (ABILIFY) 5 MG tablet     atorvastatin (LIPITOR) 20 MG tablet     Calcium Citrate-Vitamin D 250-200 MG-UNIT TABS     carBAMazepine (TEGRETOL) 200 MG tablet     carBAMazepine (TEGRETOL) 200 MG tablet     denosumab (PROLIA) 60 MG/ML SOLN injection     Dextromethorphan-Guaifenesin (MUCINEX DM PO)     DULOXETINE HCL PO     famotidine (PEPCID) 20 MG tablet     FLUZONE HIGH-DOSE 0.5 ML injection     ketoconazole (NIZORAL) 2 % cream     Melatonin 10 MG TABS     menthol-zinc oxide (CALMOSEPTINE) 0.44-20.6 % OINT ointment     methylphenidate (RITALIN) 10 MG tablet     Mirtazapine (REMERON PO)     Multiple Vitamin (MULTIVITAMINS PO)     ranitidine (ZANTAC) 150 MG tablet     sulfacetamide sodium, Acne, 10 % lotion     TRAZODONE HCL PO     triamcinolone (ARISTOCORT HP) 0.5 % external cream     triamcinolone (KENALOG) 0.5 % OINT ointment     trospium (SANCTURA) 20 MG tablet     vitamin B-Complex     No current facility-administered medications for this visit.      New medication: On Duloxetine and Mirtazpine and Trazodone and Ritalin. She discontinued Effexor and Abilify  previously  per Dr. Marquise Coleman, her psychiatrist.  On 4/10/19 she informed me that she started Abilify and stopped  Rexulti.    PHQ-9 SCORE 2018 2019 9/15/2019   PHQ-9 Total Score MyChart 10 (Moderate depression) 7 (Mild depression) 5 (Mild depression)   PHQ-9 Total Score 10 7 5     NAS-7 SCORE 2016   Total Score - 6 (mild anxiety)   Total Score 3 -     SOCIAL HISTORY:  Ms. Padilla grew up in the [place] area and is the oldest of 5 children in her family of origin.  Her father was a dentist who she believes was bipolar.  He  of lung cancer at age 72.  Her mother  of sepsis at age 80.  She had been diagnosed with breast cancer when Ms. Padilla was 8.  She describes the marriage between her parents as misael.  She is 5 years older than her closest-aged sister and they are all within 4 years of each other.   Her daughter  12/3 and her mother   She tends to feel more depressed in winter.      Ms. Padilla attended [employer] for a year and later went to night school at the [employer].  She felt that she could not continue her education to the point of graduation because she was working full time and raising her daughter.  Her daughter  in  at age 31 of liver failure secondary to an accidental Tylenol overdose.  She had been recovering from a hysterectomy.      Ms. 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  She had to retire at the time secondary to her MS.  She has never .  She has not dated,and states that if she were she would date, she would be interested in a relationship with a woman.  There is no history of  service or legal problems.  She expresses concern about her increasing social isolation.  She has at least 1 friend, Jael, who she met at a therapy group in .  She is active in facilitating  groups for people with MS both in [place] and [place].  She lives alone and currently gets help from a home health aide, as well as home health nurse.     She sees Dr. Ban Coleman, psychiatrist and is trying to figure out best antidepressant regimen.  .Effexor reduced to 300 per Dr. Coleman and is now substituting Cymbalta for it.  She feels she has been more depressed since the Fall, but doesn't think it is SAD.   She states that she is recommending case management.     SESSION:  Mili participated in a telehealth session with her consent in light of the COVID-19 pandemic. Her depression is significant, as she is feeling frustrted staing mainly at home.  She goes out every other day to grocery or pharmacy  We discussed decreasng those trips by picking up more provisions/trip, but continuing to get some fresh air.  We discussed startegies for overcoming her social isolation and anxiety.  She will consider more video chats with friends and relatives.  We discussed eating, which has increased, lack of exercise, and an ill-advised pattern of going to be 2-4 AM.  We discussed recommended changed.  She is taking the social distancing seriously, using mask and gloves..  She was pleased to talk and would like to talk again in  3 weeks.   She is limiting exposure to the news.  She participated fully and appeared to derive benefit. Affect is positive.  Rapport was excellent.    The treatment plan was reviewed with the patient at today s visit. The treatment plan remains current based on the patient s status and progress to date.  This telehealth service is appropriate and effective for delivering services in light of the necessity for social distancing to mitigate the COVID-19 epidemic. Patient has agreed to receiving telehealth services.    Time service started: 3:01  Time service ended: 3:56  Extended session due to complexity of case and length of interval.      Mode of transmission: [email or web address].me    Location of  originating:  Home of the patient    Distance site:  Home office of provider  The patient has been notified of following:   The  video visit will be conducted via a call between you and your psychologist.  This service lets us provide the care you need with a video conversation. Video visits may be billed at different rates depending on your insurance coverage.  Please reach out to your insurance provider with any questions. If during the course of the call the psychologist feels a video visit is not appropriate, you will not be charged for this service.    DIAGNOSES:   Major depression, recurrent, mild (F33.0).   Behavioral factors affecting morbid obesity (E66.01).     PLAN:  Ms. Padilla will return to clinic on 5/20 @ 4 for problem-solving and supportive therapy consistent with treatment plan..  Goal per day now that she is tracking will change from 2000/day to 1750 consistently.  This is a compromise from 1500.    Last treatment plan signed:11/20/2019  Last Treatment plan review: 4/8/20 ([facility])  Treatment plan review due: 7/8/20             Ramin Olivo, PhD, A.B.P.P., L.P.   Director, Health Psychology

## 2020-05-20 ENCOUNTER — VIRTUAL VISIT (OUTPATIENT)
Dept: PSYCHOLOGY | Facility: CLINIC | Age: 65
End: 2020-05-20
Payer: COMMERCIAL

## 2020-05-20 DIAGNOSIS — F54 PSYCHOLOGICAL FACTORS AFFECTING MORBID OBESITY (H): ICD-10-CM

## 2020-05-20 DIAGNOSIS — E66.01 PSYCHOLOGICAL FACTORS AFFECTING MORBID OBESITY (H): ICD-10-CM

## 2020-05-20 DIAGNOSIS — F33.1 MAJOR DEPRESSIVE DISORDER, RECURRENT EPISODE, MODERATE (H): Primary | ICD-10-CM
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
## 2020-06-10 ENCOUNTER — VIRTUAL VISIT (OUTPATIENT)
Dept: PSYCHOLOGY | Facility: CLINIC | Age: 65
End: 2020-06-10
Payer: COMMERCIAL

## 2020-06-10 DIAGNOSIS — F54 PSYCHOLOGICAL FACTORS AFFECTING MORBID OBESITY (H): ICD-10-CM

## 2020-06-10 DIAGNOSIS — E66.01 PSYCHOLOGICAL FACTORS AFFECTING MORBID OBESITY (H): ICD-10-CM

## 2020-06-10 DIAGNOSIS — F33.1 MAJOR DEPRESSIVE DISORDER, RECURRENT EPISODE, MODERATE (H): Primary | ICD-10-CM
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
## 2020-07-02 ENCOUNTER — VIRTUAL VISIT (OUTPATIENT)
Dept: PSYCHOLOGY | Facility: CLINIC | Age: 65
End: 2020-07-02
Payer: COMMERCIAL

## 2020-07-02 DIAGNOSIS — F54 PSYCHOLOGICAL FACTORS AFFECTING MORBID OBESITY (H): ICD-10-CM

## 2020-07-02 DIAGNOSIS — E66.01 PSYCHOLOGICAL FACTORS AFFECTING MORBID OBESITY (H): ICD-10-CM

## 2020-07-02 DIAGNOSIS — F33.1 MAJOR DEPRESSIVE DISORDER, RECURRENT EPISODE, MODERATE (H): Primary | ICD-10-CM

## 2020-07-02 NOTE — PROGRESS NOTES
See other note for this date.    Ramin Olivo, PhD AVERY,  A.B.P.P.  Director, Health Psychology  [phone]

## 2020-07-02 NOTE — PROGRESS NOTES
Health Psychology                     Department of Medicine  Izzy Montaño, Ph.D., L.P. [phone]                          [facility] Sherrie Crowe, Ph.D., L.P. [phone]                   [place] Mail Code 937   Radha Salomón, Ph.D.  [phone]       [address] [place] [address] Vin Ward, Ph.D.,  L.P. [phone]        [place] [address]           Ranulfo Whipple, Ph.D. [phone]      Enriqueta Sorto, Ph.D., L.P. [phone]    [facility]   Ramin Olivo, Ph.D., A.B.P.P., L.P. [phone]  [address] [place] [address] Kathie Galvan, Ph.D., L.P. [phone]     [facility]    Ms. Padilla is a 65-year-old woman self-referred for psychological consultation because her therapist of the last 24 years retired.  She is seen for problem-solving and supportive therapy for depression and multiple health issues.     HISTORY OF PRESENTING CONCERN:  Ms. Padilla reported at intake (7/28/16) a  lengthy history of depression.  She sees a psychiatrist, Ban Coleman, at [facility], and is taking Abilify 7.5 mg, trazodone 500 mg, ripazepam 75 mg each day at bedtime, Tegretol 400 mg at bedtime and methylphenidate 10 mg b.i.d.  She discontineud ability and is now taking Rexulti 2 mg.  She has a history of hospitalizations including 3 at [facility] in the late 1990s, early 2000s when she had 2 courses of ECT lasting 7-10 sessions and approximately 3 other single session ECTs.  She stopped due to memory problems.  She kept with Dr. Coleman who she first met as an inpatient and has seen her for the past 18 years.  She had also been hospitalized psychiatrically at [facility] at age 23.  She previously worked with psychiatrist, Doug Sarabia for three years, stopping, in part, because she didn't feel he took her suicide attempt sufficiently seriously.  She reports her depression tends to vary.     MEDICAL HISTORY:   Ms. Padilla has a complex medical history.  She was diagnosed with MS in 1985.  Her psychiatrist at [facility] in [place], Dr. 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  She has used a scooter since 1992, using it more  than she had previously.  She also can use a walker.  She was diagnosed with fibromyalgia in 1997.   She is seen at the [place] [facility] for her eye care. During 8th grade, she fell on a ski lift, injuring her larynx.     WEIGHT not taken. She states she weighed >300 last time we spoke and today weighs exactly 300.  Discussed goal of cutting back by 125 calories per day.   That would get her losing 2 lbs./month.      She stopped attending OA.    Wt Readings from Last 4 Encounters:   02/04/20 138.3 kg (305 lb)   01/29/20 137.7 kg (303 lb 8 oz)   11/20/19 138.2 kg (304 lb 9.6 oz)   10/16/19 137.6 kg (303 lb 6.4 oz)     Past Medical History:   Diagnosis Date     Depression, major, in partial remission (H)      Fibromyalgia syndrome      Gastro-oesophageal reflux disease      Hyperlipemia      Lymphedema      Multiple sclerosis (H)     tremors with MS, all four limbs and head     Multiple sclerosis, secondary progressive (H)      Sleep apnea      Vocal cord paralysis, unilateral complete         Past Surgical History:   Procedure Laterality Date     COLONOSCOPY N/A 7/17/2017    Procedure: COMBINED COLONOSCOPY, SINGLE OR MULTIPLE BIOPSY/POLYPECTOMY BY BIOPSY;;  Surgeon: Wong Beaulieu MD;  Location:  GI     COLONOSCOPY N/A 2/23/2018    Procedure: COMBINED COLONOSCOPY, SINGLE OR MULTIPLE BIOPSY/POLYPECTOMY BY BIOPSY;  COLONOSCOPY ;  Surgeon: Yessica Santnaa MD;  Location:  GI     ENT SURGERY      throat 1969, vocal cord surgery with skin grafting     ESOPHAGOSCOPY, GASTROSCOPY, DUODENOSCOPY (EGD), COMBINED N/A 12/16/2014    Procedure: COMBINED ENDOSCOPIC ULTRASOUND, ESOPHAGOSCOPY, GASTROSCOPY, DUODENOSCOPY (EGD), FINE NEEDLE ASPIRATE/BIOPSY;   Surgeon: Yessica Santana MD;  Location:  GI     ESOPHAGOSCOPY, GASTROSCOPY, DUODENOSCOPY (EGD), COMBINED N/A 12/16/2014    Procedure: COMBINED ESOPHAGOSCOPY, GASTROSCOPY, DUODENOSCOPY (EGD), BIOPSY SINGLE OR MULTIPLE;  Surgeon: Yessica Santana MD;  Location:  GI     LAPAROSCOPIC APPENDECTOMY  5/30/2013    Procedure: LAPAROSCOPIC APPENDECTOMY;;  Surgeon: Salvador Morris MD;  Location:  OR     LAPAROSCOPIC BIOPSY LIVER  5/30/2013    Procedure: LAPAROSCOPIC BIOPSY LIVER;;  Surgeon: Salvador Morris MD;  Location:  OR     LAPAROSCOPIC GASTRIC SLEEVE  5/30/2013    Procedure: LAPAROSCOPIC GASTRIC SLEEVE;  LAPAROSCOPIC SLEEVE GASTRECTOMY/ LAPARSCOPIC  APPENDECTOMY /LIVER BIOPSIES/LIVER CYST DRAINAGE;  Surgeon: Salvador Morris MD;  Location:  OR     ORTHOPEDIC SURGERY      R wrist 2010       Current Outpatient Medications   Medication     ARIPiprazole (ABILIFY) 5 MG tablet     atorvastatin (LIPITOR) 20 MG tablet     Calcium Citrate-Vitamin D 250-200 MG-UNIT TABS     carBAMazepine (TEGRETOL) 200 MG tablet     carBAMazepine (TEGRETOL) 200 MG tablet     denosumab (PROLIA) 60 MG/ML SOLN injection     Dextromethorphan-Guaifenesin (MUCINEX DM PO)     DULOXETINE HCL PO     famotidine (PEPCID) 20 MG tablet     FLUZONE HIGH-DOSE 0.5 ML injection     ketoconazole (NIZORAL) 2 % cream     Melatonin 10 MG TABS     menthol-zinc oxide (CALMOSEPTINE) 0.44-20.6 % OINT ointment     methylphenidate (RITALIN) 10 MG tablet     Mirtazapine (REMERON PO)     Multiple Vitamin (MULTIVITAMINS PO)     ranitidine (ZANTAC) 150 MG tablet     sulfacetamide sodium, Acne, 10 % lotion     TRAZODONE HCL PO     triamcinolone (ARISTOCORT HP) 0.5 % external cream     triamcinolone (KENALOG) 0.5 % OINT ointment     trospium (SANCTURA) 20 MG tablet     vitamin B-Complex     No current facility-administered medications for this visit.      New medication: On Duloxetine and Mirtazpine and Trazodone and Ritalin. She discontinued Effexor  and Abilify previously  per Dr. Marquise Coleman, her psychiatrist.  On 4/10/19 she informed me that she started Abilify and stopped  Rexulti.    PHQ-9 SCORE 2018 2019 9/15/2019   PHQ-9 Total Score MyChart 10 (Moderate depression) 7 (Mild depression) 5 (Mild depression)   PHQ-9 Total Score 10 7 5     NAS-7 SCORE 2016   Total Score - 6 (mild anxiety)   Total Score 3 -     SOCIAL HISTORY:  Ms. Padilla grew up in the [place] and is the oldest of 5 children in her family of origin.  Her father was a dentist who she believes was bipolar.  He  of lung cancer at age 72.  Her mother  of sepsis at age 80.  She had been diagnosed with breast cancer when Ms. Padilla was 8.  She describes the marriage between her parents as misael.  She is 5 years older than her closest-aged sister and they are all within 4 years of each other.   Her daughter  12/3 and her mother   She tends to feel more depressed in winter.      Ms. Padilla attended [employer] for a year and later went to night school at the [employer].  She felt that she could not continue her education to the point of graduation because she was working full time and raising her daughter.  Her daughter  in  at age 31 of liver failure secondary to an accidental Tylenol overdose.  She had been recovering from a hysterectomy.      Ms. 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  She had to retire at the time secondary to her MS.  She has never .  She has not dated,and states that if she were she would date, she would be interested in a relationship with a woman.  There is no history of  service or legal problems.  She expresses concern about her increasing social isolation.  She has at least 1 friend, Jael, who she met at a therapy group in .  She is active in  facilitating groups for people with MS both in [place] and [place].  She lives alone and currently gets help from a home health aide, as well as home health nurse.     She sees Dr. Ban Coleman, psychiatrist and is trying to figure out best antidepressant regimen.  .Effexor reduced to 300 per Dr. Coleman and is now substituting Cymbalta for it.  She feels she has been more depressed since the Fall, but doesn't think it is SAD.   She states that she is recommending case management.     SESSION:  Mili participated in a telehealth session with her consent in light of the COVID-19 pandemic. Her depression is significant, as she is feeling frustrated staing mainly at home, but less so as she adapts to the new realities. More recently she has felt a bit more depressed, with lower concentration and energy  We explored the weather and other possibly contributing factors.   We discussed her feelings about the death of Clint Noguera and its aftermath. She is upset about several businesses she has frequented for over 4  years being destroyed.   She went to a restaurant for the first time, which was a good experience.  She and her friend were the only ones there.  Weight:  She lost 1 lb. In the interval.  She is wanting to keep to the daily calorie goal to 1750. She thinks it helps. Since last seen, she had 11 (vs 7 days) averaging 1750.  Other days were over 2000.  This is the best she she has done so far.  She is getting rid of snacks is helping.   Current goal of 1750/da is better than 1500.     She thinks it is more realistic.  She proposes 3 changes:  1. Counting calories; 2. substitutig vegetables for crackers; 3. Being weighed weekly instead of every other week.   She is reinforced for her progress, incluing getting less food when she went to [employer].  She is taking the social distancing seriously, using mask and gloves.  She is 3/12 sessions into acupuncture for bladder hyperactivity.  She is unsure if it is working.  .  She was pleased to talk and would like to talk again in  3 weeks. She has socially distanced meeting with a friend from each other.   She is limiting exposure to the news.  She participated fully and appeared to derive benefit. Affect is positive.  Rapport was excellent.    This telehealth service is appropriate and effective for delivering services in light of the necessity for social distancing to mitigate the COVID-19 epidemic and for conservation of PPE.     Patient has agreed to receiving telehealth services after being informed about it: Yes    Patient prefers video invitation/information to be sent by:   email    Time service started: 10:01  Time service ended: 10:38    Mode of transmission: [email or web address]    Location of originating:  Home of the patient    Distance site:  Home office of provider for [facility]    The patient has been notified that:  Video visits will be conducted via a call with their psychologist to provide the care they need with a video conversation. Video visits may be billed at different rates depending on insurance coverage.  Patients are advised to please contact their insurance provider with any questions about their health insurance coverage. If during the course of a call the psychologist feels a video visit is not appropriate, patients will not be charged for this service    DIAGNOSES:   Major depression, recurrent, mild (F33.0).   Behavioral factors affecting morbid obesity (E66.01).     PLAN:  Ms. Padilla will return for video visit  on 7/23 @ 11  for problem-solving and supportive therapy consistent with treatment plan..  Goal per day now that she is tracking will change from 2000/day to 1750 consistently.  This is a compromise from 1500.    Last treatment plan signed:11/20/2019  Last Treatment plan review: 4/8/20 ([facility])  Treatment plan review due: 7/8/20             Ramin Olivo, PhD, A.B.P.P., L.P.   Director, Health Psychology

## 2020-07-05 NOTE — PROGRESS NOTES
"Mili Padilla is a 65 year old female who is being evaluated via a billable telephone visit.      The patient has been notified of following:     \"This telephone visit will be conducted via a call between you and your physician/provider. We have found that certain health care needs can be provided without the need for a physical exam.  This service lets us provide the care you need with a short phone conversation.  If a prescription is necessary we can send it directly to your pharmacy.  If lab work is needed we can place an order for that and you can then stop by our lab to have the test done at a later time.    Telephone visits are billed at different rates depending on your insurance coverage. During this emergency period, for some insurers they may be billed the same as an in-person visit.  Please reach out to your insurance provider with any questions.    If during the course of the call the physician/provider feels a telephone visit is not appropriate, you will not be charged for this service.\"    Patient has given verbal consent for Telephone visit?  Yes    What phone number would you like to be contacted at? [phone]    How would you like to obtain your AVS? [facility]      BARIATRIC FOLLOW UP      July 6, 2020    HISTORY OF PRESENT ILLNESS: Pt presents today for her 7 yr follow-up appointment status post status laparoscopic gastric sleeve.    At her 5 yr post op she had to stop seeing Neha Valadez RD because she gained 10 lbs last year so [facility] would not cover. She is  on appealing this.     She continues with significant depression.  Is seeing Ramin Olivo, PhD Health psychologist as well as her psychiatrist.  she is feeling frustrated staying mainly at home, but less so as she adapts to the new realities.    She is working with Dr. Olivo on her weight.  Originally started with him because he works with MS.  But in last year he is also working with her on her weight.  At her visit " on 7/2/2020 she was trying to keep her calorie goal to 1750.  11 days out of 21 she made it to 1750 calories.  This was up from 7 the time before. She has got rid of snacks.    Is much more conscious in what she is eating.   Has avoided buying unhealthy snacks.      New goals from 7/2/2020 visit:     Counting calories- completed  Weighing weekly- She is going to weight herself once a week.  It is a little hard because she is shaking or trembling when she is standing.      She stopped attending OA.  She is having acupuncture for her bladder with [facility].  Has had a little improvement will be on in for 12 weeks.      300 lbs 0 oz    Wt Readings from Last 4 Encounters:   07/06/20 300 lb (136.1 kg)   07/06/20 300 lb (136.1 kg)   02/04/20 305 lb (138.3 kg)   01/29/20 303 lb 8 oz (137.7 kg)     BARIATRIC METRICS:  Initial Weight: 305 lbs  Current Weight: 300 lb (136.1 kg)(patient reported)  Body mass index is 46.99 kg/m .   Cumulative weight loss (lbs): 5    Patient is taking the following bariatric postoperative vitamins:  2 Multivitamin with Minerals (HS)  500 mg Calcium With Vitamin D (BID - AM + afternoon) Dietician advised pt to increase Calcium to TID today  5000 International units Vitamin D  Vitamin B12 discontinued d/t labs last year. Will assess if needed following labs  1 Vitamin B Complex/Thiamine - per psychologist  No iron needed    Pt is not exercising.  Has MS and has had additional neuropathy in her lower extremities this year so she is unable to stand.  She has the ability to move her upper body though.   Interested in some upper body workouts.       SOCIAL HISTORY:  Pt denies smoking.  Pt denies alcohol use.  Avoids NSAIDS.      REVIEW OF SYSTEMS:  GI:  Nausea-none  Vomiting-none  Diarrhea-none  Constipation-seldom, using Colace daily.    Dysphagia-none  Abdominal Pain-none  Heartburn-none     SKIN:  Intertriginous irritation-none     PSYCH:  Depression-See HPI.    LABS/IMAGING/MEDICAL RECORDS  "REVIEW:   Hemoglobin A1C   Date Value Ref Range Status   02/11/2013 5.9 4.3 - 6.0 % Final     Vitamin D Deficiency screening   Date Value Ref Range Status   06/17/2019 58 20 - 75 ug/L Final     Comment:     Season, race, dietary intake, and treatment affect the concentration of   25-hydroxy-Vitamin D. Values may decrease during winter months and increase   during summer months. Values 20-29 ug/L may indicate Vitamin D insufficiency   and values <20 ug/L may indicate Vitamin D deficiency.  Vitamin D determination is routinely performed by an immunoassay specific for   25 hydroxyvitamin D3.  If an individual is on vitamin D2 (ergocalciferol)   supplementation, please specify 25 [place] vitamin D2 and D3 level determination by   LCMSMS test VITD23.       Parathyroid Hormone Intact   Date Value Ref Range Status   06/17/2019 28 12 - 64 pg/mL Final     Vitamin B12   Date Value Ref Range Status   06/17/2019 734 193 - 986 pg/mL Final     Hemoglobin   Date Value Ref Range Status   06/17/2019 12.9 11.7 - 15.7 g/dL Final     Ferritin   Date Value Ref Range Status   06/17/2019 263 (H) 8 - 252 ng/mL Final     Iron   Date Value Ref Range Status   06/17/2019 53 35 - 180 ug/dL Final     Iron Binding Cap   Date Value Ref Range Status   06/17/2019 244 240 - 430 ug/dL Final     Iron Saturation Index   Date Value Ref Range Status   06/17/2019 22 15 - 46 % Final   06/18/2018 21 15 - 46 % Final   06/05/2017 16 15 - 46 % Final       PHYSICAL EXAMINATION:  Ht 5' 7\" (1.702 m)   Wt 300 lb (136.1 kg)   LMP 12/10/2010   BMI 46.99 kg/m    Pt sounded pleasant in NAD.     ASSESSMENT AND PLAN:     Bariatric surgery status  7 years status laparoscopic gastric sleeve    Malnutrition following gastrointestinal surgery  Continue taking recommended post-op vitamins.  Increase Calcium to TID   Will assess need for B12 following labs  Ordered  - CBC with platelets; Future  - Vitamin B12; Future  - Vitamin D Screen; Future  - Parathyroid Hormone Intact; " Future  - Iron and Iron Binding Capacity; Future  - Ferritin; Future     GERD without esophagitis  Continue famotidine  Refilled   - famotidine (PEPCID) 20 MG tablet; Take 1 tablet (20 mg) by mouth 2 times daily  Dispense: 180 tablet; Refill: 3     Osteopenia determined by x-ray  Continue management of osteopenia through PCP. Pt had last BM testing 3 years ago.  I will manage vitamin D supplementation.  Increase Calcium to TID.   Continue 5000 Int Units of Vitamin D daily  - Vitamin D Screen; Future  - Parathyroid Hormone Intact; Future     Morbid obesity (H)  Pt will file appeal to get dietician coverage again. She feels this would benefit her if she did this quarterly.    Continue seeing health psychologist and try to maintain 1750 calories.    Try starting some upper extremity workouts 2-3 times a week    10 min wheelchair arm workout from MS society  [email or web address]    Seated Upper Body Exercise Video 1 hr long)  [facility]  [email or web address]    [place] upper extremities exercise handout (will mail to you as well)  [email or web address]    Increased LE Neuropathy (most likely due to MS)  Ordered  - Vitamin B12; Future  Will assess need for B12 following labs    Follow up: Return to clinic in 1 year. Diet visit in 3 months.     Phone call duration: 21 minutes    Adwoa Hollins PA-C

## 2020-07-06 ENCOUNTER — VIRTUAL VISIT (OUTPATIENT)
Dept: SURGERY | Facility: CLINIC | Age: 65
End: 2020-07-06
Payer: COMMERCIAL

## 2020-07-06 VITALS — HEIGHT: 67 IN | WEIGHT: 293 LBS | BODY MASS INDEX: 45.99 KG/M2

## 2020-07-06 VITALS — WEIGHT: 293 LBS | HEIGHT: 67 IN | BODY MASS INDEX: 45.99 KG/M2

## 2020-07-06 DIAGNOSIS — Z98.84 S/P BARIATRIC SURGERY: ICD-10-CM

## 2020-07-06 DIAGNOSIS — E66.01 MORBID OBESITY WITH BMI OF 45.0-49.9, ADULT (H): ICD-10-CM

## 2020-07-06 DIAGNOSIS — K21.9 GERD WITHOUT ESOPHAGITIS: ICD-10-CM

## 2020-07-06 DIAGNOSIS — G62.9 NEUROPATHY: ICD-10-CM

## 2020-07-06 DIAGNOSIS — M85.80 OSTEOPENIA DETERMINED BY X-RAY: ICD-10-CM

## 2020-07-06 DIAGNOSIS — Z98.84 BARIATRIC SURGERY STATUS: ICD-10-CM

## 2020-07-06 DIAGNOSIS — K91.2 POSTSURGICAL MALABSORPTION: ICD-10-CM

## 2020-07-06 DIAGNOSIS — E66.01 MORBID OBESITY (H): Primary | ICD-10-CM

## 2020-07-06 PROCEDURE — 97803 MED NUTRITION INDIV SUBSEQ: CPT | Mod: 95 | Performed by: DIETITIAN, REGISTERED

## 2020-07-06 PROCEDURE — 99213 OFFICE O/P EST LOW 20 MIN: CPT | Mod: 95 | Performed by: PHYSICIAN ASSISTANT

## 2020-07-06 RX ORDER — DOCUSATE SODIUM 100 MG/1
100 CAPSULE, LIQUID FILLED ORAL 2 TIMES DAILY PRN
COMMUNITY
End: 2021-01-23

## 2020-07-06 RX ORDER — FAMOTIDINE 20 MG/1
20 TABLET, FILM COATED ORAL 2 TIMES DAILY
Qty: 180 TABLET | Refills: 3 | Status: SHIPPED | OUTPATIENT
Start: 2020-07-06 | End: 2020-12-25

## 2020-07-06 ASSESSMENT — MIFFLIN-ST. JEOR
SCORE: 1933.42
SCORE: 1933.42

## 2020-07-06 NOTE — PROGRESS NOTES
"Mili Padilla is a 65 year old female who is being evaluated via a billable video visit.      The patient has been notified of following:     \"This video visit will be conducted via a call between you and your physician/provider. We have found that certain health care needs can be provided without the need for an in-person physical exam.  This service lets us provide the care you need with a video conversation.  If a prescription is necessary we can send it directly to your pharmacy.  If lab work is needed we can place an order for that and you can then stop by our lab to have the test done at a later time.    Video visits are billed at different rates depending on your insurance coverage.  Please reach out to your insurance provider with any questions.    If during the course of the call the physician/provider feels a video visit is not appropriate, you will not be charged for this service.\"    Patient has given verbal consent for Video visit? Yes  How would you like to obtain your AVS? Sanjeevhart  Patient would like the video invitation sent by: Send to e-mail at: [email or web address]  Will anyone else be joining your video visit? No        Type of service:   *Switched to phone visit - pt does not have appropriate software or smart phone for video chat*        NUTRITION POST OP APPOINTMENT  DATE OF VISIT: July 6, 2020    Mili Padilla  1955  female  [id number]  65 year old     ASSESSMENT:    REASON FOR VISIT:  Mili is a 65 year old year old female presents today for 7 year PO nutrition follow-up appointment. Patient is accompanied by self.    DIAGNOSIS:  Status post gastric sleeve surgery.  Obesity Obesity Grade III BMI >40     ANTHROPOMETRICS:  Initial Weight: 304 lbs   Height: 5' 7\"   Current Weight: 300 lbs 0 oz     BMI: Body mass index is 46.99 kg/m .    VITAMINS AND MINERALS:  2 Multivitamin with Minerals (HS)  500 mg Calcium With Vitamin D (BID - AM + afternoon)  5000 International units Vitamin D  Vitamin B12 " discontinued d/t labs  1 Vitamin B Complex/Thiamine - per psychologist  No iron needed    *Advised pt to increase Calcium to TID. Vitamin B12 per provider pending labs.     NUTRITION HISTORY:  Breakfast: fruit + cottage cheese   Lunch: deli turkey slices + cheddar cheese + banana   Supper: Low-janice frozen meal  [employer] meals + fruit   Snacks: popcorn, triscuits, nuts (3x/day)  Fluids consumed: Water (48oz), diet soda (24oz)  Consuming liquid calories: No  Protein intake: 60-70 grams/day  Tolerate regular texture food: Yes  Any foods not tolerated details: Yes  If any food not tolerated: spicy   Portion size: 1 cup or more  Take 20-30 minutes to consume each meal: Yes   Eat protein foods first: Yes  Fluids and meals separate by at least 30 minutes: Yes  Chew foods thoroughly: Yes  Tolerating diet: Yes  Drinking high protein supplements: No  Consuming snacks per day: 3  Additional Information: Pt has not seen RD for several years. History of BED however has not followed up with [facility] for awhile; feels symptoms are currently controlled. Works regularly with psychologist on emotional/binge eating. Currently tracking kcals and aiming for 1750 kcals (~13 kcal/kg). Increasingly successful with hitting this goal. Wants to focus less on portions and more on calories. Declined setting an exercise goal today. Pt to verify RD coverage with insurance prior to next visit.         PHYSICAL ACTIVITY:  None    DIAGNOSIS:  Previous Nutrition Diagnosis: Altered gastrointestinal function related to alteration in gastrointestinal structure as evidenced by history of gastric sleeve surgery.- no change    Previous goals:  (remote)    Current Nutrition Diagnosis: Altered gastrointestinal function related to alteration in gastrointestinal structure as evidenced by history of gastric sleeve surgery.    INTERVENTION:   Nutrition Prescription: Eat 3 meals a day at regular intervals. Consume 60-90 grams of protein daily. Follow  post-surgical vitamin and mineral protocol.  Assessed learning needs and learning preferences.    GOALS:  Continue to track intake with guidance from RD and psychologist  Replace crackers with a fruit or vegetable  Trial of protein water instead of juice    Follow-Up:   Recommend q3m follow up visits to assist with lifestyle changes or per insurance.    Implementation: Discussed progress toward previous goals; reinforced importance of following bariatric lifestyle changes.    NUTRITION MONITORING AND EVALUATION:  Anticipated compliance: fair-good  Verbalized fair-good understanding.    Follow up: Patient to follow up in 3 months.    Visit Length:  33 minutes    Teodora Warner RD, LD  Clinical Dietitian   HPI      KATHY      Physical Exam

## 2020-07-06 NOTE — PATIENT INSTRUCTIONS
Try starting some upper extremity workouts 2-3 times a week.  (see links below)    File appeal to get dietician coverage again. (hopefully quarterly with Neha)    Have follow up labs drawn.    You may get your blood drawn at any [facility] lab.  Lab results will be released through popexpert once resulted. Please allow 5 business days for me to review them and send you a personal message through popexpert regarding the results.     Return to clinic in 1 year.        Youtube videos for upper extremities:    1.  10 min wheelchair arm workout from MS [employer]  [email or web address]    2.  Seated Upper Body Exercise Video 1 hr long)  [facility]  [email or web address]    [facility] upper extremities exercise handout (will mail to you as well)  [email or web address]

## 2020-07-13 ENCOUNTER — HOSPITAL ENCOUNTER (OUTPATIENT)
Dept: LAB | Facility: CLINIC | Age: 65
Discharge: HOME | End: 2020-07-13
Attending: PHYSICIAN ASSISTANT | Admitting: PHYSICIAN ASSISTANT
Payer: COMMERCIAL

## 2020-07-13 DIAGNOSIS — Z98.84 BARIATRIC SURGERY STATUS: ICD-10-CM

## 2020-07-13 DIAGNOSIS — K91.2 POSTSURGICAL MALABSORPTION: ICD-10-CM

## 2020-07-13 LAB
ERYTHROCYTE [DISTWIDTH] IN BLOOD BY AUTOMATED COUNT: 12.7 % (ref 10–15)
FERRITIN SERPL-MCNC: 270 NG/ML (ref 8–252)
HCT VFR BLD AUTO: 38.5 % (ref 35–47)
HGB BLD-MCNC: 12.6 G/DL (ref 11.7–15.7)
IRON SATN MFR SERPL: 26 % (ref 15–46)
IRON SERPL-MCNC: 61 UG/DL (ref 35–180)
MCH RBC QN AUTO: 31.1 PG (ref 26.5–33)
MCHC RBC AUTO-ENTMCNC: 32.7 G/DL (ref 31.5–36.5)
MCV RBC AUTO: 95 FL (ref 78–100)
PLATELET # BLD AUTO: 179 10E9/L (ref 150–450)
PTH-INTACT SERPL-MCNC: 25 PG/ML (ref 12–64)
RBC # BLD AUTO: 4.05 10E12/L (ref 3.8–5.2)
TIBC SERPL-MCNC: 239 UG/DL (ref 240–430)
VIT B12 SERPL-MCNC: 888 PG/ML (ref 193–986)
WBC # BLD AUTO: 11.1 10E9/L (ref 4–11)

## 2020-07-13 PROCEDURE — 83550 IRON BINDING TEST: CPT | Performed by: PHYSICIAN ASSISTANT

## 2020-07-13 PROCEDURE — 82306 VITAMIN D 25 HYDROXY: CPT | Performed by: PHYSICIAN ASSISTANT

## 2020-07-13 PROCEDURE — 82607 VITAMIN B-12: CPT | Performed by: PHYSICIAN ASSISTANT

## 2020-07-13 PROCEDURE — 82728 ASSAY OF FERRITIN: CPT | Performed by: PHYSICIAN ASSISTANT

## 2020-07-13 PROCEDURE — 83540 ASSAY OF IRON: CPT | Performed by: PHYSICIAN ASSISTANT

## 2020-07-13 PROCEDURE — 83970 ASSAY OF PARATHORMONE: CPT | Performed by: PHYSICIAN ASSISTANT

## 2020-07-13 PROCEDURE — 85027 COMPLETE CBC AUTOMATED: CPT | Performed by: PHYSICIAN ASSISTANT

## 2020-07-13 PROCEDURE — 36415 COLL VENOUS BLD VENIPUNCTURE: CPT | Performed by: PHYSICIAN ASSISTANT

## 2020-07-14 LAB — DEPRECATED CALCIDIOL+CALCIFEROL SERPL-MC: 70 UG/L (ref 20–75)

## 2020-07-21 ENCOUNTER — TELEPHONE (OUTPATIENT)
Dept: SURGERY | Facility: CLINIC | Age: 65
End: 2020-07-21

## 2020-07-21 NOTE — TELEPHONE ENCOUNTER
Noted there is no CONONR for primary care clinic in pt chart.      Called pt to discuss.  Pt states she wants lab results to be mailed to her and she will get them to primary doctor.    Mailed labs as requested to address on file.  Lin Ferrara RN on 7/21/2020 at 11:17 AM

## 2020-07-23 ENCOUNTER — VIRTUAL VISIT (OUTPATIENT)
Dept: PSYCHOLOGY | Facility: CLINIC | Age: 65
End: 2020-07-23
Payer: COMMERCIAL

## 2020-07-23 DIAGNOSIS — E66.01 PSYCHOLOGICAL FACTORS AFFECTING MORBID OBESITY (H): ICD-10-CM

## 2020-07-23 DIAGNOSIS — F33.1 MAJOR DEPRESSIVE DISORDER, RECURRENT EPISODE, MODERATE (H): Primary | ICD-10-CM

## 2020-07-23 DIAGNOSIS — F54 PSYCHOLOGICAL FACTORS AFFECTING MORBID OBESITY (H): ICD-10-CM

## 2020-07-23 NOTE — PROGRESS NOTES
Health Psychology                     Department of Medicine  Izzy Montaño, Ph.D., L.P. [phone]                          [facility] Sherrie Crowe, Ph.D., L.P. [phone]                   [place] Mail [address]   Radha Salomón, Ph.D.  [phone]       [address] [place] [address] Vin Ward, Ph.D.,  L.P. [phone]        [place] [address]           Ranulfo Whipple, Ph.D. [phone]      Enriqueta Sorto, Ph.D., L.P. [phone]    [facility]   Ramin Olivo, Ph.D., A.B.P.P., L.P. [phone]  [address] [place] [address] Kathie Galvan, Ph.D., L.P. [phone]     Health Psychology Telephone Note    Ms. Padilla is a 65-year-old woman self-referred for psychological consultation because her therapist of the last 24 years retired.  She is seen for problem-solving and supportive therapy for depression and multiple health issues.     HISTORY OF PRESENTING CONCERN:  Ms. Padilla reported at intake (7/28/16) a  lengthy history of depression.  She sees a psychiatrist, Ban Coleman, at [facility], and is taking Abilify 7.5 mg, trazodone 500 mg, ripazepam 75 mg each day at bedtime, Tegretol 400 mg at bedtime and methylphenidate 10 mg b.i.d.  She discontineud ability and is now taking Rexulti 2 mg.  She has a history of hospitalizations including 3 at [facility] in the late 1990s, early 2000s when she had 2 courses of ECT lasting 7-10 sessions and approximately 3 other single session ECTs.  She stopped due to memory problems.  She kept with Dr. Coleman who she first met as an inpatient and has seen her for the past 18 years.  She had also been hospitalized psychiatrically at [facility] at age 23.  She previously worked with psychiatrist, Doug Sarabia for three years, stopping, in part, because she didn't feel he took her suicide attempt sufficiently seriously.  She reports her depression tends to vary.     MEDICAL HISTORY:  Ms.  Randy has a complex medical history.  She was diagnosed with MS in 1985.  Her psychiatrist at [facility] in [place], Dr. 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  She has used a scooter since 1992, using it more  than she had previously.  She also can use a walker.  She was diagnosed with fibromyalgia in 1997.   She is seen at the [facility] for her eye care. During 8th grade, she fell on a ski lift, injuring her larynx.     WEIGHT not taken. She states she weighed >300 last time we spoke and today weighs exactly 300.  Discussed goal of cutting back by 125 calories per day.   That would get her losing 2 lbs./month.    She states weight is 297 at home.  She is weighing self weekly, using more vegetables for snack.  She plans to write down calories in the morning.     She stopped attending OA.    Wt Readings from Last 4 Encounters:   07/06/20 136.1 kg (300 lb)   07/06/20 136.1 kg (300 lb)   02/04/20 138.3 kg (305 lb)   01/29/20 137.7 kg (303 lb 8 oz)     Past Medical History:   Diagnosis Date     Depression, major, in partial remission (H)      Fibromyalgia syndrome      Gastro-oesophageal reflux disease      Hyperlipemia      Lymphedema      Multiple sclerosis (H)     tremors with MS, all four limbs and head     Multiple sclerosis, secondary progressive (H)      Sleep apnea      Vocal cord paralysis, unilateral complete         Past Surgical History:   Procedure Laterality Date     COLONOSCOPY N/A 7/17/2017    Procedure: COMBINED COLONOSCOPY, SINGLE OR MULTIPLE BIOPSY/POLYPECTOMY BY BIOPSY;;  Surgeon: Wong Beaulieu MD;  Location:  GI     COLONOSCOPY N/A 2/23/2018    Procedure: COMBINED COLONOSCOPY, SINGLE OR MULTIPLE BIOPSY/POLYPECTOMY BY BIOPSY;  COLONOSCOPY ;  Surgeon: Yessica Santana MD;  Location:  GI     ENT SURGERY      throat 1969, vocal cord surgery with skin grafting     ESOPHAGOSCOPY, GASTROSCOPY, DUODENOSCOPY (EGD), COMBINED N/A 12/16/2014     Procedure: COMBINED ENDOSCOPIC ULTRASOUND, ESOPHAGOSCOPY, GASTROSCOPY, DUODENOSCOPY (EGD), FINE NEEDLE ASPIRATE/BIOPSY;  Surgeon: Yessica Santana MD;  Location:  GI     ESOPHAGOSCOPY, GASTROSCOPY, DUODENOSCOPY (EGD), COMBINED N/A 12/16/2014    Procedure: COMBINED ESOPHAGOSCOPY, GASTROSCOPY, DUODENOSCOPY (EGD), BIOPSY SINGLE OR MULTIPLE;  Surgeon: Yessica Santana MD;  Location:  GI     LAPAROSCOPIC APPENDECTOMY  5/30/2013    Procedure: LAPAROSCOPIC APPENDECTOMY;;  Surgeon: Salvador Morris MD;  Location:  OR     LAPAROSCOPIC BIOPSY LIVER  5/30/2013    Procedure: LAPAROSCOPIC BIOPSY LIVER;;  Surgeon: Salvador Morris MD;  Location:  OR     LAPAROSCOPIC GASTRIC SLEEVE  5/30/2013    Procedure: LAPAROSCOPIC GASTRIC SLEEVE;  LAPAROSCOPIC SLEEVE GASTRECTOMY/ LAPARSCOPIC  APPENDECTOMY /LIVER BIOPSIES/LIVER CYST DRAINAGE;  Surgeon: Salvador Morris MD;  Location:  OR     ORTHOPEDIC SURGERY      R wrist 2010       Current Outpatient Medications   Medication     ARIPiprazole (ABILIFY) 5 MG tablet     atorvastatin (LIPITOR) 20 MG tablet     Calcium Citrate-Vitamin D 250-200 MG-UNIT TABS     carBAMazepine (TEGRETOL) 200 MG tablet     carBAMazepine (TEGRETOL) 200 MG tablet     denosumab (PROLIA) 60 MG/ML SOLN injection     Dextromethorphan-Guaifenesin (MUCINEX DM PO)     docusate sodium (COLACE) 100 MG capsule     DULOXETINE HCL PO     famotidine (PEPCID) 20 MG tablet     FLUZONE HIGH-DOSE 0.5 ML injection     ketoconazole (NIZORAL) 2 % cream     Melatonin 10 MG TABS     menthol-zinc oxide (CALMOSEPTINE) 0.44-20.6 % OINT ointment     methylphenidate (RITALIN) 10 MG tablet     Mirtazapine (REMERON PO)     Multiple Vitamin (MULTIVITAMINS PO)     sulfacetamide sodium, Acne, 10 % lotion     TRAZODONE HCL PO     triamcinolone (ARISTOCORT HP) 0.5 % external cream     triamcinolone (KENALOG) 0.5 % OINT ointment     trospium (SANCTURA) 20 MG tablet     vitamin B-Complex     No current facility-administered  medications for this visit.      New medication: On Duloxetine and Mirtazpine and Trazodone and Ritalin. She discontinued Effexor and Abilify previously  per Dr. Marquise Coleman, her psychiatrist.  On 4/10/19 she informed me that she started Abilify and stopped  Rexulti.    PHQ-9 SCORE 2018 2019 9/15/2019   PHQ-9 Total Score MyChart 10 (Moderate depression) 7 (Mild depression) 5 (Mild depression)   PHQ-9 Total Score 10 7 5     NAS-7 SCORE 2016   Total Score - 6 (mild anxiety)   Total Score 3 -     SOCIAL HISTORY:  Ms. Padilla grew up in the [place] area and is the oldest of 5 children in her family of origin.  Her father was a dentist who she believes was bipolar.  He  of lung cancer at age 72.  Her mother  of sepsis at age 80.  She had been diagnosed with breast cancer when Ms. Padilla was 8.  She describes the marriage between her parents as misael.  She is 5 years older than her closest-aged sister and they are all within 4 years of each other.   Her daughter  12/3 and her mother   She tends to feel more depressed in winter.      Ms. Padilla attended [employer] for a year and later went to night school at the [employer].  She felt that she could not continue her education to the point of graduation because she was working full time and raising her daughter.  Her daughter  in  at age 31 of liver failure secondary to an accidental Tylenol overdose.  She had been recovering from a hysterectomy.      Ms. 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  She had to retire at the time secondary to her MS.  She has never .  She has not dated,and states that if she were she would date, she would be interested in a relationship with a woman.  There is no history of  service or legal problems.  She expresses concern  about her increasing social isolation.  She has at least 1 friend, Jennifer, who she met at a therapy group in 1984.  She is active in facilitating groups for people with MS both in [place] and [place].  She lives alone and currently gets help from a home health aide, as well as home health nurse.     She sees Dr. Ban Coleman, psychiatrist and is trying to figure out best antidepressant regimen.  .Effexor reduced to 300 per Dr. Coleman and is now substituting Cymbalta for it.  She feels she has been more depressed since the Fall, but doesn't think it is SAD.   She states that she is recommending case management.     SESSION:  Mili participated in a telehealth session of psychotherapy.. Her depression is significant, as she is feeling frustrated staing mainly at home, but less so as she adapts to the new realities.  The depression varies.  She felt somewhat better when getting together to play board games with her friend jennifer.  Weight:  She lost 2 lbs. In the interval.  She continues to want to keep to the daily calorie goal to 1750. She thinks it helps. Since last seen, she had most averaging 1750.  Other days were over 200, but the ceiling is lowering, so nothing over 52820.  This is the best she she has done so far.  She is getting rid of snacks  Or converting between meals to vegetables.  She is reinforced for her progress, including getting less food when she eats out.  She reports not always adding up the calories, generally when she does is at the end of the day.  We discussed doing it before dinner so as to cue her better in real time about how much more she can eaat in the latter part of the day.  She is taking the social distancing seriously, using mask and gloves.  She is 3/12 sessions into acupuncture for bladder hyperactivity.  She is thining it is working and giving as high as 50% improvement. .    She was pleased to talk and would like to talk again in  3 weeks.   She participated fully and appeared  to derive benefit. Affect is positive.  Rapport was excellent.   The treatment plan was reviewed with the patient at today s visit. The treatment plan remains current based on the patient s status and progress to date.  This telehealth (telephone) service is appropriate and effective for delivering services in light of the necessity for social distancing to mitigate the COVID-19 epidemic and for conservation of PPE.     Patient has agreed to receiving telehealth services after being informed about it: Yes    Patient prefers video invitation/information to be sent by:   email    Time service started: 12:02  Time service ended: 12:57  Extended session due to complexity of case and length of interval.      Mode of transmission: Doxy.me converted to telephone due to her camera not working    Location of originating:  Home of the patient    Distance site:  Home office of provider for [facility]    The patient has been notified that:  Video visits will be conducted via a call with their psychologist to provide the care they need with a video conversation. Video visits may be billed at different rates depending on insurance coverage.  Patients are advised to please contact their insurance provider with any questions about their health insurance coverage. If during the course of a call the psychologist feels a video visit is not appropriate, patients will not be charged for this service    DIAGNOSES:   Major depression, recurrent, mild (F33.0).   Behavioral factors affecting morbid obesity (E66.01).     PLAN:  Ms. Padilla will return for video visit  on 8/13 @ 12  for problem-solving and supportive therapy consistent with treatment plan..  Goal per day now that she is tracking will change from 2000/day to 1750 consistently.  This is a compromise from 1500.    Last treatment plan signed:11/20/2019  Last Treatment plan review: 4/8/20 ([facility])  Treatment plan verbal   review due: 10/2020             Ramin Olivo, PhD,  A.B.P.P., L.P.   Director, Health Psychology

## 2020-08-27 ENCOUNTER — VIRTUAL VISIT (OUTPATIENT)
Dept: PSYCHOLOGY | Facility: CLINIC | Age: 65
End: 2020-08-27
Payer: COMMERCIAL

## 2020-08-27 DIAGNOSIS — F33.1 MAJOR DEPRESSIVE DISORDER, RECURRENT EPISODE, MODERATE (H): Primary | ICD-10-CM

## 2020-08-27 DIAGNOSIS — E66.01 PSYCHOLOGICAL FACTORS AFFECTING MORBID OBESITY (H): ICD-10-CM

## 2020-08-27 DIAGNOSIS — F54 PSYCHOLOGICAL FACTORS AFFECTING MORBID OBESITY (H): ICD-10-CM

## 2020-08-27 NOTE — PROGRESS NOTES
Health Psychology                     Department of Medicine  Izzy Montaño, Ph.D., L.P. [phone]                          [facility] Sherrie Crowe, Ph.D., L.P. [phone]                   [place] Mail Code 642   Radha Salomón, Ph.D.  [phone]       [address] [place] [address] Vin Ward, Ph.D.,  L.P. [phone]        [place] [address]           Ranulfo Whipple, Ph.D. [phone]      Enriqueta Sorto, Ph.D., L.P. [phone]    [facility]   Ramin Olivo, Ph.D., A.B.P.P., L.P. [phone]  [address] [place] [address] Kathie Galvan, Ph.D., L.P. [phone]     Health Psychology [facility] Health Note    Ms. Padilla is a 65-year-old woman self-referred for psychological consultation because her therapist of the last 24 years retired.  She is seen for problem-solving and supportive therapy for depression and multiple health issues.     HISTORY OF PRESENTING CONCERN:  Ms. Padilla reported at intake (7/28/16) a  lengthy history of depression.  She sees a psychiatrist, Ban Coleman, at [facility], and is taking Abilify 7.5 mg, trazodone 500 mg, ripazepam 75 mg each day at bedtime, Tegretol 400 mg at bedtime and methylphenidate 10 mg b.i.d.  She discontineud ability and is now taking Rexulti 2 mg.  She has a history of hospitalizations including 3 at [facility] in the late 1990s, early 2000s when she had 2 courses of ECT lasting 7-10 sessions and approximately 3 other single session ECTs.  She stopped due to memory problems.  She kept with Dr. Coleman who she first met as an inpatient and has seen her for the past 18 years.  She had also been hospitalized psychiatrically at [facility] at age 23.  She previously worked with psychiatrist, Doug Sarabia for three years, stopping, in part, because she didn't feel he took her suicide attempt sufficiently seriously.  She reports her depression tends to vary.     MEDICAL  HISTORY:  Ms. Padilla has a complex medical history.  She was diagnosed with MS in 1985.  Her psychiatrist at [facility] in [place], Dr. 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  She has used a scooter since 1992, using it more  than she had previously.  She also can use a walker.  She was diagnosed with fibromyalgia in 1997.   She is seen at the [place] for her eye care. During 8th grade, she fell on a ski lift, injuring her larynx.     WEIGHT not taken. She states she weighed >300 last time we spoke and today weighs exactly 300.  Discussed goal of cutting back by 125 calories per day.   That would get her losing 2 lbs./month.    She states weight is 295 down from 297 at home.  She is weighing self weekly, using more vegetables for snack.  She plans to write down calories in the morning.     She stopped attending OA.    Wt Readings from Last 4 Encounters:   07/06/20 136.1 kg (300 lb)   07/06/20 136.1 kg (300 lb)   02/04/20 138.3 kg (305 lb)   01/29/20 137.7 kg (303 lb 8 oz)     Past Medical History:   Diagnosis Date     Depression, major, in partial remission (H)      Fibromyalgia syndrome      Gastro-oesophageal reflux disease      Hyperlipemia      Lymphedema      Multiple sclerosis (H)     tremors with MS, all four limbs and head     Multiple sclerosis, secondary progressive (H)      Sleep apnea      Vocal cord paralysis, unilateral complete         Past Surgical History:   Procedure Laterality Date     COLONOSCOPY N/A 7/17/2017    Procedure: COMBINED COLONOSCOPY, SINGLE OR MULTIPLE BIOPSY/POLYPECTOMY BY BIOPSY;;  Surgeon: Wong Beaulieu MD;  Location:  GI     COLONOSCOPY N/A 2/23/2018    Procedure: COMBINED COLONOSCOPY, SINGLE OR MULTIPLE BIOPSY/POLYPECTOMY BY BIOPSY;  COLONOSCOPY ;  Surgeon: Yessica Santana MD;  Location:  GI     ENT SURGERY      throat 1969, vocal cord surgery with skin grafting     ESOPHAGOSCOPY, GASTROSCOPY, DUODENOSCOPY  (EGD), COMBINED N/A 12/16/2014    Procedure: COMBINED ENDOSCOPIC ULTRASOUND, ESOPHAGOSCOPY, GASTROSCOPY, DUODENOSCOPY (EGD), FINE NEEDLE ASPIRATE/BIOPSY;  Surgeon: Yessica Santana MD;  Location:  GI     ESOPHAGOSCOPY, GASTROSCOPY, DUODENOSCOPY (EGD), COMBINED N/A 12/16/2014    Procedure: COMBINED ESOPHAGOSCOPY, GASTROSCOPY, DUODENOSCOPY (EGD), BIOPSY SINGLE OR MULTIPLE;  Surgeon: Yessica Santana MD;  Location:  GI     LAPAROSCOPIC APPENDECTOMY  5/30/2013    Procedure: LAPAROSCOPIC APPENDECTOMY;;  Surgeon: Salvador Morris MD;  Location:  OR     LAPAROSCOPIC BIOPSY LIVER  5/30/2013    Procedure: LAPAROSCOPIC BIOPSY LIVER;;  Surgeon: Salvador Morris MD;  Location:  OR     LAPAROSCOPIC GASTRIC SLEEVE  5/30/2013    Procedure: LAPAROSCOPIC GASTRIC SLEEVE;  LAPAROSCOPIC SLEEVE GASTRECTOMY/ LAPARSCOPIC  APPENDECTOMY /LIVER BIOPSIES/LIVER CYST DRAINAGE;  Surgeon: Salvador Morris MD;  Location:  OR     ORTHOPEDIC SURGERY      R wrist 2010       Current Outpatient Medications   Medication     ARIPiprazole (ABILIFY) 5 MG tablet     atorvastatin (LIPITOR) 20 MG tablet     Calcium Citrate-Vitamin D 250-200 MG-UNIT TABS     carBAMazepine (TEGRETOL) 200 MG tablet     carBAMazepine (TEGRETOL) 200 MG tablet     denosumab (PROLIA) 60 MG/ML SOLN injection     Dextromethorphan-Guaifenesin (MUCINEX DM PO)     docusate sodium (COLACE) 100 MG capsule     DULOXETINE HCL PO     famotidine (PEPCID) 20 MG tablet     FLUZONE HIGH-DOSE 0.5 ML injection     ketoconazole (NIZORAL) 2 % cream     Melatonin 10 MG TABS     menthol-zinc oxide (CALMOSEPTINE) 0.44-20.6 % OINT ointment     methylphenidate (RITALIN) 10 MG tablet     Mirtazapine (REMERON PO)     Multiple Vitamin (MULTIVITAMINS PO)     sulfacetamide sodium, Acne, 10 % lotion     TRAZODONE HCL PO     triamcinolone (ARISTOCORT HP) 0.5 % external cream     triamcinolone (KENALOG) 0.5 % OINT ointment     trospium (SANCTURA) 20 MG tablet     vitamin B-Complex     No  current facility-administered medications for this visit.      New medication: On Duloxetine and Mirtazpine and Trazodone and Ritalin. She discontinued Effexor and Abilify previously  per Dr. Marquise Coleman, her psychiatrist.  On 4/10/19 she informed me that she started Abilify and stopped  Rexulti.    PHQ-9 SCORE 2018 2019 9/15/2019   PHQ-9 Total Score MyChart 10 (Moderate depression) 7 (Mild depression) 5 (Mild depression)   PHQ-9 Total Score 10 7 5     NAS-7 SCORE 2016   Total Score - 6 (mild anxiety)   Total Score 3 -     SOCIAL HISTORY:  Ms. Padilla grew up in the [place] area and is the oldest of 5 children in her family of origin.  Her father was a dentist who she believes was bipolar.  He  of lung cancer at age 72.  Her mother  of sepsis at age 80.  She had been diagnosed with breast cancer when Ms. Padilla was 8.  She describes the marriage between her parents as misael.  She is 5 years older than her closest-aged sister and they are all within 4 years of each other.   Her daughter  12/3 and her mother   She tends to feel more depressed in winter.      Ms. Padilla attended [employer] for a year and later went to night school at the [employer].  She felt that she could not continue her education to the point of graduation because she was working full time and raising her daughter.  Her daughter  in  at age 31 of liver failure secondary to an accidental Tylenol overdose.  She had been recovering from a hysterectomy.      Ms. 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  She had to retire at the time secondary to her MS.  She has never .  She has not dated,and states that if she were she would date, she would be interested in a relationship with a woman.  There is no history of  service or legal  problems.  She expresses concern about her increasing social isolation.  She has at least 1 friend, Jael, who she met at a therapy group in 1984.  She is active in facilitating groups for people with MS both in [place] and [place].  She lives alone and currently gets help from a home health aide, as well as home health nurse.     She sees Dr. Ban Coleman, psychiatrist and is trying to figure out best antidepressant regimen.  .Effexor reduced to 300 per Dr. Coleman and is now substituting Cymbalta for it.  She feels she has been more depressed since the Fall, but doesn't think it is SAD.   She states that she is recommending case management.     SESSION:  Mili participated in a telehealth session of psychotherapy.. Her depression is significant, as she is feeling frustrated staing mainly at home, but less so as she adapts to the new realities.  The depression varies.    She reports her home health aide is coming earlier, like 8:30 AAM.  We discussed going to be earlier again.  Shesays her goal is to take her meds by 12 so she can go to bed a half hour later (12L30).  Weight:  She lost 2 lbs. In the interval.  She continues to want to keep to the daily lycalorie goal to 1750. She thinks it helps. Since last seen, she had been  at 1750 half the days and within 2000 calories the rest of the time..  This is the best she she has done so far.  She is trying to decrease snacks, but still has problems refraining as much as she thinks she needs to. We discussed how much she would lose if she went from 2 bags/week of a cheese popcorn to on eper month (98 lbs /year) Or converting between meals to vegetables.  She is reinforced for her progress, including getting less food when she eats out.  She reports not always adding up the calories, generally when she does is at the end of the day.   She is taking the social distancing seriously, using mask and gloves.  She is 3/12 sessions into acupuncture for bladder hyperactivity.   She is thining it is working and giving as high as 50% improvement.   She is very pleased to be responding so well to acupuncture for laldder relief.  It lets her sleep better at night.  She is pleased about it and wonders about spreading the word better.  She had some down days around 8/3, the birthday of her  daughter.  We discussed her desire to go to the [facility], but fearing that her mobility is an issue   We strategized around it.    She was pleased to talk and would like to talk again in  3 weeks, but our schedules didn't coincide, so will be seen in 4..   She participated fully and appeared to derive benefit. Affect is positive.  Rapport was excellent.   The treatment plan was reviewed with the patient at today s visit. The treatment plan remains current based on the patient s status and progress to date.  This telehealth (telephone) service is appropriate and effective for delivering services in light of the necessity for social distancing to mitigate the COVID-19 epidemic and for conservation of PPE.     Patient has agreed to receiving telehealth services after being informed about it: Yes    Patient prefers video invitation/information to be sent by:   email    Time service started: 9:00  Time service ended: 9:55  Extended session due to complexity of case and length of interval.    Mode of transmission: [email or web address]     Location of originating:  Home of the patient    Distance site:  Home office of provider for [facility]    The patient has been notified that:  Video visits will be conducted via a call with their psychologist to provide the care they need with a video conversation. Video visits may be billed at different rates depending on insurance coverage.  Patients are advised to please contact their insurance provider with any questions about their health insurance coverage. If during the course of a call the psychologist feels a video visit is not appropriate, patients will not be charged for this  service  DIAGNOSES:   Major depression, recurrent, mild (F33.0).   Behavioral factors affecting morbid obesity (E66.01).     PLAN:  Ms. Padilla will return for video visit  on 9/23 @ 11  for problem-solving and supportive therapy consistent with treatment plan..  Goal per day now that she is tracking will change from 2000/day to 1750 consistently.  This is a compromise from 1500.    Last treatment plan signed:11/20/2019  Last Treatment plan review: 7/23/20 ([facility])  Treatment plan verbal   review due: 10/2020             Ramin Olivo, PhD, A.B.P.P., L.P.   Director, Health Psychology

## 2020-09-21 ENCOUNTER — HOSPITAL ENCOUNTER (OUTPATIENT)
Dept: CT IMAGING | Facility: CLINIC | Age: 65
Discharge: HOME | End: 2020-09-21
Attending: INTERNAL MEDICINE | Admitting: INTERNAL MEDICINE
Payer: COMMERCIAL

## 2020-09-21 DIAGNOSIS — Q43.8 REDUNDANT COLON: ICD-10-CM

## 2020-09-21 DIAGNOSIS — D12.6 ADENOMATOUS POLYP OF COLON, UNSPECIFIED PART OF COLON: ICD-10-CM

## 2020-09-21 PROCEDURE — 74263 CT COLONOGRAPHY SCREENING: CPT

## 2020-09-23 ENCOUNTER — VIRTUAL VISIT (OUTPATIENT)
Dept: PSYCHOLOGY | Facility: CLINIC | Age: 65
End: 2020-09-23
Payer: COMMERCIAL

## 2020-09-23 DIAGNOSIS — F54 PSYCHOLOGICAL FACTORS AFFECTING MORBID OBESITY (H): ICD-10-CM

## 2020-09-23 DIAGNOSIS — F33.1 MAJOR DEPRESSIVE DISORDER, RECURRENT EPISODE, MODERATE (H): Primary | ICD-10-CM

## 2020-09-23 DIAGNOSIS — E66.01 PSYCHOLOGICAL FACTORS AFFECTING MORBID OBESITY (H): ICD-10-CM

## 2020-09-23 NOTE — PROGRESS NOTES
Health Psychology                     Department of Medicine  Izzy Montaño, Ph.D., L.P. [phone]                          [facility] Sherrie Crowe, Ph.D., L.P. [phone]                   [place] Mail Code 188   Radha Salomón, Ph.D.  [phone]       [address] [place] [address] Vin Ward, Ph.D.,  L.P. [phone]        [place] [address]           Ranulfo Whipple, Ph.D. [phone]      Enriqueta Sorto, Ph.D., L.P. [phone]    [facility]   Ramin Olivo, Ph.D., A.B.P.P., L.P. [phone]  [address] [place] [address] Kathie Galvan, Ph.D., L.P. [phone]     Health Psychology [facility] Health Note    Ms. Padilla is a 65-year-old woman self-referred for psychological consultation because her therapist of the last 24 years retired.  She is seen for problem-solving and supportive therapy for depression and multiple health issues.     HISTORY OF PRESENTING CONCERN:  Ms. Padilla reported at intake (7/28/16) a  lengthy history of depression.  She sees a psychiatrist, Ban Coleman, at [facility], and is taking Abilify 7.5 mg, trazodone 500 mg, ripazepam 75 mg each day at bedtime, Tegretol 400 mg at bedtime and methylphenidate 10 mg b.i.d.  She discontineud ability and is now taking Rexulti 2 mg.  She has a history of hospitalizations including 3 at [facility] in the late 1990s, early 2000s when she had 2 courses of ECT lasting 7-10 sessions and approximately 3 other single session ECTs.  She stopped due to memory problems.  She kept with Dr. Coleman who she first met as an inpatient and has seen her for the past 18 years.  She had also been hospitalized psychiatrically at [facility] at age 23.  She previously worked with psychiatrist, Doug Sarabia for three years, stopping, in part, because she didn't feel he took her suicide attempt sufficiently seriously.  She reports her depression tends to vary.     MEDICAL  HISTORY:  Ms. Padilla has a complex medical history.  She was diagnosed with MS in 1985.  Her psychiatrist at [facility] in [place], Dr. 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  She has used a scooter since 1992, using it more  than she had previously.  She also can use a walker.  She was diagnosed with fibromyalgia in 1997.   She is seen at the [place] for her eye care. During 8th grade, she fell on a ski lift, injuring her larynx.     WEIGHT not taken. iscussed goal of cutting back by 125 calories per day.   That would get her losing 2 lbs./month.  She has goal of 1750 claories/day, and states she met that goal aout half the days of the interview.     She states weight is still 295.  She had gone up and then decreased again  She is weighing herself weekly, using more vegetables for snack.  She plans to write down calories in the morning.     She resumed attending OA.  She had stopped a year ago and then resumed this month.    Wt Readings from Last 4 Encounters:   07/06/20 136.1 kg (300 lb)   07/06/20 136.1 kg (300 lb)   02/04/20 138.3 kg (305 lb)   01/29/20 137.7 kg (303 lb 8 oz)     Past Medical History:   Diagnosis Date     Depression, major, in partial remission (H)      Fibromyalgia syndrome      Gastro-oesophageal reflux disease      Hyperlipemia      Lymphedema      Multiple sclerosis (H)     tremors with MS, all four limbs and head     Multiple sclerosis, secondary progressive (H)      Sleep apnea      Vocal cord paralysis, unilateral complete         Past Surgical History:   Procedure Laterality Date     COLONOSCOPY N/A 7/17/2017    Procedure: COMBINED COLONOSCOPY, SINGLE OR MULTIPLE BIOPSY/POLYPECTOMY BY BIOPSY;;  Surgeon: Wong Beaulieu MD;  Location:  GI     COLONOSCOPY N/A 2/23/2018    Procedure: COMBINED COLONOSCOPY, SINGLE OR MULTIPLE BIOPSY/POLYPECTOMY BY BIOPSY;  COLONOSCOPY ;  Surgeon: Yessica Santana MD;  Location:  GI     ENT  SURGERY      throat 1969, vocal cord surgery with skin grafting     ESOPHAGOSCOPY, GASTROSCOPY, DUODENOSCOPY (EGD), COMBINED N/A 12/16/2014    Procedure: COMBINED ENDOSCOPIC ULTRASOUND, ESOPHAGOSCOPY, GASTROSCOPY, DUODENOSCOPY (EGD), FINE NEEDLE ASPIRATE/BIOPSY;  Surgeon: Yessica Santana MD;  Location:  GI     ESOPHAGOSCOPY, GASTROSCOPY, DUODENOSCOPY (EGD), COMBINED N/A 12/16/2014    Procedure: COMBINED ESOPHAGOSCOPY, GASTROSCOPY, DUODENOSCOPY (EGD), BIOPSY SINGLE OR MULTIPLE;  Surgeon: Yessica Santana MD;  Location:  GI     LAPAROSCOPIC APPENDECTOMY  5/30/2013    Procedure: LAPAROSCOPIC APPENDECTOMY;;  Surgeon: Salvador Morris MD;  Location:  OR     LAPAROSCOPIC BIOPSY LIVER  5/30/2013    Procedure: LAPAROSCOPIC BIOPSY LIVER;;  Surgeon: Salvador Morris MD;  Location:  OR     LAPAROSCOPIC GASTRIC SLEEVE  5/30/2013    Procedure: LAPAROSCOPIC GASTRIC SLEEVE;  LAPAROSCOPIC SLEEVE GASTRECTOMY/ LAPARSCOPIC  APPENDECTOMY /LIVER BIOPSIES/LIVER CYST DRAINAGE;  Surgeon: Salvador Morris MD;  Location:  OR     ORTHOPEDIC SURGERY      R wrist 2010       Current Outpatient Medications   Medication     ARIPiprazole (ABILIFY) 5 MG tablet     atorvastatin (LIPITOR) 20 MG tablet     Calcium Citrate-Vitamin D 250-200 MG-UNIT TABS     carBAMazepine (TEGRETOL) 200 MG tablet     carBAMazepine (TEGRETOL) 200 MG tablet     denosumab (PROLIA) 60 MG/ML SOLN injection     Dextromethorphan-Guaifenesin (MUCINEX DM PO)     docusate sodium (COLACE) 100 MG capsule     DULOXETINE HCL PO     famotidine (PEPCID) 20 MG tablet     FLUZONE HIGH-DOSE 0.5 ML injection     ketoconazole (NIZORAL) 2 % cream     Melatonin 10 MG TABS     menthol-zinc oxide (CALMOSEPTINE) 0.44-20.6 % OINT ointment     methylphenidate (RITALIN) 10 MG tablet     Mirtazapine (REMERON PO)     Multiple Vitamin (MULTIVITAMINS PO)     sulfacetamide sodium, Acne, 10 % lotion     TRAZODONE HCL PO     triamcinolone (ARISTOCORT HP) 0.5 % external cream      triamcinolone (KENALOG) 0.5 % OINT ointment     trospium (SANCTURA) 20 MG tablet     vitamin B-Complex     No current facility-administered medications for this visit.      Medication: On Duloxetine and Mirtazpine and Trazodone and Ritalin. She discontinued Effexor and Abilify previously  per Dr. Marquise Coleman, her psychiatrist.  On 4/10/19 she informed me that she started Abilify and stopped  Rexulti.    PHQ-9 SCORE 2018 2019 9/15/2019   PHQ-9 Total Score MyChart 10 (Moderate depression) 7 (Mild depression) 5 (Mild depression)   PHQ-9 Total Score 10 7 5     NAS-7 SCORE 2016   Total Score - 6 (mild anxiety)   Total Score 3 -     SOCIAL HISTORY:  Ms. Padilla grew up in the [place] area and is the oldest of 5 children in her family of origin.  Her father was a dentist who she believes was bipolar.  He  of lung cancer at age 72.  Her mother  of sepsis at age 80.  She had been diagnosed with breast cancer when Ms. Padilla was 8.  She describes the marriage between her parents as misael.  She is 5 years older than her closest-aged sister and they are all within 4 years of each other.   Her daughter  12/3 and her mother   She tends to feel more depressed in winter.      Ms. Padilla attended [employer] for a year and later went to night school at the [employer].  She felt that she could not continue her education to the point of graduation because she was working full time and raising her daughter.  Her daughter  in  at age 31 of liver failure secondary to an accidental Tylenol overdose.  She had been recovering from a hysterectomy.      Ms. 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  She had to retire at the time secondary to her MS.  She has never .  She has not dated,and states that if she were she would date, she  would be interested in a relationship with a woman.  There is no history of  service or legal problems.  She expresses concern about her increasing social isolation.  She has at least 1 friend, Jael, who she met at a therapy group in 1984.  She is active in facilitating groups for people with MS both in [place] and [place].  She lives alone and currently gets help from a home health aide, as well as home health nurse.     She sees Dr. Ban Coleman, psychiatrist and is trying to figure out best antidepressant regimen.  .Effexor reduced to 300 per Dr. Coleman and is now substituting Cymbalta for it.  She feels she has been more depressed since the Fall, but doesn't think it is SAD.   She states that she is recommending case management.     SESSION:  Mili participated in a telehealth session of psychotherapy.. Her depression is significant, as she is feeling frustrated staing mainly at home, but less so as she adapts to the new realities.  The depression varies.    She reports her home health aide is coming earlier, like 8:30 AAM.  We discussed going to be earlier again.  Shesays her goal is to take her meds by 12 so she can go to bed a half hour later (12L30).  Weight:  She lost 0 lbs. In the interval.  She continues to want to keep to the daily lycalorie goal to 1750. She thinks it helps. Since last seen, she had been  at 1750 half the days and within 2000 calories the rest of the time.  We discussed the need to be more consistent about staying with goal if she wants to be successful.  She identifies ways the weight is hard on her.  She is taking the social distancing seriously, using mask and gloves.    She had a virtual colonoscopy ( CT scan due to problems with her last  4 colonoscopies.  She is concerned about polyps and potential need for additional colonoscopiesl  She participated fully and appeared to derive benefit. Affect is positive.  Rapport was excellent.   This telehealth (telephone) service  is appropriate and effective for delivering services in light of the necessity for social distancing to mitigate the COVID-19 epidemic and for conservation of PPE.     Patient has agreed to receiving telehealth services after being informed about it: Yes    Patient prefers video invitation/information to be sent by:   email    Time service started: 11:05  Time service ended: 11:52    Mode of transmission: [email or web address]     Location of originating:  Home of the patient    Distance site:  Home office of provider for [facility]    The patient has been notified that:  Video visits will be conducted via a call with their psychologist to provide the care they need with a video conversation. Video visits may be billed at different rates depending on insurance coverage.  Patients are advised to please contact their insurance provider with any questions about their health insurance coverage. If during the course of a call the psychologist feels a video visit is not appropriate, patients will not be charged for this service  DIAGNOSES:   Major depression, recurrent, mild (F33.0).   Behavioral factors affecting morbid obesity (E66.01).     PLAN:  Ms. Padilla will return for video visit  on 10/21 @ 2  for problem-solving and supportive therapy consistent with treatment plan..  Goal per day now that she is tracking will change from 2000/day to 1750 consistently.     Last treatment plan signed:11/20/2019  Last Treatment plan review: 7/23/20 ([facility])  Treatment plan verbal   review due: 10/2020             Ramin Olivo, PhD, A.B.P.P., L.P.   Director, Health Psychology

## 2020-10-20 ENCOUNTER — HOSPITAL ENCOUNTER (OUTPATIENT)
Dept: MAMMOGRAPHY | Facility: CLINIC | Age: 65
Discharge: HOME | End: 2020-10-20
Attending: FAMILY MEDICINE | Admitting: FAMILY MEDICINE
Payer: COMMERCIAL

## 2020-10-20 DIAGNOSIS — Z12.31 VISIT FOR SCREENING MAMMOGRAM: ICD-10-CM

## 2020-10-20 PROCEDURE — 77067 SCR MAMMO BI INCL CAD: CPT

## 2020-10-21 ENCOUNTER — VIRTUAL VISIT (OUTPATIENT)
Dept: PSYCHOLOGY | Facility: CLINIC | Age: 65
End: 2020-10-21
Payer: COMMERCIAL

## 2020-10-21 DIAGNOSIS — E66.01 PSYCHOLOGICAL FACTORS AFFECTING MORBID OBESITY (H): Primary | ICD-10-CM

## 2020-10-21 DIAGNOSIS — F33.0 MAJOR DEPRESSIVE DISORDER, RECURRENT EPISODE, MILD (H): ICD-10-CM

## 2020-10-21 DIAGNOSIS — F54 PSYCHOLOGICAL FACTORS AFFECTING MORBID OBESITY (H): Primary | ICD-10-CM

## 2020-10-21 PROCEDURE — 90837 PSYTX W PT 60 MINUTES: CPT | Mod: 95 | Performed by: PSYCHOLOGIST

## 2020-10-21 NOTE — PROGRESS NOTES
Health Psychology                     Department of Medicine  Izzy Montaño, Ph.D., L.P. [phone]                          [facility] Sherrie Crowe, Ph.D., L.P. [phone]                   [place] Mail Code 753   Radha Salomón, Ph.D.  [phone]       [address] [place] [address] Vin Ward, Ph.D.,  L.P. [phone]        [place] [address]           Ranulfo Whipple, Ph.D. [phone]      Enriqueta Sorto, Ph.D., L.P. [phone]    [facility]   Ramin Olivo, Ph.D., A.B.P.P., L.P. [phone]  [address] [place] [address] Kathie Galvan, Ph.D., L.P. [phone]     Health Psychology [facility] Health Note    Ms. Padilla is a 65-year-old woman self-referred for psychological consultation because her therapist of the last 24 years retired.  She is seen for problem-solving and supportive therapy for depression and multiple health issues.     HISTORY OF PRESENTING CONCERN:  Ms. Padilla reported at intake (7/28/16) a  lengthy history of depression.  She sees a psychiatrist, Ban Coleman, at [facility], and is taking Abilify 7.5 mg, trazodone 500 mg, ripazepam 75 mg each day at bedtime, Tegretol 400 mg at bedtime and methylphenidate 10 mg b.i.d.  She discontineud ability and is now taking Rexulti 2 mg.  She has a history of hospitalizations including 3 at [facility] in the late 1990s, early 2000s when she had 2 courses of ECT lasting 7-10 sessions and approximately 3 other single session ECTs.  She stopped due to memory problems.  She kept with Dr. Coleman who she first met as an inpatient and has seen her for the past 18 years.  She had also been hospitalized psychiatrically at [facility] at age 23.  She previously worked with psychiatrist, Doug Sarabia for three years, stopping, in part, because she didn't feel he took her suicide attempt sufficiently seriously.  She reports her depression tends to vary.     MEDICAL  HISTORY:  Ms. Padilla has a complex medical history.  She was diagnosed with MS in 1985.  Her psychiatrist at [facility] in [place], Dr. 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  She has used a scooter since 1992, using it more  than she had previously.  She also can use a walker.  She was diagnosed with fibromyalgia in 1997.   She is seen at the [facility] for her eye care. During 8th grade, she fell on a ski lift, injuring her larynx.     WEIGHT not taken. iscussed goal of cutting back by 125 calories per day.   That would get her losing 2 lbs./month.  She has goal of 1750 claories/day. and states she met that goal aout half the days of the interview.  She had only 9 days down to goalt, the rest were about 2100 janice.     She states weight is down to 290 from 292 or 295.  She had gone up and then decreased again  She is weighing herself weekly, using more vegetables for snack.  She plans to write down calories in the morning.     She resumed attending OA.  She had stopped a year ago and then resumed this month.    Wt Readings from Last 4 Encounters:   07/06/20 136.1 kg (300 lb)   07/06/20 136.1 kg (300 lb)   02/04/20 138.3 kg (305 lb)   01/29/20 137.7 kg (303 lb 8 oz)     Past Medical History:   Diagnosis Date     Depression, major, in partial remission (H)      Fibromyalgia syndrome      Gastro-oesophageal reflux disease      Hyperlipemia      Lymphedema      Multiple sclerosis (H)     tremors with MS, all four limbs and head     Multiple sclerosis, secondary progressive (H)      Sleep apnea      Vocal cord paralysis, unilateral complete         Past Surgical History:   Procedure Laterality Date     COLONOSCOPY N/A 7/17/2017    Procedure: COMBINED COLONOSCOPY, SINGLE OR MULTIPLE BIOPSY/POLYPECTOMY BY BIOPSY;;  Surgeon: Wong Beaulieu MD;  Location:  GI     COLONOSCOPY N/A 2/23/2018    Procedure: COMBINED COLONOSCOPY, SINGLE OR MULTIPLE BIOPSY/POLYPECTOMY BY  BIOPSY;  COLONOSCOPY ;  Surgeon: Yessica Santana MD;  Location:  GI     ENT SURGERY      throat 1969, vocal cord surgery with skin grafting     ESOPHAGOSCOPY, GASTROSCOPY, DUODENOSCOPY (EGD), COMBINED N/A 12/16/2014    Procedure: COMBINED ENDOSCOPIC ULTRASOUND, ESOPHAGOSCOPY, GASTROSCOPY, DUODENOSCOPY (EGD), FINE NEEDLE ASPIRATE/BIOPSY;  Surgeon: Yessica Santana MD;  Location:  GI     ESOPHAGOSCOPY, GASTROSCOPY, DUODENOSCOPY (EGD), COMBINED N/A 12/16/2014    Procedure: COMBINED ESOPHAGOSCOPY, GASTROSCOPY, DUODENOSCOPY (EGD), BIOPSY SINGLE OR MULTIPLE;  Surgeon: Yessica Santana MD;  Location: [facility]     LAPAROSCOPIC APPENDECTOMY  5/30/2013    Procedure: LAPAROSCOPIC APPENDECTOMY;;  Surgeon: Salvador Morris MD;  Location:  OR     LAPAROSCOPIC BIOPSY LIVER  5/30/2013    Procedure: LAPAROSCOPIC BIOPSY LIVER;;  Surgeon: Salvador Morris MD;  Location:  OR     LAPAROSCOPIC GASTRIC SLEEVE  5/30/2013    Procedure: LAPAROSCOPIC GASTRIC SLEEVE;  LAPAROSCOPIC SLEEVE GASTRECTOMY/ LAPARSCOPIC  APPENDECTOMY /LIVER BIOPSIES/LIVER CYST DRAINAGE;  Surgeon: Salvador Morris MD;  Location:  OR     ORTHOPEDIC SURGERY      R wrist 2010       Current Outpatient Medications   Medication     ARIPiprazole (ABILIFY) 5 MG tablet     atorvastatin (LIPITOR) 20 MG tablet     Calcium Citrate-Vitamin D 250-200 MG-UNIT TABS     carBAMazepine (TEGRETOL) 200 MG tablet     carBAMazepine (TEGRETOL) 200 MG tablet     denosumab (PROLIA) 60 MG/ML SOLN injection     Dextromethorphan-Guaifenesin (MUCINEX DM PO)     docusate sodium (COLACE) 100 MG capsule     DULOXETINE HCL PO     famotidine (PEPCID) 20 MG tablet     FLUZONE HIGH-DOSE 0.5 ML injection     ketoconazole (NIZORAL) 2 % cream     Melatonin 10 MG TABS     menthol-zinc oxide (CALMOSEPTINE) 0.44-20.6 % OINT ointment     methylphenidate (RITALIN) 10 MG tablet     Mirtazapine (REMERON PO)     Multiple Vitamin (MULTIVITAMINS PO)     sulfacetamide sodium, Acne, 10 %  lotion     TRAZODONE HCL PO     triamcinolone (ARISTOCORT HP) 0.5 % external cream     triamcinolone (KENALOG) 0.5 % OINT ointment     trospium (SANCTURA) 20 MG tablet     vitamin B-Complex     No current facility-administered medications for this visit.      Medication: On Duloxetine and Mirtazpine and Trazodone and Ritalin. She discontinued Effexor and Abilify previously per Dr. Marquise Coleman, her psychiatrist.  On 4/10/19 she informed me that she started Abilify and stopped  Rexulti.    PHQ-9 SCORE 2018 2019 9/15/2019   PHQ-9 Total Score MyChart 10 (Moderate depression) 7 (Mild depression) 5 (Mild depression)   PHQ-9 Total Score 10 7 5     NAS-7 SCORE 2016   Total Score - 6 (mild anxiety)   Total Score 3 -     SOCIAL HISTORY:  Ms. Padilla grew up in the [place] area and is the oldest of 5 children in her family of origin.  Her father was a dentist who she believes was bipolar.  He  of lung cancer at age 72.  Her mother  of sepsis at age 80.  She had been diagnosed with breast cancer when Ms. Padilla was 8.  She describes the marriage between her parents as misael.  She is 5 years older than her closest-aged sister and they are all within 4 years of each other.   Her daughter  12/3 and her mother   She tends to feel more depressed in winter.      Ms. Padilla attended [employer] for a year and later went to night school at the [employer].  She felt that she could not continue her education to the point of graduation because she was working full time and raising her daughter.  Her daughter  in  at age 31 of liver failure secondary to an accidental Tylenol overdose.  She had been recovering from a hysterectomy.      Ms. 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  She had to retire at the time secondary to her MS.  Jaquelin  has never .  She has not dated,and states that if she were she would date, she would be interested in a relationship with a woman.  There is no history of  service or legal problems.  She expresses concern about her increasing social isolation.  She has at least 1 friend, Jael, who she met at a therapy group in 1984.  She is active in facilitating groups for people with MS both in [place] and [place].  She lives alone and currently gets help from a home health aide, as well as home health nurse.     She sees Dr. Ban Coleman, psychiatrist and is trying to figure out best antidepressant regimen.  .Effexor reduced to 300 per Dr. Coleman and is now substituting Cymbalta for it.  She feels she has been more depressed since the Fall, but doesn't think it is SAD.   She states that she is recommending case management.     SESSION:  Mili participated in a telehealth session of psychotherapy.. Her depression is significant, as she is feeling frustrated staing mainly at home, but less so as she adapts to the new realities.  The depression varies.    Health: Her colonoscopy results revealed no new polyps.  She is very pleased and the next one will be in 3 years.  A month ago she had a balance issue one day, so couldn't walk between living room and kitchen. Since then, she has used walker daily throughout the day.  She saw her neurologist yesterday who wondered whwther it might be due to medication (carbamazepine) or low sodium.  [facility].   She reports her home health aide is coming earlier, like 8:30 AAM.  We discussed going to be earlier again.  She says her goal is to take her meds by 12 so she can go to bed a half hour later (12:30).   She is going to bed [id number] and 1:00. She gets 7-8 hours of sleep/night.  She also has a counselor geri to her home, who has cut back to 1/month  They have worked together for 3 years. Her name is Aury Matthew,  MSW.  Weight:  She lost20 lbs. In the interval.  She continues to want to keep to the daily lycalorie goal to 1750. She thinks it helps. Since last seen, she had been  at 1750 half the days and within 2000 calories the rest of the time.  We discussed the need to be more consistent about staying with goal if she wants to be successful.  She identifies ways the weight is hard on her.  She states she is down to 290.  She is taking the social distancing seriously, using mask and gloves.  Her aide doesn't wear a mask, but is only there for a half hour for help such as showering  The counselor wears a mask.  She does not thinks she will see her family on Thanksgiving due to COVID-19.  She did get to her daughter's grave before it got cold, and felt good bout it. Overall, she appears to be coping relatively well.   She had a virtual colonoscopy ( CT scan due to problems with her last  4 colonoscopies.  She is concerned about polyps and potential need for additional colonoscopy.  She does not thinks she will see her family on Thanksgiving due to COVID-19.  She did get to her daughter's grave before it got cold, and felt good bout it. Overall, she appears to be coping relatively well.   She participated fully and appeared to derive benefit. Affect is positive.  Rapport was excellent.   This telehealth (telephone) service is appropriate and effective for delivering services in light of the necessity for social distancing to mitigate the COVID-19 epidemic and for conservation of PPE.     Patient has agreed to receiving telehealth services after being informed about it: Yes    Patient prefers video invitation/information to be sent by:   email    Time service started: 2:02  Time service ended: 2:55  Extended session due to complexity of case and length of interval.      Mode of transmission: [email or web address]     Location of originating:  Home of the patient    Distance site:  Home office of provider for [facility]    The patient has been  notified that:  Video visits will be conducted via a call with their psychologist to provide the care they need with a video conversation. Video visits may be billed at different rates depending on insurance coverage.  Patients are advised to please contact their insurance provider with any questions about their health insurance coverage. If during the course of a call the psychologist feels a video visit is not appropriate, patients will not be charged for this service  DIAGNOSES:   Major depression, recurrent, mild (F33.0).   Behavioral factors affecting morbid obesity (E66.01).     PLAN:  Ms. Padilla will return for video visit  on 11/25 @ 2  for problem-solving and supportive therapy consistent with treatment plan..  Goal per day now that she is tracking will change from 2000/day to 1750 consistently.     Last treatment plan signed:11/20/2019  Last Treatment plan review: 7/23/20 ([facility])  Treatment plan verbal   review due: 11/2020             Ramin Olivo, PhD, A.B.P.P., L.P.   Director, Health Psychology

## 2020-11-25 ENCOUNTER — VIRTUAL VISIT (OUTPATIENT)
Dept: PSYCHOLOGY | Facility: CLINIC | Age: 65
End: 2020-11-25
Payer: COMMERCIAL

## 2020-11-25 DIAGNOSIS — F33.0 MAJOR DEPRESSIVE DISORDER, RECURRENT EPISODE, MILD (H): Primary | ICD-10-CM

## 2020-11-25 DIAGNOSIS — F54 PSYCHOLOGICAL FACTORS AFFECTING MORBID OBESITY (H): ICD-10-CM

## 2020-11-25 DIAGNOSIS — E66.01 PSYCHOLOGICAL FACTORS AFFECTING MORBID OBESITY (H): ICD-10-CM

## 2020-11-25 PROCEDURE — 90837 PSYTX W PT 60 MINUTES: CPT | Mod: 95 | Performed by: PSYCHOLOGIST

## 2020-11-25 NOTE — PROGRESS NOTES
Health Psychology                     Department of Medicine  Izzy Montaño, Ph.D., L.P. [phone]                          [facility] Sherrie Crowe, Ph.D., L.P. [phone]                   [place] Mail Code 828   Radha Salomón, Ph.D.  [phone]       [address] [place] [address] Vin Ward, Ph.D.,  L.P. [phone]        [place] [address]           Ranulfo Whipple, Ph.D. [phone]      Enriqueta Sorto, Ph.D., L.P. [phone]    [facility]   Ramin Olivo, Ph.D., A.B.P.P., L.P. [phone]  [address] [place] [address] Kathie Galvan, Ph.D., L.P. [phone]     Health Psychology Levine, Susan. \Hospital Has a New Name and Outlook.\"" Health Note    Ms. Padilla is a 65-year-old woman self-referred for psychological consultation because her therapist of the last 24 years retired.  She is seen for problem-solving and supportive therapy for depression and multiple health issues.     HISTORY OF PRESENTING CONCERN:  Ms. Padilla reported at intake (7/28/16) a  lengthy history of depression.  She sees a psychiatrist, Ban Coleman, at [facility], and is taking Abilify 7.5 mg, trazodone 500 mg, ripazepam 75 mg each day at bedtime, Tegretol 400 mg at bedtime and methylphenidate 10 mg b.i.d.  She discontineud ability and is now taking Rexulti 2 mg.  She has a history of hospitalizations including 3 at [facility] in the late 1990s, early 2000s when she had 2 courses of ECT lasting 7-10 sessions and approximately 3 other single session ECTs.  She stopped due to memory problems.  She kept with Dr. Coleman who she first met as an inpatient and has seen her for the past 18 years.  She had also been hospitalized psychiatrically at [facility] at age 23.  She previously worked with psychiatrist, Doug Sarabia for three years, stopping, in part, because she didn't feel he took her suicide attempt sufficiently seriously.  She reports her depression tends to vary.     MEDICAL  HISTORY:  Ms. Padilla has a complex medical history.  She was diagnosed with MS in 1985.  Her psychiatrist at [facility] in [place], Dr. 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  She has used a scooter since 1992, using it more  than she had previously.  She also can use a walker.  She was diagnosed with fibromyalgia in 1997.   She is seen at the [facility] for her eye care. During 8th grade, she fell on a ski lift, injuring her larynx.     WEIGHT not taken. iscussed goal of cutting back by 125 calories per day.   That would get her losing 2 lbs./month.  She has goal of 1750 claories/day. and states she met that goal aout half the days of the interview.  She had only 4 days down to goal in the last 18 days;  the rest were about 2100 janice.   She was at goal in the preceeding days for 11 of 17 days.    She states weight is down to 285 from 290.  She is weighing herself weekly, using more vegetables for snack.  She plans to write down calories in the morning. She acknowledges she needs to do it daily.       She resumed attending OA.  She had stopped a year ago and then resumed this month.  We discuss teasing apart stress copying from eating.    She is  starting reatment for  benign paroxysmal positional vertigo treatment in December, 2020.  She is getting some physical therapy for helping with convergence.  Her balance is feeling a bit better.  He is drinking Power Aid (0 calories)     Wt Readings from Last 4 Encounters:   07/06/20 136.1 kg (300 lb)   07/06/20 136.1 kg (300 lb)   02/04/20 138.3 kg (305 lb)   01/29/20 137.7 kg (303 lb 8 oz)     Past Medical History:   Diagnosis Date     Depression, major, in partial remission (H)      Fibromyalgia syndrome      Gastro-oesophageal reflux disease      Hyperlipemia      Lymphedema      Multiple sclerosis (H)     tremors with MS, all four limbs and head     Multiple sclerosis, secondary progressive (H)      Sleep apnea       Vocal cord paralysis, unilateral complete         Past Surgical History:   Procedure Laterality Date     COLONOSCOPY N/A 7/17/2017    Procedure: COMBINED COLONOSCOPY, SINGLE OR MULTIPLE BIOPSY/POLYPECTOMY BY BIOPSY;;  Surgeon: Wong Beaulieu MD;  Location:  GI     COLONOSCOPY N/A 2/23/2018    Procedure: COMBINED COLONOSCOPY, SINGLE OR MULTIPLE BIOPSY/POLYPECTOMY BY BIOPSY;  COLONOSCOPY ;  Surgeon: Yessica Santana MD;  Location:  GI     ENT SURGERY      throat 1969, vocal cord surgery with skin grafting     ESOPHAGOSCOPY, GASTROSCOPY, DUODENOSCOPY (EGD), COMBINED N/A 12/16/2014    Procedure: COMBINED ENDOSCOPIC ULTRASOUND, ESOPHAGOSCOPY, GASTROSCOPY, DUODENOSCOPY (EGD), FINE NEEDLE ASPIRATE/BIOPSY;  Surgeon: Yessica Santana MD;  Location: [facility]     ESOPHAGOSCOPY, GASTROSCOPY, DUODENOSCOPY (EGD), COMBINED N/A 12/16/2014    Procedure: COMBINED ESOPHAGOSCOPY, GASTROSCOPY, DUODENOSCOPY (EGD), BIOPSY SINGLE OR MULTIPLE;  Surgeon: Yessica Santana MD;  Location: [facility]     LAPAROSCOPIC APPENDECTOMY  5/30/2013    Procedure: LAPAROSCOPIC APPENDECTOMY;;  Surgeon: Salvador Morris MD;  Location:  OR     LAPAROSCOPIC BIOPSY LIVER  5/30/2013    Procedure: LAPAROSCOPIC BIOPSY LIVER;;  Surgeon: Salvador Morris MD;  Location:  OR     LAPAROSCOPIC GASTRIC SLEEVE  5/30/2013    Procedure: LAPAROSCOPIC GASTRIC SLEEVE;  LAPAROSCOPIC SLEEVE GASTRECTOMY/ LAPARSCOPIC  APPENDECTOMY /LIVER BIOPSIES/LIVER CYST DRAINAGE;  Surgeon: Salvador Morris MD;  Location:  OR     ORTHOPEDIC SURGERY      R wrist 2010       Current Outpatient Medications   Medication     ARIPiprazole (ABILIFY) 5 MG tablet     atorvastatin (LIPITOR) 20 MG tablet     Calcium Citrate-Vitamin D 250-200 MG-UNIT TABS     carBAMazepine (TEGRETOL) 200 MG tablet     carBAMazepine (TEGRETOL) 200 MG tablet     denosumab (PROLIA) 60 MG/ML SOLN injection     Dextromethorphan-Guaifenesin (MUCINEX DM PO)     docusate sodium (COLACE) 100 MG capsule      DULOXETINE HCL PO     famotidine (PEPCID) 20 MG tablet     FLUZONE HIGH-DOSE 0.5 ML injection     ketoconazole (NIZORAL) 2 % cream     Melatonin 10 MG TABS     menthol-zinc oxide (CALMOSEPTINE) 0.44-20.6 % OINT ointment     methylphenidate (RITALIN) 10 MG tablet     Mirtazapine (REMERON PO)     Multiple Vitamin (MULTIVITAMINS PO)     sulfacetamide sodium, Acne, 10 % lotion     TRAZODONE HCL PO     triamcinolone (ARISTOCORT HP) 0.5 % external cream     triamcinolone (KENALOG) 0.5 % OINT ointment     trospium (SANCTURA) 20 MG tablet     vitamin B-Complex     No current facility-administered medications for this visit.      Medication: On Duloxetine and Mirtazpine and Trazodone and Ritalin. She discontinued Effexor and Abilify previously per Dr. Marquise Coleman, her psychiatrist.  On 4/10/19 she informed me that she started Abilify and stopped  Rexulti.    PHQ-9 SCORE 2018 2019 9/15/2019   PHQ-9 Total Score MyChart 10 (Moderate depression) 7 (Mild depression) 5 (Mild depression)   PHQ-9 Total Score 10 7 5     NAS-7 SCORE 2016   Total Score - 6 (mild anxiety)   Total Score 3 -     SOCIAL HISTORY:  Ms. Padilla grew up in the [place] area and is the oldest of 5 children in her family of origin.  Her father was a dentist who she believes was bipolar.  He  of lung cancer at age 72.  Her mother  of sepsis at age 80.  She had been diagnosed with breast cancer when Ms. Padilla was 8.  She describes the marriage between her parents as misael.  She is 5 years older than her closest-aged sister and they are all within 4 years of each other.   Her daughter  12/3 and her mother   She tends to feel more depressed in winter.      Ms. Padilla attended [employer] for a year and later went to night school at the [employer].  She felt that she could not continue her education to the point of graduation because she was working full time and raising her daughter.  Her  daughter  in  at age 31 of liver failure secondary to an accidental Tylenol overdose.  She had been recovering from a hysterectomy.      Ms. 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  She had to retire at the time secondary to her MS.  She has never .  She has not dated,and states that if she were she would date, she would be interested in a relationship with a woman.  There is no history of  service or legal problems.  She expresses concern about her increasing social isolation.  She has at least 1 friend, Jael, who she met at a therapy group in .  She is active in facilitating groups for people with MS both in [place] and [place].  She lives alone and currently gets help from a home health aide, as well as home health nurse.     She sees Dr. Ban Coleman, psychiatrist and is trying to figure out best antidepressant regimen.  .Dr. Coleman prescribes Cymbalta for it.  She feels she has been more depressed since the Fall, but doesn't think it is SAD.   She states that she has recommended case management.     SESSION:  Mili participated in a telehealth session of psychotherapy.. Her depression is significant, as she is feeling frustrated staing mainly at home, but less so as she adapts to the new realities.  The depression varies.    Health: Her colonoscopy results revealed no new polyps.  She is very pleased and the next one will be in 3 years.  A month ago she had a balance issue one day, so couldn't walk between living room and kitchen. Since then, she has used walker daily throughout the day.  She saw her neurologist yesterday who wondered whwther it might be due to medication (carbamazepine) or low sodium.  [facility].   She reports her home health aide is coming earlier, like 8:30 AAM.  We discussed  going to be earlier again.  She says her goal is to take her meds by 12 so she can go to bed a half hour later (12:30).   She is going to bed [id number]:00 and 1:00. She gets 7-8 hours of sleep/night.  She also has a counselor geri to her home, who has cut back to 1/month  They have worked together for 3 years. Her name is Aury MatthewEMILY.  Weight:  She lost 5 lbs. In the interval.  She continues to want to keep to the daily lycalorie goal to 1750. She thinks it helps. Since last seen, she had been  at 1750 half the days and within 2000 calories the rest of the time.  We discussed the need to be more consistent about staying with goal if she wants to be successful.  She identifies ways the weight is hard on her.  She is getting off the tegretol, which she has taken for her MS tremors.  We discussed the possibility that part of her recent weight loss may be due to the titration off of it.  Her MS neurologist is leaving, and she plans to see a new neurologist at the [facility].  Two new health problems are her dizziness and an atypical finding byher dentist.  She does not thinks she will see her family on Thanksgiving due to COVID-19.  She did get to her daughter's grave before it got cold, and felt good bout it. Overall, she appears to be coping relatively well.   A sister will drop off food for her tomorrow.  She participated fully and appeared to derive benefit. Affect is positive.  Rapport was excellent.   This telehealth (telephone) service is appropriate and effective for delivering services in light of the necessity for social distancing to mitigate the COVID-19 epidemic and for conservation of PPE.     Patient has agreed to receiving telehealth services after being informed about it: Yes    Patient prefers video invitation/information to be sent by:   email    Time service started: 2:02  Time service ended: 2:55  Extended session due to complexity of case and length of interval.      Mode  of transmission: Doxy.me     Location of originating:  Home of the patient    Distance site:  Home office of provider for [facility]    The patient has been notified that:  Video visits will be conducted via a call with their psychologist to provide the care they need with a video conversation. Video visits may be billed at different rates depending on insurance coverage.  Patients are advised to please contact their insurance provider with any questions about their health insurance coverage. If during the course of a call the psychologist feels a video visit is not appropriate, patients will not be charged for this service  DIAGNOSES:   Major depression, recurrent, mild (F33.0).   Behavioral factors affecting morbid obesity (E66.01).     PLAN:  Ms. Padilla will return for video visit  on 12/23 @ 3  for problem-solving and supportive therapy consistent with treatment plan..  Goal per day now that she is tracking will change from 2000/day to 1750 consistently.     Last treatment plan signed:11/20/2019  Last Treatment plan review: 7/23/20 ([facility])  Treatment plan verbal   review due: 11/2020 (will send treatment paln)             Ramin Olivo, PhD, A.B.P.P., L.P.   Director, Health Psychology

## 2020-12-25 ENCOUNTER — APPOINTMENT (OUTPATIENT)
Dept: CT IMAGING | Facility: CLINIC | Age: 65
DRG: 872 | End: 2020-12-25
Attending: PHYSICIAN ASSISTANT
Payer: COMMERCIAL

## 2020-12-25 ENCOUNTER — HOSPITAL ENCOUNTER (INPATIENT)
Facility: CLINIC | Age: 65
LOS: 6 days | Discharge: NURSING, REHAB OR LONG-TERM CARE | DRG: 872 | End: 2020-12-31
Attending: PHYSICIAN ASSISTANT | Admitting: HOSPITALIST
Payer: COMMERCIAL

## 2020-12-25 DIAGNOSIS — R11.0 NAUSEA: ICD-10-CM

## 2020-12-25 DIAGNOSIS — M62.81 GENERALIZED MUSCLE WEAKNESS: ICD-10-CM

## 2020-12-25 DIAGNOSIS — R19.7 DIARRHEA, UNSPECIFIED TYPE: ICD-10-CM

## 2020-12-25 DIAGNOSIS — I10 BENIGN ESSENTIAL HYPERTENSION: ICD-10-CM

## 2020-12-25 DIAGNOSIS — G35 MS (MULTIPLE SCLEROSIS) (H): ICD-10-CM

## 2020-12-25 DIAGNOSIS — A41.9 SEPSIS DUE TO URINARY TRACT INFECTION (H): Primary | ICD-10-CM

## 2020-12-25 DIAGNOSIS — R50.9 FEVER AND CHILLS: ICD-10-CM

## 2020-12-25 DIAGNOSIS — N39.0 SEPSIS DUE TO URINARY TRACT INFECTION (H): Primary | ICD-10-CM

## 2020-12-25 DIAGNOSIS — S32.010A COMPRESSION FRACTURE OF L1 VERTEBRA, INITIAL ENCOUNTER (H): ICD-10-CM

## 2020-12-25 DIAGNOSIS — R05.9 COUGH: ICD-10-CM

## 2020-12-25 DIAGNOSIS — N20.0 CALCULUS OF KIDNEY: ICD-10-CM

## 2020-12-25 LAB
ALBUMIN UR-MCNC: 100 MG/DL
ANION GAP SERPL CALCULATED.3IONS-SCNC: 5 MMOL/L (ref 3–14)
APPEARANCE UR: ABNORMAL
BACTERIA #/AREA URNS HPF: ABNORMAL /HPF
BASOPHILS # BLD AUTO: 0 10E9/L (ref 0–0.2)
BASOPHILS NFR BLD AUTO: 0.2 %
BILIRUB UR QL STRIP: NEGATIVE
BUN SERPL-MCNC: 16 MG/DL (ref 7–30)
CALCIUM SERPL-MCNC: 8.8 MG/DL (ref 8.5–10.1)
CHLORIDE SERPL-SCNC: 99 MMOL/L (ref 94–109)
CO2 SERPL-SCNC: 29 MMOL/L (ref 20–32)
COLOR UR AUTO: YELLOW
CREAT SERPL-MCNC: 0.68 MG/DL (ref 0.52–1.04)
D DIMER PPP FEU-MCNC: 3.3 UG/ML FEU (ref 0–0.5)
DIFFERENTIAL METHOD BLD: ABNORMAL
EOSINOPHIL # BLD AUTO: 0 10E9/L (ref 0–0.7)
EOSINOPHIL NFR BLD AUTO: 0 %
ERYTHROCYTE [DISTWIDTH] IN BLOOD BY AUTOMATED COUNT: 12.8 % (ref 10–15)
FLUABV+SARS-COV-2+RSV PNL RESP NAA+PROBE: NEGATIVE
FLUABV+SARS-COV-2+RSV PNL RESP NAA+PROBE: NEGATIVE
GFR SERPL CREATININE-BSD FRML MDRD: >90 ML/MIN/{1.73_M2}
GLUCOSE SERPL-MCNC: 134 MG/DL (ref 70–99)
GLUCOSE UR STRIP-MCNC: NEGATIVE MG/DL
HCT VFR BLD AUTO: 35.3 % (ref 35–47)
HGB BLD-MCNC: 11.6 G/DL (ref 11.7–15.7)
HGB UR QL STRIP: ABNORMAL
IMM GRANULOCYTES # BLD: 0.1 10E9/L (ref 0–0.4)
IMM GRANULOCYTES NFR BLD: 0.7 %
INTERPRETATION ECG - MUSE: NORMAL
KETONES UR STRIP-MCNC: 10 MG/DL
LABORATORY COMMENT REPORT: NORMAL
LACTATE BLD-SCNC: 1 MMOL/L (ref 0.7–2)
LEUKOCYTE ESTERASE UR QL STRIP: ABNORMAL
LYMPHOCYTES # BLD AUTO: 0.3 10E9/L (ref 0.8–5.3)
LYMPHOCYTES NFR BLD AUTO: 2.9 %
MCH RBC QN AUTO: 31.2 PG (ref 26.5–33)
MCHC RBC AUTO-ENTMCNC: 32.9 G/DL (ref 31.5–36.5)
MCV RBC AUTO: 95 FL (ref 78–100)
MONOCYTES # BLD AUTO: 1 10E9/L (ref 0–1.3)
MONOCYTES NFR BLD AUTO: 8.8 %
NEUTROPHILS # BLD AUTO: 10.3 10E9/L (ref 1.6–8.3)
NEUTROPHILS NFR BLD AUTO: 87.4 %
NITRATE UR QL: POSITIVE
NRBC # BLD AUTO: 0 10*3/UL
NRBC BLD AUTO-RTO: 0 /100
PH UR STRIP: 6 PH (ref 5–7)
PLATELET # BLD AUTO: 145 10E9/L (ref 150–450)
POTASSIUM SERPL-SCNC: 3.9 MMOL/L (ref 3.4–5.3)
RBC # BLD AUTO: 3.72 10E12/L (ref 3.8–5.2)
RBC #/AREA URNS AUTO: 11 /HPF (ref 0–2)
RSV RNA SPEC QL NAA+PROBE: NORMAL
SARS-COV-2 RNA SPEC QL NAA+PROBE: NEGATIVE
SODIUM SERPL-SCNC: 133 MMOL/L (ref 133–144)
SOURCE: ABNORMAL
SP GR UR STRIP: 1.02 (ref 1–1.03)
SPECIMEN SOURCE: NORMAL
SQUAMOUS #/AREA URNS AUTO: 3 /HPF (ref 0–1)
TROPONIN I SERPL-MCNC: <0.015 UG/L (ref 0–0.04)
UROBILINOGEN UR STRIP-MCNC: NORMAL MG/DL (ref 0–2)
WBC # BLD AUTO: 11.8 10E9/L (ref 4–11)
WBC #/AREA URNS AUTO: 47 /HPF (ref 0–5)

## 2020-12-25 PROCEDURE — 99221 1ST HOSP IP/OBS SF/LOW 40: CPT | Performed by: NURSE PRACTITIONER

## 2020-12-25 PROCEDURE — 81001 URINALYSIS AUTO W/SCOPE: CPT | Performed by: PHYSICIAN ASSISTANT

## 2020-12-25 PROCEDURE — 80048 BASIC METABOLIC PNL TOTAL CA: CPT | Performed by: PHYSICIAN ASSISTANT

## 2020-12-25 PROCEDURE — 250N000009 HC RX 250: Performed by: PHYSICIAN ASSISTANT

## 2020-12-25 PROCEDURE — 83605 ASSAY OF LACTIC ACID: CPT | Performed by: PHYSICIAN ASSISTANT

## 2020-12-25 PROCEDURE — 87636 SARSCOV2 & INF A&B AMP PRB: CPT | Performed by: PHYSICIAN ASSISTANT

## 2020-12-25 PROCEDURE — 250N000011 HC RX IP 250 OP 636: Performed by: PHYSICIAN ASSISTANT

## 2020-12-25 PROCEDURE — 72131 CT LUMBAR SPINE W/O DYE: CPT

## 2020-12-25 PROCEDURE — 99285 EMERGENCY DEPT VISIT HI MDM: CPT | Mod: 25

## 2020-12-25 PROCEDURE — 70450 CT HEAD/BRAIN W/O DYE: CPT

## 2020-12-25 PROCEDURE — 250N000013 HC RX MED GY IP 250 OP 250 PS 637: Performed by: STUDENT IN AN ORGANIZED HEALTH CARE EDUCATION/TRAINING PROGRAM

## 2020-12-25 PROCEDURE — 87086 URINE CULTURE/COLONY COUNT: CPT | Performed by: EMERGENCY MEDICINE

## 2020-12-25 PROCEDURE — C9803 HOPD COVID-19 SPEC COLLECT: HCPCS

## 2020-12-25 PROCEDURE — 85379 FIBRIN DEGRADATION QUANT: CPT | Performed by: PHYSICIAN ASSISTANT

## 2020-12-25 PROCEDURE — 250N000013 HC RX MED GY IP 250 OP 250 PS 637: Performed by: HOSPITALIST

## 2020-12-25 PROCEDURE — 93005 ELECTROCARDIOGRAM TRACING: CPT

## 2020-12-25 PROCEDURE — 120N000001 HC R&B MED SURG/OB

## 2020-12-25 PROCEDURE — 87040 BLOOD CULTURE FOR BACTERIA: CPT | Performed by: PHYSICIAN ASSISTANT

## 2020-12-25 PROCEDURE — 96375 TX/PRO/DX INJ NEW DRUG ADDON: CPT

## 2020-12-25 PROCEDURE — 99223 1ST HOSP IP/OBS HIGH 75: CPT | Mod: AI | Performed by: HOSPITALIST

## 2020-12-25 PROCEDURE — 85025 COMPLETE CBC W/AUTO DIFF WBC: CPT | Performed by: PHYSICIAN ASSISTANT

## 2020-12-25 PROCEDURE — 84484 ASSAY OF TROPONIN QUANT: CPT | Performed by: PHYSICIAN ASSISTANT

## 2020-12-25 PROCEDURE — 96365 THER/PROPH/DIAG IV INF INIT: CPT | Mod: 59

## 2020-12-25 PROCEDURE — 258N000003 HC RX IP 258 OP 636: Performed by: PHYSICIAN ASSISTANT

## 2020-12-25 PROCEDURE — 96361 HYDRATE IV INFUSION ADD-ON: CPT

## 2020-12-25 PROCEDURE — 250N000013 HC RX MED GY IP 250 OP 250 PS 637: Performed by: PHYSICIAN ASSISTANT

## 2020-12-25 PROCEDURE — 71275 CT ANGIOGRAPHY CHEST: CPT

## 2020-12-25 PROCEDURE — 87088 URINE BACTERIA CULTURE: CPT | Performed by: EMERGENCY MEDICINE

## 2020-12-25 PROCEDURE — 87186 SC STD MICRODIL/AGAR DIL: CPT | Performed by: EMERGENCY MEDICINE

## 2020-12-25 RX ORDER — CEFTRIAXONE 1 G/1
1 INJECTION, POWDER, FOR SOLUTION INTRAMUSCULAR; INTRAVENOUS EVERY 24 HOURS
Status: DISCONTINUED | OUTPATIENT
Start: 2020-12-26 | End: 2020-12-28

## 2020-12-25 RX ORDER — ACETAMINOPHEN 500 MG
1000 TABLET ORAL ONCE
Status: COMPLETED | OUTPATIENT
Start: 2020-12-25 | End: 2020-12-25

## 2020-12-25 RX ORDER — NALOXONE HYDROCHLORIDE 0.4 MG/ML
0.4 INJECTION, SOLUTION INTRAMUSCULAR; INTRAVENOUS; SUBCUTANEOUS
Status: DISCONTINUED | OUTPATIENT
Start: 2020-12-25 | End: 2020-12-31 | Stop reason: HOSPADM

## 2020-12-25 RX ORDER — LIDOCAINE 40 MG/G
CREAM TOPICAL
Status: DISCONTINUED | OUTPATIENT
Start: 2020-12-25 | End: 2020-12-31 | Stop reason: HOSPADM

## 2020-12-25 RX ORDER — FAMOTIDINE 20 MG/1
20 TABLET, FILM COATED ORAL 2 TIMES DAILY
Status: DISCONTINUED | OUTPATIENT
Start: 2020-12-25 | End: 2020-12-25

## 2020-12-25 RX ORDER — IOPAMIDOL 755 MG/ML
83 INJECTION, SOLUTION INTRAVASCULAR ONCE
Status: COMPLETED | OUTPATIENT
Start: 2020-12-25 | End: 2020-12-25

## 2020-12-25 RX ORDER — DULOXETIN HYDROCHLORIDE 30 MG/1
60 CAPSULE, DELAYED RELEASE ORAL 2 TIMES DAILY
Status: DISCONTINUED | OUTPATIENT
Start: 2020-12-25 | End: 2020-12-25

## 2020-12-25 RX ORDER — CARBAMAZEPINE 200 MG/1
100 TABLET ORAL 2 TIMES DAILY
COMMUNITY
End: 2023-09-12

## 2020-12-25 RX ORDER — CEFTRIAXONE 1 G/1
1 INJECTION, POWDER, FOR SOLUTION INTRAMUSCULAR; INTRAVENOUS ONCE
Status: COMPLETED | OUTPATIENT
Start: 2020-12-25 | End: 2020-12-25

## 2020-12-25 RX ORDER — FAMOTIDINE 40 MG/1
20 TABLET, FILM COATED ORAL 2 TIMES DAILY
COMMUNITY
End: 2020-12-25

## 2020-12-25 RX ORDER — DOCUSATE SODIUM 100 MG/1
100 CAPSULE, LIQUID FILLED ORAL 2 TIMES DAILY
Status: DISCONTINUED | OUTPATIENT
Start: 2020-12-25 | End: 2020-12-31 | Stop reason: HOSPADM

## 2020-12-25 RX ORDER — ARIPIPRAZOLE 5 MG/1
5 TABLET ORAL DAILY
Status: DISCONTINUED | OUTPATIENT
Start: 2020-12-25 | End: 2020-12-31 | Stop reason: HOSPADM

## 2020-12-25 RX ORDER — CARBAMAZEPINE 200 MG/1
200 TABLET ORAL 2 TIMES DAILY
Status: DISCONTINUED | OUTPATIENT
Start: 2020-12-25 | End: 2020-12-31 | Stop reason: HOSPADM

## 2020-12-25 RX ORDER — ATORVASTATIN CALCIUM 10 MG/1
20 TABLET, FILM COATED ORAL DAILY
Status: DISCONTINUED | OUTPATIENT
Start: 2020-12-25 | End: 2020-12-31 | Stop reason: HOSPADM

## 2020-12-25 RX ORDER — CARBAMAZEPINE 200 MG/1
400 TABLET ORAL AT BEDTIME
Status: DISCONTINUED | OUTPATIENT
Start: 2020-12-25 | End: 2020-12-25

## 2020-12-25 RX ORDER — METHYLPHENIDATE HYDROCHLORIDE 10 MG/1
10 TABLET ORAL 3 TIMES DAILY
Status: DISCONTINUED | OUTPATIENT
Start: 2020-12-25 | End: 2020-12-25

## 2020-12-25 RX ORDER — TRAZODONE HYDROCHLORIDE 100 MG/1
500 TABLET ORAL AT BEDTIME
Status: DISCONTINUED | OUTPATIENT
Start: 2020-12-25 | End: 2020-12-31 | Stop reason: HOSPADM

## 2020-12-25 RX ORDER — OXYCODONE AND ACETAMINOPHEN 5; 325 MG/1; MG/1
1-2 TABLET ORAL EVERY 4 HOURS PRN
Status: DISCONTINUED | OUTPATIENT
Start: 2020-12-25 | End: 2020-12-31 | Stop reason: HOSPADM

## 2020-12-25 RX ORDER — MORPHINE SULFATE 4 MG/ML
4 INJECTION, SOLUTION INTRAMUSCULAR; INTRAVENOUS ONCE
Status: COMPLETED | OUTPATIENT
Start: 2020-12-25 | End: 2020-12-25

## 2020-12-25 RX ORDER — NALOXONE HYDROCHLORIDE 0.4 MG/ML
0.2 INJECTION, SOLUTION INTRAMUSCULAR; INTRAVENOUS; SUBCUTANEOUS
Status: DISCONTINUED | OUTPATIENT
Start: 2020-12-25 | End: 2020-12-31 | Stop reason: HOSPADM

## 2020-12-25 RX ORDER — LORAZEPAM 0.5 MG/1
0.5 TABLET ORAL
Status: COMPLETED | OUTPATIENT
Start: 2020-12-25 | End: 2020-12-26

## 2020-12-25 RX ORDER — TOLTERODINE TARTRATE 2 MG/1
2 TABLET, EXTENDED RELEASE ORAL 2 TIMES DAILY
Status: DISCONTINUED | OUTPATIENT
Start: 2020-12-25 | End: 2020-12-31 | Stop reason: HOSPADM

## 2020-12-25 RX ORDER — DULOXETIN HYDROCHLORIDE 60 MG/1
120 CAPSULE, DELAYED RELEASE ORAL DAILY
COMMUNITY
End: 2023-09-12

## 2020-12-25 RX ORDER — DULOXETIN HYDROCHLORIDE 60 MG/1
120 CAPSULE, DELAYED RELEASE ORAL DAILY
Status: DISCONTINUED | OUTPATIENT
Start: 2020-12-26 | End: 2020-12-31 | Stop reason: HOSPADM

## 2020-12-25 RX ORDER — METHYLPHENIDATE HYDROCHLORIDE 10 MG/1
20 TABLET ORAL DAILY
Status: DISCONTINUED | OUTPATIENT
Start: 2020-12-26 | End: 2020-12-31 | Stop reason: HOSPADM

## 2020-12-25 RX ADMIN — DOCUSATE SODIUM 100 MG: 100 CAPSULE, LIQUID FILLED ORAL at 20:40

## 2020-12-25 RX ADMIN — MELATONIN 5 MG TABLET 10 MG: at 21:57

## 2020-12-25 RX ADMIN — SODIUM CHLORIDE 100 ML: 9 INJECTION, SOLUTION INTRAVENOUS at 13:11

## 2020-12-25 RX ADMIN — ATORVASTATIN CALCIUM 20 MG: 10 TABLET, FILM COATED ORAL at 18:00

## 2020-12-25 RX ADMIN — TOLTERODINE TARTRATE 2 MG: 2 TABLET, FILM COATED ORAL at 20:40

## 2020-12-25 RX ADMIN — IOPAMIDOL 83 ML: 755 INJECTION, SOLUTION INTRAVENOUS at 13:11

## 2020-12-25 RX ADMIN — ACETAMINOPHEN 1000 MG: 500 TABLET, FILM COATED ORAL at 15:15

## 2020-12-25 RX ADMIN — TRAZODONE HYDROCHLORIDE 500 MG: 100 TABLET ORAL at 21:58

## 2020-12-25 RX ADMIN — CEFTRIAXONE SODIUM 1 G: 1 INJECTION, POWDER, FOR SOLUTION INTRAMUSCULAR; INTRAVENOUS at 14:24

## 2020-12-25 RX ADMIN — SODIUM CHLORIDE, POTASSIUM CHLORIDE, SODIUM LACTATE AND CALCIUM CHLORIDE 1000 ML: 600; 310; 30; 20 INJECTION, SOLUTION INTRAVENOUS at 12:16

## 2020-12-25 RX ADMIN — CARBAMAZEPINE 200 MG: 200 TABLET ORAL at 21:57

## 2020-12-25 RX ADMIN — MIRTAZAPINE 75 MG: 30 TABLET, FILM COATED ORAL at 21:57

## 2020-12-25 RX ADMIN — MORPHINE SULFATE 4 MG: 4 INJECTION, SOLUTION INTRAMUSCULAR; INTRAVENOUS at 14:24

## 2020-12-25 RX ADMIN — ARIPIPRAZOLE 5 MG: 5 TABLET ORAL at 19:48

## 2020-12-25 RX ADMIN — OXYCODONE HYDROCHLORIDE AND ACETAMINOPHEN 1 TABLET: 5; 325 TABLET ORAL at 19:18

## 2020-12-25 ASSESSMENT — ACTIVITIES OF DAILY LIVING (ADL): ADLS_ACUITY_SCORE: 16

## 2020-12-25 ASSESSMENT — ENCOUNTER SYMPTOMS
ABDOMINAL PAIN: 0
FEVER: 1
NAUSEA: 0
WEAKNESS: 1
COUGH: 1
VOMITING: 0
BACK PAIN: 1
NUMBNESS: 0
DIARRHEA: 0
SHORTNESS OF BREATH: 1

## 2020-12-25 ASSESSMENT — MIFFLIN-ST. JEOR: SCORE: 1826.82

## 2020-12-25 NOTE — PROGRESS NOTES
RECEIVING UNIT ED HANDOFF REVIEW    ED Nurse Handoff Report was reviewed by: Chapis Martin RN on December 25, 2020 at 4:29 PM

## 2020-12-25 NOTE — PHARMACY-ADMISSION MEDICATION HISTORY
Pharmacy Medication History  Admission medication history interview status for the 12/25/2020  admission is complete. See EPIC admission navigator for prior to admission medications       Medication history sources: Patient  And Sure Scripts   L  Adherence Assessment: Good    Significant changes made to the medication list:  Discontinued: famotidine, calmoseptine, sulfacetamide  New:   Changed: carbamazpine,           Additional medication history information:     Medication reconciliation completed by provider prior to medication history? Yes    Time spent in this activity: 25min       Prior to Admission medications    Medication Sig Last Dose Taking? Auth Provider   ARIPiprazole (ABILIFY) 5 MG tablet Take 5 mg by mouth daily  12/24/2020 at Unknown time Yes Reported, Patient   atorvastatin (LIPITOR) 20 MG tablet Take 20 mg by mouth daily. 12/24/2020 at Unknown time Yes Reported, Patient   carBAMazepine (TEGRETOL) 200 MG tablet Take 200 mg by mouth 2 times daily  Yes Unknown, Entered By History   Dextromethorphan-Guaifenesin (MUCINEX DM PO) Take 1,200 mg by mouth daily 1200mg daily  12/24/2020 at Unknown time Yes Reported, Patient   docusate sodium (COLACE) 100 MG capsule Take 100 mg by mouth 2 times daily 12/24/2020 at Unknown time Yes Reported, Patient   DULoxetine (CYMBALTA) 60 MG capsule Take 120 mg by mouth daily   Yes Unknown, Entered By History   ketoconazole (NIZORAL) 2 % cream Apply topically 2 times daily as needed  Past Week at PRN Yes Reported, Patient   Melatonin 10 MG TABS Take 10 mg by mouth At Bedtime 12/24/2020 at Unknown time Yes Reported, Patient   methylphenidate (RITALIN) 10 MG tablet Take 20 mg by mouth daily  12/24/2020 at Unknown time Yes Reported, Patient   mirtazapine (REMERON) 30 MG tablet Take 75 mg by mouth At Bedtime  12/24/2020 at Unknown time Yes Reported, Patient   Multiple Vitamin (MULTIVITAMINS PO) Take 2 tablets by mouth every evening. WITH IRON 12/24/2020 at Unknown time Yes  Reported, Patient   traZODone (DESYREL) 100 MG tablet Take 500 mg by mouth At Bedtime  12/24/2020 at Unknown time Yes Reported, Patient   triamcinolone (ARISTOCORT HP) 0.5 % external cream Apply topically At Bedtime To hands  Yes Reported, Patient   trospium (SANCTURA) 20 MG tablet Take 1 tablet (20 mg) by mouth 2 times daily (before meals) 12/24/2020 at Unknown time Yes Adwoa Hollins PA-C   vitamin B-Complex Take 1 tablet by mouth daily  12/24/2020 at Unknown time Yes Reported, Patient   Calcium Citrate-Vitamin D 250-200 MG-UNIT TABS Take 1 tablet by mouth 3 times daily    Reported, Patient   denosumab (PROLIA) 60 MG/ML SOLN injection Inject 60 mg Subcutaneous every 6 months More than a month at Unknown time  Reported, Patient   triamcinolone (KENALOG) 0.5 % OINT ointment Apply topically 2 times daily as needed for irritation To legs   Reported, Patient       The information provided in this note is only as accurate as the sources available at the time of the update(s).   Suzanne Lindsey PharmD

## 2020-12-25 NOTE — ED NOTES
Report received. Patient visualized. Vital signs are stable. Patient given tylenol for fever. Abx are complete. Patient resting in bed. MRI checklist completed and to be faxed down to MRI. Will continue to monitor.

## 2020-12-25 NOTE — ED PROVIDER NOTES
Emergency Department Attending Supervision Note  12/25/2020  2:34 PM      I evaluated this patient in conjunction with Milka TALBERT      Briefly, the patient presented with shortness of breath, cough, fever, generalized weakness.  She has a history of MSVesna Ponce have been present for about 4 days.  She did have a fall off her toilet a few days ago hitting her head and low back.    On my exam, pt has GCS15, she has 4/5 strength in the BLE, baseline limited sensation to light touch in BLE, heart has RRR, abd soft and NT without rebound/guarding.    Results:  Labs Ordered and Resulted from Time of ED Arrival Up to the Time of Departure from the ED   CBC WITH PLATELETS DIFFERENTIAL - Abnormal; Notable for the following components:       Result Value    WBC 11.8 (*)     RBC Count 3.72 (*)     Hemoglobin 11.6 (*)     Platelet Count 145 (*)     Absolute Neutrophil 10.3 (*)     Absolute Lymphocytes 0.3 (*)     All other components within normal limits   BASIC METABOLIC PANEL - Abnormal; Notable for the following components:    Glucose 134 (*)     All other components within normal limits   ROUTINE UA WITH MICROSCOPIC - Abnormal; Notable for the following components:    Ketones Urine 10 (*)     Blood Urine Large (*)     Protein Albumin Urine 100 (*)     Nitrite Urine Positive (*)     Leukocyte Esterase Urine Large (*)     WBC Urine 47 (*)     RBC Urine 11 (*)     Bacteria Urine Many (*)     Squamous Epithelial /HPF Urine 3 (*)     All other components within normal limits   D DIMER QUANTITATIVE - Abnormal; Notable for the following components:    D Dimer 3.3 (*)     All other components within normal limits   INFLUENZA A/B & SARS-COV2 PCR MULTIPLEX   LACTIC ACID WHOLE BLOOD   TROPONIN I   BLOOD CULTURE   BLOOD CULTURE   URINE CULTURE AEROBIC BACTERIAL       CT Chest Pulmonary Embolism w Contrast   Final Result   IMPRESSION:   1.  No pulmonary embolism demonstrated.   2.  No definite pulmonary infiltrates demonstrated  within the   limitations of mild respiratory motion artifact.   3.  Question a nodular liver contour and splenomegaly. Cirrhosis and   portal hypertension could be present.      ELVIE WISDOM MD      Lumbar spine CT w/o contrast   Final Result   IMPRESSION:   1. Acute transversely oriented fracture involving the L1 superior   endplate, extending into the T12-L1 disc space, as described. This is   seen in the setting of diffuse idiopathic skeletal hyperostosis.   2. No other acute fracture identified.   3. Multilevel degenerative changes, as described. Mild spinal canal   stenosis at T12-L1. No definite high-grade spinal canal narrowing.   Mild to moderate left L5-S1 neural foraminal stenosis. No high-grade   neural foraminal narrowing.   4. Small prevertebral hematoma along the anterior aspect of the T12   and L1 vertebral bodies.   5. Left renal calculi, as described.      The findings were discussed with Milka Ward by myself at 2:35 PM on   12/25/2020.       ELVIE YOUNG MD      Head CT w/o contrast   Final Result   IMPRESSION:   No evidence of acute intracranial hemorrhage, mass, or   herniation.         ELVIE YOUNG MD      Lumbar spine MRI w/o contrast    (Results Pending)       MDM:  Patient presents to the ER for evaluation of fever, cough, shortness of breath, generalized weakness.  She is febrile up to 102.3  F but otherwise vitally stable.    A broad work-up was performed including negative Covid testing, CT of the chest that did not show any PE or infiltrate.    Respect to her fall, CT of the head was negative.  CT of the lumbar spine suggested an acute fracture of the L1 superior endplate.  Neurosurgery was consulted with recommendation for lumbar spine MRI.    Other labs showed evidence of leukocytosis of 11.8, no lactic acidosis, evidence of UTI.  There were no prior urine cultures available and patient was empirically started on Rocephin.  Blood cultures were drawn prior to this.    Patient was  ultimately admitted to the hospital for further management.    Diagnosis    ICD-10-CM    1. Sepsis due to urinary tract infection (H)  A41.9     N39.0    2. Generalized muscle weakness  M62.81    3. MS (multiple sclerosis) (H)  G35    4. Cough  R05    5. Fever and chills  R50.9    6. Compression fracture of L1 vertebra, initial encounter (H)  S32.010A           Celestino Blankenship MD  12/25/20 [id number]

## 2020-12-25 NOTE — ED NOTES
[facility]  ED Nurse Handoff Report    ED Chief complaint: Shortness of Breath and Fever      ED Diagnosis:   Final diagnoses:   Generalized muscle weakness   MS (multiple sclerosis) (H)   Cough   Fever and chills   Sepsis due to urinary tract infection (H)   Compression fracture of L1 vertebra, initial encounter (H)   Calculus of kidney       Code Status: Full Code    Allergies:   Allergies   Allergen Reactions     Augmentin      Sensitive,  Able to tolerate a few doses, otherwise hives on hands     Betaseron [Interferon Beta-1b]      Necrosis of skin at injection sites     Dust Mite Extract Unknown     Mold Unknown       Patient Story: Pt lives at home alone, hx MS.  C/o lower back pain and States increased weakness over past couple days where she is unable to walk - normally uses walker.  States has had a fever at home as high as 103, SOB, slight dry cough.  No known exposure to COVID and hadn't been tested prior to today's visit.  Arrived by rig.  Focused Assessment: Pt has UTI, started rocephin.  Given 1L NS fluid.  CT lumbar showed actue fx to L1.  Neurosurgery consulted and wanted MRI.  Placed in line for MRI.  Patient states pain 2/10 when not moving.  A/o x4.    Treatments and/or interventions provided: IVF  Patient's response to treatments and/or interventions: improved    To be done/followed up on inpatient unit:  na    Does this patient have any cognitive concerns?: na    Activity level - Baseline/Home:  Walker  Activity Level - Current:   Unknown    Patient's Preferred language: English   Needed?: No    Isolation: None  Infection: Not Applicable  Patient tested for COVID 19 prior to admission: YES  Bariatric?: Yes does not need opal bed    Vital Signs:   Vitals:    12/25/20 1324 12/25/20 1330 12/25/20 1400 12/25/20 1430   BP:  132/60 (!) 141/82 119/56   Pulse:  90 91 89   Resp:       Temp: 102.3  F (39.1  C)      TempSrc: Oral      SpO2: 93% 94% 93% 90%   Weight:        Height:           Cardiac Rhythm:     Was the PSS-3 completed:   Yes  What interventions are required if any?               Family Comments: na  OBS brochure/video discussed/provided to patient/family: N/A              Name of person given brochure if not patient:               Relationship to patient:     For the majority of the shift this patient's behavior was Green.   Behavioral interventions performed were na.    ED NURSE PHONE NUMBER: *[phone]

## 2020-12-25 NOTE — CONSULTS
[facility]    Neurosurgery Consultation     Date of Admission:  12/25/2020  Date of Consult (When I saw the patient): 12/25/20    Assessment & Plan   Mili Padilla is a 65 year old female who was admitted on 12/25/2020. I was asked to see the patient for acute L1 superior endplate fracture, extending into the T12-L1 disc space, s/p fall.       Assessment: Acute fracture involving the L1 superior endplate, extending into the T12-L1 disc space, in the setting of diffuse idiopathic skeletal hyperostosis.    Plan:   - MRI lumbar spine  - Further recommendations to follow       I have discussed the following assessment and plan with Dr. Correa who is in agreement with initial plan and will follow up with further consultation recommendations.    Jania Estrada CNP  [facility] Neurosurgery  [address] [address]  Tel [phone]  Pager [phone]    Code Status    Prior    Reason for Consult   Reason for consult: I was asked by Milka Ward PA-C to evaluate this patient for acute L1 superior endplate fracture, extending into the T12-L1 disc space.    Primary Care Physician   Jasmyn Márquez    Chief Complaint   Back pain, leg weakness    History is obtained from the patient, electronic health record and emergency department physician    History of Present Illness   Mili Padilla is a 65 year old female with history of multiple sclerosis, neuropathy, and chronic low back pain who presents s/p fall that occurred on Tuesday. She typically has a home health aid, but has been without help due to the holiday this week. She reports she fell off the toilet on Tuesday, landing on her back, 911 was called to help her get up. She has had increased back pain since. She thinks she fell because she was lightheaded and had a fever. She denies radicular leg pain or paresthesias. She does report increased weakness in her legs making it difficult to  walk. She typically uses a walker for ambulation. CT lumbar spine with acute fracture involving the L1 superior endplate, extending into the T12-L1 disc space, in the setting of diffuse idiopathic skeletal hyperostosis. Denies saddle anesthesia. History of urinary urgency, follows with [facility].  She rates her back pain 9/10 at worst, 2/10 when sitting at the moment.       Past Medical History   I have reviewed this patient's medical history and updated it with pertinent information if needed.   Past Medical History:   Diagnosis Date     Depression, major, in partial remission (H)      Fibromyalgia syndrome      Gastro-oesophageal reflux disease      Hyperlipemia      Lymphedema      Multiple sclerosis (H)     tremors with MS, all four limbs and head     Multiple sclerosis, secondary progressive (H)      Sleep apnea      Vocal cord paralysis, unilateral complete        Past Surgical History   I have reviewed this patient's surgical history and updated it with pertinent information if needed.  Past Surgical History:   Procedure Laterality Date     COLONOSCOPY N/A 7/17/2017    Procedure: COMBINED COLONOSCOPY, SINGLE OR MULTIPLE BIOPSY/POLYPECTOMY BY BIOPSY;;  Surgeon: Wong Beaulieu MD;  Location:  GI     COLONOSCOPY N/A 2/23/2018    Procedure: COMBINED COLONOSCOPY, SINGLE OR MULTIPLE BIOPSY/POLYPECTOMY BY BIOPSY;  COLONOSCOPY ;  Surgeon: Yessica Santana MD;  Location:  GI     ENT SURGERY      throat 1969, vocal cord surgery with skin grafting     ESOPHAGOSCOPY, GASTROSCOPY, DUODENOSCOPY (EGD), COMBINED N/A 12/16/2014    Procedure: COMBINED ENDOSCOPIC ULTRASOUND, ESOPHAGOSCOPY, GASTROSCOPY, DUODENOSCOPY (EGD), FINE NEEDLE ASPIRATE/BIOPSY;  Surgeon: Yessica Santana MD;  Location:  GI     ESOPHAGOSCOPY, GASTROSCOPY, DUODENOSCOPY (EGD), COMBINED N/A 12/16/2014    Procedure: COMBINED ESOPHAGOSCOPY, GASTROSCOPY, DUODENOSCOPY (EGD), BIOPSY SINGLE OR MULTIPLE;  Surgeon: Yessica Santana  MD Ria;  Location:  GI     LAPAROSCOPIC APPENDECTOMY  2013    Procedure: LAPAROSCOPIC APPENDECTOMY;;  Surgeon: Salvador Morris MD;  Location:  OR     LAPAROSCOPIC BIOPSY LIVER  2013    Procedure: LAPAROSCOPIC BIOPSY LIVER;;  Surgeon: Salvador Morris MD;  Location:  OR     LAPAROSCOPIC GASTRIC SLEEVE  2013    Procedure: LAPAROSCOPIC GASTRIC SLEEVE;  LAPAROSCOPIC SLEEVE GASTRECTOMY/ LAPARSCOPIC  APPENDECTOMY /LIVER BIOPSIES/LIVER CYST DRAINAGE;  Surgeon: Salvador Morris MD;  Location:  OR     ORTHOPEDIC SURGERY      R wrist        Prior to Admission Medications   Prior to Admission Medications   Prescriptions Last Dose Informant Patient Reported? Taking?   ARIPiprazole (ABILIFY) 5 MG tablet   Yes Yes   Sig: Take 5 mg by mouth daily    Calcium Citrate-Vitamin D 250-200 MG-UNIT TABS   Yes No   Sig: Take 2 tablets by mouth   DULoxetine (CYMBALTA) 60 MG capsule   Yes Yes   Sig: Take 60 mg by mouth 2 times daily   Dextromethorphan-Guaifenesin (MUCINEX DM PO)   Yes No   Simg daily   Melatonin 10 MG TABS   Yes No   Sig: Take 10 mg by mouth At Bedtime   Multiple Vitamin (MULTIVITAMINS PO)   Yes No   Sig: Take 2 tablets by mouth every evening. WITH IRON   atorvastatin (LIPITOR) 20 MG tablet   Yes Yes   Sig: Take 20 mg by mouth daily.   carBAMazepine (TEGRETOL) 200 MG tablet  Pharmacy Yes Yes   Sig: Take 300 mg by mouth every morning. 300mg = 1.5 tablets.   carBAMazepine (TEGRETOL) 200 MG tablet  Pharmacy Yes Yes   Sig: Take 400 mg by mouth At Bedtime.   denosumab (PROLIA) 60 MG/ML SOLN injection   Yes No   Sig: Inject 60 mg Subcutaneous every 6 months   docusate sodium (COLACE) 100 MG capsule   Yes No   Sig: Take 100 mg by mouth 2 times daily   famotidine (PEPCID) 40 MG tablet   Yes Yes   Sig: Take 20 mg by mouth 2 times daily   ketoconazole (NIZORAL) 2 % cream  at PRN  Yes Yes   Sig: Apply topically 2 times daily as needed    menthol-zinc oxide (CALMOSEPTINE) 0.44-20.6 % OINT ointment    Yes No   methylphenidate (RITALIN) 10 MG tablet   Yes Yes   Sig: Take 10 mg by mouth 3 times daily    mirtazapine (REMERON) 30 MG tablet   Yes Yes   Sig: Take 75 mg by mouth At Bedtime    sulfacetamide sodium, Acne, 10 % lotion   Yes No   Sig: APPLY TO FACE QAM   traZODone (DESYREL) 100 MG tablet   Yes Yes   Sig: Take 500 mg by mouth At Bedtime    triamcinolone (ARISTOCORT HP) 0.5 % external cream   Yes Yes   Sig: Apply topically 2 times daily    triamcinolone (KENALOG) 0.5 % OINT ointment   Yes No   Sig: Apply topically 2 times daily   trospium (SANCTURA) 20 MG tablet   Yes Yes   Sig: Take 1 tablet (20 mg) by mouth 2 times daily (before meals)   vitamin B-Complex   Yes No      Facility-Administered Medications: None     Allergies   Allergies   Allergen Reactions     Augmentin      Sensitive,  Able to tolerate a few doses, otherwise hives on hands     Betaseron [Interferon Beta-1b]      Necrosis of skin at injection sites     Dust Mite Extract Unknown     Mold Unknown       Social History   I have reviewed this patient's social history and updated it with pertinent information if needed. Mili Padilla  reports that she quit smoking about 46 years ago. Her smoking use included cigarettes. She has never used smokeless tobacco. She reports current alcohol use. She reports that she does not use drugs.    Family History   I have reviewed this patient's family history and updated it with pertinent information if needed.   Family History   Problem Relation Age of Onset     Breast Cancer Mother      Other Cancer Father      Breast Cancer Maternal Aunt      Breast Cancer Maternal Aunt      Breast Cancer Maternal Aunt      Breast Cancer Maternal Aunt      Glaucoma No family hx of      Macular Degeneration No family hx of      Amblyopia No family hx of        Review of Systems   ROS negative other than symptoms noted in HPI    Physical Exam   Temp: 102.3  F (39.1  C) Temp src: Oral BP: 119/56 Pulse: 89   Resp: 18 SpO2: 90 %     "  Vital Signs with Ranges  Temp:  [99.5  F (37.5  C)-102.3  F (39.1  C)] 102.3  F (39.1  C)  Pulse:  [89-94] 89  Resp:  [18] 18  BP: (114-141)/(49-82) 119/56  SpO2:  [90 %-94 %] 90 %  280 lbs 0 oz     , Blood pressure 119/56, pulse 89, temperature 102.3  F (39.1  C), temperature source Oral, resp. rate 18, height 1.676 m (5' 6\"), weight 127 kg (280 lb), last menstrual period 12/10/2010, SpO2 90 %, not currently breastfeeding.  280 lbs 0 oz  HEENT:  Normocephalic, atraumatic.  PERRLA.  EOM s intact.   Neck:  Supple, non-tender  Lungs:  No SOB  Abdomen:  Soft, non-tender, obese abdomen  Skin:  Warm and dry, good capillary refill.  Extremities:  Good radial and dorsalis pedis pulses bilaterally, no edema, cyanosis or clubbing.    NEUROLOGICAL EXAMINATION:   Mental status:  Alert and Oriented x 3, speech is fluent.  Cranial nerves:  II-XII intact.   Motor:    Shoulder Abduction:  Right:  5/5   Left:  5/5  Biceps:                      Right:  5/5   Left:  5/5  Triceps:                     Right:  5/5   Left:  5/5  Wrist Extensors:       Right:  5/5   Left:  5/5  Wrist Flexors:           Right:  5/5   Left:  5/5  Interosseus :             Right:  5/5   Left:  5/5  Hip Flexor:                Right: 3/5   Left:  3/5  Hip Adductor:            Right:  5/5  Left:  5/5  Hip Abductor:            Right:  5/5  Left:  5/5  Gastroc Soleus:        Right:  5/5  Left:  5/5  Tib/Ant:                     Right:  5/5  Left:  5/5  EHL:                         Right:  5/5  Left:  5/5  Sensation:  Intact  Reflexes:   Negative Clonus.    Gait:  Unable to assess     Lumbar examination reveals moderate tenderness of the spine. Straight leg raise is negative bilaterally.     Data     CBC RESULTS:   Recent Labs   Lab Test 12/25/20  1214   WBC 11.8*   RBC 3.72*   HGB 11.6*   HCT 35.3   MCV 95   MCH 31.2   MCHC 32.9   RDW 12.8   *     Basic Metabolic Panel:  Lab Results   Component Value Date     12/25/2020      Lab Results   Component " Value Date    POTASSIUM 3.9 12/25/2020     Lab Results   Component Value Date    CHLORIDE 99 12/25/2020     Lab Results   Component Value Date    HEAVENLY 8.8 12/25/2020     Lab Results   Component Value Date    CO2 29 12/25/2020     Lab Results   Component Value Date    BUN 16 12/25/2020     Lab Results   Component Value Date    CR 0.68 12/25/2020     Lab Results   Component Value Date     12/25/2020     INR:  Lab Results   Component Value Date    INR 0.98 02/11/2013    INR 0.96 12/12/2010
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
## 2020-12-25 NOTE — ED NOTES
Bed: ED11  Expected date:   Expected time:   Means of arrival:   Comments:  413  65 F sob imbalalnce/poss covid  1159

## 2020-12-26 ENCOUNTER — APPOINTMENT (OUTPATIENT)
Dept: MRI IMAGING | Facility: CLINIC | Age: 65
DRG: 872 | End: 2020-12-26
Attending: HOSPITALIST
Payer: COMMERCIAL

## 2020-12-26 ENCOUNTER — DOCUMENTATION ONLY (OUTPATIENT)
Dept: ORTHOPEDICS | Facility: CLINIC | Age: 65
End: 2020-12-26

## 2020-12-26 LAB — LACTATE BLD-SCNC: 0.7 MMOL/L (ref 0.7–2)

## 2020-12-26 PROCEDURE — 84145 PROCALCITONIN (PCT): CPT | Performed by: INTERNAL MEDICINE

## 2020-12-26 PROCEDURE — 250N000011 HC RX IP 250 OP 636: Performed by: HOSPITALIST

## 2020-12-26 PROCEDURE — 72148 MRI LUMBAR SPINE W/O DYE: CPT

## 2020-12-26 PROCEDURE — 120N000001 HC R&B MED SURG/OB

## 2020-12-26 PROCEDURE — 83605 ASSAY OF LACTIC ACID: CPT | Performed by: INTERNAL MEDICINE

## 2020-12-26 PROCEDURE — 250N000013 HC RX MED GY IP 250 OP 250 PS 637: Performed by: STUDENT IN AN ORGANIZED HEALTH CARE EDUCATION/TRAINING PROGRAM

## 2020-12-26 PROCEDURE — 250N000013 HC RX MED GY IP 250 OP 250 PS 637: Performed by: INTERNAL MEDICINE

## 2020-12-26 PROCEDURE — 250N000013 HC RX MED GY IP 250 OP 250 PS 637: Performed by: HOSPITALIST

## 2020-12-26 PROCEDURE — 250N000011 HC RX IP 250 OP 636: Performed by: STUDENT IN AN ORGANIZED HEALTH CARE EDUCATION/TRAINING PROGRAM

## 2020-12-26 PROCEDURE — 36415 COLL VENOUS BLD VENIPUNCTURE: CPT | Performed by: INTERNAL MEDICINE

## 2020-12-26 PROCEDURE — L0464 TLSO 4MOD SACRO-SCAP PRE: HCPCS

## 2020-12-26 PROCEDURE — 99232 SBSQ HOSP IP/OBS MODERATE 35: CPT | Performed by: INTERNAL MEDICINE

## 2020-12-26 RX ORDER — SENNOSIDES 8.6 MG
1-2 TABLET ORAL 2 TIMES DAILY
Status: DISCONTINUED | OUTPATIENT
Start: 2020-12-26 | End: 2020-12-31 | Stop reason: HOSPADM

## 2020-12-26 RX ORDER — BISACODYL 10 MG
10 SUPPOSITORY, RECTAL RECTAL DAILY PRN
Status: DISCONTINUED | OUTPATIENT
Start: 2020-12-26 | End: 2020-12-31 | Stop reason: HOSPADM

## 2020-12-26 RX ORDER — POLYETHYLENE GLYCOL 3350 17 G/17G
17 POWDER, FOR SOLUTION ORAL 2 TIMES DAILY PRN
Status: DISCONTINUED | OUTPATIENT
Start: 2020-12-26 | End: 2020-12-31 | Stop reason: HOSPADM

## 2020-12-26 RX ORDER — FAMOTIDINE 10 MG
10 TABLET ORAL 2 TIMES DAILY
Status: DISCONTINUED | OUTPATIENT
Start: 2020-12-26 | End: 2020-12-31 | Stop reason: HOSPADM

## 2020-12-26 RX ADMIN — OXYCODONE HYDROCHLORIDE AND ACETAMINOPHEN 1 TABLET: 5; 325 TABLET ORAL at 19:57

## 2020-12-26 RX ADMIN — DOCUSATE SODIUM 100 MG: 100 CAPSULE, LIQUID FILLED ORAL at 08:30

## 2020-12-26 RX ADMIN — SENNOSIDES 1 TABLET: 8.6 TABLET, FILM COATED ORAL at 10:18

## 2020-12-26 RX ADMIN — DULOXETINE HYDROCHLORIDE 120 MG: 60 CAPSULE, DELAYED RELEASE ORAL at 08:30

## 2020-12-26 RX ADMIN — TOLTERODINE TARTRATE 2 MG: 2 TABLET, FILM COATED ORAL at 21:24

## 2020-12-26 RX ADMIN — TRAZODONE HYDROCHLORIDE 500 MG: 100 TABLET ORAL at 21:23

## 2020-12-26 RX ADMIN — OXYCODONE HYDROCHLORIDE AND ACETAMINOPHEN 1 TABLET: 5; 325 TABLET ORAL at 12:02

## 2020-12-26 RX ADMIN — FAMOTIDINE 10 MG: 10 TABLET ORAL at 13:45

## 2020-12-26 RX ADMIN — FAMOTIDINE 10 MG: 10 TABLET ORAL at 21:23

## 2020-12-26 RX ADMIN — ARIPIPRAZOLE 5 MG: 5 TABLET ORAL at 08:30

## 2020-12-26 RX ADMIN — ATORVASTATIN CALCIUM 20 MG: 10 TABLET, FILM COATED ORAL at 08:30

## 2020-12-26 RX ADMIN — CARBAMAZEPINE 200 MG: 200 TABLET ORAL at 08:30

## 2020-12-26 RX ADMIN — METHYLPHENIDATE HYDROCHLORIDE 20 MG: 10 TABLET ORAL at 08:30

## 2020-12-26 RX ADMIN — MELATONIN 5 MG TABLET 10 MG: at 21:24

## 2020-12-26 RX ADMIN — LORAZEPAM 0.5 MG: 0.5 TABLET ORAL at 00:20

## 2020-12-26 RX ADMIN — CARBAMAZEPINE 200 MG: 200 TABLET ORAL at 21:24

## 2020-12-26 RX ADMIN — TOLTERODINE TARTRATE 2 MG: 2 TABLET, FILM COATED ORAL at 08:30

## 2020-12-26 RX ADMIN — CEFTRIAXONE SODIUM 1 G: 1 INJECTION, POWDER, FOR SOLUTION INTRAMUSCULAR; INTRAVENOUS at 13:10

## 2020-12-26 RX ADMIN — MIRTAZAPINE 75 MG: 30 TABLET, FILM COATED ORAL at 21:23

## 2020-12-26 RX ADMIN — ENOXAPARIN SODIUM 40 MG: 40 INJECTION SUBCUTANEOUS at 20:24

## 2020-12-26 ASSESSMENT — ACTIVITIES OF DAILY LIVING (ADL)
ADLS_ACUITY_SCORE: 23
ADLS_ACUITY_SCORE: 14
ADLS_ACUITY_SCORE: 21
ADLS_ACUITY_SCORE: 21
ADLS_ACUITY_SCORE: 23
ADLS_ACUITY_SCORE: 22

## 2020-12-26 ASSESSMENT — MIFFLIN-ST. JEOR: SCORE: 1875.75

## 2020-12-26 NOTE — PLAN OF CARE
DATE & TIME: 12/26/20 AM shift  Cognitive Concerns/ Orientation : A&O x4. Calm & cooperative.   BEHAVIOR & AGGRESSION TOOL COLOR: Green  ABNL VS/O2: VSS on 1L of oxygen this shift, desats to 89% on RA. JAEGER/SOB at times.  MOBILITY: Bedrest for now, pt has on order for brace, will be fitted for one later today.  Hx MS and fibromyalgia. Refused turning to side due to lower back pain, weight shifting encouraged.   PAIN MANAGMENT: PRN Percocet x 1 for lower back pain.    DIET: Regular  BOWEL/BLADDER: Urinary incontinence and frequency, purewick in place. One loose BM this shift, please hold PRNs for now. Hx of constipation.   ABNL LAB/BG: WBC 11.8, D-dimer 3.3  DRAIN/DEVICES: L PIV SL  TELEMETRY RHYTHM: N/A  SKIN: Scattered bruising, pale, 1 abd blister and 2 abd scabs from heating pad at home, generalized trace edema  TESTS/PROCEDURES: n/a  D/C DAY/GOALS/PLACE: Pending, once afebrile and antibiotic plan in place  OTHER IMPORTANT INFO: Abd round, firm, hx of constipation. Baseline bilateral neuropathy

## 2020-12-26 NOTE — H&P
[facility]    History and Physical - Hospitalist Service       Date of Admission:  12/25/2020    Assessment & Plan   Mili Padilla is a 65 year old female admitted on 12/25/2020. She has a PMH most remarkable for MS, and bladder dysfunction who was admitted for sepsis secondary to UTI.    1. Sepsis secondary to Urinary Tract infection. Patient with chronic urinary urgency and lack of bladder symptoms thought to be secondary to MS.   - UA consistent with infection  - Ceftriaxone for infection; broaden for continued fever or hemodynamic worsening  - Cultures pending  - Received fluids in the ER    2. Transverse fracture of L1 superior endplate. Secondary to fall after she became weak with infection.  - Neurosurgery consult  - MRI spine tonight; lorazepam ordered for claustrophobia  - Percocet PRN for pain    Chronic Issues  1. Depression - home remeron  2. Fibromylagia - home abilify, tegretol  3. GERD  4. HLD - lipitor  5. MS  6. OVIDIO  7. Insomnia - tradozone     Diet: Combination Diet Regular Diet Adult    DVT Prophylaxis: Enoxaparin (Lovenox) SQ  Waggoner Catheter: not present  Code Status: Full Code           Disposition Plan   Expected discharge: 2 - 3 days, recommended to pending plan for spine surgery once SIRS/Sepsis treated and spinal plan determined.  Entered: Rhett Wild MD 12/25/2020, 7:41 PM     The patient's care was discussed with the Bedside Nurse.    Rhett Wild MD  [facility]  Contact information available via [facility] Paging/Directory      ______________________________________________________________________    Chief Complaint   Fever, back pain    History is obtained from the patient    History of Present Illness   Mili Padilla is a 65 year old female who has a PMH most remarkable for MS and bladder urgency who presented to the ER with a 4 day history of fevers. Patient noted that 4 days ago she started developing fevers, shortly after  that she developed who body weakness, and lightheadedness upon standing. After developing fevers and becoming weak she tells me that she tried to get up and was light headed. She fell and afterwards her back hurt. She has essentially been laying down since due to back pain.    In the ER she was evaluated for fevers and although she didn't have urinary symptoms, her UA was consistent with an infection. Patient notes that she doesn't get urinary symptoms due to her MS, but has had a number of infections. She endorses fevers, light-headedness and back pain. No chills, chest pain, SOB, abdominal pain, nausea, vomiting, or diarrhea.     Review of Systems    The 10 point Review of Systems is negative other than noted in the HPI or here.    Past Medical History    I have reviewed this patient's medical history and updated it with pertinent information if needed.   Past Medical History:   Diagnosis Date     Depression, major, in partial remission (H)      Fibromyalgia syndrome      Gastro-oesophageal reflux disease      Hyperlipemia      Lymphedema      Multiple sclerosis (H)     tremors with MS, all four limbs and head     Multiple sclerosis, secondary progressive (H)      Sleep apnea      Vocal cord paralysis, unilateral complete        Past Surgical History   I have reviewed this patient's surgical history and updated it with pertinent information if needed.  Past Surgical History:   Procedure Laterality Date     COLONOSCOPY N/A 7/17/2017    Procedure: COMBINED COLONOSCOPY, SINGLE OR MULTIPLE BIOPSY/POLYPECTOMY BY BIOPSY;;  Surgeon: Wong Beaulieu MD;  Location:  GI     COLONOSCOPY N/A 2/23/2018    Procedure: COMBINED COLONOSCOPY, SINGLE OR MULTIPLE BIOPSY/POLYPECTOMY BY BIOPSY;  COLONOSCOPY ;  Surgeon: Yessica Santana MD;  Location:  GI     ENT SURGERY      throat 1969, vocal cord surgery with skin grafting     ESOPHAGOSCOPY, GASTROSCOPY, DUODENOSCOPY (EGD), COMBINED N/A 12/16/2014    Procedure: COMBINED  ENDOSCOPIC ULTRASOUND, ESOPHAGOSCOPY, GASTROSCOPY, DUODENOSCOPY (EGD), FINE NEEDLE ASPIRATE/BIOPSY;  Surgeon: Yessica Santana MD;  Location:  GI     ESOPHAGOSCOPY, GASTROSCOPY, DUODENOSCOPY (EGD), COMBINED N/A 2014    Procedure: COMBINED ESOPHAGOSCOPY, GASTROSCOPY, DUODENOSCOPY (EGD), BIOPSY SINGLE OR MULTIPLE;  Surgeon: Yessica Santana MD;  Location:  GI     LAPAROSCOPIC APPENDECTOMY  2013    Procedure: LAPAROSCOPIC APPENDECTOMY;;  Surgeon: Salvador Morris MD;  Location:  OR     LAPAROSCOPIC BIOPSY LIVER  2013    Procedure: LAPAROSCOPIC BIOPSY LIVER;;  Surgeon: Salvador Morris MD;  Location:  OR     LAPAROSCOPIC GASTRIC SLEEVE  2013    Procedure: LAPAROSCOPIC GASTRIC SLEEVE;  LAPAROSCOPIC SLEEVE GASTRECTOMY/ LAPARSCOPIC  APPENDECTOMY /LIVER BIOPSIES/LIVER CYST DRAINAGE;  Surgeon: Salvador Morris MD;  Location:  OR     ORTHOPEDIC SURGERY      R wrist        Social History   I have reviewed this patient's social history and updated it with pertinent information if needed.  Social History     Tobacco Use     Smoking status: Former Smoker     Types: Cigarettes     Quit date: 1974     Years since quittin.2     Smokeless tobacco: Never Used   Substance Use Topics     Alcohol use: Yes     Alcohol/week: 0.0 standard drinks     Comment: Once a month.     Drug use: No       Family History   I have reviewed this patient's family history and updated it with pertinent information if needed.  Family History   Problem Relation Age of Onset     Breast Cancer Mother      Other Cancer Father      Breast Cancer Maternal Aunt      Breast Cancer Maternal Aunt      Breast Cancer Maternal Aunt      Breast Cancer Maternal Aunt      Glaucoma No family hx of      Macular Degeneration No family hx of      Amblyopia No family hx of        Prior to Admission Medications   Prior to Admission Medications   Prescriptions Last Dose Informant Patient Reported? Taking?   ARIPiprazole  (ABILIFY) 5 MG tablet 12/24/2020 at Unknown time Self Yes Yes   Sig: Take 5 mg by mouth daily    Calcium Citrate-Vitamin D 250-200 MG-UNIT TABS  Self Yes No   Sig: Take 1 tablet by mouth 3 times daily    DULoxetine (CYMBALTA) 60 MG capsule  Self Yes Yes   Sig: Take 120 mg by mouth daily    Dextromethorphan-Guaifenesin (MUCINEX DM PO) 12/24/2020 at Unknown time Self Yes Yes   Sig: Take 1,200 mg by mouth daily 1200mg daily    Melatonin 10 MG TABS 12/24/2020 at Unknown time Self Yes Yes   Sig: Take 10 mg by mouth At Bedtime   Multiple Vitamin (MULTIVITAMINS PO) 12/24/2020 at Unknown time Self Yes Yes   Sig: Take 2 tablets by mouth every evening. WITH IRON   atorvastatin (LIPITOR) 20 MG tablet 12/24/2020 at Unknown time Self Yes Yes   Sig: Take 20 mg by mouth daily.   carBAMazepine (TEGRETOL) 200 MG tablet  Self Yes Yes   Sig: Take 200 mg by mouth 2 times daily   denosumab (PROLIA) 60 MG/ML SOLN injection More than a month at Unknown time Self Yes No   Sig: Inject 60 mg Subcutaneous every 6 months   docusate sodium (COLACE) 100 MG capsule 12/24/2020 at Unknown time Self Yes Yes   Sig: Take 100 mg by mouth 2 times daily   ketoconazole (NIZORAL) 2 % cream Past Week at PRN Self Yes Yes   Sig: Apply topically 2 times daily as needed    methylphenidate (RITALIN) 10 MG tablet 12/24/2020 at Unknown time Self Yes Yes   Sig: Take 20 mg by mouth daily    mirtazapine (REMERON) 30 MG tablet 12/24/2020 at Unknown time Self Yes Yes   Sig: Take 75 mg by mouth At Bedtime    traZODone (DESYREL) 100 MG tablet 12/24/2020 at Unknown time Self Yes Yes   Sig: Take 500 mg by mouth At Bedtime    triamcinolone (ARISTOCORT HP) 0.5 % external cream  Self Yes Yes   Sig: Apply topically At Bedtime To hands   triamcinolone (KENALOG) 0.5 % OINT ointment  Self Yes No   Sig: Apply topically 2 times daily as needed for irritation To legs   trospium (SANCTURA) 20 MG tablet 12/24/2020 at Unknown time Self Yes Yes   Sig: Take 1 tablet (20 mg) by mouth 2  times daily (before meals)   vitamin B-Complex 12/24/2020 at Unknown time Self Yes Yes   Sig: Take 1 tablet by mouth daily       Facility-Administered Medications: None     Allergies   Allergies   Allergen Reactions     Augmentin      Sensitive,  Able to tolerate a few doses, otherwise hives on hands     Betaseron [Interferon Beta-1b]      Necrosis of skin at injection sites     Dust Mite Extract Unknown     Mold Unknown       Physical Exam   Vital Signs: Temp: 98.1  F (36.7  C) Temp src: Oral BP: 121/62 Pulse: 82   Resp: 18 SpO2: 90 %      Weight: 280 lbs 0 oz    General Appearance: Well appearing, no distress  Eyes: JEANNETTE, EOMI  HEENT: NC, AT. MMM.   Respiratory: CTAB, normal work of breathing  Cardiovascular: Regular Rate and Rhythm, normal S1, S2. No murmurs, rubs, gallops  GI: Soft, non-tender, non-distended  Lymph/Hematologic: No asymetric swelling, edema. No bruising.  Genitourinary: Deferred  Skin: No rashes, lesions, wounds.  Musculoskeletal: Warm, well perfused  Neurologic: AOx4, CNII-XII intact.  Psychiatric: Euthymic. Mood appropriate.     Data   Data reviewed today: I reviewed all medications, new labs and imaging results over the last 24 hours. I personally reviewed the chest CT image(s) showing no PE, the abdominal CT image(s) showing L1 fracture and the head CT image(s) showing no intracranial processes.    Recent Labs   Lab 12/25/20  1214   WBC 11.8*   HGB 11.6*   MCV 95   *      POTASSIUM 3.9   CHLORIDE 99   CO2 29   BUN 16   CR 0.68   ANIONGAP 5   HEAVENLY 8.8   *   TROPI <0.015

## 2020-12-26 NOTE — PROGRESS NOTES
[facility]    Medicine Progress Note - Hospitalist Service       Date of Admission:  12/25/2020  Assessment & Plan       Mili Padilla is a 65 year old female admitted on 12/25/2020. She has a PMH most remarkable for MS, and bladder dysfunction who was admitted for sepsis secondary to UTI.    She presented with symptoms of SOB, cough, fever to 103 x 4 days and weakness with lightheadedness with standing. She fell. Vitals at admission 102.3, 90, 132/60, 94% on RA.   CT chest showed no definite pulmonary infiltrates, neg for PE     Sepsis suspect secondary to Pyelnephritis Patient with chronic urinary urgency and lack of bladder symptoms thought to be secondary to MS.   * COVID and influenza PCR negative.   * UA showed 47 WBC, 11 WBC    - Urine and blood cultures pending.   - Ceftriaxone for infection; broaden for continued fever or hemodynamic worsening.   - Received fluids in the ER.      Transverse fracture of L1 superior endplate. Secondary to fall after she became weak with infection.   MRI on 12/26/20:   IMPRESSION:   1.  Redemonstrated DISH associated L1 fracture with approximately 40% anterior height loss. There is disruption of the anterior longitudinal ligament, distal ligamentous complex and likely focal disruption of the posterior longitudinal ligament. The ligamentum flavum and interspinous ligaments are intact. There is no facet joint widening.   2.  Multilevel degenerative changes of the lumbar spine. Mild spinal canal stenosis at T12-L1.   3.  Multilevel bilateral neural foraminal narrowing, greatest at L5-S1 where there is moderate stenosis on the left. In addition, a left-sided extraforaminal disc osteophyte complex at L5-S1 contacts the exiting left L5 nerve root, which may result in symptoms of impingement.    - appreciate neurosurgery consult   - recommend TLSO brace and outpatient follow up in 6 weeks with xray prior.     ? Cirrhosis with splenomegaly on CT at admission.    - liver panel, INR ordered.       Chronic Issues  1. Depression - continue PTA remeron, abilify  2. Fibromylagia - continue PTA tegretol  3. GERD - resumed PTA famotidine (Reported by patient. Not on admission med list)   4. HLD - continue PTA lipitor  5. MS  6. OVIDIO - hypoxia noted at night. Patient reports non-compliance with CPAP. Discussed observed hypoxia and importance of continuing CPAP.   7. Insomnia - continue PTA tradozone       Diet: Combination Diet Regular Diet Adult    DVT Prophylaxis: Enoxaparin (Lovenox) SQ  Waggoner Catheter: not present  Code Status: Full Code           Disposition Plan   Expected discharge: 2 - 3 days, recommended to TBD once Afebrile x 24 hours, urine culture back. BCx negative x 48 hours. .  Entered: Isabelle Hansen MD 12/26/2020, 8:06 AM       The patient's care was discussed with the Bedside Nurse and Patient.    Isabelle Hansen MD  Hospitalist Service  [facility]  Contact information available via [facility] Paging/Directory    ______________________________________________________________________    Interval History   Events over last 24 hours as outlined above. Patient denies any SOB, CP, abdominal pain, N/V/D.   Patient feels better today than yesterday. Less weak. Notes intermittent SOB and mild cough on and off for some time but did not feel she was coming down with a respiratory infection. She mostly noted weakness and fever as her primary symptoms and states she has not had symptoms with prior UTI's. No fevers     Data reviewed today: I reviewed all medications, new labs and imaging results over the last 24 hours. I personally reviewed no images or EKG's today.    Physical Exam   Vital Signs: Temp: 98.7  F (37.1  C) Temp src: Oral BP: 131/68 Pulse: 81   Resp: 20 SpO2: 99 % O2 Device: Nasal cannula Oxygen Delivery: 1 LPM  Weight: 290 lbs 12.59 oz  Constitutional: NAD,   Neuropsyche:  alert and oriented, answers questions appropriately. Speech  normal, face symmetric and moving all 4 extremities without gross focal neurological deficit.   Respiratory:  Breathing comfortably, good air exchange, no wheezes, no crackles.   Cardiovascular:  Regular rate and rhythm, no edema.  GI:  soft, NT/ND, BS normal, ostomy just emptied no stool or air currently in bag.   Skin/Integumen:  No acute rash or sign of bleeding.     Data   Recent Labs   Lab 12/25/20  1214   WBC 11.8*   HGB 11.6*   MCV 95   *      POTASSIUM 3.9   CHLORIDE 99   CO2 29   BUN 16   CR 0.68   ANIONGAP 5   HEAVENLY 8.8   *   TROPI <0.015     Recent Results (from the past 24 hour(s))   Lumbar spine MRI w/o contrast    Narrative    EXAM: MR LUMBAR SPINE W/O CONTRAST  LOCATION: [facility]  DATE/TIME: 12/26/2020 12:47 AM    INDICATION: L1 fracture with intervertebral disc extension and weakness.  COMPARISON: Lumbar spine CT dated 12/25/2020.  TECHNIQUE: Routine Lumbar Spine MRI without IV contrast.    FINDINGS:   Nomenclature is based on 5 lumbar type vertebral bodies. Redemonstrated anterior compression deformity of L1, resulting in approximately 30% height loss. No osseous retropulsion. There is a T2 hyperintense fluid cleft along the fracture defect,   suggestive of osteoporosis. As stated before, the fracture extends into the intervertebral disc at T12-L1 with disc or ligamentous disruption. There is disruption of the anterior longitudinal ligament. The posterior longitudinal ligament may be focally   disrupted at the level of the intervertebral disc. The ligamentum flavum is intact. No significant interspinous ligamentous edema. No facet joint widening. Mild prevertebral edema in the overlying soft tissues. Normal distal spinal cord and cauda equina   with conus medullaris at L1-L2. Fatty atrophy of the paraspinous muscles. Degenerative changes of the sacroiliac joints.    T12-L1: Disc osteophyte complex with mild facet arthropathy. Mild spinal canal stenosis and narrowing  of the bilateral neural foramen.     L1-L2: Small right paracentral disc protrusion. Bilateral facet arthropathy. The spinal canal and subarticular recesses are patent. No neural foraminal narrowing.    L2-L3: Small right paracentral disc protrusion and bilateral facet arthropathy. No narrowing of the spinal canal or subarticular recesses. No neural foraminal narrowing.     L3-L4: Small left paracentral disc protrusion and annular fissure. Small right paracentral disc protrusion. Bilateral facet arthropathy. No narrowing of the spinal canal, subarticular recesses or neural foramina.    L4-L5: Central disc protrusion and annular fissure. Mild bilateral facet arthropathy. Mild bilateral neural foraminal narrowing. No narrowing of the spinal canal or subarticular recesses.     L5-S1: There is a symmetric disc osteophyte complex with a superimposed left lateral disc osteophyte that contacts the exiting left L5 nerve root. Bilateral facet arthropathy. No narrowing of the spinal canal or subarticular recesses. Moderate left and   mild right neural foraminal narrowing.      Impression    IMPRESSION:  1.  Redemonstrated DISH associated L1 fracture with approximately 40% anterior height loss. There is disruption of the anterior longitudinal ligament, distal ligamentous complex and likely focal disruption of the posterior longitudinal ligament. The   ligamentum flavum and interspinous ligaments are intact. There is no facet joint widening.  2.  Multilevel degenerative changes of the lumbar spine. Mild spinal canal stenosis at T12-L1.  3.  Multilevel bilateral neural foraminal narrowing, greatest at L5-S1 where there is moderate stenosis on the left. In addition, a left-sided extraforaminal disc osteophyte complex at L5-S1 contacts the exiting left L5 nerve root, which may result in   symptoms of impingement.     Medications       ARIPiprazole  5 mg Oral Daily     atorvastatin  20 mg Oral Daily     carBAMazepine  200 mg Oral  BID     cefTRIAXone  1 g Intravenous Q24H     docusate sodium  100 mg Oral BID     DULoxetine  120 mg Oral Daily     enoxaparin ANTICOAGULANT  40 mg Subcutaneous Q12H     famotidine  10 mg Oral BID     melatonin  10 mg Oral At Bedtime     methylphenidate  20 mg Oral Daily     mirtazapine  75 mg Oral At Bedtime     sennosides  1-2 tablet Oral BID     sodium chloride (PF)  3 mL Intracatheter Q8H     tolterodine  2 mg Oral BID     traZODone  500 mg Oral At Bedtime

## 2020-12-26 NOTE — PROVIDER NOTIFICATION
MD Notification    Notified Person: MD    Notified Person Name: Malik     Notification Date/Time: 12/25/20 9565    Notification Interaction: Paged     Purpose of Notification: COVID negative. Discontinue precautions? Thank you!     Orders Received: Discontinued.    Comments:

## 2020-12-26 NOTE — PLAN OF CARE
DATE & TIME: 12/25/20 6494-9937  Cognitive Concerns/ Orientation : A&O x4. Calm & cooperative. One time dose Ativan given for MRI due to claustrophobia.   BEHAVIOR & AGGRESSION TOOL COLOR: Green  ABNL VS/O2: VSS on RA until @0245 pt tmax 101.9, O2 85%, RR 28, /71. Placed on 2L NC, sats 96% and ice packs applied. VS resolved ex 1L NC (sats remain 89-91% on RA). JAEGER/SOB at times.  MOBILITY: Bedrest. Blower mattress used for transfer. Hx MS and fibromyalgia. Refused turning. Weight shifting encouraged   PAIN MANAGMENT: Pt reports 3/10 back pain. Pt reports being comfortable at rest. Quiet environment promoted. Refused hot/cold packs. PRN Percocet available  DIET: Regular  BOWEL/BLADDER: Urinary incontinence and frequency, purewick in place. No BM overnight  ABNL LAB/BG: WBC 11.8, D-dimer 3.3  DRAIN/DEVICES: R & L arm PIV SL  TELEMETRY RHYTHM: N/A  SKIN: Scattered bruising, pale, 1 abd blister and 2 abd scabs from heating pad at home, generalized trace edema  TESTS/PROCEDURES: MRI completed overnight, see results   D/C DAY/GOALS/PLACE: 2-3 days pending sx improvement   OTHER IMPORTANT INFO: Infrequent congested cough. Abd round, firm. Baseline bilateral neuropathy

## 2020-12-26 NOTE — PROGRESS NOTES
.Admission    Patient arrives to room  via cart from ED.  Care plan note:     Inpatient nursing criteria listed below were met:    PCD's Documented: NA  Skin issues/needs documented :NA  Isolation education started/completed NA  Patient allergies verified with patient: Yes  Verified completion of [place] Risk Assessment Tool:  Yes  Verified completion of Guardianship screening tool: Yes  Fall Prevention: Care plan updated, Education given and documented Yes  Care Plan initiated: Yes  Home medications documented in belongings flowsheet: NA  Patient belongings documented in belongings flowsheet: NA  Reminder note (belongings/ medications) placed in discharge instructions:NA  Admission profile/ required documentation complete: Yes  Bedside Report Letter given and explained to patient Yes  Visitor Designated? NA  If patient is a 72 hour hold/Commitment are belongings removed from room and locked up? NA

## 2020-12-26 NOTE — PROGRESS NOTES
[facility]    Neurosurgery  Daily Note    Assessment & Plan    64 yo female with history of MS, neuropathy, and chronic low back pain who presented to ED s/p fall. CT showed acute L1 superior endplate fracture, extending into the T12-L1 disc space. This morning, patient reports back pain when twisting/turning in bed. Denies radicular leg pain. Baseline neuropathy in LE. She is able to move her LE better today, no weakness on exam.     MRI lumbar spine obtained  IMPRESSION:  1.  Redemonstrated DISH associated L1 fracture with approximately 40% anterior height loss. There is disruption of the anterior longitudinal ligament, distal ligamentous complex and likely focal disruption of the posterior longitudinal ligament. The  ligamentum flavum and interspinous ligaments are intact. There is no facet joint widening.  2.  Multilevel degenerative changes of the lumbar spine. Mild spinal canal stenosis at T12-L1.  3.  Multilevel bilateral neural foraminal narrowing, greatest at L5-S1 where there is moderate stenosis on the left. In addition, a left-sided extraforaminal disc osteophyte complex at L5-S1 contacts the exiting left L5 nerve root, which may result in symptoms of impingement.    Plan:  - No surgical intervention planned  - Recommend TLSO brace and outpatient follow-up in 6 weeks with xray prior  - Okay to work with PT/OT once TLSO obtained    Discussed with Dr. Madeline Estrada, MARYLU  [facility] Neurosurgery  [address] [address]  Tel [phone]  Pager [phone]      Interval History   Stable     Physical Exam   Temp: 98.7  F (37.1  C) Temp src: Oral BP: 131/68 Pulse: 81   Resp: 20 SpO2: 99 % O2 Device: Nasal cannula Oxygen Delivery: 1 LPM  Vitals:    12/25/20 1207 12/26/20 0619   Weight: 280 lb (127 kg) 290 lb 12.6 oz (131.9 kg)     Vital Signs with Ranges  Temp:  [98.1  F (36.7  C)-102.3  F (39.1  C)] 98.7  F (37.1   C)  Pulse:  [81-97] 81  Resp:  [18-28] 20  BP: (111-171)/(49-82) 131/68  SpO2:  [85 %-99 %] 99 %  No intake/output data recorded.    Mental status:  Alert and Oriented x 3, speech is fluent.  Cranial nerves:  II-XII intact.   PERRLA  Motor:  Moves all extremities. Strength is 5/5 throughout the upper and lower extremities  Sensation:  Intact  Reflexes:  Negative Babinski.  Negative Clonus.        Medications        ARIPiprazole  5 mg Oral Daily     atorvastatin  20 mg Oral Daily     carBAMazepine  200 mg Oral BID     cefTRIAXone  1 g Intravenous Q24H     docusate sodium  100 mg Oral BID     DULoxetine  120 mg Oral Daily     enoxaparin ANTICOAGULANT  40 mg Subcutaneous Q12H     melatonin  10 mg Oral At Bedtime     methylphenidate  20 mg Oral Daily     mirtazapine  75 mg Oral At Bedtime     sodium chloride (PF)  3 mL Intracatheter Q8H     tolterodine  2 mg Oral BID     traZODone  500 mg Oral At Bedtime       Jania Estrada CNP  [facility] Neurosurgery   [address] [address]  Tel [phone]  Pager [phone]

## 2020-12-26 NOTE — PLAN OF CARE
DATE & TIME: 12/25/20 4645-8129  Cognitive Concerns/ Orientation : A&Ox4  BEHAVIOR & AGGRESSION TOOL COLOR: Green  CIWA SCORE: N/A  ABNL VS/O2: N/A  MOBILITY: Bedrest  PAIN MANAGMENT: 5. Oxycondone given x1  DIET: Regular  BOWEL/BLADDER: continent of bowel. Puriq in place  ABNL LAB/BG: N/A  DRAIN/DEVICES: 2 PIVs  TELEMETRY RHYTHM: N/A  SKIN: Blisters on abdomen. Got it from heating pack/heating blanket at home.   TESTS/PROCEDURES: Awaiting MRI for tonight.. MRI checklist completed and faxed.   D/C DAY/GOALS/PLACE: 2-3 days pending plan for spine surgery once SIRS/sepsis treated.  OTHER IMPORTANT INFO: Patient belonging at bedside. Awaiting MRI tonight.

## 2020-12-27 ENCOUNTER — APPOINTMENT (OUTPATIENT)
Dept: PHYSICAL THERAPY | Facility: CLINIC | Age: 65
DRG: 872 | End: 2020-12-27
Attending: HOSPITALIST
Payer: COMMERCIAL

## 2020-12-27 ENCOUNTER — APPOINTMENT (OUTPATIENT)
Dept: GENERAL RADIOLOGY | Facility: CLINIC | Age: 65
DRG: 872 | End: 2020-12-27
Attending: INTERNAL MEDICINE
Payer: COMMERCIAL

## 2020-12-27 LAB
ALBUMIN SERPL-MCNC: 2.2 G/DL (ref 3.4–5)
ALP SERPL-CCNC: 79 U/L (ref 40–150)
ALT SERPL W P-5'-P-CCNC: 29 U/L (ref 0–50)
ANION GAP SERPL CALCULATED.3IONS-SCNC: 3 MMOL/L (ref 3–14)
AST SERPL W P-5'-P-CCNC: 32 U/L (ref 0–45)
BACTERIA SPEC CULT: ABNORMAL
BACTERIA SPEC CULT: ABNORMAL
BILIRUB SERPL-MCNC: 0.6 MG/DL (ref 0.2–1.3)
BUN SERPL-MCNC: 13 MG/DL (ref 7–30)
CALCIUM SERPL-MCNC: 9 MG/DL (ref 8.5–10.1)
CHLORIDE SERPL-SCNC: 99 MMOL/L (ref 94–109)
CO2 SERPL-SCNC: 32 MMOL/L (ref 20–32)
CREAT SERPL-MCNC: 0.55 MG/DL (ref 0.52–1.04)
ERYTHROCYTE [DISTWIDTH] IN BLOOD BY AUTOMATED COUNT: 12.9 % (ref 10–15)
GFR SERPL CREATININE-BSD FRML MDRD: >90 ML/MIN/{1.73_M2}
GLUCOSE SERPL-MCNC: 166 MG/DL (ref 70–99)
HCT VFR BLD AUTO: 35.4 % (ref 35–47)
HGB BLD-MCNC: 11.8 G/DL (ref 11.7–15.7)
INR PPP: 1.07 (ref 0.86–1.14)
Lab: ABNORMAL
MAGNESIUM SERPL-MCNC: 2 MG/DL (ref 1.6–2.3)
MCH RBC QN AUTO: 31.2 PG (ref 26.5–33)
MCHC RBC AUTO-ENTMCNC: 33.3 G/DL (ref 31.5–36.5)
MCV RBC AUTO: 94 FL (ref 78–100)
PLATELET # BLD AUTO: 116 10E9/L (ref 150–450)
POTASSIUM SERPL-SCNC: 2.9 MMOL/L (ref 3.4–5.3)
POTASSIUM SERPL-SCNC: 3 MMOL/L (ref 3.4–5.3)
POTASSIUM SERPL-SCNC: 3 MMOL/L (ref 3.4–5.3)
PROT SERPL-MCNC: 6.5 G/DL (ref 6.8–8.8)
RBC # BLD AUTO: 3.78 10E12/L (ref 3.8–5.2)
SODIUM SERPL-SCNC: 134 MMOL/L (ref 133–144)
SPECIMEN SOURCE: ABNORMAL
WBC # BLD AUTO: 9.6 10E9/L (ref 4–11)

## 2020-12-27 PROCEDURE — 71046 X-RAY EXAM CHEST 2 VIEWS: CPT

## 2020-12-27 PROCEDURE — 99233 SBSQ HOSP IP/OBS HIGH 50: CPT | Performed by: INTERNAL MEDICINE

## 2020-12-27 PROCEDURE — 250N000013 HC RX MED GY IP 250 OP 250 PS 637: Performed by: STUDENT IN AN ORGANIZED HEALTH CARE EDUCATION/TRAINING PROGRAM

## 2020-12-27 PROCEDURE — 97530 THERAPEUTIC ACTIVITIES: CPT | Mod: GP

## 2020-12-27 PROCEDURE — 250N000011 HC RX IP 250 OP 636: Performed by: STUDENT IN AN ORGANIZED HEALTH CARE EDUCATION/TRAINING PROGRAM

## 2020-12-27 PROCEDURE — 120N000001 HC R&B MED SURG/OB

## 2020-12-27 PROCEDURE — 250N000013 HC RX MED GY IP 250 OP 250 PS 637: Performed by: INTERNAL MEDICINE

## 2020-12-27 PROCEDURE — 80053 COMPREHEN METABOLIC PANEL: CPT | Performed by: INTERNAL MEDICINE

## 2020-12-27 PROCEDURE — 85610 PROTHROMBIN TIME: CPT | Performed by: INTERNAL MEDICINE

## 2020-12-27 PROCEDURE — 250N000013 HC RX MED GY IP 250 OP 250 PS 637: Performed by: HOSPITALIST

## 2020-12-27 PROCEDURE — 97161 PT EVAL LOW COMPLEX 20 MIN: CPT | Mod: GP

## 2020-12-27 PROCEDURE — 250N000011 HC RX IP 250 OP 636: Performed by: HOSPITALIST

## 2020-12-27 PROCEDURE — 250N000011 HC RX IP 250 OP 636: Performed by: INTERNAL MEDICINE

## 2020-12-27 PROCEDURE — 83735 ASSAY OF MAGNESIUM: CPT | Performed by: INTERNAL MEDICINE

## 2020-12-27 PROCEDURE — 36415 COLL VENOUS BLD VENIPUNCTURE: CPT | Performed by: INTERNAL MEDICINE

## 2020-12-27 PROCEDURE — 85027 COMPLETE CBC AUTOMATED: CPT | Performed by: INTERNAL MEDICINE

## 2020-12-27 PROCEDURE — 84132 ASSAY OF SERUM POTASSIUM: CPT | Performed by: INTERNAL MEDICINE

## 2020-12-27 RX ORDER — POTASSIUM CHLORIDE 1500 MG/1
20 TABLET, EXTENDED RELEASE ORAL DAILY
Status: DISCONTINUED | OUTPATIENT
Start: 2020-12-27 | End: 2020-12-31 | Stop reason: HOSPADM

## 2020-12-27 RX ORDER — POTASSIUM CHLORIDE 1500 MG/1
20 TABLET, EXTENDED RELEASE ORAL ONCE
Status: COMPLETED | OUTPATIENT
Start: 2020-12-27 | End: 2020-12-27

## 2020-12-27 RX ORDER — TORSEMIDE 10 MG/1
10 TABLET ORAL DAILY
Status: DISCONTINUED | OUTPATIENT
Start: 2020-12-27 | End: 2020-12-31 | Stop reason: HOSPADM

## 2020-12-27 RX ORDER — POTASSIUM CHLORIDE 1500 MG/1
20 TABLET, EXTENDED RELEASE ORAL ONCE
Status: COMPLETED | OUTPATIENT
Start: 2020-12-28 | End: 2020-12-28

## 2020-12-27 RX ORDER — POTASSIUM CHLORIDE 750 MG/1
10 CAPSULE, EXTENDED RELEASE ORAL DAILY
Status: DISCONTINUED | OUTPATIENT
Start: 2020-12-27 | End: 2020-12-27 | Stop reason: CLARIF

## 2020-12-27 RX ORDER — POTASSIUM CHLORIDE 750 MG/1
10 TABLET, EXTENDED RELEASE ORAL DAILY
Status: DISCONTINUED | OUTPATIENT
Start: 2020-12-27 | End: 2020-12-27

## 2020-12-27 RX ORDER — ONDANSETRON 2 MG/ML
4 INJECTION INTRAMUSCULAR; INTRAVENOUS EVERY 6 HOURS PRN
Status: DISCONTINUED | OUTPATIENT
Start: 2020-12-27 | End: 2020-12-31 | Stop reason: HOSPADM

## 2020-12-27 RX ORDER — PROCHLORPERAZINE MALEATE 5 MG
5 TABLET ORAL EVERY 6 HOURS PRN
Status: DISCONTINUED | OUTPATIENT
Start: 2020-12-27 | End: 2020-12-31 | Stop reason: HOSPADM

## 2020-12-27 RX ORDER — PROCHLORPERAZINE 25 MG
12.5 SUPPOSITORY, RECTAL RECTAL EVERY 12 HOURS PRN
Status: DISCONTINUED | OUTPATIENT
Start: 2020-12-27 | End: 2020-12-31 | Stop reason: HOSPADM

## 2020-12-27 RX ORDER — ONDANSETRON 4 MG/1
4 TABLET, ORALLY DISINTEGRATING ORAL EVERY 6 HOURS PRN
Status: DISCONTINUED | OUTPATIENT
Start: 2020-12-27 | End: 2020-12-31 | Stop reason: HOSPADM

## 2020-12-27 RX ORDER — POTASSIUM CHLORIDE 1500 MG/1
40 TABLET, EXTENDED RELEASE ORAL ONCE
Status: COMPLETED | OUTPATIENT
Start: 2020-12-27 | End: 2020-12-27

## 2020-12-27 RX ORDER — LISINOPRIL 10 MG/1
10 TABLET ORAL DAILY PRN
Status: DISCONTINUED | OUTPATIENT
Start: 2020-12-27 | End: 2020-12-28

## 2020-12-27 RX ORDER — POTASSIUM CHLORIDE 1500 MG/1
40 TABLET, EXTENDED RELEASE ORAL ONCE
Status: COMPLETED | OUTPATIENT
Start: 2020-12-27 | End: 2020-12-28

## 2020-12-27 RX ADMIN — ATORVASTATIN CALCIUM 20 MG: 10 TABLET, FILM COATED ORAL at 08:47

## 2020-12-27 RX ADMIN — PROCHLORPERAZINE EDISYLATE 5 MG: 5 INJECTION INTRAMUSCULAR; INTRAVENOUS at 18:28

## 2020-12-27 RX ADMIN — TORSEMIDE 10 MG: 10 TABLET ORAL at 10:40

## 2020-12-27 RX ADMIN — CEFTRIAXONE SODIUM 1 G: 1 INJECTION, POWDER, FOR SOLUTION INTRAMUSCULAR; INTRAVENOUS at 13:39

## 2020-12-27 RX ADMIN — MELATONIN 5 MG TABLET 10 MG: at 21:15

## 2020-12-27 RX ADMIN — OXYCODONE HYDROCHLORIDE AND ACETAMINOPHEN 1 TABLET: 5; 325 TABLET ORAL at 13:38

## 2020-12-27 RX ADMIN — OXYCODONE HYDROCHLORIDE AND ACETAMINOPHEN 1 TABLET: 5; 325 TABLET ORAL at 21:16

## 2020-12-27 RX ADMIN — ONDANSETRON 4 MG: 2 INJECTION INTRAMUSCULAR; INTRAVENOUS at 18:00

## 2020-12-27 RX ADMIN — POTASSIUM CHLORIDE 10 MEQ: 750 TABLET, EXTENDED RELEASE ORAL at 11:17

## 2020-12-27 RX ADMIN — MIRTAZAPINE 75 MG: 30 TABLET, FILM COATED ORAL at 21:15

## 2020-12-27 RX ADMIN — OXYCODONE HYDROCHLORIDE AND ACETAMINOPHEN 1 TABLET: 5; 325 TABLET ORAL at 07:10

## 2020-12-27 RX ADMIN — POTASSIUM CHLORIDE 20 MEQ: 1500 TABLET, EXTENDED RELEASE ORAL at 16:21

## 2020-12-27 RX ADMIN — TRAZODONE HYDROCHLORIDE 500 MG: 100 TABLET ORAL at 21:15

## 2020-12-27 RX ADMIN — CARBAMAZEPINE 200 MG: 200 TABLET ORAL at 21:16

## 2020-12-27 RX ADMIN — DULOXETINE HYDROCHLORIDE 120 MG: 60 CAPSULE, DELAYED RELEASE ORAL at 08:46

## 2020-12-27 RX ADMIN — TOLTERODINE TARTRATE 2 MG: 2 TABLET, FILM COATED ORAL at 21:16

## 2020-12-27 RX ADMIN — POTASSIUM CHLORIDE 40 MEQ: 1500 TABLET, EXTENDED RELEASE ORAL at 14:36

## 2020-12-27 RX ADMIN — FAMOTIDINE 10 MG: 10 TABLET ORAL at 08:47

## 2020-12-27 RX ADMIN — OXYCODONE HYDROCHLORIDE AND ACETAMINOPHEN 1 TABLET: 5; 325 TABLET ORAL at 00:10

## 2020-12-27 RX ADMIN — ARIPIPRAZOLE 5 MG: 5 TABLET ORAL at 08:46

## 2020-12-27 RX ADMIN — ENOXAPARIN SODIUM 40 MG: 40 INJECTION SUBCUTANEOUS at 08:47

## 2020-12-27 RX ADMIN — CARBAMAZEPINE 200 MG: 200 TABLET ORAL at 08:47

## 2020-12-27 RX ADMIN — TOLTERODINE TARTRATE 2 MG: 2 TABLET, FILM COATED ORAL at 08:46

## 2020-12-27 RX ADMIN — METHYLPHENIDATE HYDROCHLORIDE 20 MG: 10 TABLET ORAL at 08:46

## 2020-12-27 RX ADMIN — FAMOTIDINE 10 MG: 10 TABLET ORAL at 21:15

## 2020-12-27 RX ADMIN — ENOXAPARIN SODIUM 40 MG: 40 INJECTION SUBCUTANEOUS at 21:15

## 2020-12-27 ASSESSMENT — ACTIVITIES OF DAILY LIVING (ADL)
ADLS_ACUITY_SCORE: 23
ADLS_ACUITY_SCORE: 24
ADLS_ACUITY_SCORE: 22
ADLS_ACUITY_SCORE: 23

## 2020-12-27 NOTE — PLAN OF CARE
Cognitive Concerns/ Orientation : A&O x4.   BEHAVIOR & AGGRESSION TOOL COLOR: Green  ABNL VS/O2: VSS on RA  MOBILITY: Bedrest . order for brace. Brace arrived and staff assisted pt to stand at side of bed but pt could not tolerate any steps. Hx MS and fibromyalgia. Refused turning to side due to lower back pain, weight shifting encouraged.   PAIN MANAGMENT: pain 7/10 treated with percocet 1 tab x1. Effective per pt.  DIET: Regular (good appetite)  BOWEL/BLADDER: Urinary incontinence.  Purewick in place. Had 1x diarrhea this shift. Held PRNs this shift.   ABNL LAB/BG: WBC 11.8, RBC count 145, D-dimer 3.3  DRAIN/DEVICES: L PIV SL  TELEMETRY RHYTHM: N/A  SKIN: Scattered bruising, pale, 1 abd blister and 2 abd scabs from use of heating pad at home.  Generalized trace edema bilaterally.   TESTS/PROCEDURES: n/a  D/C DAY/GOALS/PLACE: 2-3 days once afebrile x 24hrs and negative urine culture x 48hrs per MD note.   OTHER IMPORTANT INFO: Abd round, firm, hx of constipation. Baseline bilateral neuropathy

## 2020-12-27 NOTE — PLAN OF CARE
OT: Orders received. Chart reviewed and discussed with care team.  OT not indicated due to per PT, pt Ax2 with mobility and ADLs and this time. Anticipate pt will discharge to TCU prior to being able to meaningfully engage in OT, will continue to work with PT.  Defer discharge recommendations to physical therapy.  Will complete orders.

## 2020-12-27 NOTE — PROGRESS NOTES
"S: I saw Mili Padilla at the [facility] for fitting and delivery of a TLSO spinal brace per Rx from Jania Estrada NP. Mili was diagnosed with a superior endplate vertebral fracture of L1 and multi-level degenerative changes of the lumbar spine.   O: Mili was approximately 5'5\" tall and weighed 290 lbs. Spinal orthosis is to provide support and compression to reduce loading of superior endplate fracture for improved safety and comfort.  A: I have fit a modified [place] 464 p/n [id number], lot no. [id number] brace that I added extension panels to for the required additional 6 or 8 inches of circumference to fit the patient. Panels were glued, stitched, and Velcroed into place to match form and function of original brace for best fit. The brace was snug to fit, providing adequate compression and requiring care to center the sternal bar in place while folding folding the Velcro front panels over each other. I provided written and verbal instruction on use and care and demonstrated donning technique to patient and RN present.  p: follow up is to be PRN.  "

## 2020-12-27 NOTE — PROGRESS NOTES
[facility]    Medicine Progress Note - Hospitalist Service       Date of Admission:  12/25/2020  Assessment & Plan       Mili Padilla is a 65 year old female admitted on 12/25/2020. She has a PMH most remarkable for MS, and bladder dysfunction who was admitted for sepsis secondary to UTI.    She presented with symptoms of SOB, cough, fever to 103 x 4 days and weakness with lightheadedness with standing. She fell. Vitals at admission 102.3, 90, 132/60, 94% on RA.   CT chest showed no definite pulmonary infiltrates, neg for PE     Sepsis suspect secondary to Pyelnephritis Patient with chronic urinary urgency and lack of bladder symptoms thought to be secondary to MS.   * COVID and influenza PCR negative.   * UA showed 47 WBC, 11 WBC    - Urine Cx growing enterobacter and GNR. Susceptibilities are pendng.   - Afebrile with normalization of WBC on Ceftriaxone.     Diarrhea with abdominal discomfort on 12/27/20. Abdominal exam benign. Patient was constipated and started bowel regime on 12/26/20 and now having frequent stools ~ 4 in the last 24 hours. Currently small in amount. She notes some stomach upset when she tries to eat and early satiety. No nausea and vomiting. Has not noted increased distention. Unsure if she is passing gas.   - I suspect she actually has some ileus.   - Get up and move as much as possible. This is a challenge but patient was walking with walker prior to admission. So at least get her up to chair and and walking as much as tolerates.   - Monitor diarrhea, but currently no worrisome features.   - normalize electrolytes    Hypokalemia   - Replacement ordered  - check magnesium and replace PRN.      Transverse fracture of L1 superior endplate. Secondary to fall after she became weak with infection.   MRI on 12/26/20:   IMPRESSION:   1.  Redemonstrated DISH associated L1 fracture with approximately 40% anterior height loss. There is disruption of the anterior longitudinal  ligament, distal ligamentous complex and likely focal disruption of the posterior longitudinal ligament. The ligamentum flavum and interspinous ligaments are intact. There is no facet joint widening.   2.  Multilevel degenerative changes of the lumbar spine. Mild spinal canal stenosis at T12-L1.   3.  Multilevel bilateral neural foraminal narrowing, greatest at L5-S1 where there is moderate stenosis on the left. In addition, a left-sided extraforaminal disc osteophyte complex at L5-S1 contacts the exiting left L5 nerve root, which may result in symptoms of impingement.    - appreciate neurosurgery consult   - recommend TLSO brace and outpatient follow up in 6 weeks with xray prior.     HTN  Suspect fluid overload - wts up 127 > 132 kg after IVF given at admission.    - BP up on 12/27/20   - start torsemide 10 mg + KCl 20 meq daily.   - daily weights  - start lisinopril 10 mg daily if SBP over 180 (ordered as PRN)     ? Cirrhosis with splenomegaly on CT at admission.   - Mild thrombocytopenia noted. Liver panel and INR normal.   - Discussed with patient this concern and future monitoring for signs of developing cirrhosis.   - She has an obese abdomen but no clear fluid wave.     Recent Labs   Lab 12/27/20  1009 12/25/20  1325   ALBUMIN 2.2*  --    BILITOTAL 0.6  --    ALT 29  --    AST 32  --    ALKPHOS 79  --    PROTEIN  --  100*   INR 1.07  --      Recent Labs   Lab 12/27/20  1009 12/25/20  1214   WBC 9.6 11.8*   HGB 11.8 11.6*   * 145*         Chronic Issues  1. Depression - continue PTA remeron, abilify  2. Fibromylagia - continue PTA tegretol  3. GERD - resumed PTA famotidine (Reported by patient. Not on admission med list)   4. HLD - continue PTA lipitor  5. MS  6. OVIDIO - hypoxia noted at night. Patient reports non-compliance with CPAP. Discussed observed hypoxia and importance of continuing CPAP.   7. Insomnia - continue PTA tradozone       Diet: Combination Diet Regular Diet Adult    DVT Prophylaxis:  Enoxaparin (Lovenox) SQ  Waggoner Catheter: not present  Code Status: Full Code           Disposition Plan   Expected discharge: 2 - 3 days, recommended to transitional care unit once Afebrile x 24 hours, urine culture back. BCx negative x 48 hours. Abdominal pain and diarrhea improved.   Entered: Isabelle Hansen MD 12/27/2020, 4:54 PM       The patient's care was discussed with the Bedside Nurse and Patient.    Isabelle Hansen MD  Hospitalist Service  [facility]  Contact information available via [facility] Paging/Directory    ______________________________________________________________________    Interval History   Events over last 24 hours as outlined above. Patient denies any SOB, CP, abdominal pain, N/V/D.   Abdominal discomfort and diarrhea as above.     Data reviewed today: I reviewed all medications, new labs and imaging results over the last 24 hours. I personally reviewed no images or EKG's today.    Physical Exam   Vital Signs: Temp: 97.9  F (36.6  C) Temp src: Oral BP: (!) 167/82 Pulse: 74   Resp: 18 SpO2: 95 % O2 Device: None (Room air) Oxygen Delivery: 1 LPM  Weight: 290 lbs 12.59 oz  Constitutional: NAD,   Neuropsyche:  alert and oriented, answers questions appropriately. Speech normal, face symmetric and moving all 4 extremities without gross focal neurological deficit.   Respiratory:  Breathing comfortably, good air exchange, no wheezes, no crackles.   Cardiovascular:  Regular rate and rhythm, no edema.  GI:  soft, NT/ND, BS normal, ostomy just emptied no stool or air currently in bag.   Skin/Integumen:  No acute rash or sign of bleeding.     Data   Recent Labs   Lab 12/27/20  1451 12/27/20  1009 12/25/20  1214   WBC  --  9.6 11.8*   HGB  --  11.8 11.6*   MCV  --  94 95   PLT  --  116* 145*   INR  --  1.07  --    NA  --  134 133   POTASSIUM 3.0* 2.9* 3.9   CHLORIDE  --  99 99   CO2  --  32 29   BUN  --  13 16   CR  --  0.55 0.68   ANIONGAP  --  3 5   HEAVENLY  --  9.0 8.8   GLC  --   166* 134*   ALBUMIN  --  2.2*  --    PROTTOTAL  --  6.5*  --    BILITOTAL  --  0.6  --    ALKPHOS  --  79  --    ALT  --  29  --    AST  --  32  --    TROPI  --   --  <0.015     Recent Results (from the past 24 hour(s))   XR Chest 2 Views    Narrative    CHEST TWO VIEWS 12/27/2020 9:38 AM     HISTORY: Hypoxia.    COMPARISON: November 4, 2011       Impression    IMPRESSION: Question trace pleural fluid bilaterally best seen on  lateral view. There are no acute infiltrates. The cardiac silhouette  is not enlarged. Pulmonary vasculature is unremarkable.    ELVIE WISDOM MD     Medications       ARIPiprazole  5 mg Oral Daily     atorvastatin  20 mg Oral Daily     carBAMazepine  200 mg Oral BID     cefTRIAXone  1 g Intravenous Q24H     docusate sodium  100 mg Oral BID     DULoxetine  120 mg Oral Daily     enoxaparin ANTICOAGULANT  40 mg Subcutaneous Q12H     famotidine  10 mg Oral BID     melatonin  10 mg Oral At Bedtime     methylphenidate  20 mg Oral Daily     mirtazapine  75 mg Oral At Bedtime     potassium chloride ER  20 mEq Oral Daily     sennosides  1-2 tablet Oral BID     sodium chloride (PF)  3 mL Intracatheter Q8H     tolterodine  2 mg Oral BID     torsemide  10 mg Oral Daily     traZODone  500 mg Oral At Bedtime

## 2020-12-27 NOTE — PLAN OF CARE
DATE & TIME: 12/27/20 AM shift  Cognitive Concerns/ Orientation : A&O x4. Calm & cooperative.   BEHAVIOR & AGGRESSION TOOL COLOR: Green  ABNL VS/O2: VSS on RA. JAEGER/SOB at times.  MOBILITY: Up to the chair x 1 but had a hard time getting up so lift was used. Brace used when out of bed. Hx MS and fibromyalgia. Encouraged getting up to the chair to improve bowel movement  PAIN MANAGMENT: PRN Percocet x 1 for lower back pain.    DIET: Regular, poor appetite. Abdominal pain today when eating, cramping.   BOWEL/BLADDER: Urinary incontinence and frequency, purewick in place. Started on PO torsemide. Two loose BM this shift, please hold PRNs for now. Hx of constipation.   ABNL LAB/BG: K 2.9; replacement started.   DRAIN/DEVICES: R PIV SL  TELEMETRY RHYTHM: N/A  SKIN: Scattered bruising, pale, 1 abd blister and 2 abd scabs from heating pad at home, generalized trace edema  TESTS/PROCEDURES: n/a  D/C DAY/GOALS/PLACE: Pending, once afebrile and antibiotic plan in place. Continues on IV Rocephin.   OTHER IMPORTANT INFO: Abd round/distended, firm, hx of constipation. Baseline bilateral neuropathy

## 2020-12-27 NOTE — PROGRESS NOTES
MD Notification    Notified Person: MD    Notified Person Name: Tyrone    Notification Date/Time: 1732 12/27/2020    Notification Interaction: page    Purpose of Notification: pt is feeling nauseous, can you order some nausea meds?     Orders Received: zofran and compazine ordered    Comments:

## 2020-12-27 NOTE — PROGRESS NOTES
12/27/20 0800   Quick Adds   Type of Visit Initial PT Evaluation   Living Environment   People in home alone   Current Living Arrangements apartment   Home Accessibility no concerns;wheelchair accessible   Transportation Anticipated health plan transportation   Living Environment Comments Handicap accessible apartment except bathroom as a tub/shower.    Self-Care   Usual Activity Tolerance moderate   Current Activity Tolerance fair   Equipment Currently Used at Home other (see comments);grab bar, toilet;grab bar, tub/shower;raised toilet seat;tub bench;lift device  (4WW; electric scooter; leg ; recliner lift chair)   Activity/Exercise/Self-Care Comment Patient reports she uses the 4WW to navigate around her apartment, electric scooter when leaving her apartment. She has a home health aide that comes 3x/week to assist with bathing.    Disability/Function   Hearing Difficulty or Deaf no   Wear Glasses or Blind yes   Vision Management glasses   Concentrating, Remembering or Making Decisions Difficulty no   Difficulty Communicating no   Difficulty Eating/Swallowing no   Walking or Climbing Stairs Difficulty yes   Walking or Climbing Stairs ambulation difficulty, requires equipment;transferring difficulty, requires equipment   Mobility Management 4WW for short distances, scooter for long distances   Dressing/Bathing Difficulty yes   Dressing/Bathing bathing difficulty, assistance 1 person;bathing difficulty, requires equipment   Dressing/Bathing Management home health aide, tub bench   Toileting issues no   Fall history within last six months yes   Number of times patient has fallen within last six months 1   Change in Functional Status Since Onset of Current Illness/Injury yes   General Information   Onset of Illness/Injury or Date of Surgery 12/25/20   Referring Physician Rhett Wild MD   Patient/Family Therapy Goals Statement (PT) to reduce pain, independent with transfers   Pertinent History of  Current Problem (include personal factors and/or comorbidities that impact the POC) Patient is 64 YO F admitted after a fall at home due to sepsis/UTI. She was found on MRI to have transverse L1 fracture. She has been evaluated by neurosurgery with TLSO recommended. PMH: MS with bladder dysfunction, fibromyalgia, depression, insomnia   Existing Precautions/Restrictions brace worn when out of bed;fall;oxygen therapy device and L/min;spinal   General Observations Patient in supine upon arrival, agreeable to PT.    Cognition   Orientation Status (Cognition) oriented x 4   Affect/Mental Status (Cognition) WFL   Follows Commands (Cognition) WFL   Pain Assessment   Patient Currently in Pain Yes, see Vital Sign flowsheet  (2/10 low back pain)   Integumentary/Edema   Integumentary/Edema Comments Discoloration of lower legs bilaterally   Posture    Posture Protracted shoulders;Forward head position   Range of Motion (ROM)   ROM Quick Adds ROM deficits secondary to weakness  (also limited by body habitus)   Strength   Manual Muscle Testing Quick Adds Deficits observed during functional mobility   Strength Comments Generalized weakness in B LE and trunk   Bed Mobility   Bed Mobility rolling left;rolling right;supine-sit   Rolling Left [place] (Bed Mobility) maximum assist (25% patient effort)   Rolling Right [place] (Bed Mobility) maximum assist (25% patient effort)   Supine-Sit [place] (Bed Mobility) moderate assist (50% patient effort);2 person assist   Bed Mobility Limitations decreased ability to use legs for bridging/pushing;impaired ability to control trunk for mobility   Assistive Device (Bed Mobility) bed rails;draw sheet   Transfers   Transfers bed-chair transfer;sit-stand transfer   Bed-Chair Transfer   Bed-Chair [place] (Transfers) minimum assist (75% patient effort);2 person assist   Assistive Device (Bed-Chair Transfers) walker, 4-wheeled   Sit-Stand Transfer   Sit-Stand [place]  (Transfers) moderate assist (50% patient effort);2 person assist   Assistive Device (Sit-Stand Transfers) walker, 4-wheeled   Sit/Stand Transfer Comments Increased time to reach upright standing   Gait/Stairs (Locomotion)   [place] Level (Gait) 2 person assist;minimum assist (75% patient effort)   Assistive Device (Gait) walker, 4-wheeled   Distance in Feet (Required for LE Total Joints) 2' from  bed to chair   Comment (Gait/Stairs) Patient took small shuffling steps to transfer over to recliner chair.    Balance   Balance other (describe)   Balance Comments Posterior lean in sitting requiring at least min assist at all times, incresaed assist with dynamic sitting activity; Min assist +2 for standing balance at 4WW   Sensory Examination   Sensory Perception other (describe)   Sensory Perception Comments chronic neuropathy symptoms of numbness in B feet to knees, unchanged from her baseline   Coordination   Coordination no deficits were identified   Clinical Impression   Criteria for Skilled Therapeutic Intervention yes, treatment indicated   PT Diagnosis (PT) impaired functional mobility   Influenced by the following impairments pain, weakness, decreased activity tolerance, impaired balance, lumbar spinal precautions   Functional limitations due to impairments increased difficulty with bed mobility, transfers and ambulation   Clinical Presentation Stable/Uncomplicated   Clinical Presentation Rationale controled pain, stable vitals, current presentation   Clinical Decision Making (Complexity) low complexity   Therapy Frequency (PT) 5x/week   Predicted Duration of Therapy Intervention (days/wks) 1 week   Planned Therapy Interventions (PT) balance training;bed mobility training;home exercise program;orthotic fitting/training;patient/family education;postural re-education;strengthening;transfer training   Risk & Benefits of therapy have been explained evaluation/treatment results reviewed;care plan/treatment goals  reviewed;risks/benefits reviewed;current/potential barriers reviewed;participants voiced agreement with care plan;participants included;patient   Clinical Impression Comments Patient significantly below her reported baseline level of independence.    PT Discharge Planning    PT Discharge Recommendation (DC Rec) Transitional Care Facility   PT Rationale for DC Rec Patient significantly below her reported baseline level of independence and lives alone. She currently requires +2 assist for mobility and is unable to self-don her TLSO.    PT Brief overview of current status  Max assist for rolling, mod assist +2 for bed mobility, mod assist + 2 for sit <> stand transfers   Total Evaluation Time   Total Evaluation Time (Minutes) 8

## 2020-12-27 NOTE — PLAN OF CARE
DATE & TIME: 12/27/20 7548-8869  Cognitive Concerns/ Orientation : A&O x4.   BEHAVIOR & AGGRESSION TOOL COLOR: Green  ABNL VS/O2: VSS except HTN and desats on RA to 88%; 93-96% on 1 L O2 via nasal cannula  MOBILITY: Bedrest, spinal brace in the room. Refused turning to side due to lower back pain, weight shifting encouraged.   PAIN MANAGMENT: Lower back pain managed with percocet 1 tab x2. Effective per pt.  DIET: Regular   BOWEL/BLADDER: Urinary incontinence.  Purewick in place. No BM this shift.  ABNL LAB/BG: None new.  DRAIN/DEVICES: L PIV SL  TELEMETRY RHYTHM: N/A  SKIN: Scattered bruising, pale, 1 abd blister and 2 abd scabs from use of heating pad at home.  Generalized trace edema bilaterally.   TESTS/PROCEDURES: NA  D/C DAY/GOALS/PLACE: 2-3 days once afebrile x 24hrs and negative urine culture x 48hrs per MD note.   OTHER IMPORTANT INFO: Abd round, firm, hx of constipation. Baseline bilateral neuropathy.

## 2020-12-27 NOTE — PROGRESS NOTES
[facility]    Neurosurgery  Daily Note    Assessment & Plan    64 yo female with history of MS, neuropathy, and chronic low back pain who presented to ED s/p fall. Imaging showed acute L1 superior endplate fracture, extending into the T12-L1 disc space, in the setting of DISH. This morning, patient reports continued back pain. Denies radicular leg pain. Baseline neuropathy in LE. She is able to move all extremities and feels her LE strength is at baseline. MRI reviewed with Dr. Correa.     Plan:  - No surgical intervention planned  - Recommend TLSO brace and outpatient follow-up in 6 weeks with xray prior  - NSG will sign off. Please contact for any further questions or concerns.     Discussed with Dr. Madeline Estrada, MARYLU  [facility] Neurosurgery  [facility]  [address] [place] [id number]  Tel [phone]  Pager [phone]      Interval History   Stable     Physical Exam   Temp: 98.6  F (37  C) Temp src: Oral BP: (!) 161/58 Pulse: 75   Resp: 18 SpO2: 93 % O2 Device: None (Room air) Oxygen Delivery: 1 LPM  Vitals:    12/25/20 1207 12/26/20 0619   Weight: 280 lb (127 kg) 290 lb 12.6 oz (131.9 kg)     Vital Signs with Ranges  Temp:  [98  F (36.7  C)-99.3  F (37.4  C)] 98.6  F (37  C)  Pulse:  [75-88] 75  Resp:  [16-18] 18  BP: (119-161)/(48-77) 161/58  SpO2:  [88 %-97 %] 93 %  I/O last 3 completed shifts:  In: 300 [P.O.:300]  Out: 950 [Urine:950]    Mental status:  Alert and Oriented x 3, speech is fluent.  Cranial nerves:  II-XII intact.   PERRLA  Motor:  Moves all extremities. Strength is 5/5 throughout the upper and lower extremities  Sensation:  Intact  Reflexes:  Negative Babinski.  Negative Clonus.        Medications        ARIPiprazole  5 mg Oral Daily     atorvastatin  20 mg Oral Daily     carBAMazepine  200 mg Oral BID     cefTRIAXone  1 g Intravenous Q24H     docusate sodium  100 mg Oral BID     DULoxetine  120 mg Oral Daily      enoxaparin ANTICOAGULANT  40 mg Subcutaneous Q12H     famotidine  10 mg Oral BID     melatonin  10 mg Oral At Bedtime     methylphenidate  20 mg Oral Daily     mirtazapine  75 mg Oral At Bedtime     potassium chloride ER  10 mEq Oral Daily     sennosides  1-2 tablet Oral BID     sodium chloride (PF)  3 mL Intracatheter Q8H     tolterodine  2 mg Oral BID     torsemide  10 mg Oral Daily     traZODone  500 mg Oral At Bedtime       Jania Estrada [facility] Neurosurgery   [address] [address]  Tel [phone]  Pager [phone]

## 2020-12-28 ENCOUNTER — TELEPHONE (OUTPATIENT)
Dept: NEUROSURGERY | Facility: CLINIC | Age: 65
End: 2020-12-28

## 2020-12-28 LAB
ANION GAP SERPL CALCULATED.3IONS-SCNC: 5 MMOL/L (ref 3–14)
BUN SERPL-MCNC: 13 MG/DL (ref 7–30)
C DIFF TOX B STL QL: NEGATIVE
CALCIUM SERPL-MCNC: 9 MG/DL (ref 8.5–10.1)
CHLORIDE SERPL-SCNC: 99 MMOL/L (ref 94–109)
CO2 SERPL-SCNC: 29 MMOL/L (ref 20–32)
CREAT SERPL-MCNC: 0.55 MG/DL (ref 0.52–1.04)
GFR SERPL CREATININE-BSD FRML MDRD: >90 ML/MIN/{1.73_M2}
GLUCOSE SERPL-MCNC: 115 MG/DL (ref 70–99)
MAGNESIUM SERPL-MCNC: 2.1 MG/DL (ref 1.6–2.3)
PLATELET # BLD AUTO: 166 10E9/L (ref 150–450)
POTASSIUM SERPL-SCNC: 3.6 MMOL/L (ref 3.4–5.3)
SODIUM SERPL-SCNC: 133 MMOL/L (ref 133–144)
SPECIMEN SOURCE: NORMAL

## 2020-12-28 PROCEDURE — 250N000013 HC RX MED GY IP 250 OP 250 PS 637: Performed by: INTERNAL MEDICINE

## 2020-12-28 PROCEDURE — 250N000011 HC RX IP 250 OP 636: Performed by: STUDENT IN AN ORGANIZED HEALTH CARE EDUCATION/TRAINING PROGRAM

## 2020-12-28 PROCEDURE — 85049 AUTOMATED PLATELET COUNT: CPT | Performed by: INTERNAL MEDICINE

## 2020-12-28 PROCEDURE — 36415 COLL VENOUS BLD VENIPUNCTURE: CPT | Performed by: INTERNAL MEDICINE

## 2020-12-28 PROCEDURE — 83735 ASSAY OF MAGNESIUM: CPT | Performed by: INTERNAL MEDICINE

## 2020-12-28 PROCEDURE — 80048 BASIC METABOLIC PNL TOTAL CA: CPT | Performed by: INTERNAL MEDICINE

## 2020-12-28 PROCEDURE — 99232 SBSQ HOSP IP/OBS MODERATE 35: CPT | Performed by: INTERNAL MEDICINE

## 2020-12-28 PROCEDURE — 250N000013 HC RX MED GY IP 250 OP 250 PS 637: Performed by: STUDENT IN AN ORGANIZED HEALTH CARE EDUCATION/TRAINING PROGRAM

## 2020-12-28 PROCEDURE — 250N000013 HC RX MED GY IP 250 OP 250 PS 637: Performed by: HOSPITALIST

## 2020-12-28 PROCEDURE — 250N000011 HC RX IP 250 OP 636: Performed by: INTERNAL MEDICINE

## 2020-12-28 PROCEDURE — 120N000001 HC R&B MED SURG/OB

## 2020-12-28 PROCEDURE — 87493 C DIFF AMPLIFIED PROBE: CPT | Performed by: INTERNAL MEDICINE

## 2020-12-28 RX ORDER — SULFAMETHOXAZOLE/TRIMETHOPRIM 800-160 MG
1 TABLET ORAL 2 TIMES DAILY
Status: DISCONTINUED | OUTPATIENT
Start: 2020-12-28 | End: 2020-12-31 | Stop reason: HOSPADM

## 2020-12-28 RX ORDER — LISINOPRIL 10 MG/1
10 TABLET ORAL DAILY
Status: DISCONTINUED | OUTPATIENT
Start: 2020-12-28 | End: 2020-12-28

## 2020-12-28 RX ORDER — LISINOPRIL 10 MG/1
10 TABLET ORAL DAILY PRN
Status: DISCONTINUED | OUTPATIENT
Start: 2020-12-28 | End: 2020-12-31 | Stop reason: HOSPADM

## 2020-12-28 RX ADMIN — SULFAMETHOXAZOLE AND TRIMETHOPRIM 1 TABLET: 800; 160 TABLET ORAL at 13:21

## 2020-12-28 RX ADMIN — TORSEMIDE 10 MG: 10 TABLET ORAL at 08:12

## 2020-12-28 RX ADMIN — MELATONIN 5 MG TABLET 10 MG: at 21:28

## 2020-12-28 RX ADMIN — ATORVASTATIN CALCIUM 20 MG: 10 TABLET, FILM COATED ORAL at 08:11

## 2020-12-28 RX ADMIN — OXYCODONE HYDROCHLORIDE AND ACETAMINOPHEN 1 TABLET: 5; 325 TABLET ORAL at 13:21

## 2020-12-28 RX ADMIN — OXYCODONE HYDROCHLORIDE AND ACETAMINOPHEN 1 TABLET: 5; 325 TABLET ORAL at 21:27

## 2020-12-28 RX ADMIN — SULFAMETHOXAZOLE AND TRIMETHOPRIM 1 TABLET: 800; 160 TABLET ORAL at 20:02

## 2020-12-28 RX ADMIN — POTASSIUM CHLORIDE 20 MEQ: 1500 TABLET, EXTENDED RELEASE ORAL at 02:15

## 2020-12-28 RX ADMIN — ARIPIPRAZOLE 5 MG: 5 TABLET ORAL at 08:12

## 2020-12-28 RX ADMIN — FAMOTIDINE 10 MG: 10 TABLET ORAL at 08:12

## 2020-12-28 RX ADMIN — MIRTAZAPINE 75 MG: 30 TABLET, FILM COATED ORAL at 21:28

## 2020-12-28 RX ADMIN — ONDANSETRON 4 MG: 2 INJECTION INTRAMUSCULAR; INTRAVENOUS at 02:57

## 2020-12-28 RX ADMIN — ENOXAPARIN SODIUM 40 MG: 40 INJECTION SUBCUTANEOUS at 20:02

## 2020-12-28 RX ADMIN — PROCHLORPERAZINE EDISYLATE 5 MG: 5 INJECTION INTRAMUSCULAR; INTRAVENOUS at 00:28

## 2020-12-28 RX ADMIN — POTASSIUM CHLORIDE 40 MEQ: 1500 TABLET, EXTENDED RELEASE ORAL at 00:28

## 2020-12-28 RX ADMIN — METHYLPHENIDATE HYDROCHLORIDE 20 MG: 10 TABLET ORAL at 08:11

## 2020-12-28 RX ADMIN — CARBAMAZEPINE 200 MG: 200 TABLET ORAL at 08:12

## 2020-12-28 RX ADMIN — OXYCODONE HYDROCHLORIDE AND ACETAMINOPHEN 1 TABLET: 5; 325 TABLET ORAL at 04:55

## 2020-12-28 RX ADMIN — TOLTERODINE TARTRATE 2 MG: 2 TABLET, FILM COATED ORAL at 08:12

## 2020-12-28 RX ADMIN — POTASSIUM CHLORIDE 20 MEQ: 1500 TABLET, EXTENDED RELEASE ORAL at 08:12

## 2020-12-28 RX ADMIN — ONDANSETRON 4 MG: 4 TABLET, ORALLY DISINTEGRATING ORAL at 08:19

## 2020-12-28 RX ADMIN — ENOXAPARIN SODIUM 40 MG: 40 INJECTION SUBCUTANEOUS at 08:12

## 2020-12-28 RX ADMIN — TRAZODONE HYDROCHLORIDE 500 MG: 100 TABLET ORAL at 21:28

## 2020-12-28 RX ADMIN — TOLTERODINE TARTRATE 2 MG: 2 TABLET, FILM COATED ORAL at 20:02

## 2020-12-28 RX ADMIN — DULOXETINE HYDROCHLORIDE 120 MG: 60 CAPSULE, DELAYED RELEASE ORAL at 08:11

## 2020-12-28 RX ADMIN — FAMOTIDINE 10 MG: 10 TABLET ORAL at 20:02

## 2020-12-28 RX ADMIN — CARBAMAZEPINE 200 MG: 200 TABLET ORAL at 20:02

## 2020-12-28 ASSESSMENT — ACTIVITIES OF DAILY LIVING (ADL)
ADLS_ACUITY_SCORE: 21
ADLS_ACUITY_SCORE: 24
ADLS_ACUITY_SCORE: 22
ADLS_ACUITY_SCORE: 21
ADLS_ACUITY_SCORE: 22
ADLS_ACUITY_SCORE: 21

## 2020-12-28 NOTE — DISCHARGE INSTRUCTIONS
- Recommend TLSO brace and outpatient follow-up in 6 weeks with xray prior. Please call to arrange a follow-up appointment.     [facility] Neurosurgery  [address] [address]  Tel [phone]

## 2020-12-28 NOTE — PLAN OF CARE
PT: Patient in supine upon arrival of therapist. Pt reports she just returned to supine after sitting up in the chair for an hour requesting to rest.

## 2020-12-28 NOTE — PLAN OF CARE
DATE & TIME: 12/27/20 3707-8324  Cognitive Concerns/ Orientation : A&O x4. Calm & cooperative.   BEHAVIOR & AGGRESSION TOOL COLOR: Green  ABNL VS/O2: VSS on RA ex HTN. JAEGER/SOB at times.  MOBILITY: Goal was to get OOB for dinner, but pt became nauseous for a while around dinner time. Didn't feel like moving to the chair. Brace used when out of bed. Hx MS and fibromyalgia.   PAIN MANAGMENT: PRN Percocet x 1 for lower back pain.    DIET: Regular, poor appetite.   BOWEL/BLADDER: Urinary incontinence and frequency, purewick in place. Started on PO torsemide. Three loose BM this shift.  Hx of constipation.   ABNL LAB/BG: K 3.0; replaced. Recheck at 3.0 again. Started replacement.   DRAIN/DEVICES: R PIV SL  TELEMETRY RHYTHM: N/A  SKIN: Scattered bruising, pale, 1 abd blister and 2 abd scabs from heating pad at home, generalized trace edema  TESTS/PROCEDURES: n/a  D/C DAY/GOALS/PLACE: Pending, once afebrile and antibiotic plan in place. Continues on IV Rocephin.   OTHER IMPORTANT INFO: Abd round/distended, firm, hx of constipation. Baseline bilateral neuropathy. Pt nauseous around dinnertime, given PRN IV zofran- ineffective. Given IV compazine- effective.

## 2020-12-28 NOTE — PROGRESS NOTES
[facility]    Medicine Progress Note - Hospitalist Service       Date of Admission:  12/25/2020  Assessment & Plan       Mili Padilla is a 65 year old female admitted on 12/25/2020. She has a PMH most remarkable for MS, and bladder dysfunction who was admitted for sepsis secondary to UTI.    She presented with symptoms of SOB, cough, fever to 103 x 4 days and weakness with lightheadedness with standing. She fell. Vitals at admission 102.3, 90, 132/60, 94% on RA.   CT chest showed no definite pulmonary infiltrates, neg for PE.      Sepsis suspect secondary to Pyelnephritis Patient with chronic urinary urgency and lack of bladder symptoms thought to be secondary to MS.   * COVID and influenza PCR negative.   * UA showed 47 WBC, 11 WBC    - Urine Cx growing 2 species of enterobacter on sensitive to ceftriaxone, one not.    - Afebrile with normalization of WBC on Ceftriaxone.   - change ceftriaxone to bactrim on 12/28/20.     Diarrhea with nausea and abdominal discomfort on 12/27/20. Abdominal exam benign. Patient was constipated and started bowel regime on 12/26/20 and then started having frequent stools ~ 5+ stools 24 hours. She notes some stomach upset, nausea, when she tries to eat and early satiety. No vomiting. Has not noted increased distention. Unsure if she is passing gas. Distended abdomen with high pitch bowel sounds on exam. Non-tender.     - I suspect she actually has some ileus.   - Get up and move as much as possible. This is a challenge but patient was walking with walker prior to admission. So at least get her up to chair and and walking as much as tolerates.   - zofran and compazine PRN for nausea  - Monitor diarrhea, but currently no worrisome features.   - normalize electrolytes    Hypokalemia (2.8 on 12/27/20, mg 2)   - Replacement protocol ordered     Transverse fracture of L1 superior endplate. Secondary to fall after she became weak with infection.   MRI on 12/26/20:    IMPRESSION:   1.  Redemonstrated DISH associated L1 fracture with approximately 40% anterior height loss. There is disruption of the anterior longitudinal ligament, distal ligamentous complex and likely focal disruption of the posterior longitudinal ligament. The ligamentum flavum and interspinous ligaments are intact. There is no facet joint widening.   2.  Multilevel degenerative changes of the lumbar spine. Mild spinal canal stenosis at T12-L1.   3.  Multilevel bilateral neural foraminal narrowing, greatest at L5-S1 where there is moderate stenosis on the left. In addition, a left-sided extraforaminal disc osteophyte complex at L5-S1 contacts the exiting left L5 nerve root, which may result in symptoms of impingement.    - appreciate neurosurgery consult   - recommend TLSO brace and outpatient follow up in 6 weeks with xray prior.     HTN  Suspect fluid overload - wts up 127 > 132 kg after IVF given at admission.    - BP up on 12/27/20   - start torsemide 10 mg + KCl 20 meq daily on 12/27/20   - daily weights  - start lisinopril 10 mg daily if SBP over 180 (ordered as PRN)     ? Cirrhosis with splenomegaly on CT at admission.   - Mild thrombocytopenia noted. Liver panel and INR normal.   - Discussed with patient this concern and future monitoring for signs of developing cirrhosis.   - She has an obese abdomen but no clear fluid wave.     Recent Labs   Lab 12/27/20  1009 12/25/20  1325   ALBUMIN 2.2*  --    BILITOTAL 0.6  --    ALT 29  --    AST 32  --    ALKPHOS 79  --    PROTEIN  --  100*   INR 1.07  --      Recent Labs   Lab 12/28/20  0713 12/27/20  1009 12/25/20  1214   WBC  --  9.6 11.8*   HGB  --  11.8 11.6*    116* 145*         Chronic Issues  1. Depression - continue PTA remeron, abilify  2. Fibromylagia - continue PTA tegretol  3. GERD - resumed PTA famotidine (Reported by patient. Not on admission med list)   4. HLD - continue PTA lipitor  5. MS  6. OVIDIO - hypoxia noted at night. Patient reports  non-compliance with CPAP. Discussed observed hypoxia and importance of continuing CPAP.   7. Insomnia - continue PTA tradozone       Diet: Combination Diet Regular Diet Adult    DVT Prophylaxis: Enoxaparin (Lovenox) SQ  Waggoner Catheter: not present  Code Status: Full Code           Disposition Plan   Expected discharge: Tomorrow, recommended to transitional care unit once we see abdominal pain and diarrhea improved.   Entered: Isabelle Hansen MD 12/28/2020, 9:56 AM       The patient's care was discussed with the Bedside Nurse and Patient.    Isabelle Hansen MD  Hospitalist Service  [facility]  Contact information available via [facility] Paging/Directory    ______________________________________________________________________    Interval History   Events over last 24 hours as outlined above. Patient denies any SOB, CP, abdominal pain, N/V/D.   Patient has noted improvement in abdominal pain, taking compazine and zofran for nausea with improvement.     Data reviewed today: I reviewed all medications, new labs and imaging results over the last 24 hours. I personally reviewed no images or EKG's today.    Physical Exam   Vital Signs: Temp: 97.7  F (36.5  C) Temp src: Oral BP: (!) 152/78 Pulse: 77   Resp: 18 SpO2: 91 % O2 Device: None (Room air)    Weight: 290 lbs 12.59 oz  Constitutional: NAD,   Neuropsyche:  alert and oriented, answers questions appropriately. Speech normal, face symmetric and moving all 4 extremities without gross focal neurological deficit.   Respiratory:  Breathing comfortably, good air exchange, no wheezes, no crackles.   Cardiovascular:  Regular rate and rhythm, no edema.  GI:  soft, moderately distended with high pitched bowel sounds, NT.   Skin/Integumen:  No acute rash or sign of bleeding.     Data   Recent Labs   Lab 12/28/20  0713 12/27/20  2036 12/27/20  1451 12/27/20  1009 12/25/20  1214   WBC  --   --   --  9.6 11.8*   HGB  --   --   --  11.8 11.6*   MCV  --   --    --  94 95     --   --  116* 145*   INR  --   --   --  1.07  --      --   --  134 133   POTASSIUM 3.6 3.0* 3.0* 2.9* 3.9   CHLORIDE 99  --   --  99 99   CO2 29  --   --  32 29   BUN 13  --   --  13 16   CR 0.55  --   --  0.55 0.68   ANIONGAP 5  --   --  3 5   HEAVENLY 9.0  --   --  9.0 8.8   *  --   --  166* 134*   ALBUMIN  --   --   --  2.2*  --    PROTTOTAL  --   --   --  6.5*  --    BILITOTAL  --   --   --  0.6  --    ALKPHOS  --   --   --  79  --    ALT  --   --   --  29  --    AST  --   --   --  32  --    TROPI  --   --   --   --  <0.015     No results found for this or any previous visit (from the past 24 hour(s)).  Medications       ARIPiprazole  5 mg Oral Daily     atorvastatin  20 mg Oral Daily     carBAMazepine  200 mg Oral BID     docusate sodium  100 mg Oral BID     DULoxetine  120 mg Oral Daily     enoxaparin ANTICOAGULANT  40 mg Subcutaneous Q12H     famotidine  10 mg Oral BID     melatonin  10 mg Oral At Bedtime     methylphenidate  20 mg Oral Daily     mirtazapine  75 mg Oral At Bedtime     potassium chloride ER  20 mEq Oral Daily     sennosides  1-2 tablet Oral BID     sodium chloride (PF)  3 mL Intracatheter Q8H     sulfamethoxazole-trimethoprim  1 tablet Oral BID     tolterodine  2 mg Oral BID     torsemide  10 mg Oral Daily     traZODone  500 mg Oral At Bedtime

## 2020-12-28 NOTE — PLAN OF CARE
DATE & TIME: 12/27-28/20 [id number]  Cognitive Concerns/ Orientation : A&O x4. Calm & cooperative.   BEHAVIOR & AGGRESSION TOOL COLOR: Green  ABNL VS/O2: VSS on RA ex HTN. JAEGER/SOB at times.  MOBILITY: Goal was to get OOB but pt didn't feel like getting up overnight. Brace to be used when out of bed. Hx MS and fibromyalgia.   PAIN MANAGMENT: PRN Percocet x 1 for lower back pain.    DIET: Regular, nausea with food/meds, compazine and zofran given, pt reports compazine works better.  BOWEL/BLADDER: Urinary incontinence and frequency, purewick in place. Three loose BM this shift.  Hx of constipation.   ABNL LAB/BG: K 3.0; replaced. Recheck pending  DRAIN/DEVICES: R PIV SL  TELEMETRY RHYTHM: N/A  SKIN: Scattered bruising, pale, 1 abd blister and 2 abd scabs from heating pad at home, generalized trace edema, blanchable redness on coccyx  TESTS/PROCEDURES: n/a  D/C DAY/GOALS/PLACE: Pending, once afebrile and antibiotic plan in place. Continues on IV Rocephin.   OTHER IMPORTANT INFO: Abd round/distended, firm, hx of constipation. Baseline bilateral neuropathy.

## 2020-12-29 ENCOUNTER — APPOINTMENT (OUTPATIENT)
Dept: PHYSICAL THERAPY | Facility: CLINIC | Age: 65
DRG: 872 | End: 2020-12-29
Payer: COMMERCIAL

## 2020-12-29 DIAGNOSIS — S32.019A L1 VERTEBRAL FRACTURE (H): Primary | ICD-10-CM

## 2020-12-29 LAB
ANION GAP SERPL CALCULATED.3IONS-SCNC: 3 MMOL/L (ref 3–14)
BUN SERPL-MCNC: 13 MG/DL (ref 7–30)
CALCIUM SERPL-MCNC: 9 MG/DL (ref 8.5–10.1)
CHLORIDE SERPL-SCNC: 97 MMOL/L (ref 94–109)
CO2 SERPL-SCNC: 30 MMOL/L (ref 20–32)
CREAT SERPL-MCNC: 0.62 MG/DL (ref 0.52–1.04)
GFR SERPL CREATININE-BSD FRML MDRD: >90 ML/MIN/{1.73_M2}
GLUCOSE SERPL-MCNC: 166 MG/DL (ref 70–99)
MAGNESIUM SERPL-MCNC: 2.3 MG/DL (ref 1.6–2.3)
POTASSIUM SERPL-SCNC: 3.3 MMOL/L (ref 3.4–5.3)
POTASSIUM SERPL-SCNC: 3.5 MMOL/L (ref 3.4–5.3)
SODIUM SERPL-SCNC: 130 MMOL/L (ref 133–144)
TSH SERPL DL<=0.005 MIU/L-ACNC: 2.07 MU/L (ref 0.4–4)

## 2020-12-29 PROCEDURE — 250N000013 HC RX MED GY IP 250 OP 250 PS 637: Performed by: HOSPITALIST

## 2020-12-29 PROCEDURE — 250N000011 HC RX IP 250 OP 636: Performed by: INTERNAL MEDICINE

## 2020-12-29 PROCEDURE — 80048 BASIC METABOLIC PNL TOTAL CA: CPT | Performed by: INTERNAL MEDICINE

## 2020-12-29 PROCEDURE — 84132 ASSAY OF SERUM POTASSIUM: CPT | Performed by: INTERNAL MEDICINE

## 2020-12-29 PROCEDURE — 250N000011 HC RX IP 250 OP 636: Performed by: STUDENT IN AN ORGANIZED HEALTH CARE EDUCATION/TRAINING PROGRAM

## 2020-12-29 PROCEDURE — 250N000013 HC RX MED GY IP 250 OP 250 PS 637: Performed by: STUDENT IN AN ORGANIZED HEALTH CARE EDUCATION/TRAINING PROGRAM

## 2020-12-29 PROCEDURE — 83735 ASSAY OF MAGNESIUM: CPT | Performed by: INTERNAL MEDICINE

## 2020-12-29 PROCEDURE — 99232 SBSQ HOSP IP/OBS MODERATE 35: CPT | Performed by: INTERNAL MEDICINE

## 2020-12-29 PROCEDURE — 250N000013 HC RX MED GY IP 250 OP 250 PS 637: Performed by: INTERNAL MEDICINE

## 2020-12-29 PROCEDURE — 97530 THERAPEUTIC ACTIVITIES: CPT | Mod: GP

## 2020-12-29 PROCEDURE — 84443 ASSAY THYROID STIM HORMONE: CPT | Performed by: INTERNAL MEDICINE

## 2020-12-29 PROCEDURE — 120N000001 HC R&B MED SURG/OB

## 2020-12-29 PROCEDURE — 36415 COLL VENOUS BLD VENIPUNCTURE: CPT | Performed by: INTERNAL MEDICINE

## 2020-12-29 RX ORDER — LOSARTAN POTASSIUM 25 MG/1
25 TABLET ORAL ONCE
Status: COMPLETED | OUTPATIENT
Start: 2020-12-29 | End: 2020-12-29

## 2020-12-29 RX ORDER — LOSARTAN POTASSIUM 50 MG/1
50 TABLET ORAL DAILY
Status: DISCONTINUED | OUTPATIENT
Start: 2020-12-30 | End: 2020-12-31 | Stop reason: HOSPADM

## 2020-12-29 RX ORDER — LACTOBACILLUS RHAMNOSUS GG 10B CELL
1 CAPSULE ORAL 2 TIMES DAILY
Status: DISCONTINUED | OUTPATIENT
Start: 2020-12-29 | End: 2020-12-31 | Stop reason: HOSPADM

## 2020-12-29 RX ORDER — POTASSIUM CHLORIDE 1500 MG/1
40 TABLET, EXTENDED RELEASE ORAL ONCE
Status: COMPLETED | OUTPATIENT
Start: 2020-12-29 | End: 2020-12-29

## 2020-12-29 RX ORDER — LOSARTAN POTASSIUM 25 MG/1
25 TABLET ORAL DAILY
Status: DISCONTINUED | OUTPATIENT
Start: 2020-12-29 | End: 2020-12-29

## 2020-12-29 RX ADMIN — TOLTERODINE TARTRATE 2 MG: 2 TABLET, FILM COATED ORAL at 08:33

## 2020-12-29 RX ADMIN — METHYLPHENIDATE HYDROCHLORIDE 20 MG: 10 TABLET ORAL at 08:33

## 2020-12-29 RX ADMIN — SULFAMETHOXAZOLE AND TRIMETHOPRIM 1 TABLET: 800; 160 TABLET ORAL at 20:13

## 2020-12-29 RX ADMIN — ENOXAPARIN SODIUM 40 MG: 40 INJECTION SUBCUTANEOUS at 09:36

## 2020-12-29 RX ADMIN — OXYCODONE HYDROCHLORIDE AND ACETAMINOPHEN 1 TABLET: 5; 325 TABLET ORAL at 03:39

## 2020-12-29 RX ADMIN — SULFAMETHOXAZOLE AND TRIMETHOPRIM 1 TABLET: 800; 160 TABLET ORAL at 08:33

## 2020-12-29 RX ADMIN — DULOXETINE HYDROCHLORIDE 120 MG: 60 CAPSULE, DELAYED RELEASE ORAL at 08:33

## 2020-12-29 RX ADMIN — TRAZODONE HYDROCHLORIDE 500 MG: 100 TABLET ORAL at 22:28

## 2020-12-29 RX ADMIN — TORSEMIDE 10 MG: 10 TABLET ORAL at 08:33

## 2020-12-29 RX ADMIN — MELATONIN 5 MG TABLET 10 MG: at 22:30

## 2020-12-29 RX ADMIN — LOSARTAN POTASSIUM 25 MG: 25 TABLET, FILM COATED ORAL at 13:01

## 2020-12-29 RX ADMIN — FAMOTIDINE 10 MG: 10 TABLET ORAL at 08:33

## 2020-12-29 RX ADMIN — Medication 1 CAPSULE: at 13:01

## 2020-12-29 RX ADMIN — POTASSIUM CHLORIDE 40 MEQ: 1500 TABLET, EXTENDED RELEASE ORAL at 10:53

## 2020-12-29 RX ADMIN — CARBAMAZEPINE 200 MG: 200 TABLET ORAL at 20:13

## 2020-12-29 RX ADMIN — OXYCODONE HYDROCHLORIDE AND ACETAMINOPHEN 1 TABLET: 5; 325 TABLET ORAL at 14:24

## 2020-12-29 RX ADMIN — OXYCODONE HYDROCHLORIDE AND ACETAMINOPHEN 1 TABLET: 5; 325 TABLET ORAL at 20:28

## 2020-12-29 RX ADMIN — ARIPIPRAZOLE 5 MG: 5 TABLET ORAL at 08:34

## 2020-12-29 RX ADMIN — LOSARTAN POTASSIUM 25 MG: 25 TABLET, FILM COATED ORAL at 09:40

## 2020-12-29 RX ADMIN — TOLTERODINE TARTRATE 2 MG: 2 TABLET, FILM COATED ORAL at 20:14

## 2020-12-29 RX ADMIN — ENOXAPARIN SODIUM 40 MG: 40 INJECTION SUBCUTANEOUS at 20:14

## 2020-12-29 RX ADMIN — CARBAMAZEPINE 200 MG: 200 TABLET ORAL at 08:33

## 2020-12-29 RX ADMIN — ATORVASTATIN CALCIUM 20 MG: 10 TABLET, FILM COATED ORAL at 08:33

## 2020-12-29 RX ADMIN — OXYCODONE HYDROCHLORIDE AND ACETAMINOPHEN 1 TABLET: 5; 325 TABLET ORAL at 08:38

## 2020-12-29 RX ADMIN — MIRTAZAPINE 75 MG: 30 TABLET, FILM COATED ORAL at 22:29

## 2020-12-29 RX ADMIN — FAMOTIDINE 10 MG: 10 TABLET ORAL at 20:13

## 2020-12-29 RX ADMIN — POTASSIUM CHLORIDE 20 MEQ: 1500 TABLET, EXTENDED RELEASE ORAL at 08:33

## 2020-12-29 RX ADMIN — ONDANSETRON 4 MG: 4 TABLET, ORALLY DISINTEGRATING ORAL at 10:53

## 2020-12-29 RX ADMIN — Medication 1 CAPSULE: at 20:14

## 2020-12-29 ASSESSMENT — ACTIVITIES OF DAILY LIVING (ADL)
ADLS_ACUITY_SCORE: 20
ADLS_ACUITY_SCORE: 21
ADLS_ACUITY_SCORE: 20

## 2020-12-29 NOTE — PROGRESS NOTES
[facility]    Medicine Progress Note - Hospitalist Service       Date of Admission:  12/25/2020  Assessment & Plan       Mili Padilla is a 65 year old female admitted on 12/25/2020. She has a PMH most remarkable for MS, and bladder dysfunction who was admitted for sepsis secondary to UTI.    She presented with symptoms of SOB, cough, fever to 103 x 4 days and weakness with lightheadedness with standing. She fell. Vitals at admission 102.3, 90, 132/60, 94% on RA.   CT chest showed no definite pulmonary infiltrates, neg for PE.      Sepsis suspect secondary to Pyelnephritis Patient with chronic urinary urgency and lack of bladder symptoms thought to be secondary to MS.   * COVID and influenza PCR negative.   * UA showed 47 WBC, 11 WBC    - Urine Cx growing 2 species of enterobacter on sensitive to ceftriaxone, one not.    - Afebrile with normalization of WBC on Ceftriaxone.   - change ceftriaxone to bactrim on 12/28/20 to complete 7 day course at discharge.      Diarrhea with nausea and abdominal discomfort on 12/27/20. Abdominal exam benign. Patient was constipated and started bowel regime on 12/26/20 and then started having frequent stools ~ 5+ stools 24 hours. She notes some stomach upset, nausea, when she tries to eat and early satiety. No vomiting. Has not noted increased distention. Unsure if she is passing gas. Distended abdomen with high pitch bowel sounds on exam. Non-tender.     - Despite the diarrhea, I suspect she had some ileus as well. We got her up out of bed and moving as much as possible and diarrhea resolved on 12/29/20.    - zofran and compazine PRN for nausea, appetite better on 12/29/20.   - diarrhea resolving on 12/29/20. C.diff negative.   - start probiotic while on antibiotics.     HTN  Suspect fluid overload - wts up 127 > 132 kg after IVF given at admission.    - BP up on 12/27/20 and remained up.   - start torsemide 10 mg + KCl 20 meq daily on 12/27/20   - daily  weights ordered but not done.   - Started losartan 50 mg on 12/29/20.   - F/u BMP and BP as outpatient. Consider switch to spironolactone depending on labs.     Hypokalemia (2.8 on 12/27/20, mg 2) - Likely related to diarrhea.   - Replacement protocol ordered  - Started potassium with torsemide.   - Started losartan.   - Diarrhea resolving    Mild hyponatremia - Patient reports history of low sodium levels this year through her clinic (not available in Epic) Her sodium here was 133 > 130. We had started torsemide for HTN but I think she need diuretics for HTN and this is much less likely to aggravate hyponatremia compared to thiazide diuretic.   - Continue torsemide while following BMP as outpatient.   - Avoid thiazide diuretics  - TSH ordered  - If continues as outpatient, stop thiazide diuretic and obtain urine osmolality and Na.      Transverse fracture of L1 superior endplate. Secondary to fall after she became weak with infection.   MRI on 12/26/20:   IMPRESSION:   1.  Redemonstrated DISH associated L1 fracture with approximately 40% anterior height loss. There is disruption of the anterior longitudinal ligament, distal ligamentous complex and likely focal disruption of the posterior longitudinal ligament. The ligamentum flavum and interspinous ligaments are intact. There is no facet joint widening.   2.  Multilevel degenerative changes of the lumbar spine. Mild spinal canal stenosis at T12-L1.   3.  Multilevel bilateral neural foraminal narrowing, greatest at L5-S1 where there is moderate stenosis on the left. In addition, a left-sided extraforaminal disc osteophyte complex at L5-S1 contacts the exiting left L5 nerve root, which may result in symptoms of impingement.    - appreciate neurosurgery consult   - recommend TLSO brace and outpatient follow up in 6 weeks with xray prior.     ? Cirrhosis with splenomegaly on CT at admission.   - Mild thrombocytopenia noted. Liver panel and INR normal.   - Discussed  with patient this concern and future monitoring for signs of developing cirrhosis.   - She has an obese abdomen but no clear fluid wave.     Recent Labs   Lab 12/27/20  1009 12/25/20  1325   ALBUMIN 2.2*  --    BILITOTAL 0.6  --    ALT 29  --    AST 32  --    ALKPHOS 79  --    PROTEIN  --  100*   INR 1.07  --      Recent Labs   Lab 12/28/20  0713 12/27/20  1009 12/25/20  1214   WBC  --  9.6 11.8*   HGB  --  11.8 11.6*    116* 145*         Chronic Issues  1. Depression - continue PTA remeron, abilify  2. Fibromylagia - continue PTA tegretol  3. GERD - resumed PTA famotidine (Reported by patient. Not on admission med list)   4. HLD - continue PTA lipitor  5. MS  6. OVIDIO - hypoxia noted at night. Patient reports non-compliance with CPAP. Discussed observed hypoxia and importance of continuing CPAP.   7. Insomnia - continue PTA tradozone       Diet: Combination Diet Regular Diet Adult    DVT Prophylaxis: Enoxaparin (Lovenox) SQ  Waggoner Catheter: not present  Code Status: Full Code           Disposition Plan   Expected discharge: Today, recommended to transitional care unit one bed available.   Entered: Isabelle Hansen MD 12/29/2020, 11:37 AM       The patient's care was discussed with the Bedside Nurse and Patient.    Isabelle Hansen MD  Hospitalist Service  [facility]  Contact information available via [facility] Paging/Directory    ______________________________________________________________________    Interval History   Events over last 24 hours as outlined above. Patient denies any SOB, CP, abdominal pain, N/V/D.   Only one stool yet today. Ate better, nausea better.     Data reviewed today: I reviewed all medications, new labs and imaging results over the last 24 hours. I personally reviewed no images or EKG's today.    Physical Exam   Vital Signs: Temp: 97.7  F (36.5  C) Temp src: Oral BP: (!) 171/61 Pulse: 78   Resp: 18 SpO2: 94 % O2 Device: None (Room air)    Weight: 290 lbs 12.59  oz  Constitutional: NAD,   Neuropsyche:  alert and oriented, answers questions appropriately. Speech normal, face symmetric and moving all 4 extremities without gross focal neurological deficit.   Respiratory:  Breathing comfortably, good air exchange, no wheezes, no crackles.   Cardiovascular:  Regular rate and rhythm, no edema.  GI:  soft, moderately distended with high pitched bowel sounds, NT.   Skin/Integumen:  No acute rash or sign of bleeding.     Data   Recent Labs   Lab 12/29/20  0901 12/28/20  0713 12/27/20 2036 12/27/20  1009 12/27/20  1009 12/25/20  1214   WBC  --   --   --   --  9.6 11.8*   HGB  --   --   --   --  11.8 11.6*   MCV  --   --   --   --  94 95   PLT  --  166  --   --  116* 145*   INR  --   --   --   --  1.07  --    * 133  --   --  134 133   POTASSIUM 3.3* 3.6 3.0*   < > 2.9* 3.9   CHLORIDE 97 99  --   --  99 99   CO2 30 29  --   --  32 29   BUN 13 13  --   --  13 16   CR 0.62 0.55  --   --  0.55 0.68   ANIONGAP 3 5  --   --  3 5   HEAVENLY 9.0 9.0  --   --  9.0 8.8   * 115*  --   --  166* 134*   ALBUMIN  --   --   --   --  2.2*  --    PROTTOTAL  --   --   --   --  6.5*  --    BILITOTAL  --   --   --   --  0.6  --    ALKPHOS  --   --   --   --  79  --    ALT  --   --   --   --  29  --    AST  --   --   --   --  32  --    TROPI  --   --   --   --   --  <0.015    < > = values in this interval not displayed.     No results found for this or any previous visit (from the past 24 hour(s)).  Medications       ARIPiprazole  5 mg Oral Daily     atorvastatin  20 mg Oral Daily     carBAMazepine  200 mg Oral BID     docusate sodium  100 mg Oral BID     DULoxetine  120 mg Oral Daily     enoxaparin ANTICOAGULANT  40 mg Subcutaneous Q12H     famotidine  10 mg Oral BID     losartan  25 mg Oral Daily     melatonin  10 mg Oral At Bedtime     methylphenidate  20 mg Oral Daily     mirtazapine  75 mg Oral At Bedtime     potassium chloride ER  20 mEq Oral Daily     sennosides  1-2 tablet Oral BID      sodium chloride (PF)  3 mL Intracatheter Q8H     sulfamethoxazole-trimethoprim  1 tablet Oral BID     tolterodine  2 mg Oral BID     torsemide  10 mg Oral Daily     traZODone  500 mg Oral At Bedtime

## 2020-12-29 NOTE — PLAN OF CARE
C-Diff rule out in progress    Cognitive Concerns/ Orientation : A&O x4. Calm & cooperative.   BEHAVIOR & AGGRESSION TOOL COLOR: Green  ABNL VS/O2: VSS on RA ex HTN. JAEGER/SOB at times.  MOBILITY: Goal to get OOB. Refused this AM. Encouraged with MD at rounds. Agreed to repositions later in shift.  PAIN MANAGMENT: PRN Percocet for lower back pain.    DIET: Regular  BOWEL/BLADDER: Urinary incontinence and frequency, purewick in place. Hx of constipation, loose BMs, C-Diff in process.   ABNL LAB/BG: K 3.6, Mg 2.1  DRAIN/DEVICES: R PIV SL  TELEMETRY RHYTHM: N/A  SKIN: Scattered bruising, pale, 1 abd blister and 2 abd scabs from heating pad at home, generalized trace edema, blanchable redness on coccyx. Barrier cream and wipes for fecal incontinence.  TESTS/PROCEDURES: none pending  D/C DAY/GOALS/PLACE: possibly tomorrow to TCU, per MD at rounds.    OTHER IMPORTANT INFO: Abd round/distended, firm, hx of constipation. Baseline bilateral neuropathy. Pt intermittently nauseous PRN zofran and compazine, effective.

## 2020-12-29 NOTE — PROGRESS NOTES
Care Management Follow Up    Length of Stay (days): 4    Expected Discharge Date: 12/30/20(-12/31 TCU)     Concerns to be Addressed:       Patient plan of care discussed at interdisciplinary rounds: Yes    Anticipated Discharge Disposition:       Anticipated Discharge Services:    Anticipated Discharge DME:      Patient/family educated on Medicare website which has current facility and service quality ratings:    Education Provided on the Discharge Plan:    Patient/Family in Agreement with the Plan:      Referrals Placed by CM/SW:    Private pay costs discussed:     Additional Information:  TCU is recommended by PT. Dr Hansen requests placement for today or tomorrow.  Writer has reviewed chart. Given her age, MS history, recent UTI and fall, writer did ask ARU to also review her case.  Writer attempt to meet with patient but she was with a caregiver, Writer will try to return later today  to discuss TCU and make referrals.    ALYSON JiangSW

## 2020-12-29 NOTE — PLAN OF CARE
DATE & TIME: 12/28/20 1900-0730  Cognitive Concerns/ Orientation : A&O x4. Calm & cooperative.   BEHAVIOR & AGGRESSION TOOL COLOR: Green  ABNL VS/O2: VSS on RA ex HTN. JAEGER/SOB at times.  MOBILITY: up to chair on day shift, Back to bed and then refused PT  PAIN MANAGMENT: PRN Percocet for lower back pain given x2    DIET: Regular  BOWEL/BLADDER: Urinary incontinence and frequency, purewick in place. Hx of constipation,loose Bms tonight, C.Diff negative  ABNL LAB/BG: K 3.6 Mg. 2.1  DRAIN/DEVICES: R PIV SL  TELEMETRY RHYTHM: N/A  SKIN: Scattered bruising, pale, 1 abd blister and 2 abd scabs from heating pad at home (patient reported to previous RN), generalized trace edema, blanchable redness on coccyx  TESTS/PROCEDURES: C. Diff Negative, MD discontinued enteric precautions   D/C DAY/GOALS/PLACE: possibly tomorrow to TCU, per MD at rounds for PT/OT  OTHER IMPORTANT INFO: Abd round/distended, firm, hx of constipation. Baseline bilateral neuropathy. Possible discharge tomorrow to TCU for further PT/OT

## 2020-12-29 NOTE — PROVIDER NOTIFICATION
MD Notification    Notified Person: MD    Notified Person Name: Malik    Notification Date/Time: 12/28/20 10:40 PM    Notification Interaction: Text page     Purpose of Notification: C.Diff Negative OK to D/C precautions?    Orders Received: **MD discontinued Enteric Precautions     Comments:

## 2020-12-30 ENCOUNTER — APPOINTMENT (OUTPATIENT)
Dept: PHYSICAL THERAPY | Facility: CLINIC | Age: 65
DRG: 872 | End: 2020-12-30
Payer: COMMERCIAL

## 2020-12-30 LAB
ANION GAP SERPL CALCULATED.3IONS-SCNC: 4 MMOL/L (ref 3–14)
BUN SERPL-MCNC: 17 MG/DL (ref 7–30)
CALCIUM SERPL-MCNC: 9.2 MG/DL (ref 8.5–10.1)
CHLORIDE SERPL-SCNC: 99 MMOL/L (ref 94–109)
CO2 SERPL-SCNC: 29 MMOL/L (ref 20–32)
CREAT SERPL-MCNC: 0.59 MG/DL (ref 0.52–1.04)
GFR SERPL CREATININE-BSD FRML MDRD: >90 ML/MIN/{1.73_M2}
GLUCOSE SERPL-MCNC: 115 MG/DL (ref 70–99)
POTASSIUM SERPL-SCNC: 3.9 MMOL/L (ref 3.4–5.3)
SODIUM SERPL-SCNC: 132 MMOL/L (ref 133–144)

## 2020-12-30 PROCEDURE — 99239 HOSP IP/OBS DSCHRG MGMT >30: CPT | Performed by: INTERNAL MEDICINE

## 2020-12-30 PROCEDURE — 250N000011 HC RX IP 250 OP 636: Performed by: INTERNAL MEDICINE

## 2020-12-30 PROCEDURE — 250N000013 HC RX MED GY IP 250 OP 250 PS 637: Performed by: STUDENT IN AN ORGANIZED HEALTH CARE EDUCATION/TRAINING PROGRAM

## 2020-12-30 PROCEDURE — 250N000011 HC RX IP 250 OP 636: Performed by: STUDENT IN AN ORGANIZED HEALTH CARE EDUCATION/TRAINING PROGRAM

## 2020-12-30 PROCEDURE — 250N000013 HC RX MED GY IP 250 OP 250 PS 637: Performed by: HOSPITALIST

## 2020-12-30 PROCEDURE — 97110 THERAPEUTIC EXERCISES: CPT | Mod: GP

## 2020-12-30 PROCEDURE — 120N000001 HC R&B MED SURG/OB

## 2020-12-30 PROCEDURE — 80048 BASIC METABOLIC PNL TOTAL CA: CPT | Performed by: INTERNAL MEDICINE

## 2020-12-30 PROCEDURE — 36415 COLL VENOUS BLD VENIPUNCTURE: CPT | Performed by: INTERNAL MEDICINE

## 2020-12-30 PROCEDURE — 250N000013 HC RX MED GY IP 250 OP 250 PS 637: Performed by: INTERNAL MEDICINE

## 2020-12-30 RX ORDER — ONDANSETRON 4 MG/1
4 TABLET, ORALLY DISINTEGRATING ORAL EVERY 6 HOURS PRN
DISCHARGE
Start: 2020-12-30 | End: 2021-02-17

## 2020-12-30 RX ORDER — PROCHLORPERAZINE MALEATE 5 MG
5 TABLET ORAL EVERY 6 HOURS PRN
DISCHARGE
Start: 2020-12-30 | End: 2021-02-17

## 2020-12-30 RX ORDER — SULFAMETHOXAZOLE/TRIMETHOPRIM 800-160 MG
1 TABLET ORAL 2 TIMES DAILY
DISCHARGE
Start: 2020-12-30 | End: 2021-01-05

## 2020-12-30 RX ORDER — LOSARTAN POTASSIUM 50 MG/1
50 TABLET ORAL DAILY
DISCHARGE
Start: 2020-12-31 | End: 2023-09-20

## 2020-12-30 RX ORDER — OXYCODONE AND ACETAMINOPHEN 5; 325 MG/1; MG/1
1-2 TABLET ORAL EVERY 6 HOURS PRN
Refills: 0 | Status: SHIPPED | DISCHARGE
Start: 2020-12-30 | End: 2021-01-05

## 2020-12-30 RX ORDER — LACTOBACILLUS RHAMNOSUS GG 10B CELL
1 CAPSULE ORAL 2 TIMES DAILY
DISCHARGE
Start: 2020-12-30 | End: 2021-01-09

## 2020-12-30 RX ORDER — TORSEMIDE 10 MG/1
10 TABLET ORAL DAILY
DISCHARGE
Start: 2020-12-31 | End: 2021-07-13

## 2020-12-30 RX ORDER — POTASSIUM CHLORIDE 1500 MG/1
20 TABLET, EXTENDED RELEASE ORAL DAILY
DISCHARGE
Start: 2020-12-31 | End: 2021-07-13

## 2020-12-30 RX ADMIN — FAMOTIDINE 10 MG: 10 TABLET ORAL at 09:13

## 2020-12-30 RX ADMIN — POTASSIUM CHLORIDE 20 MEQ: 1500 TABLET, EXTENDED RELEASE ORAL at 09:13

## 2020-12-30 RX ADMIN — LOSARTAN POTASSIUM 50 MG: 50 TABLET, FILM COATED ORAL at 09:12

## 2020-12-30 RX ADMIN — FAMOTIDINE 10 MG: 10 TABLET ORAL at 20:28

## 2020-12-30 RX ADMIN — OXYCODONE HYDROCHLORIDE AND ACETAMINOPHEN 1 TABLET: 5; 325 TABLET ORAL at 00:44

## 2020-12-30 RX ADMIN — SULFAMETHOXAZOLE AND TRIMETHOPRIM 1 TABLET: 800; 160 TABLET ORAL at 09:12

## 2020-12-30 RX ADMIN — ENOXAPARIN SODIUM 40 MG: 40 INJECTION SUBCUTANEOUS at 09:14

## 2020-12-30 RX ADMIN — MELATONIN 5 MG TABLET 10 MG: at 21:16

## 2020-12-30 RX ADMIN — METHYLPHENIDATE HYDROCHLORIDE 20 MG: 10 TABLET ORAL at 09:13

## 2020-12-30 RX ADMIN — Medication 1 CAPSULE: at 09:14

## 2020-12-30 RX ADMIN — CARBAMAZEPINE 200 MG: 200 TABLET ORAL at 09:13

## 2020-12-30 RX ADMIN — ENOXAPARIN SODIUM 40 MG: 40 INJECTION SUBCUTANEOUS at 20:29

## 2020-12-30 RX ADMIN — ARIPIPRAZOLE 5 MG: 5 TABLET ORAL at 09:13

## 2020-12-30 RX ADMIN — Medication 1 CAPSULE: at 20:28

## 2020-12-30 RX ADMIN — TRAZODONE HYDROCHLORIDE 500 MG: 100 TABLET ORAL at 21:15

## 2020-12-30 RX ADMIN — CARBAMAZEPINE 200 MG: 200 TABLET ORAL at 20:28

## 2020-12-30 RX ADMIN — ONDANSETRON 4 MG: 4 TABLET, ORALLY DISINTEGRATING ORAL at 03:19

## 2020-12-30 RX ADMIN — OXYCODONE HYDROCHLORIDE AND ACETAMINOPHEN 1 TABLET: 5; 325 TABLET ORAL at 13:44

## 2020-12-30 RX ADMIN — TOLTERODINE TARTRATE 2 MG: 2 TABLET, FILM COATED ORAL at 09:13

## 2020-12-30 RX ADMIN — TOLTERODINE TARTRATE 2 MG: 2 TABLET, FILM COATED ORAL at 20:27

## 2020-12-30 RX ADMIN — DULOXETINE HYDROCHLORIDE 120 MG: 60 CAPSULE, DELAYED RELEASE ORAL at 09:13

## 2020-12-30 RX ADMIN — SULFAMETHOXAZOLE AND TRIMETHOPRIM 1 TABLET: 800; 160 TABLET ORAL at 20:28

## 2020-12-30 RX ADMIN — TORSEMIDE 10 MG: 10 TABLET ORAL at 09:13

## 2020-12-30 RX ADMIN — ATORVASTATIN CALCIUM 20 MG: 10 TABLET, FILM COATED ORAL at 09:13

## 2020-12-30 RX ADMIN — MIRTAZAPINE 75 MG: 30 TABLET, FILM COATED ORAL at 21:16

## 2020-12-30 ASSESSMENT — ACTIVITIES OF DAILY LIVING (ADL)
ADLS_ACUITY_SCORE: 20
ADLS_ACUITY_SCORE: 20
ADLS_ACUITY_SCORE: 22
ADLS_ACUITY_SCORE: 20

## 2020-12-30 NOTE — PROGRESS NOTES
Care Management Follow Up    Length of Stay (days): 5    Expected Discharge Date: 12/30/20(TCU)     Concerns to be Addressed:       Patient plan of care discussed at interdisciplinary rounds: Yes    Anticipated Discharge Disposition:       Anticipated Discharge Services:    Anticipated Discharge DME:      Patient/family educated on Medicare website which has current facility and service quality ratings:    Education Provided on the Discharge Plan:    Patient/Family in Agreement with the Plan:      Referrals Placed by CM/SW:    Private pay costs discussed: transportation costs    Additional Information:  Writer spoke with patient in regards to TCU recommendation. Patient is agreeable to TCU, has never been to facility. Writer discussed medicare.gov to get the ratings of each facilities. Patient asked writer to call sister jyotsna to do the research for patient and make recommendations where to send referrals. Writer discussed TCU with patients sister over the phone, facilities patient wanted sister to look at were [facility], [facility], [facility], [facility], [facility]. Patients sister would like referrals sent to these facilities 1. [facility], 2. [facility], 3. [facility], 4. [facility]. With [facility] as number one, patient is ok with referrals being sent. Writer sent referrals via [facility].     Patient will need transport setup at discharge.     ADDENDUM 144    [facility] can accept patient tomorrow 12/31.      FRANKO Raya

## 2020-12-30 NOTE — PLAN OF CARE
Cognitive Concerns/ Orientation : A&O x4. Calm & cooperative.   BEHAVIOR & AGGRESSION TOOL COLOR: Green  ABNL VS/O2: VSS on RA ex HTN. JAEGER/SOB at times.  MOBILITY: up to chair today x2 (4 hours)  PAIN MANAGMENT: PRN Percocet for lower back pain given x2    DIET: Regular  BOWEL/BLADDER: Urinary incontinence and frequency, purewick in place. Hx of constipation,loose Bms tonight, C.Diff negative  ABNL LAB/BG: K 3.3, replaced. Recheck 3.5. Mg. 2.3  DRAIN/DEVICES: R PIV SL  TELEMETRY RHYTHM: N/A  SKIN: Scattered bruising, pale, 1 abd blister and 2 abd scabs from heating pad at home (patient reported to previous RN), generalized trace edema, blanchable redness on coccyx  TESTS/PROCEDURES:   --C. Diff Negative. Probiotic given.  D/C DAY/GOALS/PLACE: 12/30-12/31/2020, seeking placement, per SW notes. To TCU.  OTHER IMPORTANT INFO: Abd round/distended, firm, hx of constipation. Baseline bilateral neuropathy.

## 2020-12-30 NOTE — DISCHARGE SUMMARY
[facility]  Hospitalist Discharge Summary      Date of Admission:  12/25/2020  Date of Discharge:  12/30/2020  Discharging Provider: Isabelle Hansen MD      Discharge Diagnoses   Sepsis suspect secondary to Pyelnephritis   Diarrhea with nausea and abdominal discomfort on 12/27/20, RESOLVING  HTN  Hypokalemia (2.8 on 12/27/20, mg 2) RESOLVED-   Mild hyponatremia, chronic   Transverse fracture of L1 superior endplate.  ? Cirrhosis with splenomegaly on CT at admission      Chronic Issues  1. Depression -   2. Fibromylagia   3. GERD   4. HLD   5. MS  6. OVIDIO   7. Insomnia      Follow-ups Needed After Discharge   Follow-up Appointments     Follow Up and recommended labs and tests      Follow up with intermediate physician.  The following labs/tests are   recommended: BMP on Monday, Jan, 4 for med monitoring on torsemide and   losartan.  Follow up with Neurosurgery as they recommended.         Follow-up and recommended labs and tests       Please follow up at [facility] in 6 weeks with   xray prior .  Please call the clinic at [phone] for scheduling.             Unresulted Labs Ordered in the Past 30 Days of this Admission     Date and Time Order Name Status Description    12/25/2020 1214 Blood culture Preliminary     12/25/2020 1214 Blood culture Preliminary       Hospitalist group will be notified if cultures turn positive. No growth to date.       Discharge Disposition   Discharged to short-term care facility  Condition at discharge: Good      Hospital Course         Mili Padilla is a 65 year old female admitted on 12/25/2020. She has a PMH most remarkable for MS, and bladder dysfunction who was admitted for sepsis secondary to UTI.    She presented with symptoms of SOB, cough, fever to 103 x 4 days and weakness with lightheadedness with standing. She fell. Vitals at admission 102.3, 90, 132/60, 94% on RA.   CT chest showed no definite pulmonary infiltrates,  neg for PE.      Sepsis suspect secondary to Pyelnephritis Patient with chronic urinary urgency and lack of bladder symptoms thought to be secondary to MS.   * COVID and influenza PCR negative.   * UA showed 47 WBC, 11 WBC    - Urine Cx grew 2 species of enterobacter on sensitive to ceftriaxone, one not.  Both sensitive to Bactrim.   - Afebrile with normalization of WBC on Ceftriaxone.   - Changed ceftriaxone to bactrim on 12/28/20 to complete 7 day course at discharge.      Diarrhea with nausea and abdominal discomfort on 12/27/20, RESOLVING. Abdominal exam benign. Patient was constipated and started bowel regime on 12/26/20 and then started having frequent stools ~ 5+ stools 24 hours. She notes some stomach upset, nausea, when she tries to eat and early satiety. No vomiting. Has not noted increased distention. Unsure if she is passing gas. Distended abdomen with high pitch bowel sounds on exam. Non-tender.     - Despite the diarrhea, I suspect she had some ileus as well. We got her up out of bed and moving as much as possible and diarrhea resolved on 12/29/20.     - zofran and compazine PRN for nausea, appetite better on 12/29/20.   - diarrhea resolving on 12/29/20. C.diff negative.   - start probiotic while on antibiotics.     HTN  Suspect fluid overload - wts up 127 > 132 kg after IVF given at admission.      - start torsemide 10 mg + KCl 20 meq daily on 12/27/20   - started losartan 50 mg on 12/29/20.   - F/u BMP and BP as outpatient. Consider switch to spironolactone depending on labs.   - BP controlled AM of 12/30/20.   - BMP on 1/4/2020    Hypokalemia (2.8 on 12/27/20, mg 2) - Likely related to diarrhea.   - Replacement protocol ordered  - Started potassium with torsemide.   - Started losartan.   - K 3.9 on day of discharge.  - Diarrhea resolving  - BMP on 1/4/2020    Mild hyponatremia - Patient reports history of low sodium levels this year through her clinic (not available in Epic) Her sodium here was 133  > 130. We had started torsemide for HTN but I think she need diuretics for HTN and this is much less likely to aggravate hyponatremia compared to thiazide diuretic.   * TSH nl at 2.07    - Continue torsemide while following BMP as outpatient.   - Avoid thiazide diuretics  - If continues as outpatient, stop thiazide diuretic and obtain urine osmolality and Na.      Transverse fracture of L1 superior endplate. Secondary to fall after she became weak with infection.   MRI on 12/26/20:   IMPRESSION:   1.  Redemonstrated DISH associated L1 fracture with approximately 40% anterior height loss. There is disruption of the anterior longitudinal ligament, distal ligamentous complex and likely focal disruption of the posterior longitudinal ligament. The ligamentum flavum and interspinous ligaments are intact. There is no facet joint widening.   2.  Multilevel degenerative changes of the lumbar spine. Mild spinal canal stenosis at T12-L1.   3.  Multilevel bilateral neural foraminal narrowing, greatest at L5-S1 where there is moderate stenosis on the left. In addition, a left-sided extraforaminal disc osteophyte complex at L5-S1 contacts the exiting left L5 nerve root, which may result in symptoms of impingement.    - appreciate neurosurgery consult   - recommend TLSO brace and outpatient follow up in 6 weeks with xray prior.   - Using oxycodone about once daily. #6 tablets prescribed at discharge.     ? Cirrhosis with splenomegaly on CT at admission.   - Mild thrombocytopenia noted. Liver panel and INR normal.   - Discussed with patient this concern and future monitoring for signs of developing cirrhosis.   - She has an obese abdomen but no clear fluid wave.     Recent Labs   Lab 12/27/20  1009 12/25/20  1325   ALBUMIN 2.2*  --    BILITOTAL 0.6  --    ALT 29  --    AST 32  --    ALKPHOS 79  --    PROTEIN  --  100*   INR 1.07  --      Recent Labs   Lab 12/28/20  0713 12/27/20  1009 12/25/20  1214   WBC  --  9.6 11.8*   HGB  --   11.8 11.6*    116* 145*         Chronic Issues  1. Depression - continue PTA remeron, abilify  2. Fibromylagia - continue PTA tegretol  3. GERD - resumed PTA famotidine (Reported by patient. Not on admission med list)   4. HLD - continue PTA lipitor  5. MS  6. OVIDIO - hypoxia noted at night. Patient reports non-compliance with CPAP. Discussed observed hypoxia and importance of continuing CPAP.   7. Insomnia - continue PTA tradozone      Consultations This Hospital Stay   PHYSICAL THERAPY ADULT IP CONSULT  OCCUPATIONAL THERAPY ADULT IP CONSULT  ORTHOPEDIC SURGERY IP CONSULT  ORTHOSIS BRACE IP CONSULT  PHYSICAL THERAPY ADULT IP CONSULT  OCCUPATIONAL THERAPY ADULT IP CONSULT    Code Status   Full Code    Time Spent on this Encounter   I, Isabelle Hansen MD, personally saw the patient today and spent greater than 30 minutes discharging this patient.       Isabelle Hansen MD  [place] [address] [facility]  [address] [place] [address]  Phone: [phone]  ______________________________________________________________________    Physical Exam   Vital Signs: Temp: 98.1  F (36.7  C) Temp src: Oral BP: 139/59 Pulse: 81   Resp: 16 SpO2: 93 % O2 Device: None (Room air)    Weight: 290 lbs 12.59 oz  Constitutional:    NAD,   Neuropsyche:  alert and oriented, answers questions appropriately. Speech normal, face symmetric and moving all 4 extremities without gross focal neurological deficit.   Respiratory:        Breathing comfortably, good air exchange, no wheezes, no crackles.   Cardiovascular:  Regular rate and rhythm, no edema.  GI:  soft, moderately distended with high pitched bowel sounds, NT.   Skin/Integumen:  No acute rash or sign of bleeding.              Primary Care Physician   Jasmyn Márquez    Discharge Orders      Follow-up and recommended labs and tests     Please follow up at [facility] in 6 weeks with xray prior .  Please call the clinic at  [phone] for scheduling.     Activity    - continue to wear TLSO brace at all times when out of bed   - Call the Neurosurgery clinic for any questions or concerns during interim, at [phone]  - Seek medical attention immediately if any paresthesias, weakness, gait instability, bowel/bladder incontinence, new pain.     General info for SNF    Length of Stay Estimate: Short Term Care: Estimated # of Days <30  Condition at Discharge: Improving  Level of care:skilled   Rehabilitation Potential: Fair  Admission H&P remains valid and up-to-date: Yes  Recent Chemotherapy: N/A  Use Nursing Home Standing Orders: Yes     Mantoux instructions    Give two-step Mantoux (PPD) Per Facility Policy Yes     Reason for your hospital stay    You were admitted with a febrile illness due to a UTI.     Glucose monitor nursing POCT    Before meals and at bedtime     Follow Up and recommended labs and tests    Follow up with long term physician.  The following labs/tests are recommended: BMP on Monday, Jan, 4 for med monitoring on torsemide and losartan.  Follow up with Neurosurgery as they recommended.     Activity - Up with nursing assistance    Use TLSO brace when up and walking     Additional Discharge Instructions    Use your CPAP at night while sleeping.     Physical Therapy Adult Consult    Evaluate and treat as clinically indicated.    Reason:  Deconditioned secondary to acute illness.     Occupational Therapy Adult Consult    Evaluate and treat as clinically indicated.    Reason:   Deconditioned secondary to acute illness.   .     Advance Diet as Tolerated    Follow this diet upon discharge: Orders Placed This Encounter      Combination Diet Regular Diet Adult       Significant Results and Procedures   Most Recent 3 CBC's:  Recent Labs   Lab Test 12/28/20  0713 12/27/20  1009 12/25/20  1214 07/13/20  1530   WBC  --  9.6 11.8* 11.1*   HGB  --  11.8 11.6* 12.6   MCV  --  94 95 95    116* 145* 179     Most Recent  3 BMP's:  Recent Labs   Lab Test 12/30/20  0854 12/29/20  1507 12/29/20  0901 12/28/20  0713   *  --  130* 133   POTASSIUM 3.9 3.5 3.3* 3.6   CHLORIDE 99  --  97 99   CO2 29  --  30 29   BUN 17  --  13 13   CR 0.59  --  0.62 0.55   ANIONGAP 4  --  3 5   HEAVENLY 9.2  --  9.0 9.0   *  --  166* 115*     Most Recent 2 LFT's:  Recent Labs   Lab Test 12/27/20  1009 12/24/14 2013   AST 32 25   ALT 29 26   ALKPHOS 79 120   BILITOTAL 0.6 0.6     Most Recent 3 Troponin's:  Recent Labs   Lab Test 12/25/20  1214 12/24/14 2013   TROPI <0.015 <0.015  The 99th percentile for upper reference range is 0.045 ug/L.  Troponin values in   the range of 0.045 - 0.120 ug/L may be associated with risks of adverse   clinical events.   Effective 7/30/2014, the reference range for this assay has changed to reflect   new instrumentation/methodology.       Most Recent 6 Bacteria Isolates From Any Culture (See EPIC Reports for Culture Details):  Recent Labs   Lab Test 12/25/20  1325 12/25/20  1252 12/25/20  1214 12/24/14 2053 05/30/13  1027   CULT 50,000 to 100,000 colonies/mL  Enterobacter cloacae complex  *  50,000 to 100,000 colonies/mL  Strain 2  Enterobacter cloacae complex  * No growth after 5 days No growth after 5 days No growth No growth     Most Recent TSH and T4:  Recent Labs   Lab Test 12/29/20  0901   TSH 2.07     Most Recent Urinalysis:  Recent Labs   Lab Test 12/25/20  1325   COLOR Yellow   APPEARANCE Slightly Cloudy   URINEGLC Negative   URINEBILI Negative   URINEKETONE 10*   SG 1.020   UBLD Large*   URINEPH 6.0   PROTEIN 100*   NITRITE Positive*   LEUKEST Large*   RBCU 11*   WBCU 47*   ,   Results for orders placed or performed during the hospital encounter of 12/25/20   Lumbar spine CT w/o contrast    Narrative    CT LUMBAR SPINE WITHOUT CONTRAST  12/25/2020 1:27 PM     HISTORY: Fall, back pain since.     TECHNIQUE: Axial images of the lumbar spine were obtained without  intravenous contrast. Multiplanar  reformations were performed.   Radiation dose for this scan was reduced using automated exposure  control, adjustment of the mA and/or kV according to patient size, or  iterative reconstruction technique.     COMPARISON: CT colonography screening exam without contrast 9/21/2020,  CT angiogram chest 12/25/2020.    FINDINGS:  There are findings consistent with diffuse idiopathic  skeletal hyperostosis. Flowing ossification is seen extending along  the right anterolateral aspect of the vertebral column involving  numerous levels of the thoracic spine and extending inferiorly into  the upper lumbar region. On previous CT of 9/21/2020, it was evident  that there was marginal osseous endplate fusion across the T12-L1 disc  space. Currently, there is evidence for an acute fracture involving  the left aspect of the L1 superior endplate and extending into the  T12-L1 disc space, including the right posterolateral aspect of the  disc space (series 7 images 24-27). There appears to be new mild  widening across the disc space. No significant fracture fragment  retropulsion. No definite involvement of the posterior elements is  identified. There is a small prevertebral hematoma, best seen along  the left anterolateral aspect of the T12 and L1 vertebral bodies.    No other acute fracture is identified. Mild dextroconvex curvature of  the visualized lower thoracic spine. Otherwise normal alignment. Mild  degenerative disc height loss at L5-S1. Minimal disc space narrowing  elsewhere. Mild lower lumbar degenerative facet arthropathy.    Mild spinal canal stenosis at the T12-L1 level related to the diffuse  disc osteophyte complex, unchanged. Unchanged right lateral recess  disc herniation at L1-L2 with marginal calcification/ossification. No  definite high-grade spinal canal stenosis. Mild to moderate left and  mild right neural foraminal stenosis at L5-S1 in the setting of  degenerative endplate osteophytes and facet arthropathy.  No definite  high-grade neural foraminal narrowing evident.    Partially visualized postsurgical changes along the greater curvature  of the stomach. Multiple left renal calculi are noted, at least one of  which appears new from the prior CT of 9/21/2020, measuring 10 mm  (series 5 image 80). This lesion is positioned along the inferior  aspect of the left renal hilum. There is an additional calculus in the  left renal pelvis measuring 8 mm. Scattered atherosclerosis of the  abdominal aorta and its branches.      Impression    IMPRESSION:  1. Acute transversely oriented fracture involving the L1 superior  endplate, extending into the T12-L1 disc space, as described. This is  seen in the setting of diffuse idiopathic skeletal hyperostosis.  2. No other acute fracture identified.  3. Multilevel degenerative changes, as described. Mild spinal canal  stenosis at T12-L1. No definite high-grade spinal canal narrowing.  Mild to moderate left L5-S1 neural foraminal stenosis. No high-grade  neural foraminal narrowing.  4. Small prevertebral hematoma along the anterior aspect of the T12  and L1 vertebral bodies.  5. Left renal calculi, as described.    The findings were discussed with Milka Ward by myself at 2:35 PM on  12/25/2020.     ELVIE YONUG MD   Head CT w/o contrast    Narrative    CT SCAN OF THE HEAD WITHOUT CONTRAST   12/25/2020 1:26 PM     HISTORY: Fall, head trauma.    TECHNIQUE:  Axial images of the head and coronal reformations without  IV contrast material. Radiation dose for this scan was reduced using  automated exposure control, adjustment of the mA and/or kV according  to patient size, or iterative reconstruction technique.    COMPARISON: None.    FINDINGS: There is no evidence of intracranial hemorrhage, mass, acute  infarct or anomaly. Mild age commensurate generalized brain  parenchymal volume loss. Mild prominence of the retrocerebellar  extra-axial space, likely representing bella-cisterna magna.  The  ventricles are normal in size, shape and configuration. The brain  parenchyma and extra-axial spaces otherwise appear within normal  limits.     The visualized portions of the sinuses and mastoids appear normal. The  bony calvarium and bones of the skull base appear intact. Hyperostosis  frontalis interna. Bilateral temporomandibular joint degenerative  changes.      Impression    IMPRESSION:   No evidence of acute intracranial hemorrhage, mass, or  herniation.      ELVIE YOUNG MD   CT Chest Pulmonary Embolism w Contrast    Narrative    CT CHEST PULMONARY EMBOLISM WITH CONTRAST 12/25/2020 1:28 PM    CLINICAL HISTORY: Elevated dimer, COVID likely, rule out pulmonary  embolism.      TECHNIQUE: CT angiogram chest during arterial phase injection IV  contrast. 2D and 3D MIP reconstructions were performed by the CT  technologist. Dose reduction techniques were used.     CONTRAST: 83 mL [id number]    COMPARISON: None.    FINDINGS:  ANGIOGRAM CHEST: Pulmonary arteries are normal caliber and negative  for pulmonary emboli. Thoracic aorta is negative for dissection. No CT  evidence of right heart strain.    LUNGS AND PLEURA: No definite infiltrates demonstrated given the  respiratory motion artifact. A few linear areas of atelectasis and/or  infiltrate noted. Benign calcified granuloma left lower lobe.    MEDIASTINUM/AXILLAE: No adenopathy or aneurysm.    UPPER ABDOMEN: Splenomegaly at 18.8 cm. Probable cyst in the liver.  Nodular contour to the liver may be present, cirrhosis not excluded.    MUSCULOSKELETAL: Survey of the visualized bony structures demonstrates  no destructive bony lesions.      Impression    IMPRESSION:  1.  No pulmonary embolism demonstrated.  2.  No definite pulmonary infiltrates demonstrated within the  limitations of mild respiratory motion artifact.  3.  Question a nodular liver contour and splenomegaly. Cirrhosis and  portal hypertension could be present.    ELVIE WISDOM MD   Lumbar spine  MRI w/o contrast    Narrative    EXAM: MR LUMBAR SPINE W/O CONTRAST  LOCATION: [facility]  DATE/TIME: 12/26/2020 12:47 AM    INDICATION: L1 fracture with intervertebral disc extension and weakness.  COMPARISON: Lumbar spine CT dated 12/25/2020.  TECHNIQUE: Routine Lumbar Spine MRI without IV contrast.    FINDINGS:   Nomenclature is based on 5 lumbar type vertebral bodies. Redemonstrated anterior compression deformity of L1, resulting in approximately 30% height loss. No osseous retropulsion. There is a T2 hyperintense fluid cleft along the fracture defect,   suggestive of osteoporosis. As stated before, the fracture extends into the intervertebral disc at T12-L1 with disc or ligamentous disruption. There is disruption of the anterior longitudinal ligament. The posterior longitudinal ligament may be focally   disrupted at the level of the intervertebral disc. The ligamentum flavum is intact. No significant interspinous ligamentous edema. No facet joint widening. Mild prevertebral edema in the overlying soft tissues. Normal distal spinal cord and cauda equina   with conus medullaris at L1-L2. Fatty atrophy of the paraspinous muscles. Degenerative changes of the sacroiliac joints.    T12-L1: Disc osteophyte complex with mild facet arthropathy. Mild spinal canal stenosis and narrowing of the bilateral neural foramen.     L1-L2: Small right paracentral disc protrusion. Bilateral facet arthropathy. The spinal canal and subarticular recesses are patent. No neural foraminal narrowing.    L2-L3: Small right paracentral disc protrusion and bilateral facet arthropathy. No narrowing of the spinal canal or subarticular recesses. No neural foraminal narrowing.     L3-L4: Small left paracentral disc protrusion and annular fissure. Small right paracentral disc protrusion. Bilateral facet arthropathy. No narrowing of the spinal canal, subarticular recesses or neural foramina.    L4-L5: Central disc protrusion and  annular fissure. Mild bilateral facet arthropathy. Mild bilateral neural foraminal narrowing. No narrowing of the spinal canal or subarticular recesses.     L5-S1: There is a symmetric disc osteophyte complex with a superimposed left lateral disc osteophyte that contacts the exiting left L5 nerve root. Bilateral facet arthropathy. No narrowing of the spinal canal or subarticular recesses. Moderate left and   mild right neural foraminal narrowing.      Impression    IMPRESSION:  1.  Redemonstrated DISH associated L1 fracture with approximately 40% anterior height loss. There is disruption of the anterior longitudinal ligament, distal ligamentous complex and likely focal disruption of the posterior longitudinal ligament. The   ligamentum flavum and interspinous ligaments are intact. There is no facet joint widening.  2.  Multilevel degenerative changes of the lumbar spine. Mild spinal canal stenosis at T12-L1.  3.  Multilevel bilateral neural foraminal narrowing, greatest at L5-S1 where there is moderate stenosis on the left. In addition, a left-sided extraforaminal disc osteophyte complex at L5-S1 contacts the exiting left L5 nerve root, which may result in   symptoms of impingement.   XR Chest 2 Views    Narrative    CHEST TWO VIEWS 12/27/2020 9:38 AM     HISTORY: Hypoxia.    COMPARISON: November 4, 2011       Impression    IMPRESSION: Question trace pleural fluid bilaterally best seen on  lateral view. There are no acute infiltrates. The cardiac silhouette  is not enlarged. Pulmonary vasculature is unremarkable.    ELVIE WISDOM MD       Discharge Medications   Current Discharge Medication List      START taking these medications    Details   lactobacillus rhamnosus, GG, (CULTURELL) capsule Take 1 capsule by mouth 2 times daily for 10 days    Associated Diagnoses: Diarrhea, unspecified type      losartan (COZAAR) 50 MG tablet Take 1 tablet (50 mg) by mouth daily  Qty:      Associated Diagnoses: Benign essential  hypertension      ondansetron (ZOFRAN-ODT) 4 MG ODT tab Take 1 tablet (4 mg) by mouth every 6 hours as needed for nausea or vomiting  Qty:      Associated Diagnoses: Nausea      oxyCODONE-acetaminophen (PERCOCET) 5-325 MG tablet Take 1-2 tablets by mouth every 6 hours as needed for severe pain  Refills: 0    Associated Diagnoses: Compression fracture of L1 vertebra, initial encounter (H)      potassium chloride ER (KLOR-CON M) 20 MEQ CR tablet Take 1 tablet (20 mEq) by mouth daily  Qty:      Associated Diagnoses: Benign essential hypertension      prochlorperazine (COMPAZINE) 5 MG tablet Take 1 tablet (5 mg) by mouth every 6 hours as needed for vomiting  Qty:      Associated Diagnoses: Nausea      sulfamethoxazole-trimethoprim (BACTRIM DS) 800-160 MG tablet Take 1 tablet by mouth 2 times daily for 6 days    Associated Diagnoses: Sepsis due to urinary tract infection (H)      torsemide (DEMADEX) 10 MG tablet Take 1 tablet (10 mg) by mouth daily  Qty:      Associated Diagnoses: Benign essential hypertension         CONTINUE these medications which have NOT CHANGED    Details   ARIPiprazole (ABILIFY) 5 MG tablet Take 5 mg by mouth daily   Refills: 2      atorvastatin (LIPITOR) 20 MG tablet Take 20 mg by mouth daily.      carBAMazepine (TEGRETOL) 200 MG tablet Take 200 mg by mouth 2 times daily      Dextromethorphan-Guaifenesin (MUCINEX DM PO) Take 1,200 mg by mouth daily 1200mg daily       docusate sodium (COLACE) 100 MG capsule Take 100 mg by mouth 2 times daily      DULoxetine (CYMBALTA) 60 MG capsule Take 120 mg by mouth daily       ketoconazole (NIZORAL) 2 % cream Apply topically 2 times daily as needed     Associated Diagnoses: Bariatric surgery status; Obesity, Class III, BMI 40-49.9 (morbid obesity) (H)      Melatonin 10 MG TABS Take 10 mg by mouth At Bedtime      methylphenidate (RITALIN) 10 MG tablet Take 20 mg by mouth daily       mirtazapine (REMERON) 30 MG tablet Take 75 mg by mouth At Bedtime        Multiple Vitamin (MULTIVITAMINS PO) Take 2 tablets by mouth every evening. WITH IRON      traZODone (DESYREL) 100 MG tablet Take 500 mg by mouth At Bedtime       triamcinolone (ARISTOCORT HP) 0.5 % external cream Apply topically At Bedtime To hands      trospium (SANCTURA) 20 MG tablet Take 1 tablet (20 mg) by mouth 2 times daily (before meals)      vitamin B-Complex Take 1 tablet by mouth daily       Calcium Citrate-Vitamin D 250-200 MG-UNIT TABS Take 1 tablet by mouth 3 times daily       denosumab (PROLIA) 60 MG/ML SOLN injection Inject 60 mg Subcutaneous every 6 months      triamcinolone (KENALOG) 0.5 % OINT ointment Apply topically 2 times daily as needed for irritation To legs           Allergies   Allergies   Allergen Reactions     Augmentin      Sensitive,  Able to tolerate a few doses, otherwise hives on hands     Betaseron [Interferon Beta-1b]      Necrosis of skin at injection sites     Dust Mite Extract Unknown     Mold Unknown

## 2020-12-30 NOTE — PLAN OF CARE
DATE & TIME: 12/30/20 3694-5906  Cognitive Concerns/ Orientation : A&O x4. Calm & cooperative.   BEHAVIOR & AGGRESSION TOOL COLOR: Green  ABNL VS/O2: VSS on RA ex HTN. JAEGER/SOB at times.  MOBILITY: Turn Q2, refuses at times. Up with 2+Lift. Brace on when OOB. Up in chair for meals.   PAIN MANAGMENT: PRN Percocet for lower back pain, 1 tab given x1.  DIET: Regular  BOWEL/BLADDER: Urinary incontinence and frequency, purewick in place. Still having loose Bms, x1 thi shift. C.Diff negative.  ABNL LAB/BG: Na 132  DRAIN/DEVICES: No IV, MD aware and OK  TELEMETRY RHYTHM: N/A  SKIN: Scattered bruising, pale, 1 abd blister and 2 abd scabs from heating pad at home (patient reported to previous RN), generalized trace edema, blanchable redness on coccyx.  TESTS/PROCEDURES: C. Diff Negative  D/C DAY/GOALS/PLACE: Pending TCU bed available, [facility] TCU can accept Pt tomorrow 12/31.   OTHER IMPORTANT INFO: Abd round/distended, firm, hx of constipation. Baseline bilateral neuropathy.

## 2020-12-30 NOTE — PLAN OF CARE
DATE & TIME: 12/29-30/20 7544-7925  Cognitive Concerns/ Orientation : A&O x4. Calm & cooperative.   BEHAVIOR & AGGRESSION TOOL COLOR: Green  ABNL VS/O2: VSS on RA ex HTN. JAEGER/SOB at times.  MOBILITY: Turn Q2. Up with 2+Lift. Brace on when OOB.  PAIN MANAGMENT: PRN Percocet for lower back pain given x2   DIET: Regular. Nausea/reflux. Zofran given x1, repositioned.  BOWEL/BLADDER: Urinary incontinence and frequency, purewick in place. No stools overnight, C.Diff negative.  ABNL LAB/BG:   DRAIN/DEVICES: No IV, MD aware and OK  TELEMETRY RHYTHM: N/A  SKIN: Scattered bruising, pale, 1 abd blister and 2 abd scabs from heating pad at home (patient reported to previous RN), generalized trace edema, blanchable redness on coccyx  TESTS/PROCEDURES:   --C. Diff Negative  D/C DAY/GOALS/PLACE: 12/30-12/31/2020, seeking placement, per SW notes. To TCU.  OTHER IMPORTANT INFO: Abd round/distended, firm, hx of constipation. Baseline bilateral neuropathy.

## 2020-12-31 ENCOUNTER — TELEPHONE (OUTPATIENT)
Dept: PSYCHOLOGY | Facility: CLINIC | Age: 65
End: 2020-12-31

## 2020-12-31 VITALS
RESPIRATION RATE: 18 BRPM | TEMPERATURE: 98.1 F | BODY MASS INDEX: 46.99 KG/M2 | HEART RATE: 79 BPM | DIASTOLIC BLOOD PRESSURE: 78 MMHG | HEIGHT: 66 IN | SYSTOLIC BLOOD PRESSURE: 147 MMHG | OXYGEN SATURATION: 94 % | WEIGHT: 292.4 LBS

## 2020-12-31 LAB
BACTERIA SPEC CULT: NO GROWTH
BACTERIA SPEC CULT: NO GROWTH
CREAT SERPL-MCNC: 0.68 MG/DL (ref 0.52–1.04)
GFR SERPL CREATININE-BSD FRML MDRD: >90 ML/MIN/{1.73_M2}
PLATELET # BLD AUTO: 332 10E9/L (ref 150–450)
SPECIMEN SOURCE: NORMAL
SPECIMEN SOURCE: NORMAL

## 2020-12-31 PROCEDURE — 82565 ASSAY OF CREATININE: CPT | Performed by: HOSPITALIST

## 2020-12-31 PROCEDURE — 36415 COLL VENOUS BLD VENIPUNCTURE: CPT | Performed by: HOSPITALIST

## 2020-12-31 PROCEDURE — 250N000013 HC RX MED GY IP 250 OP 250 PS 637: Performed by: STUDENT IN AN ORGANIZED HEALTH CARE EDUCATION/TRAINING PROGRAM

## 2020-12-31 PROCEDURE — 250N000013 HC RX MED GY IP 250 OP 250 PS 637: Performed by: HOSPITALIST

## 2020-12-31 PROCEDURE — 250N000013 HC RX MED GY IP 250 OP 250 PS 637: Performed by: INTERNAL MEDICINE

## 2020-12-31 PROCEDURE — 250N000011 HC RX IP 250 OP 636: Performed by: STUDENT IN AN ORGANIZED HEALTH CARE EDUCATION/TRAINING PROGRAM

## 2020-12-31 PROCEDURE — 85049 AUTOMATED PLATELET COUNT: CPT | Performed by: HOSPITALIST

## 2020-12-31 RX ADMIN — METHYLPHENIDATE HYDROCHLORIDE 20 MG: 10 TABLET ORAL at 08:04

## 2020-12-31 RX ADMIN — TORSEMIDE 10 MG: 10 TABLET ORAL at 08:05

## 2020-12-31 RX ADMIN — LOSARTAN POTASSIUM 50 MG: 50 TABLET, FILM COATED ORAL at 08:05

## 2020-12-31 RX ADMIN — TOLTERODINE TARTRATE 2 MG: 2 TABLET, FILM COATED ORAL at 08:04

## 2020-12-31 RX ADMIN — ATORVASTATIN CALCIUM 20 MG: 10 TABLET, FILM COATED ORAL at 08:05

## 2020-12-31 RX ADMIN — POTASSIUM CHLORIDE 20 MEQ: 1500 TABLET, EXTENDED RELEASE ORAL at 08:05

## 2020-12-31 RX ADMIN — Medication 1 CAPSULE: at 08:05

## 2020-12-31 RX ADMIN — CARBAMAZEPINE 200 MG: 200 TABLET ORAL at 08:05

## 2020-12-31 RX ADMIN — DULOXETINE HYDROCHLORIDE 120 MG: 60 CAPSULE, DELAYED RELEASE ORAL at 08:05

## 2020-12-31 RX ADMIN — SULFAMETHOXAZOLE AND TRIMETHOPRIM 1 TABLET: 800; 160 TABLET ORAL at 08:05

## 2020-12-31 RX ADMIN — ARIPIPRAZOLE 5 MG: 5 TABLET ORAL at 08:04

## 2020-12-31 RX ADMIN — ENOXAPARIN SODIUM 40 MG: 40 INJECTION SUBCUTANEOUS at 08:04

## 2020-12-31 RX ADMIN — FAMOTIDINE 10 MG: 10 TABLET ORAL at 08:05

## 2020-12-31 ASSESSMENT — ACTIVITIES OF DAILY LIVING (ADL)
ADLS_ACUITY_SCORE: 22

## 2020-12-31 ASSESSMENT — MIFFLIN-ST. JEOR: SCORE: 1883.07

## 2020-12-31 NOTE — PLAN OF CARE
Discharge    Patient discharged to [facility] via stretcher with [facility].   Care plan note:     DATE & TIME: 12/31/20 1017-2881  Cognitive Concerns/ Orientation : A&Ox4. Calm & cooperative.   BEHAVIOR & AGGRESSION TOOL COLOR: Green  ABNL VS/O2: HTN, other VSS on RA   MOBILITY: Turn Q2 but refusing. Up with 2+Lift. Brace on when out of bed. Up to chair for meals.   PAIN MANAGMENT: Denies, PRN Percocet 1-2 tabs available.  DIET: Regular  BOWEL/BLADDER: Urinary incontinence and frequency, purewick in place. No stools this shift. C.Diff negative.   ABNL LAB/BG: none abnormal today  DRAIN/DEVICES: No IV, MD aware and OK  TELEMETRY RHYTHM: N/A  SKIN: Scattered bruising. Abd blister from home heating pad/heating blanket use at home. Redness on coccyx.  TESTS/PROCEDURES: C. Diff Negative  D/C DAY/GOALS/PLACE: Discharge today to [facility] at 1400.   OTHER IMPORTANT INFO: Abd round/distended, firm. Baseline bilateral neuropathy. JAEGER.     Listed belongings gathered and returned to patient. Yes  Care Plan and Patient education resolved: Yes  Prescriptions if needed, hard copies sent with patient  NA  Home and hospital acquired medications returned to patient: NA  Medication Bin checked and emptied on discharge Yes  Follow up appointment made for patient: Yes

## 2020-12-31 NOTE — TELEPHONE ENCOUNTER
Health Psychology                     Department of Medicine  Izzy Montaño, Ph.D., L.P. [phone]                          [facility] Sherrie Crowe, Ph.D., L.P. [phone]                   [place] Mail Code 881   Vin Ward, Ph.D.,  L.P. [phone]        [address] [place] [address] Ranulfo Whipple, Ph.D. [phone]        [place] [address]           Enriqueta Sorto, Ph.D., L.P. [phone]       Ramin Olivo, Ph.D., A.B.P.P., L.P. [phone]  [facility]   Kathie Galvan, Ph.D., L.P. [phone]    [address] [place] [address]       Telephone Note      Patient called to cancel as she is currently admitted at [facility]. She will be doing rehab at [facility] in [place].   She will call me when ready to reschedule.    Ramin Olivo, PhD LP,  A.B.P.P.  Director, Health Psychology  [phone]

## 2020-12-31 NOTE — PLAN OF CARE
Cognitive Concerns/ Orientation : A&O x4. Calm & cooperative.   BEHAVIOR & AGGRESSION TOOL COLOR: Green  ABNL VS/O2: VSS on RA   MOBILITY: Turn Q2 but refuses. Up with 2+Lift. Brace on when out of bed. Up to chair for meals.   PAIN MANAGMENT:Denied  DIET: Regular  BOWEL/BLADDER: Urinary incontinence and frequency, purewick in place.1 loose BM this shift. C.Diff negative. 142 Bladder scan.   ABNL LAB/BG: Na 132  DRAIN/DEVICES: No IV, MD aware and OK  TELEMETRY RHYTHM: N/A  SKIN: Scattered bruising Abd blister from  heating pad/heating blanket use at home. Redness on coccyx.  TESTS/PROCEDURES: C. Diff Negative  D/C DAY/GOALS/PLACE: TCU discharge tomorrow.   OTHER IMPORTANT INFO: Abd round/distended, firm, hx of constipation. Baseline bilateral neuropathy.

## 2020-12-31 NOTE — PLAN OF CARE
PT: Patient discharging to TCU in a couple of hours. Defer further therapies to TCU setting.    Physical Therapy Discharge Summary    Reason for therapy discharge:    Discharged to transitional care facility.    Progress towards therapy goal(s). See goals on Care Plan in [facility] electronic health record for goal details.  Goals not met.  Barriers to achieving goals:   discharge from facility.    Therapy recommendation(s):    Continued therapy is recommended.  Rationale/Recommendations:  eval and treat at TCU.

## 2020-12-31 NOTE — PLAN OF CARE
DATE & TIME: 12/30/20 7281-9250  Cognitive Concerns/ Orientation : A&Ox4. Calm & cooperative.   BEHAVIOR & AGGRESSION TOOL COLOR: Green  ABNL VS/O2: VSS on RA   MOBILITY: Turn Q2 but refusing. Up with 2+Lift. Brace on when out of bed. Up to chair for meals. not out of bed overnight.  PAIN MANAGMENT: Denies  DIET: Regular  BOWEL/BLADDER: Urinary incontinence and frequency, purewick in place. No stools this shift. C.Diff negative.   ABNL LAB/BG: Na 132  DRAIN/DEVICES: No IV, MD aware and OK  TELEMETRY RHYTHM: N/A  SKIN: Scattered bruising. Abd blister from home heating pad/heating blanket use at home. Redness on coccyx.  TESTS/PROCEDURES: C. Diff Negative  D/C DAY/GOALS/PLACE: Discharge today to [facility] TCU.   OTHER IMPORTANT INFO: Abd round/distended, firm. Baseline bilateral neuropathy.  JAEGER.  LS diminished.  Nausea at times.  Refusing repositioning overnight.

## 2020-12-31 NOTE — PROGRESS NOTES
Care Management Discharge Note    Discharge Date: 12/31/20(TCU)       Discharge Disposition:      Discharge Services:      Discharge DME:      Discharge Transportation: health plan transportation    Private pay costs discussed: transportation costs    PAS Confirmation Code:    Patient/family educated on Medicare website which has current facility and service quality ratings:      Education Provided on the Discharge Plan:    Persons Notified of Discharge Plans: Yes  Patient/Family in Agreement with the Plan:      Handoff Referral Completed: No    Additional Information:  Writer confirmed bed at [facility]. Patient will discharge to [facility] via S stretcher due to total cares via  transport. Transport at 1400. Updated facility on time & faxed orders via DOD.    PAS-RR    D: Per DHS regulation, SW completed and submitted PAS-RR to [place] [employer] Direct Connect via the Senior LinkAge Line.  PAS-RR confirmation # is : [id number]    I: SW spoke with pt and they are aware a PAS-RR has been submitted.  PHILIPPE reviewed with pt that they may be contacted for a follow up appointment within 10 days of hospital discharge if their SNF stay is < 30 days.  Contact information for [facility] LinkAge Line was also provided.    A: pt verbalized understanding.    P: Further questions may be directed to [facility] LinkAge Line at #[phone], option #4 for PAS-RR staff.    ADDENDUM 1049    PCS form completed and faxed.      FRANKO Raya

## 2021-01-02 DIAGNOSIS — F32.4 MAJOR DEPRESSIVE DISORDER IN PARTIAL REMISSION, UNSPECIFIED WHETHER RECURRENT (H): ICD-10-CM

## 2021-01-02 DIAGNOSIS — F32.4 MAJOR DEPRESSIVE DISORDER IN PARTIAL REMISSION, UNSPECIFIED WHETHER RECURRENT (H): Primary | ICD-10-CM

## 2021-01-02 RX ORDER — METHYLPHENIDATE HYDROCHLORIDE 10 MG/1
20 TABLET ORAL DAILY
Qty: 3 TABLET | Refills: 0 | Status: SHIPPED | OUTPATIENT
Start: 2021-01-02 | End: 2021-01-02

## 2021-01-02 RX ORDER — METHYLPHENIDATE HYDROCHLORIDE 20 MG/1
20 TABLET ORAL DAILY
Qty: 3 TABLET | Refills: 0 | Status: SHIPPED | OUTPATIENT
Start: 2021-01-02 | End: 2021-01-21

## 2021-01-03 ASSESSMENT — MIFFLIN-ST. JEOR
SCORE: 1879.89
SCORE: 1879.89

## 2021-01-04 ENCOUNTER — TRANSFERRED RECORDS (OUTPATIENT)
Dept: HEALTH INFORMATION MANAGEMENT | Facility: CLINIC | Age: 66
End: 2021-01-04

## 2021-01-04 ENCOUNTER — NURSING HOME VISIT (OUTPATIENT)
Dept: GERIATRICS | Facility: CLINIC | Age: 66
End: 2021-01-04
Payer: COMMERCIAL

## 2021-01-04 VITALS
HEIGHT: 66 IN | TEMPERATURE: 97.4 F | SYSTOLIC BLOOD PRESSURE: 154 MMHG | DIASTOLIC BLOOD PRESSURE: 66 MMHG | BODY MASS INDEX: 46.88 KG/M2 | HEART RATE: 73 BPM | RESPIRATION RATE: 18 BRPM | OXYGEN SATURATION: 94 % | WEIGHT: 291.7 LBS

## 2021-01-04 DIAGNOSIS — I10 BENIGN ESSENTIAL HYPERTENSION: ICD-10-CM

## 2021-01-04 DIAGNOSIS — G35 MS (MULTIPLE SCLEROSIS) (H): ICD-10-CM

## 2021-01-04 DIAGNOSIS — R53.81 PHYSICAL DECONDITIONING: ICD-10-CM

## 2021-01-04 DIAGNOSIS — A41.9 SEPSIS DUE TO URINARY TRACT INFECTION (H): ICD-10-CM

## 2021-01-04 DIAGNOSIS — N39.0 SEPSIS DUE TO URINARY TRACT INFECTION (H): ICD-10-CM

## 2021-01-04 DIAGNOSIS — S32.010D COMPRESSION FRACTURE OF L1 VERTEBRA WITH ROUTINE HEALING, SUBSEQUENT ENCOUNTER: ICD-10-CM

## 2021-01-04 DIAGNOSIS — R19.7 DIARRHEA, UNSPECIFIED TYPE: ICD-10-CM

## 2021-01-04 DIAGNOSIS — N12 PYELONEPHRITIS: Primary | ICD-10-CM

## 2021-01-04 LAB
ANION GAP SERPL CALCULATED.3IONS-SCNC: 7 MMOL/L (ref 7–16)
BUN SERPL-MCNC: 8 MG/DL (ref 7–26)
CALCIUM SERPL-MCNC: 9.9 MG/DL (ref 8.4–10.4)
CHLORIDE SERPLBLD-SCNC: 98 MMOL/L (ref 98–109)
CO2 SERPL-SCNC: 29 MMOL/L (ref 20–29)
CREAT SERPL-MCNC: 0.4 MG/DL (ref 0.55–1.02)
GFR SERPL CREATININE-BSD FRML MDRD: >60 ML/MIN/1.73M2
GLUCOSE SERPL-MCNC: 128 MG/DL (ref 70–100)
POTASSIUM SERPL-SCNC: 3.5 MMOL/L (ref 3.5–5.1)
SODIUM SERPL-SCNC: 134 MMOL/L (ref 136–145)

## 2021-01-04 PROCEDURE — 99310 SBSQ NF CARE HIGH MDM 45: CPT | Performed by: NURSE PRACTITIONER

## 2021-01-04 RX ORDER — LOPERAMIDE HYDROCHLORIDE 2 MG/1
2 TABLET ORAL 4 TIMES DAILY PRN
Start: 2021-01-04 | End: 2021-01-23

## 2021-01-04 NOTE — LETTER
1/4/2021        RE: Mili Padilla  [address]  [facility] [id number]        [facility]  PRIMARY CARE PROVIDER AND CLINIC:  Jasmyn Márquez MD, [employer] [address] / Kristian*  Chief Complaint   Patient presents with     Hospital F/U     [facility] Medical Record Number:  [id number]  Place of Service where encounter took place:  [facility] ([facility]) [[id number]]    Mili Padilla  is a 65 year old  (1955), admitted to the above facility from  [facility]. Hospital stay 12/25/20 through 12/31/20..  Admitted to this facility for  rehab, medical management and nursing care.    HPI:    HPI information obtained from: facility chart records, facility staff, patient report and [facility] chart review.   Brief Summary of Hospital Course:   PMH of MS with bladder dysfunction, HTN, GERD, HLD, OVIDIO, insomnia presents with SOB, cough, fever  Sepsis suspected secondary to pyelonephritis: UC grew 2 species of enterobacter both sensitive to bactrim patient started on IV rocephin transitioned to oral bactrim 12/28 to complete 7 day coarse  Diarrhea/N/V: improved, initially constipated, started on bowel regimen, possible ileus, N/V/ resolved as of 12/29, diarrhea resolving C-diff negative  HTN: some fluid overload due to IVF, started on losartan and torsemide, Tx hypokalemia and hyponatremia  Fall at home/transverse fracture of L1: neurosurgery consult, TLSO brace and f/u in 6 weeks  Updates on Status Since Skilled nursing Admission: On exam today patient is resting in bed, denies N/V, states appetite is good, still having diarrhea, states loose stools with standing and turning over in bed, denies abdomjnal pain or discomfort, states having 4/10 pain in low back no change in pain level with standing, denies pain in legs or radiating pain, no other concerns at time of exam.     CODE STATUS/ADVANCE DIRECTIVES DISCUSSION:   CPR/Full code   Patient's  living condition: lives alone  ALLERGIES: Augmentin, Betaseron [interferon beta-1b], Dust mite extract, and Mold  PAST MEDICAL HISTORY:  has a past medical history of Depression, major, in partial remission (H), Fibromyalgia syndrome, Gastro-oesophageal reflux disease, Hyperlipemia, Lymphedema, Multiple sclerosis (H), Multiple sclerosis, secondary progressive (H), Sleep apnea, and Vocal cord paralysis, unilateral complete.  PAST SURGICAL HISTORY:   has a past surgical history that includes orthopedic surgery; ENT surgery; Laparoscopic gastric sleeve (5/30/2013); Laparoscopic appendectomy (5/30/2013); Laparoscopic Biopsy Liver (5/30/2013); Esophagoscopy, gastroscopy, duodenoscopy (EGD), combined (N/A, 12/16/2014); Esophagoscopy, gastroscopy, duodenoscopy (EGD), combined (N/A, 12/16/2014); Colonoscopy (N/A, 7/17/2017); and Colonoscopy (N/A, 2/23/2018).  FAMILY HISTORY: family history includes Breast Cancer in her maternal aunt, maternal aunt, maternal aunt, maternal aunt, and mother; Other Cancer in her father.  SOCIAL HISTORY:   reports that she quit smoking about 46 years ago. Her smoking use included cigarettes. She has never used smokeless tobacco. She reports current alcohol use. She reports that she does not use drugs.    Post Discharge Medication Reconciliation Status: discharge medications reconciled, continue medications without change    Current Outpatient Medications   Medication Sig Dispense Refill     ARIPiprazole (ABILIFY) 5 MG tablet Take 5 mg by mouth daily   2     atorvastatin (LIPITOR) 20 MG tablet Take 20 mg by mouth daily.       Calcium Citrate-Vitamin D 250-200 MG-UNIT TABS Take 1 tablet by mouth 3 times daily        carBAMazepine (TEGRETOL) 200 MG tablet Take 200 mg by mouth 2 times daily       Dextromethorphan-Guaifenesin (MUCINEX DM PO) Take 1,200 mg by mouth daily 1200mg daily        docusate sodium (COLACE) 100 MG capsule Take 100 mg by mouth 2 times daily       DULoxetine (CYMBALTA) 60 MG  capsule Take 120 mg by mouth daily        ketoconazole (NIZORAL) 2 % cream Apply topically 2 times daily as needed        lactobacillus rhamnosus, GG, (CULTURELL) capsule Take 1 capsule by mouth 2 times daily for 10 days       losartan (COZAAR) 50 MG tablet Take 1 tablet (50 mg) by mouth daily       Melatonin 10 MG TABS Take 10 mg by mouth At Bedtime       methylphenidate (RITALIN) 20 MG tablet Take 1 tablet (20 mg) by mouth daily 3 tablet 0     mirtazapine (REMERON) 30 MG tablet Take 75 mg by mouth At Bedtime        Multiple Vitamin (MULTIVITAMINS PO) Take 2 tablets by mouth every evening. WITH IRON       ondansetron (ZOFRAN-ODT) 4 MG ODT tab Take 1 tablet (4 mg) by mouth every 6 hours as needed for nausea or vomiting       oxyCODONE-acetaminophen (PERCOCET) 5-325 MG tablet Take 1-2 tablets by mouth every 6 hours as needed for severe pain  0     potassium chloride ER (KLOR-CON M) 20 MEQ CR tablet Take 1 tablet (20 mEq) by mouth daily       prochlorperazine (COMPAZINE) 5 MG tablet Take 1 tablet (5 mg) by mouth every 6 hours as needed for vomiting       sulfamethoxazole-trimethoprim (BACTRIM DS) 800-160 MG tablet Take 1 tablet by mouth 2 times daily for 6 days       torsemide (DEMADEX) 10 MG tablet Take 1 tablet (10 mg) by mouth daily       traZODone (DESYREL) 100 MG tablet Take 500 mg by mouth At Bedtime        triamcinolone (ARISTOCORT HP) 0.5 % external cream Apply topically At Bedtime To hands       triamcinolone (KENALOG) 0.5 % OINT ointment Apply topically 2 times daily as needed for irritation To legs       trospium (SANCTURA) 20 MG tablet Take 1 tablet (20 mg) by mouth 2 times daily (before meals)       vitamin B-Complex Take 1 tablet by mouth daily        denosumab (PROLIA) 60 MG/ML SOLN injection Inject 60 mg Subcutaneous every 6 months           ROS:  10 point ROS of systems including Constitutional, Eyes, Respiratory, Cardiovascular, Gastroenterology, Genitourinary, Integumentary, Musculoskeletal,  "Psychiatric were all negative except for pertinent positives noted in my HPI.    Vitals:  BP (!) 154/66   Pulse 73   Temp 97.4  F (36.3  C)   Resp 18   Ht 1.676 m (5' 6\")   Wt 132.3 kg (291 lb 11.2 oz)   LMP 12/10/2010   SpO2 94%   BMI 47.08 kg/m    Exam:  GENERAL APPEARANCE:  Alert, in no distress  ENT:  Mouth and posterior oropharynx normal, moist mucous membranes, normal hearing acuity  EYES:  EOM, conjunctivae, lids, pupils and irises normal, PERRL  NECK:  .  RESP:  no respiratory distress  CV:  peripheral edema trace+ in LE bilaterally  ABDOMEN:  abdomen round  M/S:   patient resting in bed, able to move all 4 extremities  SKIN:  Inspection of skin and subcutaneous tissue baseline  NEURO:   speech WNL  PSYCH:  affect and mood normal    Lab/Diagnostic data:    Most Recent 3 CBC's:  Recent Labs   Lab Test 12/31/20  0932 12/28/20  0713 12/27/20  1009 12/25/20  1214 07/13/20  1530   WBC  --   --  9.6 11.8* 11.1*   HGB  --   --  11.8 11.6* 12.6   MCV  --   --  94 95 95    166 116* 145* 179     Most Recent 3 BMP's:  Recent Labs   Lab Test 12/31/20  0932 12/30/20  0854 12/29/20  1507 12/29/20  0901 12/28/20  0713   NA  --  132*  --  130* 133   POTASSIUM  --  3.9 3.5 3.3* 3.6   CHLORIDE  --  99  --  97 99   CO2  --  29  --  30 29   BUN  --  17  --  13 13   CR 0.68 0.59  --  0.62 0.55   ANIONGAP  --  4  --  3 5   HEAVENLY  --  9.2  --  9.0 9.0   GLC  --  115*  --  166* 115*       ASSESSMENT/PLAN:  Pyelonephritis  Sepsis due to urinary tract infection (H)  Physical deconditioning  Acute/ongoing: Bactrim /160mg BID end 1/5/21  culturelle 1 PO BID    MS (multiple sclerosis) (H)  Ongoing: PT and OT for strengthening    Compression fracture of L1 vertebra with routine healing, subsequent encounter  Acute/ongoing: TLSO brace on when OOB, Percocet5/325mg 1-2 tabs PO q 6 hours prn    Diarrhea:   Acute/ongoing: C-diff negative during hospitalization, change colace to BID prn, imodium 2mg q 6 hours prn  BMP on " Wednesday    Benign essential hypertension  Acute/ongoing: vitals daily and prn, BMP follow, torsemide 10mg QD, KCL 20meq QD, losartan 50mg QD       Orders written by provider at facility  BMP and CBC on Wednesday  Imodium 2mg q 6 hours prn    Total time spent with patient visit at the [facility] nursing facility was 35 min including patient visit and review of past records. Greater than 50% of total time spent with counseling and coordinating care due to discussed diarrhea and plan to start imodium, changes colace to prn, discussed medications and POC, education on pain control and percocet.      Electronically signed by:  Tonya Lynn Haase, APRN CNP                         Sincerely,        Tonya Lynn Haase, APRN CNP

## 2021-01-04 NOTE — PROGRESS NOTES
[place] [facility]  PRIMARY CARE PROVIDER AND CLINIC:  Jasmyn Márquez MD, [facility] [address] / [place]*  Chief Complaint   Patient presents with     Hospital F/U     [place] Medical Record Number:  [id number]  Place of Service where encounter took place:  [facility] (S) [[id number]]    Mili Padilla  is a 65 year old  (1955), admitted to the above facility from  [facility]. Hospital stay 12/25/20 through 12/31/20..  Admitted to this facility for  rehab, medical management and nursing care.    HPI:    HPI information obtained from: facility chart records, facility staff, patient report and [facility] chart review.   Brief Summary of Hospital Course:   PMH of MS with bladder dysfunction, HTN, GERD, HLD, OVIDIO, insomnia presents with SOB, cough, fever  Sepsis suspected secondary to pyelonephritis: UC grew 2 species of enterobacter both sensitive to bactrim patient started on IV rocephin transitioned to oral bactrim 12/28 to complete 7 day coarse  Diarrhea/N/V: improved, initially constipated, started on bowel regimen, possible ileus, N/V/ resolved as of 12/29, diarrhea resolving C-diff negative  HTN: some fluid overload due to IVF, started on losartan and torsemide, Tx hypokalemia and hyponatremia  Fall at home/transverse fracture of L1: neurosurgery consult, TLSO brace and f/u in 6 weeks  Updates on Status Since Skilled nursing Admission: On exam today patient is resting in bed, denies N/V, states appetite is good, still having diarrhea, states loose stools with standing and turning over in bed, denies abdomjnal pain or discomfort, states having 4/10 pain in low back no change in pain level with standing, denies pain in legs or radiating pain, no other concerns at time of exam.     CODE STATUS/ADVANCE DIRECTIVES DISCUSSION:   CPR/Full code   Patient's living condition: lives alone  ALLERGIES: Augmentin, Betaseron [interferon beta-1b], Dust mite  extract, and Mold  PAST MEDICAL HISTORY:  has a past medical history of Depression, major, in partial remission (H), Fibromyalgia syndrome, Gastro-oesophageal reflux disease, Hyperlipemia, Lymphedema, Multiple sclerosis (H), Multiple sclerosis, secondary progressive (H), Sleep apnea, and Vocal cord paralysis, unilateral complete.  PAST SURGICAL HISTORY:   has a past surgical history that includes orthopedic surgery; ENT surgery; Laparoscopic gastric sleeve (5/30/2013); Laparoscopic appendectomy (5/30/2013); Laparoscopic Biopsy Liver (5/30/2013); Esophagoscopy, gastroscopy, duodenoscopy (EGD), combined (N/A, 12/16/2014); Esophagoscopy, gastroscopy, duodenoscopy (EGD), combined (N/A, 12/16/2014); Colonoscopy (N/A, 7/17/2017); and Colonoscopy (N/A, 2/23/2018).  FAMILY HISTORY: family history includes Breast Cancer in her maternal aunt, maternal aunt, maternal aunt, maternal aunt, and mother; Other Cancer in her father.  SOCIAL HISTORY:   reports that she quit smoking about 46 years ago. Her smoking use included cigarettes. She has never used smokeless tobacco. She reports current alcohol use. She reports that she does not use drugs.    Post Discharge Medication Reconciliation Status: discharge medications reconciled, continue medications without change    Current Outpatient Medications   Medication Sig Dispense Refill     ARIPiprazole (ABILIFY) 5 MG tablet Take 5 mg by mouth daily   2     atorvastatin (LIPITOR) 20 MG tablet Take 20 mg by mouth daily.       Calcium Citrate-Vitamin D 250-200 MG-UNIT TABS Take 1 tablet by mouth 3 times daily        carBAMazepine (TEGRETOL) 200 MG tablet Take 200 mg by mouth 2 times daily       Dextromethorphan-Guaifenesin (MUCINEX DM PO) Take 1,200 mg by mouth daily 1200mg daily        docusate sodium (COLACE) 100 MG capsule Take 100 mg by mouth 2 times daily       DULoxetine (CYMBALTA) 60 MG capsule Take 120 mg by mouth daily        ketoconazole (NIZORAL) 2 % cream Apply topically 2  times daily as needed        lactobacillus rhamnosus, GG, (CULTURELL) capsule Take 1 capsule by mouth 2 times daily for 10 days       losartan (COZAAR) 50 MG tablet Take 1 tablet (50 mg) by mouth daily       Melatonin 10 MG TABS Take 10 mg by mouth At Bedtime       methylphenidate (RITALIN) 20 MG tablet Take 1 tablet (20 mg) by mouth daily 3 tablet 0     mirtazapine (REMERON) 30 MG tablet Take 75 mg by mouth At Bedtime        Multiple Vitamin (MULTIVITAMINS PO) Take 2 tablets by mouth every evening. WITH IRON       ondansetron (ZOFRAN-ODT) 4 MG ODT tab Take 1 tablet (4 mg) by mouth every 6 hours as needed for nausea or vomiting       oxyCODONE-acetaminophen (PERCOCET) 5-325 MG tablet Take 1-2 tablets by mouth every 6 hours as needed for severe pain  0     potassium chloride ER (KLOR-CON M) 20 MEQ CR tablet Take 1 tablet (20 mEq) by mouth daily       prochlorperazine (COMPAZINE) 5 MG tablet Take 1 tablet (5 mg) by mouth every 6 hours as needed for vomiting       sulfamethoxazole-trimethoprim (BACTRIM DS) 800-160 MG tablet Take 1 tablet by mouth 2 times daily for 6 days       torsemide (DEMADEX) 10 MG tablet Take 1 tablet (10 mg) by mouth daily       traZODone (DESYREL) 100 MG tablet Take 500 mg by mouth At Bedtime        triamcinolone (ARISTOCORT HP) 0.5 % external cream Apply topically At Bedtime To hands       triamcinolone (KENALOG) 0.5 % OINT ointment Apply topically 2 times daily as needed for irritation To legs       trospium (SANCTURA) 20 MG tablet Take 1 tablet (20 mg) by mouth 2 times daily (before meals)       vitamin B-Complex Take 1 tablet by mouth daily        denosumab (PROLIA) 60 MG/ML SOLN injection Inject 60 mg Subcutaneous every 6 months           ROS:  10 point ROS of systems including Constitutional, Eyes, Respiratory, Cardiovascular, Gastroenterology, Genitourinary, Integumentary, Musculoskeletal, Psychiatric were all negative except for pertinent positives noted in my HPI.    Vitals:  BP (!)  "154/66   Pulse 73   Temp 97.4  F (36.3  C)   Resp 18   Ht 1.676 m (5' 6\")   Wt 132.3 kg (291 lb 11.2 oz)   LMP 12/10/2010   SpO2 94%   BMI 47.08 kg/m    Exam:  GENERAL APPEARANCE:  Alert, in no distress  ENT:  Mouth and posterior oropharynx normal, moist mucous membranes, normal hearing acuity  EYES:  EOM, conjunctivae, lids, pupils and irises normal, PERRL  NECK:  .  RESP:  no respiratory distress  CV:  peripheral edema trace+ in LE bilaterally  ABDOMEN:  abdomen round  M/S:   patient resting in bed, able to move all 4 extremities  SKIN:  Inspection of skin and subcutaneous tissue baseline  NEURO:   speech WNL  PSYCH:  affect and mood normal    Lab/Diagnostic data:    Most Recent 3 CBC's:  Recent Labs   Lab Test 12/31/20  0932 12/28/20  0713 12/27/20  1009 12/25/20  1214 07/13/20  1530   WBC  --   --  9.6 11.8* 11.1*   HGB  --   --  11.8 11.6* 12.6   MCV  --   --  94 95 95    166 116* 145* 179     Most Recent 3 BMP's:  Recent Labs   Lab Test 12/31/20  0932 12/30/20  0854 12/29/20  1507 12/29/20  0901 12/28/20  0713   NA  --  132*  --  130* 133   POTASSIUM  --  3.9 3.5 3.3* 3.6   CHLORIDE  --  99  --  97 99   CO2  --  29  --  30 29   BUN  --  17  --  13 13   CR 0.68 0.59  --  0.62 0.55   ANIONGAP  --  4  --  3 5   HEAVENLY  --  9.2  --  9.0 9.0   GLC  --  115*  --  166* 115*       ASSESSMENT/PLAN:  Pyelonephritis  Sepsis due to urinary tract infection (H)  Physical deconditioning  Acute/ongoing: Bactrim /160mg BID end 1/5/21  culturelle 1 PO BID    MS (multiple sclerosis) (H)  Ongoing: PT and OT for strengthening    Compression fracture of L1 vertebra with routine healing, subsequent encounter  Acute/ongoing: TLSO brace on when OOB, Percocet5/325mg 1-2 tabs PO q 6 hours prn    Diarrhea:   Acute/ongoing: C-diff negative during hospitalization, change colace to BID prn, imodium 2mg q 6 hours prn  BMP on Wednesday    Benign essential hypertension  Acute/ongoing: vitals daily and prn, BMP follow, " torsemide 10mg QD, KCL 20meq QD, losartan 50mg QD       Orders written by provider at facility  BMP and CBC on Wednesday  Imodium 2mg q 6 hours prn    Total time spent with patient visit at the skilled nursing facility was 35 min including patient visit and review of past records. Greater than 50% of total time spent with counseling and coordinating care due to discussed diarrhea and plan to start imodium, changes colace to prn, discussed medications and POC, education on pain control and percocet.      Electronically signed by:  Tonya Lynn Haase, APRN CNP

## 2021-01-06 ENCOUNTER — NURSING HOME VISIT (OUTPATIENT)
Dept: GERIATRICS | Facility: CLINIC | Age: 66
End: 2021-01-06
Payer: COMMERCIAL

## 2021-01-06 ENCOUNTER — TRANSFERRED RECORDS (OUTPATIENT)
Dept: HEALTH INFORMATION MANAGEMENT | Facility: CLINIC | Age: 66
End: 2021-01-06

## 2021-01-06 VITALS
RESPIRATION RATE: 18 BRPM | DIASTOLIC BLOOD PRESSURE: 64 MMHG | BODY MASS INDEX: 46.88 KG/M2 | OXYGEN SATURATION: 95 % | WEIGHT: 291.7 LBS | HEIGHT: 66 IN | TEMPERATURE: 97.9 F | HEART RATE: 75 BPM | SYSTOLIC BLOOD PRESSURE: 137 MMHG

## 2021-01-06 DIAGNOSIS — R53.81 PHYSICAL DECONDITIONING: ICD-10-CM

## 2021-01-06 DIAGNOSIS — G35 MS (MULTIPLE SCLEROSIS) (H): ICD-10-CM

## 2021-01-06 DIAGNOSIS — N12 PYELONEPHRITIS: Primary | ICD-10-CM

## 2021-01-06 DIAGNOSIS — N39.0 SEPSIS DUE TO URINARY TRACT INFECTION (H): ICD-10-CM

## 2021-01-06 DIAGNOSIS — I10 BENIGN ESSENTIAL HYPERTENSION: ICD-10-CM

## 2021-01-06 DIAGNOSIS — A41.9 SEPSIS DUE TO URINARY TRACT INFECTION (H): ICD-10-CM

## 2021-01-06 DIAGNOSIS — R19.7 DIARRHEA, UNSPECIFIED TYPE: ICD-10-CM

## 2021-01-06 DIAGNOSIS — S32.010D COMPRESSION FRACTURE OF L1 VERTEBRA WITH ROUTINE HEALING, SUBSEQUENT ENCOUNTER: ICD-10-CM

## 2021-01-06 PROCEDURE — 99309 SBSQ NF CARE MODERATE MDM 30: CPT | Performed by: NURSE PRACTITIONER

## 2021-01-06 NOTE — LETTER
1/6/2021        RE: Mili Padilla  [address]  [facility] [id number]        [place] [facility]  [place] Medical Record Number:  [id number]  Place of Service where encounter took place:  [facility] (S) [[id number]]  Chief Complaint   Patient presents with     Nursing Home Acute       HPI:    Mili Padilla  is a 65 year old (1955), who is being seen today for an episodic care visit.  HPI information obtained from: facility chart records, facility staff, patient report and [facility] chart review. Today's concern is:  Pyelonephritis/sepsis: denies fever, chills, dysuria, fatigued today, states she took imodium yesterday and that caused abdominal cramping and she felt better after having loose stool today, denies N/V today, states appetite is improving some, patient states she walked from her bed to the door in her room today using a RW.   HTN: BP stable 137/64, 132/63, 154/66 with HR in 70 range, denies CP, palpitations, SOB.   Compression Fx: patient states pain is controlled, no other concerns.     Past Medical and Surgical History reviewed in Epic today.    MEDICATIONS:    Current Outpatient Medications   Medication Sig Dispense Refill     ARIPiprazole (ABILIFY) 5 MG tablet Take 5 mg by mouth daily   2     atorvastatin (LIPITOR) 20 MG tablet Take 20 mg by mouth daily.       Calcium Citrate-Vitamin D 250-200 MG-UNIT TABS Take 1 tablet by mouth 3 times daily        carBAMazepine (TEGRETOL) 200 MG tablet Take 200 mg by mouth 2 times daily       denosumab (PROLIA) 60 MG/ML SOLN injection Inject 60 mg Subcutaneous every 6 months       Dextromethorphan-Guaifenesin (MUCINEX DM PO) Take 1,200 mg by mouth daily 1200mg daily        docusate sodium (COLACE) 100 MG capsule Take 100 mg by mouth 2 times daily as needed       DULoxetine (CYMBALTA) 60 MG capsule Take 120 mg by mouth daily        ketoconazole (NIZORAL) 2 % cream Apply topically 2 times daily as needed         "lactobacillus rhamnosus, GG, (CULTURELL) capsule Take 1 capsule by mouth 2 times daily for 10 days       loperamide (IMODIUM A-D) 2 MG tablet Take 1 tablet (2 mg) by mouth 4 times daily as needed for diarrhea       losartan (COZAAR) 50 MG tablet Take 1 tablet (50 mg) by mouth daily       Melatonin 10 MG TABS Take 10 mg by mouth At Bedtime       methylphenidate (RITALIN) 20 MG tablet Take 1 tablet (20 mg) by mouth daily 3 tablet 0     mirtazapine (REMERON) 30 MG tablet Take 75 mg by mouth At Bedtime        Multiple Vitamin (MULTIVITAMINS PO) Take 2 tablets by mouth every evening. WITH IRON       ondansetron (ZOFRAN-ODT) 4 MG ODT tab Take 1 tablet (4 mg) by mouth every 6 hours as needed for nausea or vomiting       potassium chloride ER (KLOR-CON M) 20 MEQ CR tablet Take 1 tablet (20 mEq) by mouth daily       prochlorperazine (COMPAZINE) 5 MG tablet Take 1 tablet (5 mg) by mouth every 6 hours as needed for vomiting       torsemide (DEMADEX) 10 MG tablet Take 1 tablet (10 mg) by mouth daily       traZODone (DESYREL) 100 MG tablet Take 500 mg by mouth At Bedtime        triamcinolone (ARISTOCORT HP) 0.5 % external cream Apply topically At Bedtime To hands       triamcinolone (KENALOG) 0.5 % OINT ointment Apply topically 2 times daily as needed for irritation To legs       trospium (SANCTURA) 20 MG tablet Take 1 tablet (20 mg) by mouth 2 times daily (before meals)       vitamin B-Complex Take 1 tablet by mouth daily            REVIEW OF SYSTEMS:  10 point ROS of systems including Constitutional, Eyes, Respiratory, Cardiovascular, Gastroenterology, Genitourinary, Integumentary, Musculoskeletal, Psychiatric were all negative except for pertinent positives noted in my HPI.    Objective:  /64   Pulse 75   Temp 97.9  F (36.6  C)   Resp 18   Ht 1.676 m (5' 6\")   Wt 132.3 kg (291 lb 11.2 oz)   LMP 12/10/2010   SpO2 95%   BMI 47.08 kg/m    Exam:  GENERAL APPEARANCE:  Alert, in no distress  ENT:  Mouth and posterior " oropharynx normal, moist mucous membranes, normal hearing acuity  EYES:  EOM, conjunctivae, lids, pupils and irises normal, PERRL  NECK:  .  RESP:  no respiratory distress  CV:  peripheral edema trace+ in LE bilaterally  ABDOMEN:  abdomen round  M/S:   patient resting in bed, able to move all 4 extremities  SKIN:  Inspection of skin and subcutaneous tissue baseline  NEURO:   speech WNL  PSYCH:  affect and mood normal    Labs:     Most Recent 3 CBC's:  Recent Labs   Lab Test 12/31/20  0932 12/28/20  0713 12/27/20  1009 12/25/20  1214 07/13/20  1530   WBC  --   --  9.6 11.8* 11.1*   HGB  --   --  11.8 11.6* 12.6   MCV  --   --  94 95 95    166 116* 145* 179     Most Recent 3 BMP's:  Recent Labs   Lab Test 01/04/21 12/31/20  0932 12/30/20  0854 12/29/20  1507 12/29/20  0901   *  --  132*  --  130*   POTASSIUM 3.5  --  3.9 3.5 3.3*   CHLORIDE 98  --  99  --  97   CO2 29  --  29  --  30   BUN 8  --  17  --  13   CR 0.40* 0.68 0.59  --  0.62   ANIONGAP 7  --  4  --  3   HEAVENLY 9.9  --  9.2  --  9.0   *  --  115*  --  166*       ASSESSMENT/PLAN:  Pyelonephritis  Sepsis due to urinary tract infection (H)  Physical deconditioning  Acute/ongoing: Bactrim /160mg BID finished  culturelle 1 PO BID continue     MS (multiple sclerosis) (H)  Ongoing: PT and OT for strengthening     Compression fracture of L1 vertebra with routine healing, subsequent encounter  Acute/ongoing: TLSO brace on when OOB, Percocet5/325mg 1-2 tabs PO q 6 hours prn     Diarrhea:   Acute/ongoing: C-diff negative during hospitalization, change colace to BID prn, imodium 2mg q 6 hours prn  BMP pending today  Send a stool culture for c-diff  One was done during hospitalization but due to ongoing diarrhea and symptoms will re check     Benign essential hypertension  Acute/ongoing: vitals daily and prn, BMP follow, torsemide 10mg QD, KCL 20meq QD, losartan 50mg QD         Orders written by provider at facility  Stool culture for  C-diff      Electronically signed by:  Tonya Lynn Haase, APRN CNP               Sincerely,        Tonya Lynn Haase, APRN CNP

## 2021-01-06 NOTE — PROGRESS NOTES
[place] [facility]  [place] Medical Record Number:  [id number]  Place of Service where encounter took place:  [facility] ([facility]) [[id number]]  Chief Complaint   Patient presents with     Nursing Home Acute       HPI:    Mili Padilla  is a 65 year old (1955), who is being seen today for an episodic care visit.  HPI information obtained from: facility chart records, facility staff, patient report and [facility] chart review. Today's concern is:  Pyelonephritis/sepsis: denies fever, chills, dysuria, fatigued today, states she took imodium yesterday and that caused abdominal cramping and she felt better after having loose stool today, denies N/V today, states appetite is improving some, patient states she walked from her bed to the door in her room today using a RW.   HTN: BP stable 137/64, 132/63, 154/66 with HR in 70 range, denies CP, palpitations, SOB.   Compression Fx: patient states pain is controlled, no other concerns.     Past Medical and Surgical History reviewed in Epic today.    MEDICATIONS:    Current Outpatient Medications   Medication Sig Dispense Refill     ARIPiprazole (ABILIFY) 5 MG tablet Take 5 mg by mouth daily   2     atorvastatin (LIPITOR) 20 MG tablet Take 20 mg by mouth daily.       Calcium Citrate-Vitamin D 250-200 MG-UNIT TABS Take 1 tablet by mouth 3 times daily        carBAMazepine (TEGRETOL) 200 MG tablet Take 200 mg by mouth 2 times daily       denosumab (PROLIA) 60 MG/ML SOLN injection Inject 60 mg Subcutaneous every 6 months       Dextromethorphan-Guaifenesin (MUCINEX DM PO) Take 1,200 mg by mouth daily 1200mg daily        docusate sodium (COLACE) 100 MG capsule Take 100 mg by mouth 2 times daily as needed       DULoxetine (CYMBALTA) 60 MG capsule Take 120 mg by mouth daily        ketoconazole (NIZORAL) 2 % cream Apply topically 2 times daily as needed        lactobacillus rhamnosus, GG, (CULTURELL) capsule Take 1 capsule by mouth 2 times daily for 10 days        "loperamide (IMODIUM A-D) 2 MG tablet Take 1 tablet (2 mg) by mouth 4 times daily as needed for diarrhea       losartan (COZAAR) 50 MG tablet Take 1 tablet (50 mg) by mouth daily       Melatonin 10 MG TABS Take 10 mg by mouth At Bedtime       methylphenidate (RITALIN) 20 MG tablet Take 1 tablet (20 mg) by mouth daily 3 tablet 0     mirtazapine (REMERON) 30 MG tablet Take 75 mg by mouth At Bedtime        Multiple Vitamin (MULTIVITAMINS PO) Take 2 tablets by mouth every evening. WITH IRON       ondansetron (ZOFRAN-ODT) 4 MG ODT tab Take 1 tablet (4 mg) by mouth every 6 hours as needed for nausea or vomiting       potassium chloride ER (KLOR-CON M) 20 MEQ CR tablet Take 1 tablet (20 mEq) by mouth daily       prochlorperazine (COMPAZINE) 5 MG tablet Take 1 tablet (5 mg) by mouth every 6 hours as needed for vomiting       torsemide (DEMADEX) 10 MG tablet Take 1 tablet (10 mg) by mouth daily       traZODone (DESYREL) 100 MG tablet Take 500 mg by mouth At Bedtime        triamcinolone (ARISTOCORT HP) 0.5 % external cream Apply topically At Bedtime To hands       triamcinolone (KENALOG) 0.5 % OINT ointment Apply topically 2 times daily as needed for irritation To legs       trospium (SANCTURA) 20 MG tablet Take 1 tablet (20 mg) by mouth 2 times daily (before meals)       vitamin B-Complex Take 1 tablet by mouth daily            REVIEW OF SYSTEMS:  10 point ROS of systems including Constitutional, Eyes, Respiratory, Cardiovascular, Gastroenterology, Genitourinary, Integumentary, Musculoskeletal, Psychiatric were all negative except for pertinent positives noted in my HPI.    Objective:  /64   Pulse 75   Temp 97.9  F (36.6  C)   Resp 18   Ht 1.676 m (5' 6\")   Wt 132.3 kg (291 lb 11.2 oz)   LMP 12/10/2010   SpO2 95%   BMI 47.08 kg/m    Exam:  GENERAL APPEARANCE:  Alert, in no distress  ENT:  Mouth and posterior oropharynx normal, moist mucous membranes, normal hearing acuity  EYES:  EOM, conjunctivae, lids, pupils " and irises normal, PERRL  NECK:  .  RESP:  no respiratory distress  CV:  peripheral edema trace+ in LE bilaterally  ABDOMEN:  abdomen round  M/S:   patient resting in bed, able to move all 4 extremities  SKIN:  Inspection of skin and subcutaneous tissue baseline  NEURO:   speech WNL  PSYCH:  affect and mood normal    Labs:     Most Recent 3 CBC's:  Recent Labs   Lab Test 12/31/20  0932 12/28/20  0713 12/27/20  1009 12/25/20  1214 07/13/20  1530   WBC  --   --  9.6 11.8* 11.1*   HGB  --   --  11.8 11.6* 12.6   MCV  --   --  94 95 95    166 116* 145* 179     Most Recent 3 BMP's:  Recent Labs   Lab Test 01/04/21 12/31/20  0932 12/30/20  0854 12/29/20  1507 12/29/20  0901   *  --  132*  --  130*   POTASSIUM 3.5  --  3.9 3.5 3.3*   CHLORIDE 98  --  99  --  97   CO2 29  --  29  --  30   BUN 8  --  17  --  13   CR 0.40* 0.68 0.59  --  0.62   ANIONGAP 7  --  4  --  3   HEAVENLY 9.9  --  9.2  --  9.0   *  --  115*  --  166*       ASSESSMENT/PLAN:  Pyelonephritis  Sepsis due to urinary tract infection (H)  Physical deconditioning  Acute/ongoing: Bactrim /160mg BID finished  culturelle 1 PO BID continue     MS (multiple sclerosis) (H)  Ongoing: PT and OT for strengthening     Compression fracture of L1 vertebra with routine healing, subsequent encounter  Acute/ongoing: TLSO brace on when OOB, Percocet5/325mg 1-2 tabs PO q 6 hours prn     Diarrhea:   Acute/ongoing: C-diff negative during hospitalization, change colace to BID prn, imodium 2mg q 6 hours prn  BMP pending today  Send a stool culture for c-diff  One was done during hospitalization but due to ongoing diarrhea and symptoms will re check     Benign essential hypertension  Acute/ongoing: vitals daily and prn, BMP follow, torsemide 10mg QD, KCL 20meq QD, losartan 50mg QD         Orders written by provider at facility  Stool culture for C-diff      Electronically signed by:  Tonya Lynn Haase, APRN CNP

## 2021-01-12 ASSESSMENT — MIFFLIN-ST. JEOR: SCORE: 1849.96

## 2021-01-13 ENCOUNTER — NURSING HOME VISIT (OUTPATIENT)
Dept: GERIATRICS | Facility: CLINIC | Age: 66
End: 2021-01-13
Payer: COMMERCIAL

## 2021-01-13 VITALS
SYSTOLIC BLOOD PRESSURE: 136 MMHG | DIASTOLIC BLOOD PRESSURE: 73 MMHG | OXYGEN SATURATION: 95 % | HEIGHT: 66 IN | TEMPERATURE: 97.6 F | BODY MASS INDEX: 45.82 KG/M2 | HEART RATE: 79 BPM | RESPIRATION RATE: 17 BRPM | WEIGHT: 285.1 LBS

## 2021-01-13 DIAGNOSIS — G35 MS (MULTIPLE SCLEROSIS) (H): ICD-10-CM

## 2021-01-13 DIAGNOSIS — S32.010D COMPRESSION FRACTURE OF L1 VERTEBRA WITH ROUTINE HEALING, SUBSEQUENT ENCOUNTER: ICD-10-CM

## 2021-01-13 DIAGNOSIS — A41.9 SEPSIS DUE TO URINARY TRACT INFECTION (H): ICD-10-CM

## 2021-01-13 DIAGNOSIS — N12 PYELONEPHRITIS: Primary | ICD-10-CM

## 2021-01-13 DIAGNOSIS — R53.81 PHYSICAL DECONDITIONING: ICD-10-CM

## 2021-01-13 DIAGNOSIS — N39.0 SEPSIS DUE TO URINARY TRACT INFECTION (H): ICD-10-CM

## 2021-01-13 DIAGNOSIS — I10 BENIGN ESSENTIAL HYPERTENSION: ICD-10-CM

## 2021-01-13 DIAGNOSIS — R19.7 DIARRHEA, UNSPECIFIED TYPE: ICD-10-CM

## 2021-01-13 PROCEDURE — 99310 SBSQ NF CARE HIGH MDM 45: CPT | Performed by: NURSE PRACTITIONER

## 2021-01-13 RX ORDER — DIPHENOXYLATE HCL/ATROPINE 2.5-.025MG
1 TABLET ORAL 4 TIMES DAILY PRN
Qty: 40 TABLET | Refills: 0 | Status: SHIPPED | OUTPATIENT
Start: 2021-01-13 | End: 2021-01-22

## 2021-01-13 NOTE — PROGRESS NOTES
[facility]  [place] Medical Record Number:  [id number]  Place of Service where encounter took place:  [facility] (S) [[id number]]  Chief Complaint   Patient presents with     Nursing Home Acute       HPI:    Mili Padilla  is a 65 year old (1955), who is being seen today for an episodic care visit.  HPI information obtained from: facility chart records, facility staff, patient report and [facility] chart review. Today's concern is:  Pyelonephritis/sepsis from UTI: patient finished with Abx, denies fever, chills, dysuria, frequency   Diarrhea: continues to have diarrhea, patient states stool is starting to firm up a bit but she has stooling every time she moves and therapy noted patient has bowel incontinence with walking  MS/function: walking up to 30 feet with RW with SBA, fatigues easily also has bowel incontinence with waling.   HTN: /73, 136/73, 142/77 with HR in 70-80 range, denies CP, palpitations.     Past Medical and Surgical History reviewed in Epic today.    MEDICATIONS:    Current Outpatient Medications   Medication Sig Dispense Refill     ARIPiprazole (ABILIFY) 5 MG tablet Take 5 mg by mouth daily   2     atorvastatin (LIPITOR) 20 MG tablet Take 20 mg by mouth daily.       Calcium Citrate-Vitamin D 250-200 MG-UNIT TABS Take 1 tablet by mouth 3 times daily        carBAMazepine (TEGRETOL) 200 MG tablet Take 200 mg by mouth 2 times daily       denosumab (PROLIA) 60 MG/ML SOLN injection Inject 60 mg Subcutaneous every 6 months       Dextromethorphan-Guaifenesin (MUCINEX DM PO) Take 1,200 mg by mouth daily 1200mg daily        docusate sodium (COLACE) 100 MG capsule Take 100 mg by mouth 2 times daily as needed       DULoxetine (CYMBALTA) 60 MG capsule Take 120 mg by mouth daily        ketoconazole (NIZORAL) 2 % cream Apply topically 2 times daily as needed        loperamide (IMODIUM A-D) 2 MG tablet Take 1 tablet (2 mg) by mouth 4 times daily as needed for diarrhea    "    losartan (COZAAR) 50 MG tablet Take 1 tablet (50 mg) by mouth daily       Melatonin 10 MG TABS Take 10 mg by mouth At Bedtime       methylphenidate (RITALIN) 20 MG tablet Take 1 tablet (20 mg) by mouth daily 3 tablet 0     mirtazapine (REMERON) 30 MG tablet Take 75 mg by mouth At Bedtime        Multiple Vitamin (MULTIVITAMINS PO) Take 2 tablets by mouth every evening. WITH IRON       ondansetron (ZOFRAN-ODT) 4 MG ODT tab Take 1 tablet (4 mg) by mouth every 6 hours as needed for nausea or vomiting       potassium chloride ER (KLOR-CON M) 20 MEQ CR tablet Take 1 tablet (20 mEq) by mouth daily       prochlorperazine (COMPAZINE) 5 MG tablet Take 1 tablet (5 mg) by mouth every 6 hours as needed for vomiting       torsemide (DEMADEX) 10 MG tablet Take 1 tablet (10 mg) by mouth daily       traZODone (DESYREL) 100 MG tablet Take 500 mg by mouth At Bedtime        triamcinolone (ARISTOCORT HP) 0.5 % external cream Apply topically At Bedtime To hands       triamcinolone (KENALOG) 0.5 % OINT ointment Apply topically 2 times daily as needed for irritation To legs       trospium (SANCTURA) 20 MG tablet Take 1 tablet (20 mg) by mouth 2 times daily (before meals)       vitamin B-Complex Take 1 tablet by mouth daily            REVIEW OF SYSTEMS:  10 point ROS of systems including Constitutional, Eyes, Respiratory, Cardiovascular, Gastroenterology, Genitourinary, Integumentary, Musculoskeletal, Psychiatric were all negative except for pertinent positives noted in my HPI.    Objective:  /73   Pulse 79   Temp 97.6  F (36.4  C)   Resp 17   Ht 1.676 m (5' 6\")   Wt 129.3 kg (285 lb 1.6 oz)   LMP 12/10/2010   SpO2 95%   BMI 46.02 kg/m    Exam:  GENERAL APPEARANCE:  Alert, in no distress  ENT:  Mouth and posterior oropharynx normal, moist mucous membranes, normal hearing acuity  EYES:  EOM, conjunctivae, lids, pupils and irises normal, PERRL  NECK:  .  RESP:  no respiratory distress  CV:  peripheral edema trace+ in LE " bilaterally  ABDOMEN:  abdomen round  M/S:   patient resting in bed, able to move all 4 extremities  SKIN:  Inspection of skin and subcutaneous tissue baseline  NEURO:   speech WNL  PSYCH:  affect and mood normal    Labs:     Most Recent 3 CBC's:  Recent Labs   Lab Test 12/31/20  0932 12/28/20  0713 12/27/20  1009 12/25/20  1214 07/13/20  1530   WBC  --   --  9.6 11.8* 11.1*   HGB  --   --  11.8 11.6* 12.6   MCV  --   --  94 95 95    166 116* 145* 179     Most Recent 3 BMP's:  Recent Labs   Lab Test 01/04/21 12/31/20  0932 12/30/20  0854 12/29/20  1507 12/29/20  0901   *  --  132*  --  130*   POTASSIUM 3.5  --  3.9 3.5 3.3*   CHLORIDE 98  --  99  --  97   CO2 29  --  29  --  30   BUN 8  --  17  --  13   CR 0.40* 0.68 0.59  --  0.62   ANIONGAP 7  --  4  --  3   HEAVENLY 9.9  --  9.2  --  9.0   *  --  115*  --  166*       ASSESSMENT/PLAN:  Pyelonephritis  Sepsis due to urinary tract infection (H)  Physical deconditioning  Acute/ongoing: Bactrim /160mg BID finished  culturelle 1 PO BID continue     MS (multiple sclerosis) (H)  Ongoing: PT and OT for strengthening     Compression fracture of L1 vertebra with routine healing, subsequent encounter  Acute/ongoing: TLSO brace on when OOB, Percocet5/325mg 1-2 tabs PO q 6 hours prn     Diarrhea:   Acute/ongoing: C-diff negative during hospitalization, change colace to BID prn  DC imodium, start lomotil 2.5mg BID scheduled and q 12 hours prn     Benign essential hypertension  Acute/ongoing: vitals daily and prn, BMP follow, torsemide 10mg QD, KCL 20meq QD, losartan 50mg QD          Orders written by provider at facility  DC imodium  Lomotil 2.5mg BID scheduled and q 12 hours prn    Total time spent with patient visit at the skilled nursing facility was 35 min including patient visit and review of past records. Greater than 50% of total time spent with counseling and coordinating care due to discussed diarrhea, some improvement but patient having  incontinent episodes during therapy, limiting therapy. Discussed pain control which is improved, patient states she is taking 1 tablet of oxycodone a day, education on use of pain meds if she needs for therapy and to increase function if pain is bad enough. .  Electronically signed by:  Tonya Lynn Haase, APRN CNP

## 2021-01-13 NOTE — LETTER
1/13/2021        RE: Mili Padilla  [address]  [facility] [address]        [facility]  [place] Medical Record Number:  [id number]  Place of Service where encounter took place:  [facility] (S) [[id number]]  Chief Complaint   Patient presents with     Nursing Home Acute       HPI:    Mili Padilla  is a 65 year old (1955), who is being seen today for an episodic care visit.  HPI information obtained from: facility chart records, facility staff, patient report and [facility] chart review. Today's concern is:  Pyelonephritis/sepsis from UTI: patient finished with Abx, denies fever, chills, dysuria, frequency   Diarrhea: continues to have diarrhea, patient states stool is starting to firm up a bit but she has stooling every time she moves and therapy noted patient has bowel incontinence with walking  MS/function: walking up to 30 feet with RW with SBA, fatigues easily also has bowel incontinence with waling.   HTN: /73, 136/73, 142/77 with HR in 70-80 range, denies CP, palpitations.     Past Medical and Surgical History reviewed in Epic today.    MEDICATIONS:    Current Outpatient Medications   Medication Sig Dispense Refill     ARIPiprazole (ABILIFY) 5 MG tablet Take 5 mg by mouth daily   2     atorvastatin (LIPITOR) 20 MG tablet Take 20 mg by mouth daily.       Calcium Citrate-Vitamin D 250-200 MG-UNIT TABS Take 1 tablet by mouth 3 times daily        carBAMazepine (TEGRETOL) 200 MG tablet Take 200 mg by mouth 2 times daily       denosumab (PROLIA) 60 MG/ML SOLN injection Inject 60 mg Subcutaneous every 6 months       Dextromethorphan-Guaifenesin (MUCINEX DM PO) Take 1,200 mg by mouth daily 1200mg daily        docusate sodium (COLACE) 100 MG capsule Take 100 mg by mouth 2 times daily as needed       DULoxetine (CYMBALTA) 60 MG capsule Take 120 mg by mouth daily        ketoconazole (NIZORAL) 2 % cream Apply topically 2 times daily as needed        loperamide  "(IMODIUM A-D) 2 MG tablet Take 1 tablet (2 mg) by mouth 4 times daily as needed for diarrhea       losartan (COZAAR) 50 MG tablet Take 1 tablet (50 mg) by mouth daily       Melatonin 10 MG TABS Take 10 mg by mouth At Bedtime       methylphenidate (RITALIN) 20 MG tablet Take 1 tablet (20 mg) by mouth daily 3 tablet 0     mirtazapine (REMERON) 30 MG tablet Take 75 mg by mouth At Bedtime        Multiple Vitamin (MULTIVITAMINS PO) Take 2 tablets by mouth every evening. WITH IRON       ondansetron (ZOFRAN-ODT) 4 MG ODT tab Take 1 tablet (4 mg) by mouth every 6 hours as needed for nausea or vomiting       potassium chloride ER (KLOR-CON M) 20 MEQ CR tablet Take 1 tablet (20 mEq) by mouth daily       prochlorperazine (COMPAZINE) 5 MG tablet Take 1 tablet (5 mg) by mouth every 6 hours as needed for vomiting       torsemide (DEMADEX) 10 MG tablet Take 1 tablet (10 mg) by mouth daily       traZODone (DESYREL) 100 MG tablet Take 500 mg by mouth At Bedtime        triamcinolone (ARISTOCORT HP) 0.5 % external cream Apply topically At Bedtime To hands       triamcinolone (KENALOG) 0.5 % OINT ointment Apply topically 2 times daily as needed for irritation To legs       trospium (SANCTURA) 20 MG tablet Take 1 tablet (20 mg) by mouth 2 times daily (before meals)       vitamin B-Complex Take 1 tablet by mouth daily            REVIEW OF SYSTEMS:  10 point ROS of systems including Constitutional, Eyes, Respiratory, Cardiovascular, Gastroenterology, Genitourinary, Integumentary, Musculoskeletal, Psychiatric were all negative except for pertinent positives noted in my HPI.    Objective:  /73   Pulse 79   Temp 97.6  F (36.4  C)   Resp 17   Ht 1.676 m (5' 6\")   Wt 129.3 kg (285 lb 1.6 oz)   LMP 12/10/2010   SpO2 95%   BMI 46.02 kg/m    Exam:  GENERAL APPEARANCE:  Alert, in no distress  ENT:  Mouth and posterior oropharynx normal, moist mucous membranes, normal hearing acuity  EYES:  EOM, conjunctivae, lids, pupils and irises " normal, PERRL  NECK:  .  RESP:  no respiratory distress  CV:  peripheral edema trace+ in LE bilaterally  ABDOMEN:  abdomen round  M/S:   patient resting in bed, able to move all 4 extremities  SKIN:  Inspection of skin and subcutaneous tissue baseline  NEURO:   speech WNL  PSYCH:  affect and mood normal    Labs:     Most Recent 3 CBC's:  Recent Labs   Lab Test 12/31/20  0932 12/28/20  0713 12/27/20  1009 12/25/20  1214 07/13/20  1530   WBC  --   --  9.6 11.8* 11.1*   HGB  --   --  11.8 11.6* 12.6   MCV  --   --  94 95 95    166 116* 145* 179     Most Recent 3 BMP's:  Recent Labs   Lab Test 01/04/21 12/31/20  0932 12/30/20  0854 12/29/20  1507 12/29/20  0901   *  --  132*  --  130*   POTASSIUM 3.5  --  3.9 3.5 3.3*   CHLORIDE 98  --  99  --  97   CO2 29  --  29  --  30   BUN 8  --  17  --  13   CR 0.40* 0.68 0.59  --  0.62   ANIONGAP 7  --  4  --  3   HEAVENLY 9.9  --  9.2  --  9.0   *  --  115*  --  166*       ASSESSMENT/PLAN:  Pyelonephritis  Sepsis due to urinary tract infection (H)  Physical deconditioning  Acute/ongoing: Bactrim /160mg BID finished  culturelle 1 PO BID continue     MS (multiple sclerosis) (H)  Ongoing: PT and OT for strengthening     Compression fracture of L1 vertebra with routine healing, subsequent encounter  Acute/ongoing: TLSO brace on when OOB, Percocet5/325mg 1-2 tabs PO q 6 hours prn     Diarrhea:   Acute/ongoing: C-diff negative during hospitalization, change colace to BID prn  DC imodium, start lomotil 2.5mg BID scheduled and q 12 hours prn     Benign essential hypertension  Acute/ongoing: vitals daily and prn, BMP follow, torsemide 10mg QD, KCL 20meq QD, losartan 50mg QD          Orders written by provider at facility  DC imodium  Lomotil 2.5mg BID scheduled and q 12 hours prn    Total time spent with patient visit at the skilled nursing facility was 35 min including patient visit and review of past records. Greater than 50% of total time spent with counseling and  coordinating care due to discussed diarrhea, some improvement but patient having incontinent episodes during therapy, limiting therapy. Discussed pain control which is improved, patient states she is taking 1 tablet of oxycodone a day, education on use of pain meds if she needs for therapy and to increase function if pain is bad enough. .  Electronically signed by:  Tonya Lynn Haase, APRN CNP               Sincerely,        Tonya Lynn Haase, APRN CNP

## 2021-01-15 ENCOUNTER — HEALTH MAINTENANCE LETTER (OUTPATIENT)
Age: 66
End: 2021-01-15

## 2021-01-17 ENCOUNTER — NURSING HOME VISIT (OUTPATIENT)
Dept: GERIATRICS | Facility: CLINIC | Age: 66
End: 2021-01-17
Payer: COMMERCIAL

## 2021-01-17 DIAGNOSIS — I10 BENIGN ESSENTIAL HYPERTENSION: ICD-10-CM

## 2021-01-17 DIAGNOSIS — R19.7 DIARRHEA, UNSPECIFIED TYPE: ICD-10-CM

## 2021-01-17 DIAGNOSIS — S32.010D COMPRESSION FRACTURE OF L1 VERTEBRA WITH ROUTINE HEALING, SUBSEQUENT ENCOUNTER: ICD-10-CM

## 2021-01-17 DIAGNOSIS — G35 MS (MULTIPLE SCLEROSIS) (H): ICD-10-CM

## 2021-01-17 DIAGNOSIS — N12 PYELONEPHRITIS: Primary | ICD-10-CM

## 2021-01-17 PROCEDURE — 99305 1ST NF CARE MODERATE MDM 35: CPT | Performed by: INTERNAL MEDICINE

## 2021-01-19 NOTE — PROGRESS NOTES
[place] [facility]  PRIMARY CARE PROVIDER AND CLINIC:  Jasmyn Márquez MD, [place] [facility] [address] / [place]*      Pt was seen at the [facility] on 1/17/21 for an initial TCU visit    Mili Padilla  is a 65 year old  (1955), admitted to the above facility from  [facility]. Hospital stay 12/25/20 through 12/31/20..      Admitted to this facility for  rehab, medical management and nursing care.    Hospital course was reviewed by me:      PMH of MS with bladder dysfunction, HTN, GERD, HLD, OVIDIO,    Pt  presented with SOB, cough, fever  Sepsis suspected secondary to pyelonephritis: UC grew 2 species of enterobacter both sensitive to bactrim patient started on IV rocephin transitioned to oral bactrim 12/28 to complete 7 day course (which has now been completed)  Course complicated by mild volume overload secondary to IV fluids.    She was noted to have possible cirrhosis with splenomegaly on admission CT scan. LFTs nl/ INR 1.07.  No history of ETOH abuse    Pt developed diarrhea following admission/ C diff neg one week ago.  Has continued to have frequent loose stools, refractory to anti-diarrheal medications. No nausea or emesis. She has chronic urinary urgency.  She has chronic neuropathic pain in her legs.    Recent  fall at home/transverse fracture of L1: neurosurgery consult, TLSO brace and f/u in 6 weeks      Pt's main concern remains diarrhea with occ stool incontinence  Appetite fair.   She is walking short distances in therapy, limited by fatigue  Back pain is under fair control.    CODE STATUS/ADVANCE DIRECTIVES DISCUSSION:   CPR/Full code   Patient's living condition: lives alone  ALLERGIES: Augmentin, Betaseron [interferon beta-1b], Dust mite extract, and Mold  PAST MEDICAL HISTORY:  has a past medical history of Depression, major, in partial remission (H), Fibromyalgia syndrome, Gastro-oesophageal reflux disease, Hyperlipemia, Lymphedema,  Multiple sclerosis (H), Multiple sclerosis, secondary progressive (H), Sleep apnea, and Vocal cord paralysis, unilateral complete.  PAST SURGICAL HISTORY:   has a past surgical history that includes orthopedic surgery; ENT surgery; Laparoscopic gastric sleeve (5/30/2013); Laparoscopic appendectomy (5/30/2013); Laparoscopic Biopsy Liver (5/30/2013); Esophagoscopy, gastroscopy, duodenoscopy (EGD), combined (N/A, 12/16/2014); Esophagoscopy, gastroscopy, duodenoscopy (EGD), combined (N/A, 12/16/2014); Colonoscopy (N/A, 7/17/2017); and Colonoscopy (N/A, 2/23/2018).  FAMILY HISTORY: family history includes Breast Cancer in her maternal aunt, maternal aunt, maternal aunt, maternal aunt, and mother; Other Cancer in her father.  SOCIAL HISTORY:   reports that she quit smoking about 46 years ago. Her smoking use included cigarettes. She has never used smokeless tobacco. She reports current alcohol use. She reports that she does not use drugs.      Current Outpatient Medications   Medication Sig Dispense Refill     ARIPiprazole (ABILIFY) 5 MG tablet Take 5 mg by mouth daily   2     atorvastatin (LIPITOR) 20 MG tablet Take 20 mg by mouth daily.       Calcium Citrate-Vitamin D 250-200 MG-UNIT TABS Take 1 tablet by mouth 3 times daily        carBAMazepine (TEGRETOL) 200 MG tablet Take 200 mg by mouth 2 times daily       denosumab (PROLIA) 60 MG/ML SOLN injection Inject 60 mg Subcutaneous every 6 months       Dextromethorphan-Guaifenesin (MUCINEX DM PO) Take 1,200 mg by mouth daily 1200mg daily        diphenoxylate-atropine (LOMOTIL) 2.5-0.025 MG tablet Take 1 tablet by mouth 4 times daily as needed for diarrhea 40 tablet 0     docusate sodium (COLACE) 100 MG capsule Take 100 mg by mouth 2 times daily as needed       DULoxetine (CYMBALTA) 60 MG capsule Take 120 mg by mouth daily        ketoconazole (NIZORAL) 2 % cream Apply topically 2 times daily as needed        loperamide (IMODIUM A-D) 2 MG tablet Take 1 tablet (2 mg) by mouth  4 times daily as needed for diarrhea       losartan (COZAAR) 50 MG tablet Take 1 tablet (50 mg) by mouth daily       Melatonin 10 MG TABS Take 10 mg by mouth At Bedtime       methylphenidate (RITALIN) 20 MG tablet Take 1 tablet (20 mg) by mouth daily 3 tablet 0     mirtazapine (REMERON) 30 MG tablet Take 75 mg by mouth At Bedtime        Multiple Vitamin (MULTIVITAMINS PO) Take 2 tablets by mouth every evening. WITH IRON       ondansetron (ZOFRAN-ODT) 4 MG ODT tab Take 1 tablet (4 mg) by mouth every 6 hours as needed for nausea or vomiting       potassium chloride ER (KLOR-CON M) 20 MEQ CR tablet Take 1 tablet (20 mEq) by mouth daily       prochlorperazine (COMPAZINE) 5 MG tablet Take 1 tablet (5 mg) by mouth every 6 hours as needed for vomiting       torsemide (DEMADEX) 10 MG tablet Take 1 tablet (10 mg) by mouth daily       traZODone (DESYREL) 100 MG tablet Take 500 mg by mouth At Bedtime        triamcinolone (ARISTOCORT HP) 0.5 % external cream Apply topically At Bedtime To hands       triamcinolone (KENALOG) 0.5 % OINT ointment Apply topically 2 times daily as needed for irritation To legs       trospium (SANCTURA) 20 MG tablet Take 1 tablet (20 mg) by mouth 2 times daily (before meals)       vitamin B-Complex Take 1 tablet by mouth daily            ROS:  10 point ROS neg except as noted above    Exam:  GENERAL APPEARANCE:  Alert, in no distress, lying in bed.  Frustrated re continued diarrhea  ENT:  Oral mucosa moist  EYES:  No eye redness or drainage  NECK: supple  RESP:  no respiratory distress  CV: RRR  ABDOMEN:  Soft, protuberant, non-tender  M/S:   Trace LE edema B.  generalized LE weakness. Gait was not assessed by me. Spine was not examined  SKIN:  No rash  NEURO:  Alert, fully oriented. CN intact  PSYCH:  affect and mood normal    Lab/Diagnostic data:    Most Recent 3 CBC's:  Recent Labs   Lab Test 12/31/20  0932 12/28/20  0713 12/27/20  1009 12/25/20  1214 07/13/20  1530   WBC  --   --  9.6 11.8* 11.1*    HGB  --   --  11.8 11.6* 12.6   MCV  --   --  94 95 95    166 116* 145* 179     Most Recent 3 BMP's:  Recent Labs   Lab Test 01/04/21 12/31/20  0932 12/30/20  0854 12/29/20  1507 12/29/20  0901   *  --  132*  --  130*   POTASSIUM 3.5  --  3.9 3.5 3.3*   CHLORIDE 98  --  99  --  97   CO2 29  --  29  --  30   BUN 8  --  17  --  13   CR 0.40* 0.68 0.59  --  0.62   ANIONGAP 7  --  4  --  3   HEAVENLY 9.9  --  9.2  --  9.0   *  --  115*  --  166*       ASSESSMENT/PLAN:    Pyelonephritis  Sepsis due to urinary tract infection (H)  Physical deconditioning  Afebrile. No acute  symptoms  Pt has completed course of antibiotics  Plan monitor voiding, vitals      MS (multiple sclerosis) (H)  Ongoing: PT and OT for strengthening    Compression fracture of L1 vertebra with routine healing, subsequent encounter  Acute/ongoing: TLSO brace on when OOB, Percocet5/325mg 1-2 tabs PO q 6 hours prn    Diarrhea:   Acute/ongoing: C-diff negative one week ago.  Etiology unclear, possible antibiotic related (even with neg C diff), possible mild ileus or even primary constipation  Plan  Repeat C diff. Consider trial of Questran.  KUB to r/out primary constipation    Compression fracture of L1 vertebra with routine healing, subsequent encounter  Acute/ongoing: TLSO brace on when OOB oxycodone prn. Therapies.    Benign essential hypertension  Stable  Plan continue current medications, monitor BMP,BP    Possible cirrhosis with splenomegaly  Nl transaminases  Could represent MADDEN  Plan outpt f/u    Shane Griffin MD

## 2021-01-21 ENCOUNTER — NURSING HOME VISIT (OUTPATIENT)
Dept: GERIATRICS | Facility: CLINIC | Age: 66
End: 2021-01-21
Payer: COMMERCIAL

## 2021-01-21 VITALS
SYSTOLIC BLOOD PRESSURE: 134 MMHG | BODY MASS INDEX: 45.64 KG/M2 | WEIGHT: 284 LBS | TEMPERATURE: 97 F | RESPIRATION RATE: 16 BRPM | DIASTOLIC BLOOD PRESSURE: 69 MMHG | HEIGHT: 66 IN | OXYGEN SATURATION: 94 % | HEART RATE: 73 BPM

## 2021-01-21 DIAGNOSIS — G35 MS (MULTIPLE SCLEROSIS) (H): ICD-10-CM

## 2021-01-21 DIAGNOSIS — R53.81 PHYSICAL DECONDITIONING: ICD-10-CM

## 2021-01-21 DIAGNOSIS — I10 BENIGN ESSENTIAL HYPERTENSION: ICD-10-CM

## 2021-01-21 DIAGNOSIS — N39.0 SEPSIS DUE TO URINARY TRACT INFECTION (H): ICD-10-CM

## 2021-01-21 DIAGNOSIS — R19.7 DIARRHEA, UNSPECIFIED TYPE: ICD-10-CM

## 2021-01-21 DIAGNOSIS — A41.9 SEPSIS DUE TO URINARY TRACT INFECTION (H): ICD-10-CM

## 2021-01-21 DIAGNOSIS — S32.010D COMPRESSION FRACTURE OF L1 VERTEBRA WITH ROUTINE HEALING, SUBSEQUENT ENCOUNTER: ICD-10-CM

## 2021-01-21 DIAGNOSIS — N12 PYELONEPHRITIS: Primary | ICD-10-CM

## 2021-01-21 PROCEDURE — 99309 SBSQ NF CARE MODERATE MDM 30: CPT | Performed by: NURSE PRACTITIONER

## 2021-01-21 RX ORDER — METHYLPHENIDATE HYDROCHLORIDE 20 MG/1
20 TABLET ORAL DAILY
Qty: 14 TABLET | Refills: 0 | Status: SHIPPED | OUTPATIENT
Start: 2021-01-21 | End: 2021-02-04

## 2021-01-21 ASSESSMENT — MIFFLIN-ST. JEOR: SCORE: 1844.97

## 2021-01-21 NOTE — PROGRESS NOTES
"[facility]  [place] Medical Record Number:  [id number]  Place of Service where encounter took place:  [facility] ([facility]) [[id number]]  Chief Complaint   Patient presents with     Nursing Home Acute     HPI:    Mili Padilla  is a 65 year old (1955), who is being seen today for an episodic care visit.  HPI information obtained from: facility chart records, facility staff, patient report and [facility] chart review.     This is a 65-year-old female, with a past medical history significant for depression, fibromyalgia, GERD, hyperlipidemia, multiple sclerosis, bladder dysfunction, OVIDIO and insomnia, who was admitted to [facility] 12/25/20 through 12/31/20 for fever, cough, shortness of breath and weakness. A chest CT was negative for acute findings, but did reveal \"Question a nodular liver contour and splenomegaly. Cirrhosis and portal hypertension could be present\". A urine culture on 12/25/20 revealed ,000 col/ml Enterobacter cloacae complex and ,000 col/ml Strain 2 Enterobacter cloacae complex. Ceftriaxone was changed to Bactrim on 12/28/20. Initially constipated, but later developed diarrhea with nausea and abdominal discomfort. Weight trended up after fluid administration and Torsemide was initiated. Also started on Losartan. A TCU stay was recommended for ongoing physical rehabilitation.     Today's concern is:     Loose Stools. Continues to have loose stools. Had 2 yesterday. Is irritating her skin from all of the wiping. Reports they obtained a stool sample yesterday and she is waiting to hear back. Has a GI appointment in less than 2 weeks, but doesn't want to wait that long for relief.    Transverse Fracture of L1 Superior Endplate. Feels pain is managed with pain medications. Working with therapy. Upon review of documentation, utilizing Hydrocodone-Acetaminophen daily on average.    Hypertension. Upon review of blood pressures over the past 5 days, " systolic range from 112-151, most < 140. Diastolic range from 68-84.    Past Medical and Surgical History reviewed in Epic today.    MEDICATIONS:  Current Outpatient Medications   Medication Sig Dispense Refill     ARIPiprazole (ABILIFY) 5 MG tablet Take 5 mg by mouth daily   2     atorvastatin (LIPITOR) 20 MG tablet Take 20 mg by mouth daily.       Calcium Citrate-Vitamin D 250-200 MG-UNIT TABS Take 1 tablet by mouth 3 times daily        calcium polycarbophil (FIBERCON) 625 MG tablet Take 1 tablet by mouth 2 times daily       carBAMazepine (TEGRETOL) 200 MG tablet Take 200 mg by mouth 2 times daily       cholestyramine (QUESTRAN) 4 g packet Take 1 packet by mouth daily       DULoxetine (CYMBALTA) 60 MG capsule Take 120 mg by mouth daily        guaiFENesin (MUCINEX) 600 MG 12 hr tablet Take 1,200 mg by mouth 2 times daily       ketoconazole (NIZORAL) 2 % cream Apply topically 2 times daily as needed        losartan (COZAAR) 50 MG tablet Take 1 tablet (50 mg) by mouth daily       Melatonin 10 MG TABS Take 10 mg by mouth At Bedtime       methylphenidate (RITALIN) 20 MG tablet Take 1 tablet (20 mg) by mouth daily 14 tablet 0     mirtazapine (REMERON) 30 MG tablet Take 75 mg by mouth At Bedtime        Multiple Vitamin (MULTIVITAMINS PO) Take 2 tablets by mouth every evening. WITH IRON       ondansetron (ZOFRAN-ODT) 4 MG ODT tab Take 1 tablet (4 mg) by mouth every 6 hours as needed for nausea or vomiting       oxyCODONE-acetaminophen (PERCOCET) 5-325 MG tablet Take 1-2 tablets by mouth every 6 hours as needed for severe pain       potassium chloride ER (KLOR-CON M) 20 MEQ CR tablet Take 1 tablet (20 mEq) by mouth daily       prochlorperazine (COMPAZINE) 5 MG tablet Take 1 tablet (5 mg) by mouth every 6 hours as needed for vomiting       torsemide (DEMADEX) 10 MG tablet Take 1 tablet (10 mg) by mouth daily       traZODone (DESYREL) 100 MG tablet Take 500 mg by mouth At Bedtime        triamcinolone (ARISTOCORT HP) 0.5 %  "external cream Apply topically At Bedtime To hands       triamcinolone (KENALOG) 0.5 % OINT ointment Apply topically 2 times daily as needed for irritation To legs       trospium (SANCTURA) 20 MG tablet Take 1 tablet (20 mg) by mouth 2 times daily (before meals)       vitamin B-Complex Take 1 tablet by mouth daily        denosumab (PROLIA) 60 MG/ML SOLN injection Inject 60 mg Subcutaneous every 6 months On hold in TCU       REVIEW OF SYSTEMS:  4 point ROS including Respiratory, CV, GI and , other than that noted in the HPI,  is negative    Objective:  /69   Pulse 73   Temp 97  F (36.1  C)   Resp 16   Ht 1.676 m (5' 6\")   Wt 128.8 kg (284 lb)   LMP 12/10/2010   SpO2 94%   BMI 45.84 kg/m    Exam:  GENERAL APPEARANCE:  Alert, in no distress  ENT:  Mouth and posterior oropharynx normal, moist mucous membranes  EYES:  EOM, conjunctivae, lids, pupils and irises normal  RESP:  respiratory effort and palpation of chest normal, lungs clear to auscultation , no respiratory distress, in upper lobes  CV:  Palpation and auscultation of heart done , regular rate and rhythm, no murmur, rub, or gallop  M/S:   Active movement of bilateral upper and lower extremities.  PSYCH:  affect and mood normal    Labs:   Labs done in SNF are in [place] EPIC. Please refer to them using [facility] Everywhere.    ASSESSMENT/PLAN:  Loose Stools. Present since hospitalization. Most recent stool sample negative for acute findings per staff report. Started on Lomotil which was recently changed to scheduled which patient reports has not helped. Will discontinue Lomotil and start Questran 4 g PO every day. Also on a Fiber supplement that was recently increased. Consider KUB to rule out primary constipation. Also has GI appointment scheduled for virtual on 2/2/21.    Recent Pyelonephritis with Sepsis with History of Bladder Dysfunction. Completed course of Bactrim on 1/5/21. Takes Trospium.     Transverse Fracture of L1 Superior Endplate. " Follow-up with Neurosurgery on 2/10/21. TLSO brace when out of bed. Oxycodone-Acetaminophen ordered for pain control. Working with Physical and Occupational Therapy.     Hypertension and Hyperlipidemia. Most blood pressures < 140/90. Monitor daily. Taking Atorvastatin and Losartan. Also on Torsemide.    Multiple Sclerosis. Physical and Occupational Therapy ordered for strengthening.    Orders written by provider at facility  Stool sample negative - Discontinue Lomotil  Questran 4 mg PO every day Dx: Diarrhea    Electronically signed by:  CHANCE Solorio CNP     [facility] Controlled Substance Medication Fill:  Mili Padilla  1955  Effective Jan 1, 2021, The [place] Board of Pharmacy has a new legislative requirement that requires checking the [place]  database before initially prescribing an opiate and every three months for patients receiving an opiate for treatment of chronic pain.   Request for controlled substance medication:    for Ritalin medication   checked, and last dispensed fill  on 1/7/21 for 30 amount

## 2021-01-21 NOTE — LETTER
"    1/21/2021        RE: Mili Padilla  [address]  [facility] [address]        [place] [facility]  [place] [facility] Record Number:  [id number]  Place of Service where encounter took place:  [facility] ([facility]) [[id number]]  Chief Complaint   Patient presents with     Nursing Home Acute     HPI:    Mili Padilla  is a 65 year old (1955), who is being seen today for an episodic care visit.  HPI information obtained from: facility chart records, facility staff, patient report and [facility] chart review.     This is a 65-year-old female, with a past medical history significant for depression, fibromyalgia, GERD, hyperlipidemia, multiple sclerosis, bladder dysfunction, OVIDIO and insomnia, who was admitted to [facility] 12/25/20 through 12/31/20 for fever, cough, shortness of breath and weakness. A chest CT was negative for acute findings, but did reveal \"Question a nodular liver contour and splenomegaly. Cirrhosis and portal hypertension could be present\". A urine culture on 12/25/20 revealed ,000 col/ml Enterobacter cloacae complex and ,000 col/ml Strain 2 Enterobacter cloacae complex. Ceftriaxone was changed to Bactrim on 12/28/20. Initially constipated, but later developed diarrhea with nausea and abdominal discomfort. Weight trended up after fluid administration and Torsemide was initiated. Also started on Losartan. A TCU stay was recommended for ongoing physical rehabilitation.     Today's concern is:     Loose Stools. Continues to have loose stools. Had 2 yesterday. Is irritating her skin from all of the wiping. Reports they obtained a stool sample yesterday and she is waiting to hear back. Has a GI appointment in less than 2 weeks, but doesn't want to wait that long for relief.    Transverse Fracture of L1 Superior Endplate. Feels pain is managed with pain medications. Working with therapy. Upon review of documentation, utilizing " Hydrocodone-Acetaminophen daily on average.    Hypertension. Upon review of blood pressures over the past 5 days, systolic range from 112-151, most < 140. Diastolic range from 68-84.    Past Medical and Surgical History reviewed in Epic today.    MEDICATIONS:  Current Outpatient Medications   Medication Sig Dispense Refill     ARIPiprazole (ABILIFY) 5 MG tablet Take 5 mg by mouth daily   2     atorvastatin (LIPITOR) 20 MG tablet Take 20 mg by mouth daily.       Calcium Citrate-Vitamin D 250-200 MG-UNIT TABS Take 1 tablet by mouth 3 times daily        calcium polycarbophil (FIBERCON) 625 MG tablet Take 1 tablet by mouth 2 times daily       carBAMazepine (TEGRETOL) 200 MG tablet Take 200 mg by mouth 2 times daily       cholestyramine (QUESTRAN) 4 g packet Take 1 packet by mouth daily       DULoxetine (CYMBALTA) 60 MG capsule Take 120 mg by mouth daily        guaiFENesin (MUCINEX) 600 MG 12 hr tablet Take 1,200 mg by mouth 2 times daily       ketoconazole (NIZORAL) 2 % cream Apply topically 2 times daily as needed        losartan (COZAAR) 50 MG tablet Take 1 tablet (50 mg) by mouth daily       Melatonin 10 MG TABS Take 10 mg by mouth At Bedtime       methylphenidate (RITALIN) 20 MG tablet Take 1 tablet (20 mg) by mouth daily 14 tablet 0     mirtazapine (REMERON) 30 MG tablet Take 75 mg by mouth At Bedtime        Multiple Vitamin (MULTIVITAMINS PO) Take 2 tablets by mouth every evening. WITH IRON       ondansetron (ZOFRAN-ODT) 4 MG ODT tab Take 1 tablet (4 mg) by mouth every 6 hours as needed for nausea or vomiting       oxyCODONE-acetaminophen (PERCOCET) 5-325 MG tablet Take 1-2 tablets by mouth every 6 hours as needed for severe pain       potassium chloride ER (KLOR-CON M) 20 MEQ CR tablet Take 1 tablet (20 mEq) by mouth daily       prochlorperazine (COMPAZINE) 5 MG tablet Take 1 tablet (5 mg) by mouth every 6 hours as needed for vomiting       torsemide (DEMADEX) 10 MG tablet Take 1 tablet (10 mg) by mouth daily    "    traZODone (DESYREL) 100 MG tablet Take 500 mg by mouth At Bedtime        triamcinolone (ARISTOCORT HP) 0.5 % external cream Apply topically At Bedtime To hands       triamcinolone (KENALOG) 0.5 % OINT ointment Apply topically 2 times daily as needed for irritation To legs       trospium (SANCTURA) 20 MG tablet Take 1 tablet (20 mg) by mouth 2 times daily (before meals)       vitamin B-Complex Take 1 tablet by mouth daily        denosumab (PROLIA) 60 MG/ML SOLN injection Inject 60 mg Subcutaneous every 6 months On hold in TCU       REVIEW OF SYSTEMS:  4 point ROS including Respiratory, CV, GI and , other than that noted in the HPI,  is negative    Objective:  /69   Pulse 73   Temp 97  F (36.1  C)   Resp 16   Ht 1.676 m (5' 6\")   Wt 128.8 kg (284 lb)   LMP 12/10/2010   SpO2 94%   BMI 45.84 kg/m    Exam:  GENERAL APPEARANCE:  Alert, in no distress  ENT:  Mouth and posterior oropharynx normal, moist mucous membranes  EYES:  EOM, conjunctivae, lids, pupils and irises normal  RESP:  respiratory effort and palpation of chest normal, lungs clear to auscultation , no respiratory distress, in upper lobes  CV:  Palpation and auscultation of heart done , regular rate and rhythm, no murmur, rub, or gallop  M/S:   Active movement of bilateral upper and lower extremities.  PSYCH:  affect and mood normal    Labs:   Labs done in SNF are in [place] EPIC. Please refer to them using [facility] Everywhere.    ASSESSMENT/PLAN:  Loose Stools. Present since hospitalization. Most recent stool sample negative for acute findings per staff report. Started on Lomotil which was recently changed to scheduled which patient reports has not helped. Will discontinue Lomotil and start Questran 4 g PO every day. Also on a Fiber supplement that was recently increased. Consider KUB to rule out primary constipation. Also has GI appointment scheduled for virtual on 2/2/21.    Recent Pyelonephritis with Sepsis with History of Bladder " Dysfunction. Completed course of Bactrim on 1/5/21. Takes Trospium.     Transverse Fracture of L1 Superior Endplate. Follow-up with Neurosurgery on 2/10/21. TLSO brace when out of bed. Oxycodone-Acetaminophen ordered for pain control. Working with Physical and Occupational Therapy.     Hypertension and Hyperlipidemia. Most blood pressures < 140/90. Monitor daily. Taking Atorvastatin and Losartan. Also on Torsemide.    Multiple Sclerosis. Physical and Occupational Therapy ordered for strengthening.    Orders written by provider at facility  Stool sample negative - Discontinue Lomotil  Questran 4 mg PO every day Dx: Diarrhea    Electronically signed by:  CHANCE Solorio CNP     [facility] Controlled Substance Medication Fill:  Mili Padilla  1955  Effective Jan 1, 2021, The [place] Board of Pharmacy has a new legislative requirement that requires checking the [place]  database before initially prescribing an opiate and every three months for patients receiving an opiate for treatment of chronic pain.   Request for controlled substance medication:    for Ritalin medication   checked, and last dispensed fill  on 1/7/21 for 30 amount            Sincerely,        CHANCE Solorio CNP

## 2021-01-23 RX ORDER — OXYCODONE AND ACETAMINOPHEN 5; 325 MG/1; MG/1
1-2 TABLET ORAL EVERY 6 HOURS PRN
COMMUNITY
End: 2021-02-17

## 2021-01-23 RX ORDER — CALCIUM POLYCARBOPHIL 625 MG 625 MG/1
4 TABLET ORAL 2 TIMES DAILY
COMMUNITY
End: 2022-10-03

## 2021-01-23 RX ORDER — GUAIFENESIN 600 MG/1
600 TABLET, EXTENDED RELEASE ORAL 2 TIMES DAILY
Status: ON HOLD | COMMUNITY
End: 2023-01-18

## 2021-01-23 RX ORDER — CHOLESTYRAMINE 4 G/9G
1 POWDER, FOR SUSPENSION ORAL 3 TIMES DAILY
COMMUNITY
End: 2021-02-09

## 2021-01-24 ASSESSMENT — MIFFLIN-ST. JEOR: SCORE: 1841.79

## 2021-01-25 ASSESSMENT — MIFFLIN-ST. JEOR: SCORE: 1638.58

## 2021-01-25 NOTE — PROGRESS NOTES
[facility]  [place] [facility] Record Number:  [id number]  Place of Service where encounter took place:  [facility] ([facility]) [[id number]]  Chief Complaint   Patient presents with     Nursing Home Acute     Diarrhea f/u       HPI:    Mili Padilla  is a 65 year old (1955), who is being seen today for an episodic care visit.  HPI information obtained from: {[facility] HPI:625365}. Today's concern is:  {[facility] DX:259292}    Past Medical and Surgical History reviewed in Epic today.    MEDICATIONS:  {Providers Please double check the med list (in the plan section >> meds & orders tab) and Discontinue any of the meds flagged by the TC to be discontinued}  Current Outpatient Medications   Medication Sig Dispense Refill     ARIPiprazole (ABILIFY) 5 MG tablet Take 5 mg by mouth daily   2     atorvastatin (LIPITOR) 20 MG tablet Take 20 mg by mouth daily.       Calcium Citrate-Vitamin D 250-200 MG-UNIT TABS Take 1 tablet by mouth 3 times daily        calcium polycarbophil (FIBERCON) 625 MG tablet Take 1 tablet by mouth 2 times daily       carBAMazepine (TEGRETOL) 200 MG tablet Take 200 mg by mouth 2 times daily       cholestyramine (QUESTRAN) 4 g packet Take 1 packet by mouth daily       denosumab (PROLIA) 60 MG/ML SOLN injection Inject 60 mg Subcutaneous every 6 months On hold in TCU       DULoxetine (CYMBALTA) 60 MG capsule Take 120 mg by mouth daily        guaiFENesin (MUCINEX) 600 MG 12 hr tablet Take 1,200 mg by mouth 2 times daily       ketoconazole (NIZORAL) 2 % cream Apply topically 2 times daily as needed        losartan (COZAAR) 50 MG tablet Take 1 tablet (50 mg) by mouth daily       Melatonin 10 MG TABS Take 10 mg by mouth At Bedtime       methylphenidate (RITALIN) 20 MG tablet Take 1 tablet (20 mg) by mouth daily 14 tablet 0     mirtazapine (REMERON) 30 MG tablet Take 75 mg by mouth At Bedtime        Multiple Vitamin (MULTIVITAMINS PO) Take 2 tablets by mouth every evening. WITH IRON        "ondansetron (ZOFRAN-ODT) 4 MG ODT tab Take 1 tablet (4 mg) by mouth every 6 hours as needed for nausea or vomiting       oxyCODONE-acetaminophen (PERCOCET) 5-325 MG tablet Take 1-2 tablets by mouth every 6 hours as needed for severe pain       potassium chloride ER (KLOR-CON M) 20 MEQ CR tablet Take 1 tablet (20 mEq) by mouth daily       prochlorperazine (COMPAZINE) 5 MG tablet Take 1 tablet (5 mg) by mouth every 6 hours as needed for vomiting       torsemide (DEMADEX) 10 MG tablet Take 1 tablet (10 mg) by mouth daily       traZODone (DESYREL) 100 MG tablet Take 500 mg by mouth At Bedtime        triamcinolone (ARISTOCORT HP) 0.5 % external cream Apply topically At Bedtime To hands       triamcinolone (KENALOG) 0.5 % OINT ointment Apply topically 2 times daily as needed for irritation To legs       trospium (SANCTURA) 20 MG tablet Take 1 tablet (20 mg) by mouth 2 times daily (before meals)       vitamin B-Complex Take 1 tablet by mouth daily        ***    REVIEW OF SYSTEMS:  {ROS [facility]:004865}    Objective:  BP (!) 160/77   Pulse 84   Temp 97.7  F (36.5  C)   Resp 16   Ht 1.676 m (5' 6\")   Wt 128.5 kg (283 lb 4.8 oz)   LMP 12/10/2010   SpO2 97%   BMI 45.73 kg/m    Exam:  {Nursing home physical exam :752501}    Labs:   {fgslab:402723}    ASSESSMENT/PLAN:  {[facility] DX:080166}    {fgsorders:350820}  ***    {fgstime1:116662}  Electronically signed by:  Bobbi Bill MA ***  {Providers Please double check the med list (in the plan section >> meds & orders tab) and Discontinue any of the meds flagged by the TC to be discontinued}      "

## 2021-01-26 ENCOUNTER — NURSING HOME VISIT (OUTPATIENT)
Dept: GERIATRICS | Facility: CLINIC | Age: 66
End: 2021-01-26
Payer: COMMERCIAL

## 2021-01-26 VITALS
HEART RATE: 75 BPM | WEIGHT: 238.5 LBS | DIASTOLIC BLOOD PRESSURE: 74 MMHG | BODY MASS INDEX: 38.33 KG/M2 | OXYGEN SATURATION: 95 % | HEIGHT: 66 IN | RESPIRATION RATE: 16 BRPM | SYSTOLIC BLOOD PRESSURE: 138 MMHG | TEMPERATURE: 98.2 F

## 2021-01-26 DIAGNOSIS — R19.7 DIARRHEA, UNSPECIFIED TYPE: ICD-10-CM

## 2021-01-26 DIAGNOSIS — A41.9 SEPSIS DUE TO URINARY TRACT INFECTION (H): Primary | ICD-10-CM

## 2021-01-26 DIAGNOSIS — N39.0 SEPSIS DUE TO URINARY TRACT INFECTION (H): Primary | ICD-10-CM

## 2021-01-26 DIAGNOSIS — I10 BENIGN ESSENTIAL HYPERTENSION: ICD-10-CM

## 2021-01-26 DIAGNOSIS — R53.81 PHYSICAL DECONDITIONING: ICD-10-CM

## 2021-01-26 DIAGNOSIS — G35 MS (MULTIPLE SCLEROSIS) (H): ICD-10-CM

## 2021-01-26 PROCEDURE — 99310 SBSQ NF CARE HIGH MDM 45: CPT | Performed by: NURSE PRACTITIONER

## 2021-01-26 NOTE — PROGRESS NOTES
[facility]  [place] [facility] Record Number:  [id number]  Place of Service where encounter took place:  [facility] ([facility]) [[id number]]  Chief Complaint   Patient presents with     Nursing Home Acute     Diarrhea f/u       HPI:    Mili Padilla  is a 65 year old (1955), who is being seen today for an episodic care visit.  HPI information obtained from: facility chart records, facility staff, patient report and [facility] chart review. Today's concern is:  Diarrhea: ongoing diarrhea with no relief from cholestyramine, imodium or lomotil, Ova and parasites negative all other testing on stool negative. Patient denies N/V, has some cramping occasionally, Hx of Gastric bypass. patiet is afebrile, diarrhea interferes with therapy and functional progression.    MS: ongoing: working with therapy, walking short distances, working on strength, needing assistance with ADL's  HTN: BP stable 138/74, 140/78, 160/77 with HR 70-80 range, denies CP, palpitations, SOB   Past Medical and Surgical History reviewed in Epic today.    MEDICATIONS:    Current Outpatient Medications   Medication Sig Dispense Refill     ARIPiprazole (ABILIFY) 5 MG tablet Take 5 mg by mouth daily   2     atorvastatin (LIPITOR) 20 MG tablet Take 20 mg by mouth daily.       Calcium Citrate-Vitamin D 250-200 MG-UNIT TABS Take 1 tablet by mouth 3 times daily        calcium polycarbophil (FIBERCON) 625 MG tablet Take 1 tablet by mouth 2 times daily       carBAMazepine (TEGRETOL) 200 MG tablet Take 200 mg by mouth 2 times daily       cholestyramine (QUESTRAN) 4 g packet Take 1 packet by mouth daily       denosumab (PROLIA) 60 MG/ML SOLN injection Inject 60 mg Subcutaneous every 6 months On hold in [facility]       DULoxetine (CYMBALTA) 60 MG capsule Take 120 mg by mouth daily        guaiFENesin (MUCINEX) 600 MG 12 hr tablet Take 1,200 mg by mouth 2 times daily       ketoconazole (NIZORAL) 2 % cream Apply topically 2 times daily as needed     "    losartan (COZAAR) 50 MG tablet Take 1 tablet (50 mg) by mouth daily       Melatonin 10 MG TABS Take 10 mg by mouth At Bedtime       methylphenidate (RITALIN) 20 MG tablet Take 1 tablet (20 mg) by mouth daily 14 tablet 0     mirtazapine (REMERON) 30 MG tablet Take 75 mg by mouth At Bedtime        Multiple Vitamin (MULTIVITAMINS PO) Take 2 tablets by mouth every evening. WITH IRON       ondansetron (ZOFRAN-ODT) 4 MG ODT tab Take 1 tablet (4 mg) by mouth every 6 hours as needed for nausea or vomiting       oxyCODONE-acetaminophen (PERCOCET) 5-325 MG tablet Take 1-2 tablets by mouth every 6 hours as needed for severe pain       potassium chloride ER (KLOR-CON M) 20 MEQ CR tablet Take 1 tablet (20 mEq) by mouth daily       prochlorperazine (COMPAZINE) 5 MG tablet Take 1 tablet (5 mg) by mouth every 6 hours as needed for vomiting       torsemide (DEMADEX) 10 MG tablet Take 1 tablet (10 mg) by mouth daily       traZODone (DESYREL) 100 MG tablet Take 500 mg by mouth At Bedtime        triamcinolone (ARISTOCORT HP) 0.5 % external cream Apply topically At Bedtime To hands       triamcinolone (KENALOG) 0.5 % OINT ointment Apply topically 2 times daily as needed for irritation To legs       trospium (SANCTURA) 20 MG tablet Take 1 tablet (20 mg) by mouth 2 times daily (before meals)       vitamin B-Complex Take 1 tablet by mouth daily            REVIEW OF SYSTEMS:  10 point ROS of systems including Constitutional, Eyes, Respiratory, Cardiovascular, Gastroenterology, Genitourinary, Integumentary, Musculoskeletal, Psychiatric were all negative except for pertinent positives noted in my HPI.    Objective:  /74   Pulse 75   Temp 98.2  F (36.8  C)   Resp 16   Ht 1.676 m (5' 6\")   Wt 108.2 kg (238 lb 8 oz)   LMP 12/10/2010   SpO2 95%   BMI 38.49 kg/m    Exam:  GENERAL APPEARANCE:  Alert, in no distress  ENT:  Mouth and posterior oropharynx normal, moist mucous membranes, [place]  EYES:  EOM, conjunctivae, lids, pupils and " irises normal  RESP:  no respiratory distress  CV:  peripheral edema trace+ in LE bilaterally  ABDOMEN:  abdomen obese  M/S:   patient resting in bed at time of exam, able to move all 4 extremitiesi  SKIN:  Inspection of skin and subcutaneous tissue baseline  NEURO:   speech WNL  PSYCH:  affect and mood normal    Labs:     Most Recent 3 CBC's:  Recent Labs   Lab Test 12/31/20  0932 12/28/20  0713 12/27/20  1009 12/25/20  1214 07/13/20  1530   WBC  --   --  9.6 11.8* 11.1*   HGB  --   --  11.8 11.6* 12.6   MCV  --   --  94 95 95    166 116* 145* 179     Most Recent 3 BMP's:  Recent Labs   Lab Test 01/04/21 12/31/20  0932 12/30/20  0854 12/29/20  1507 12/29/20  0901   *  --  132*  --  130*   POTASSIUM 3.5  --  3.9 3.5 3.3*   CHLORIDE 98  --  99  --  97   CO2 29  --  29  --  30   BUN 8  --  17  --  13   CR 0.40* 0.68 0.59  --  0.62   ANIONGAP 7  --  4  --  3   HEAVENLY 9.9  --  9.2  --  9.0   *  --  115*  --  166*       ASSESSMENT/PLAN:  ASSESSMENT/PLAN:  Pyelonephritis  Sepsis due to urinary tract infection (H)  Physical deconditioning  Acute/ongoing: Bactrim /160mg BID finished  culturelle 1 PO BID continue     MS (multiple sclerosis) (H)  Ongoing: PT and OT for strengthening     Compression fracture of L1 vertebra with routine healing, subsequent encounter  Acute/ongoing: TLSO brace on when OOB, Percocet5/325mg 1-2 tabs PO q 6 hours prn     Diarrhea:   Acute/ongoing: C-diff negative, stool for ova and parasites negative  DC imodium and lomotil  Cholestyramine 4gm increase to TID  fibercon 2 tabs BID  Will have GI consult on 2/2/21     Benign essential hypertension  Acute/ongoing: vitals daily and prn, BMP follow, torsemide 10mg QD, KCL 20meq QD, losartan 50mg QD          Orders written by provider at facility  Cholestyramine 4gm TID  Increase fiber to 2 tabs BID  Flat and upright x-ray of abdomen in AM    Total time spent with patient visit at the skilled nursing facility was 40 minutes  including patient visit and review of past records. Greater than 50% of total time spent with counseling and coordinating care due to discussed ongoing diarrhea, lomotil and imodium not effective, will increase cholestyramine and fiber, check x-ray of abdomen and continue current Tx, discussed and education on dietary changes, no juice, drink gatorade and water for hydration, bland diet etc. .  Electronically signed by:  Tonya Lynn Haase, APRN CNP

## 2021-01-26 NOTE — LETTER
1/26/2021        RE: Mili Padilla  [address]  [facility] [address]        [place] [facility]  [place] Medical Record Number:  [id number]  Place of Service where encounter took place:  [facility] ([facility]) [[id number]]  Chief Complaint   Patient presents with     Nursing Home Acute     Diarrhea f/u       HPI:    Mili Padilla  is a 65 year old (1955), who is being seen today for an episodic care visit.  HPI information obtained from: facility chart records, facility staff, patient report and [facility] chart review. Today's concern is:  Diarrhea: ongoing diarrhea with no relief from cholestyramine, imodium or lomotil, Ova and parasites negative all other testing on stool negative. Patient denies N/V, has some cramping occasionally, Hx of Gastric bypass. patiet is afebrile, diarrhea interferes with therapy and functional progression.    MS: ongoing: working with therapy, walking short distances, working on strength, needing assistance with ADL's  HTN: BP stable 138/74, 140/78, 160/77 with HR 70-80 range, denies CP, palpitations, SOB   Past Medical and Surgical History reviewed in Epic today.    MEDICATIONS:    Current Outpatient Medications   Medication Sig Dispense Refill     ARIPiprazole (ABILIFY) 5 MG tablet Take 5 mg by mouth daily   2     atorvastatin (LIPITOR) 20 MG tablet Take 20 mg by mouth daily.       Calcium Citrate-Vitamin D 250-200 MG-UNIT TABS Take 1 tablet by mouth 3 times daily        calcium polycarbophil (FIBERCON) 625 MG tablet Take 1 tablet by mouth 2 times daily       carBAMazepine (TEGRETOL) 200 MG tablet Take 200 mg by mouth 2 times daily       cholestyramine (QUESTRAN) 4 g packet Take 1 packet by mouth daily       denosumab (PROLIA) 60 MG/ML SOLN injection Inject 60 mg Subcutaneous every 6 months On hold in U       DULoxetine (CYMBALTA) 60 MG capsule Take 120 mg by mouth daily        guaiFENesin (MUCINEX) 600 MG 12 hr tablet Take 1,200 mg by mouth 2  "times daily       ketoconazole (NIZORAL) 2 % cream Apply topically 2 times daily as needed        losartan (COZAAR) 50 MG tablet Take 1 tablet (50 mg) by mouth daily       Melatonin 10 MG TABS Take 10 mg by mouth At Bedtime       methylphenidate (RITALIN) 20 MG tablet Take 1 tablet (20 mg) by mouth daily 14 tablet 0     mirtazapine (REMERON) 30 MG tablet Take 75 mg by mouth At Bedtime        Multiple Vitamin (MULTIVITAMINS PO) Take 2 tablets by mouth every evening. WITH IRON       ondansetron (ZOFRAN-ODT) 4 MG ODT tab Take 1 tablet (4 mg) by mouth every 6 hours as needed for nausea or vomiting       oxyCODONE-acetaminophen (PERCOCET) 5-325 MG tablet Take 1-2 tablets by mouth every 6 hours as needed for severe pain       potassium chloride ER (KLOR-CON M) 20 MEQ CR tablet Take 1 tablet (20 mEq) by mouth daily       prochlorperazine (COMPAZINE) 5 MG tablet Take 1 tablet (5 mg) by mouth every 6 hours as needed for vomiting       torsemide (DEMADEX) 10 MG tablet Take 1 tablet (10 mg) by mouth daily       traZODone (DESYREL) 100 MG tablet Take 500 mg by mouth At Bedtime        triamcinolone (ARISTOCORT HP) 0.5 % external cream Apply topically At Bedtime To hands       triamcinolone (KENALOG) 0.5 % OINT ointment Apply topically 2 times daily as needed for irritation To legs       trospium (SANCTURA) 20 MG tablet Take 1 tablet (20 mg) by mouth 2 times daily (before meals)       vitamin B-Complex Take 1 tablet by mouth daily            REVIEW OF SYSTEMS:  10 point ROS of systems including Constitutional, Eyes, Respiratory, Cardiovascular, Gastroenterology, Genitourinary, Integumentary, Musculoskeletal, Psychiatric were all negative except for pertinent positives noted in my HPI.    Objective:  /74   Pulse 75   Temp 98.2  F (36.8  C)   Resp 16   Ht 1.676 m (5' 6\")   Wt 108.2 kg (238 lb 8 oz)   LMP 12/10/2010   SpO2 95%   BMI 38.49 kg/m    Exam:  GENERAL APPEARANCE:  Alert, in no distress  ENT:  Mouth and " posterior oropharynx normal, moist mucous membranes, [place]  EYES:  EOM, conjunctivae, lids, pupils and irises normal  RESP:  no respiratory distress  CV:  peripheral edema trace+ in LE bilaterally  ABDOMEN:  abdomen obese  M/S:   patient resting in bed at time of exam, able to move all 4 extremitiesi  SKIN:  Inspection of skin and subcutaneous tissue baseline  NEURO:   speech WNL  PSYCH:  affect and mood normal    Labs:     Most Recent 3 CBC's:  Recent Labs   Lab Test 12/31/20  0932 12/28/20  0713 12/27/20  1009 12/25/20  1214 07/13/20  1530   WBC  --   --  9.6 11.8* 11.1*   HGB  --   --  11.8 11.6* 12.6   MCV  --   --  94 95 95    166 116* 145* 179     Most Recent 3 BMP's:  Recent Labs   Lab Test 01/04/21 12/31/20  0932 12/30/20  0854 12/29/20  1507 12/29/20  0901   *  --  132*  --  130*   POTASSIUM 3.5  --  3.9 3.5 3.3*   CHLORIDE 98  --  99  --  97   CO2 29  --  29  --  30   BUN 8  --  17  --  13   CR 0.40* 0.68 0.59  --  0.62   ANIONGAP 7  --  4  --  3   HEAVENLY 9.9  --  9.2  --  9.0   *  --  115*  --  166*       ASSESSMENT/PLAN:  ASSESSMENT/PLAN:  Pyelonephritis  Sepsis due to urinary tract infection (H)  Physical deconditioning  Acute/ongoing: Bactrim /160mg BID finished  culturelle 1 PO BID continue     MS (multiple sclerosis) (H)  Ongoing: PT and OT for strengthening     Compression fracture of L1 vertebra with routine healing, subsequent encounter  Acute/ongoing: TLSO brace on when OOB, Percocet5/325mg 1-2 tabs PO q 6 hours prn     Diarrhea:   Acute/ongoing: C-diff negative, stool for ova and parasites negative  DC imodium and lomotil  Cholestyramine 4gm increase to TID  fibercon 2 tabs BID  Will have GI consult on 2/2/21     Benign essential hypertension  Acute/ongoing: vitals daily and prn, BMP follow, torsemide 10mg QD, KCL 20meq QD, losartan 50mg QD          Orders written by provider at facility  Cholestyramine 4gm TID  Increase fiber to 2 tabs BID  Flat and upright x-ray of abdomen  in AM    Total time spent with patient visit at the [facility] nursing [facility] was 40 minutes including patient visit and review of past records. Greater than 50% of total time spent with counseling and coordinating care due to discussed ongoing diarrhea, lomotil and imodium not effective, will increase cholestyramine and fiber, check x-ray of abdomen and continue current Tx, discussed and education on dietary changes, no juice, drink gatorade and water for hydration, bland diet etc. .  Electronically signed by:  Tonya Lynn Haase, APRN CNP               Sincerely,        Tonya Lynn Haase, APRN CNP

## 2021-01-31 ASSESSMENT — MIFFLIN-ST. JEOR: SCORE: 1840.43

## 2021-02-01 ENCOUNTER — NURSING HOME VISIT (OUTPATIENT)
Dept: GERIATRICS | Facility: CLINIC | Age: 66
End: 2021-02-01
Payer: COMMERCIAL

## 2021-02-01 VITALS
TEMPERATURE: 98.8 F | SYSTOLIC BLOOD PRESSURE: 149 MMHG | HEART RATE: 77 BPM | BODY MASS INDEX: 45.48 KG/M2 | WEIGHT: 283 LBS | DIASTOLIC BLOOD PRESSURE: 70 MMHG | HEIGHT: 66 IN | OXYGEN SATURATION: 95 % | RESPIRATION RATE: 16 BRPM

## 2021-02-01 DIAGNOSIS — G35 MS (MULTIPLE SCLEROSIS) (H): ICD-10-CM

## 2021-02-01 DIAGNOSIS — N39.0 SEPSIS DUE TO URINARY TRACT INFECTION (H): Primary | ICD-10-CM

## 2021-02-01 DIAGNOSIS — R53.81 PHYSICAL DECONDITIONING: ICD-10-CM

## 2021-02-01 DIAGNOSIS — R19.7 DIARRHEA, UNSPECIFIED TYPE: ICD-10-CM

## 2021-02-01 DIAGNOSIS — A41.9 SEPSIS DUE TO URINARY TRACT INFECTION (H): Primary | ICD-10-CM

## 2021-02-01 DIAGNOSIS — N12 PYELONEPHRITIS: ICD-10-CM

## 2021-02-01 DIAGNOSIS — I10 BENIGN ESSENTIAL HYPERTENSION: ICD-10-CM

## 2021-02-01 DIAGNOSIS — S32.010D COMPRESSION FRACTURE OF L1 VERTEBRA WITH ROUTINE HEALING, SUBSEQUENT ENCOUNTER: ICD-10-CM

## 2021-02-01 PROCEDURE — 99310 SBSQ NF CARE HIGH MDM 45: CPT | Performed by: NURSE PRACTITIONER

## 2021-02-01 NOTE — PROGRESS NOTES
[place] [facility]  [place] [facility] Record Number:  [id number]  Place of Service where encounter took place:  [facility] ([facility]) [[id number]]  Chief Complaint   Patient presents with     Nursing Home Acute       HPI:    Mili Padilla  is a 65 year old (1955), who is being seen today for an episodic care visit.  HPI information obtained from: facility chart records, facility staff, patient report and [facility] chart review. Today's concern is:  Diarrhea: ongoing diarrhea with no relief from cholestyramine, imodium or lomotil, Ova and parasites negative all other testing on stool negative. Patient denies N/V, has some cramping occasionally, Hx of Gastric bypass. On exam today patient states diarrhea has decreased to 1-2 times per day from 3-4 times per day, she is walking with therapy in room due to covid precautings, using a 4ww, states she is getting stronger   MS: see above, patient getting stronger, making progress in therapy    HTN: BP stable 149/70, 137/67, 134/71 with HR in 70's, denies CP, palpitations, SOB     Past Medical and Surgical History reviewed in Epic today.    MEDICATIONS:    Current Outpatient Medications   Medication Sig Dispense Refill     ARIPiprazole (ABILIFY) 5 MG tablet Take 5 mg by mouth daily   2     atorvastatin (LIPITOR) 20 MG tablet Take 20 mg by mouth daily.       Calcium Citrate-Vitamin D 250-200 MG-UNIT TABS Take 1 tablet by mouth 3 times daily        calcium polycarbophil (FIBERCON) 625 MG tablet Take 2 tablets by mouth 2 times daily       carBAMazepine (TEGRETOL) 200 MG tablet Take 200 mg by mouth 2 times daily       cholestyramine (QUESTRAN) 4 g packet Take 1 packet by mouth 3 times daily       denosumab (PROLIA) 60 MG/ML SOLN injection Inject 60 mg Subcutaneous every 6 months On hold in [facility]       DULoxetine (CYMBALTA) 60 MG capsule Take 120 mg by mouth daily        guaiFENesin (MUCINEX) 600 MG 12 hr tablet Take 1,200 mg by mouth 2 times daily        "ketoconazole (NIZORAL) 2 % cream Apply topically 2 times daily as needed        losartan (COZAAR) 50 MG tablet Take 1 tablet (50 mg) by mouth daily       Melatonin 10 MG TABS Take 10 mg by mouth At Bedtime       methylphenidate (RITALIN) 20 MG tablet Take 1 tablet (20 mg) by mouth daily 14 tablet 0     mirtazapine (REMERON) 30 MG tablet Take 75 mg by mouth At Bedtime        Multiple Vitamin (MULTIVITAMINS PO) Take 2 tablets by mouth every evening. WITH IRON       ondansetron (ZOFRAN-ODT) 4 MG ODT tab Take 1 tablet (4 mg) by mouth every 6 hours as needed for nausea or vomiting       oxyCODONE-acetaminophen (PERCOCET) 5-325 MG tablet Take 1-2 tablets by mouth every 6 hours as needed for severe pain       potassium chloride ER (KLOR-CON M) 20 MEQ CR tablet Take 1 tablet (20 mEq) by mouth daily       prochlorperazine (COMPAZINE) 5 MG tablet Take 1 tablet (5 mg) by mouth every 6 hours as needed for vomiting       torsemide (DEMADEX) 10 MG tablet Take 1 tablet (10 mg) by mouth daily       traZODone (DESYREL) 100 MG tablet Take 500 mg by mouth At Bedtime        triamcinolone (ARISTOCORT HP) 0.5 % external cream Apply topically At Bedtime To hands       triamcinolone (KENALOG) 0.5 % OINT ointment Apply topically 2 times daily as needed for irritation To legs       trospium (SANCTURA) 20 MG tablet Take 1 tablet (20 mg) by mouth 2 times daily (before meals)       vitamin B-Complex Take 1 tablet by mouth daily            REVIEW OF SYSTEMS:  10 point ROS of systems including Constitutional, Eyes, Respiratory, Cardiovascular, Gastroenterology, Genitourinary, Integumentary, Musculoskeletal, Psychiatric were all negative except for pertinent positives noted in my HPI.    Objective:  BP (!) 149/70   Pulse 77   Temp 98.8  F (37.1  C)   Resp 16   Ht 1.676 m (5' 6\")   Wt 128.4 kg (283 lb)   LMP 12/10/2010   SpO2 95%   BMI 45.68 kg/m    Exam:  GENERAL APPEARANCE:  Alert, in no distress  ENT:  Mouth and posterior oropharynx " normal, moist mucous membranes, [place]  EYES:  EOM, conjunctivae, lids, pupils and irises normal  RESP:  no respiratory distress  CV:  peripheral edema trace+ in LE bilaterally  ABDOMEN:  abdomen obese  M/S:   patient resting in bed at time of exam, able to move all 4 extremitiesi  SKIN:  Inspection of skin and subcutaneous tissue baseline  NEURO:   speech WNL  PSYCH:  affect and mood normal    Labs:     Most Recent 3 CBC's:  Recent Labs   Lab Test 12/31/20  0932 12/28/20  0713 12/27/20  1009 12/25/20  1214 07/13/20  1530   WBC  --   --  9.6 11.8* 11.1*   HGB  --   --  11.8 11.6* 12.6   MCV  --   --  94 95 95    166 116* 145* 179     Most Recent 3 BMP's:  Recent Labs   Lab Test 01/04/21 12/31/20  0932 12/30/20  0854 12/29/20  1507 12/29/20  0901   *  --  132*  --  130*   POTASSIUM 3.5  --  3.9 3.5 3.3*   CHLORIDE 98  --  99  --  97   CO2 29  --  29  --  30   BUN 8  --  17  --  13   CR 0.40* 0.68 0.59  --  0.62   ANIONGAP 7  --  4  --  3   HEAVENLY 9.9  --  9.2  --  9.0   *  --  115*  --  166*       ASSESSMENT/PLAN:  Pyelonephritis  Sepsis due to urinary tract infection (H)  Physical deconditioning  Acute/ongoing: Bactrim /160mg BID finished  culturelle 1 PO BID continue     MS (multiple sclerosis) (H)  Ongoing: PT and OT for strengthening     Compression fracture of L1 vertebra with routine healing, subsequent encounter  Acute/ongoing: TLSO brace on when OOB, Percocet5/325mg 1-2 tabs PO q 6 hours prn     Diarrhea:   Acute/ongoing: C-diff negative, stool for ova and parasites negative  DC imodium and lomotil  Cholestyramine 4gm increase to TID  fibercon 3 tabs BID  Will have GI consult on 2/2/21 tomorrow     Benign essential hypertension  Acute/ongoing: vitals daily and prn, BMP follow, torsemide 10mg QD, KCL 20meq QD, losartan 50mg QD          Orders written by provider at facility  Increase fiber tabs to 3 tabs BID  F/u with GI in AM    Total time spent with patient visit at the [facility]  facility was 35 min including patient visit and review of past records. Greater than 50% of total time spent with counseling and coordinating care due to discussed loose stool, some improvement in bowel control and decrease in amount of times per day, no c/o pain or discomfort, discussed upcoming GI consult tomorrow, discussed therapy and functional status.  Electronically signed by:  Tonya Lynn Haase, APRN CNP

## 2021-02-01 NOTE — LETTER
2/1/2021        RE: Mili Padilla  [address]  [facility] [address]        [place] [facility]  [place] [facility] Record Number:  [id number]  Place of Service where encounter took place:  [facility] ([facility]) [[id number]]  Chief Complaint   Patient presents with     Nursing Home Acute       HPI:    Mili Padilla  is a 65 year old (1955), who is being seen today for an episodic care visit.  HPI information obtained from: facility chart records, facility staff, patient report and [facility] chart review. Today's concern is:  Diarrhea: ongoing diarrhea with no relief from cholestyramine, imodium or lomotil, Ova and parasites negative all other testing on stool negative. Patient denies N/V, has some cramping occasionally, Hx of Gastric bypass. On exam today patient states diarrhea has decreased to 1-2 times per day from 3-4 times per day, she is walking with therapy in room due to covid precautings, using a 4ww, states she is getting stronger   MS: see above, patient getting stronger, making progress in therapy    HTN: BP stable 149/70, 137/67, 134/71 with HR in 70's, denies CP, palpitations, SOB     Past Medical and Surgical History reviewed in Epic today.    MEDICATIONS:    Current Outpatient Medications   Medication Sig Dispense Refill     ARIPiprazole (ABILIFY) 5 MG tablet Take 5 mg by mouth daily   2     atorvastatin (LIPITOR) 20 MG tablet Take 20 mg by mouth daily.       Calcium Citrate-Vitamin D 250-200 MG-UNIT TABS Take 1 tablet by mouth 3 times daily        calcium polycarbophil (FIBERCON) 625 MG tablet Take 2 tablets by mouth 2 times daily       carBAMazepine (TEGRETOL) 200 MG tablet Take 200 mg by mouth 2 times daily       cholestyramine (QUESTRAN) 4 g packet Take 1 packet by mouth 3 times daily       denosumab (PROLIA) 60 MG/ML SOLN injection Inject 60 mg Subcutaneous every 6 months On hold in [facility]       DULoxetine (CYMBALTA) 60 MG capsule Take 120 mg by mouth daily     "    guaiFENesin (MUCINEX) 600 MG 12 hr tablet Take 1,200 mg by mouth 2 times daily       ketoconazole (NIZORAL) 2 % cream Apply topically 2 times daily as needed        losartan (COZAAR) 50 MG tablet Take 1 tablet (50 mg) by mouth daily       Melatonin 10 MG TABS Take 10 mg by mouth At Bedtime       methylphenidate (RITALIN) 20 MG tablet Take 1 tablet (20 mg) by mouth daily 14 tablet 0     mirtazapine (REMERON) 30 MG tablet Take 75 mg by mouth At Bedtime        Multiple Vitamin (MULTIVITAMINS PO) Take 2 tablets by mouth every evening. WITH IRON       ondansetron (ZOFRAN-ODT) 4 MG ODT tab Take 1 tablet (4 mg) by mouth every 6 hours as needed for nausea or vomiting       oxyCODONE-acetaminophen (PERCOCET) 5-325 MG tablet Take 1-2 tablets by mouth every 6 hours as needed for severe pain       potassium chloride ER (KLOR-CON M) 20 MEQ CR tablet Take 1 tablet (20 mEq) by mouth daily       prochlorperazine (COMPAZINE) 5 MG tablet Take 1 tablet (5 mg) by mouth every 6 hours as needed for vomiting       torsemide (DEMADEX) 10 MG tablet Take 1 tablet (10 mg) by mouth daily       traZODone (DESYREL) 100 MG tablet Take 500 mg by mouth At Bedtime        triamcinolone (ARISTOCORT HP) 0.5 % external cream Apply topically At Bedtime To hands       triamcinolone (KENALOG) 0.5 % OINT ointment Apply topically 2 times daily as needed for irritation To legs       trospium (SANCTURA) 20 MG tablet Take 1 tablet (20 mg) by mouth 2 times daily (before meals)       vitamin B-Complex Take 1 tablet by mouth daily            REVIEW OF SYSTEMS:  10 point ROS of systems including Constitutional, Eyes, Respiratory, Cardiovascular, Gastroenterology, Genitourinary, Integumentary, Musculoskeletal, Psychiatric were all negative except for pertinent positives noted in my HPI.    Objective:  BP (!) 149/70   Pulse 77   Temp 98.8  F (37.1  C)   Resp 16   Ht 1.676 m (5' 6\")   Wt 128.4 kg (283 lb)   LMP 12/10/2010   SpO2 95%   BMI 45.68 kg/m  "   Exam:  GENERAL APPEARANCE:  Alert, in no distress  ENT:  Mouth and posterior oropharynx normal, moist mucous membranes, [place]  EYES:  EOM, conjunctivae, lids, pupils and irises normal  RESP:  no respiratory distress  CV:  peripheral edema trace+ in LE bilaterally  ABDOMEN:  abdomen obese  M/S:   patient resting in bed at time of exam, able to move all 4 extremitiesi  SKIN:  Inspection of skin and subcutaneous tissue baseline  NEURO:   speech WNL  PSYCH:  affect and mood normal    Labs:     Most Recent 3 CBC's:  Recent Labs   Lab Test 12/31/20  0932 12/28/20  0713 12/27/20  1009 12/25/20  1214 07/13/20  1530   WBC  --   --  9.6 11.8* 11.1*   HGB  --   --  11.8 11.6* 12.6   MCV  --   --  94 95 95    166 116* 145* 179     Most Recent 3 BMP's:  Recent Labs   Lab Test 01/04/21 12/31/20  0932 12/30/20  0854 12/29/20  1507 12/29/20  0901   *  --  132*  --  130*   POTASSIUM 3.5  --  3.9 3.5 3.3*   CHLORIDE 98  --  99  --  97   CO2 29  --  29  --  30   BUN 8  --  17  --  13   CR 0.40* 0.68 0.59  --  0.62   ANIONGAP 7  --  4  --  3   HEAVENLY 9.9  --  9.2  --  9.0   *  --  115*  --  166*       ASSESSMENT/PLAN:  Pyelonephritis  Sepsis due to urinary tract infection (H)  Physical deconditioning  Acute/ongoing: Bactrim /160mg BID finished  culturelle 1 PO BID continue     MS (multiple sclerosis) (H)  Ongoing: PT and OT for strengthening     Compression fracture of L1 vertebra with routine healing, subsequent encounter  Acute/ongoing: TLSO brace on when OOB, Percocet5/325mg 1-2 tabs PO q 6 hours prn     Diarrhea:   Acute/ongoing: C-diff negative, stool for ova and parasites negative  DC imodium and lomotil  Cholestyramine 4gm increase to TID  fibercon 3 tabs BID  Will have GI consult on 2/2/21 tomorrow     Benign essential hypertension  Acute/ongoing: vitals daily and prn, BMP follow, torsemide 10mg QD, KCL 20meq QD, losartan 50mg QD          Orders written by provider at facility  Increase fiber tabs to 3  tabs BID  F/u with GI in AM    Total time spent with patient visit at the [facility] nursing [facility] was 35 min including patient visit and review of past records. Greater than 50% of total time spent with counseling and coordinating care due to discussed loose stool, some improvement in bowel control and decrease in amount of times per day, no c/o pain or discomfort, discussed upcoming GI consult tomorrow, discussed therapy and functional status.  Electronically signed by:  Tonya Lynn Haase, APRN CNP               Sincerely,        Tonya Lynn Haase, APRN CNP

## 2021-02-03 ASSESSMENT — MIFFLIN-ST. JEOR: SCORE: 1838.62

## 2021-02-04 ENCOUNTER — NURSING HOME VISIT (OUTPATIENT)
Dept: GERIATRICS | Facility: CLINIC | Age: 66
End: 2021-02-04
Payer: COMMERCIAL

## 2021-02-04 VITALS
DIASTOLIC BLOOD PRESSURE: 82 MMHG | TEMPERATURE: 97.9 F | OXYGEN SATURATION: 95 % | RESPIRATION RATE: 16 BRPM | WEIGHT: 282.6 LBS | SYSTOLIC BLOOD PRESSURE: 155 MMHG | HEIGHT: 66 IN | BODY MASS INDEX: 45.42 KG/M2 | HEART RATE: 78 BPM

## 2021-02-04 DIAGNOSIS — S32.010D COMPRESSION FRACTURE OF L1 VERTEBRA WITH ROUTINE HEALING, SUBSEQUENT ENCOUNTER: ICD-10-CM

## 2021-02-04 DIAGNOSIS — A41.9 SEPSIS DUE TO URINARY TRACT INFECTION (H): ICD-10-CM

## 2021-02-04 DIAGNOSIS — R53.81 PHYSICAL DECONDITIONING: ICD-10-CM

## 2021-02-04 DIAGNOSIS — R19.7 DIARRHEA, UNSPECIFIED TYPE: Primary | ICD-10-CM

## 2021-02-04 DIAGNOSIS — G35 MS (MULTIPLE SCLEROSIS) (H): ICD-10-CM

## 2021-02-04 DIAGNOSIS — I10 BENIGN ESSENTIAL HYPERTENSION: ICD-10-CM

## 2021-02-04 DIAGNOSIS — N39.0 SEPSIS DUE TO URINARY TRACT INFECTION (H): ICD-10-CM

## 2021-02-04 PROCEDURE — 99310 SBSQ NF CARE HIGH MDM 45: CPT | Performed by: NURSE PRACTITIONER

## 2021-02-04 RX ORDER — METHYLPHENIDATE HYDROCHLORIDE 20 MG/1
20 TABLET ORAL DAILY
Qty: 14 TABLET | Refills: 0 | Status: ON HOLD | OUTPATIENT
Start: 2021-02-04 | End: 2023-01-18

## 2021-02-04 NOTE — LETTER
2/4/2021        RE: Mili Padilla  [address]  [facility] [id number]        [facility]  [place] [facility] Record Number:  [id number]  Place of Service where encounter took place:  [facility] (S) [[id number]]  Chief Complaint   Patient presents with     Nursing Home Acute       HPI:    Mili Padilla  is a 65 year old (1955), who is being seen today for an episodic care visit.  HPI information obtained from: facility chart records, facility staff, patient report and [facility] chart review. Today's concern is:  Diarrhea: has an appt with GI,  Hx of Gastric bypass. On exam today patient states diarrhea has improved, she did have one formed stool yesterday but has had 2 loose stools since then, using a 4ww walking up to 120 feet with break in the middle , patient is independent in room with 4ww  MS: see above, patient getting stronger, making progress in therapy   HTN: BP stable 155/82, 140/77, 139/61 with HR in 70-80 range, denies CP, palpitations, SOB     Past Medical and Surgical History reviewed in Epic today.    MEDICATIONS:    Current Outpatient Medications   Medication Sig Dispense Refill     ARIPiprazole (ABILIFY) 5 MG tablet Take 5 mg by mouth daily   2     atorvastatin (LIPITOR) 20 MG tablet Take 20 mg by mouth daily.       Calcium Citrate-Vitamin D 250-200 MG-UNIT TABS Take 1 tablet by mouth 3 times daily        calcium polycarbophil (FIBERCON) 625 MG tablet Take 3 tablets by mouth 2 times daily       carBAMazepine (TEGRETOL) 200 MG tablet Take 200 mg by mouth 2 times daily       cholestyramine (QUESTRAN) 4 g packet Take 1 packet by mouth 3 times daily       denosumab (PROLIA) 60 MG/ML SOLN injection Inject 60 mg Subcutaneous every 6 months On hold in U       DULoxetine (CYMBALTA) 60 MG capsule Take 120 mg by mouth daily        guaiFENesin (MUCINEX) 600 MG 12 hr tablet Take 1,200 mg by mouth 2 times daily       ketoconazole (NIZORAL) 2 % cream Apply  "topically 2 times daily as needed        losartan (COZAAR) 50 MG tablet Take 1 tablet (50 mg) by mouth daily       Melatonin 10 MG TABS Take 10 mg by mouth At Bedtime       methylphenidate (RITALIN) 20 MG tablet Take 1 tablet (20 mg) by mouth daily 14 tablet 0     mirtazapine (REMERON) 30 MG tablet Take 75 mg by mouth At Bedtime        Multiple Vitamin (MULTIVITAMINS PO) Take 2 tablets by mouth every evening. WITH IRON       ondansetron (ZOFRAN-ODT) 4 MG ODT tab Take 1 tablet (4 mg) by mouth every 6 hours as needed for nausea or vomiting       oxyCODONE-acetaminophen (PERCOCET) 5-325 MG tablet Take 1-2 tablets by mouth every 6 hours as needed for severe pain       potassium chloride ER (KLOR-CON M) 20 MEQ CR tablet Take 1 tablet (20 mEq) by mouth daily       prochlorperazine (COMPAZINE) 5 MG tablet Take 1 tablet (5 mg) by mouth every 6 hours as needed for vomiting       torsemide (DEMADEX) 10 MG tablet Take 1 tablet (10 mg) by mouth daily       traZODone (DESYREL) 100 MG tablet Take 500 mg by mouth At Bedtime        triamcinolone (ARISTOCORT HP) 0.5 % external cream Apply topically At Bedtime To hands       triamcinolone (KENALOG) 0.5 % OINT ointment Apply topically 2 times daily as needed for irritation To legs       trospium (SANCTURA) 20 MG tablet Take 1 tablet (20 mg) by mouth 2 times daily (before meals)       vitamin B-Complex Take 1 tablet by mouth daily            REVIEW OF SYSTEMS:  10 point ROS of systems including Constitutional, Eyes, Respiratory, Cardiovascular, Gastroenterology, Genitourinary, Integumentary, Musculoskeletal, Psychiatric were all negative except for pertinent positives noted in my HPI.    Objective:  BP (!) 155/82   Pulse 78   Temp 97.9  F (36.6  C)   Resp 16   Ht 1.676 m (5' 6\")   Wt 128.2 kg (282 lb 9.6 oz)   LMP 12/10/2010   SpO2 95%   BMI 45.61 kg/m    Exam:  GENERAL APPEARANCE:  Alert, in no distress  ENT:  Mouth and posterior oropharynx normal, moist mucous membranes, " [place]  EYES:  EOM, conjunctivae, lids, pupils and irises normal  RESP:  no respiratory distress  CV:  peripheral edema trace+ in LE bilaterally  ABDOMEN:  abdomen obese  M/S:   patient resting in bed at time of exam, able to move all 4 extremitiesi  SKIN:  Inspection of skin and subcutaneous tissue baseline  NEURO:   speech WNL  PSYCH:  affect and mood normal    Labs:     Most Recent 3 CBC's:  Recent Labs   Lab Test 12/31/20  0932 12/28/20  0713 12/27/20  1009 12/25/20  1214 07/13/20  1530   WBC  --   --  9.6 11.8* 11.1*   HGB  --   --  11.8 11.6* 12.6   MCV  --   --  94 95 95    166 116* 145* 179     Most Recent 3 BMP's:  Recent Labs   Lab Test 01/04/21 12/31/20  0932 12/30/20  0854 12/29/20  1507 12/29/20  0901   *  --  132*  --  130*   POTASSIUM 3.5  --  3.9 3.5 3.3*   CHLORIDE 98  --  99  --  97   CO2 29  --  29  --  30   BUN 8  --  17  --  13   CR 0.40* 0.68 0.59  --  0.62   ANIONGAP 7  --  4  --  3   HEAVENLY 9.9  --  9.2  --  9.0   *  --  115*  --  166*       ASSESSMENT/PLAN:     MS (multiple sclerosis) (H)  Ongoing: PT and OT for strengthening     Compression fracture of L1 vertebra with routine healing, subsequent encounter  Acute/ongoing: TLSO brace on when OOB, Percocet5/325mg 1-2 tabs PO q 6 hours prn     Diarrhea:   Acute/ongoing: C-diff negative, stool for ova and parasites negative  DC imodium and lomotil  Cholestyramine 4gm TID  Increase fibercon to 4 tabs BID  Scheduled for GI consult 2/2/21 per video visit but something happened with technology, appt rescheduled for next week.      Pyelonephritis  Sepsis due to urinary tract infection (H)  Physical deconditioning  Acute/ongoing: Bactrim course finished  culturelle 1 PO BID continue    Benign essential hypertension  Acute/ongoing: vitals daily and prn, BMP follow, torsemide 10mg QD, KCL 20meq QD, losartan 50mg QD          Orders written by provider at facility  Increase fibercon tabs to 4 PO BID    Total time spent with patient visit at  the skilled nursing facility was 35 min including patient visit and review of past records. Greater than 50% of total time spent with counseling and coordinating care due to discussed bowel pattern at length, discussed fiber tabs and increasing to 4 tabs BID, patient agrees with plan, discussed GI consult, will still keep GI consult, continue with therapy for strengthening, patient states she is making progress and getting stronger. .  Electronically signed by:  Tonya Lynn Haase, APRN CNP               Sincerely,        Tonya Lynn Haase, APRN CNP

## 2021-02-04 NOTE — PROGRESS NOTES
[facility]  [place] [facility] Record Number:  [id number]  Place of Service where encounter took place:  [facility] (S) [[id number]]  Chief Complaint   Patient presents with     Nursing Home Acute       HPI:    Mili Padilla  is a 65 year old (1955), who is being seen today for an episodic care visit.  HPI information obtained from: facility chart records, facility staff, patient report and [facility] chart review. Today's concern is:  Diarrhea: has an appt with GI,  Hx of Gastric bypass. On exam today patient states diarrhea has improved, she did have one formed stool yesterday but has had 2 loose stools since then, using a 4ww walking up to 120 feet with break in the middle , patient is independent in room with 4ww  MS: see above, patient getting stronger, making progress in therapy   HTN: BP stable 155/82, 140/77, 139/61 with HR in 70-80 range, denies CP, palpitations, SOB     Past Medical and Surgical History reviewed in Epic today.    MEDICATIONS:    Current Outpatient Medications   Medication Sig Dispense Refill     ARIPiprazole (ABILIFY) 5 MG tablet Take 5 mg by mouth daily   2     atorvastatin (LIPITOR) 20 MG tablet Take 20 mg by mouth daily.       Calcium Citrate-Vitamin D 250-200 MG-UNIT TABS Take 1 tablet by mouth 3 times daily        calcium polycarbophil (FIBERCON) 625 MG tablet Take 3 tablets by mouth 2 times daily       carBAMazepine (TEGRETOL) 200 MG tablet Take 200 mg by mouth 2 times daily       cholestyramine (QUESTRAN) 4 g packet Take 1 packet by mouth 3 times daily       denosumab (PROLIA) 60 MG/ML SOLN injection Inject 60 mg Subcutaneous every 6 months On hold in [facility]       DULoxetine (CYMBALTA) 60 MG capsule Take 120 mg by mouth daily        guaiFENesin (MUCINEX) 600 MG 12 hr tablet Take 1,200 mg by mouth 2 times daily       ketoconazole (NIZORAL) 2 % cream Apply topically 2 times daily as needed        losartan (COZAAR) 50 MG tablet Take 1 tablet (50 mg) by  "mouth daily       Melatonin 10 MG TABS Take 10 mg by mouth At Bedtime       methylphenidate (RITALIN) 20 MG tablet Take 1 tablet (20 mg) by mouth daily 14 tablet 0     mirtazapine (REMERON) 30 MG tablet Take 75 mg by mouth At Bedtime        Multiple Vitamin (MULTIVITAMINS PO) Take 2 tablets by mouth every evening. WITH IRON       ondansetron (ZOFRAN-ODT) 4 MG ODT tab Take 1 tablet (4 mg) by mouth every 6 hours as needed for nausea or vomiting       oxyCODONE-acetaminophen (PERCOCET) 5-325 MG tablet Take 1-2 tablets by mouth every 6 hours as needed for severe pain       potassium chloride ER (KLOR-CON M) 20 MEQ CR tablet Take 1 tablet (20 mEq) by mouth daily       prochlorperazine (COMPAZINE) 5 MG tablet Take 1 tablet (5 mg) by mouth every 6 hours as needed for vomiting       torsemide (DEMADEX) 10 MG tablet Take 1 tablet (10 mg) by mouth daily       traZODone (DESYREL) 100 MG tablet Take 500 mg by mouth At Bedtime        triamcinolone (ARISTOCORT HP) 0.5 % external cream Apply topically At Bedtime To hands       triamcinolone (KENALOG) 0.5 % OINT ointment Apply topically 2 times daily as needed for irritation To legs       trospium (SANCTURA) 20 MG tablet Take 1 tablet (20 mg) by mouth 2 times daily (before meals)       vitamin B-Complex Take 1 tablet by mouth daily            REVIEW OF SYSTEMS:  10 point ROS of systems including Constitutional, Eyes, Respiratory, Cardiovascular, Gastroenterology, Genitourinary, Integumentary, Musculoskeletal, Psychiatric were all negative except for pertinent positives noted in my HPI.    Objective:  BP (!) 155/82   Pulse 78   Temp 97.9  F (36.6  C)   Resp 16   Ht 1.676 m (5' 6\")   Wt 128.2 kg (282 lb 9.6 oz)   LMP 12/10/2010   SpO2 95%   BMI 45.61 kg/m    Exam:  GENERAL APPEARANCE:  Alert, in no distress  ENT:  Mouth and posterior oropharynx normal, moist mucous membranes, [place]  EYES:  EOM, conjunctivae, lids, pupils and irises normal  RESP:  no respiratory " distress  CV:  peripheral edema trace+ in LE bilaterally  ABDOMEN:  abdomen obese  M/S:   patient resting in bed at time of exam, able to move all 4 extremitiesi  SKIN:  Inspection of skin and subcutaneous tissue baseline  NEURO:   speech WNL  PSYCH:  affect and mood normal    Labs:     Most Recent 3 CBC's:  Recent Labs   Lab Test 12/31/20  0932 12/28/20  0713 12/27/20  1009 12/25/20  1214 07/13/20  1530   WBC  --   --  9.6 11.8* 11.1*   HGB  --   --  11.8 11.6* 12.6   MCV  --   --  94 95 95    166 116* 145* 179     Most Recent 3 BMP's:  Recent Labs   Lab Test 01/04/21 12/31/20  0932 12/30/20  0854 12/29/20  1507 12/29/20  0901   *  --  132*  --  130*   POTASSIUM 3.5  --  3.9 3.5 3.3*   CHLORIDE 98  --  99  --  97   CO2 29  --  29  --  30   BUN 8  --  17  --  13   CR 0.40* 0.68 0.59  --  0.62   ANIONGAP 7  --  4  --  3   HEAVENLY 9.9  --  9.2  --  9.0   *  --  115*  --  166*       ASSESSMENT/PLAN:     MS (multiple sclerosis) (H)  Ongoing: PT and OT for strengthening     Compression fracture of L1 vertebra with routine healing, subsequent encounter  Acute/ongoing: TLSO brace on when OOB, Percocet5/325mg 1-2 tabs PO q 6 hours prn     Diarrhea:   Acute/ongoing: C-diff negative, stool for ova and parasites negative  DC imodium and lomotil  Cholestyramine 4gm TID  Increase fibercon to 4 tabs BID  Scheduled for GI consult 2/2/21 per video visit but something happened with technology, appt rescheduled for next week.      Pyelonephritis  Sepsis due to urinary tract infection (H)  Physical deconditioning  Acute/ongoing: Bactrim course finished  culturelle 1 PO BID continue    Benign essential hypertension  Acute/ongoing: vitals daily and prn, BMP follow, torsemide 10mg QD, KCL 20meq QD, losartan 50mg QD          Orders written by provider at facility  Increase fibercon tabs to 4 PO BID    Total time spent with patient visit at the skilled nursing facility was 35 min including patient visit and review of past  records. Greater than 50% of total time spent with counseling and coordinating care due to discussed bowel pattern at length, discussed fiber tabs and increasing to 4 tabs BID, patient agrees with plan, discussed GI consult, will still keep GI consult, continue with therapy for strengthening, patient states she is making progress and getting stronger. .  Electronically signed by:  Tonya Lynn Haase, APRN CNP

## 2021-02-08 ASSESSMENT — MIFFLIN-ST. JEOR: SCORE: 1822.29

## 2021-02-08 NOTE — PROGRESS NOTES
[place] [facility]  [place] [facility] Record Number:  [id number]  Place of Service where encounter took place:  [facility] ([facility]) [[id number]]  Chief Complaint   Patient presents with     Nursing Home Acute       HPI:    Mili Padilla  is a 65 year old (1955), who is being seen today for an episodic care visit.  HPI information obtained from: facility chart records, facility staff, patient report and [facility] chart review. Today's concern is:  Diarrhea: has an appt with GI on 2/16/21,  Hx of Gastric bypass. On exam today patient states diarrhea is ongoing 3-4 times per day, she has had 2 formed stools since last visit on 2/4/21,  using a 4ww walking up to 120 feet using a 4ww, indep in room with ambulation, toileting and ADL's  MS: see above, patient getting stronger, making progress in therapy   HTN: BP stable 152/77, 151/77, 147/75 with HR in 70-80 range, denies CP, palpitations, SOB        Past Medical and Surgical History reviewed in Epic today.    MEDICATIONS:    Current Outpatient Medications   Medication Sig Dispense Refill     ARIPiprazole (ABILIFY) 5 MG tablet Take 5 mg by mouth daily   2     atorvastatin (LIPITOR) 20 MG tablet Take 20 mg by mouth daily.       Calcium Citrate-Vitamin D 250-200 MG-UNIT TABS Take 1 tablet by mouth 3 times daily        calcium polycarbophil (FIBERCON) 625 MG tablet Take 4 tablets by mouth 2 times daily       carBAMazepine (TEGRETOL) 200 MG tablet Take 200 mg by mouth 2 times daily       cholestyramine (QUESTRAN) 4 g packet Take 1 packet by mouth 3 times daily       denosumab (PROLIA) 60 MG/ML SOLN injection Inject 60 mg Subcutaneous every 6 months On hold in [facility]       DULoxetine (CYMBALTA) 60 MG capsule Take 120 mg by mouth daily        guaiFENesin (MUCINEX) 600 MG 12 hr tablet Take 1,200 mg by mouth 2 times daily       ketoconazole (NIZORAL) 2 % cream Apply topically 2 times daily as needed        losartan (COZAAR) 50 MG tablet Take 1 tablet (50 mg)  "by mouth daily       Melatonin 10 MG TABS Take 10 mg by mouth At Bedtime       methylphenidate (RITALIN) 20 MG tablet Take 1 tablet (20 mg) by mouth daily 14 tablet 0     mirtazapine (REMERON) 30 MG tablet Take 75 mg by mouth At Bedtime        Multiple Vitamin (MULTIVITAMINS PO) Take 2 tablets by mouth every evening. WITH IRON       ondansetron (ZOFRAN-ODT) 4 MG ODT tab Take 1 tablet (4 mg) by mouth every 6 hours as needed for nausea or vomiting       oxyCODONE-acetaminophen (PERCOCET) 5-325 MG tablet Take 1-2 tablets by mouth every 6 hours as needed for severe pain       potassium chloride ER (KLOR-CON M) 20 MEQ CR tablet Take 1 tablet (20 mEq) by mouth daily       prochlorperazine (COMPAZINE) 5 MG tablet Take 1 tablet (5 mg) by mouth every 6 hours as needed for vomiting       torsemide (DEMADEX) 10 MG tablet Take 1 tablet (10 mg) by mouth daily       traZODone (DESYREL) 100 MG tablet Take 500 mg by mouth At Bedtime        triamcinolone (ARISTOCORT HP) 0.5 % external cream Apply topically At Bedtime To hands       triamcinolone (KENALOG) 0.5 % OINT ointment Apply topically 2 times daily as needed for irritation To legs       trospium (SANCTURA) 20 MG tablet Take 1 tablet (20 mg) by mouth 2 times daily (before meals)       vitamin B-Complex Take 1 tablet by mouth daily            REVIEW OF SYSTEMS:  10 point ROS of systems including Constitutional, Eyes, Respiratory, Cardiovascular, Gastroenterology, Genitourinary, Integumentary, Musculoskeletal, Psychiatric were all negative except for pertinent positives noted in my HPI.    Objective:  BP (!) 151/77   Pulse 74   Temp 97.8  F (36.6  C)   Resp 18   Ht 1.676 m (5' 6\")   Wt 126.6 kg (279 lb)   LMP 12/10/2010   SpO2 97%   BMI 45.03 kg/m    Exam:  GENERAL APPEARANCE:  Alert, in no distress  ENT:  Mouth and posterior oropharynx normal, moist mucous membranes, [place]  EYES:  EOM, conjunctivae, lids, pupils and irises normal  RESP:  no respiratory " distress  CV:  peripheral edema trace+ in LE bilaterally  ABDOMEN:  abdomen obese  M/S:   patient resting in bed at time of exam, able to move all 4 extremitiesi  SKIN:  Inspection of skin and subcutaneous tissue baseline  NEURO:   speech WNL  PSYCH:  affect and mood normal    Labs:     Most Recent 3 CBC's:  Recent Labs   Lab Test 12/31/20  0932 12/28/20  0713 12/27/20  1009 12/25/20  1214 07/13/20  1530   WBC  --   --  9.6 11.8* 11.1*   HGB  --   --  11.8 11.6* 12.6   MCV  --   --  94 95 95    166 116* 145* 179     Most Recent 3 BMP's:  Recent Labs   Lab Test 01/04/21 12/31/20  0932 12/30/20  0854 12/29/20  1507 12/29/20  0901   *  --  132*  --  130*   POTASSIUM 3.5  --  3.9 3.5 3.3*   CHLORIDE 98  --  99  --  97   CO2 29  --  29  --  30   BUN 8  --  17  --  13   CR 0.40* 0.68 0.59  --  0.62   ANIONGAP 7  --  4  --  3   HEAVENLY 9.9  --  9.2  --  9.0   *  --  115*  --  166*       ASSESSMENT/PLAN:  MS (multiple sclerosis) (H)  Ongoing: PT and OT for strengthening     Compression fracture of L1 vertebra with routine healing, subsequent encounter  Acute/ongoing: TLSO brace on when OOB, patient states she has not taken percocet in over a week, will continue prn while in TCU and DC at discharge     Diarrhea:   Acute/ongoing: C-diff negative, stool for ova and parasites negative  DC cholestyramine  Start imodium 2mg BID  Increase fibercon to 5 tabs BID  Scheduled for GI consult on 2/16/21     Pyelonephritis  Sepsis due to urinary tract infection (H)  Physical deconditioning  Acute/ongoing: Bactrim course finished  culturelle 1 PO BID continue     Benign essential hypertension  Acute/ongoing: vitals daily and prn, BMP follow, torsemide 10mg QD, KCL 20meq QD, losartan 50mg QD    Orders written by provider at facility  DC questran  Restart imodium 2mg BID  Start bismuth subsalicylate 262mg tab TID    Total time spent with patient visit at the skilled nursing facility was 35 min including patient visit and  review of past records. Greater than 50% of total time spent with counseling and coordinating care due to discussed bowels and loose stool, increase fiber tabs, DC questran as patient does not feel this medication has been effective, restart imodium, discussed upcoming GI consult. Dicussed therapy and plan for DC will continue here at TCU until GI appt to try to get diarrhea controlled.  Electronically signed by:  Tonya Lynn Haase, APRN CNP

## 2021-02-09 ENCOUNTER — NURSING HOME VISIT (OUTPATIENT)
Dept: GERIATRICS | Facility: CLINIC | Age: 66
End: 2021-02-09
Payer: COMMERCIAL

## 2021-02-09 VITALS
SYSTOLIC BLOOD PRESSURE: 151 MMHG | TEMPERATURE: 97.8 F | RESPIRATION RATE: 18 BRPM | DIASTOLIC BLOOD PRESSURE: 77 MMHG | BODY MASS INDEX: 44.84 KG/M2 | HEIGHT: 66 IN | OXYGEN SATURATION: 97 % | HEART RATE: 74 BPM | WEIGHT: 279 LBS

## 2021-02-09 DIAGNOSIS — G35 MS (MULTIPLE SCLEROSIS) (H): ICD-10-CM

## 2021-02-09 DIAGNOSIS — R19.7 DIARRHEA, UNSPECIFIED TYPE: Primary | ICD-10-CM

## 2021-02-09 DIAGNOSIS — I10 BENIGN ESSENTIAL HYPERTENSION: ICD-10-CM

## 2021-02-09 DIAGNOSIS — S32.010D COMPRESSION FRACTURE OF L1 VERTEBRA WITH ROUTINE HEALING, SUBSEQUENT ENCOUNTER: ICD-10-CM

## 2021-02-09 DIAGNOSIS — R53.81 PHYSICAL DECONDITIONING: ICD-10-CM

## 2021-02-09 PROCEDURE — 99310 SBSQ NF CARE HIGH MDM 45: CPT | Performed by: NURSE PRACTITIONER

## 2021-02-09 RX ORDER — BISMUTH SUBSALICYLATE 262 MG/1
1 TABLET, CHEWABLE ORAL 3 TIMES DAILY
Status: ON HOLD
Start: 2021-02-09 | End: 2023-01-18

## 2021-02-09 RX ORDER — LOPERAMIDE HYDROCHLORIDE 2 MG/1
2 TABLET ORAL 2 TIMES DAILY
Start: 2021-02-09 | End: 2023-02-08

## 2021-02-09 NOTE — LETTER
2/9/2021        RE: Mili Padilla  [address]  [facility] [address]        [facility]  [place] Medical Record Number:  [id number]  Place of Service where encounter took place:  [facility] ([facility]) [[id number]]  Chief Complaint   Patient presents with     Nursing Home Acute       HPI:    Mili Padilla  is a 65 year old (1955), who is being seen today for an episodic care visit.  HPI information obtained from: facility chart records, facility staff, patient report and [facility] chart review. Today's concern is:  Diarrhea: has an appt with GI on 2/16/21,  Hx of Gastric bypass. On exam today patient states diarrhea is ongoing 3-4 times per day, she has had 2 formed stools since last visit on 2/4/21,  using a 4ww walking up to 120 feet using a 4ww, indep in room with ambulation, toileting and ADL's  MS: see above, patient getting stronger, making progress in therapy   HTN: BP stable 152/77, 151/77, 147/75 with HR in 70-80 range, denies CP, palpitations, SOB        Past Medical and Surgical History reviewed in Epic today.    MEDICATIONS:    Current Outpatient Medications   Medication Sig Dispense Refill     ARIPiprazole (ABILIFY) 5 MG tablet Take 5 mg by mouth daily   2     atorvastatin (LIPITOR) 20 MG tablet Take 20 mg by mouth daily.       Calcium Citrate-Vitamin D 250-200 MG-UNIT TABS Take 1 tablet by mouth 3 times daily        calcium polycarbophil (FIBERCON) 625 MG tablet Take 4 tablets by mouth 2 times daily       carBAMazepine (TEGRETOL) 200 MG tablet Take 200 mg by mouth 2 times daily       cholestyramine (QUESTRAN) 4 g packet Take 1 packet by mouth 3 times daily       denosumab (PROLIA) 60 MG/ML SOLN injection Inject 60 mg Subcutaneous every 6 months On hold in U       DULoxetine (CYMBALTA) 60 MG capsule Take 120 mg by mouth daily        guaiFENesin (MUCINEX) 600 MG 12 hr tablet Take 1,200 mg by mouth 2 times daily       ketoconazole (NIZORAL) 2 % cream Apply  "topically 2 times daily as needed        losartan (COZAAR) 50 MG tablet Take 1 tablet (50 mg) by mouth daily       Melatonin 10 MG TABS Take 10 mg by mouth At Bedtime       methylphenidate (RITALIN) 20 MG tablet Take 1 tablet (20 mg) by mouth daily 14 tablet 0     mirtazapine (REMERON) 30 MG tablet Take 75 mg by mouth At Bedtime        Multiple Vitamin (MULTIVITAMINS PO) Take 2 tablets by mouth every evening. WITH IRON       ondansetron (ZOFRAN-ODT) 4 MG ODT tab Take 1 tablet (4 mg) by mouth every 6 hours as needed for nausea or vomiting       oxyCODONE-acetaminophen (PERCOCET) 5-325 MG tablet Take 1-2 tablets by mouth every 6 hours as needed for severe pain       potassium chloride ER (KLOR-CON M) 20 MEQ CR tablet Take 1 tablet (20 mEq) by mouth daily       prochlorperazine (COMPAZINE) 5 MG tablet Take 1 tablet (5 mg) by mouth every 6 hours as needed for vomiting       torsemide (DEMADEX) 10 MG tablet Take 1 tablet (10 mg) by mouth daily       traZODone (DESYREL) 100 MG tablet Take 500 mg by mouth At Bedtime        triamcinolone (ARISTOCORT HP) 0.5 % external cream Apply topically At Bedtime To hands       triamcinolone (KENALOG) 0.5 % OINT ointment Apply topically 2 times daily as needed for irritation To legs       trospium (SANCTURA) 20 MG tablet Take 1 tablet (20 mg) by mouth 2 times daily (before meals)       vitamin B-Complex Take 1 tablet by mouth daily            REVIEW OF SYSTEMS:  10 point ROS of systems including Constitutional, Eyes, Respiratory, Cardiovascular, Gastroenterology, Genitourinary, Integumentary, Musculoskeletal, Psychiatric were all negative except for pertinent positives noted in my HPI.    Objective:  BP (!) 151/77   Pulse 74   Temp 97.8  F (36.6  C)   Resp 18   Ht 1.676 m (5' 6\")   Wt 126.6 kg (279 lb)   LMP 12/10/2010   SpO2 97%   BMI 45.03 kg/m    Exam:  GENERAL APPEARANCE:  Alert, in no distress  ENT:  Mouth and posterior oropharynx normal, moist mucous membranes, " [place]  EYES:  EOM, conjunctivae, lids, pupils and irises normal  RESP:  no respiratory distress  CV:  peripheral edema trace+ in LE bilaterally  ABDOMEN:  abdomen obese  M/S:   patient resting in bed at time of exam, able to move all 4 extremitiesi  SKIN:  Inspection of skin and subcutaneous tissue baseline  NEURO:   speech WNL  PSYCH:  affect and mood normal    Labs:     Most Recent 3 CBC's:  Recent Labs   Lab Test 12/31/20  0932 12/28/20  0713 12/27/20  1009 12/25/20  1214 07/13/20  1530   WBC  --   --  9.6 11.8* 11.1*   HGB  --   --  11.8 11.6* 12.6   MCV  --   --  94 95 95    166 116* 145* 179     Most Recent 3 BMP's:  Recent Labs   Lab Test 01/04/21 12/31/20  0932 12/30/20  0854 12/29/20  1507 12/29/20  0901   *  --  132*  --  130*   POTASSIUM 3.5  --  3.9 3.5 3.3*   CHLORIDE 98  --  99  --  97   CO2 29  --  29  --  30   BUN 8  --  17  --  13   CR 0.40* 0.68 0.59  --  0.62   ANIONGAP 7  --  4  --  3   HEAVENLY 9.9  --  9.2  --  9.0   *  --  115*  --  166*       ASSESSMENT/PLAN:  MS (multiple sclerosis) (H)  Ongoing: PT and OT for strengthening     Compression fracture of L1 vertebra with routine healing, subsequent encounter  Acute/ongoing: TLSO brace on when OOB, patient states she has not taken percocet in over a week, will continue prn while in TCU and DC at discharge     Diarrhea:   Acute/ongoing: C-diff negative, stool for ova and parasites negative  DC cholestyramine  Start imodium 2mg BID  Increase fibercon to 5 tabs BID  Scheduled for GI consult on 2/16/21     Pyelonephritis  Sepsis due to urinary tract infection (H)  Physical deconditioning  Acute/ongoing: Bactrim course finished  culturelle 1 PO BID continue     Benign essential hypertension  Acute/ongoing: vitals daily and prn, BMP follow, torsemide 10mg QD, KCL 20meq QD, losartan 50mg QD    Orders written by provider at facility  DC questran  Restart imodium 2mg BID  Start bismuth subsalicylate 262mg tab TID    Total time spent with  patient visit at the skilled nursing facility was 35 min including patient visit and review of past records. Greater than 50% of total time spent with counseling and coordinating care due to discussed bowels and loose stool, increase fiber tabs, DC questran as patient does not feel this medication has been effective, restart imodium, discussed upcoming GI consult. Dicussed therapy and plan for DC will continue here at TCU until GI appt to try to get diarrhea controlled.  Electronically signed by:  Tonya Lynn Haase, APRN CNP               Sincerely,        Tonya Lynn Haase, APRN CNP

## 2021-02-10 ENCOUNTER — ANCILLARY PROCEDURE (OUTPATIENT)
Dept: GENERAL RADIOLOGY | Facility: CLINIC | Age: 66
End: 2021-02-10
Attending: NURSE PRACTITIONER
Payer: COMMERCIAL

## 2021-02-10 ENCOUNTER — OFFICE VISIT (OUTPATIENT)
Dept: NEUROSURGERY | Facility: CLINIC | Age: 66
End: 2021-02-10
Attending: NURSE PRACTITIONER
Payer: COMMERCIAL

## 2021-02-10 VITALS
TEMPERATURE: 98.2 F | DIASTOLIC BLOOD PRESSURE: 74 MMHG | HEIGHT: 67 IN | BODY MASS INDEX: 43.63 KG/M2 | HEART RATE: 81 BPM | WEIGHT: 278 LBS | OXYGEN SATURATION: 95 % | SYSTOLIC BLOOD PRESSURE: 121 MMHG

## 2021-02-10 DIAGNOSIS — S32.019A L1 VERTEBRAL FRACTURE (H): ICD-10-CM

## 2021-02-10 DIAGNOSIS — S32.019A CLOSED FRACTURE OF FIRST LUMBAR VERTEBRA, UNSPECIFIED FRACTURE MORPHOLOGY, INITIAL ENCOUNTER (H): Primary | ICD-10-CM

## 2021-02-10 PROCEDURE — 99213 OFFICE O/P EST LOW 20 MIN: CPT | Performed by: NURSE PRACTITIONER

## 2021-02-10 PROCEDURE — 72100 X-RAY EXAM L-S SPINE 2/3 VWS: CPT | Performed by: RADIOLOGY

## 2021-02-10 ASSESSMENT — MIFFLIN-ST. JEOR
SCORE: 1819.11
SCORE: 1833.63

## 2021-02-10 ASSESSMENT — PAIN SCALES - GENERAL: PAINLEVEL: NO PAIN (0)

## 2021-02-10 NOTE — PROGRESS NOTES
"[facility]  Neurosurgery Follow Up:    HPI: 6 weeks s/p L1 superior endplate fracture extending into the T12-L1 disc space in the setting of DISH. Today she states she has had improvement in her back pain. She denies radicular pain. She has baseline neuropathy and weakness in her legs due to MS. She states this has not changed. She continues to wear the brace when out of bed.     Medical, surgical, family, and social history unchanged since prior exam.  Exam:  Constitutional:  Alert, well nourished, NAD.  HEENT: Normocephalic, atraumatic.   Pulm:  Without shortness of breath   CV:  No pitting edema of BLE.      Vital Signs:  /74   Pulse 81   Temp 98.2  F (36.8  C)   Ht 5' 7\" (1.702 m)   Wt 278 lb (126.1 kg)   LMP 12/10/2010   SpO2 95%   BMI 43.54 kg/m      Neurological:  Awake  Alert  Oriented x 3  Motor exam:        IP Q DF PF EHL  R   5  5   5   5    5  L   5  5   5   5    5     Able to spontaneously move L/E bilaterally  Sensation intact throughout all L/E dermatomes    Imaging: Lumbar XR   IMPRESSION: L1 compression fracture with mild to moderate height loss, slightly evolved compared to the prior exam. No new fractures are identified.    A/P: L1 superior endplate fracture extending into the T12-L1 disc space in the setting of DISH. Continue brace when up. Follow up in 6 weeks with lumbar XR prior. She verbalized understanding and agreement.    Patient Instructions   -Continue TLSO brace as recommended.  -Follow up in 6 weeks with lumbar xray prior.  -Please contact our clinic with questions or concerns at [phone].      Lisa Wright, MARYLU  [facility]  [address] [place] [address]  [address] [place] [address]  Tel [phone]  Fax [phone]    "

## 2021-02-10 NOTE — NURSING NOTE
"Mili Padilla is a 65 year old female who presents for:  Chief Complaint   Patient presents with     Consult     XR prior--ED follow up Compression fracture L1 vertebre        Initial Vitals:  /74   Pulse 81   Temp 98.2  F (36.8  C)   Ht 5' 7\" (1.702 m)   Wt 278 lb (126.1 kg)   LMP 12/10/2010   SpO2 95%   BMI 43.54 kg/m   Estimated body mass index is 43.54 kg/m  as calculated from the following:    Height as of this encounter: 5' 7\" (1.702 m).    Weight as of this encounter: 278 lb (126.1 kg).. Body surface area is 2.44 meters squared. BP completed using cuff size: Large  No Pain (0)    Nursing Comments: Patient presents for XR prior--ED follow up Compression fracture L1 vertebre    Raúl Walton MA  "

## 2021-02-10 NOTE — PATIENT INSTRUCTIONS
-Continue TLSO brace as recommended.  -Follow up in 6 weeks with lumbar xray prior.  -Please contact our clinic with questions or concerns at [phone].

## 2021-02-10 NOTE — LETTER
"    2/10/2021         RE: Mili Padilla  [address]  [facility] [address]        Dear Colleague,    Thank you for referring your patient, Mili Padilla, to the [facility] [place]. Please see a copy of my visit note below.    [facility]  Neurosurgery Follow Up:    HPI: 6 weeks s/p L1 superior endplate fracture extending into the T12-L1 disc space in the setting of DISH. Today she states she has had improvement in her back pain. She denies radicular pain. She has baseline neuropathy and weakness in her legs due to MS. She states this has not changed. She continues to wear the brace when out of bed.     Medical, surgical, family, and social history unchanged since prior exam.  Exam:  Constitutional:  Alert, well nourished, NAD.  HEENT: Normocephalic, atraumatic.   Pulm:  Without shortness of breath   CV:  No pitting edema of BLE.      Vital Signs:  /74   Pulse 81   Temp 98.2  F (36.8  C)   Ht 5' 7\" (1.702 m)   Wt 278 lb (126.1 kg)   LMP 12/10/2010   SpO2 95%   BMI 43.54 kg/m      Neurological:  Awake  Alert  Oriented x 3  Motor exam:        IP Q DF PF EHL  R   5  5   5   5    5  L   5  5   5   5    5     Able to spontaneously move L/E bilaterally  Sensation intact throughout all L/E dermatomes    Imaging: Lumbar XR   IMPRESSION: L1 compression fracture with mild to moderate height loss, slightly evolved compared to the prior exam. No new fractures are identified.    A/P: L1 superior endplate fracture extending into the T12-L1 disc space in the setting of DISH. Continue brace when up. Follow up in 6 weeks with lumbar XR prior. She verbalized understanding and agreement.    Patient Instructions   -Continue TLSO brace as recommended.  -Follow up in 6 weeks with lumbar xray prior.  -Please contact our clinic with questions or concerns at [phone].      Lisa Wright, MARYLU  [facility]  [address] [place] [address] [address]  [place] [address]  Tel [phone]  Fax [phone]        Again, thank you for allowing me to participate in the care of your patient.        Sincerely,        Lisa Wright, NP

## 2021-02-11 ENCOUNTER — NURSING HOME VISIT (OUTPATIENT)
Dept: GERIATRICS | Facility: CLINIC | Age: 66
End: 2021-02-11
Payer: COMMERCIAL

## 2021-02-11 VITALS
RESPIRATION RATE: 16 BRPM | OXYGEN SATURATION: 96 % | DIASTOLIC BLOOD PRESSURE: 68 MMHG | HEART RATE: 77 BPM | TEMPERATURE: 97.4 F | HEIGHT: 66 IN | BODY MASS INDEX: 44.72 KG/M2 | SYSTOLIC BLOOD PRESSURE: 142 MMHG | WEIGHT: 278.3 LBS

## 2021-02-11 DIAGNOSIS — S32.010D COMPRESSION FRACTURE OF L1 VERTEBRA WITH ROUTINE HEALING, SUBSEQUENT ENCOUNTER: ICD-10-CM

## 2021-02-11 DIAGNOSIS — G35 MS (MULTIPLE SCLEROSIS) (H): ICD-10-CM

## 2021-02-11 DIAGNOSIS — R19.7 DIARRHEA, UNSPECIFIED TYPE: Primary | ICD-10-CM

## 2021-02-11 DIAGNOSIS — I10 BENIGN ESSENTIAL HYPERTENSION: ICD-10-CM

## 2021-02-11 DIAGNOSIS — R53.81 PHYSICAL DECONDITIONING: ICD-10-CM

## 2021-02-11 PROCEDURE — 99309 SBSQ NF CARE MODERATE MDM 30: CPT | Performed by: NURSE PRACTITIONER

## 2021-02-11 NOTE — LETTER
2/11/2021        RE: Mili Padilla  [address]  [facility] [address]        [facility]  [place] Medical Record Number:  [id number]  Place of Service where encounter took place:  [facility] ([facility]) [[id number]]  Chief Complaint   Patient presents with     Nursing Home Acute       HPI:    Mili Padilla  is a 65 year old (1955), who is being seen today for an episodic care visit.  HPI information obtained from: facility chart records, facility staff, patient report and [facility] chart review. Today's concern is:  Diarrhea: has an appt with GI on 2/16/21,  Hx of Gastric bypass. On exam today patient states diarrhea is improving, states stool is not as watery and she has not had a loose stool since last night, also states bulk of stool is smaller    Compression Fx: patient had an appt with ortho spine, x-ray taken at appt, and patient will have to continue with back brace for another 6 weeks and continue spinal precautions as the Fx needs more time to heal. Patient is feeling a little discouraged about that but states she is not taking pain medications and pain has pretty much resolved In therapy patient  using a 4ww walking up to 120 feet using a 4ww, indep in room with ambulation, toileting and ADL's  MS: see above, patient getting stronger, making progress in therapy   HTN: BP stable 142/68, 158/75, 152/77 with HR in 70's, denies CP, palpitations, SOB     Past Medical and Surgical History reviewed in Epic today.    MEDICATIONS:    Current Outpatient Medications   Medication Sig Dispense Refill     ARIPiprazole (ABILIFY) 5 MG tablet Take 5 mg by mouth daily   2     atorvastatin (LIPITOR) 20 MG tablet Take 20 mg by mouth daily.       bismuth subsalicylate (PEPTO BISMOL) 262 MG chewable tablet Take 1 tablet (262 mg) by mouth 3 times daily       Calcium Citrate-Vitamin D 250-200 MG-UNIT TABS Take 1 tablet by mouth 3 times daily        calcium polycarbophil (FIBERCON)  625 MG tablet Take 4 tablets by mouth 2 times daily       carBAMazepine (TEGRETOL) 200 MG tablet Take 200 mg by mouth 2 times daily       denosumab (PROLIA) 60 MG/ML SOLN injection Inject 60 mg Subcutaneous every 6 months On hold in [facility]       DULoxetine (CYMBALTA) 60 MG capsule Take 120 mg by mouth daily        guaiFENesin (MUCINEX) 600 MG 12 hr tablet Take 1,200 mg by mouth 2 times daily       ketoconazole (NIZORAL) 2 % cream Apply topically 2 times daily as needed        loperamide (IMODIUM A-D) 2 MG tablet Take 1 tablet (2 mg) by mouth 2 times daily       losartan (COZAAR) 50 MG tablet Take 1 tablet (50 mg) by mouth daily       Melatonin 10 MG TABS Take 10 mg by mouth At Bedtime       methylphenidate (RITALIN) 20 MG tablet Take 1 tablet (20 mg) by mouth daily 14 tablet 0     mirtazapine (REMERON) 30 MG tablet Take 75 mg by mouth At Bedtime        Multiple Vitamin (MULTIVITAMINS PO) Take 2 tablets by mouth every evening. WITH IRON       ondansetron (ZOFRAN-ODT) 4 MG ODT tab Take 1 tablet (4 mg) by mouth every 6 hours as needed for nausea or vomiting       oxyCODONE-acetaminophen (PERCOCET) 5-325 MG tablet Take 1-2 tablets by mouth every 6 hours as needed for severe pain       potassium chloride ER (KLOR-CON M) 20 MEQ CR tablet Take 1 tablet (20 mEq) by mouth daily       prochlorperazine (COMPAZINE) 5 MG tablet Take 1 tablet (5 mg) by mouth every 6 hours as needed for vomiting       torsemide (DEMADEX) 10 MG tablet Take 1 tablet (10 mg) by mouth daily       traZODone (DESYREL) 100 MG tablet Take 500 mg by mouth At Bedtime        triamcinolone (ARISTOCORT HP) 0.5 % external cream Apply topically At Bedtime To hands       triamcinolone (KENALOG) 0.5 % OINT ointment Apply topically 2 times daily as needed for irritation To legs       trospium (SANCTURA) 20 MG tablet Take 1 tablet (20 mg) by mouth 2 times daily (before meals)       vitamin B-Complex Take 1 tablet by mouth daily            REVIEW OF SYSTEMS:  10 point  "ROS of systems including Constitutional, Eyes, Respiratory, Cardiovascular, Gastroenterology, Genitourinary, Integumentary, Musculoskeletal, Psychiatric were all negative except for pertinent positives noted in my HPI.    Objective:  BP (!) 142/68   Pulse 77   Temp 97.4  F (36.3  C)   Resp 16   Ht 1.676 m (5' 6\")   Wt 126.2 kg (278 lb 4.8 oz)   LMP 12/10/2010   SpO2 96%   BMI 44.92 kg/m    Exam:  GENERAL APPEARANCE:  Alert, in no distress  ENT:  Mouth and posterior oropharynx normal, moist mucous membranes, [place]  EYES:  EOM, conjunctivae, lids, pupils and irises normal  RESP:  no respiratory distress  CV:  peripheral edema trace+ in LE bilaterally  ABDOMEN:  abdomen obese  M/S:   patient resting in bed at time of exam, able to move all 4 extremitiesi  SKIN:  Inspection of skin and subcutaneous tissue baseline  NEURO:   speech WNL  PSYCH:  affect and mood normal       Labs:     Most Recent 3 CBC's:  Recent Labs   Lab Test 12/31/20  0932 12/28/20  0713 12/27/20  1009 12/25/20  1214 07/13/20  1530   WBC  --   --  9.6 11.8* 11.1*   HGB  --   --  11.8 11.6* 12.6   MCV  --   --  94 95 95    166 116* 145* 179     Most Recent 3 BMP's:  Recent Labs   Lab Test 01/04/21 12/31/20  0932 12/30/20  0854 12/29/20  1507 12/29/20  0901   *  --  132*  --  130*   POTASSIUM 3.5  --  3.9 3.5 3.3*   CHLORIDE 98  --  99  --  97   CO2 29  --  29  --  30   BUN 8  --  17  --  13   CR 0.40* 0.68 0.59  --  0.62   ANIONGAP 7  --  4  --  3   HEAVENLY 9.9  --  9.2  --  9.0   *  --  115*  --  166*       ASSESSMENT/PLAN:  MS (multiple sclerosis) (H)  Ongoing: PT and OT for strengthening     Compression fracture of L1 vertebra with routine healing, subsequent encounter  Acute/ongoing: TLSO brace on when OOB for another 6 weeks, continue percocet prn while in TCU, DC at discharge     Diarrhea:   Acute/ongoing: C-diff negative, stool for ova and parasites negative  Improved diarrhea  continue imodium 2mg BID  continue fibercon to " 4 tabs BID and Bismuth TID  Scheduled for GI consult on 2/16/21     Benign essential hypertension  Acute/ongoing: vitals daily and prn, BMP follow, torsemide 10mg QD, KCL 20meq QD, losartan 50mg QD    Orders written by provider at facility  No new orders      Electronically signed by:  Tonya Lynn Haase, APRN CNP               Sincerely,        Tonya Lynn Haase, APRN CNP

## 2021-02-11 NOTE — PROGRESS NOTES
[facility]  [place] Medical Record Number:  [id number]  Place of Service where encounter took place:  [facility] ([facility]) [[id number]]  Chief Complaint   Patient presents with     Nursing Home Acute       HPI:    Mili Padilla  is a 65 year old (1955), who is being seen today for an episodic care visit.  HPI information obtained from: facility chart records, facility staff, patient report and [facility] chart review. Today's concern is:  Diarrhea: has an appt with GI on 2/16/21,  Hx of Gastric bypass. On exam today patient states diarrhea is improving, states stool is not as watery and she has not had a loose stool since last night, also states bulk of stool is smaller    Compression Fx: patient had an appt with ortho spine, x-ray taken at appt, and patient will have to continue with back brace for another 6 weeks and continue spinal precautions as the Fx needs more time to heal. Patient is feeling a little discouraged about that but states she is not taking pain medications and pain has pretty much resolved In therapy patient  using a 4ww walking up to 120 feet using a 4ww, indep in room with ambulation, toileting and ADL's  MS: see above, patient getting stronger, making progress in therapy   HTN: BP stable 142/68, 158/75, 152/77 with HR in 70's, denies CP, palpitations, SOB     Past Medical and Surgical History reviewed in Epic today.    MEDICATIONS:    Current Outpatient Medications   Medication Sig Dispense Refill     ARIPiprazole (ABILIFY) 5 MG tablet Take 5 mg by mouth daily   2     atorvastatin (LIPITOR) 20 MG tablet Take 20 mg by mouth daily.       bismuth subsalicylate (PEPTO BISMOL) 262 MG chewable tablet Take 1 tablet (262 mg) by mouth 3 times daily       Calcium Citrate-Vitamin D 250-200 MG-UNIT TABS Take 1 tablet by mouth 3 times daily        calcium polycarbophil (FIBERCON) 625 MG tablet Take 4 tablets by mouth 2 times daily       carBAMazepine (TEGRETOL) 200 MG tablet  Take 200 mg by mouth 2 times daily       denosumab (PROLIA) 60 MG/ML SOLN injection Inject 60 mg Subcutaneous every 6 months On hold in [facility]       DULoxetine (CYMBALTA) 60 MG capsule Take 120 mg by mouth daily        guaiFENesin (MUCINEX) 600 MG 12 hr tablet Take 1,200 mg by mouth 2 times daily       ketoconazole (NIZORAL) 2 % cream Apply topically 2 times daily as needed        loperamide (IMODIUM A-D) 2 MG tablet Take 1 tablet (2 mg) by mouth 2 times daily       losartan (COZAAR) 50 MG tablet Take 1 tablet (50 mg) by mouth daily       Melatonin 10 MG TABS Take 10 mg by mouth At Bedtime       methylphenidate (RITALIN) 20 MG tablet Take 1 tablet (20 mg) by mouth daily 14 tablet 0     mirtazapine (REMERON) 30 MG tablet Take 75 mg by mouth At Bedtime        Multiple Vitamin (MULTIVITAMINS PO) Take 2 tablets by mouth every evening. WITH IRON       ondansetron (ZOFRAN-ODT) 4 MG ODT tab Take 1 tablet (4 mg) by mouth every 6 hours as needed for nausea or vomiting       oxyCODONE-acetaminophen (PERCOCET) 5-325 MG tablet Take 1-2 tablets by mouth every 6 hours as needed for severe pain       potassium chloride ER (KLOR-CON M) 20 MEQ CR tablet Take 1 tablet (20 mEq) by mouth daily       prochlorperazine (COMPAZINE) 5 MG tablet Take 1 tablet (5 mg) by mouth every 6 hours as needed for vomiting       torsemide (DEMADEX) 10 MG tablet Take 1 tablet (10 mg) by mouth daily       traZODone (DESYREL) 100 MG tablet Take 500 mg by mouth At Bedtime        triamcinolone (ARISTOCORT HP) 0.5 % external cream Apply topically At Bedtime To hands       triamcinolone (KENALOG) 0.5 % OINT ointment Apply topically 2 times daily as needed for irritation To legs       trospium (SANCTURA) 20 MG tablet Take 1 tablet (20 mg) by mouth 2 times daily (before meals)       vitamin B-Complex Take 1 tablet by mouth daily            REVIEW OF SYSTEMS:  10 point ROS of systems including Constitutional, Eyes, Respiratory, Cardiovascular, Gastroenterology,  "Genitourinary, Integumentary, Musculoskeletal, Psychiatric were all negative except for pertinent positives noted in my HPI.    Objective:  BP (!) 142/68   Pulse 77   Temp 97.4  F (36.3  C)   Resp 16   Ht 1.676 m (5' 6\")   Wt 126.2 kg (278 lb 4.8 oz)   LMP 12/10/2010   SpO2 96%   BMI 44.92 kg/m    Exam:  GENERAL APPEARANCE:  Alert, in no distress  ENT:  Mouth and posterior oropharynx normal, moist mucous membranes, [place]  EYES:  EOM, conjunctivae, lids, pupils and irises normal  RESP:  no respiratory distress  CV:  peripheral edema trace+ in LE bilaterally  ABDOMEN:  abdomen obese  M/S:   patient resting in bed at time of exam, able to move all 4 extremitiesi  SKIN:  Inspection of skin and subcutaneous tissue baseline  NEURO:   speech WNL  PSYCH:  affect and mood normal       Labs:     Most Recent 3 CBC's:  Recent Labs   Lab Test 12/31/20  0932 12/28/20  0713 12/27/20  1009 12/25/20  1214 07/13/20  1530   WBC  --   --  9.6 11.8* 11.1*   HGB  --   --  11.8 11.6* 12.6   MCV  --   --  94 95 95    166 116* 145* 179     Most Recent 3 BMP's:  Recent Labs   Lab Test 01/04/21 12/31/20  0932 12/30/20  0854 12/29/20  1507 12/29/20  0901   *  --  132*  --  130*   POTASSIUM 3.5  --  3.9 3.5 3.3*   CHLORIDE 98  --  99  --  97   CO2 29  --  29  --  30   BUN 8  --  17  --  13   CR 0.40* 0.68 0.59  --  0.62   ANIONGAP 7  --  4  --  3   HEAVENLY 9.9  --  9.2  --  9.0   *  --  115*  --  166*       ASSESSMENT/PLAN:  MS (multiple sclerosis) (H)  Ongoing: PT and OT for strengthening     Compression fracture of L1 vertebra with routine healing, subsequent encounter  Acute/ongoing: TLSO brace on when OOB for another 6 weeks, continue percocet prn while in TCU, DC at discharge     Diarrhea:   Acute/ongoing: C-diff negative, stool for ova and parasites negative  Improved diarrhea  continue imodium 2mg BID  continue fibercon to 4 tabs BID and Bismuth TID  Scheduled for GI consult on 2/16/21     Benign essential " hypertension  Acute/ongoing: vitals daily and prn, BMP follow, torsemide 10mg QD, KCL 20meq QD, losartan 50mg QD    Orders written by provider at facility  No new orders      Electronically signed by:  Tonya Lynn Haase, APRN CNP

## 2021-02-16 ASSESSMENT — MIFFLIN-ST. JEOR: SCORE: 1829.09

## 2021-02-16 NOTE — PROGRESS NOTES
[facility] DISCHARGE SUMMARY  PATIENT'S NAME: Mili Padilla  YOB: 1955  MEDICAL RECORD NUMBER:  [id number]  Place of Service where encounter took place:  [facility] (U) [25]    PRIMARY CARE PROVIDER AND CLINIC RESPONSIBLE AFTER TRANSFER:   Jasmyn Márquez MD, [place] [employer] [facility] [address] / Kristian*    [facility] Provider     Transferring providers: Tonya Lynn Haase, APRN CNP, Dr. Shane Griffin MD  Recent Hospitalization/ED:  [facility] Hospital stay 12/25/20 to 12/31/20..  Date of SNF Admission: December/31/2020.  Date of SNF (anticipated) Discharge: February/18/2021  Discharged to: previous independent home  Cognitive Scores: BIMS: 15/15 and SBT 2/28.  Physical Function: Ambulating 2X100' ft with RWW  DME: Walker    CODE STATUS/ADVANCE DIRECTIVES DISCUSSION:  Full Code   ALLERGIES: Augmentin, Betaseron [interferon beta-1b], Dust mite extract, and Mold    DISCHARGE DIAGNOSIS/NURSING FACILITY COURSE:   Patient made slow progress, having a lot of issues with diarrhea and some incontinence with ambulation. Progressed to walking up to 179 feet using a RW, indep with ADL's will DC home with home PT, OT, HHA through [facility] and also will have private PCA services twice day.     Past Medical History:  has a past medical history of Depression, major, in partial remission (H), Fibromyalgia syndrome, Gastro-oesophageal reflux disease, Hyperlipemia, Lymphedema, Multiple sclerosis (H), Multiple sclerosis, secondary progressive (H), Sleep apnea, and Vocal cord paralysis, unilateral complete.    Discharge Medications:    Current Outpatient Medications   Medication Sig Dispense Refill     ARIPiprazole (ABILIFY) 5 MG tablet Take 5 mg by mouth daily   2     atorvastatin (LIPITOR) 20 MG tablet Take 20 mg by mouth daily.       bismuth subsalicylate (PEPTO BISMOL) 262 MG chewable tablet Take 1 tablet (262 mg) by mouth 3 times daily       Calcium Citrate-Vitamin D  250-200 MG-UNIT TABS Take 1 tablet by mouth 3 times daily        calcium polycarbophil (FIBERCON) 625 MG tablet Take 4 tablets by mouth 2 times daily       carBAMazepine (TEGRETOL) 200 MG tablet Take 200 mg by mouth 2 times daily       DULoxetine (CYMBALTA) 60 MG capsule Take 120 mg by mouth daily        guaiFENesin (MUCINEX) 600 MG 12 hr tablet Take 600 mg by mouth 2 times daily        ketoconazole (NIZORAL) 2 % cream Apply topically 2 times daily as needed        LACTOBACILLUS RHAMNOSUS, GG, PO Give 1 tablet by mouth two times a day for loose stool 30 billion units       loperamide (IMODIUM A-D) 2 MG tablet Take 1 tablet (2 mg) by mouth 2 times daily       losartan (COZAAR) 50 MG tablet Take 1 tablet (50 mg) by mouth daily       Melatonin 10 MG TABS Take 10 mg by mouth At Bedtime       methylphenidate (RITALIN) 20 MG tablet Take 1 tablet (20 mg) by mouth daily 14 tablet 0     mirtazapine (REMERON) 30 MG tablet Take 75 mg by mouth At Bedtime        Multiple Vitamin (MULTIVITAMINS PO) Take 2 tablets by mouth every evening. WITH IRON       ondansetron (ZOFRAN-ODT) 4 MG ODT tab Take 1 tablet (4 mg) by mouth every 6 hours as needed for nausea or vomiting       oxyCODONE-acetaminophen (PERCOCET) 5-325 MG tablet Take 1-2 tablets by mouth every 6 hours as needed for severe pain       potassium chloride ER (KLOR-CON M) 20 MEQ CR tablet Take 1 tablet (20 mEq) by mouth daily       torsemide (DEMADEX) 10 MG tablet Take 1 tablet (10 mg) by mouth daily       traZODone (DESYREL) 100 MG tablet Take 500 mg by mouth At Bedtime        triamcinolone (ARISTOCORT HP) 0.5 % external cream Apply topically At Bedtime To hands       triamcinolone (KENALOG) 0.5 % OINT ointment Apply topically 2 times daily as needed for irritation To legs       trospium (SANCTURA) 20 MG tablet Take 1 tablet (20 mg) by mouth 2 times daily (before meals)       vitamin B-Complex Take 1 tablet by mouth daily        denosumab (PROLIA) 60 MG/ML SOLN injection  "Inject 60 mg Subcutaneous every 6 months On hold in TCU       prochlorperazine (COMPAZINE) 5 MG tablet Take 1 tablet (5 mg) by mouth every 6 hours as needed for vomiting (Patient not taking: Reported on 2/17/2021)         Medication Changes/Rationale:     Medications adjusted due to diarrhea, fibercon increased to 4 tabs BID, started on bismuth and imodium    Medications that were not effective for diarrhea were Questran and lomotil    DC percocet at discharge patient able to wean off    Controlled medications sent with patient:   not applicable/none     ROS:   10 point ROS of systems including Constitutional, Eyes, Respiratory, Cardiovascular, Gastroenterology, Genitourinary, Integumentary, Musculoskeletal, Psychiatric were all negative except for pertinent positives noted in my HPI.    Physical Exam:   Vitals: /65   Pulse 74   Temp 97.7  F (36.5  C)   Resp 18   Ht 1.702 m (5' 7\")   Wt 125.6 kg (277 lb)   LMP 12/10/2010   SpO2 95%   BMI 43.38 kg/m    BMI= Body mass index is 43.38 kg/m .  GENERAL APPEARANCE:  Alert, in no distress  ENT:  Mouth and posterior oropharynx normal, moist mucous membranes, normal hearing acuity  EYES:  EOM, conjunctivae, lids, pupils and irises normal, PERRL  RESP:  no respiratory distress  CV:  peripheral edema 1+ in LE bilaterally  ABDOMEN:  abdomen round  M/S:   patient sitting up in WC, able to move all 4 extremities  SKIN:  Inspection of skin and subcutaneous tissue baseline  NEURO:   speech WNL  PSYCH:  affect and mood normal     SNF labs:   Most Recent 3 CBC's:  Recent Labs   Lab Test 12/31/20  0932 12/28/20  0713 12/27/20  1009 12/25/20  1214 07/13/20  1530   WBC  --   --  9.6 11.8* 11.1*   HGB  --   --  11.8 11.6* 12.6   MCV  --   --  94 95 95    166 116* 145* 179     Most Recent 3 BMP's:  Recent Labs   Lab Test 01/04/21 12/31/20  0932 12/30/20  0854 12/29/20  1507 12/29/20  0901   *  --  132*  --  130*   POTASSIUM 3.5  --  3.9 3.5 3.3*   CHLORIDE 98  -- "  99  --  97   CO2 29  --  29  --  30   BUN 8  --  17  --  13   CR 0.40* 0.68 0.59  --  0.62   ANIONGAP 7  --  4  --  3   HEAVENLY 9.9  --  9.2  --  9.0   *  --  115*  --  166*     Assessment and Treatment:     MS (multiple sclerosis) (H)  Ongoing: DC home with home PT, OT and HHA through [facility], home PCA twice daily     Compression fracture of L1 vertebra with routine healing, subsequent encounter  Acute/ongoing: TLSO brace on when OOB for another 6 weeks, tylenol prn for pain control, DC percocet at Discharge patient no longer using     Diarrhea:   Acute/ongoing: C-diff negative, stool for ova and parasites negative  Improved diarrhea  appt with GI on 2/16/21 no changes in plan below will f/u with GI on 3/16/21  continue imodium 2mg BID  continue fibercon to 4 tabs BID and Bismuth TID  Did give patient detailed instructions on taper of medications, start with fibercon, then imodium then bismuth as tolerated     Benign essential hypertension  Acute/ongoing:  torsemide 10mg QD, KCL 20meq QD, losartan 50mg QD       DISCHARGE PLAN:    Follow up labs: No labs orders/due    Medical Follow Up:      Follow up with primary care provider in 1 weeks    MT referral needed and placed by this provider: No    Current [place] scheduled appointments:  Next 5 appointments (look out 90 days)    Mar 24, 2021  2:30 PM  Return Visit with Lisa Wright NP  [facility] [place] ([facility] ) [address] [place] [id number] [phone]           Discharge Services: Home Care:  Occupational Therapy, Physical Therapy and Home Health Aide    Discharge Instructions Verbalized to Patient at Discharge:     None      TOTAL DISCHARGE TIME:   Greater than 30 minutes  Electronically signed by:  Tonya Lynn Haase, APRN CNP

## 2021-02-17 ENCOUNTER — DISCHARGE SUMMARY NURSING HOME (OUTPATIENT)
Dept: GERIATRICS | Facility: CLINIC | Age: 66
End: 2021-02-17
Payer: COMMERCIAL

## 2021-02-17 ENCOUNTER — DOCUMENTATION ONLY (OUTPATIENT)
Dept: CARE COORDINATION | Facility: CLINIC | Age: 66
End: 2021-02-17

## 2021-02-17 VITALS
TEMPERATURE: 97.7 F | RESPIRATION RATE: 18 BRPM | HEART RATE: 74 BPM | BODY MASS INDEX: 43.47 KG/M2 | SYSTOLIC BLOOD PRESSURE: 123 MMHG | DIASTOLIC BLOOD PRESSURE: 65 MMHG | OXYGEN SATURATION: 95 % | HEIGHT: 67 IN | WEIGHT: 277 LBS

## 2021-02-17 DIAGNOSIS — G35 MS (MULTIPLE SCLEROSIS) (H): ICD-10-CM

## 2021-02-17 DIAGNOSIS — I10 BENIGN ESSENTIAL HYPERTENSION: ICD-10-CM

## 2021-02-17 DIAGNOSIS — R53.81 PHYSICAL DECONDITIONING: ICD-10-CM

## 2021-02-17 DIAGNOSIS — S32.010D COMPRESSION FRACTURE OF L1 VERTEBRA WITH ROUTINE HEALING, SUBSEQUENT ENCOUNTER: ICD-10-CM

## 2021-02-17 DIAGNOSIS — N12 PYELONEPHRITIS: ICD-10-CM

## 2021-02-17 DIAGNOSIS — R19.7 DIARRHEA, UNSPECIFIED TYPE: Primary | ICD-10-CM

## 2021-02-17 DIAGNOSIS — A41.9 SEPSIS DUE TO URINARY TRACT INFECTION (H): ICD-10-CM

## 2021-02-17 DIAGNOSIS — F32.4 MAJOR DEPRESSIVE DISORDER IN PARTIAL REMISSION, UNSPECIFIED WHETHER RECURRENT (H): ICD-10-CM

## 2021-02-17 DIAGNOSIS — N39.0 SEPSIS DUE TO URINARY TRACT INFECTION (H): ICD-10-CM

## 2021-02-17 PROCEDURE — 99316 NF DSCHRG MGMT 30 MIN+: CPT | Performed by: NURSE PRACTITIONER

## 2021-02-17 NOTE — LETTER
2/17/2021        RE: Mili Padilla  [address]  [place] [address]        [facility] DISCHARGE SUMMARY  PATIENT'S NAME: Mili Padilla  YOB: 1955  MEDICAL RECORD NUMBER:  [id number]  Place of Service where encounter took place:  [facility] (TCU) [25]    PRIMARY CARE PROVIDER AND CLINIC RESPONSIBLE AFTER TRANSFER:   Jasmyn Márquez MD, [facility] [address] [facility] [address] / Kristian*    [facility] Provider     Transferring providers: Tonya Lynn Haase, APRN CNP, Dr. Shane Griffin MD  Recent Hospitalization/ED:  [facility] stay 12/25/20 to 12/31/20..  Date of SNF Admission: December/31/2020.  Date of SNF (anticipated) Discharge: February/18/2021  Discharged to: previous independent home  Cognitive Scores: BIMS: 15/15 and SBT 2/28.  Physical Function: Ambulating 2X100' ft with RWW  DME: Walker    CODE STATUS/ADVANCE DIRECTIVES DISCUSSION:  Full Code   ALLERGIES: Augmentin, Betaseron [interferon beta-1b], Dust mite extract, and Mold    DISCHARGE DIAGNOSIS/NURSING FACILITY COURSE:   Patient made slow progress, having a lot of issues with diarrhea and some incontinence with ambulation. Progressed to walking up to 179 feet using a RW, indep with ADL's will DC home with home PT, OT, HHA through [facility] and also will have private PCA services twice day.     Past Medical History:  has a past medical history of Depression, major, in partial remission (H), Fibromyalgia syndrome, Gastro-oesophageal reflux disease, Hyperlipemia, Lymphedema, Multiple sclerosis (H), Multiple sclerosis, secondary progressive (H), Sleep apnea, and Vocal cord paralysis, unilateral complete.    Discharge Medications:    Current Outpatient Medications   Medication Sig Dispense Refill     ARIPiprazole (ABILIFY) 5 MG tablet Take 5 mg by mouth daily   2     atorvastatin (LIPITOR) 20 MG tablet Take 20 mg by mouth daily.       bismuth subsalicylate (PEPTO BISMOL) 262  MG chewable tablet Take 1 tablet (262 mg) by mouth 3 times daily       Calcium Citrate-Vitamin D 250-200 MG-UNIT TABS Take 1 tablet by mouth 3 times daily        calcium polycarbophil (FIBERCON) 625 MG tablet Take 4 tablets by mouth 2 times daily       carBAMazepine (TEGRETOL) 200 MG tablet Take 200 mg by mouth 2 times daily       DULoxetine (CYMBALTA) 60 MG capsule Take 120 mg by mouth daily        guaiFENesin (MUCINEX) 600 MG 12 hr tablet Take 600 mg by mouth 2 times daily        ketoconazole (NIZORAL) 2 % cream Apply topically 2 times daily as needed        LACTOBACILLUS RHAMNOSUS, GG, PO Give 1 tablet by mouth two times a day for loose stool 30 billion units       loperamide (IMODIUM A-D) 2 MG tablet Take 1 tablet (2 mg) by mouth 2 times daily       losartan (COZAAR) 50 MG tablet Take 1 tablet (50 mg) by mouth daily       Melatonin 10 MG TABS Take 10 mg by mouth At Bedtime       methylphenidate (RITALIN) 20 MG tablet Take 1 tablet (20 mg) by mouth daily 14 tablet 0     mirtazapine (REMERON) 30 MG tablet Take 75 mg by mouth At Bedtime        Multiple Vitamin (MULTIVITAMINS PO) Take 2 tablets by mouth every evening. WITH IRON       ondansetron (ZOFRAN-ODT) 4 MG ODT tab Take 1 tablet (4 mg) by mouth every 6 hours as needed for nausea or vomiting       oxyCODONE-acetaminophen (PERCOCET) 5-325 MG tablet Take 1-2 tablets by mouth every 6 hours as needed for severe pain       potassium chloride ER (KLOR-CON M) 20 MEQ CR tablet Take 1 tablet (20 mEq) by mouth daily       torsemide (DEMADEX) 10 MG tablet Take 1 tablet (10 mg) by mouth daily       traZODone (DESYREL) 100 MG tablet Take 500 mg by mouth At Bedtime        triamcinolone (ARISTOCORT HP) 0.5 % external cream Apply topically At Bedtime To hands       triamcinolone (KENALOG) 0.5 % OINT ointment Apply topically 2 times daily as needed for irritation To legs       trospium (SANCTURA) 20 MG tablet Take 1 tablet (20 mg) by mouth 2 times daily (before meals)        "vitamin B-Complex Take 1 tablet by mouth daily        denosumab (PROLIA) 60 MG/ML SOLN injection Inject 60 mg Subcutaneous every 6 months On hold in TCU       prochlorperazine (COMPAZINE) 5 MG tablet Take 1 tablet (5 mg) by mouth every 6 hours as needed for vomiting (Patient not taking: Reported on 2/17/2021)         Medication Changes/Rationale:     Medications adjusted due to diarrhea, fibercon increased to 4 tabs BID, started on bismuth and imodium    Medications that were not effective for diarrhea were Questran and lomotil    DC percocet at discharge patient able to wean off    Controlled medications sent with patient:   not applicable/none     ROS:   10 point ROS of systems including Constitutional, Eyes, Respiratory, Cardiovascular, Gastroenterology, Genitourinary, Integumentary, Musculoskeletal, Psychiatric were all negative except for pertinent positives noted in my HPI.    Physical Exam:   Vitals: /65   Pulse 74   Temp 97.7  F (36.5  C)   Resp 18   Ht 1.702 m (5' 7\")   Wt 125.6 kg (277 lb)   LMP 12/10/2010   SpO2 95%   BMI 43.38 kg/m    BMI= Body mass index is 43.38 kg/m .  GENERAL APPEARANCE:  Alert, in no distress  ENT:  Mouth and posterior oropharynx normal, moist mucous membranes, normal hearing acuity  EYES:  EOM, conjunctivae, lids, pupils and irises normal, PERRL  RESP:  no respiratory distress  CV:  peripheral edema 1+ in LE bilaterally  ABDOMEN:  abdomen round  M/S:   patient sitting up in WC, able to move all 4 extremities  SKIN:  Inspection of skin and subcutaneous tissue baseline  NEURO:   speech WNL  PSYCH:  affect and mood normal     SNF labs:   Most Recent 3 CBC's:  Recent Labs   Lab Test 12/31/20  0932 12/28/20  0713 12/27/20  1009 12/25/20  1214 07/13/20  1530   WBC  --   --  9.6 11.8* 11.1*   HGB  --   --  11.8 11.6* 12.6   MCV  --   --  94 95 95    166 116* 145* 179     Most Recent 3 BMP's:  Recent Labs   Lab Test 01/04/21 12/31/20  0932 12/30/20  0854 12/29/20  1507 " 12/29/20  0901   *  --  132*  --  130*   POTASSIUM 3.5  --  3.9 3.5 3.3*   CHLORIDE 98  --  99  --  97   CO2 29  --  29  --  30   BUN 8  --  17  --  13   CR 0.40* 0.68 0.59  --  0.62   ANIONGAP 7  --  4  --  3   HEAVENLY 9.9  --  9.2  --  9.0   *  --  115*  --  166*     Assessment and Treatment:     MS (multiple sclerosis) (H)  Ongoing: DC home with home PT, OT and HHA through [facility], home PCA twice daily     Compression fracture of L1 vertebra with routine healing, subsequent encounter  Acute/ongoing: TLSO brace on when OOB for another 6 weeks, tylenol prn for pain control, DC percocet at Discharge patient no longer using     Diarrhea:   Acute/ongoing: C-diff negative, stool for ova and parasites negative  Improved diarrhea  appt with GI on 2/16/21 no changes in plan below will f/u with GI on 3/16/21  continue imodium 2mg BID  continue fibercon to 4 tabs BID and Bismuth TID  Did give patient detailed instructions on taper of medications, start with fibercon, then imodium then bismuth as tolerated     Benign essential hypertension  Acute/ongoing:  torsemide 10mg QD, KCL 20meq QD, losartan 50mg QD       DISCHARGE PLAN:    Follow up labs: No labs orders/due    Medical Follow Up:      Follow up with primary care provider in 1 weeks    [facility] referral needed and placed by this provider: No    Current [place] scheduled appointments:  Next 5 appointments (look out 90 days)    Mar 24, 2021  2:30 PM  Return Visit with Lisa Wright NP  [facility] [place] ([facility] ) [address] [place] [id number] [phone]           Discharge Services: Home Care:  Occupational Therapy, Physical Therapy and Home Health Aide    Discharge Instructions Verbalized to Patient at Discharge:     None      TOTAL DISCHARGE TIME:   Greater than 30 minutes  Electronically signed by:  Tonya Lynn Haase, APRN CNP                         Sincerely,        Rocio  Lynn Haase, APRN CNP

## 2021-02-18 NOTE — PROGRESS NOTES
Due to a high volume of referrals, [facility]  has requested this patient's home care episode be transferred to their deferral partner [facility] at [phone]. Care team and patient notified.   Randi Schirmers RN   Cell: [phone]

## 2021-02-22 NOTE — PROGRESS NOTES
HISTORY OF PRESENT ILLNESS: Mili Padilla is a 65 year old female with a history of trauma to the larynx at age 13 from a skiing accident.  She was reconstructed by Dr. Hammer in the past and subsequently followed by Dr. Day.  I had seen her initially in 2008 and I have been following her since then.  Her airway has been stable since last seen her on 2/4/2020.  She has had no difficulties since her last visit.  She notes that she swallows liquids without any difficulties.  Solid and dry foods sometimes will get slightly increased difficulty but all her swallowing well.  She has no changes in her voice no new breathing issues.  She has a long-standing left vocal fold immobility. She has had some increase in over the last 5-6 years. Her last exam hours suspicious of the cricopharyngeus muscle dysfunction    She is seen today for a video visitShe is seen today for a video visit.  She was recently hospitalized for sepsis which was thought to be secondary to Juan nephritis.  She is also had some low back pain due to vertebral problems.  Her voice has been stable and her airway has been stable.  She notes when she takes a lot of fiber pills that she has have applesauce with this but otherwise her swallowing is not difficult for her.    Last 2 Scores for Patient-Answered VHI Questionnaire  VHI Total Score 12/8/2016   VHI Total Score 21       Last 2 Scores for Patient-Answered CSI Questionnaire  CSI Total Score 1/28/2019 2/4/2020   CSI Total Score 4 8         Last 2 Scores for Patient-Answered EAT Questionnaire  EAT Total Score 1/28/2019 2/4/2020   EAT Total Score 5 Incomplete           PAST MEDICAL HISTORY:   Past Medical History:   Diagnosis Date     Depression, major, in partial remission (H)      Fibromyalgia syndrome      Gastro-oesophageal reflux disease      Hyperlipemia      Lymphedema      Multiple sclerosis (H)     tremors with MS, all four limbs and head     Multiple sclerosis, secondary progressive (H)       Sleep apnea      Vocal cord paralysis, unilateral complete        PAST SURGICAL HISTORY:   Past Surgical History:   Procedure Laterality Date     COLONOSCOPY N/A 7/17/2017    Procedure: COMBINED COLONOSCOPY, SINGLE OR MULTIPLE BIOPSY/POLYPECTOMY BY BIOPSY;;  Surgeon: Wong Beaulieu MD;  Location:  GI     COLONOSCOPY N/A 2/23/2018    Procedure: COMBINED COLONOSCOPY, SINGLE OR MULTIPLE BIOPSY/POLYPECTOMY BY BIOPSY;  COLONOSCOPY ;  Surgeon: Yessica Santana MD;  Location:  GI     ENT SURGERY      throat 1969, vocal cord surgery with skin grafting     ESOPHAGOSCOPY, GASTROSCOPY, DUODENOSCOPY (EGD), COMBINED N/A 12/16/2014    Procedure: COMBINED ENDOSCOPIC ULTRASOUND, ESOPHAGOSCOPY, GASTROSCOPY, DUODENOSCOPY (EGD), FINE NEEDLE ASPIRATE/BIOPSY;  Surgeon: Yessica Santana MD;  Location: [facility]     ESOPHAGOSCOPY, GASTROSCOPY, DUODENOSCOPY (EGD), COMBINED N/A 12/16/2014    Procedure: COMBINED ESOPHAGOSCOPY, GASTROSCOPY, DUODENOSCOPY (EGD), BIOPSY SINGLE OR MULTIPLE;  Surgeon: Yessica Santana MD;  Location: [facility]     LAPAROSCOPIC APPENDECTOMY  5/30/2013    Procedure: LAPAROSCOPIC APPENDECTOMY;;  Surgeon: Salvador Morris MD;  Location:  OR     LAPAROSCOPIC BIOPSY LIVER  5/30/2013    Procedure: LAPAROSCOPIC BIOPSY LIVER;;  Surgeon: Salvador Morris MD;  Location:  OR     LAPAROSCOPIC GASTRIC SLEEVE  5/30/2013    Procedure: LAPAROSCOPIC GASTRIC SLEEVE;  LAPAROSCOPIC SLEEVE GASTRECTOMY/ LAPARSCOPIC  APPENDECTOMY /LIVER BIOPSIES/LIVER CYST DRAINAGE;  Surgeon: Salvador Morris MD;  Location:  OR     ORTHOPEDIC SURGERY      R wrist 2010       FAMILY HISTORY:   Family History   Problem Relation Age of Onset     Breast Cancer Mother      Other Cancer Father      Breast Cancer Maternal Aunt      Breast Cancer Maternal Aunt      Breast Cancer Maternal Aunt      Breast Cancer Maternal Aunt      Glaucoma No family hx of      Macular Degeneration No family hx of      Amblyopia No family hx of         SOCIAL HISTORY:   Social History     Tobacco Use     Smoking status: Former Smoker     Types: Cigarettes     Quit date: 1974     Years since quittin.4     Smokeless tobacco: Never Used   Substance Use Topics     Alcohol use: Yes     Alcohol/week: 0.0 standard drinks     Comment: Once a month.       REVIEW OF SYSTEMS: Ten point review of systems was performed and is negative except for:   [facility] ROS 2020   Constitutional -   Neurology Dizzy spells, Numbness   Psychology Frequently feeling depressed or sad   Eyes -   Ears, Nose, Throat Hoarseness   Cardiopulmonary Cough   Gastrointestinal/Genitourinary Heartburn/indigestion, Constipation   Musculoskeletal Swollen legs/feet   Endocrine Heat or cold intolerance        ALLERGIES: Augmentin, Betaseron [interferon beta-1b], Dust mite extract, and Mold    MEDICATIONS:   Current Outpatient Medications   Medication Sig Dispense Refill     ARIPiprazole (ABILIFY) 5 MG tablet Take 5 mg by mouth daily   2     atorvastatin (LIPITOR) 20 MG tablet Take 20 mg by mouth daily.       bismuth subsalicylate (PEPTO BISMOL) 262 MG chewable tablet Take 1 tablet (262 mg) by mouth 3 times daily       Calcium Citrate-Vitamin D 250-200 MG-UNIT TABS Take 1 tablet by mouth 3 times daily        calcium polycarbophil (FIBERCON) 625 MG tablet Take 4 tablets by mouth 2 times daily       carBAMazepine (TEGRETOL) 200 MG tablet Take 200 mg by mouth 2 times daily       denosumab (PROLIA) 60 MG/ML SOLN injection Inject 60 mg Subcutaneous every 6 months On hold in [facility]       DULoxetine (CYMBALTA) 60 MG capsule Take 120 mg by mouth daily        guaiFENesin (MUCINEX) 600 MG 12 hr tablet Take 600 mg by mouth 2 times daily        ketoconazole (NIZORAL) 2 % cream Apply topically 2 times daily as needed        LACTOBACILLUS RHAMNOSUS, GG, PO Give 1 tablet by mouth two times a day for loose stool 30 billion units       loperamide (IMODIUM A-D) 2 MG tablet Take 1 tablet (2 mg) by mouth 2 times  daily       losartan (COZAAR) 50 MG tablet Take 1 tablet (50 mg) by mouth daily       Melatonin 10 MG TABS Take 10 mg by mouth At Bedtime       methylphenidate (RITALIN) 20 MG tablet Take 1 tablet (20 mg) by mouth daily 14 tablet 0     mirtazapine (REMERON) 30 MG tablet Take 75 mg by mouth At Bedtime        Multiple Vitamin (MULTIVITAMINS PO) Take 2 tablets by mouth every evening. WITH IRON       potassium chloride ER (KLOR-CON M) 20 MEQ CR tablet Take 1 tablet (20 mEq) by mouth daily       torsemide (DEMADEX) 10 MG tablet Take 1 tablet (10 mg) by mouth daily       traZODone (DESYREL) 100 MG tablet Take 500 mg by mouth At Bedtime        triamcinolone (ARISTOCORT HP) 0.5 % external cream Apply topically At Bedtime To hands       triamcinolone (KENALOG) 0.5 % OINT ointment Apply topically 2 times daily as needed for irritation To legs       trospium (SANCTURA) 20 MG tablet Take 1 tablet (20 mg) by mouth 2 times daily (before meals)       vitamin B-Complex Take 1 tablet by mouth daily            PHYSICAL EXAMINATION:    She  is awake, alert and in no apparent distress.      Her voice is clear and articulate.    Upper airway is clear.   Cranial nerves II-XII are grossly intact by visual inspection.by visual inspection.          IMPRESSION/PLAN: She is doing quite well in terms of her voice and airway.  Her swallowing is stable.  We discussed that she has been stable for the last 10 years.  I did offer her more prolonged follow-up but she prefer to follow-up in a year to keep an check on her progress.  I advised her we could potentially alternate between in-clinic visits and video visits.  She is quite accepting of this and we will schedule her for  an in-clinic visit in 1 year.

## 2021-02-23 ENCOUNTER — VIRTUAL VISIT (OUTPATIENT)
Dept: OTOLARYNGOLOGY | Facility: CLINIC | Age: 66
End: 2021-02-23
Payer: COMMERCIAL

## 2021-02-23 VITALS — HEIGHT: 67 IN | BODY MASS INDEX: 43.16 KG/M2 | WEIGHT: 275 LBS

## 2021-02-23 DIAGNOSIS — R13.19 OTHER DYSPHAGIA: ICD-10-CM

## 2021-02-23 DIAGNOSIS — J38.01 VOCAL CORD PARALYSIS, UNILATERAL COMPLETE: Primary | ICD-10-CM

## 2021-02-23 PROCEDURE — 99213 OFFICE O/P EST LOW 20 MIN: CPT | Mod: 95 | Performed by: OTOLARYNGOLOGY

## 2021-02-23 ASSESSMENT — PAIN SCALES - GENERAL: PAINLEVEL: NO PAIN (0)

## 2021-02-23 ASSESSMENT — MIFFLIN-ST. JEOR: SCORE: 1812.08

## 2021-02-23 NOTE — PROGRESS NOTES
Mili is a 65 year old who is being evaluated via a billable video visit.      How would you like to obtain your AVS? MyChart  If the video visit is dropped, the invitation should be resent by: Send to e-mail at: [email or web address]  Will anyone else be joining your video visit? No      Video Start Time: 10:14 AM  Video-Visit Details    Type of service:  Video Visit    Video End Time:10:29 AM    Originating Location (pt. Location): Home    Distant Location (provider location):  [facility] E[facility]SE AND [facility] [place]     Platform used for Video Visit: E-Box - [email or web address]

## 2021-02-23 NOTE — PATIENT INSTRUCTIONS
1.  You were seen in the ENT Clinic today by . If you have any questions or concerns after your appointment, please call [phone]. Press option #1 for scheduling related needs. Press option #3 for Nurse advice.    2.   Plan is to return to clinic in 1 year for follow up and exam.      Doris Pulido LPN  [phone]  [facility] - Otolaryngology

## 2021-02-23 NOTE — LETTER
2/23/2021       RE: Mili Padilla  [address]  [place] [facility] [place] [address]     Dear Colleague,    Thank you for referring your patient, Mili Padilla, to the [facility] E[facility] NOSE AND THROAT CLINIC [place] at [facility]. Please see a copy of my visit note below.       HISTORY OF PRESENT ILLNESS: Mili Padilla is a 65 year old female with a history of trauma to the larynx at age 13 from a skiing accident.  She was reconstructed by Dr. Hammer in the past and subsequently followed by Dr. Day.  I had seen her initially in 2008 and I have been following her since then.  Her airway has been stable since last seen her on 2/4/2020.  She has had no difficulties since her last visit.  She notes that she swallows liquids without any difficulties.  Solid and dry foods sometimes will get slightly increased difficulty but all her swallowing well.  She has no changes in her voice no new breathing issues.  She has a long-standing left vocal fold immobility. She has had some increase in over the last 5-6 years. Her last exam hours suspicious of the cricopharyngeus muscle dysfunction    She is seen today for a video visitShe is seen today for a video visit.  She was recently hospitalized for sepsis which was thought to be secondary to Juan nephritis.  She is also had some low back pain due to vertebral problems.  Her voice has been stable and her airway has been stable.  She notes when she takes a lot of fiber pills that she has have applesauce with this but otherwise her swallowing is not difficult for her.    Last 2 Scores for Patient-Answered VHI Questionnaire  VHI Total Score 12/8/2016   VHI Total Score 21       Last 2 Scores for Patient-Answered CSI Questionnaire  CSI Total Score 1/28/2019 2/4/2020   CSI Total Score 4 8         Last 2 Scores for Patient-Answered EAT Questionnaire  EAT Total Score 1/28/2019 2/4/2020   EAT Total Score 5 Incomplete           PAST  MEDICAL HISTORY:   Past Medical History:   Diagnosis Date     Depression, major, in partial remission (H)      Fibromyalgia syndrome      Gastro-oesophageal reflux disease      Hyperlipemia      Lymphedema      Multiple sclerosis (H)     tremors with MS, all four limbs and head     Multiple sclerosis, secondary progressive (H)      Sleep apnea      Vocal cord paralysis, unilateral complete        PAST SURGICAL HISTORY:   Past Surgical History:   Procedure Laterality Date     COLONOSCOPY N/A 7/17/2017    Procedure: COMBINED COLONOSCOPY, SINGLE OR MULTIPLE BIOPSY/POLYPECTOMY BY BIOPSY;;  Surgeon: Wong Beaulieu MD;  Location:  GI     COLONOSCOPY N/A 2/23/2018    Procedure: COMBINED COLONOSCOPY, SINGLE OR MULTIPLE BIOPSY/POLYPECTOMY BY BIOPSY;  COLONOSCOPY ;  Surgeon: Yessica Santana MD;  Location:  GI     ENT SURGERY      throat 1969, vocal cord surgery with skin grafting     ESOPHAGOSCOPY, GASTROSCOPY, DUODENOSCOPY (EGD), COMBINED N/A 12/16/2014    Procedure: COMBINED ENDOSCOPIC ULTRASOUND, ESOPHAGOSCOPY, GASTROSCOPY, DUODENOSCOPY (EGD), FINE NEEDLE ASPIRATE/BIOPSY;  Surgeon: Yessica Santana MD;  Location:  GI     ESOPHAGOSCOPY, GASTROSCOPY, DUODENOSCOPY (EGD), COMBINED N/A 12/16/2014    Procedure: COMBINED ESOPHAGOSCOPY, GASTROSCOPY, DUODENOSCOPY (EGD), BIOPSY SINGLE OR MULTIPLE;  Surgeon: Yessica Santana MD;  Location: [facility]     LAPAROSCOPIC APPENDECTOMY  5/30/2013    Procedure: LAPAROSCOPIC APPENDECTOMY;;  Surgeon: Salvador Morris MD;  Location:  OR     LAPAROSCOPIC BIOPSY LIVER  5/30/2013    Procedure: LAPAROSCOPIC BIOPSY LIVER;;  Surgeon: Salvador Morris MD;  Location:  OR     LAPAROSCOPIC GASTRIC SLEEVE  5/30/2013    Procedure: LAPAROSCOPIC GASTRIC SLEEVE;  LAPAROSCOPIC SLEEVE GASTRECTOMY/ LAPARSCOPIC  APPENDECTOMY /LIVER BIOPSIES/LIVER CYST DRAINAGE;  Surgeon: Salvador Morris MD;  Location:  OR     ORTHOPEDIC SURGERY      R wrist 2010       FAMILY HISTORY:   Family  History   Problem Relation Age of Onset     Breast Cancer Mother      Other Cancer Father      Breast Cancer Maternal Aunt      Breast Cancer Maternal Aunt      Breast Cancer Maternal Aunt      Breast Cancer Maternal Aunt      Glaucoma No family hx of      Macular Degeneration No family hx of      Amblyopia No family hx of        SOCIAL HISTORY:   Social History     Tobacco Use     Smoking status: Former Smoker     Types: Cigarettes     Quit date: 1974     Years since quittin.4     Smokeless tobacco: Never Used   Substance Use Topics     Alcohol use: Yes     Alcohol/week: 0.0 standard drinks     Comment: Once a month.       REVIEW OF SYSTEMS: Ten point review of systems was performed and is negative except for:   [facility] ROS 2020   Constitutional -   Neurology Dizzy spells, Numbness   Psychology Frequently feeling depressed or sad   Eyes -   Ears, Nose, Throat Hoarseness   Cardiopulmonary Cough   Gastrointestinal/Genitourinary Heartburn/indigestion, Constipation   Musculoskeletal Swollen legs/feet   Endocrine Heat or cold intolerance        ALLERGIES: Augmentin, Betaseron [interferon beta-1b], Dust mite extract, and Mold    MEDICATIONS:   Current Outpatient Medications   Medication Sig Dispense Refill     ARIPiprazole (ABILIFY) 5 MG tablet Take 5 mg by mouth daily   2     atorvastatin (LIPITOR) 20 MG tablet Take 20 mg by mouth daily.       bismuth subsalicylate (PEPTO BISMOL) 262 MG chewable tablet Take 1 tablet (262 mg) by mouth 3 times daily       Calcium Citrate-Vitamin D 250-200 MG-UNIT TABS Take 1 tablet by mouth 3 times daily        calcium polycarbophil (FIBERCON) 625 MG tablet Take 4 tablets by mouth 2 times daily       carBAMazepine (TEGRETOL) 200 MG tablet Take 200 mg by mouth 2 times daily       denosumab (PROLIA) 60 MG/ML SOLN injection Inject 60 mg Subcutaneous every 6 months On hold in [facility]       DULoxetine (CYMBALTA) 60 MG capsule Take 120 mg by mouth daily        guaiFENesin (MUCINEX) 600  MG 12 hr tablet Take 600 mg by mouth 2 times daily        ketoconazole (NIZORAL) 2 % cream Apply topically 2 times daily as needed        LACTOBACILLUS RHAMNOSUS, GG, PO Give 1 tablet by mouth two times a day for loose stool 30 billion units       loperamide (IMODIUM A-D) 2 MG tablet Take 1 tablet (2 mg) by mouth 2 times daily       losartan (COZAAR) 50 MG tablet Take 1 tablet (50 mg) by mouth daily       Melatonin 10 MG TABS Take 10 mg by mouth At Bedtime       methylphenidate (RITALIN) 20 MG tablet Take 1 tablet (20 mg) by mouth daily 14 tablet 0     mirtazapine (REMERON) 30 MG tablet Take 75 mg by mouth At Bedtime        Multiple Vitamin (MULTIVITAMINS PO) Take 2 tablets by mouth every evening. WITH IRON       potassium chloride ER (KLOR-CON M) 20 MEQ CR tablet Take 1 tablet (20 mEq) by mouth daily       torsemide (DEMADEX) 10 MG tablet Take 1 tablet (10 mg) by mouth daily       traZODone (DESYREL) 100 MG tablet Take 500 mg by mouth At Bedtime        triamcinolone (ARISTOCORT HP) 0.5 % external cream Apply topically At Bedtime To hands       triamcinolone (KENALOG) 0.5 % OINT ointment Apply topically 2 times daily as needed for irritation To legs       trospium (SANCTURA) 20 MG tablet Take 1 tablet (20 mg) by mouth 2 times daily (before meals)       vitamin B-Complex Take 1 tablet by mouth daily            PHYSICAL EXAMINATION:    She  is awake, alert and in no apparent distress.      Her voice is clear and articulate.    Upper airway is clear.   Cranial nerves II-XII are grossly intact by visual inspection.by visual inspection.          IMPRESSION/PLAN: She is doing quite well in terms of her voice and airway.  Her swallowing is stable.  We discussed that she has been stable for the last 10 years.  I did offer her more prolonged follow-up but she prefer to follow-up in a year to keep an check on her progress.  I advised her we could potentially alternate between in-clinic visits and video visits.  She is quite  accepting of this and we will schedule her for  an in-clinic visit in 1 year.              Mili is a 65 year old who is being evaluated via a billable video visit.      How would you like to obtain your AVS? MyChart  If the video visit is dropped, the invitation should be resent by: Send to e-mail at: [email or web address]  Will anyone else be joining your video visit? No      Video Start Time: 10:14 AM  Video-Visit Details    Type of service:  Video Visit    Video End Time:10:29 AM    Originating Location (pt. Location): Home    Distant Location (provider location):  [facility]     Platform used for Video Visit: Gist        Again, thank you for allowing me to participate in the care of your patient.      Sincerely,    Clint Still MD

## 2021-02-23 NOTE — NURSING NOTE
"Chief Complaint   Patient presents with     RECHECK     One year follow up, vocal cord paralysis       Height 1.689 m (5' 6.5\"), weight 124.7 kg (275 lb), last menstrual period 12/10/2010, not currently breastfeeding.    Kandy Lua, EMT  "

## 2021-03-22 ENCOUNTER — VIRTUAL VISIT (OUTPATIENT)
Dept: PSYCHOLOGY | Facility: CLINIC | Age: 66
End: 2021-03-22
Payer: COMMERCIAL

## 2021-03-22 DIAGNOSIS — F33.0 MAJOR DEPRESSIVE DISORDER, RECURRENT EPISODE, MILD (H): Primary | ICD-10-CM

## 2021-03-22 DIAGNOSIS — F54 PSYCHOLOGICAL FACTORS AFFECTING MORBID OBESITY (H): ICD-10-CM

## 2021-03-22 DIAGNOSIS — E66.01 PSYCHOLOGICAL FACTORS AFFECTING MORBID OBESITY (H): ICD-10-CM

## 2021-03-22 PROCEDURE — 90837 PSYTX W PT 60 MINUTES: CPT | Mod: 95 | Performed by: PSYCHOLOGIST

## 2021-03-22 NOTE — PROGRESS NOTES
Health Psychology                     Department of Medicine  Izzy Montaño, Ph.D., L.P. [phone]                          [facility] Sherrie Crowe, Ph.D., L.P. [phone]                   [place] Mail Code 844   Radha Salomón, Ph.D.  [phone]       [address] [place] [address] Vin Ward, Ph.D.,  L.P. [phone]        [place] [address]           Ranulfo Whipple, Ph.D. [phone]      Enriqueta Sorto, Ph.D., L.P. [phone]    [facility]   Ramin Olivo, Ph.D., A.B.P.P., L.P. [phone]  [address] [place] [address] Kathie Galvan, Ph.D., L.P. [phone]     Health Psychology [facility] [place] Health Note    Ms. Padilla is a 65-year-old woman self-referred for psychological consultation because her therapist of the last 24 years retired.  She is seen for problem-solving and supportive therapy for depression and multiple health issues.     HISTORY OF PRESENTING CONCERN:  Ms. Padilla reported at intake (7/28/16) a  lengthy history of depression.  She sees a psychiatrist, Ban Coleman, at [facility], and is taking Abilify 7.5 mg, trazodone 500 mg, ripazepam 75 mg each day at bedtime, Tegretol 400 mg at bedtime and methylphenidate 10 mg b.i.d.  She discontineud ability and is now taking Rexulti 2 mg.  She has a history of hospitalizations including 3 at [facility] in the late 1990s, early 2000s when she had 2 courses of ECT lasting 7-10 sessions and approximately 3 other single session ECTs.  She stopped due to memory problems.  She kept with Dr. Coleman who she first met as an inpatient and has seen her for the past 18 years.  She had also been hospitalized psychiatrically at [facility] at age 23.  She previously worked with psychiatrist, Doug Sarabia for three years, stopping, in part, because she didn't feel he took her suicide attempt sufficiently seriously.  She reports her depression tends to vary.     MEDICAL  HISTORY:  Ms. Padilla has a complex medical history.  She was diagnosed with MS in 1985.  Her psychiatrist at [facility] in [place], Dr. 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  She has used a scooter since 1992, using it more  than she had previously.  She also can use a walker.  She was diagnosed with fibromyalgia in 1997.   She is seen at the [facility] for her eye care. During 8th grade, she fell on a ski lift, injuring her larynx.     WEIGHT not taken. iscussed goal of cutting back by 125 calories per day.   That would get her losing 2 lbs./month.  She has goal of 1750 claories/day. and states she met that goal aout half the days of the interview.  She had only 4 days down to goal in the last 18 days;  the rest were about 2100 janice.   She was at goal in the preceeding days for 11 of 17 days.    She states weight is down to 285 from 290.  She is weighing herself weekly, using more vegetables for snack.  She plans to write down calories in the morning. She acknowledges she needs to do it daily.       She resumed attending OA.  She had stopped a year ago and then resumed this month.  We discuss teasing apart stress copying from eating.    She is  starting reatment for  benign paroxysmal positional vertigo treatment in December, 2020.  She is getting some physical therapy for helping with convergence.  Her balance is feeling a bit better.  He is drinking Power Aid (0 calories)     She reports weight is 272.    Wt Readings from Last 4 Encounters:   02/23/21 124.7 kg (275 lb)   02/16/21 125.6 kg (277 lb)   02/10/21 126.2 kg (278 lb 4.8 oz)   02/10/21 126.1 kg (278 lb)     Past Medical History:   Diagnosis Date     Depression, major, in partial remission (H)      Fibromyalgia syndrome      Gastro-oesophageal reflux disease      Hyperlipemia      Lymphedema      Multiple sclerosis (H)     tremors with MS, all four limbs and head     Multiple sclerosis, secondary  progressive (H)      Sleep apnea      Vocal cord paralysis, unilateral complete         Past Surgical History:   Procedure Laterality Date     COLONOSCOPY N/A 7/17/2017    Procedure: COMBINED COLONOSCOPY, SINGLE OR MULTIPLE BIOPSY/POLYPECTOMY BY BIOPSY;;  Surgeon: Wong Beaulieu MD;  Location:  GI     COLONOSCOPY N/A 2/23/2018    Procedure: COMBINED COLONOSCOPY, SINGLE OR MULTIPLE BIOPSY/POLYPECTOMY BY BIOPSY;  COLONOSCOPY ;  Surgeon: Yessica Santana MD;  Location:  GI     ENT SURGERY      throat 1969, vocal cord surgery with skin grafting     ESOPHAGOSCOPY, GASTROSCOPY, DUODENOSCOPY (EGD), COMBINED N/A 12/16/2014    Procedure: COMBINED ENDOSCOPIC ULTRASOUND, ESOPHAGOSCOPY, GASTROSCOPY, DUODENOSCOPY (EGD), FINE NEEDLE ASPIRATE/BIOPSY;  Surgeon: Yessica Santana MD;  Location: [facility]     ESOPHAGOSCOPY, GASTROSCOPY, DUODENOSCOPY (EGD), COMBINED N/A 12/16/2014    Procedure: COMBINED ESOPHAGOSCOPY, GASTROSCOPY, DUODENOSCOPY (EGD), BIOPSY SINGLE OR MULTIPLE;  Surgeon: Yessica Santana MD;  Location: [facility]     LAPAROSCOPIC APPENDECTOMY  5/30/2013    Procedure: LAPAROSCOPIC APPENDECTOMY;;  Surgeon: Salvador Morris MD;  Location:  OR     LAPAROSCOPIC BIOPSY LIVER  5/30/2013    Procedure: LAPAROSCOPIC BIOPSY LIVER;;  Surgeon: Salvador Morris MD;  Location:  OR     LAPAROSCOPIC GASTRIC SLEEVE  5/30/2013    Procedure: LAPAROSCOPIC GASTRIC SLEEVE;  LAPAROSCOPIC SLEEVE GASTRECTOMY/ LAPARSCOPIC  APPENDECTOMY /LIVER BIOPSIES/LIVER CYST DRAINAGE;  Surgeon: Salvador Morris MD;  Location:  OR     ORTHOPEDIC SURGERY      R wrist 2010       Current Outpatient Medications   Medication     ARIPiprazole (ABILIFY) 5 MG tablet     atorvastatin (LIPITOR) 20 MG tablet     bismuth subsalicylate (PEPTO BISMOL) 262 MG chewable tablet     Calcium Citrate-Vitamin D 250-200 MG-UNIT TABS     calcium polycarbophil (FIBERCON) 625 MG tablet     carBAMazepine (TEGRETOL) 200 MG tablet     denosumab (PROLIA) 60 MG/ML  SOLN injection     DULoxetine (CYMBALTA) 60 MG capsule     guaiFENesin (MUCINEX) 600 MG 12 hr tablet     ketoconazole (NIZORAL) 2 % cream     LACTOBACILLUS RHAMNOSUS, GG, PO     loperamide (IMODIUM A-D) 2 MG tablet     losartan (COZAAR) 50 MG tablet     Melatonin 10 MG TABS     methylphenidate (RITALIN) 20 MG tablet     mirtazapine (REMERON) 30 MG tablet     Multiple Vitamin (MULTIVITAMINS PO)     potassium chloride ER (KLOR-CON M) 20 MEQ CR tablet     torsemide (DEMADEX) 10 MG tablet     traZODone (DESYREL) 100 MG tablet     triamcinolone (ARISTOCORT HP) 0.5 % external cream     triamcinolone (KENALOG) 0.5 % OINT ointment     trospium (SANCTURA) 20 MG tablet     vitamin B-Complex     No current facility-administered medications for this visit.      Medication: On Duloxetine and Mirtazpine and Trazodone and Ritalin. She discontinued Effexor and Abilify previously per Dr. Marquise Coleman, her psychiatrist.  On 4/10/19 she informed me that she started Abilify and stopped  Rexulti.    PHQ-9 SCORE 2018 2019 9/15/2019   PHQ-9 Total Score MyChart 10 (Moderate depression) 7 (Mild depression) 5 (Mild depression)   PHQ-9 Total Score 10 7 5     NAS-7 SCORE 2016   Total Score - 6 (mild anxiety)   Total Score 3 -     SOCIAL HISTORY:  Ms. Padilla grew up in the [place] area and is the oldest of 5 children in her family of origin.  Her father was a dentist who she believes was bipolar.  He  of lung cancer at age 72.  Her mother  of sepsis at age 80.  She had been diagnosed with breast cancer when Ms. Padilla was 8.  She describes the marriage between her parents as misael.  She is 5 years older than her closest-aged sister and they are all within 4 years of each other.   Her daughter  12/3 and her mother   She tends to feel more depressed in winter.      Ms. Padilla attended [employer] for a year and later went to night school at the [employer].  She felt that she could  not continue her education to the point of graduation because she was working full time and raising her daughter.  Her daughter  in  at age 31 of liver failure secondary to an accidental Tylenol overdose.  She had been recovering from a hysterectomy.      Ms. 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  She had to retire at the time secondary to her MS.  She has never .  She has not dated,and states that if she were she would date, she would be interested in a relationship with a woman.  There is no history of  service or legal problems.  She expresses concern about her increasing social isolation.  She has at least 1 friend, Jael, who she met at a therapy group in .  She is active in facilitating groups for people with MS both in [place] and [place].  She lives alone and currently gets help from a home health aide, as well as home health nurse.     She sees Dr. Ban Coleman, psychiatrist and is trying to figure out best antidepressant regimen.  .Dr. Coleman prescribes Cymbalta for it.  She feels she has been more depressed since the Fall, but doesn't think it is SAD.   She states that she has recommended case management.     SESSION:  Mili participated in a telehealth session of psychotherapy..  The depression varies and is currently moderate.   She got home 21 from [facility] home and hospital.   She has lost weight since she got home, not much, but we discussed keeping momentum.   She hasn't resumed tracking calories since returning home.  They weighed her daily  At [facility].  She thinks she can weigh herself regularly, e.g., x2/week.  We discussed tracking calories with current goal decreasing to 1600 from 1750.   She thinks she has been under 1750.   She was vaccinated x2with Moderna while at [facility]. She was diagnosed with HTN when in hospital  She fell  off toilet, at home when had fever,had an L1 fracture.  She was hospitalized after falling,began to wear a brace and  then was at [facility] for 7 weeks, which is why we haven't gotten together.   She is getting PT, OT, and has PCA support.   She is no longer having pain from her fracture but continues to have pain elsewhere.  She reports difficulty sleeping, increased trazodone and is no extended release melatonin.  These changes to her regimen are helping her sleep. She also started going to bed earlier while at [facility], and is now going to bed around midnight.  We discussed moving it to 11.  She is wiling to consider.    She is back to leading MS groups.  Her MS neurologist left and she plans to see a new neurologist at the [facility] in April   She had had an atypical finding by her dentist.  She sees her dentist in May. Apparently the scar tissue cleared up.   A new stressor is her friend Jael's brandy with multiple myeloma and a pancreatic tumor.  We will focus o it next session.  She is trying to be supportive.  She participated fully and appeared to derive benefit. Affect is positive.  Rapport was excellent.   A treatment plan was completed.  This telehealth (telephone) service is appropriate and effective for delivering services in light of the necessity for social distancing to mitigate the COVID-19 epidemic and for conservation of PPE.     Patient has agreed to receiving telehealth services after being informed about it: Yes    Patient prefers video invitation/information to be sent by:   email    Time service started: :11:01  Time service ended: 11:55  Extended session due to complexity of case and length of interval.      Mode of transmission: [employer]    Location of originating:  Home of the patient    Distance site:  Home office of provider for [facility]    The patient has been notified that:  Video visits will be conducted via a call with their psychologist to  provide the care they need with a video conversation. Video visits may be billed at different rates depending on insurance coverage.  Patients are advised to please contact their insurance provider with any questions about their health insurance coverage. 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   Behavioral factors affecting morbid obesity (E66.01).     PLAN:  Ms. Padilla will return for video visit  on 4/19 @ 2  for problem-solving and supportive therapy consistent with treatment plan..  Goal per day now that she is tracking will change from 2000/day to 1750 consistently.     Last treatment plan signed:3/22/2021  Last Treatment plan review: 3/22/2021  Treatment plan verbal Review due: 6/22/2021  Treatment Review due: 3/22/2022         Ramin Olivo, PhD, A.B.P.P., L.P.   Director, Health Psychology

## 2021-03-24 ENCOUNTER — OFFICE VISIT (OUTPATIENT)
Dept: NEUROSURGERY | Facility: CLINIC | Age: 66
End: 2021-03-24
Attending: NURSE PRACTITIONER
Payer: COMMERCIAL

## 2021-03-24 ENCOUNTER — ANCILLARY PROCEDURE (OUTPATIENT)
Dept: GENERAL RADIOLOGY | Facility: CLINIC | Age: 66
End: 2021-03-24
Attending: NURSE PRACTITIONER
Payer: COMMERCIAL

## 2021-03-24 VITALS
HEIGHT: 67 IN | WEIGHT: 229.2 LBS | HEART RATE: 95 BPM | SYSTOLIC BLOOD PRESSURE: 110 MMHG | BODY MASS INDEX: 35.97 KG/M2 | OXYGEN SATURATION: 97 % | DIASTOLIC BLOOD PRESSURE: 70 MMHG

## 2021-03-24 DIAGNOSIS — S32.019A CLOSED FRACTURE OF FIRST LUMBAR VERTEBRA, UNSPECIFIED FRACTURE MORPHOLOGY, INITIAL ENCOUNTER (H): ICD-10-CM

## 2021-03-24 DIAGNOSIS — S32.019A CLOSED FRACTURE OF FIRST LUMBAR VERTEBRA, UNSPECIFIED FRACTURE MORPHOLOGY, INITIAL ENCOUNTER (H): Primary | ICD-10-CM

## 2021-03-24 PROCEDURE — G0463 HOSPITAL OUTPT CLINIC VISIT: HCPCS

## 2021-03-24 PROCEDURE — 99213 OFFICE O/P EST LOW 20 MIN: CPT | Performed by: NURSE PRACTITIONER

## 2021-03-24 PROCEDURE — 72100 X-RAY EXAM L-S SPINE 2/3 VWS: CPT | Performed by: RADIOLOGY

## 2021-03-24 RX ORDER — FAMOTIDINE 20 MG
1000 TABLET ORAL
Status: ON HOLD | COMMUNITY
End: 2023-01-18

## 2021-03-24 RX ORDER — ASPIRIN 81 MG
100 TABLET, DELAYED RELEASE (ENTERIC COATED) ORAL 2 TIMES DAILY
COMMUNITY
End: 2023-02-08

## 2021-03-24 ASSESSMENT — MIFFLIN-ST. JEOR: SCORE: 1604.33

## 2021-03-24 ASSESSMENT — PAIN SCALES - GENERAL: PAINLEVEL: NO PAIN (0)

## 2021-03-24 NOTE — PROGRESS NOTES
"[facility]  Neurosurgery Follow Up:    HPI: 12 weeks s/p L1 superior endplate fracture extending into the T12-L1 disc space in the setting of DISH. Today she denies back pain. She states she hasn't had back pain in \"a while.\" She denies radicular pain. She has baseline peripheral neuropathy and weakness in her legs due to MS. She has no new or worsening symptoms. Has been wearing the brace.    Medical, surgical, family, and social history unchanged since prior exam.  Exam:  Constitutional:  Alert, well nourished, NAD.  HEENT: Normocephalic, atraumatic.   Pulm:  Without shortness of breath   CV:  No pitting edema of BLE.      Vital Signs:  /70   Pulse 95   Ht 5' 6.5\" (1.689 m)   Wt 229 lb 3.2 oz (104 kg)   LMP 12/10/2010   SpO2 97%   BMI 36.44 kg/m      Neurological:  Awake  Alert  Oriented x 3  Motor exam:        IP Q DF PF EHL  R   5  5   5   5    5  L   5  5   5   5    5     Able to spontaneously move L/E bilaterally  Sensation intact throughout all L/E dermatomes     Imaging:   Lumbar XR   IMPRESSION: L1 compression fracture with mild to moderate height loss, unchanged. Moderate degenerative disc disease at L5-S1. Moderate lower lumbar spine facet arthropathy.    A/P: L1 superior endplate fracture  Patient Instructions   -Wean from brace over 1-2 weeks.   -Follow up if new symptoms develop, or unable to wean from the brace.  -Please contact our clinic with questions or concerns at [phone].      Lisa Wright, MARYLU  [facility]  [address] [place] [address]  Tel [phone]  Fax [phone]    "

## 2021-03-24 NOTE — LETTER
"    3/24/2021         RE: Mili Padilla  [address]  [facility] [address]        Dear Colleague,    Thank you for referring your patient, Mili Padilla, to the [facility] [place]. Please see a copy of my visit note below.    [facility] Neurosurgery  Neurosurgery Follow Up:    HPI: 12 weeks s/p L1 superior endplate fracture extending into the T12-L1 disc space in the setting of DISH. Today she denies back pain. She states she hasn't had back pain in \"a while.\" She denies radicular pain. She has baseline peripheral neuropathy and weakness in her legs due to MS. She has no new or worsening symptoms. Has been wearing the brace.    Medical, surgical, family, and social history unchanged since prior exam.  Exam:  Constitutional:  Alert, well nourished, NAD.  HEENT: Normocephalic, atraumatic.   Pulm:  Without shortness of breath   CV:  No pitting edema of BLE.      Vital Signs:  /70   Pulse 95   Ht 5' 6.5\" (1.689 m)   Wt 229 lb 3.2 oz (104 kg)   LMP 12/10/2010   SpO2 97%   BMI 36.44 kg/m      Neurological:  Awake  Alert  Oriented x 3  Motor exam:        IP Q DF PF EHL  R   5  5   5   5    5  L   5  5   5   5    5     Able to spontaneously move L/E bilaterally  Sensation intact throughout all L/E dermatomes     Imaging:   Lumbar XR   IMPRESSION: L1 compression fracture with mild to moderate height loss, unchanged. Moderate degenerative disc disease at L5-S1. Moderate lower lumbar spine facet arthropathy.    A/P: L1 superior endplate fracture  Patient Instructions   -Wean from brace over 1-2 weeks.   -Follow up if new symptoms develop, or unable to wean from the brace.  -Please contact our clinic with questions or concerns at [phone].      Lisa Wright, MARYLU  [facility]surgery  [address] [place] [address]  Tel [phone]  Fax [phone]        Again, thank you for allowing me to participate in the care of your " patient.        Sincerely,        Lisa Wright, NP

## 2021-03-24 NOTE — PATIENT INSTRUCTIONS
-Wean from brace over 1-2 weeks.   -Follow up if new symptoms develop, or unable to wean from the brace.  -Please contact our clinic with questions or concerns at [phone].

## 2021-03-24 NOTE — NURSING NOTE
"March 24, 2021 2:22 PM   Mili Padilla is a 65 year old female who presents for:    Chief Complaint   Patient presents with     Surgical Followup     6 week follow-up, x-ray prior, dx: closed fracture of first lumbar vertebra      Initial Vitals: /70   Pulse 95   Ht 1.689 m (5' 6.5\")   Wt 104 kg (229 lb 3.2 oz)   LMP 12/10/2010   SpO2 97%   BMI 36.44 kg/m   Estimated body mass index is 36.44 kg/m  as calculated from the following:    Height as of this encounter: 1.689 m (5' 6.5\").    Weight as of this encounter: 104 kg (229 lb 3.2 oz). Body surface area is 2.21 meters squared.  No Pain (0) Comment: Data Unavailable       Clinical concerns: Mili Padilla is here today for a 6 week follow-up, x-ray prior, dx: closed fracture of first lumbar vertebra.     Luis Miguel Restrepo MA  "

## 2021-03-30 ENCOUNTER — OFFICE VISIT (OUTPATIENT)
Dept: OPHTHALMOLOGY | Facility: CLINIC | Age: 66
End: 2021-03-30
Attending: OPHTHALMOLOGY
Payer: COMMERCIAL

## 2021-03-30 DIAGNOSIS — H53.40 VISUAL FIELD DEFECT: Primary | ICD-10-CM

## 2021-03-30 DIAGNOSIS — H53.40 VISUAL FIELD DEFECT: ICD-10-CM

## 2021-03-30 PROCEDURE — G0463 HOSPITAL OUTPT CLINIC VISIT: HCPCS | Performed by: TECHNICIAN/TECHNOLOGIST

## 2021-03-30 PROCEDURE — 92133 CPTRZD OPH DX IMG PST SGM ON: CPT | Performed by: OPHTHALMOLOGY

## 2021-03-30 PROCEDURE — 92014 COMPRE OPH EXAM EST PT 1/>: CPT | Performed by: OPHTHALMOLOGY

## 2021-03-30 ASSESSMENT — VISUAL ACUITY
OD_CC: 20/30
OS_CC: 20/30
METHOD: SNELLEN - LINEAR
OD_PH_CC: 20/25
CORRECTION_TYPE: GLASSES
OS_CC+: -2

## 2021-03-30 ASSESSMENT — CONF VISUAL FIELD
OS_NORMAL: 1
OD_NORMAL: 1
METHOD: COUNTING FINGERS

## 2021-03-30 ASSESSMENT — TONOMETRY
OS_IOP_MMHG: 15
IOP_METHOD: ICARE
OD_IOP_MMHG: 16

## 2021-03-30 ASSESSMENT — EXTERNAL EXAM - RIGHT EYE: OD_EXAM: NORMAL

## 2021-03-30 ASSESSMENT — SLIT LAMP EXAM - LIDS
COMMENTS: NORMAL
COMMENTS: NORMAL

## 2021-03-30 ASSESSMENT — EXTERNAL EXAM - LEFT EYE: OS_EXAM: NORMAL

## 2021-03-30 ASSESSMENT — CUP TO DISC RATIO
OD_RATIO: 0.7
OS_RATIO: 0.6

## 2021-03-30 NOTE — PROGRESS NOTES
"    Assessment & Plan      Mili Pdailla is a 64 year old female with the following diagnoses:   1. Optic atrophy of both eyes    2. Secondary progressive multiple sclerosis (H)       Here for annual eye examination. Last visit 1/2/20. Feels as if her cataracts have worsened. Having a harder time seeing print, especially when darker. This has worsened since her last annual visit. Denies any visual acuity changes. Still able to see television and read well with lighter conditions.     Would like to talk about cataract surgery.     Secondary progressive MS (follows [facility]). She states that she is \"stable\". She is not on a MS medication, not since 2005. She just saw her MS specialist 12/20. Scheduled to see Dr. Radha Ku 5/21.      H/o optic neuritis left eye 1986. No episodes of painful vision loss. Vision overall good.      Relatively stable RNFL OCT OU, visual fields unable to be performed today due to machine down. No ARTEMIO.      Visually significant cataracts in both eyes. Has cupping both eyes. This is due to her multiple sclerosis and not low tension glaucoma (LTG).      Sounds like she is having trouble reading.  Her bifocal add is pretty low.  Will ask her to put over the counter readers over current glasses because says she cannot afford new glasses at this point.       RTC 1-2 years, sooner if needed.          Attending Physician Attestation:  Complete documentation of historical and exam elements from today's encounter can be found in the full encounter summary report (not reduplicated in this progress note).  I personally obtained the chief complaint(s) and history of present illness.  I confirmed and edited as necessary the review of systems, past medical/surgical history, family history, social history, and examination findings as documented by others; and I examined the patient myself.  I personally reviewed the relevant tests, images, and reports as documented above.  I " formulated and edited as necessary the assessment and plan and discussed the findings and management plan with the patient and family. I was present with the medical student who participated in the service and in the documentation of this note. - MD Mendel Ward, MS4

## 2021-04-19 ENCOUNTER — VIRTUAL VISIT (OUTPATIENT)
Dept: PSYCHOLOGY | Facility: CLINIC | Age: 66
End: 2021-04-19
Payer: COMMERCIAL

## 2021-04-19 DIAGNOSIS — E66.01 PSYCHOLOGICAL FACTORS AFFECTING MORBID OBESITY (H): ICD-10-CM

## 2021-04-19 DIAGNOSIS — F33.0 MAJOR DEPRESSIVE DISORDER, RECURRENT EPISODE, MILD (H): Primary | ICD-10-CM

## 2021-04-19 DIAGNOSIS — F54 PSYCHOLOGICAL FACTORS AFFECTING MORBID OBESITY (H): ICD-10-CM

## 2021-04-19 PROCEDURE — 90837 PSYTX W PT 60 MINUTES: CPT | Mod: 95 | Performed by: PSYCHOLOGIST
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
## 2021-05-12 ENCOUNTER — VIRTUAL VISIT (OUTPATIENT)
Dept: PSYCHOLOGY | Facility: CLINIC | Age: 66
End: 2021-05-12
Payer: COMMERCIAL

## 2021-05-12 DIAGNOSIS — F54 PSYCHOLOGICAL FACTORS AFFECTING MORBID OBESITY (H): ICD-10-CM

## 2021-05-12 DIAGNOSIS — E66.01 PSYCHOLOGICAL FACTORS AFFECTING MORBID OBESITY (H): ICD-10-CM

## 2021-05-12 DIAGNOSIS — F33.0 MAJOR DEPRESSIVE DISORDER, RECURRENT EPISODE, MILD (H): Primary | ICD-10-CM

## 2021-05-12 PROCEDURE — 90834 PSYTX W PT 45 MINUTES: CPT | Mod: 95 | Performed by: PSYCHOLOGIST

## 2021-05-12 NOTE — PROGRESS NOTES
Health Psychology                     Department of Medicine  Izzy Montaño, Ph.D., L.P. [phone]                          [facility] Sherrie Crowe, Ph.D., L.P. [phone]                   [place] Mail Code [address]   Ranulfo Whipple, Ph.D. [phone]        [address] [place] [address] Enriqueta Sorto, Ph.D., L.P. [phone]    [place] [address]           Ramin Olivo, Ph.D., A.B.P.P., L.P. [phone]        Kathie Galvan, Ph.D., L.P. [phone]  [address] Psychology Telemental Health Note    Ms. Padilla is a 65-year-old woman self-referred for psychological consultation because her therapist of the last 24 years retired.  She is seen for problem-solving and supportive therapy for depression and multiple health issues.     HISTORY OF PRESENTING CONCERN:  Ms. Padilla reported at intake (7/28/16) a  lengthy history of depression.  She sees a psychiatrist, Ban Coleman, at [facility], and is taking Abilify 7.5 mg, trazodone 500 mg, ripazepam 75 mg each day at bedtime, Tegretol 400 mg at bedtime and methylphenidate 10 mg b.i.d.  She discontineud ability and is now taking Rexulti 2 mg.  She has a history of hospitalizations including 3 at [facility] in the late 1990s, early 2000s when she had 2 courses of ECT lasting 7-10 sessions and approximately 3 other single session ECTs.  She stopped due to memory problems.  She kept with Dr. Coleman who she first met as an inpatient and has seen her for the past 18 years.  She had also been hospitalized psychiatrically at [facility] at age 23.  She previously worked with psychiatrist, Doug Sarabia for three years, stopping, in part, because she didn't feel he took her suicide attempt sufficiently seriously.  She reports her depression tends to vary.     MEDICAL HISTORY:  Ms. Padilla has a complex medical history.  She was diagnosed with MS in  1985.  Her psychiatrist at [facility] in [place], Dr. 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  She has used a scooter since 1992, using it more  than she had previously.  She also can use a walker.  She was diagnosed with fibromyalgia in 1997.   She is seen at the [facility] for her eye care. During 8th grade, she fell on a ski lift, injuring her larynx.     WEIGHT not taken. iscussed goal of cutting back by 125 calories per day.   That would get her losing 2 lbs./month.  She has goal of 1750 claories/day. and states she met that goal aout half the days of the interview.  She had only 4 days down to goal in the last 18 days;  the rest were about 2100 janice.   She was at goal in the preceeding days for 11 of 17 days.    She states weight is down to 285 from 290.  She is weighing herself weekly, using more vegetables for snack.  She plans to write down calories in the morning. She acknowledges she needs to do it daily.       She resumed attending OA.  She had stopped a year ago and then resumed this month.  We discuss teasing apart stress copying from eating.    She is  starting reatment for  benign paroxysmal positional vertigo treatment in December, 2020.  She is getting some physical therapy for helping with convergence.  Her balance is feeling a bit better.  He is drinking Power Aid (0 calories)     She reports weight is 277 up from 272 last session.    Wt Readings from Last 4 Encounters:   03/24/21 104 kg (229 lb 3.2 oz)   02/23/21 124.7 kg (275 lb)   02/16/21 125.6 kg (277 lb)   02/10/21 126.2 kg (278 lb 4.8 oz)     Past Medical History:   Diagnosis Date     Depression, major, in partial remission (H)      Fibromyalgia syndrome      Gastro-oesophageal reflux disease      Hyperlipemia      Lymphedema      Multiple sclerosis (H)     tremors with MS, all four limbs and head     Multiple sclerosis, secondary progressive (H)      Sleep apnea      Vocal cord paralysis,  unilateral complete         Past Surgical History:   Procedure Laterality Date     COLONOSCOPY N/A 7/17/2017    Procedure: COMBINED COLONOSCOPY, SINGLE OR MULTIPLE BIOPSY/POLYPECTOMY BY BIOPSY;;  Surgeon: Wong Beaulieu MD;  Location:  GI     COLONOSCOPY N/A 2/23/2018    Procedure: COMBINED COLONOSCOPY, SINGLE OR MULTIPLE BIOPSY/POLYPECTOMY BY BIOPSY;  COLONOSCOPY ;  Surgeon: Yessica Santana MD;  Location:  GI     ENT SURGERY      throat 1969, vocal cord surgery with skin grafting     ESOPHAGOSCOPY, GASTROSCOPY, DUODENOSCOPY (EGD), COMBINED N/A 12/16/2014    Procedure: COMBINED ENDOSCOPIC ULTRASOUND, ESOPHAGOSCOPY, GASTROSCOPY, DUODENOSCOPY (EGD), FINE NEEDLE ASPIRATE/BIOPSY;  Surgeon: Yessica Santana MD;  Location: [facility]     ESOPHAGOSCOPY, GASTROSCOPY, DUODENOSCOPY (EGD), COMBINED N/A 12/16/2014    Procedure: COMBINED ESOPHAGOSCOPY, GASTROSCOPY, DUODENOSCOPY (EGD), BIOPSY SINGLE OR MULTIPLE;  Surgeon: Yessica Santana MD;  Location: [facility]     LAPAROSCOPIC APPENDECTOMY  5/30/2013    Procedure: LAPAROSCOPIC APPENDECTOMY;;  Surgeon: Salvador Morris MD;  Location:  OR     LAPAROSCOPIC BIOPSY LIVER  5/30/2013    Procedure: LAPAROSCOPIC BIOPSY LIVER;;  Surgeon: Salvador Morris MD;  Location:  OR     LAPAROSCOPIC GASTRIC SLEEVE  5/30/2013    Procedure: LAPAROSCOPIC GASTRIC SLEEVE;  LAPAROSCOPIC SLEEVE GASTRECTOMY/ LAPARSCOPIC  APPENDECTOMY /LIVER BIOPSIES/LIVER CYST DRAINAGE;  Surgeon: Salvador Morris MD;  Location:  OR     ORTHOPEDIC SURGERY      R wrist 2010       Current Outpatient Medications   Medication     ARIPiprazole (ABILIFY) 5 MG tablet     atorvastatin (LIPITOR) 20 MG tablet     bismuth subsalicylate (PEPTO BISMOL) 262 MG chewable tablet     Calcium Citrate-Vitamin D 250-200 MG-UNIT TABS     calcium polycarbophil (FIBERCON) 625 MG tablet     carBAMazepine (TEGRETOL) 200 MG tablet     denosumab (PROLIA) 60 MG/ML SOLN injection     docusate sodium (COLACE) 100 MG tablet      DULoxetine (CYMBALTA) 60 MG capsule     guaiFENesin (MUCINEX) 600 MG 12 hr tablet     ketoconazole (NIZORAL) 2 % cream     LACTOBACILLUS RHAMNOSUS, GG, PO     loperamide (IMODIUM A-D) 2 MG tablet     losartan (COZAAR) 50 MG tablet     Melatonin 10 MG TABS     methylphenidate (RITALIN) 20 MG tablet     mirtazapine (REMERON) 30 MG tablet     Multiple Vitamin (MULTIVITAMINS PO)     potassium chloride ER (KLOR-CON M) 20 MEQ CR tablet     torsemide (DEMADEX) 10 MG tablet     traZODone (DESYREL) 100 MG tablet     triamcinolone (ARISTOCORT HP) 0.5 % external cream     triamcinolone (KENALOG) 0.5 % OINT ointment     trospium (SANCTURA) 20 MG tablet     vitamin B-Complex     Vitamin D, Cholecalciferol, 25 MCG (1000 UT) CAPS     No current facility-administered medications for this visit.      Medication: On Duloxetine and Mirtazpine and Trazodone and Ritalin. She discontinued Effexor and Abilify previously per Dr. Marquise Coleman, her psychiatrist.  On 4/10/19 she informed me that she started Abilify and stopped  Rexulti.    PHQ-9 SCORE 2018 2019 9/15/2019   PHQ-9 Total Score MyChart 10 (Moderate depression) 7 (Mild depression) 5 (Mild depression)   PHQ-9 Total Score 10 7 5     NAS-7 SCORE 2016   Total Score - 6 (mild anxiety)   Total Score 3 -     SOCIAL HISTORY:  Ms. Padilla grew up in the [place] area and is the oldest of 5 children in her family of origin.  Her father was a dentist who she believes was bipolar.  He  of lung cancer at age 72.  Her mother  of sepsis at age 80.  She had been diagnosed with breast cancer when Ms. Padilla was 8.  She describes the marriage between her parents as misael.  She is 5 years older than her closest-aged sister and they are all within 4 years of each other.   Her daughter  12/3 and her mother   She tends to feel more depressed in winter.      Ms. Padilla attended [employer] for a year and later went to night school at the [employer].  She felt that she could not continue her education to the point of graduation because she was working full time and raising her daughter.  Her daughter  in  at age 31 of liver failure secondary to an accidental Tylenol overdose.  She had been recovering from a hysterectomy.      Ms. 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  She had to retire at the time secondary to her MS.  She has never .  She has not dated,and states that if she were she would date, she would be interested in a relationship with a woman.  There is no history of  service or legal problems.  She expresses concern about her increasing social isolation.  She has at least 1 friend, Jael, who she met at a therapy group in .  She is active in facilitating groups for people with MS both in [place] and [place].  She lives alone and currently gets help from a home health aide, as well as home health nurse.     She sees Dr. Ban Coleman, psychiatrist and is trying to figure out best antidepressant regimen.  .Dr. Coleman prescribes Cymbalta for it.  She feels she has been more depressed since the Fall, but doesn't think it is SAD.   She states that she has recommended case management.     SESSION:  Mili participated in a telehealth session of psychotherapy..  The depression varies and is currently moderate.   She is feeling down due to worry about her friend, Jael, whose biopsy whose result revealed she doesn't have MML  Her brother-in-law who is having colon issues. H had a partial colectomy..  We will focus o it next session.  She is trying to be supportive. She feels overburdened by leadng a MS group q 2 weeks instead of monthly like pre-COVID. She told her MS support group supervisor she was burnt out  She would lke to decrease her groups to 1/month from 2/month.  We discussed  strategies for decreasing burnout.    She has been overeating, feeling upset for letting herself down.  We continue to discussed cutting back on caloric intake.  She is not able to exercise much, bu does some PT exercises.  She is encouraged to get out for a longer walk daily if possible.  She is rarely sticking to the [id number] calorie ceiling. She is startng a meal delivery service next week, which she thinks will help.     She was vaccinated x2 with Moderna while at [facility] in January, 2021. She was diagnosed with HTN when in hospital.  One of her doctors is looking into balance issues, wondering about low sodium.      Her MS neurologist left and she started to see a new neurologist at the [facility]. New imaging sties are planned.    She wished to end the session a bit early as she was tired. She participated fully and appeared to derive benefit. Affect is positive.  Rapport was excellent.   This telehealth (telephone) service is appropriate and effective for delivering services in light of the necessity for social distancing to mitigate the COVID-19 epidemic and for conservation of PPE.     Patient has agreed to receiving telehealth services after being informed about it: Yes    Patient prefers video invitation/information to be sent by:   email    Time service started: :11:01  Time service ended: 11:48    Mode of transmission: MicroQuant    Location of originating:  Home of the patient    Distance site:  Home office of provider for [facility]    The patient has been notified that:  Video visits will be conducted via a call with their psychologist to provide the care they need with a video conversation. Video visits may be billed at different rates depending on insurance coverage.  Patients are advised to please contact their insurance provider with any questions about their health insurance coverage. If during the course of a call the psychologist feels a video visit is not appropriate, patients  will not be charged for this service  DIAGNOSES:   Major depression, recurrent, oderate (F33.1).   Behavioral factors affecting morbid obesity (E66.01).     PLAN:  Ms. Padilla will return for video visit  on 6/16 @ 3  for problem-solving and supportive therapy consistent with treatment plan.  Goal per day now that she is tracking will change from 2000/day to 1750 consistently.     Last treatment plan signed:3/22/2021  Last Treatment plan review: 3/22/2021  Treatment plan verbal Review due: 6/22/2021  Treatment Review due: 3/22/2022         Ramin Olivo, PhD, A.B.P.P., L.P.   Director, Health Psychology

## 2021-05-27 ENCOUNTER — TRANSFERRED RECORDS (OUTPATIENT)
Dept: HEALTH INFORMATION MANAGEMENT | Facility: CLINIC | Age: 66
End: 2021-05-27

## 2021-06-16 ENCOUNTER — VIRTUAL VISIT (OUTPATIENT)
Dept: PSYCHOLOGY | Facility: CLINIC | Age: 66
End: 2021-06-16
Payer: COMMERCIAL

## 2021-06-16 DIAGNOSIS — E66.01 PSYCHOLOGICAL FACTORS AFFECTING MORBID OBESITY (H): ICD-10-CM

## 2021-06-16 DIAGNOSIS — F54 PSYCHOLOGICAL FACTORS AFFECTING MORBID OBESITY (H): ICD-10-CM

## 2021-06-16 DIAGNOSIS — F33.0 MAJOR DEPRESSIVE DISORDER, RECURRENT EPISODE, MILD (H): Primary | ICD-10-CM

## 2021-06-16 PROCEDURE — 90834 PSYTX W PT 45 MINUTES: CPT | Mod: 95 | Performed by: PSYCHOLOGIST

## 2021-06-16 NOTE — PROGRESS NOTES
Health Psychology                     Department of Medicine  Izzy Montaño, Ph.D., L.P. [phone]                          [facility] Sherrie Crowe, Ph.D., L.P. [phone]                   [place] Mail Code 884   Ranulfo Whipple, Ph.D. [phone]        [address] [place] [address] Enriqueta Sorto, Ph.D., L.P. [phone]    [place] [address]           Ramin Olivo, Ph.D., A.B.P.P., L.P. [phone]        Kathie Galvan, Ph.D., L.P. [phone]  [address] Psychology Telemental Health Note    Ms. Padilla is a 65-year-old woman self-referred for psychological consultation because her therapist of the last 24 years retired.  She is seen for problem-solving and supportive therapy for depression and multiple health issues.     HISTORY OF PRESENTING CONCERN:  Ms. Padilla reported at intake (7/28/16) a  lengthy history of depression.  She sees a psychiatrist, Ban Coleman, at [facility], and is taking Abilify 7.5 mg, trazodone 500 mg, ripazepam 75 mg each day at bedtime, Tegretol 400 mg at bedtime and methylphenidate 10 mg b.i.d.  She discontineud ability and is now taking Rexulti 2 mg.  She has a history of hospitalizations including 3 at [facility] in the late 1990s, early 2000s when she had 2 courses of ECT lasting 7-10 sessions and approximately 3 other single session ECTs.  She stopped due to memory problems.  She kept with Dr. Coleman who she first met as an inpatient and has seen her for the past 18 years.  She had also been hospitalized psychiatrically at [facility] at age 23.  She previously worked with psychiatrist, Doug Sarabia for three years, stopping, in part, because she didn't feel he took her suicide attempt sufficiently seriously.  She reports her depression tends to vary.     MEDICAL HISTORY:  Ms. Padilla has a complex medical history.  She was diagnosed with MS in  1985.  Her psychiatrist at [facility] in [place], Dr. 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  She has used a scooter since 1992, using it more  than she had previously.  She also can use a walker.  She was diagnosed with fibromyalgia in 1997.   She is seen at the [facility] for her eye care. During 8th grade, she fell on a ski lift, injuring her larynx.     WEIGHT not taken. iscussed goal of cutting back by 125 calories per day.   That would get her losing 2 lbs./month.  She has goal of 1750 claories/day. and states she met that goal aout half the days of the interview.  She had only 4 days down to goal in the last 18 days;  the rest were about 2100 janice.   She was at goal in the preceeding days for 11 of 17 days.    She states weight is down to 285 from 290.  She is weighing herself weekly, using more vegetables for snack.  She plans to write down calories in the morning. She acknowledges she needs to do it daily.      She resumed attending OA, then stopped during the winter.     She stared treatment for benign paroxysmal positional vertigo treatment in December, 2020, but did not complete it. She reports weight is 268 down from  277 last times    Wt Readings from Last 4 Encounters:   03/24/21 104 kg (229 lb 3.2 oz)   02/23/21 124.7 kg (275 lb)   02/16/21 125.6 kg (277 lb)   02/10/21 126.2 kg (278 lb 4.8 oz)     Past Medical History:   Diagnosis Date     Depression, major, in partial remission (H)      Fibromyalgia syndrome      Gastro-oesophageal reflux disease      Hyperlipemia      Lymphedema      Multiple sclerosis (H)     tremors with MS, all four limbs and head     Multiple sclerosis, secondary progressive (H)      Sleep apnea      Vocal cord paralysis, unilateral complete         Past Surgical History:   Procedure Laterality Date     COLONOSCOPY N/A 7/17/2017    Procedure: COMBINED COLONOSCOPY, SINGLE OR MULTIPLE BIOPSY/POLYPECTOMY BY BIOPSY;;  Surgeon:  Wong Beaulieu MD;  Location:  GI     COLONOSCOPY N/A 2/23/2018    Procedure: COMBINED COLONOSCOPY, SINGLE OR MULTIPLE BIOPSY/POLYPECTOMY BY BIOPSY;  COLONOSCOPY ;  Surgeon: Yessica Santana MD;  Location:  GI     ENT SURGERY      throat 1969, vocal cord surgery with skin grafting     ESOPHAGOSCOPY, GASTROSCOPY, DUODENOSCOPY (EGD), COMBINED N/A 12/16/2014    Procedure: COMBINED ENDOSCOPIC ULTRASOUND, ESOPHAGOSCOPY, GASTROSCOPY, DUODENOSCOPY (EGD), FINE NEEDLE ASPIRATE/BIOPSY;  Surgeon: Yessica Santana MD;  Location:  GI     ESOPHAGOSCOPY, GASTROSCOPY, DUODENOSCOPY (EGD), COMBINED N/A 12/16/2014    Procedure: COMBINED ESOPHAGOSCOPY, GASTROSCOPY, DUODENOSCOPY (EGD), BIOPSY SINGLE OR MULTIPLE;  Surgeon: Yessica Santana MD;  Location:  GI     LAPAROSCOPIC APPENDECTOMY  5/30/2013    Procedure: LAPAROSCOPIC APPENDECTOMY;;  Surgeon: Salvador Morris MD;  Location:  OR     LAPAROSCOPIC BIOPSY LIVER  5/30/2013    Procedure: LAPAROSCOPIC BIOPSY LIVER;;  Surgeon: Salvador Morris MD;  Location:  OR     LAPAROSCOPIC GASTRIC SLEEVE  5/30/2013    Procedure: LAPAROSCOPIC GASTRIC SLEEVE;  LAPAROSCOPIC SLEEVE GASTRECTOMY/ LAPARSCOPIC  APPENDECTOMY /LIVER BIOPSIES/LIVER CYST DRAINAGE;  Surgeon: Salvador Morris MD;  Location:  OR     ORTHOPEDIC SURGERY      R wrist 2010       Current Outpatient Medications   Medication     ARIPiprazole (ABILIFY) 5 MG tablet     atorvastatin (LIPITOR) 20 MG tablet     bismuth subsalicylate (PEPTO BISMOL) 262 MG chewable tablet     Calcium Citrate-Vitamin D 250-200 MG-UNIT TABS     calcium polycarbophil (FIBERCON) 625 MG tablet     carBAMazepine (TEGRETOL) 200 MG tablet     denosumab (PROLIA) 60 MG/ML SOLN injection     docusate sodium (COLACE) 100 MG tablet     DULoxetine (CYMBALTA) 60 MG capsule     guaiFENesin (MUCINEX) 600 MG 12 hr tablet     ketoconazole (NIZORAL) 2 % cream     LACTOBACILLUS RHAMNOSUS, GG, PO     loperamide (IMODIUM A-D) 2 MG tablet      losartan (COZAAR) 50 MG tablet     Melatonin 10 MG TABS     methylphenidate (RITALIN) 20 MG tablet     mirtazapine (REMERON) 30 MG tablet     Multiple Vitamin (MULTIVITAMINS PO)     potassium chloride ER (KLOR-CON M) 20 MEQ CR tablet     torsemide (DEMADEX) 10 MG tablet     traZODone (DESYREL) 100 MG tablet     triamcinolone (ARISTOCORT HP) 0.5 % external cream     triamcinolone (KENALOG) 0.5 % OINT ointment     trospium (SANCTURA) 20 MG tablet     vitamin B-Complex     Vitamin D, Cholecalciferol, 25 MCG (1000 UT) CAPS     No current facility-administered medications for this visit.      Medication: On Duloxetine and Mirtazpine and Trazodone and Ritalin. She discontinued Effexor and Abilify previously per Dr. Marquise Coleman, her psychiatrist.  On 4/10/19 she informed me that she started Abilify    PHQ-9 SCORE 2018 2019 9/15/2019   PHQ-9 Total Score MyChart 10 (Moderate depression) 7 (Mild depression) 5 (Mild depression)   PHQ-9 Total Score 10 7 5     NAS-7 SCORE 2016   Total Score - 6 (mild anxiety)   Total Score 3 -     SOCIAL HISTORY:  Ms. Padilla grew up in the [place] area and is the oldest of 5 children in her family of origin.  Her father was a dentist who she believes was bipolar.  He  of lung cancer at age 72.  Her mother  of sepsis at age 80.  She had been diagnosed with breast cancer when Ms. Padilla was 8.  She describes the marriage between her parents as misael.  She is 5 years older than her closest-aged sister and they are all within 4 years of each other.   Her daughter  12/3 and her mother   She tends to feel more depressed in winter.      Ms. Padilla attended [employer] for a year and later went to night school at the [employer].  She felt that she could not continue her education to the point of graduation because she was working full time and raising her daughter.  Her daughter  in  at age 31 of liver failure secondary to an  accidental Tylenol overdose.  She had been recovering from a hysterectomy.      Ms. 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  She had to retire at the time secondary to her MS.  She has never .  She has not dated,and states that if she were she would date, she would be interested in a relationship with a woman.  There is no history of  service or legal problems.  She expresses concern about her increasing social isolation.  She has at least 1 friend, Jael, who she met at a therapy group in 1984.  She is active in facilitating groups for people with MS both in [place] and [place].  She lives alone and currently gets help from a home health aide, as well as home health nurse.     She sees Dr. Ban Coleman, psychiatrist and is trying to figure out best antidepressant regimen.  .Dr. Coleman prescribes Cymbalta for it.  She feels she has been more depressed since the Fall, but doesn't think it is SAD.   She states that she has recommended case management.     SESSION:  Mili participated in a telehealth session of psychotherapy.. The depression varies and is currently 2-3 out of ten, and her anxiety is increasing with her friend's dx of stage 4 pancreatic cancer, the imminent loss of her , and her increasing financial problems.     She is worried about her friend, Jael.  She wonders about a group for getting support for dealing with the cancer.  Her brother-in-law who is having colon issues. He had a partial colectomy..He is doing better.    She is trying to be supportive to others.  We discussed the need to cultivate more oa a friendship network. . She feels overburdened by leadng a MS group q 2 weeks instead of monthly like pre-COVID. She told her MS support group supervisor she was burnt out ot some degree.  She would lke to decrease her groups to 1/month from  2/month.  We discussed strategies for decreasing burnout.    She is encouraged to get out for a longer walk daily if possible.  She is sticking to the 3832-9934 calorie ceiling about half the time, otherwise, going up to 2000. She is startng a meal delivery service next week, which she thinks will help.     She was vaccinated x2 with Moderna while at [facility] in January, 2021. She was diagnosed with HTN when in hospital.  One of her doctors is looking into balance issues, wondering about low sodium.      Her MS neurologist left and she started to see a new neurologist at the [facility]. New imaging sties are planned.  She has a small meningioma. Her scan was otherwise unchanged.  She wished to end the session a bit early as she was tired. She participated fully and appeared to derive benefit. Affect is positive.  Rapport was excellent.   This telehealth (telephone) service is appropriate and effective for delivering services in light of the necessity for social distancing to mitigate the COVID-19 epidemic and for conservation of PPE.     Patient has agreed to receiving telehealth services after being informed about it: Yes    Patient prefers video invitation/information to be sent by:   email    Time service started: :3:05  Time service ended:  3:52    Mode of transmission: [email or web address]    Location of originating:  Home of the patient    Distance site:  Home office of provider for [facility]    The patient has been notified that:  Video visits will be conducted via a call with their psychologist to provide the care they need with a video conversation. Video visits may be billed at different rates depending on insurance coverage.  Patients are advised to please contact their insurance provider with any questions about their health insurance coverage. If during the course of a call the psychologist feels a video visit is not appropriate, patients will not be charged for this service  DIAGNOSES:   Major  depression, recurrent, oderate (F33.1).   Behavioral factors affecting morbid obesity (E66.01).     PLAN:  Ms. Padilla will return for video visit  on 7/6 @ 12  for problem-solving and supportive therapy consistent with treatment plan.  Goal per day now that she is tracking will change from 2000/day to 1750 consistently.   Preference for future meetings:             In-person   prefers, even if masked              Remote              Either    Last treatment plan signed:3/22/2021  Last Treatment plan review: 3/22/2021  Treatment plan verbal Review due: 6/22/2021  Treatment Review due: 3/22/2022         Ramin Olivo, PhD, A.B.P.P., L.P.   Director, Health Psychology

## 2021-07-13 ENCOUNTER — VIRTUAL VISIT (OUTPATIENT)
Dept: SURGERY | Facility: CLINIC | Age: 66
End: 2021-07-13
Payer: COMMERCIAL

## 2021-07-13 VITALS — BODY MASS INDEX: 41.59 KG/M2 | WEIGHT: 265 LBS | HEIGHT: 67 IN

## 2021-07-13 DIAGNOSIS — K59.00 CONSTIPATION, UNSPECIFIED CONSTIPATION TYPE: ICD-10-CM

## 2021-07-13 DIAGNOSIS — K91.2 POSTSURGICAL MALABSORPTION: ICD-10-CM

## 2021-07-13 DIAGNOSIS — Z98.84 BARIATRIC SURGERY STATUS: ICD-10-CM

## 2021-07-13 DIAGNOSIS — E66.01 MORBID OBESITY (H): Primary | ICD-10-CM

## 2021-07-13 PROCEDURE — 99215 OFFICE O/P EST HI 40 MIN: CPT | Mod: 95 | Performed by: PHYSICIAN ASSISTANT

## 2021-07-13 RX ORDER — UBIDECARENONE 75 MG
1000 CAPSULE ORAL WEEKLY
COMMUNITY
End: 2023-09-12

## 2021-07-13 RX ORDER — FAMOTIDINE 20 MG/1
20 TABLET, FILM COATED ORAL 2 TIMES DAILY
COMMUNITY
End: 2022-10-03

## 2021-07-13 RX ORDER — POLYETHYLENE GLYCOL 3350 17 G/17G
1 POWDER, FOR SOLUTION ORAL DAILY
Qty: 578 G | Refills: 11 | Status: SHIPPED | OUTPATIENT
Start: 2021-07-13 | End: 2022-10-03

## 2021-07-13 RX ORDER — LANOLIN ALCOHOL/MO/W.PET/CERES
400 CREAM (GRAM) TOPICAL 2 TIMES DAILY
COMMUNITY
End: 2022-10-03

## 2021-07-13 RX ORDER — TRIMETHOPRIM 100 MG/1
100 TABLET ORAL DAILY
COMMUNITY
End: 2022-10-03

## 2021-07-13 ASSESSMENT — MIFFLIN-ST. JEOR: SCORE: 1761.72

## 2021-07-13 NOTE — PROGRESS NOTES
"Mili is a 66 year old who is being evaluated via a billable video visit.      If the video visit is dropped, the invitation should be resent by: Text to cell phone: [phone]  Will anyone else be joining your video visit? No      Video-Visit Details    Type of service:  Video Visit    Video Start Time: 1:38 PM    Video End Time:2:07 PM    Originating Location (pt. Location): Home    Distant Location (provider location):  [facility] [place]     Platform used for Video Visit: [facility] [place]    Mili Padilla is a 65 year old female who is being evaluated via a billable telephone visit.      The patient has been notified of following:     \"This telephone visit will be conducted via a call between you and your physician/provider. We have found that certain health care needs can be provided without the need for a physical exam.  This service lets us provide the care you need with a short phone conversation.  If a prescription is necessary we can send it directly to your pharmacy.  If lab work is needed we can place an order for that and you can then stop by our lab to have the test done at a later time.    Telephone visits are billed at different rates depending on your insurance coverage. During this emergency period, for some insurers they may be billed the same as an in-person visit.  Please reach out to your insurance provider with any questions.    If during the course of the call the physician/provider feels a telephone visit is not appropriate, you will not be charged for this service.\"    Patient has given verbal consent for Telephone visit?  Yes    What phone number would you like to be contacted at? [phone]    How would you like to obtain your AVS? [place]      BARIATRIC FOLLOW UP      July 13, 2021      HISTORY OF PRESENT ILLNESS: Pt presents today for her 8 yr follow-up appointment status post status laparoscopic gastric sleeve.    At her 5 yr post op she had to stop seeing " Neha Valadez RD because she gained 10 lbs last year so [employer] would not cover. She was working on appealing this.     She continues with significant depression.  Is seeing Ramin Olivo, PhD Health psychologist as well as her psychiatrist.  she is feeling frustrated staying mainly at home, but less so as she adapts to the new realities.    She is working with Dr. Olivo on her weight.  Originally started with him because he works with MS.  But he now also works with her on her weight. She maintain her calories at 1750 a day.   She has got rid of snacks.    Is much more conscious in what she is eating.   Has avoided buying unhealthy snacks.      Pt broke vertebrae in her back last year. Was in hospital and rehab from [facility] day until February 18th.  She was wearing a back brace until Mid March.  She has PCA help daily now for ADL's like dressing and help with support stockings.  This is working well for her.     She lost 35 lbs in the last year.  This was intentional but also helped with chronic diarrhea after hospitalization due to IV antibiotics.  Is down to 1750 calories a day.  She is taking imodium and Pepto bismol to avoid chronic diarrhea since her hospitalization. C diff has been negative.  Has seen GI who is comfortable with this plan.  Still suffers from chronic constipation.  IS on a daily antibiotic.      Mood has been fairly good.  It has been stable through pandemic and in the hospital.      BARIATRIC METRICS:  Initial Weight (lbs): 305 lbs   Last Visits Weight: 300 lb (136.1 kg)  Current Weight: 265 lb (120.2 kg) (pt reported)  Body mass index is 42.13 kg/m .   Wt change since last visit (lbs): -35  Cumulative weight loss (lbs): 40    Wt Readings from Last 10 Encounters:   07/13/21 265 lb (120.2 kg)   03/24/21 229 lb 3.2 oz (104 kg)   02/23/21 275 lb (124.7 kg)   02/16/21 277 lb (125.6 kg)   02/10/21 278 lb 4.8 oz (126.2 kg)   02/10/21 278 lb (126.1 kg)   02/08/21 279 lb (126.6  kg)   02/03/21 282 lb 9.6 oz (128.2 kg)   01/31/21 283 lb (128.4 kg)   01/25/21 238 lb 8 oz (108.2 kg)       Patient is taking the following bariatric postoperative vitamins:  2 Multivitamin with Minerals (HS)  No calcium 5000 International units Vitamin D  Vitamin B12 once a week  1 Vitamin B Complex/Thiamine - per psychologist  No iron needed    Pt is exercising by doing her rehab exercises daily.  Has MS but walks every hour with assistance.     SOCIAL HISTORY:  Pt denies smoking.  Pt denies alcohol use.  Avoids NSAIDS.      REVIEW OF SYSTEMS:  GI:  Nausea-none  Vomiting-none  Diarrhea-none, on Pepto bismol, and Imodium to control her diarrhea.  Constipation-daily hard stools, using Colace daily.    Dysphagia-none  Abdominal Pain-none  Heartburn-none, Taking famotidine     SKIN:  Intertriginous irritation-none     PSYCH:  Depression-under good control.  Sees long term therapist.     LABS/IMAGING/MEDICAL RECORDS REVIEW:   Hemoglobin A1C   Date Value Ref Range Status   02/11/2013 5.9 4.3 - 6.0 % Final     Vitamin D Deficiency screening   Date Value Ref Range Status   07/13/2020 70 20 - 75 ug/L Final     Comment:     Season, race, dietary intake, and treatment affect the concentration of   25-hydroxy-Vitamin D. Values may decrease during winter months and increase   during summer months. Values 20-29 ug/L may indicate Vitamin D insufficiency   and values <20 ug/L may indicate Vitamin D deficiency.  Vitamin D determination is routinely performed by an immunoassay specific for   25 hydroxyvitamin D3.  If an individual is on vitamin D2 (ergocalciferol)   supplementation, please specify 25 OH vitamin D2 and D3 level determination by   LCMSMS test VITD23.       Parathyroid Hormone Intact   Date Value Ref Range Status   07/13/2020 25 12 - 64 pg/mL Final     Vitamin B12   Date Value Ref Range Status   07/13/2020 888 193 - 986 pg/mL Final     Hemoglobin   Date Value Ref Range Status   12/27/2020 11.8 11.7 - 15.7 g/dL Final  "    Ferritin   Date Value Ref Range Status   07/13/2020 270 (H) 8 - 252 ng/mL Final     Iron   Date Value Ref Range Status   07/13/2020 61 35 - 180 ug/dL Final     Iron Binding Cap   Date Value Ref Range Status   07/13/2020 239 (L) 240 - 430 ug/dL Final     Iron Saturation Index   Date Value Ref Range Status   07/13/2020 26 15 - 46 % Final   06/17/2019 22 15 - 46 % Final   06/18/2018 21 15 - 46 % Final       PHYSICAL EXAMINATION:  Ht 5' 6.5\" (1.689 m)   Wt 265 lb (120.2 kg)   LMP 12/10/2010   BMI 42.13 kg/m    Pt sounded pleasant in NAD.     ASSESSMENT AND PLAN:     Bariatric surgery status  8 years status laparoscopic gastric sleeve    Malnutrition following gastrointestinal surgery  Continue taking recommended post-op vitamins.  Restart Calcium   Ordered  - CBC with platelets; Future  - Vitamin B12; Future  - Vitamin D Screen; Future  - Parathyroid Hormone Intact; Future  - Iron and Iron Binding Capacity; Future  - Ferritin; Future  - Vitamin A; Future    GERD without esophagitis  Continue famotidine  May refill until next July if request comes in.    Osteopenia determined by x-ray  Continue management of osteopenia through PCP. Pt had last BM testing 3 years ago.  I will manage vitamin D supplementation. Restart calcium.  Continue 5000 Int Units of Vitamin D daily  - Vitamin D Screen; Future  - Parathyroid Hormone Intact; Future     Morbid obesity (H)  Patient was congratulated on weight loss success this last year. Healthy habits to assist with further weight loss were discussed. Mili will file an  appeal to get dietician coverage again. She feels this would benefit her if she did this quarterly.  If needed I can write a letter for the appeal.  Continue seeing health psychologist and continue to maintain 1750 calories.    Continue PT exercises and also add in some upper extremity workouts 2-3 times a week    10 min wheelchair arm workout from MS society  [email or web address]    Seated Upper Body " Exercise Video 1 hr long)  [facility]  [email or web address]    [place] upper extremities exercise handout (will mail to you as well)  [email or web address]    Constipation  Pt can continue Colace daily. Will add Miralax.   - polyethylene glycol (MIRALAX) 17 GM/Dose powder; Take 17 g (1 capful) by mouth daily    Follow up: Return to clinic in 1 year. Diet visit in 3 months if coverage allows.     Adwoa Hollins PA-C      51 minutes spent on the date of the encounter doing chart review, review of test results, patient visit and documentation

## 2021-07-21 ENCOUNTER — VIRTUAL VISIT (OUTPATIENT)
Dept: PSYCHOLOGY | Facility: CLINIC | Age: 66
End: 2021-07-21
Payer: COMMERCIAL

## 2021-07-21 DIAGNOSIS — E66.01 PSYCHOLOGICAL FACTORS AFFECTING MORBID OBESITY (H): ICD-10-CM

## 2021-07-21 DIAGNOSIS — F33.0 MAJOR DEPRESSIVE DISORDER, RECURRENT EPISODE, MILD (H): Primary | ICD-10-CM

## 2021-07-21 DIAGNOSIS — F54 PSYCHOLOGICAL FACTORS AFFECTING MORBID OBESITY (H): ICD-10-CM

## 2021-07-21 PROCEDURE — 90837 PSYTX W PT 60 MINUTES: CPT | Mod: 95 | Performed by: PSYCHOLOGIST

## 2021-07-21 NOTE — PROGRESS NOTES
Health Psychology                     Department of Medicine  Izzy Montaño, Ph.D., L.P. [phone]                          [facility] Sherrie Crowe, Ph.D., L.P. [phone]                   [place] Mail Code 562   Ranulfo Whipple, Ph.D. [phone]        [address] [place] [address] Enriqueta Sorto, Ph.D., L.P. [phone]    [place] [address]           Ramin Olivo, Ph.D., A.B.P.P., L.P. [phone]        Kathie Galvan, Ph.D., L.P. [phone]  [address] Psychology Telemental Health Note    Ms. Padilla is a 65-year-old woman self-referred for psychological consultation because her therapist of the last 24 years retired.  She is seen for problem-solving and supportive therapy for depression and multiple health issues.     HISTORY OF PRESENTING CONCERN:  Ms. Padilla reported at intake (7/28/16) a  lengthy history of depression.  She sees a psychiatrist, Ban Coleman, at [facility], and is taking Abilify 7.5 mg, trazodone 500 mg, ripazepam 75 mg each day at bedtime, Tegretol 400 mg at bedtime and methylphenidate 10 mg b.i.d.  She discontineud ability and is now taking Rexulti 2 mg.  She has a history of hospitalizations including 3 at [facility] in the late 1990s, early 2000s when she had 2 courses of ECT lasting 7-10 sessions and approximately 3 other single session ECTs.  She stopped due to memory problems.  She kept with Dr. Coleman who she first met as an inpatient and has seen her for the past 18 years.  She had also been hospitalized psychiatrically at [facility] at age 23.  She previously worked with psychiatrist, Doug Sarabia for three years, stopping, in part, because she didn't feel he took her suicide attempt sufficiently seriously.  She reports her depression tends to vary.     MEDICAL HISTORY:  Ms. Padilla has a complex medical history.  She was diagnosed with MS in  1985.  Her psychiatrist at [facility] in [place], Dr. 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  She has used a scooter since 1992, using it more  than she had previously.  She also can use a walker.  She was diagnosed with fibromyalgia in 1997.   She is seen at the [facility] for her eye care. During 8th grade, she fell on a ski lift, injuring her larynx.     WEIGHT not taken. iscussed goal of cutting back by 125 calories per day.   That would get her losing 2 lbs./month.  She has goal of 1750 claories/day. and states she met that goal aout half the days of the interview.  She had only 4 days down to goal in the last 18 days;  the rest were about 2100 janice.   She was at goal in the preceeding days for 11 of 17 days.    She states weight is down to 285 from 290.  She is weighing herself weekly, using more vegetables for snack.  She plans to write down calories in the morning. She acknowledges she needs to do it daily.      She resumed attending OA, then stopped during the winter.     She stared treatment for benign paroxysmal positional vertigo treatment in December, 2020, but did not complete it. She reports weight is 270 up from 268 down from  277.    Wt Readings from Last 4 Encounters:   07/13/21 120.2 kg (265 lb)   03/24/21 104 kg (229 lb 3.2 oz)   02/23/21 124.7 kg (275 lb)   02/16/21 125.6 kg (277 lb)     Past Medical History:   Diagnosis Date     Depression, major, in partial remission (H)      Fibromyalgia syndrome      Gastro-oesophageal reflux disease      Hyperlipemia      Lymphedema      Multiple sclerosis (H)     tremors with MS, all four limbs and head     Multiple sclerosis, secondary progressive (H)      Sleep apnea      Vocal cord paralysis, unilateral complete         Past Surgical History:   Procedure Laterality Date     COLONOSCOPY N/A 7/17/2017    Procedure: COMBINED COLONOSCOPY, SINGLE OR MULTIPLE BIOPSY/POLYPECTOMY BY BIOPSY;;  Surgeon: Brittnee  Wong ROBLES MD;  Location:  GI     COLONOSCOPY N/A 2/23/2018    Procedure: COMBINED COLONOSCOPY, SINGLE OR MULTIPLE BIOPSY/POLYPECTOMY BY BIOPSY;  COLONOSCOPY ;  Surgeon: Yessica Santana MD;  Location:  GI     ENT SURGERY      throat 1969, vocal cord surgery with skin grafting     ESOPHAGOSCOPY, GASTROSCOPY, DUODENOSCOPY (EGD), COMBINED N/A 12/16/2014    Procedure: COMBINED ENDOSCOPIC ULTRASOUND, ESOPHAGOSCOPY, GASTROSCOPY, DUODENOSCOPY (EGD), FINE NEEDLE ASPIRATE/BIOPSY;  Surgeon: Yessica Santana MD;  Location:  GI     ESOPHAGOSCOPY, GASTROSCOPY, DUODENOSCOPY (EGD), COMBINED N/A 12/16/2014    Procedure: COMBINED ESOPHAGOSCOPY, GASTROSCOPY, DUODENOSCOPY (EGD), BIOPSY SINGLE OR MULTIPLE;  Surgeon: Yessica Santana MD;  Location: [facility]     LAPAROSCOPIC APPENDECTOMY  5/30/2013    Procedure: LAPAROSCOPIC APPENDECTOMY;;  Surgeon: Salvador Morris MD;  Location:  OR     LAPAROSCOPIC BIOPSY LIVER  5/30/2013    Procedure: LAPAROSCOPIC BIOPSY LIVER;;  Surgeon: Salvador Morris MD;  Location:  OR     LAPAROSCOPIC GASTRIC SLEEVE  5/30/2013    Procedure: LAPAROSCOPIC GASTRIC SLEEVE;  LAPAROSCOPIC SLEEVE GASTRECTOMY/ LAPARSCOPIC  APPENDECTOMY /LIVER BIOPSIES/LIVER CYST DRAINAGE;  Surgeon: Salvador Morris MD;  Location:  OR     ORTHOPEDIC SURGERY      R wrist 2010       Current Outpatient Medications   Medication     ARIPiprazole (ABILIFY) 5 MG tablet     atorvastatin (LIPITOR) 20 MG tablet     bismuth subsalicylate (PEPTO BISMOL) 262 MG chewable tablet     calcium polycarbophil (FIBERCON) 625 MG tablet     carBAMazepine (TEGRETOL) 200 MG tablet     Cranberry 1000 MG CAPS     cyanocobalamin (VITAMIN B-12) 100 MCG tablet     denosumab (PROLIA) 60 MG/ML SOLN injection     docusate sodium (COLACE) 100 MG tablet     DULoxetine (CYMBALTA) 60 MG capsule     famotidine (PEPCID) 20 MG tablet     folic acid (FOLVITE) 400 MCG tablet     guaiFENesin (MUCINEX) 600 MG 12 hr tablet     ketoconazole (NIZORAL)  2 % cream     LACTOBACILLUS RHAMNOSUS, GG, PO     loperamide (IMODIUM A-D) 2 MG tablet     losartan (COZAAR) 50 MG tablet     Melatonin 10 MG TABS     methylphenidate (RITALIN) 20 MG tablet     mirtazapine (REMERON) 30 MG tablet     Multiple Vitamin (MULTIVITAMINS PO)     polyethylene glycol (MIRALAX) 17 GM/Dose powder     traZODone (DESYREL) 100 MG tablet     triamcinolone (ARISTOCORT HP) 0.5 % external cream     triamcinolone (KENALOG) 0.5 % OINT ointment     trimethoprim (TRIMPEX) 100 MG tablet     trospium (SANCTURA) 20 MG tablet     vitamin B-Complex     Vitamin D, Cholecalciferol, 25 MCG (1000 UT) CAPS     No current facility-administered medications for this visit.     Medication: On Duloxetine and Mirtazpine and Trazodone and Ritalin. She discontinued Effexor and Abilify previously per Dr. Marquise Coleman, her psychiatrist.  On 4/10/19 she informed me that she started Abilify    PHQ-9 SCORE 2018 2019 9/15/2019   PHQ-9 Total Score MyChart 10 (Moderate depression) 7 (Mild depression) 5 (Mild depression)   PHQ-9 Total Score 10 7 5     NAS-7 SCORE 2016   Total Score - 6 (mild anxiety)   Total Score 3 -     SOCIAL HISTORY:  Ms. Padilla grew up in the [place] area and is the oldest of 5 children in her family of origin.  Her father was a dentist who she believes was bipolar.  He  of lung cancer at age 72.  Her mother  of sepsis at age 80.  She had been diagnosed with breast cancer when Ms. Padilla was 8.  She describes the marriage between her parents as misael.  She is 5 years older than her closest-aged sister and they are all within 4 years of each other.   Her daughter  12/3 and her mother   She tends to feel more depressed in winter.      Ms. Padilla attended [employer] for a year and later went to night school at the [employer].  She felt that she could not continue her education to the point of graduation because she was working full time and  raising her daughter.  Her daughter  in  at age 31 of liver failure secondary to an accidental Tylenol overdose.  She had been recovering from a hysterectomy.      Ms. 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  She had to retire at the time secondary to her MS.  She has never .  She has not dated,and states that if she were she would date, she would be interested in a relationship with a woman.  There is no history of  service or legal problems.  She expresses concern about her increasing social isolation.  She has at least 1 friend, Jael, who she met at a therapy group in .  She is active in facilitating groups for people with MS both in [place] and [place].  She lives alone and currently gets help from a home health aide, as well as home health nurse.     She sees Dr. Ban Coleman, psychiatrist and is trying to figure out best antidepressant regimen.  .Dr. Coleman prescribes Cymbalta for it.  She feels she has been more depressed since the Fall, but doesn't think it is SAD.   She states that she has recommended case management.     SESSION:  Mili participated in a telehealth session of psychotherapy.. The depression varies and is currently 2-3 out of ten, and her anxiety is increasing with her friend's dx of stage 4 pancreatic cancer, the imminent loss of her , and her increasing financial problems.  She discussed a snaffu with her housing costs.  She is trying to be supportive to others.  We discussed the need to cultivate more oa a friendship network.  She joined [employer] to support friend, Jael, with pancreatic cancer state 4.     Weight Issues: She doesn't want to deprive herself of anything more given losses in life (e.g., daughter, function).  Discussed how that is not a helpful way to construe deprivation of food mildly, so as to  lose weight.   She is encouraged to get out for a longer walk daily if possible.  She is having difficulty  sticking to the 4290-5332 calorie ceiling about half the time, otherwise, going up to 2000. She is startng a meal delivery service next week, which she thinks will help.       She was vaccinated x2 with Moderna while at [facility] in January, 2021. She was diagnosed with HTN when in hospital.  One of her doctors is looking into balance issues, wondering about low sodium.    She participated fully and appeared to derive benefit. Affect is positive.  Rapport was excellent.   The treatment plan was reviewed with the patient at today s visit. The treatment plan remains current based on the patient s status and progress to date.  This telehealth (telephone) service is appropriate and effective for delivering services in light of the necessity for social distancing to mitigate the COVID-19 epidemic and for conservation of PPE.     Patient has agreed to receiving telehealth services after being informed about it: Yes    Patient prefers video invitation/information to be sent by:   email    Time service started: :5:05  Time service ended:  5:58    Mode of transmission: [employer]    Location of originating:  Home of the patient    Distance site:  Home office of provider for [facility]    The patient has been notified that:  Video visits will be conducted via a call with their psychologist to provide the care they need with a video conversation. Video visits may be billed at different rates depending on insurance coverage.  Patients are advised to please contact their insurance provider with any questions about their health insurance coverage. 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   Behavioral factors affecting morbid obesity (E66.01).     PLAN:  Ms. Padilla will return for  video visit  on 7/6 @ 12  for problem-solving and supportive therapy consistent with treatment plan.  Goal per day now that she is tracking will change from 2000/day to 1750 consistently.   Preference for future meetings:             In-person   prefers, even if masked              Remote              Either    Last treatment plan signed:3/22/2021  Last Treatment plan review: 7/21/2021  Treatment plan verbal Review due: 10/21/2021  Treatment Review due: 3/22/2022         Ramin Olivo, PhD, A.B.P.P., L.P.   Director, Health Psychology

## 2021-07-22 ENCOUNTER — LAB (OUTPATIENT)
Dept: LAB | Facility: CLINIC | Age: 66
End: 2021-07-22
Attending: PHYSICIAN ASSISTANT
Payer: COMMERCIAL

## 2021-07-22 DIAGNOSIS — K91.2 POSTSURGICAL MALABSORPTION: ICD-10-CM

## 2021-07-22 DIAGNOSIS — Z98.84 BARIATRIC SURGERY STATUS: ICD-10-CM

## 2021-07-22 LAB
ERYTHROCYTE [DISTWIDTH] IN BLOOD BY AUTOMATED COUNT: 11.9 % (ref 10–15)
FERRITIN SERPL-MCNC: 214 NG/ML (ref 8–252)
HCT VFR BLD AUTO: 38.8 % (ref 35–47)
HGB BLD-MCNC: 12.5 G/DL (ref 11.7–15.7)
IRON SATN MFR SERPL: 23 % (ref 15–46)
IRON SERPL-MCNC: 55 UG/DL (ref 35–180)
MCH RBC QN AUTO: 30.2 PG (ref 26.5–33)
MCHC RBC AUTO-ENTMCNC: 32.2 G/DL (ref 31.5–36.5)
MCV RBC AUTO: 94 FL (ref 78–100)
PLATELET # BLD AUTO: 162 10E3/UL (ref 150–450)
PTH-INTACT SERPL-MCNC: 27 PG/ML (ref 18–80)
RBC # BLD AUTO: 4.14 10E6/UL (ref 3.8–5.2)
TIBC SERPL-MCNC: 235 UG/DL (ref 240–430)
VIT B12 SERPL-MCNC: 1161 PG/ML (ref 193–986)
WBC # BLD AUTO: 9.1 10E3/UL (ref 4–11)

## 2021-07-22 PROCEDURE — 82728 ASSAY OF FERRITIN: CPT

## 2021-07-22 PROCEDURE — 85027 COMPLETE CBC AUTOMATED: CPT

## 2021-07-22 PROCEDURE — 82607 VITAMIN B-12: CPT

## 2021-07-22 PROCEDURE — 83550 IRON BINDING TEST: CPT

## 2021-07-22 PROCEDURE — 36415 COLL VENOUS BLD VENIPUNCTURE: CPT

## 2021-07-22 PROCEDURE — 83970 ASSAY OF PARATHORMONE: CPT

## 2021-07-22 PROCEDURE — 82306 VITAMIN D 25 HYDROXY: CPT

## 2021-07-23 ENCOUNTER — MYC MEDICAL ADVICE (OUTPATIENT)
Dept: SURGERY | Facility: CLINIC | Age: 66
End: 2021-07-23

## 2021-07-23 LAB — DEPRECATED CALCIDIOL+CALCIFEROL SERPL-MC: 76 UG/L (ref 20–75)

## 2021-08-09 ENCOUNTER — VIRTUAL VISIT (OUTPATIENT)
Dept: PSYCHOLOGY | Facility: CLINIC | Age: 66
End: 2021-08-09
Payer: COMMERCIAL

## 2021-08-09 DIAGNOSIS — E66.01 PSYCHOLOGICAL FACTORS AFFECTING MORBID OBESITY (H): ICD-10-CM

## 2021-08-09 DIAGNOSIS — F33.0 MAJOR DEPRESSIVE DISORDER, RECURRENT EPISODE, MILD (H): Primary | ICD-10-CM

## 2021-08-09 DIAGNOSIS — F54 PSYCHOLOGICAL FACTORS AFFECTING MORBID OBESITY (H): ICD-10-CM

## 2021-08-09 PROCEDURE — 90837 PSYTX W PT 60 MINUTES: CPT | Mod: 95 | Performed by: PSYCHOLOGIST

## 2021-08-09 NOTE — PROGRESS NOTES
Health Psychology                     Department of Medicine  Izzy Montaño, Ph.D., L.P. [phone]                          [facility] Sherrie Crowe, Ph.D., L.P. [phone]                   Gypsy Mail Code 238   Ranulfo Whipple, Ph.D. [phone]        [address] [place] [address] Enriqueta Sorto, Ph.D., L.P. [phone]    [place] [address]           Ramin Olivo, Ph.D., A.B.P.P., L.P. [phone]        Kathie Galvan, Ph.D., L.P. [phone]  [address] Psychology Telemental Health Note    Ms. Padilla is a 66-year-old woman self-referred for psychological consultation because her therapist of the last 24 years retired.  She is seen for problem-solving and supportive therapy for depression and multiple health issues.     HISTORY OF PRESENTING CONCERN:  Ms. Padilla reported at intake (7/28/16) a  lengthy history of depression.  She sees a psychiatrist, Ban Coleman, at [facility], and is taking Abilify 7.5 mg, trazodone 500 mg, ripazepam 75 mg each day at bedtime, Tegretol 400 mg at bedtime and methylphenidate 10 mg b.i.d.  She discontineud ability and is now taking Rexulti 2 mg.  She has a history of hospitalizations including 3 at [facility] in the late 1990s, early 2000s when she had 2 courses of ECT lasting 7-10 sessions and approximately 3 other single session ECTs.  She stopped due to memory problems.  She kept with Dr. Coleman who she first met as an inpatient and has seen her for the past 18 years.  She had also been hospitalized psychiatrically at [facility] at age 23.  She previously worked with psychiatrist, Doug Sarabia for three years, stopping, in part, because she didn't feel he took her suicide attempt sufficiently seriously.  She reports her depression tends to vary.     MEDICAL HISTORY:  Ms. Padilla has a complex medical history.  She was diagnosed with MS in  1985.  Her psychiatrist at [facility] in [place], Dr. 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  She has used a scooter since 1992, using it more  than she had previously.  She also can use a walker.  She was diagnosed with fibromyalgia in 1997.   She is seen at the [facility] for her eye care. During 8th grade, she fell on a ski lift, injuring her larynx.     WEIGHT not taken. iscussed goal of cutting back by 125 calories per day.   That would get her losing 2 lbs./month.  She has goal of 1750 claories/day. and states she met that goal aout half the days of the interview.  She had only 4 days down to goal in the last 18 days;  the rest were about 2100 janice.   She was at goal in the preceeding days for 11 of 17 days.     She is weighing herself weekly, using more vegetables for snack.  She plans to write down calories in the morning. She acknowledges she needs to do it daily.  On 8/9/21, she reports weight is 265 down from 270 up from 268 down from  277.    She resumed attending OA, then stopped during the winter.     She stared treatment for benign paroxysmal positional vertigo treatment in December, 2020, but did not complete it.     Wt Readings from Last 4 Encounters:   07/13/21 120.2 kg (265 lb)   03/24/21 104 kg (229 lb 3.2 oz)   02/23/21 124.7 kg (275 lb)   02/16/21 125.6 kg (277 lb)     Past Medical History:   Diagnosis Date     Depression, major, in partial remission (H)      Fibromyalgia syndrome      Gastro-oesophageal reflux disease      Hyperlipemia      Lymphedema      Multiple sclerosis (H)     tremors with MS, all four limbs and head     Multiple sclerosis, secondary progressive (H)      Sleep apnea      Vocal cord paralysis, unilateral complete         Past Surgical History:   Procedure Laterality Date     COLONOSCOPY N/A 7/17/2017    Procedure: COMBINED COLONOSCOPY, SINGLE OR MULTIPLE BIOPSY/POLYPECTOMY BY BIOPSY;;  Surgeon: Wong Beaulieu MD;   Location:  GI     COLONOSCOPY N/A 2/23/2018    Procedure: COMBINED COLONOSCOPY, SINGLE OR MULTIPLE BIOPSY/POLYPECTOMY BY BIOPSY;  COLONOSCOPY ;  Surgeon: Yessica Santana MD;  Location:  GI     ENT SURGERY      throat 1969, vocal cord surgery with skin grafting     ESOPHAGOSCOPY, GASTROSCOPY, DUODENOSCOPY (EGD), COMBINED N/A 12/16/2014    Procedure: COMBINED ENDOSCOPIC ULTRASOUND, ESOPHAGOSCOPY, GASTROSCOPY, DUODENOSCOPY (EGD), FINE NEEDLE ASPIRATE/BIOPSY;  Surgeon: Yessica Santana MD;  Location:  GI     ESOPHAGOSCOPY, GASTROSCOPY, DUODENOSCOPY (EGD), COMBINED N/A 12/16/2014    Procedure: COMBINED ESOPHAGOSCOPY, GASTROSCOPY, DUODENOSCOPY (EGD), BIOPSY SINGLE OR MULTIPLE;  Surgeon: Yessica Santana MD;  Location: [facility]     LAPAROSCOPIC APPENDECTOMY  5/30/2013    Procedure: LAPAROSCOPIC APPENDECTOMY;;  Surgeon: Salvador Morris MD;  Location:  OR     LAPAROSCOPIC BIOPSY LIVER  5/30/2013    Procedure: LAPAROSCOPIC BIOPSY LIVER;;  Surgeon: Salvador Morris MD;  Location:  OR     LAPAROSCOPIC GASTRIC SLEEVE  5/30/2013    Procedure: LAPAROSCOPIC GASTRIC SLEEVE;  LAPAROSCOPIC SLEEVE GASTRECTOMY/ LAPARSCOPIC  APPENDECTOMY /LIVER BIOPSIES/LIVER CYST DRAINAGE;  Surgeon: Salvador Morris MD;  Location:  OR     ORTHOPEDIC SURGERY      R wrist 2010       Current Outpatient Medications   Medication     ARIPiprazole (ABILIFY) 5 MG tablet     atorvastatin (LIPITOR) 20 MG tablet     bismuth subsalicylate (PEPTO BISMOL) 262 MG chewable tablet     calcium polycarbophil (FIBERCON) 625 MG tablet     carBAMazepine (TEGRETOL) 200 MG tablet     Cranberry 1000 MG CAPS     cyanocobalamin (VITAMIN B-12) 100 MCG tablet     denosumab (PROLIA) 60 MG/ML SOLN injection     docusate sodium (COLACE) 100 MG tablet     DULoxetine (CYMBALTA) 60 MG capsule     famotidine (PEPCID) 20 MG tablet     folic acid (FOLVITE) 400 MCG tablet     guaiFENesin (MUCINEX) 600 MG 12 hr tablet     ketoconazole (NIZORAL) 2 % cream      LACTOBACILLUS RHAMNOSUS, GG, PO     loperamide (IMODIUM A-D) 2 MG tablet     losartan (COZAAR) 50 MG tablet     Melatonin 10 MG TABS     methylphenidate (RITALIN) 20 MG tablet     mirtazapine (REMERON) 30 MG tablet     Multiple Vitamin (MULTIVITAMINS PO)     polyethylene glycol (MIRALAX) 17 GM/Dose powder     traZODone (DESYREL) 100 MG tablet     triamcinolone (ARISTOCORT HP) 0.5 % external cream     triamcinolone (KENALOG) 0.5 % OINT ointment     trimethoprim (TRIMPEX) 100 MG tablet     trospium (SANCTURA) 20 MG tablet     vitamin B-Complex     Vitamin D, Cholecalciferol, 25 MCG (1000 UT) CAPS     No current facility-administered medications for this visit.     Medication: On Duloxetine and Mirtazpine and Trazodone and Ritalin. She discontinued Effexor and Abilify previously per Dr. Marquise Coleman, her psychiatrist.  On 4/10/19 she informed me that she started Abilify    PHQ-9 SCORE 2018 2019 9/15/2019   PHQ-9 Total Score MyChart 10 (Moderate depression) 7 (Mild depression) 5 (Mild depression)   PHQ-9 Total Score 10 7 5     NAS-7 SCORE 2016   Total Score - 6 (mild anxiety)   Total Score 3 -     SOCIAL HISTORY:  Ms. Padilla grew up in the [place] area and is the oldest of 5 children in her family of origin.  Her father was a dentist who she believes was bipolar.  He  of lung cancer at age 72.  Her mother  of sepsis at age 80.  She had been diagnosed with breast cancer when Ms. Padilla was 8.  She describes the marriage between her parents as misael.  She is 5 years older than her closest-aged sister and they are all within 4 years of each other.   Her daughter  12/3 and her mother   She tends to feel more depressed in winter.      Ms. Padilla attended [employer] for a year and later went to night school at the [employer].  She felt that she could not continue her education to the point of graduation because she was working full time and raising her  daughter.  Her daughter  in  at age 31 of liver failure secondary to an accidental Tylenol overdose.  She had been recovering from a hysterectomy.      Ms. 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  She had to retire at the time secondary to her MS.  She has never .  She has not dated,and states that if she were she would date, she would be interested in a relationship with a woman.  There is no history of  service or legal problems.  She expresses concern about her increasing social isolation.  She has at least 1 friend, Jael, who she met at a therapy group in .  She is active in facilitating groups for people with MS both in [place] and [place].  She lives alone and currently gets help from a home health aide, as well as home health nurse.     She sees Dr. Ban Coleman, psychiatrist and is trying to figure out best antidepressant regimen.  .Dr. Coleman prescribes Cymbalta for it.  She feels she has been more depressed since the Fall, but doesn't think it is SAD.   She states that she has recommended case management.     SESSION:  Mili participated in a telehealth session of psychotherapy.. The depression varies and.  She remains anxious about herbest  friend's dx of stage 4 pancreatic cancer, the imminent loss of her , and her increasing financial problems.  She joined Cassandra's group to support friend, Jael, with pancreatic cancer state 4.     Weight Issues: She states doesn't want to deprive herself of anything more given losses in life (e.g., daughter, function), which we discussed at length. We discussed other ways than the lens of deprivation to mayito at it. .   She is encouraged to get out for a longer walk daily if possible.  She is having difficulty  sticking to the [id number] calorie ceiling about half the time, otherwise, going up to 2000.  She is startng a meal delivery service next week, which she thinks will help.     Some concrete steps include having just 1 tangerine and purchasing smaller bananas.    We discussed substitutes for eating snacks out of boredom at night:  For example activities that distract from boredom; not buying the snacks.    She was vaccinated x2 with Moderna while at [facility] in January, 2021. She was diagnosed with HTN when in hospital.  One of her doctors is looking into balance issues, wondering about low sodium.    She participated fully and appeared to derive benefit. Affect is positive.  Rapport was excellent.   This telehealth (telephone) service is appropriate and effective for delivering services in light of the necessity for social distancing to mitigate the COVID-19 epidemic and for conservation of PPE.     Patient has agreed to receiving telehealth services after being informed about it: Yes    Patient prefers video invitation/information to be sent by:   email    Time service started: :4:04  Time service ended:  4:57  Extended session due to complexity of case and length of interval.    Mode of transmission: WinDensity    Location of originating:  Home of the patient    Distance site:  Home office of provider for [facility]    The patient has been notified that:  Video visits will be conducted via a call with their psychologist to provide the care they need with a video conversation. Video visits may be billed at different rates depending on insurance coverage.  Patients are advised to please contact their insurance provider with any questions about their health insurance coverage. 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   Behavioral factors affecting morbid obesity (E66.01).     PLAN:  Ms. Padilla will return for video visit  on 8/30 @ 4  for problem-solving and  supportive therapy consistent with treatment plan.  Goal per day now that she is tracking will change from 2000/day to 1750 consistently.   Preference for future meetings:             In-person   prefers, even if masked              Remote              Either    Last treatment plan signed:3/22/2021  Last Treatment plan review: 7/21/2021  Treatment plan verbal Review due: 10/21/2021  Treatment Review due: 3/22/2022         Ramin Olivo, PhD, A.B.P.P., L.P.   Director, Health Psychology

## 2021-08-30 ENCOUNTER — VIRTUAL VISIT (OUTPATIENT)
Dept: PSYCHOLOGY | Facility: CLINIC | Age: 66
End: 2021-08-30
Payer: COMMERCIAL

## 2021-08-30 DIAGNOSIS — E66.01 PSYCHOLOGICAL FACTORS AFFECTING MORBID OBESITY (H): ICD-10-CM

## 2021-08-30 DIAGNOSIS — F33.0 MAJOR DEPRESSIVE DISORDER, RECURRENT EPISODE, MILD (H): Primary | ICD-10-CM

## 2021-08-30 DIAGNOSIS — F54 PSYCHOLOGICAL FACTORS AFFECTING MORBID OBESITY (H): ICD-10-CM

## 2021-08-30 PROCEDURE — 90834 PSYTX W PT 45 MINUTES: CPT | Mod: 95 | Performed by: PSYCHOLOGIST

## 2021-08-30 NOTE — PROGRESS NOTES
Health Psychology                     Department of Medicine  Izzy Montaño, Ph.D., L.P. [phone]                          [facility] Sherrie Crowe, Ph.D., L.P. [phone]                   [place] Mail Code 927   Ranulfo Whipple, Ph.D. [phone]        [address] [place] [address] Enriqueta Sorto, Ph.D., L.P. [phone]    [place] [address]           Ramin Olivo, Ph.D., A.B.P.P., L.P. [phone]        Kathie Galvan, Ph.D., L.P. [phone]  [address] Psychology Telemental Health Note    Ms. Padilla is a 66-year-old woman self-referred for psychological consultation because her therapist of the last 24 years retired.  She is seen for problem-solving and supportive therapy for depression and multiple health issues.     HISTORY OF PRESENTING CONCERN:  Ms. Padilla reported at intake (7/28/16) a  lengthy history of depression.  She sees a psychiatrist, Ban Coleman, at [facility], and is taking Abilify 7.5 mg, trazodone 500 mg, ripazepam 75 mg each day at bedtime, Tegretol 400 mg at bedtime and methylphenidate 10 mg b.i.d.  She discontineud ability and is now taking Rexulti 2 mg.  She has a history of hospitalizations including 3 at [facility] in the late 1990s, early 2000s when she had 2 courses of ECT lasting 7-10 sessions and approximately 3 other single session ECTs.  She stopped due to memory problems.  She kept with Dr. Coleman who she first met as an inpatient and has seen her for the past 18 years.  She had also been hospitalized psychiatrically at [facility] at age 23.  She previously worked with psychiatrist, Doug Sarabia for three years, stopping, in part, because she didn't feel he took her suicide attempt sufficiently seriously.  She reports her depression tends to vary.     MEDICAL HISTORY:  Ms. Padilla has a complex medical history.  She was diagnosed with MS in  1985.  Her psychiatrist at [facility] in [place], Dr. 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  She has used a scooter since 1992, using it more  than she had previously.  She also can use a walker.  She was diagnosed with fibromyalgia in 1997.   She is seen at the [facility] for her eye care. During 8th grade, she fell on a ski lift, injuring her larynx.     WEIGHT not taken. iscussed goal of cutting back by 125 calories per day.   That would get her losing 2 lbs./month.  She has goal of 1750 claories/day. and states she met that goal aout half the days of the interview.  She had only 4 days down to goal in the last 18 days;  the rest were about 2100 janice.   She was at goal in the preceeding days for 11 of 17 days.     She is weighing herself weekly, using more vegetables for snack.  She plans to write down calories in the morning. She acknowledges she needs to do it daily.  On 8/9/21, she reports weight is 265 down from 270 up from 268 down from  277.    She resumed attending OA, then stopped during the winter.     She stared treatment for benign paroxysmal positional vertigo treatment in December, 2020, but did not complete it.     Wt Readings from Last 4 Encounters:   07/13/21 120.2 kg (265 lb)   03/24/21 104 kg (229 lb 3.2 oz)   02/23/21 124.7 kg (275 lb)   02/16/21 125.6 kg (277 lb)     Past Medical History:   Diagnosis Date     Depression, major, in partial remission (H)      Fibromyalgia syndrome      Gastro-oesophageal reflux disease      Hyperlipemia      Lymphedema      Multiple sclerosis (H)     tremors with MS, all four limbs and head     Multiple sclerosis, secondary progressive (H)      Sleep apnea      Vocal cord paralysis, unilateral complete         Past Surgical History:   Procedure Laterality Date     COLONOSCOPY N/A 7/17/2017    Procedure: COMBINED COLONOSCOPY, SINGLE OR MULTIPLE BIOPSY/POLYPECTOMY BY BIOPSY;;  Surgeon: Wong Beaulieu MD;   Location:  GI     COLONOSCOPY N/A 2/23/2018    Procedure: COMBINED COLONOSCOPY, SINGLE OR MULTIPLE BIOPSY/POLYPECTOMY BY BIOPSY;  COLONOSCOPY ;  Surgeon: Yessica Santana MD;  Location:  GI     ENT SURGERY      throat 1969, vocal cord surgery with skin grafting     ESOPHAGOSCOPY, GASTROSCOPY, DUODENOSCOPY (EGD), COMBINED N/A 12/16/2014    Procedure: COMBINED ENDOSCOPIC ULTRASOUND, ESOPHAGOSCOPY, GASTROSCOPY, DUODENOSCOPY (EGD), FINE NEEDLE ASPIRATE/BIOPSY;  Surgeon: Yessica Santana MD;  Location:  GI     ESOPHAGOSCOPY, GASTROSCOPY, DUODENOSCOPY (EGD), COMBINED N/A 12/16/2014    Procedure: COMBINED ESOPHAGOSCOPY, GASTROSCOPY, DUODENOSCOPY (EGD), BIOPSY SINGLE OR MULTIPLE;  Surgeon: Yessica Santana MD;  Location: [facility]     LAPAROSCOPIC APPENDECTOMY  5/30/2013    Procedure: LAPAROSCOPIC APPENDECTOMY;;  Surgeon: Salvador Morris MD;  Location:  OR     LAPAROSCOPIC BIOPSY LIVER  5/30/2013    Procedure: LAPAROSCOPIC BIOPSY LIVER;;  Surgeon: Salvador Morris MD;  Location:  OR     LAPAROSCOPIC GASTRIC SLEEVE  5/30/2013    Procedure: LAPAROSCOPIC GASTRIC SLEEVE;  LAPAROSCOPIC SLEEVE GASTRECTOMY/ LAPARSCOPIC  APPENDECTOMY /LIVER BIOPSIES/LIVER CYST DRAINAGE;  Surgeon: Salvador Morris MD;  Location:  OR     ORTHOPEDIC SURGERY      R wrist 2010       Current Outpatient Medications   Medication     ARIPiprazole (ABILIFY) 5 MG tablet     atorvastatin (LIPITOR) 20 MG tablet     bismuth subsalicylate (PEPTO BISMOL) 262 MG chewable tablet     calcium polycarbophil (FIBERCON) 625 MG tablet     carBAMazepine (TEGRETOL) 200 MG tablet     Cranberry 1000 MG CAPS     cyanocobalamin (VITAMIN B-12) 100 MCG tablet     denosumab (PROLIA) 60 MG/ML SOLN injection     docusate sodium (COLACE) 100 MG tablet     DULoxetine (CYMBALTA) 60 MG capsule     famotidine (PEPCID) 20 MG tablet     folic acid (FOLVITE) 400 MCG tablet     guaiFENesin (MUCINEX) 600 MG 12 hr tablet     ketoconazole (NIZORAL) 2 % cream      LACTOBACILLUS RHAMNOSUS, GG, PO     loperamide (IMODIUM A-D) 2 MG tablet     losartan (COZAAR) 50 MG tablet     Melatonin 10 MG TABS     methylphenidate (RITALIN) 20 MG tablet     mirtazapine (REMERON) 30 MG tablet     Multiple Vitamin (MULTIVITAMINS PO)     polyethylene glycol (MIRALAX) 17 GM/Dose powder     traZODone (DESYREL) 100 MG tablet     triamcinolone (ARISTOCORT HP) 0.5 % external cream     triamcinolone (KENALOG) 0.5 % OINT ointment     trimethoprim (TRIMPEX) 100 MG tablet     trospium (SANCTURA) 20 MG tablet     vitamin B-Complex     Vitamin D, Cholecalciferol, 25 MCG (1000 UT) CAPS     No current facility-administered medications for this visit.     Medication: On Duloxetine and Mirtazpine and Trazodone and Ritalin. She discontinued Effexor and Abilify previously per Dr. Marquise Coleman, her psychiatrist.  On 4/10/19 she informed me that she started Abilify    PHQ-9 SCORE 2018 2019 9/15/2019   PHQ-9 Total Score MyChart 10 (Moderate depression) 7 (Mild depression) 5 (Mild depression)   PHQ-9 Total Score 10 7 5     NAS-7 SCORE 2016   Total Score - 6 (mild anxiety)   Total Score 3 -      SOCIAL HISTORY:  Ms. Padilla grew up in the [place] area and is the oldest of 5 children in her family of origin.  Her father was a dentist who she believes was bipolar.  He  of lung cancer at age 72.  Her mother  of sepsis at age 80.  She had been diagnosed with breast cancer when Ms. Padilla was 8.  She describes the marriage between her parents as misael.  She is 5 years older than her closest-aged sister and they are all within 4 years of each other.   Her daughter  12/3 and her mother   She tends to feel more depressed in winter.      Ms. Padilla attended [employer] for a year and later went to night school at the [employer].  She felt that she could not continue her education to the point of graduation because she was working full time and raising her  daughter.  Her daughter  in  at age 31 of liver failure secondary to an accidental Tylenol overdose.  She had been recovering from a hysterectomy.      Ms. 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  She had to retire at the time secondary to her MS.  She has never .  She has not dated,and states that if she were she would date, she would be interested in a relationship with a woman.  There is no history of  service or legal problems.  She expresses concern about her increasing social isolation.  She has at least 1 friend, Jael, who she met at a therapy group in .  She is active in facilitating groups for people with MS both in [place] and [place].  She lives alone and currently gets help from a home health aide, as well as home health nurse.     She sees Dr. Ban Coleman, psychiatrist and is trying to figure out best antidepressant regimen.  .Dr. Coleman prescribes Cymbalta for it.  She feels she has been more depressed since the Fall, but doesn't think it is SAD.   She states that she has recommended case management.     SESSION:  Mili participated in a telehealth session of psychotherapy.. The depression varies and she remains anxious about herbest  friend's dx of stage 4 pancreatic cancer, the imminent loss of her , and her increasing financial problems.  She is most concerned about her efforts to have her housing costs not be increased.  There was a snafu of her leaving documents in the office and the worker she has not getting it taken care of.   We discussed learning to use her scanner to get the information flowing more quickly and communicatioin strategies.  She is quite angry about it.     She joined [facility]'s group to support friend, Jael, with pancreatic cancer stage 4.  It is helpful to her.     Weight Issues: .  She is having difficulty   sticking to the 0820-0230 calorie ceiling about half the time, otherwise, going up to 2000. She is startng a meal delivery service next week, which she thinks will help.     Some concrete steps include having just 1 tangerine and purchasing smaller bananas.  No weight change.  We discussed using the portion control plate she has.     She was vaccinated x2 with Moderna while at [facility] in January, 2021. She was diagnosed with HTN when in hospital.  One of her doctors is looking into balance issues, wondering about low sodium.  She hasn't heard that she needs to  Be towards the front of the line for additional vaccination.    She participated fully and appeared to derive benefit. Affect is positive.  Rapport was excellent.   This telehealth (telephone) service is appropriate and effective for delivering services in light of the necessity for social distancing to mitigate the COVID-19 epidemic and for conservation of PPE.     Patient has agreed to receiving telehealth services after being informed about it: Yes    Patient prefers video invitation/information to be sent by:   email    Time service started: :4:09  Time service ended:  4:49    Mode of transmission: iOnRoad    Location of originating:  Home of the patient    Distance site:  Home office of provider for [facility]    The patient has been notified that:  Video visits will be conducted via a call with their psychologist to provide the care they need with a video conversation. Video visits may be billed at different rates depending on insurance coverage.  Patients are advised to please contact their insurance provider with any questions about their health insurance coverage. 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   Behavioral factors affecting morbid obesity (E66.01).     PLAN:  Ms. Padilla will return for video  visit  9/24 @ 3  for problem-solving and supportive therapy consistent with treatment plan.  Goal per day now that she is tracking will change from 2000/day to 1750 consistently.   Preference for future meetings:             In-person   prefers, even if masked              Remote              Either    Last treatment plan signed:3/22/2021  Last Treatment plan review: 7/21/2021  Treatment plan verbal Review due: 10/21/2021  Treatment Review due: 3/22/2022         Ramin Olivo, PhD, A.B.P.P., L.P.   Director, Health Psychology

## 2021-09-08 ENCOUNTER — MEDICAL CORRESPONDENCE (OUTPATIENT)
Dept: HEALTH INFORMATION MANAGEMENT | Facility: CLINIC | Age: 66
End: 2021-09-08

## 2021-09-10 DIAGNOSIS — I10 HTN (HYPERTENSION): ICD-10-CM

## 2021-09-10 DIAGNOSIS — R01.1 HEART MURMUR: Primary | ICD-10-CM

## 2021-09-12 DIAGNOSIS — K21.9 GERD WITHOUT ESOPHAGITIS: Primary | ICD-10-CM

## 2021-09-12 RX ORDER — FAMOTIDINE 40 MG/1
TABLET, FILM COATED ORAL
Qty: 90 TABLET | Refills: 0 | Status: SHIPPED | OUTPATIENT
Start: 2021-09-12 | End: 2021-12-06

## 2021-09-24 ENCOUNTER — VIRTUAL VISIT (OUTPATIENT)
Dept: PSYCHOLOGY | Facility: CLINIC | Age: 66
End: 2021-09-24
Payer: COMMERCIAL

## 2021-09-24 DIAGNOSIS — E66.01 PSYCHOLOGICAL FACTORS AFFECTING MORBID OBESITY (H): ICD-10-CM

## 2021-09-24 DIAGNOSIS — F54 PSYCHOLOGICAL FACTORS AFFECTING MORBID OBESITY (H): ICD-10-CM

## 2021-09-24 DIAGNOSIS — F33.0 MAJOR DEPRESSIVE DISORDER, RECURRENT EPISODE, MILD (H): Primary | ICD-10-CM

## 2021-09-24 PROCEDURE — 90837 PSYTX W PT 60 MINUTES: CPT | Mod: 95 | Performed by: PSYCHOLOGIST

## 2021-09-24 NOTE — PROGRESS NOTES
Health Psychology                     Department of Medicine  Izzy Montaño, Ph.D., L.P. [phone]                          [facility] Sherrie Crowe, Ph.D., L.P. [phone]                   Tram Mail Code 051   Ranulfo Whipple, Ph.D. [phone]        [address] [place] [address] Enriqueta Sorto, Ph.D., L.P. [phone]    [place] [address]           Ramin Olivo, Ph.D., A.B.P.P., L.P. [phone]        Kathie Galvan, Ph.D., L.P. [phone]  [address] Psychology Telemental Health Note    Ms. Padilla is a 66-year-old woman self-referred for psychological consultation because her therapist of the last 24 years retired.  She is seen for problem-solving and supportive therapy for depression and multiple health issues.    Intake:  7/28/16     HISTORY OF PRESENTING CONCERN:  Ms. Padilla reported at intake a  lengthy history of depression.  She sees a psychiatrist, Ban Coleman, at [facility], and is taking Abilify 7.5 mg, trazodone 500 mg, ripazepam 75 mg each day at bedtime, Tegretol 400 mg at bedtime and methylphenidate 10 mg b.i.d.  She discontineud ability and is now taking Rexulti 2 mg.  She has a history of hospitalizations including 3 at [facility] in the late 1990s, early 2000s when she had 2 courses of ECT lasting 7-10 sessions and approximately 3 other single session ECTs.  She stopped due to memory problems.  She kept with Dr. Coleman who she first met as an inpatient and has seen her for the past 18 years.  She had also been hospitalized psychiatrically at [facility] at age 23.  She previously worked with psychiatrist, Doug Sarabia for three years, stopping, in part, because she didn't feel he took her suicide attempt sufficiently seriously.  She reports her depression tends to vary.     MEDICAL HISTORY:  Ms. Padilla has a complex medical history.  She was diagnosed with  MS in 1985.  Her psychiatrist at [facility] in [place], Dr. 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  She has used a scooter since 1992, using it more  than she had previously.  She also can use a walker.  She was diagnosed with fibromyalgia in 1997.   She is seen at the [facility] for her eye care. During 8th grade, she fell on a ski lift, injuring her larynx.     WEIGHT not taken. iscussed goal of cutting back by 125 calories per day.   That would get her losing 2 lbs./month.  She has goal of 1750 claories/day. and states she met that goal aout half the days of the interview.  She had only 4 days down to goal in the last 18 days;  the rest were about 2100 janice.   She was at goal in the preceeding days for 11 of 17 days.     She is weighing herself weekly, using more vegetables for snack.  She plans to write down calories in the morning. She acknowledges she needs to do it daily. On 9/23/21 kyle  was 273 at home; and was 2778 on 9.9 at her doctor's office.  On 8/9/21, she reported weight is 265 down from 270 up from 268 down from  277.    She resumed attending OA, then stopped during the winter.     She stared treatment for benign paroxysmal positional vertigo treatment in December, 2020, but did not complete it.     She is not calculating calories.  Her weight  Has not been changing lately.     Wt Readings from Last 4 Encounters:   07/13/21 120.2 kg (265 lb)   03/24/21 104 kg (229 lb 3.2 oz)   02/23/21 124.7 kg (275 lb)   02/16/21 125.6 kg (277 lb)     Past Medical History:   Diagnosis Date     Depression, major, in partial remission (H)      Fibromyalgia syndrome      Gastro-oesophageal reflux disease      Hyperlipemia      Lymphedema      Multiple sclerosis (H)     tremors with MS, all four limbs and head     Multiple sclerosis, secondary progressive (H)      Sleep apnea      Vocal cord paralysis, unilateral complete       Past Surgical History:   Procedure  Laterality Date     COLONOSCOPY N/A 7/17/2017    Procedure: COMBINED COLONOSCOPY, SINGLE OR MULTIPLE BIOPSY/POLYPECTOMY BY BIOPSY;;  Surgeon: Wong Beaulieu MD;  Location:  GI     COLONOSCOPY N/A 2/23/2018    Procedure: COMBINED COLONOSCOPY, SINGLE OR MULTIPLE BIOPSY/POLYPECTOMY BY BIOPSY;  COLONOSCOPY ;  Surgeon: Yessica Santana MD;  Location:  GI     ENT SURGERY      throat 1969, vocal cord surgery with skin grafting     ESOPHAGOSCOPY, GASTROSCOPY, DUODENOSCOPY (EGD), COMBINED N/A 12/16/2014    Procedure: COMBINED ENDOSCOPIC ULTRASOUND, ESOPHAGOSCOPY, GASTROSCOPY, DUODENOSCOPY (EGD), FINE NEEDLE ASPIRATE/BIOPSY;  Surgeon: Yessica Santana MD;  Location: [facility]     ESOPHAGOSCOPY, GASTROSCOPY, DUODENOSCOPY (EGD), COMBINED N/A 12/16/2014    Procedure: COMBINED ESOPHAGOSCOPY, GASTROSCOPY, DUODENOSCOPY (EGD), BIOPSY SINGLE OR MULTIPLE;  Surgeon: Yessica Santana MD;  Location: [facility]     LAPAROSCOPIC APPENDECTOMY  5/30/2013    Procedure: LAPAROSCOPIC APPENDECTOMY;;  Surgeon: Salvador Morris MD;  Location:  OR     LAPAROSCOPIC BIOPSY LIVER  5/30/2013    Procedure: LAPAROSCOPIC BIOPSY LIVER;;  Surgeon: Salvador Morris MD;  Location:  OR     LAPAROSCOPIC GASTRIC SLEEVE  5/30/2013    Procedure: LAPAROSCOPIC GASTRIC SLEEVE;  LAPAROSCOPIC SLEEVE GASTRECTOMY/ LAPARSCOPIC  APPENDECTOMY /LIVER BIOPSIES/LIVER CYST DRAINAGE;  Surgeon: Salvador Morris MD;  Location:  OR     ORTHOPEDIC SURGERY      R wrist 2010     Current Outpatient Medications   Medication     ARIPiprazole (ABILIFY) 5 MG tablet     atorvastatin (LIPITOR) 20 MG tablet     bismuth subsalicylate (PEPTO BISMOL) 262 MG chewable tablet     calcium polycarbophil (FIBERCON) 625 MG tablet     carBAMazepine (TEGRETOL) 200 MG tablet     Cranberry 1000 MG CAPS     cyanocobalamin (VITAMIN B-12) 100 MCG tablet     denosumab (PROLIA) 60 MG/ML SOLN injection     docusate sodium (COLACE) 100 MG tablet     DULoxetine (CYMBALTA) 60 MG capsule      famotidine (PEPCID) 20 MG tablet     famotidine (PEPCID) 40 MG tablet     folic acid (FOLVITE) 400 MCG tablet     guaiFENesin (MUCINEX) 600 MG 12 hr tablet     ketoconazole (NIZORAL) 2 % cream     LACTOBACILLUS RHAMNOSUS, GG, PO     loperamide (IMODIUM A-D) 2 MG tablet     losartan (COZAAR) 50 MG tablet     Melatonin 10 MG TABS     methylphenidate (RITALIN) 20 MG tablet     mirtazapine (REMERON) 30 MG tablet     Multiple Vitamin (MULTIVITAMINS PO)     polyethylene glycol (MIRALAX) 17 GM/Dose powder     traZODone (DESYREL) 100 MG tablet     triamcinolone (ARISTOCORT HP) 0.5 % external cream     triamcinolone (KENALOG) 0.5 % OINT ointment     trimethoprim (TRIMPEX) 100 MG tablet     trospium (SANCTURA) 20 MG tablet     vitamin B-Complex     Vitamin D, Cholecalciferol, 25 MCG (1000 UT) CAPS     No current facility-administered medications for this visit.     Medication: On Duloxetine and Mirtazpine and Trazodone and Ritalin. She discontinued Effexor and Abilify previously per Dr. Marquise Coleman, her psychiatrist.  On 4/10/19 she informed me that she started Abilify    PHQ-9 SCORE 2018 2019 9/15/2019   PHQ-9 Total Score MyChart 10 (Moderate depression) 7 (Mild depression) 5 (Mild depression)   PHQ-9 Total Score 10 7 5     NAS-7 SCORE 2016   Total Score - 6 (mild anxiety)   Total Score 3 -      SOCIAL HISTORY:  Ms. Padilla grew up in the [facility] and is the oldest of 5 children in her family of origin.  Her father was a dentist who she believes was bipolar.  He  of lung cancer at age 72.  Her mother  of sepsis at age 80.  She had been diagnosed with breast cancer when Ms. Padilla was 8.  She describes the marriage between her parents as misael.  She is 5 years older than her closest-aged sister and they are all within 4 years of each other.   Her daughter  12/3 and her mother   She tends to feel more depressed in winter.      Ms. Padilla attended [employer] for a  year and later went to [employer] at the [employer].  She felt that she could not continue her education to the point of graduation because she was working full time and raising her daughter.  Her daughter  in  at age 31 of liver failure secondary to an accidental Tylenol overdose.  She had been recovering from a hysterectomy.      Ms. 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  She had to retire at the time secondary to her MS.  She has never .  She has not dated,and states that if she were she would date, she would be interested in a relationship with a woman.  There is no history of  service or legal problems.  She expresses concern about her increasing social isolation.  She has at least 1 friend, Jael, who she met at a therapy group in .  She is active in facilitating groups for people with MS both in [place] and [place].  She lives alone and currently gets help from a home health aide, as well as home health nurse.     She sees Dr. Ban Coleman, psychiatrist and is trying to figure out best antidepressant regimen.  .Dr. Coleman prescribes Cymbalta for it.  She feels she has been more depressed since the Fall, but doesn't think it is SAD.   She states that she has recommended case management.     SESSION:  Mili participated in a telehealth session of psychotherapy.. The depression varies and she remains anxious about herbest  friend's dx of stage 4 pancreatic cancer, the imminent loss of her , and her increasing financial problems. No progress on the Section VIII concern,    Weight Issues: . No weight change.  She is having difficulty  sticking to the 9838-9866 calorie ceiling about half the time, otherwise, going up to 2000  Some concrete steps include having just 1 tangerine and purchasing smaller bananas.  We discussed  motivation  Including that obesity oeads to premature deaths and showed her the [employer] data.    She was vaccinated x2 with Moderna while at [facility] in January, 2021. She was diagnosed with HTN when in hospital.  One of her doctors is looking into balance issues, wondering about low sodium.  She hasn't heard that she needs to  Be towards the front of the line for additional vaccination.    She participated fully and appeared to derive benefit. Affect is positive.  Rapport was excellent.   This telehealth (telephone) service is appropriate and effective for delivering services in light of the necessity for social distancing to mitigate the COVID-19 epidemic and for conservation of PPE.     Patient has agreed to receiving telehealth services after being informed about it: Yes    Patient prefers video invitation/information to be sent by:   email    Time service started: 3:05  Time service ended: 3:59    Mode of transmission: [employer]    Location of originating:  Home of the patient    Distance site:  Home office of provider for [facility]    The patient has been notified that:  Video visits will be conducted via a call with their psychologist to provide the care they need with a video conversation. Video visits may be billed at different rates depending on insurance coverage.  Patients are advised to please contact their insurance provider with any questions about their health insurance coverage. 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   Behavioral factors affecting morbid obesity (E66.01).     PLAN:  Ms. Padilla will return for video visit 10/13 @ 3  for problem-solving and supportive therapy consistent with treatment plan.  Goal per day now that she is tracking will change from 2000/day to 1750 consistently.   Preference for future meetings:             In-person    prefers, even if masked              Remote              Either    Last treatment plan signed:3/22/2021  Last Treatment plan review: 7/21/2021  Treatment plan verbal Review due: 10/21/2021  Treatment Review due: 3/22/2022         Ramin Olivo, PhD, A.B.P.P., L.P.   Director, Health Psychology

## 2021-09-27 ENCOUNTER — HOSPITAL ENCOUNTER (OUTPATIENT)
Dept: CARDIOLOGY | Facility: CLINIC | Age: 66
Discharge: HOME | End: 2021-09-27
Attending: FAMILY MEDICINE | Admitting: FAMILY MEDICINE
Payer: COMMERCIAL

## 2021-09-27 DIAGNOSIS — R01.1 HEART MURMUR: ICD-10-CM

## 2021-09-27 DIAGNOSIS — I10 HTN (HYPERTENSION): ICD-10-CM

## 2021-09-27 LAB — LVEF ECHO: NORMAL

## 2021-09-27 PROCEDURE — 255N000002 HC RX 255 OP 636: Performed by: FAMILY MEDICINE

## 2021-09-27 PROCEDURE — 999N000208 ECHOCARDIOGRAM COMPLETE

## 2021-09-27 PROCEDURE — 93306 TTE W/DOPPLER COMPLETE: CPT | Mod: 26 | Performed by: INTERNAL MEDICINE

## 2021-09-27 RX ADMIN — HUMAN ALBUMIN MICROSPHERES AND PERFLUTREN 9 ML: 10; .22 INJECTION, SOLUTION INTRAVENOUS at 13:07

## 2021-10-13 ENCOUNTER — VIRTUAL VISIT (OUTPATIENT)
Dept: PSYCHOLOGY | Facility: CLINIC | Age: 66
End: 2021-10-13
Payer: COMMERCIAL

## 2021-10-13 DIAGNOSIS — E66.01 PSYCHOLOGICAL FACTORS AFFECTING MORBID OBESITY (H): ICD-10-CM

## 2021-10-13 DIAGNOSIS — F54 PSYCHOLOGICAL FACTORS AFFECTING MORBID OBESITY (H): ICD-10-CM

## 2021-10-13 DIAGNOSIS — F33.0 MAJOR DEPRESSIVE DISORDER, RECURRENT EPISODE, MILD (H): Primary | ICD-10-CM

## 2021-10-13 PROCEDURE — 90834 PSYTX W PT 45 MINUTES: CPT | Mod: 95 | Performed by: PSYCHOLOGIST

## 2021-10-13 NOTE — PROGRESS NOTES
Health Psychology                     Department of Medicine  Izzy Montaño, Ph.D., L.P. [phone]                          [facility] Sherrie Crowe, Ph.D., L.P. [phone]                   [place] Mail Code 420   Ranulfo Whipple, Ph.D. [phone]        [address] [place] [address] Enriqueta Sorto, Ph.D., L.P. [phone]    [place] [address]           Ramin Olivo, Ph.D., A.B.P.P., L.P. [phone]        Kathie Galvan, Ph.D., L.P. [phone]  [address] Psychology Telephone Health Progress Note    Ms. Padilla is a 66-year-old woman self-referred for psychological consultation because her therapist of the last 24 years retired.  She is seen for problem-solving and supportive therapy for depression and multiple health issues.    Intake:  7/28/16     HISTORY OF PRESENTING CONCERN:  Ms. Padilla reported at intake a  lengthy history of depression.  She sees a psychiatrist, Ban Coleman, at [facility], and is taking Abilify 7.5 mg, trazodone 500 mg, ripazepam 75 mg each day at bedtime, Tegretol 400 mg at bedtime and methylphenidate 10 mg b.i.d.  She discontineud ability and is now taking Rexulti 2 mg.  She has a history of hospitalizations including 3 at [facility] in the late 1990s, early 2000s when she had 2 courses of ECT lasting 7-10 sessions and approximately 3 other single session ECTs.  She stopped due to memory problems.  She kept with Dr. Coleman who she first met as an inpatient and has seen her for the past 18 years.  She had also been hospitalized psychiatrically at [facility] at age 23.  She previously worked with psychiatrist, Doug Sarabia for three years, stopping, in part, because she didn't feel he took her suicide attempt sufficiently seriously.  She reports her depression tends to vary.     MEDICAL HISTORY:  Ms. Padilla has a complex medical history.  She was  diagnosed with MS in 1985.  Her psychiatrist at [facility] in [place], Dr. 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  She has used a scooter since 1992, using it more  than she had previously.  She also can use a walker.  She was diagnosed with fibromyalgia in 1997.   She is seen at the [facility] for her eye care. During 8th grade, she fell on a ski lift, injuring her larynx.     WEIGHT not taken. iscussed goal of cutting back by 125 calories per day.   That would get her losing 2 lbs./month.  She has goal of 1750 claories/day. and states she met that goal aout half the days of the interview.  She had only 4 days down to goal in the last 18 days;  the rest were about 2100 janice.   She was at goal in the preceeding days for 11 of 17 days.     She is weighing herself weekly, using more vegetables for snack.  She plans to write down calories in the morning. She acknowledges she needs to do it daily. On 9/23/21 kyle  was 273 at home; and was 2778 on 9.9 at her doctor's office.  On 8/9/21, she reported weight is 265 down from 270 up from 268 down from  277.    She resumed attending OA, then stopped during the winter.     She stared treatment for benign paroxysmal positional vertigo treatment in December, 2020, but did not complete it.     She is not calculating calories.  Her weight  Has not been changing lately.     Wt Readings from Last 4 Encounters:   07/13/21 120.2 kg (265 lb)   03/24/21 104 kg (229 lb 3.2 oz)   02/23/21 124.7 kg (275 lb)   02/16/21 125.6 kg (277 lb)     Past Medical History:   Diagnosis Date     Depression, major, in partial remission (H)      Fibromyalgia syndrome      Gastro-oesophageal reflux disease      Hyperlipemia      Lymphedema      Multiple sclerosis (H)     tremors with MS, all four limbs and head     Multiple sclerosis, secondary progressive (H)      Sleep apnea      Vocal cord paralysis, unilateral complete       Past Surgical  History:   Procedure Laterality Date     COLONOSCOPY N/A 7/17/2017    Procedure: COMBINED COLONOSCOPY, SINGLE OR MULTIPLE BIOPSY/POLYPECTOMY BY BIOPSY;;  Surgeon: Wong Beaulieu MD;  Location:  GI     COLONOSCOPY N/A 2/23/2018    Procedure: COMBINED COLONOSCOPY, SINGLE OR MULTIPLE BIOPSY/POLYPECTOMY BY BIOPSY;  COLONOSCOPY ;  Surgeon: Yessica Santana MD;  Location:  GI     ENT SURGERY      throat 1969, vocal cord surgery with skin grafting     ESOPHAGOSCOPY, GASTROSCOPY, DUODENOSCOPY (EGD), COMBINED N/A 12/16/2014    Procedure: COMBINED ENDOSCOPIC ULTRASOUND, ESOPHAGOSCOPY, GASTROSCOPY, DUODENOSCOPY (EGD), FINE NEEDLE ASPIRATE/BIOPSY;  Surgeon: Yessica Santana MD;  Location:  GI     ESOPHAGOSCOPY, GASTROSCOPY, DUODENOSCOPY (EGD), COMBINED N/A 12/16/2014    Procedure: COMBINED ESOPHAGOSCOPY, GASTROSCOPY, DUODENOSCOPY (EGD), BIOPSY SINGLE OR MULTIPLE;  Surgeon: Yessica Santana MD;  Location: [facility]     LAPAROSCOPIC APPENDECTOMY  5/30/2013    Procedure: LAPAROSCOPIC APPENDECTOMY;;  Surgeon: Salvador Morris MD;  Location:  OR     LAPAROSCOPIC BIOPSY LIVER  5/30/2013    Procedure: LAPAROSCOPIC BIOPSY LIVER;;  Surgeon: Salvador Morris MD;  Location:  OR     LAPAROSCOPIC GASTRIC SLEEVE  5/30/2013    Procedure: LAPAROSCOPIC GASTRIC SLEEVE;  LAPAROSCOPIC SLEEVE GASTRECTOMY/ LAPARSCOPIC  APPENDECTOMY /LIVER BIOPSIES/LIVER CYST DRAINAGE;  Surgeon: Salvador Morris MD;  Location:  OR     ORTHOPEDIC SURGERY      R wrist 2010     Current Outpatient Medications   Medication     ARIPiprazole (ABILIFY) 5 MG tablet     atorvastatin (LIPITOR) 20 MG tablet     bismuth subsalicylate (PEPTO BISMOL) 262 MG chewable tablet     calcium polycarbophil (FIBERCON) 625 MG tablet     carBAMazepine (TEGRETOL) 200 MG tablet     Cranberry 1000 MG CAPS     cyanocobalamin (VITAMIN B-12) 100 MCG tablet     denosumab (PROLIA) 60 MG/ML SOLN injection     docusate sodium (COLACE) 100 MG tablet     DULoxetine  (CYMBALTA) 60 MG capsule     famotidine (PEPCID) 20 MG tablet     famotidine (PEPCID) 40 MG tablet     folic acid (FOLVITE) 400 MCG tablet     guaiFENesin (MUCINEX) 600 MG 12 hr tablet     ketoconazole (NIZORAL) 2 % cream     LACTOBACILLUS RHAMNOSUS, GG, PO     loperamide (IMODIUM A-D) 2 MG tablet     losartan (COZAAR) 50 MG tablet     Melatonin 10 MG TABS     methylphenidate (RITALIN) 20 MG tablet     mirtazapine (REMERON) 30 MG tablet     Multiple Vitamin (MULTIVITAMINS PO)     polyethylene glycol (MIRALAX) 17 GM/Dose powder     traZODone (DESYREL) 100 MG tablet     triamcinolone (ARISTOCORT HP) 0.5 % external cream     triamcinolone (KENALOG) 0.5 % OINT ointment     trimethoprim (TRIMPEX) 100 MG tablet     trospium (SANCTURA) 20 MG tablet     vitamin B-Complex     Vitamin D, Cholecalciferol, 25 MCG (1000 UT) CAPS     No current facility-administered medications for this visit.     Medication: On Duloxetine and Mirtazpine and Trazodone and Ritalin. She discontinued Effexor and Abilify previously per Dr. Marquise Coleman, her psychiatrist.  On 4/10/19 she informed me that she started Abilify    PHQ-9 SCORE 2018 2019 9/15/2019   PHQ-9 Total Score MyChart 10 (Moderate depression) 7 (Mild depression) 5 (Mild depression)   PHQ-9 Total Score 10 7 5     NAS-7 SCORE 2016   Total Score - 6 (mild anxiety)   Total Score 3 -      SOCIAL HISTORY:  Ms. Padilla grew up in the [facility] and is the oldest of 5 children in her family of origin.  Her father was a dentist who she believes was bipolar.  He  of lung cancer at age 72.  Her mother  of sepsis at age 80.  She had been diagnosed with breast cancer when Ms. Padilla was 8.  She describes the marriage between her parents as misael.  She is 5 years older than her closest-aged sister and they are all within 4 years of each other.   Her daughter  12/3 and her mother   She tends to feel more depressed in winter.      Ms. Padilla attended [employer] for a year and later went to [employer] school at the [employer].  She felt that she could not continue her education to the point of graduation because she was working full time and raising her daughter.  Her daughter  in  at age 31 of liver failure secondary to an accidental Tylenol overdose.  She had been recovering from a hysterectomy.      Ms. 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  She had to retire at the time secondary to her MS.  She has never .  She has not dated,and states that if she were she would date, she would be interested in a relationship with a woman.  There is no history of  service or legal problems.  She expresses concern about her increasing social isolation.  She has at least 1 friend, Jael, who she met at a therapy group in .  She is active in facilitating groups for people with MS both in [place] and [place].  She lives alone and currently gets help from a home health aide, as well as home health nurse.     She sees Dr. Ban Coleman, psychiatrist and is trying to figure out best antidepressant regimen.  .Dr. Coleman prescribes Cymbalta for it.  She feels she has been more depressed since the Fall, but doesn't think it is SAD.   She states that she has recommended case management.     SESSION:  Mili participated in a telephone session of psychotherapy.  She didn't get messages in timely manner from [employer], and did not receive email message from me.   \  The depression varies and she remains anxious about he rbest  friend's dx of stage 4 pancreatic cancer.    Weight Issues:   She states she gained 2 lbs due to extra eating. .  She is having difficulty  sticking to the 1750 calorie ceiling about half the time, otherwise, going up to>  2000  Some concrete steps include tracking calories as she eats through  the day and cutting back on snacks.  She is willing.  wE had lengthy discussion of the need to get the math right and her failin in the experiment that she doesn't have to be consistent or hold her self accountable.   She expresses motivation to change.    She was vaccinated x2 with Moderna while at [facility] in January, 2021. She was diagnosed with HTN when in hospital.     She participated fully and appeared to derive benefit. Affect is positive.  Rapport was excellent.   This telehealth (telephone) service is appropriate and effective for delivering services in light of the necessity for social distancing to mitigate the COVID-19 epidemic and for conservation of PPE.     Patient has agreed to receiving telehealth services after being informed about it: Yes    Patient prefers video invitation/information to be sent by:   email    Time service started: 3:15  Time service ended: 3:59    Mode of transmission:Telephone    Location of originating:  Home of the patient    Distance site:  Home office of provider for [facility]    The patient has been notified that:  Video visits will be conducted via a call with their psychologist to provide the care they need with a video conversation. Video visits may be billed at different rates depending on insurance coverage.  Patients are advised to please contact their insurance provider with any questions about their health insurance coverage. 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   Behavioral factors affecting morbid obesity (E66.01).     PLAN:  Ms. Padilla will return for video visit 10/13 @ 3  for problem-solving and supportive therapy consistent with treatment plan.  Goal per day now that she is tracking will change from 2000/day to 1750 consistently.   Preference for future meetings:             In-person   prefers, even if  masked              Remote              Either    Last treatment plan signed:3/22/2021  Last Treatment plan review: 7/21/2021  Treatment plan verbal Review due: 10/21/2021  Treatment Review due: 3/22/2022         Ramin Olivo, PhD, A.B.P.P., L.P.   Director, Health Psychology

## 2021-10-24 ENCOUNTER — HEALTH MAINTENANCE LETTER (OUTPATIENT)
Age: 66
End: 2021-10-24

## 2021-11-03 ENCOUNTER — VIRTUAL VISIT (OUTPATIENT)
Dept: PSYCHOLOGY | Facility: CLINIC | Age: 66
End: 2021-11-03
Payer: COMMERCIAL

## 2021-11-03 DIAGNOSIS — E66.01 PSYCHOLOGICAL FACTORS AFFECTING MORBID OBESITY (H): ICD-10-CM

## 2021-11-03 DIAGNOSIS — Z56.0 UNEMPLOYMENT: ICD-10-CM

## 2021-11-03 DIAGNOSIS — F54 PSYCHOLOGICAL FACTORS AFFECTING MORBID OBESITY (H): ICD-10-CM

## 2021-11-03 DIAGNOSIS — F33.0 MAJOR DEPRESSIVE DISORDER, RECURRENT EPISODE, MILD (H): Primary | ICD-10-CM

## 2021-11-03 PROCEDURE — 90834 PSYTX W PT 45 MINUTES: CPT | Mod: 95 | Performed by: PSYCHOLOGIST

## 2021-11-03 SDOH — ECONOMIC STABILITY - INCOME SECURITY: UNEMPLOYMENT, UNSPECIFIED: Z56.0

## 2021-12-05 DIAGNOSIS — K21.9 GERD WITHOUT ESOPHAGITIS: ICD-10-CM

## 2021-12-06 RX ORDER — FAMOTIDINE 40 MG/1
TABLET, FILM COATED ORAL
Qty: 90 TABLET | Refills: 0 | Status: SHIPPED | OUTPATIENT
Start: 2021-12-06 | End: 2022-03-07

## 2021-12-23 ENCOUNTER — VIRTUAL VISIT (OUTPATIENT)
Dept: PSYCHOLOGY | Facility: CLINIC | Age: 66
End: 2021-12-23
Payer: COMMERCIAL

## 2021-12-23 DIAGNOSIS — F33.0 MAJOR DEPRESSIVE DISORDER, RECURRENT EPISODE, MILD (H): Primary | ICD-10-CM

## 2021-12-23 DIAGNOSIS — F54 PSYCHOLOGICAL FACTORS AFFECTING MORBID OBESITY (H): ICD-10-CM

## 2021-12-23 DIAGNOSIS — E66.01 PSYCHOLOGICAL FACTORS AFFECTING MORBID OBESITY (H): ICD-10-CM

## 2021-12-23 PROCEDURE — 90834 PSYTX W PT 45 MINUTES: CPT | Mod: 95 | Performed by: PSYCHOLOGIST

## 2021-12-23 NOTE — PROGRESS NOTES
Health Psychology                     Department of Medicine  Izzy Montaño, Ph.D., L.P. [phone]                          [facility] Sherrie Crowe, Ph.D., L.P. [phone]                   [facility] Mail Code 837   Ranulfo Whipple, Ph.D. [phone]        [address] [place] [address] Enriqueta Sorto, Ph.D., L.P. [phone]    [place] [address]           Ramin Olivo, Ph.D., A.B.P.P., L.P. [phone]        Kathie Galvan, Ph.D., L.P. [phone]  [address] Psychology Telephone Health Progress Note    Ms. Padilla is a 66-year-old woman self-referred for psychological consultation because her therapist of the last 24 years retired.  She is seen for problem-solving and supportive therapy for depression and multiple health issues.    Intake:  7/28/16     HISTORY OF PRESENTING CONCERN:  Ms. Padilla reported at intake a  lengthy history of depression.  She sees a psychiatrist, Ban Coleman, at [facility], and is taking Abilify 7.5 mg, trazodone 500 mg, ripazepam 75 mg each day at bedtime, Tegretol 400 mg at bedtime and methylphenidate 10 mg b.i.d.  She discontineud ability and is now taking Rexulti 2 mg.  She has a history of hospitalizations including 3 at [facility] in the late 1990s, early 2000s when she had 2 courses of ECT lasting 7-10 sessions and approximately 3 other single session ECTs.  She stopped due to memory problems.  She kept with Dr. Coleman who she first met as an inpatient and has seen her for the past 18 years.  She had also been hospitalized psychiatrically at [facility] at age 23.  She previously worked with psychiatrist, Doug Sarabia for three years, stopping, in part, because she didn't feel he took her suicide attempt sufficiently seriously.  She reports her depression tends to vary.     MEDICAL HISTORY:  Ms. Padilla has a complex medical history.  She was  diagnosed with MS in 1985.  Her psychiatrist at [facility] in [place], Dr. 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  She has used a scooter since 1992, using it more  than she had previously.  She also can use a walker.  She was diagnosed with fibromyalgia in 1997.   She is seen at [facility] her [facility] care. During 8th grade, she fell on a ski lift, injuring her larynx. She lot 65 lb. When she ws dx'ed with MS, kept it of for 3 years  It also felt good when she  lose weight due to her stomach surgery.        WEIGHT not taken but reported below.  Discussed goal of cutting back by 125 calories per day.   That would get her losing 2 lbs./month.  She has goal of 1750 claories/day. and states she met that goal aout half the days of the interview.  She had only 4 days down to goal in the last 18 days;  the rest were about 2100 janice.   She was at goal in the preceeding days for 11 of 17 days.    She is weighing herself weekly, using more vegetables for snack.  She plans to write down calories in the      Weight    12/23/21  =280     11/3/21  = 282.                    10/13/21=   275                     9/23/21=   273                     9/9/21 =   277                     8/9/21 =   265    She resumed attending OA, then stopped during the winter.     She started treatment for benign paroxysmal positional vertigo treatment in December, 2020, but did not complete it.     She is not calculating calories.  Her weight  Has not been changing lately.     Wt Readings from Last 4 Encounters:   07/13/21 120.2 kg (265 lb)   03/24/21 104 kg (229 lb 3.2 oz)   02/23/21 124.7 kg (275 lb)   02/16/21 125.6 kg (277 lb)     Past Medical History:   Diagnosis Date     Depression, major, in partial remission (H)      Fibromyalgia syndrome      Gastro-oesophageal reflux disease      Hyperlipemia      Lymphedema      Multiple sclerosis (H)     tremors with MS, all four limbs and head      Multiple sclerosis, secondary progressive (H)      Sleep apnea      Vocal cord paralysis, unilateral complete       Past Surgical History:   Procedure Laterality Date     COLONOSCOPY N/A 7/17/2017    Procedure: COMBINED COLONOSCOPY, SINGLE OR MULTIPLE BIOPSY/POLYPECTOMY BY BIOPSY;;  Surgeon: Wong Beaulieu MD;  Location:  GI     COLONOSCOPY N/A 2/23/2018    Procedure: COMBINED COLONOSCOPY, SINGLE OR MULTIPLE BIOPSY/POLYPECTOMY BY BIOPSY;  COLONOSCOPY ;  Surgeon: Yessica Santana MD;  Location:  GI     ENT SURGERY      throat 1969, vocal cord surgery with skin grafting     ESOPHAGOSCOPY, GASTROSCOPY, DUODENOSCOPY (EGD), COMBINED N/A 12/16/2014    Procedure: COMBINED ENDOSCOPIC ULTRASOUND, ESOPHAGOSCOPY, GASTROSCOPY, DUODENOSCOPY (EGD), FINE NEEDLE ASPIRATE/BIOPSY;  Surgeon: Yessica Santana MD;  Location: [facility]     ESOPHAGOSCOPY, GASTROSCOPY, DUODENOSCOPY (EGD), COMBINED N/A 12/16/2014    Procedure: COMBINED ESOPHAGOSCOPY, GASTROSCOPY, DUODENOSCOPY (EGD), BIOPSY SINGLE OR MULTIPLE;  Surgeon: Yessica Santana MD;  Location: [facility]     LAPAROSCOPIC APPENDECTOMY  5/30/2013    Procedure: LAPAROSCOPIC APPENDECTOMY;;  Surgeon: Salvador Morris MD;  Location:  OR     LAPAROSCOPIC BIOPSY LIVER  5/30/2013    Procedure: LAPAROSCOPIC BIOPSY LIVER;;  Surgeon: Salvador Morris MD;  Location:  OR     LAPAROSCOPIC GASTRIC SLEEVE  5/30/2013    Procedure: LAPAROSCOPIC GASTRIC SLEEVE;  LAPAROSCOPIC SLEEVE GASTRECTOMY/ LAPARSCOPIC  APPENDECTOMY /LIVER BIOPSIES/LIVER CYST DRAINAGE;  Surgeon: Salvador Morris MD;  Location:  OR     ORTHOPEDIC SURGERY      R wrist 2010     Current Outpatient Medications   Medication     ARIPiprazole (ABILIFY) 5 MG tablet     atorvastatin (LIPITOR) 20 MG tablet     bismuth subsalicylate (PEPTO BISMOL) 262 MG chewable tablet     calcium polycarbophil (FIBERCON) 625 MG tablet     carBAMazepine (TEGRETOL) 200 MG tablet     Cranberry 1000 MG CAPS     cyanocobalamin (VITAMIN  B-12) 100 MCG tablet     denosumab (PROLIA) 60 MG/ML SOLN injection     docusate sodium (COLACE) 100 MG tablet     DULoxetine (CYMBALTA) 60 MG capsule     famotidine (PEPCID) 20 MG tablet     famotidine (PEPCID) 40 MG tablet     folic acid (FOLVITE) 400 MCG tablet     guaiFENesin (MUCINEX) 600 MG 12 hr tablet     ketoconazole (NIZORAL) 2 % cream     LACTOBACILLUS RHAMNOSUS, GG, PO     loperamide (IMODIUM A-D) 2 MG tablet     losartan (COZAAR) 50 MG tablet     Melatonin 10 MG TABS     methylphenidate (RITALIN) 20 MG tablet     mirtazapine (REMERON) 30 MG tablet     Multiple Vitamin (MULTIVITAMINS PO)     polyethylene glycol (MIRALAX) 17 GM/Dose powder     traZODone (DESYREL) 100 MG tablet     triamcinolone (ARISTOCORT HP) 0.5 % external cream     triamcinolone (KENALOG) 0.5 % OINT ointment     trimethoprim (TRIMPEX) 100 MG tablet     trospium (SANCTURA) 20 MG tablet     vitamin B-Complex     Vitamin D, Cholecalciferol, 25 MCG (1000 UT) CAPS     No current facility-administered medications for this visit.     Medication: On Duloxetine and Mirtazpine and Trazodone and Ritalin. She discontinued Effexor and Abilify previously per Dr. Marquise Coleman, her psychiatrist.  On 4/10/19 she informed me that she started Abilify    PHQ-9 SCORE 2018 2019 9/15/2019   PHQ-9 Total Score MyChart 10 (Moderate depression) 7 (Mild depression) 5 (Mild depression)   PHQ-9 Total Score 10 7 5     NAS-7 SCORE 2016   Total Score - 6 (mild anxiety)   Total Score 3 -      SOCIAL HISTORY:  Ms. Padilla grew up in the [place] area and is the oldest of 5 children in her family of origin.  Her father was a dentist who she believes was bipolar.  He  of lung cancer at age 72.  Her mother  of sepsis at age 80.  She had been diagnosed with breast cancer when Ms. Padilla was 8.  She describes the marriage between her parents as misael.  She is 5 years older than her closest-aged sister and they are all within 4 years of each  other.   Her daughter  12/3 and her mother   She tends to feel more depressed in winter.      Ms. Padilla attended [employer] for a year and later went to night school at the [employer].  She felt that she could not continue her education to the point of graduation because she was working full time and raising her daughter.  Her daughter  in  at age 31 of liver failure secondary to an accidental Tylenol overdose.  She had been recovering from a hysterectomy.      Ms. 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  She had to retire at the time secondary to her MS.  She has never .  She has not dated,and states that if she were she would date, she would be interested in a relationship with a woman.  There is no history of  service or legal problems.  She expresses concern about her increasing social isolation.  She has at least 1 friend, Jael, who she met at a therapy group in .  She is active in facilitating groups for people with MS both in [place] and [place].  She lives alone and currently gets help from a home health aide, as well as home health nurse.     She sees Dr. Ban Coleman, psychiatrist and is trying to figure out best antidepressant regimen.  .Dr. Coleman prescribes Cymbalta for it.  She feels she has been more depressed since the Fall, but doesn't think it is SAD.   She states that she has recommended case management.    Special dates that are triggers for sadness:  12/3  Anniversary of daughter's death ([place])    Anniversary of her mother's death     SESSION:  Mili participated in a telehealth session of psychotherapy.      The depression varies and she remains anxious about her best  friend's dx of stage 4 pancreatic cancer.  She feels she has been more depressed lately, frustrated with self for gaining  "weight      Got through early December, death anniversaries.    Her friend, Jael has decided to move to [place] to assisted living to be closer to some family members.  Sherealizes she cant' take care of her townhouse any more. We discussed staying connected.  Sheis also wanting to expand her \"relationship portfolio.\"    Weight Issues:   She states she lost  2 lbs   She is having difficulty  sticking to the 1750 calorie ceiling about half the time, otherwise, going up to>  2000  Some concrete steps include tracking calories as she eats through the day and cutting back on snacks.  She is willing.  We had lengthy discussion of the need to get the math right and her failin in the experiment that she doesn't have to be consistent or hold her self accountable.   She expresses motivation to change.    She was vaccinated x2 with Moderna while at [facility] in January, 2021. She was diagnosed with HTN when in hospital.  She got her booster during the interval.   Discussed masking more, including when PCA is with her.  Discussed mitigation for her holiday gathering (e.g., rapid tests).    She is getting zuly\in her temples when she sneezes or leans over.   She will address with her PCP,; wonders about TMD.    She participated fully and appeared to derive benefit. Affect is positive.  Rapport was excellent.   The treatment plan was reviewed with the patient at today s visit. The treatment plan remains current based on the patient s status and progress to date.    This telehealth (telephone) service is appropriate and effective for delivering services in light of the necessity for social distancing to mitigate the COVID-19 epidemic and for conservation of PPE.     Patient has agreed to receiving telehealth services after being informed about it: Yes    Patient prefers video invitation/information to be sent by:   email    Time service started: 12:02  Time service ended: 12:43    Mode of transmission:Virax    Location of originating:  " Home of the patient    Distance site:  Home office of provider for [facility]    The patient has been notified that:  Video visits will be conducted via a call with their psychologist to provide the care they need with a video conversation. Video visits may be billed at different rates depending on insurance coverage.  Patients are advised to please contact their insurance provider with any questions about their health insurance coverage. If during the course of a call the psychologist feels a video visit is not appropriate, patients will not be charged for this service  DIAGNOSES:   Major depression, recurrent, m oderate (F33.1).   Behavioral factors affecting morbid obesity (E66.01).     PLAN:  Ms. Padilla will return for in-person visit 1/26 @ 3  for problem-solving and supportive therapy consistent with treatment plan.  Goal per day now that she is tracking will change from 2000/day to 1750 consistently.   Preference for future meetings:             In-person prefers, even if masked              Remote              Either    Last treatment plan signed:3/22/2021  Last Treatment plan review: 7/21/2021  Treatment plan verbal Review due: 12/23/2021  Treatment Review due: 3/22/2022         Ramin Olivo, PhD, A.B.P.P., L.P.   Director, Health Psychology

## 2022-01-12 ENCOUNTER — HOSPITAL ENCOUNTER (OUTPATIENT)
Dept: MAMMOGRAPHY | Facility: CLINIC | Age: 67
Discharge: HOME | End: 2022-01-12
Attending: FAMILY MEDICINE | Admitting: FAMILY MEDICINE
Payer: COMMERCIAL

## 2022-01-12 DIAGNOSIS — Z12.31 VISIT FOR SCREENING MAMMOGRAM: ICD-10-CM

## 2022-01-12 PROCEDURE — 77067 SCR MAMMO BI INCL CAD: CPT

## 2022-01-27 ENCOUNTER — VIRTUAL VISIT (OUTPATIENT)
Dept: PSYCHOLOGY | Facility: CLINIC | Age: 67
End: 2022-01-27
Payer: COMMERCIAL

## 2022-01-27 DIAGNOSIS — F33.0 MAJOR DEPRESSIVE DISORDER, RECURRENT EPISODE, MILD (H): Primary | ICD-10-CM

## 2022-01-27 DIAGNOSIS — F54 PSYCHOLOGICAL FACTORS AFFECTING MORBID OBESITY (H): ICD-10-CM

## 2022-01-27 DIAGNOSIS — E66.01 PSYCHOLOGICAL FACTORS AFFECTING MORBID OBESITY (H): ICD-10-CM

## 2022-01-27 PROCEDURE — 90837 PSYTX W PT 60 MINUTES: CPT | Mod: 95 | Performed by: PSYCHOLOGIST

## 2022-01-27 NOTE — PROGRESS NOTES
Health Psychology                    Department of Medicine  Izzy Montaño, Ph.D., L.P. [phone]                          [facility] Sherrie Crowe, Ph.D., L.P. [phone]                   [place] Mail Code 782   Ranulfo Whipple, Ph.D. [phone]        [address] [place] [address] Enriqueta Sorto, Ph.D., L.P. [phone]    [place] [address]           Ramin Olivo, Ph.D., A.B.P.P., L.P. [phone]        Kathie Galvan, Ph.D., L.P. [phone]  [address] Psychology Telephone Health Progress Note    Ms. Padilla is a 66-year-old woman self-referred for psychological consultation because her therapist of the last 24 years retired.  She is seen for problem-solving and supportive therapy for depression and multiple health issues.    Intake:  7/28/16     HISTORY OF PRESENTING CONCERN:  Ms. Padilla reported at intake a  lengthy history of depression.  She sees a psychiatrist, Ban Coleman, at [facility], and is taking Abilify 7.5 mg, trazodone 500 mg, ripazepam 75 mg each day at bedtime, Tegretol 400 mg at bedtime and methylphenidate 10 mg b.i.d.  She discontineud ability and is now taking Rexulti 2 mg.  She has a history of hospitalizations including 3 at [facility] in the late 1990s, early 2000s when she had 2 courses of ECT lasting 7-10 sessions and approximately 3 other single session ECTs.  She stopped due to memory problems.  She kept with Dr. Coleman who she first met as an inpatient and has seen her for the past 18 years.  She had also been hospitalized psychiatrically at [facility] at age 23.  She previously worked with psychiatrist, Doug Sarabia for three years, stopping, in part, because she didn't feel he took her suicide attempt sufficiently seriously.  She reports her depression tends to vary.     MEDICAL HISTORY:  Ms. Padilla has a complex medical history.  She was  diagnosed with MS in 1985.  Her psychiatrist at [facility] in [place], Dr. 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  She has used a scooter since 1992, using it more  than she had previously.  She also can use a walker.  She was diagnosed with fibromyalgia in 1997.   She is seen at the [place] [facility] for her eye care. During 8th grade, she fell on a ski lift, injuring her larynx. She lot 65 lb. When she ws dx'ed with MS, kept it of for 3 years  It also felt good when she  lose weight due to her stomach surgery.        WEIGHT not taken but reported below.  Discussed goal of cutting back by 125 calories per day.   That would get her losing 2 lbs./month.  She has goal of 1750 claories/day. and states she met that goal aout half the days of the interview.  She had only 4 days down to goal in the last 18 days;  the rest were about 2100 janice.   She was at goal in the preceeding days for 11 of 17 days.    She is weighing herself weekly, using more vegetables for snack.  She plans to write down calories in the      Weight    12/23/21  =280     11/3/21  = 282.                    10/13/21=   275                     9/23/21=   273                     9/9/21 =   277                     8/9/21 =   265    She resumed attending OA, then stopped during the winter.     She started treatment for benign paroxysmal positional vertigo treatment in December, 2020, but did not complete it.     She is not calculating calories.  Her weight  Has not been changing lately.     Wt Readings from Last 4 Encounters:   07/13/21 120.2 kg (265 lb)   03/24/21 104 kg (229 lb 3.2 oz)   02/23/21 124.7 kg (275 lb)   02/16/21 125.6 kg (277 lb)     Past Medical History:   Diagnosis Date     Depression, major, in partial remission (H)      Fibromyalgia syndrome      Gastro-oesophageal reflux disease      Hyperlipemia      Lymphedema      Multiple sclerosis (H)     tremors with MS, all four limbs and head      Multiple sclerosis, secondary progressive (H)      Sleep apnea      Vocal cord paralysis, unilateral complete       Past Surgical History:   Procedure Laterality Date     COLONOSCOPY N/A 7/17/2017    Procedure: COMBINED COLONOSCOPY, SINGLE OR MULTIPLE BIOPSY/POLYPECTOMY BY BIOPSY;;  Surgeon: Wong Beaulieu MD;  Location:  GI     COLONOSCOPY N/A 2/23/2018    Procedure: COMBINED COLONOSCOPY, SINGLE OR MULTIPLE BIOPSY/POLYPECTOMY BY BIOPSY;  COLONOSCOPY ;  Surgeon: Yessica Santana MD;  Location:  GI     ENT SURGERY      throat 1969, vocal cord surgery with skin grafting     ESOPHAGOSCOPY, GASTROSCOPY, DUODENOSCOPY (EGD), COMBINED N/A 12/16/2014    Procedure: COMBINED ENDOSCOPIC ULTRASOUND, ESOPHAGOSCOPY, GASTROSCOPY, DUODENOSCOPY (EGD), FINE NEEDLE ASPIRATE/BIOPSY;  Surgeon: Yessica Santana MD;  Location: [facility]     ESOPHAGOSCOPY, GASTROSCOPY, DUODENOSCOPY (EGD), COMBINED N/A 12/16/2014    Procedure: COMBINED ESOPHAGOSCOPY, GASTROSCOPY, DUODENOSCOPY (EGD), BIOPSY SINGLE OR MULTIPLE;  Surgeon: Yessica Santana MD;  Location: [facility]     LAPAROSCOPIC APPENDECTOMY  5/30/2013    Procedure: LAPAROSCOPIC APPENDECTOMY;;  Surgeon: Salvador Morris MD;  Location:  OR     LAPAROSCOPIC BIOPSY LIVER  5/30/2013    Procedure: LAPAROSCOPIC BIOPSY LIVER;;  Surgeon: Salvador Morris MD;  Location:  OR     LAPAROSCOPIC GASTRIC SLEEVE  5/30/2013    Procedure: LAPAROSCOPIC GASTRIC SLEEVE;  LAPAROSCOPIC SLEEVE GASTRECTOMY/ LAPARSCOPIC  APPENDECTOMY /LIVER BIOPSIES/LIVER CYST DRAINAGE;  Surgeon: Salvador Morris MD;  Location:  OR     ORTHOPEDIC SURGERY      R wrist 2010     Current Outpatient Medications   Medication     ARIPiprazole (ABILIFY) 5 MG tablet     atorvastatin (LIPITOR) 20 MG tablet     bismuth subsalicylate (PEPTO BISMOL) 262 MG chewable tablet     calcium polycarbophil (FIBERCON) 625 MG tablet     carBAMazepine (TEGRETOL) 200 MG tablet     Cranberry 1000 MG CAPS     cyanocobalamin (VITAMIN  B-12) 100 MCG tablet     denosumab (PROLIA) 60 MG/ML SOLN injection     docusate sodium (COLACE) 100 MG tablet     DULoxetine (CYMBALTA) 60 MG capsule     famotidine (PEPCID) 20 MG tablet     famotidine (PEPCID) 40 MG tablet     folic acid (FOLVITE) 400 MCG tablet     guaiFENesin (MUCINEX) 600 MG 12 hr tablet     ketoconazole (NIZORAL) 2 % cream     LACTOBACILLUS RHAMNOSUS, GG, PO     loperamide (IMODIUM A-D) 2 MG tablet     losartan (COZAAR) 50 MG tablet     Melatonin 10 MG TABS     methylphenidate (RITALIN) 20 MG tablet     mirtazapine (REMERON) 30 MG tablet     Multiple Vitamin (MULTIVITAMINS PO)     polyethylene glycol (MIRALAX) 17 GM/Dose powder     traZODone (DESYREL) 100 MG tablet     triamcinolone (ARISTOCORT HP) 0.5 % external cream     triamcinolone (KENALOG) 0.5 % OINT ointment     trimethoprim (TRIMPEX) 100 MG tablet     trospium (SANCTURA) 20 MG tablet     vitamin B-Complex     Vitamin D, Cholecalciferol, 25 MCG (1000 UT) CAPS     No current facility-administered medications for this visit.     Medication: On Duloxetine and Mirtazpine and Trazodone and Ritalin. She discontinued Effexor and Abilify previously per Dr. Marquise Coleman, her psychiatrist.  On 4/10/19 she informed me that she started Abilify    PHQ-9 SCORE 2018 2019 9/15/2019   PHQ-9 Total Score MyChart 10 (Moderate depression) 7 (Mild depression) 5 (Mild depression)   PHQ-9 Total Score 10 7 5     NAS-7 SCORE 2016   Total Score - 6 (mild anxiety)   Total Score 3 -      SOCIAL HISTORY:  Ms. Padilla grew up in the [place] area and is the oldest of 5 children in her family of origin.  Her father was a dentist who she believes was bipolar.  He  of lung cancer at age 72.  Her mother  of sepsis at age 80.  She had been diagnosed with breast cancer when Ms. Padilla was 8.  She describes the marriage between her parents as misael.  She is 5 years older than her closest-aged sister and they are all within 4 years of each  other.   Her daughter  12/3 and her mother   She tends to feel more depressed in winter.      Ms. Padilla attended [employer] for a year and later went to night school at the [employer].  She felt that she could not continue her education to the point of graduation because she was working full time and raising her daughter.  Her daughter  in  at age 31 of liver failure secondary to an accidental Tylenol overdose.  She had been recovering from a hysterectomy.      Ms. 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  She had to retire at the time secondary to her MS.  She has never .  She has not dated,and states that if she were she would date, she would be interested in a relationship with a woman.  There is no history of  service or legal problems.  She expresses concern about her increasing social isolation.  She has at least 1 friend, Jael, who she met at a therapy group in .  She is active in facilitating groups for people with MS both in [place] and [place].  She lives alone and currently gets help from a home health aide, as well as home health nurse.     She sees Dr. Ban Coleman, psychiatrist and is trying to figure out best antidepressant regimen.  .Dr. Coleman prescribes Cymbalta for it.  She feels she has been more depressed since the Fall, but doesn't think it is SAD.   She states that she has recommended case management.    Special dates that are triggers for sadness:  12/3  Anniversary of daughter's death ([place])    Anniversary of her mother's death     SESSION:  Mili participated in a telehealth session of psychotherapy.      The depression varies and she remains anxious about her best  friend's dx of stage 4 pancreatic cancer.  She feels she has been more depressed lately, frustrated with self for gaining  weight.  She feels fairly ood today, pleased she she took a shower.  A few days this week she didn't get dressed, do dental care, take a shower.  Some days she doesn't get out of the building.  We discussed the importance of completing ADLS, following a schedule, and intercating with others.     She is speaking with Jael, her ill friend, every night.   Jael has decided to move to [place] to assisted living to be closer to some family members.  She will probably move in the Spring of 2022b    Weight Issues:  Not discussed todAy.   stated last session she lost  2 lbs   She is having difficulty  sticking to the 1750 calorie ceiling about half the time, otherwise, going up to>  2000  Some concrete steps include tracking calories as she eats through the day and cutting back on snacks.  She is willing.  We had lengthy discussion of the need to get the math right and her failin in the experiment that she doesn't have to be consistent or hold her self accountable.   She expresses motivation to change.    She was vaccinated and boosted.  Discussed mitigation again and stayng warm n [place].     Discussed her fniancial concerns with Section 8 housing going up. She does not think that she can afford another increase.  She has to ask her sesters for help with the art classes she is going to take.  Discussed the benefits of such activities, including socialization      She participated fully and appeared to derive benefit. Affect is positive.  Rapport was excellent.   This telehealth (telephone) service is appropriate and effective for delivering services in light of the necessity for social distancing to mitigate the COVID-19 epidemic and for conservation of PPE.     Patient has agreed to receiving telehealth services after being informed about it: Yes    Patient prefers video invitation/information to be sent by:   email    Time service started: 11:57  Time service ended: 12:56    Mode of transmission: [employer]    Location of  originating:  Home of the patient    Distance site:  Home office of provider for [facility]    The patient has been notified that:  Video visits will be conducted via a call with their psychologist to provide the care they need with a video conversation. Video visits may be billed at different rates depending on insurance coverage.  Patients are advised to please contact their insurance provider with any questions about their health insurance coverage. If during the course of a call the psychologist feels a video visit is not appropriate, patients will not be charged for this service  DIAGNOSES:   Major depression, recurrent, m oderate (F33.1).   Behavioral factors affecting morbid obesity (E66.01).     PLAN:  Ms. Padilla will return for in-person visit 2/16 @ 11 for problem-solving and supportive therapy consistent with treatment plan.  Goal per day now that she is tracking will change from 2000/day to 1750 consistently.   Preference for future meetings:             In-person prefers, even if masked              Remote              Either    Last treatment plan signed:3/22/2021  Last Treatment plan review: 7/21/2021  Treatment plan verbal Review due: 12/23/2021  Treatment Review due: 3/22/2022         Ramin Olivo, PhD, A.B.P.P., L.P.   Director, Health Psychology

## 2022-02-07 NOTE — PROGRESS NOTES
HISTORY OF PRESENT ILLNESS: Mili Padilla is a 66 year old female with a history of trauma to the larynx at age 13 from a skiing accident.  She was reconstructed by Dr. Hammer in the past and subsequently followed by Dr. Day.  I had seen her initially in 2008 and I have been following her since then.  Her airway has been stable since I last seen her on 2/4/2020 in clinic.  We did have a video visit on February 23, 2021.  She has had no new difficulties since her last visit.  She notes that she swallows liquids without any difficulties.  Solid and dry foods sometimes will get slightly increased difficulty but all her swallowing well.  She has no changes in her voice no new breathing issues.  She has a long-standing left vocal fold immobility. She has had some increase in dysphagia over the last 5-6 years. 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  On her last exam on February 23, 2021 we discussed alternating between video exams and laryngeal exams.  She is seen however today on a video examination.    Today she notes that her airway is stable.  She notes she will get short of breath with some activity but she has no stridor and no upper airway turbulence with deep breathing.  She continues to have some difficulties with swallowing.  Liquids are no problem.  Is primarily dry foods that will get stuck and addition of liquids will help her swallowing.  She does not have excessive reflux symptoms.  Her voice is stable and rough.    Last 2 Scores for Patient-Answered VHI Questionnaire  VHI Total Score 12/8/2016   VHI Total Score 21       Last 2 Scores for Patient-Answered CSI Questionnaire  CSI Total Score 1/28/2019 2/4/2020   CSI Total Score 4 8         Last 2 Scores for Patient-Answered EAT Questionnaire  EAT Total Score 1/28/2019 2/4/2020   EAT Total Score 5 Incomplete           PAST MEDICAL HISTORY:   Past Medical History:   Diagnosis  Date     Depression, major, in partial remission (H)      Fibromyalgia syndrome      Gastro-oesophageal reflux disease      Hyperlipemia      Lymphedema      Multiple sclerosis (H)     tremors with MS, all four limbs and head     Multiple sclerosis, secondary progressive (H)      Sleep apnea      Vocal cord paralysis, unilateral complete        PAST SURGICAL HISTORY:   Past Surgical History:   Procedure Laterality Date     COLONOSCOPY N/A 7/17/2017    Procedure: COMBINED COLONOSCOPY, SINGLE OR MULTIPLE BIOPSY/POLYPECTOMY BY BIOPSY;;  Surgeon: Wong Beaulieu MD;  Location:  GI     COLONOSCOPY N/A 2/23/2018    Procedure: COMBINED COLONOSCOPY, SINGLE OR MULTIPLE BIOPSY/POLYPECTOMY BY BIOPSY;  COLONOSCOPY ;  Surgeon: Yessica Santana MD;  Location:  GI     ENT SURGERY      throat 1969, vocal cord surgery with skin grafting     ESOPHAGOSCOPY, GASTROSCOPY, DUODENOSCOPY (EGD), COMBINED N/A 12/16/2014    Procedure: COMBINED ENDOSCOPIC ULTRASOUND, ESOPHAGOSCOPY, GASTROSCOPY, DUODENOSCOPY (EGD), FINE NEEDLE ASPIRATE/BIOPSY;  Surgeon: Yessica Santana MD;  Location:  GI     ESOPHAGOSCOPY, GASTROSCOPY, DUODENOSCOPY (EGD), COMBINED N/A 12/16/2014    Procedure: COMBINED ESOPHAGOSCOPY, GASTROSCOPY, DUODENOSCOPY (EGD), BIOPSY SINGLE OR MULTIPLE;  Surgeon: Yessica Santana MD;  Location: [facility]     LAPAROSCOPIC APPENDECTOMY  5/30/2013    Procedure: LAPAROSCOPIC APPENDECTOMY;;  Surgeon: Salvador Morris MD;  Location:  OR     LAPAROSCOPIC BIOPSY LIVER  5/30/2013    Procedure: LAPAROSCOPIC BIOPSY LIVER;;  Surgeon: Salvador Morris MD;  Location:  OR     LAPAROSCOPIC GASTRIC SLEEVE  5/30/2013    Procedure: LAPAROSCOPIC GASTRIC SLEEVE;  LAPAROSCOPIC SLEEVE GASTRECTOMY/ LAPARSCOPIC  APPENDECTOMY /LIVER BIOPSIES/LIVER CYST DRAINAGE;  Surgeon: Salvador Morris MD;  Location:  OR     ORTHOPEDIC SURGERY      R wrist 2010       FAMILY HISTORY:   Family History   Problem Relation Age of Onset     Breast  Cancer Mother      Other Cancer Father      Breast Cancer Maternal Aunt      Breast Cancer Maternal Aunt      Breast Cancer Maternal Aunt      Breast Cancer Maternal Aunt      Glaucoma No family hx of      Macular Degeneration No family hx of      Amblyopia No family hx of        SOCIAL HISTORY:   Social History     Tobacco Use     Smoking status: Former Smoker     Types: Cigarettes     Quit date: 1974     Years since quittin.3     Smokeless tobacco: Never Used   Substance Use Topics     Alcohol use: Yes     Alcohol/week: 0.0 standard drinks     Comment: Once a month.       REVIEW OF SYSTEMS: Ten point review of systems was performed and is negative except for:   [facility] ROS 2020   Constitutional -   Neurology Dizzy spells, Numbness   Psychology Frequently feeling depressed or sad   Eyes -   Ears, Nose, Throat Hoarseness   Cardiopulmonary Cough   Gastrointestinal/Genitourinary Heartburn/indigestion, Constipation   Musculoskeletal Swollen legs/feet   Endocrine Heat or cold intolerance        ALLERGIES: Augmentin, Betaseron [interferon beta-1b], Dust mite extract, and Mold    MEDICATIONS:   Current Outpatient Medications   Medication Sig Dispense Refill     ARIPiprazole (ABILIFY) 5 MG tablet Take 5 mg by mouth daily   2     atorvastatin (LIPITOR) 20 MG tablet Take 20 mg by mouth daily.       bismuth subsalicylate (PEPTO BISMOL) 262 MG chewable tablet Take 1 tablet (262 mg) by mouth 3 times daily       calcium polycarbophil (FIBERCON) 625 MG tablet Take 4 tablets by mouth 2 times daily       carBAMazepine (TEGRETOL) 200 MG tablet Take 200 mg by mouth 2 times daily       Cranberry 1000 MG CAPS Take by mouth daily       cyanocobalamin (VITAMIN B-12) 100 MCG tablet Take 1,000 mcg by mouth once a week       denosumab (PROLIA) 60 MG/ML SOLN injection Inject 60 mg Subcutaneous every 6 months On hold in U       docusate sodium (COLACE) 100 MG tablet Take 100 mg by mouth 2 times daily       DULoxetine (CYMBALTA)  60 MG capsule Take 120 mg by mouth daily        famotidine (PEPCID) 20 MG tablet Take 20 mg by mouth 2 times daily       famotidine (PEPCID) 40 MG tablet TAKE 1/2 TABLET BY MOUTH TWICE DAILY. 90 tablet 0     folic acid (FOLVITE) 400 MCG tablet Take 400 mcg by mouth 2 times daily       guaiFENesin (MUCINEX) 600 MG 12 hr tablet Take 600 mg by mouth 2 times daily        ketoconazole (NIZORAL) 2 % cream Apply topically 2 times daily as needed        LACTOBACILLUS RHAMNOSUS, GG, PO Give 1 tablet by mouth two times a day for loose stool 30 billion units       loperamide (IMODIUM A-D) 2 MG tablet Take 1 tablet (2 mg) by mouth 2 times daily       losartan (COZAAR) 50 MG tablet Take 1 tablet (50 mg) by mouth daily       Melatonin 10 MG TABS Take 10 mg by mouth At Bedtime Changed to extended release       methylphenidate (RITALIN) 20 MG tablet Take 1 tablet (20 mg) by mouth daily 14 tablet 0     mirtazapine (REMERON) 30 MG tablet Take 75 mg by mouth At Bedtime        Multiple Vitamin (MULTIVITAMINS PO) Take 2 tablets by mouth every evening. WITH IRON       polyethylene glycol (MIRALAX) 17 GM/Dose powder Take 17 g (1 capful) by mouth daily 578 g 11     traZODone (DESYREL) 100 MG tablet Take 600 mg by mouth At Bedtime        triamcinolone (ARISTOCORT HP) 0.5 % external cream Apply topically At Bedtime To hands       triamcinolone (KENALOG) 0.5 % OINT ointment Apply topically 2 times daily as needed for irritation To legs       trimethoprim (TRIMPEX) 100 MG tablet Take 100 mg by mouth daily       trospium (SANCTURA) 20 MG tablet Take 1 tablet (20 mg) by mouth 2 times daily (before meals)       vitamin B-Complex Take 1 tablet by mouth daily        Vitamin D, Cholecalciferol, 25 MCG (1000 UT) CAPS Take 1,000 mg by mouth 5 times daily           PHYSICAL EXAMINATION:  She  is awake, alert and in no apparent distress.  Her voice is mildly rough but she has good articulation.  Upper airway is clear with deep inspiration.  Cranial  nerves II through XII are grossly intact by visual inspection.        IMPRESSION/PLAN: Her airway and voice have been stable for greater than a decade.  She is having some mild difficulties with swallowing dry foods.  I am recommending her this is likely due to her laryngeal surgery as well as being on a couple medications that cause xerostomia which she is symptomatic for.  She is going to try turning her head to the left to see if she can decrease the exposure of the damaged half of her larynx to swallowing as well as being careful to have adequate liquids and lubrication with her diet.  Should she have any worsening symptoms we certainly could consider a swallow study but she feels that she has been largely stable over the last year.  Our plan will see her back in clinic for a flexible laryngoscopy examination.  She will call us if her swallowing should worsen

## 2022-02-08 ENCOUNTER — VIRTUAL VISIT (OUTPATIENT)
Dept: OTOLARYNGOLOGY | Facility: CLINIC | Age: 67
End: 2022-02-08
Payer: COMMERCIAL

## 2022-02-08 VITALS — HEIGHT: 67 IN | BODY MASS INDEX: 43.95 KG/M2 | WEIGHT: 280 LBS

## 2022-02-08 DIAGNOSIS — J38.01 VOCAL CORD PARALYSIS, UNILATERAL COMPLETE: Primary | ICD-10-CM

## 2022-02-08 DIAGNOSIS — R13.19 OTHER DYSPHAGIA: ICD-10-CM

## 2022-02-08 PROCEDURE — 99213 OFFICE O/P EST LOW 20 MIN: CPT | Mod: 95 | Performed by: OTOLARYNGOLOGY

## 2022-02-08 RX ORDER — PSYLLIUM HUSK 0.4 G
CAPSULE ORAL
COMMUNITY
End: 2023-02-08

## 2022-02-08 RX ORDER — CIPROFLOXACIN 250 MG/1
TABLET, FILM COATED ORAL
COMMUNITY
Start: 2021-04-28 | End: 2022-10-03

## 2022-02-08 ASSESSMENT — MIFFLIN-ST. JEOR: SCORE: 1837.7

## 2022-02-08 ASSESSMENT — PAIN SCALES - GENERAL: PAINLEVEL: NO PAIN (0)

## 2022-02-08 NOTE — NURSING NOTE
"Chief Complaint   Patient presents with     RECHECK     Return video visit       Height 1.702 m (5' 7\"), weight 127 kg (280 lb), last menstrual period 12/10/2010, not currently breastfeeding.    Yariel Motley, EMT  "

## 2022-02-08 NOTE — PROGRESS NOTES
Mili is a 66 year old who is being evaluated via a billable video visit.      How would you like to obtain your AVS? MyChart  If the video visit is dropped, the invitation should be resent by: Text to cell phone: [phone]  Will anyone else be joining your video visit? No      Video Start Time: 12:30 pm  Video-Visit Details    Type of service:  Video Visit    Video End Time:12:59 pm    Originating Location (pt. Location): Home    Distant Location (provider location):  [facility] [place]     Platform used for Video Visit: [employer]

## 2022-02-08 NOTE — LETTER
2/8/2022       RE: Mili Padilla  [address]  [facility] [address]     Dear Colleague,    Thank you for referring your patient, Mili Padilla, to the [facility] EAR NOSE AND THROAT CLINIC [place] [facility]. Please see a copy of my visit note below.    HISTORY OF PRESENT ILLNESS: Mili Padilla is a 66 year old female with a history of trauma to the larynx at age 13 from a skiing accident.  She was reconstructed by Dr. Hammer in the past and subsequently followed by Dr. Day.  I had seen her initially in 2008 and I have been following her since then.  Her airway has been stable since I last seen her on 2/4/2020 in clinic.  We did have a video visit on February 23, 2021.  She has had no new difficulties since her last visit.  She notes that she swallows liquids without any difficulties.  Solid and dry foods sometimes will get slightly increased difficulty but all her swallowing well.  She has no changes in her voice no new breathing issues.  She has a long-standing left vocal fold immobility. She has had some increase in dysphagia over the last 5-6 years. 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  On her last exam on February 23, 2021 we discussed alternating between video exams and laryngeal exams.  She is seen however today on a video examination.    Today she notes that her airway is stable.  She notes she will get short of breath with some activity but she has no stridor and no upper airway turbulence with deep breathing.  She continues to have some difficulties with swallowing.  Liquids are no problem.  Is primarily dry foods that will get stuck and addition of liquids will help her swallowing.  She does not have excessive reflux symptoms.  Her voice is stable and rough.    Last 2 Scores for Patient-Answered VHI Questionnaire  VHI Total Score 12/8/2016   VHI  Total Score 21       Last 2 Scores for Patient-Answered CSI Questionnaire  CSI Total Score 1/28/2019 2/4/2020   CSI Total Score 4 8         Last 2 Scores for Patient-Answered EAT Questionnaire  EAT Total Score 1/28/2019 2/4/2020   EAT Total Score 5 Incomplete           PAST MEDICAL HISTORY:   Past Medical History:   Diagnosis Date     Depression, major, in partial remission (H)      Fibromyalgia syndrome      Gastro-oesophageal reflux disease      Hyperlipemia      Lymphedema      Multiple sclerosis (H)     tremors with MS, all four limbs and head     Multiple sclerosis, secondary progressive (H)      Sleep apnea      Vocal cord paralysis, unilateral complete        PAST SURGICAL HISTORY:   Past Surgical History:   Procedure Laterality Date     COLONOSCOPY N/A 7/17/2017    Procedure: COMBINED COLONOSCOPY, SINGLE OR MULTIPLE BIOPSY/POLYPECTOMY BY BIOPSY;;  Surgeon: Wong Beaulieu MD;  Location:  GI     COLONOSCOPY N/A 2/23/2018    Procedure: COMBINED COLONOSCOPY, SINGLE OR MULTIPLE BIOPSY/POLYPECTOMY BY BIOPSY;  COLONOSCOPY ;  Surgeon: Yessica Santana MD;  Location:  GI     ENT SURGERY      throat 1969, vocal cord surgery with skin grafting     ESOPHAGOSCOPY, GASTROSCOPY, DUODENOSCOPY (EGD), COMBINED N/A 12/16/2014    Procedure: COMBINED ENDOSCOPIC ULTRASOUND, ESOPHAGOSCOPY, GASTROSCOPY, DUODENOSCOPY (EGD), FINE NEEDLE ASPIRATE/BIOPSY;  Surgeon: Yessica Santana MD;  Location: [facility]     ESOPHAGOSCOPY, GASTROSCOPY, DUODENOSCOPY (EGD), COMBINED N/A 12/16/2014    Procedure: COMBINED ESOPHAGOSCOPY, GASTROSCOPY, DUODENOSCOPY (EGD), BIOPSY SINGLE OR MULTIPLE;  Surgeon: Yessica Santana MD;  Location:  GI     LAPAROSCOPIC APPENDECTOMY  5/30/2013    Procedure: LAPAROSCOPIC APPENDECTOMY;;  Surgeon: Salvador Morris MD;  Location:  OR     LAPAROSCOPIC BIOPSY LIVER  5/30/2013    Procedure: LAPAROSCOPIC BIOPSY LIVER;;  Surgeon: Salvador Morris MD;  Location:  OR     LAPAROSCOPIC GASTRIC  SLEEVE  2013    Procedure: LAPAROSCOPIC GASTRIC SLEEVE;  LAPAROSCOPIC SLEEVE GASTRECTOMY/ LAPARSCOPIC  APPENDECTOMY /LIVER BIOPSIES/LIVER CYST DRAINAGE;  Surgeon: Salvador Morris MD;  Location:  OR     ORTHOPEDIC SURGERY      R wrist        FAMILY HISTORY:   Family History   Problem Relation Age of Onset     Breast Cancer Mother      Other Cancer Father      Breast Cancer Maternal Aunt      Breast Cancer Maternal Aunt      Breast Cancer Maternal Aunt      Breast Cancer Maternal Aunt      Glaucoma No family hx of      Macular Degeneration No family hx of      Amblyopia No family hx of        SOCIAL HISTORY:   Social History     Tobacco Use     Smoking status: Former Smoker     Types: Cigarettes     Quit date: 1974     Years since quittin.3     Smokeless tobacco: Never Used   Substance Use Topics     Alcohol use: Yes     Alcohol/week: 0.0 standard drinks     Comment: Once a month.       REVIEW OF SYSTEMS: Ten point review of systems was performed and is negative except for:   [facility] ROS 2020   Constitutional -   Neurology Dizzy spells, Numbness   Psychology Frequently feeling depressed or sad   Eyes -   Ears, Nose, Throat Hoarseness   Cardiopulmonary Cough   Gastrointestinal/Genitourinary Heartburn/indigestion, Constipation   Musculoskeletal Swollen legs/feet   Endocrine Heat or cold intolerance        ALLERGIES: Augmentin, Betaseron [interferon beta-1b], Dust mite extract, and Mold    MEDICATIONS:   Current Outpatient Medications   Medication Sig Dispense Refill     ARIPiprazole (ABILIFY) 5 MG tablet Take 5 mg by mouth daily   2     atorvastatin (LIPITOR) 20 MG tablet Take 20 mg by mouth daily.       bismuth subsalicylate (PEPTO BISMOL) 262 MG chewable tablet Take 1 tablet (262 mg) by mouth 3 times daily       calcium polycarbophil (FIBERCON) 625 MG tablet Take 4 tablets by mouth 2 times daily       carBAMazepine (TEGRETOL) 200 MG tablet Take 200 mg by mouth 2 times daily       Cranberry 1000  MG CAPS Take by mouth daily       cyanocobalamin (VITAMIN B-12) 100 MCG tablet Take 1,000 mcg by mouth once a week       denosumab (PROLIA) 60 MG/ML SOLN injection Inject 60 mg Subcutaneous every 6 months On hold in [facility]       docusate sodium (COLACE) 100 MG tablet Take 100 mg by mouth 2 times daily       DULoxetine (CYMBALTA) 60 MG capsule Take 120 mg by mouth daily        famotidine (PEPCID) 20 MG tablet Take 20 mg by mouth 2 times daily       famotidine (PEPCID) 40 MG tablet TAKE 1/2 TABLET BY MOUTH TWICE DAILY. 90 tablet 0     folic acid (FOLVITE) 400 MCG tablet Take 400 mcg by mouth 2 times daily       guaiFENesin (MUCINEX) 600 MG 12 hr tablet Take 600 mg by mouth 2 times daily        ketoconazole (NIZORAL) 2 % cream Apply topically 2 times daily as needed        LACTOBACILLUS RHAMNOSUS, GG, PO Give 1 tablet by mouth two times a day for loose stool 30 billion units       loperamide (IMODIUM A-D) 2 MG tablet Take 1 tablet (2 mg) by mouth 2 times daily       losartan (COZAAR) 50 MG tablet Take 1 tablet (50 mg) by mouth daily       Melatonin 10 MG TABS Take 10 mg by mouth At Bedtime Changed to extended release       methylphenidate (RITALIN) 20 MG tablet Take 1 tablet (20 mg) by mouth daily 14 tablet 0     mirtazapine (REMERON) 30 MG tablet Take 75 mg by mouth At Bedtime        Multiple Vitamin (MULTIVITAMINS PO) Take 2 tablets by mouth every evening. WITH IRON       polyethylene glycol (MIRALAX) 17 GM/Dose powder Take 17 g (1 capful) by mouth daily 578 g 11     traZODone (DESYREL) 100 MG tablet Take 600 mg by mouth At Bedtime        triamcinolone (ARISTOCORT HP) 0.5 % external cream Apply topically At Bedtime To hands       triamcinolone (KENALOG) 0.5 % OINT ointment Apply topically 2 times daily as needed for irritation To legs       trimethoprim (TRIMPEX) 100 MG tablet Take 100 mg by mouth daily       trospium (SANCTURA) 20 MG tablet Take 1 tablet (20 mg) by mouth 2 times daily (before meals)       vitamin  B-Complex Take 1 tablet by mouth daily        Vitamin D, Cholecalciferol, 25 MCG (1000 UT) CAPS Take 1,000 mg by mouth 5 times daily           PHYSICAL EXAMINATION:  She  is awake, alert and in no apparent distress.  Her voice is mildly rough but she has good articulation.  Upper airway is clear with deep inspiration.  Cranial nerves II through XII are grossly intact by visual inspection.      IMPRESSION/PLAN: Her airway and voice have been stable for greater than a decade.  She is having some mild difficulties with swallowing dry foods.  I am recommending her this is likely due to her laryngeal surgery as well as being on a couple medications that cause xerostomia which she is symptomatic for.  She is going to try turning her head to the left to see if she can decrease the exposure of the damaged half of her larynx to swallowing as well as being careful to have adequate liquids and lubrication with her diet.  Should she have any worsening symptoms we certainly could consider a swallow study but she feels that she has been largely stable over the last year.  Our plan will see her back in clinic for a flexible laryngoscopy examination.  She will call us if her swallowing should worsen    Again, thank you for allowing me to participate in the care of your patient.      Sincerely,    Clint Still MD

## 2022-02-08 NOTE — PATIENT INSTRUCTIONS
1.  You were seen in the ENT Clinic today by . If you have any questions or concerns after your appointment, please call [phone]. Press option #1 for scheduling related needs. Press option #3 for Nurse advice.    2. Plan is to return to clinic in one year for in person appointment      Doris Pulido LPN  [phone]  [facility] - Otolaryngology

## 2022-02-16 ENCOUNTER — VIRTUAL VISIT (OUTPATIENT)
Dept: PSYCHOLOGY | Facility: CLINIC | Age: 67
End: 2022-02-16
Payer: COMMERCIAL

## 2022-02-16 DIAGNOSIS — E66.01 PSYCHOLOGICAL FACTORS AFFECTING MORBID OBESITY (H): ICD-10-CM

## 2022-02-16 DIAGNOSIS — F54 PSYCHOLOGICAL FACTORS AFFECTING MORBID OBESITY (H): ICD-10-CM

## 2022-02-16 DIAGNOSIS — F33.0 MAJOR DEPRESSIVE DISORDER, RECURRENT EPISODE, MILD (H): Primary | ICD-10-CM

## 2022-02-16 PROCEDURE — 90832 PSYTX W PT 30 MINUTES: CPT | Mod: 95 | Performed by: PSYCHOLOGIST

## 2022-02-16 NOTE — PROGRESS NOTES
Health Psychology                    Department of Medicine  Izzy Montaño, Ph.D., L.P. [phone]                          [facility] Sherrie Crowe, Ph.D., L.P. [phone]                   [place] Mail Code [address]   Ranulfo Whipple, Ph.D. [phone]        [address] [place] [address] Enriqueta Sorto, Ph.D., L.P. [phone]    [place] [address]           Ramin Olivo, Ph.D., A.B.P.P., L.P. [phone]        Kathie Galvan, Ph.D., L.P. [phone]  [address] Psychology Telephone Health Progress Note    Ms. Padilla is a 66-year-old woman self-referred for psychological consultation because her therapist of the last 24 years retired.  She is seen for problem-solving and supportive therapy for depression and multiple health issues.    Intake:  7/28/16     HISTORY OF PRESENTING CONCERN:  Ms. Padilla reported at intake a  lengthy history of depression.  She sees a psychiatrist, Ban Coleman, at [facility], and is taking Abilify 7.5 mg, trazodone 500 mg, ripazepam 75 mg each day at bedtime, Tegretol 400 mg at bedtime and methylphenidate 10 mg b.i.d.  She discontineud ability and is now taking Rexulti 2 mg.  She has a history of hospitalizations including 3 at [facility] in the late 1990s, early 2000s when she had 2 courses of ECT lasting 7-10 sessions and approximately 3 other single session ECTs.  She stopped due to memory problems.  She kept with Dr. Coleman who she first met as an inpatient and has seen her for the past 18 years.  She had also been hospitalized psychiatrically at [facility] at age 23.  She previously worked with psychiatrist, Doug Sarabia for three years, stopping, in part, because she didn't feel he took her suicide attempt sufficiently seriously.  She reports her depression tends to vary.     MEDICAL HISTORY:  Ms. Padilla has a complex medical history.  She was  diagnosed with MS in 1985.  Her psychiatrist at [facility] in [place], Dr. 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  She has used a scooter since 1992, using it more  than she had previously.  She also can use a walker.  She was diagnosed with fibromyalgia in 1997.   She is seen at the [facility] for her eye care. During 8th grade, she fell on a ski lift, injuring her larynx. She lot 65 lb. When she ws dx'ed with MS, kept it of for 3 years  It also felt good when she  lose weight due to her stomach surgery.        WEIGHT not taken but reported below.  Discussed goal of cutting back by 125 calories per day.   That would get her losing 2 lbs./month.  She has goal of 1750 claories/day. and states she met that goal aout half the days of the interview.  She had only 4 days down to goal in the last 18 days;  the rest were about 2100 janice.   She was at goal in the preceeding days for 11 of 17 days.    She is weighing herself weekly, using more vegetables for snack.  She plans to write down calories in the      Weight    12/23/21  =280     11/3/21  = 282.                    10/13/21=   275                     9/23/21=   273                     9/9/21 =   277                     8/9/21 =   265    She resumed attending OA, then stopped during the winter.     She started treatment for benign paroxysmal positional vertigo treatment in December, 2020, but did not complete it.     She is not calculating calories.  Her weight  Has not been changing lately.     Wt Readings from Last 4 Encounters:   02/08/22 127 kg (280 lb)   07/13/21 120.2 kg (265 lb)   03/24/21 104 kg (229 lb 3.2 oz)   02/23/21 124.7 kg (275 lb)     Past Medical History:   Diagnosis Date     Depression, major, in partial remission (H)      Fibromyalgia syndrome      Gastro-oesophageal reflux disease      Hyperlipemia      Lymphedema      Multiple sclerosis (H)     tremors with MS, all four limbs and head      Multiple sclerosis, secondary progressive (H)      Sleep apnea      Vocal cord paralysis, unilateral complete       Past Surgical History:   Procedure Laterality Date     COLONOSCOPY N/A 7/17/2017    Procedure: COMBINED COLONOSCOPY, SINGLE OR MULTIPLE BIOPSY/POLYPECTOMY BY BIOPSY;;  Surgeon: Wong Beaulieu MD;  Location:  GI     COLONOSCOPY N/A 2/23/2018    Procedure: COMBINED COLONOSCOPY, SINGLE OR MULTIPLE BIOPSY/POLYPECTOMY BY BIOPSY;  COLONOSCOPY ;  Surgeon: Yessica Santana MD;  Location:  GI     ENT SURGERY      throat 1969, vocal cord surgery with skin grafting     ESOPHAGOSCOPY, GASTROSCOPY, DUODENOSCOPY (EGD), COMBINED N/A 12/16/2014    Procedure: COMBINED ENDOSCOPIC ULTRASOUND, ESOPHAGOSCOPY, GASTROSCOPY, DUODENOSCOPY (EGD), FINE NEEDLE ASPIRATE/BIOPSY;  Surgeon: Yessica Santana MD;  Location: [facility]     ESOPHAGOSCOPY, GASTROSCOPY, DUODENOSCOPY (EGD), COMBINED N/A 12/16/2014    Procedure: COMBINED ESOPHAGOSCOPY, GASTROSCOPY, DUODENOSCOPY (EGD), BIOPSY SINGLE OR MULTIPLE;  Surgeon: Yessica Santana MD;  Location: [facility]     LAPAROSCOPIC APPENDECTOMY  5/30/2013    Procedure: LAPAROSCOPIC APPENDECTOMY;;  Surgeon: Salvador Morris MD;  Location:  OR     LAPAROSCOPIC BIOPSY LIVER  5/30/2013    Procedure: LAPAROSCOPIC BIOPSY LIVER;;  Surgeon: Salvador Morris MD;  Location:  OR     LAPAROSCOPIC GASTRIC SLEEVE  5/30/2013    Procedure: LAPAROSCOPIC GASTRIC SLEEVE;  LAPAROSCOPIC SLEEVE GASTRECTOMY/ LAPARSCOPIC  APPENDECTOMY /LIVER BIOPSIES/LIVER CYST DRAINAGE;  Surgeon: Salvador Morris MD;  Location:  OR     ORTHOPEDIC SURGERY      R wrist 2010     Current Outpatient Medications   Medication     ARIPiprazole (ABILIFY) 5 MG tablet     atorvastatin (LIPITOR) 20 MG tablet     bismuth subsalicylate (PEPTO BISMOL) 262 MG chewable tablet     calcium polycarbophil (FIBERCON) 625 MG tablet     carBAMazepine (TEGRETOL) 200 MG tablet     ciprofloxacin (CIPRO) 250 MG tablet     Cranberry  1000 MG CAPS     cyanocobalamin (VITAMIN B-12) 100 MCG tablet     denosumab (PROLIA) 60 MG/ML SOLN injection     docusate sodium (COLACE) 100 MG tablet     DULoxetine (CYMBALTA) 60 MG capsule     famotidine (PEPCID) 20 MG tablet     famotidine (PEPCID) 40 MG tablet     folic acid (FOLVITE) 400 MCG tablet     guaiFENesin (MUCINEX) 600 MG 12 hr tablet     ketoconazole (NIZORAL) 2 % cream     LACTOBACILLUS RHAMNOSUS, GG, PO     loperamide (IMODIUM A-D) 2 MG tablet     losartan (COZAAR) 50 MG tablet     Melatonin 10 MG TABS     methylphenidate (RITALIN) 20 MG tablet     mirtazapine (REMERON) 30 MG tablet     Multiple Vitamin (MULTIVITAMINS PO)     polyethylene glycol (MIRALAX) 17 GM/Dose powder     Psyllium (METAMUCIL) 0.36 g CAPS     traZODone (DESYREL) 100 MG tablet     triamcinolone (ARISTOCORT HP) 0.5 % external cream     triamcinolone (KENALOG) 0.5 % OINT ointment     trimethoprim (TRIMPEX) 100 MG tablet     trospium (SANCTURA) 20 MG tablet     vitamin B-Complex     Vitamin D, Cholecalciferol, 25 MCG (1000 UT) CAPS     No current facility-administered medications for this visit.     Medication: On Duloxetine and Mirtazpine and Trazodone and Ritalin. She discontinued Effexor and Abilify previously per Dr. Marquise Coleman, her psychiatrist.  On 4/10/19 she informed me that she started Abilify    PHQ-9 SCORE 2018 2019 9/15/2019   PHQ-9 Total Score MyChart 10 (Moderate depression) 7 (Mild depression) 5 (Mild depression)   PHQ-9 Total Score 10 7 5     NAS-7 SCORE 2016   Total Score - 6 (mild anxiety)   Total Score 3 -      SOCIAL HISTORY:  Ms. Padilla grew up in the [facility] and is the oldest of 5 children in her family of origin.  Her father was a dentist who she believes was bipolar.  He  of lung cancer at age 72.  Her mother  of sepsis at age 80.  She had been diagnosed with breast cancer when Ms. Padilla was 8.  She describes the marriage between her parents as misael.  She is 5 years  older than her closest-aged sister and they are all within 4 years of each other.   Her daughter  12/3 and her mother   She tends to feel more depressed in winter.      Ms. Padilla attended [employer] for a year and later went to night school at the [employer].  She felt that she could not continue her education to the point of graduation because she was working full time and raising her daughter.  Her daughter  in  at age 31 of liver failure secondary to an accidental Tylenol overdose.  She had been recovering from a hysterectomy.      Ms. 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  She had to retire at the time secondary to her MS.  She has never .  She has not dated,and states that if she were she would date, she would be interested in a relationship with a woman.  There is no history of  service or legal problems.  She expresses concern about her increasing social isolation.  She has at least 1 friend, Jael, who she met at a therapy group in .  She is active in facilitating groups for people with MS both in [place] and [place].  She lives alone and currently gets help from a home health aide, as well as home health nurse.     She sees Dr. Ban Coleman, psychiatrist and is trying to figure out best antidepressant regimen.  Dr. Coleman prescribes Cymbalta for it.  She feels she has been more depressed since the Fall, but doesn't think it is SAD.   She states that she has recommended case management.  She was hospitalized and has josette suicidal, but not for year.     Special dates that are triggers for sadness:  12/3  Anniversary of daughter's death ([place])    Anniversary of her mother's death     SESSION:  Mili participated in a telehealth session of psychotherapy.  She had to cut it short so as to get to another  appointment.  She wa apologetic and agrees to check more carefully when we schedule appointments.     She learned her psychiatrist will retire in October.   They are starting to plan a transition. She has seen her since 1978.  She will miss her and is trying to see a new woman psychiatrist.  This is yet another anticipated loss for her.     The other upcoming loss is her best friend, Jael, moving to [place].  They haven't seen each other in many months.  Her health is declining.   They met i  1984 in a women's therapy group.   Her sister is offering to drive her to visit in the [facility].     The depression varies and she remains anxious about her best  friend's dx of stage 4 pancreatic cancer.  She feels she has been depressed.  She has not taken a shower in 4 days. Some days she doesn't get out of the building.  We discussed further the importance of completing ADLS, following a schedule, and intercating with others.   She hasn't started the schedule but states she is willing.    She is speaking with Jael, her ill friend, every night.   Jael has decided to move to [place] to assisted living to be closer to some family members.  She will probably move in the Spring of 2022b    Weight Issues:  Not discussed todAy.  She stated she is still at 280. She is having difficulty  sticking to the 1750 calorie ceiling about half the time, otherwise, going up to>  2000  Some concrete steps include tracking calories as she eats through the day and cutting back on snacks. In last 2 weeks,, she has more days under 1750.  She feels she has less time for snacking.  She is doing more knitting.    She was vaccinated and boosted.  Discussed mitigation again and stayng warm n Wi[place].     Discussed her fniancial concerns with [employer] housing going up.  She expects it will up n August around the time of her recertification. She does not think that she can afford another increase.      She participated fully and appeared to derive benefit.  Affect is positive.  Rapport was excellent.   This telehealth (telephone) service is appropriate and effective for delivering services in light of the necessity for social distancing to mitigate the COVID-19 epidemic and for conservation of PPE.     Patient has agreed to receiving telehealth services after being informed about it: Yes    Patient prefers video invitation/information to be sent by:   email    Time service started: 11;03  Time service ended: 11:30    Mode of transmission: AmSwitchcam    Location of originating:  Home of the patient    Distance site:  Home office of provider for [facility]    The patient has been notified that:  Video visits will be conducted via a call with their psychologist to provide the care they need with a video conversation. Video visits may be billed at different rates depending on insurance coverage.  Patients are advised to please contact their insurance provider with any questions about their health insurance coverage. If during the course of a call the psychologist feels a video visit is not appropriate, patients will not be charged for this service  DIAGNOSES:   Major depression, recurrent, moderate (F33.1).   Behavioral factors affecting morbid obesity (E66.01).     PLAN:  Ms. Padilla will return for in-person visit 2/16 @ 11 for problem-solving and supportive therapy consistent with treatment plan.  Goal per day now that she is tracking will change from 2000/day to 1750 consistently.   Preference for future meetings:             In-person prefers, even if masked              Remote         x     Either    Last treatment plan signed:3/22/2021  Last Treatment plan review: 7/21/2021  Treatment plan verbal Review due: 12/23/2021  Treatment Review due: 3/22/2022         Ramin Olivo, PhD, A.B.P.P., L.P.   Director, Health Psychology

## 2022-03-06 DIAGNOSIS — K21.9 GERD WITHOUT ESOPHAGITIS: ICD-10-CM

## 2022-03-07 RX ORDER — FAMOTIDINE 40 MG/1
TABLET, FILM COATED ORAL
Qty: 90 TABLET | Refills: 0 | Status: SHIPPED | OUTPATIENT
Start: 2022-03-07 | End: 2023-05-15

## 2022-03-29 ASSESSMENT — ANXIETY QUESTIONNAIRES
3. WORRYING TOO MUCH ABOUT DIFFERENT THINGS: SEVERAL DAYS
GAD7 TOTAL SCORE: 3
1. FEELING NERVOUS, ANXIOUS, OR ON EDGE: SEVERAL DAYS
6. BECOMING EASILY ANNOYED OR IRRITABLE: NOT AT ALL
7. FEELING AFRAID AS IF SOMETHING AWFUL MIGHT HAPPEN: NOT AT ALL
4. TROUBLE RELAXING: SEVERAL DAYS
2. NOT BEING ABLE TO STOP OR CONTROL WORRYING: NOT AT ALL
5. BEING SO RESTLESS THAT IT IS HARD TO SIT STILL: NOT AT ALL

## 2022-04-01 ENCOUNTER — VIRTUAL VISIT (OUTPATIENT)
Dept: PSYCHOLOGY | Facility: CLINIC | Age: 67
End: 2022-04-01
Payer: COMMERCIAL

## 2022-04-01 DIAGNOSIS — F54 PSYCHOLOGICAL FACTORS AFFECTING MORBID OBESITY (H): ICD-10-CM

## 2022-04-01 DIAGNOSIS — F33.0 MAJOR DEPRESSIVE DISORDER, RECURRENT EPISODE, MILD (H): Primary | ICD-10-CM

## 2022-04-01 DIAGNOSIS — E66.01 PSYCHOLOGICAL FACTORS AFFECTING MORBID OBESITY (H): ICD-10-CM

## 2022-04-01 PROCEDURE — 90834 PSYTX W PT 45 MINUTES: CPT | Mod: 95 | Performed by: PSYCHOLOGIST

## 2022-04-01 ASSESSMENT — ANXIETY QUESTIONNAIRES
GAD7 TOTAL SCORE: 3
GAD7 TOTAL SCORE: 3
7. FEELING AFRAID AS IF SOMETHING AWFUL MIGHT HAPPEN: NOT AT ALL

## 2022-04-01 NOTE — PROGRESS NOTES
Health Psychology                    Department of Medicine  Izzy Montaño, Ph.D., L.P. [phone]                          [facility] Sherrie Crowe, Ph.D., L.P. [phone]                   [place] Mail Code 710   Ranulfo Whipple, Ph.D. [phone]        [address] [place] [address] Enriqueta Sorto, Ph.D., L.P. [phone]    [place] [address]           Ramin Olivo, Ph.D., A.B.P.P., L.P. [phone]        Kathie Galvan, Ph.D., L.P. [phone]  [address] Psychology Telephone Health Progress Note    Ms. Padilla is a 66-year-old woman self-referred for psychological consultation because her therapist of the last 24 years retired.  She is seen for problem-solving and supportive therapy for depression and multiple health issues.    Intake:  7/28/16     HISTORY OF PRESENTING CONCERN:  Ms. Padilla reported at intake a  lengthy history of depression.  She sees a psychiatrist, Ban Coleman, at [facility], and is taking Abilify 7.5 mg, trazodone 500 mg, ripazepam 75 mg each day at bedtime, Tegretol 400 mg at bedtime and methylphenidate 10 mg b.i.d.  She discontineud ability and is now taking Rexulti 2 mg.  She has a history of hospitalizations including 3 at [facility] in the late 1990s, early 2000s when she had 2 courses of ECT lasting 7-10 sessions and approximately 3 other single session ECTs.  She stopped due to memory problems.  She kept with Dr. Coleman who she first met as an inpatient and has seen her for the past 18 years.  She had also been hospitalized psychiatrically at [facility] at age 23.  She previously worked with psychiatrist, Doug Sarabia for three years, stopping, in part, because she didn't feel he took her suicide attempt sufficiently seriously.  She reports her depression tends to vary.     MEDICAL HISTORY:  Ms. Padilla has a complex medical history.  She was  diagnosed with MS in 1985.  Her psychiatrist at [facility] in [place], Dr. 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  She has used a scooter since 1992, using it more  than she had previously.  She also can use a walker.  She was diagnosed with fibromyalgia in 1997.   She is seen at the [facility] for her eye care. During 8th grade, she fell on a ski lift, injuring her larynx. She lot 65 lb. When she ws dx'ed with MS, kept it of for 3 years  It also felt good when she  lose weight due to her stomach surgery.        WEIGHT not taken but reported below.  Discussed goal of cutting back by 125 calories per day.   That would get her losing 2 lbs./month.  She has goal of 1750 claories/day. and states she met that goal aout half the days of the interview.  She had only 4 days down to goal in the last 18 days;  the rest were about 2100 janice.   She was at goal in the preceeding days for 11 of 17 days.    She is weighing herself weekly, using more vegetables for snack.  She plans to write down calories in the      Weight    12/23/21  =280     11/3/21  = 282.                    10/13/21=   275                     9/23/21=   273                     9/9/21 =   277                     8/9/21 =   265    She resumed attending OA, then stopped during the winter.     She started treatment for benign paroxysmal positional vertigo treatment in December, 2020, but did not complete it.     She is not calculating calories.  Her weight  Has not been changing lately.     Wt Readings from Last 4 Encounters:   02/08/22 127 kg (280 lb)   07/13/21 120.2 kg (265 lb)   03/24/21 104 kg (229 lb 3.2 oz)   02/23/21 124.7 kg (275 lb)     Past Medical History:   Diagnosis Date     Depression, major, in partial remission (H)      Fibromyalgia syndrome      Gastro-oesophageal reflux disease      Hyperlipemia      Lymphedema      Multiple sclerosis (H)     tremors with MS, all four limbs and head      Multiple sclerosis, secondary progressive (H)      Sleep apnea      Vocal cord paralysis, unilateral complete       Past Surgical History:   Procedure Laterality Date     COLONOSCOPY N/A 7/17/2017    Procedure: COMBINED COLONOSCOPY, SINGLE OR MULTIPLE BIOPSY/POLYPECTOMY BY BIOPSY;;  Surgeon: Wong Beaulieu MD;  Location:  GI     COLONOSCOPY N/A 2/23/2018    Procedure: COMBINED COLONOSCOPY, SINGLE OR MULTIPLE BIOPSY/POLYPECTOMY BY BIOPSY;  COLONOSCOPY ;  Surgeon: Yessica Santana MD;  Location:  GI     ENT SURGERY      throat 1969, vocal cord surgery with skin grafting     ESOPHAGOSCOPY, GASTROSCOPY, DUODENOSCOPY (EGD), COMBINED N/A 12/16/2014    Procedure: COMBINED ENDOSCOPIC ULTRASOUND, ESOPHAGOSCOPY, GASTROSCOPY, DUODENOSCOPY (EGD), FINE NEEDLE ASPIRATE/BIOPSY;  Surgeon: Yessica Santana MD;  Location: [facility]     ESOPHAGOSCOPY, GASTROSCOPY, DUODENOSCOPY (EGD), COMBINED N/A 12/16/2014    Procedure: COMBINED ESOPHAGOSCOPY, GASTROSCOPY, DUODENOSCOPY (EGD), BIOPSY SINGLE OR MULTIPLE;  Surgeon: Yessica Santana MD;  Location: [facility]     LAPAROSCOPIC APPENDECTOMY  5/30/2013    Procedure: LAPAROSCOPIC APPENDECTOMY;;  Surgeon: Salvador Morris MD;  Location:  OR     LAPAROSCOPIC BIOPSY LIVER  5/30/2013    Procedure: LAPAROSCOPIC BIOPSY LIVER;;  Surgeon: Salvador Morris MD;  Location:  OR     LAPAROSCOPIC GASTRIC SLEEVE  5/30/2013    Procedure: LAPAROSCOPIC GASTRIC SLEEVE;  LAPAROSCOPIC SLEEVE GASTRECTOMY/ LAPARSCOPIC  APPENDECTOMY /LIVER BIOPSIES/LIVER CYST DRAINAGE;  Surgeon: Salvador Morris MD;  Location:  OR     ORTHOPEDIC SURGERY      R wrist 2010     Current Outpatient Medications   Medication     ARIPiprazole (ABILIFY) 5 MG tablet     atorvastatin (LIPITOR) 20 MG tablet     bismuth subsalicylate (PEPTO BISMOL) 262 MG chewable tablet     calcium polycarbophil (FIBERCON) 625 MG tablet     carBAMazepine (TEGRETOL) 200 MG tablet     ciprofloxacin (CIPRO) 250 MG tablet     Cranberry  1000 MG CAPS     cyanocobalamin (VITAMIN B-12) 100 MCG tablet     denosumab (PROLIA) 60 MG/ML SOLN injection     docusate sodium (COLACE) 100 MG tablet     DULoxetine (CYMBALTA) 60 MG capsule     famotidine (PEPCID) 20 MG tablet     famotidine (PEPCID) 40 MG tablet     folic acid (FOLVITE) 400 MCG tablet     guaiFENesin (MUCINEX) 600 MG 12 hr tablet     ketoconazole (NIZORAL) 2 % cream     LACTOBACILLUS RHAMNOSUS, GG, PO     loperamide (IMODIUM A-D) 2 MG tablet     losartan (COZAAR) 50 MG tablet     Melatonin 10 MG TABS     methylphenidate (RITALIN) 20 MG tablet     mirtazapine (REMERON) 30 MG tablet     Multiple Vitamin (MULTIVITAMINS PO)     polyethylene glycol (MIRALAX) 17 GM/Dose powder     Psyllium (METAMUCIL) 0.36 g CAPS     traZODone (DESYREL) 100 MG tablet     triamcinolone (ARISTOCORT HP) 0.5 % external cream     triamcinolone (KENALOG) 0.5 % OINT ointment     trimethoprim (TRIMPEX) 100 MG tablet     trospium (SANCTURA) 20 MG tablet     vitamin B-Complex     Vitamin D, Cholecalciferol, 25 MCG (1000 UT) CAPS     No current facility-administered medications for this visit.     Medication: On Duloxetine and Mirtazpine and Trazodone and Ritalin. She discontinued Effexor and Abilify previously per Dr. Marquise Coleman, her psychiatrist.  On 4/10/19 she informed me that she started Abilify    PHQ-9 SCORE 2018 2019 9/15/2019   PHQ-9 Total Score MyChart 10 (Moderate depression) 7 (Mild depression) 5 (Mild depression)   PHQ-9 Total Score 10 7 5     NAS-7 SCORE 2016 3/29/2022 3/29/2022   Total Score 6 (mild anxiety) - 3 (minimal anxiety)   Total Score - 3 3      SOCIAL HISTORY:  Ms. Padilla grew up in the [facility] and is the oldest of 5 children in her family of origin.  Her father was a dentist who she believes was bipolar.  He  of lung cancer at age 72.  Her mother  of sepsis at age 80.  She had been diagnosed with breast cancer when Ms. Padilla was 8.  She describes the marriage between her  parents as misael.  She is 5 years older than her closest-aged sister and they are all within 4 years of each other.   Her daughter  12/3 and her mother   She tends to feel more depressed in winter.      Ms. Padilla attended [employer] for a year and later went to [employer] school at the [employer].  She felt that she could not continue her education to the point of graduation because she was working full time and raising her daughter.  Her daughter  in  at age 31 of liver failure secondary to an accidental Tylenol overdose.  She had been recovering from a hysterectomy.      Ms. 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  She had to retire at the time secondary to her MS.  She has never .  She has not dated,and states that if she were she would date, she would be interested in a relationship with a woman.  There is no history of  service or legal problems.  She expresses concern about her increasing social isolation.  She has at least 1 friend, Jael, who she met at a therapy group in .  She is active in facilitating groups for people with MS both in [place] and [place].  She lives alone and currently gets help from a home health aide, as well as home health nurse.     She sees Dr. Ban Coleman, psychiatrist and is trying to figure out best antidepressant regimen.  Dr. Coleman prescribes Cymbalta for it.  She feels she has been more depressed since the Fall, but doesn't think it is SAD.   She states that she has recommended case management.  She was hospitalized and has josette suicidal, but not for year.     Special dates that are triggers for sadness:  12/3  Anniversary of daughter's death ([place])    Anniversary of her mother's death     SESSION:  Mili participated in a telehealth session of psychotherapy.  We had intended to  meet in person but due to a few falls and injury, she did not feel like she was up to it.  She will meet next month instead.    She learned her psychiatrist will retire in October.   They are starting to plan a transition. She has seen her since 1978.  She will miss her and is trying to see a new woman psychiatrist.  This is another anticipated loss for her.     The other loss is her best friend, Jael, moved to [place] during the interval.  They haven't seen each other in many months.  Her health is declining.  They have been in phone contact daily before and after the move.  Her sister is offering to drive her to visit in the future.     Weight Issues:  She stated she is still at 280.  She is unsure how to wear so due to her instability so we problem solved around it.  She is having difficulty  sticking to the 1750 calorie ceiling about half the time, otherwise, going up to>  2000 but feels she has more days under 1750.  She feels she has less time for snacking.  She is doing more knitting.    She has taken some classes which were good for her social stimulation as well as intellectual stimulation.  She is considering going to [employer] for more classes in the future and is going to seek a scholarship.    She was vaccinated and boosted.       She participated fully and appeared to derive benefit. Affect is positive.  Rapport was excellent.   This telehealth (telephone) service is appropriate and effective for delivering services in light of the necessity for social distancing to mitigate the COVID-19 epidemic and for conservation of PPE.     Patient has agreed to receiving telehealth services after being informed about it: Yes    Patient prefers video invitation/information to be sent by:   email    Time service started: 1:13  Time service ended: 1:55  Mode of transmission: [employer]    Location of originating:  Home of the patient    Distance site:  Home office of provider for [facility]    The patient has been notified  that:  Video visits will be conducted via a call with their psychologist to provide the care they need with a video conversation. Video visits may be billed at different rates depending on insurance coverage.  Patients are advised to please contact their insurance provider with any questions about their health insurance coverage. If during the course of a call the psychologist feels a video visit is not appropriate, patients will not be charged for this service  DIAGNOSES:   Major depression, recurrent, moderate (F33.1).   Behavioral factors affecting morbid obesity (E66.01).     PLAN:  Ms. Padilla will return for in-person visit  4/22 @ 2 for problem-solving and supportive therapy consistent with treatment plan.  Goal per day now that she is tracking will change from 2000/day to 1750 consistently.   Preference for future meetings:             In-person prefers, even if masked              Remote         x     Either    Last treatment plan signed:3/22/2021  Last Treatment plan review: 7/21/2021  Treatment plan verbal Review due: 12/23/2021  Treatment Review due: 3/22/2022 (next session)         Ramin Olivo, PhD, A.B.P.P., L.P.   Director, Health Psychology

## 2022-04-02 ASSESSMENT — ANXIETY QUESTIONNAIRES: GAD7 TOTAL SCORE: 3

## 2022-04-04 DIAGNOSIS — H53.40 VISUAL FIELD DEFECT: Primary | ICD-10-CM

## 2022-04-19 ASSESSMENT — ANXIETY QUESTIONNAIRES
7. FEELING AFRAID AS IF SOMETHING AWFUL MIGHT HAPPEN: SEVERAL DAYS
5. BEING SO RESTLESS THAT IT IS HARD TO SIT STILL: NOT AT ALL
4. TROUBLE RELAXING: SEVERAL DAYS
1. FEELING NERVOUS, ANXIOUS, OR ON EDGE: SEVERAL DAYS
GAD7 TOTAL SCORE: 4
3. WORRYING TOO MUCH ABOUT DIFFERENT THINGS: NOT AT ALL
7. FEELING AFRAID AS IF SOMETHING AWFUL MIGHT HAPPEN: SEVERAL DAYS
2. NOT BEING ABLE TO STOP OR CONTROL WORRYING: SEVERAL DAYS
GAD7 TOTAL SCORE: 4
6. BECOMING EASILY ANNOYED OR IRRITABLE: NOT AT ALL

## 2022-04-20 ASSESSMENT — ANXIETY QUESTIONNAIRES: GAD7 TOTAL SCORE: 4

## 2022-04-21 ASSESSMENT — PATIENT HEALTH QUESTIONNAIRE - PHQ9
SUM OF ALL RESPONSES TO PHQ QUESTIONS 1-9: 6
SUM OF ALL RESPONSES TO PHQ QUESTIONS 1-9: 6
10. IF YOU CHECKED OFF ANY PROBLEMS, HOW DIFFICULT HAVE THESE PROBLEMS MADE IT FOR YOU TO DO YOUR WORK, TAKE CARE OF THINGS AT HOME, OR GET ALONG WITH OTHER PEOPLE: SOMEWHAT DIFFICULT

## 2022-04-22 ENCOUNTER — OFFICE VISIT (OUTPATIENT)
Dept: PSYCHOLOGY | Facility: CLINIC | Age: 67
End: 2022-04-22
Payer: COMMERCIAL

## 2022-04-22 DIAGNOSIS — F54 PSYCHOLOGICAL FACTORS AFFECTING MORBID OBESITY (H): ICD-10-CM

## 2022-04-22 DIAGNOSIS — F33.0 MAJOR DEPRESSIVE DISORDER, RECURRENT EPISODE, MILD (H): Primary | ICD-10-CM

## 2022-04-22 DIAGNOSIS — E66.01 PSYCHOLOGICAL FACTORS AFFECTING MORBID OBESITY (H): ICD-10-CM

## 2022-04-22 PROCEDURE — 90834 PSYTX W PT 45 MINUTES: CPT | Performed by: PSYCHOLOGIST

## 2022-04-22 ASSESSMENT — PATIENT HEALTH QUESTIONNAIRE - PHQ9: SUM OF ALL RESPONSES TO PHQ QUESTIONS 1-9: 6

## 2022-04-22 NOTE — PROGRESS NOTES
Health Psychology                    Department of Medicine  Izzy Montaño, Ph.D., L.P. [phone]                         [facility] Sherrie Crowe, Ph.D., L.P. [phone]                     [place] [address]   Ranulfo Whipple, Ph.D. [phone]      [address] [place] [address] Enriqueta Sorto, Ph.D., A.B.P.P., L.P. [phone]              [place] [address]           Ramin Olivo, Ph.D., A.B.P.P., L.P. [phone]      Kathie Galvan, Ph.D., L.P. [phone]  [facility]   Carmel Farnsworth, Ph.D., A.B.P.P., L.P. [phone]    [address] [place] [address] Psychology Telephone Health Progress Note    Ms. Padilla is a 66-year-old woman self-referred for psychological consultation because her therapist of the last 24 years retired.  She is seen for problem-solving and supportive therapy for depression and multiple health issues.    Intake:  7/28/16     HISTORY OF PRESENTING CONCERN (at intake):  Ms. Padilla reported at intake a  lengthy history of depression.  She sees a psychiatrist, Ban Coleman, at [facility], and is taking Abilify 7.5 mg, trazodone 500 mg, ripazepam 75 mg each day at bedtime, Tegretol 400 mg at bedtime and methylphenidate 10 mg b.i.d.  She discontineud ability and is now taking Rexulti 2 mg.  She has a history of hospitalizations including 3 at [facility] in the late 1990s, early 2000s when she had 2 courses of ECT lasting 7-10 sessions and approximately 3 other single session ECTs.  She stopped due to memory problems.  She kept with Dr. Coleman who she first met as an inpatient and has seen her for the past 18 years.  She had also been hospitalized psychiatrically at [facility] at age 23.  She previously worked with psychiatrist, Doug Sarabia for three years, stopping, in part, because she didn't feel he took her suicide attempt sufficiently seriously.  She reportedher depression tends to vary.      MEDICAL HISTORY:  Ms. Padilla has a complex medical history.  She was diagnosed with MS in 1985.  Her psychiatrist at [facility] in [place], Dr. 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  She has used a scooter since 1992, using it more  than she had previously.  She also can use a walker.  She was diagnosed with fibromyalgia in 1997.   She is seen at the [facility] for her eye care. During 8th grade, she fell on a ski lift, injuring her larynx. She lot 65 lb. When she ws dx'ed with MS, kept it of for 3 years  It also felt good when she  lose weight due to her stomach surgery.        WEIGHT not taken but reported below.  Discussed goal of cutting back by 125 calories per day.   That would get her losing 2 lbs./month.  She has goal of 1750 claories/day. and states she met that goal aout half the days of the interview.  She had only 4 days down to goal in the last 18 days;  the rest were about 2100 janice.   She was at goal in the preceeding days for 11 of 17 days.    She is weighing herself weekly, using more vegetables for snack.  She plans to write down calories in the      Weight    12/23/21  =280     11/3/21  = 282.                    10/13/21=   275                     9/23/21=   273                     9/9/21 =   277                     8/9/21 =   265    She resumed attending OA, then stopped during the winter.     She started treatment for benign paroxysmal positional vertigo treatment in December, 2020, but did not complete it.     She is not calculating calories.  Her weight  Has not been changing lately.     Wt Readings from Last 4 Encounters:   02/08/22 127 kg (280 lb)   07/13/21 120.2 kg (265 lb)   03/24/21 104 kg (229 lb 3.2 oz)   02/23/21 124.7 kg (275 lb)     Past Medical History:   Diagnosis Date     Depression, major, in partial remission (H)      Fibromyalgia syndrome      Gastro-oesophageal reflux disease      Hyperlipemia      Lymphedema       Multiple sclerosis (H)     tremors with MS, all four limbs and head     Multiple sclerosis, secondary progressive (H)      Sleep apnea      Vocal cord paralysis, unilateral complete       Past Surgical History:   Procedure Laterality Date     COLONOSCOPY N/A 7/17/2017    Procedure: COMBINED COLONOSCOPY, SINGLE OR MULTIPLE BIOPSY/POLYPECTOMY BY BIOPSY;;  Surgeon: Wong Beaulieu MD;  Location:  GI     COLONOSCOPY N/A 2/23/2018    Procedure: COMBINED COLONOSCOPY, SINGLE OR MULTIPLE BIOPSY/POLYPECTOMY BY BIOPSY;  COLONOSCOPY ;  Surgeon: Yessica Santana MD;  Location:  GI     ENT SURGERY      throat 1969, vocal cord surgery with skin grafting     ESOPHAGOSCOPY, GASTROSCOPY, DUODENOSCOPY (EGD), COMBINED N/A 12/16/2014    Procedure: COMBINED ENDOSCOPIC ULTRASOUND, ESOPHAGOSCOPY, GASTROSCOPY, DUODENOSCOPY (EGD), FINE NEEDLE ASPIRATE/BIOPSY;  Surgeon: Yessica Santana MD;  Location: [facility]     ESOPHAGOSCOPY, GASTROSCOPY, DUODENOSCOPY (EGD), COMBINED N/A 12/16/2014    Procedure: COMBINED ESOPHAGOSCOPY, GASTROSCOPY, DUODENOSCOPY (EGD), BIOPSY SINGLE OR MULTIPLE;  Surgeon: Yessica Santana MD;  Location: [facility]     LAPAROSCOPIC APPENDECTOMY  5/30/2013    Procedure: LAPAROSCOPIC APPENDECTOMY;;  Surgeon: Salvador Morris MD;  Location:  OR     LAPAROSCOPIC BIOPSY LIVER  5/30/2013    Procedure: LAPAROSCOPIC BIOPSY LIVER;;  Surgeon: Salvador Morris MD;  Location:  OR     LAPAROSCOPIC GASTRIC SLEEVE  5/30/2013    Procedure: LAPAROSCOPIC GASTRIC SLEEVE;  LAPAROSCOPIC SLEEVE GASTRECTOMY/ LAPARSCOPIC  APPENDECTOMY /LIVER BIOPSIES/LIVER CYST DRAINAGE;  Surgeon: Salvador Morris MD;  Location:  OR     ORTHOPEDIC SURGERY      R wrist 2010     Current Outpatient Medications   Medication     ARIPiprazole (ABILIFY) 5 MG tablet     atorvastatin (LIPITOR) 20 MG tablet     bismuth subsalicylate (PEPTO BISMOL) 262 MG chewable tablet     calcium polycarbophil (FIBERCON) 625 MG tablet     carBAMazepine  (TEGRETOL) 200 MG tablet     ciprofloxacin (CIPRO) 250 MG tablet     Cranberry 1000 MG CAPS     cyanocobalamin (VITAMIN B-12) 100 MCG tablet     denosumab (PROLIA) 60 MG/ML SOLN injection     docusate sodium (COLACE) 100 MG tablet     DULoxetine (CYMBALTA) 60 MG capsule     famotidine (PEPCID) 20 MG tablet     famotidine (PEPCID) 40 MG tablet     folic acid (FOLVITE) 400 MCG tablet     guaiFENesin (MUCINEX) 600 MG 12 hr tablet     ketoconazole (NIZORAL) 2 % cream     LACTOBACILLUS RHAMNOSUS, GG, PO     loperamide (IMODIUM A-D) 2 MG tablet     losartan (COZAAR) 50 MG tablet     Melatonin 10 MG TABS     methylphenidate (RITALIN) 20 MG tablet     mirtazapine (REMERON) 30 MG tablet     Multiple Vitamin (MULTIVITAMINS PO)     polyethylene glycol (MIRALAX) 17 GM/Dose powder     Psyllium (METAMUCIL) 0.36 g CAPS     traZODone (DESYREL) 100 MG tablet     triamcinolone (ARISTOCORT HP) 0.5 % external cream     triamcinolone (KENALOG) 0.5 % OINT ointment     trimethoprim (TRIMPEX) 100 MG tablet     trospium (SANCTURA) 20 MG tablet     vitamin B-Complex     Vitamin D, Cholecalciferol, 25 MCG (1000 UT) CAPS     No current facility-administered medications for this visit.     Medication: On Duloxetine and Mirtazpine and Trazodone and Ritalin. She discontinued Effexor and Abilify previously per Dr. Marquise Coleman, her psychiatrist.  On 4/10/19 she informed me that she started Abilify    PHQ-9 SCORE 2019 9/15/2019 2022   PHQ-9 Total Score MyChart 7 (Mild depression) 5 (Mild depression) 6 (Mild depression)   PHQ-9 Total Score 7 5 6     NAS-7 SCORE 3/29/2022 3/29/2022 2022   Total Score - 3 (minimal anxiety) 4 (minimal anxiety)   Total Score 3 3 4      SOCIAL HISTORY:  Ms. Padilla grew up in the [facility] and is the oldest of 5 children in her family of origin.  Her father was a dentist who she believes was bipolar.  He  of lung cancer at age 72.  Her mother  of sepsis at age 80.  She had been diagnosed with  breast cancer when Ms. Padilla was 8.  She describes the marriage between her parents as misael.  She is 5 years older than her closest-aged sister and they are all within 4 years of each other.   Her daughter  12/3 and her mother   She tends to feel more depressed in winter.      Ms. Padilla attended [employer] for a year and later went to night school at the [employer].  She felt that she could not continue her education to the point of graduation because she was working full time and raising her daughter.  Her daughter  in  at age 31 of liver failure secondary to an accidental Tylenol overdose.  She had been recovering from a hysterectomy.      Ms. 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  She had to retire at the time secondary to her MS.  She has never .  She has not dated,and states that if she were she would date, she would be interested in a relationship with a woman.  There is no history of  service or legal problems.  She expresses concern about her increasing social isolation.  She has at least 1 friend, Jael, who she met at a therapy group in .  She is active in facilitating groups for people with MS both in [place] and [place].  She lives alone and currently gets help from a home health aide, as well as home health nurse.     She sees Dr. Ban Coleman, psychiatrist and is trying to figure out best antidepressant regimen.  Dr. Coleman prescribes Cymbalta for it.  She feels she has been more depressed since the Fall, but doesn't think it is SAD.   She states that she has recommended case management.  She was hospitalized and has josette suicidal, but not for year.     Special dates that are triggers for sadness:  12/3  Anniversary of daughter's death ([place])    Anniversary of her mother's death     SESSION:   Mili participated in the first in-person session of psychotherapy since COVID.  We had intended to meet in person last session but due to a few falls and injury, she did not feel like she was up to it.  She made it in today without problem.    Her physician is concerned about the fall, wonders about need for assisted living.  Given that she tripped over something, she disagrees.  She was concerned about shakiness in her legs, finding if she looks forward she is steadier, and has noticed improvement. She missed the last 2 PT sessions.  Discussed pushing herself to go if possible as more strength will likely help her maintain her independence.     Her psychiatrist will retire in October.   They are starting to plan a transition. She has seen her since 1978.      Her best friend, Jael, moved to [place]. They haven't seen each other in many months.  Her health is declining.  They have been in phone contact daily before and after the move.  Her sister is offering to drive her to visit in the future May 4.  We discussed trying to get a manual wheelchair to get around more easily potentially given her concern about her stability walking, even with a walker. She has a call button if she were to fall again.    Weight Issues:  She previously stated she is still at 280.   She is having difficulty  sticking to the 1750 calorie ceiling about half the time, otherwise, going up to >  2000 but feels she has more days under 1750.  She feels she has less time for snacking.  Not addressed today.    She has taken some classes which were good for her social stimulation as well as intellectual stimulation.  She is considering going to [employer] for more classes in the future and is going to seek a scholarship.    She was vaccinated and boosted.       She participated fully and appeared to derive benefit. Affect is positive.  Rapport was excellent.  Time service started: 2:03  Time service ended:  2:42    DIAGNOSES:   Major depression,  recurrent, moderate (F33.1).   Behavioral factors affecting morbid obesity (E66.01).     PLAN:  Ms. Padilla will return for in-person visit  4/22 @ 2 for problem-solving and supportive therapy consistent with treatment plan.  Goal per day now that she is tracking will change from 2000/day to 1750 consistently.   Preference for future meetings:             In-person prefers, even if masked              Remote         x     Either    Last treatment plan signed:3/22/2021  Last Treatment plan review: 7/21/2021  Treatment plan verbal Review due: 12/23/2021  Treatment Review due: 3/22/2022 (next session)         Ramin Olivo, PhD, A.B.P.P., L.P.   Director, Health Psychology

## 2022-05-06 ENCOUNTER — VIRTUAL VISIT (OUTPATIENT)
Dept: URGENT CARE | Facility: CLINIC | Age: 67
End: 2022-05-06
Payer: COMMERCIAL

## 2022-05-06 DIAGNOSIS — H00.011 HORDEOLUM EXTERNUM OF RIGHT UPPER EYELID: Primary | ICD-10-CM

## 2022-05-06 PROCEDURE — 99213 OFFICE O/P EST LOW 20 MIN: CPT | Mod: 95 | Performed by: PHYSICIAN ASSISTANT

## 2022-05-06 RX ORDER — ERYTHROMYCIN 5 MG/G
0.5 OINTMENT OPHTHALMIC 3 TIMES DAILY
Qty: 7.5 G | Refills: 0 | Status: SHIPPED | OUTPATIENT
Start: 2022-05-06 | End: 2022-05-11

## 2022-05-06 NOTE — PROGRESS NOTES
"Mili is a 66 year old who is being evaluated via a billable video visit.      Video Start Time: 4:56 PM    Assessment & Plan     Hordeolum externum of right upper eyelid  - erythromycin (ROMYCIN) 5 MG/GM ophthalmic ointment; Place 0.5 inches into the right eye 3 times daily for 5 days      BMI:   Estimated body mass index is 43.85 kg/m  as calculated from the following:    Height as of 2/8/22: 1.702 m (5' 7\").    Weight as of 2/8/22: 127 kg (280 lb).         Stephanie Milner PA-C  Virtual Urgent Care  [facility] VIRTUAL URGENT CARE    Subjective   Mili is a 66 year old who presents for the following health issues : stye  HPI     Eye(s) Problem  Onset/Duration: 3 days ago   Description:   Location: right upper eyelid  Pain: YES  Redness: YES  Accompanying Signs & Symptoms:  Discharge/mattering: YES  Swelling: YES  Visual changes: blurring  Fever: no  Nasal Congestion: no  Bothered by bright lights: no  History:  Trauma: no  Foreign body exposure: no  Wearing contacts: no  Precipitating or alleviating factors: warm compress  Therapies tried and outcome: None      Review of Systems   Constitutional, HEENT, cardiovascular, pulmonary, gi and gu systems are negative, except as otherwise noted.      Objective           Vitals:  No vitals were obtained today due to virtual visit.    Physical Exam   GENERAL: alert and no distress  EYES: eyelids- hordeolum/sty right upper eyelid  RESP: No audible wheeze, cough, or visible cyanosis.  No visible retractions or increased work of breathing.    SKIN: Visible skin clear. No significant rash, abnormal pigmentation or lesions.  NEURO: Cranial nerves grossly intact.  Mentation and speech appropriate for age.  PSYCH: Mentation appears normal, affect normal/bright, judgement and insight intact, normal speech and appearance well-groomed.      Video-Visit Details    Type of service:  Video Visit    Video End Time:5:03 PM    Originating Location (pt. Location): Home    Distant " Location (provider location):  [facility] URGENT CARE     Platform used for Video Visit: Perez

## 2022-05-12 ENCOUNTER — OFFICE VISIT (OUTPATIENT)
Dept: OPHTHALMOLOGY | Facility: CLINIC | Age: 67
End: 2022-05-12
Attending: OPHTHALMOLOGY
Payer: COMMERCIAL

## 2022-05-12 DIAGNOSIS — H25.013 CORTICAL AGE-RELATED CATARACT OF BOTH EYES: ICD-10-CM

## 2022-05-12 DIAGNOSIS — H00.11 CHALAZION OF RIGHT UPPER EYELID: ICD-10-CM

## 2022-05-12 DIAGNOSIS — H53.40 VISUAL FIELD DEFECT: ICD-10-CM

## 2022-05-12 DIAGNOSIS — G35 MULTIPLE SCLEROSIS, SECONDARY PROGRESSIVE (H): Primary | ICD-10-CM

## 2022-05-12 DIAGNOSIS — H47.20 PARTIAL OPTIC ATROPHY: ICD-10-CM

## 2022-05-12 DIAGNOSIS — H53.40 VISUAL FIELD DEFECT: Primary | ICD-10-CM

## 2022-05-12 PROCEDURE — 92133 CPTRZD OPH DX IMG PST SGM ON: CPT | Performed by: OPHTHALMOLOGY

## 2022-05-12 PROCEDURE — 92083 EXTENDED VISUAL FIELD XM: CPT | Mod: 26 | Performed by: STUDENT IN AN ORGANIZED HEALTH CARE EDUCATION/TRAINING PROGRAM

## 2022-05-12 PROCEDURE — 92133 CPTRZD OPH DX IMG PST SGM ON: CPT | Mod: 26 | Performed by: STUDENT IN AN ORGANIZED HEALTH CARE EDUCATION/TRAINING PROGRAM

## 2022-05-12 PROCEDURE — G0463 HOSPITAL OUTPT CLINIC VISIT: HCPCS | Mod: 25

## 2022-05-12 PROCEDURE — 92083 EXTENDED VISUAL FIELD XM: CPT | Performed by: OPHTHALMOLOGY

## 2022-05-12 PROCEDURE — 92014 COMPRE OPH EXAM EST PT 1/>: CPT | Mod: GC | Performed by: STUDENT IN AN ORGANIZED HEALTH CARE EDUCATION/TRAINING PROGRAM

## 2022-05-12 ASSESSMENT — REFRACTION_WEARINGRX
OD_CYLINDER: +1.75
OS_ADD: +1.75
OD_AXIS: 95
OS_AXIS: 004
OS_SPHERE: -4.75
OD_SPHERE: -6.25
OD_ADD: +1.75
SPECS_TYPE: PAL
OS_CYLINDER: +0.50

## 2022-05-12 ASSESSMENT — CUP TO DISC RATIO
OD_RATIO: 0.7
OS_RATIO: 0.6

## 2022-05-12 ASSESSMENT — VISUAL ACUITY
OS_CC+: -2
OS_CC: 20/30
CORRECTION_TYPE: GLASSES
METHOD: SNELLEN - LINEAR
OD_CC: 20/30

## 2022-05-12 ASSESSMENT — TONOMETRY
OS_IOP_MMHG: 15
IOP_METHOD: ICARE
OD_IOP_MMHG: 15

## 2022-05-12 ASSESSMENT — CONF VISUAL FIELD
OS_NORMAL: 1
OD_NORMAL: 1
METHOD: COUNTING FINGERS

## 2022-05-12 ASSESSMENT — EXTERNAL EXAM - RIGHT EYE: OD_EXAM: NORMAL

## 2022-05-12 ASSESSMENT — EXTERNAL EXAM - LEFT EYE: OS_EXAM: NORMAL

## 2022-05-12 ASSESSMENT — SLIT LAMP EXAM - LIDS: COMMENTS: NORMAL

## 2022-05-12 NOTE — NURSING NOTE
Chief Complaints and History of Present Illnesses   Patient presents with     Optic Atrophy Follow Up     Pt here today for annual optic atrophy follow up care.  States just getting over a stye in RE doing much better now - no pain healing well.  No eye health or vision concerns today.    Ocular meds = none    Marina TEJEDA, OVIDIO 2:31 PM 05/12/2022            Chief Complaint(s) and History of Present Illness(es)     Optic Atrophy Follow Up     Associated symptoms: redness (from recent RE stye - doing better - finishing therapy) and swelling (from recent RE stye - doing better - finishing therapy).  Negative for double vision, eye pain, pain with eye movement, headache, flashes and floaters    Pain scale: 0/10    Comments: Pt here today for annual optic atrophy follow up care.  States just getting over a stye in RE doing much better now - no pain healing well.  No eye health or vision concerns today.    Ocular meds = none    Marina TEJEDA, OVIDIO 2:31 PM 05/12/2022

## 2022-05-12 NOTE — PROGRESS NOTES
Assessment & Plan     Mili Padilla is a 66 year old female with the following diagnoses:   1. Multiple sclerosis, secondary progressive (H)    2. Visual field defect    3. Partial optic atrophy    4. Chalazion of right upper eyelid    5. Cortical age-related cataract of both eyes         Patient is a 66-year-old female with a history of optic neuritis in the left eye (1986), and secondary progressive multiple sclerosis who is presenting to the neuro-ophthalmology clinic in the setting of an annual follow-up.    Patient's last visit was 3/30/2021.  At that time she was presents for an annual eye examination in the setting of secondary progressive multiple sclerosis. Patient has a history of optic neuritis in the left eye, in 1986 and was subsequently diagnosed with multiple sclerosis.    Today, she reports that overall she is doing well, she recently had a stye in the right eye (onset 5/3/22) and was having some swelling and irritation with that was treated with erythromycin ointment.  This has been improving in nature. She denies any trouble seeing, no pain, no pain with eye movements, no recent episodes that have a semblance of optic neuritis. No loss of vision. No flashes of lights, no floaters.     She also follows with the [facility]. She saw Dr. Ku 2/9/2022. On initial assessment, was identified to overall be stable on 2/9/2022. She reports that she was having balance difficulty and was diagnosed with discordance of the movement of the eyes.  She did revisit Dr. Ku on 5/9/2022 after having problems with her left leg and is having difficulty lifting the leg, Dr. Ku assessed the leg and was concerned about recurrence of MS vs lumbar spinal pathology, and started her on corticosteroids - she is currently using Medrol dosepack 3/6 days - she doesn't think it is helping. Is obtaining a MRI scan soon.     Today on examination, patient's visual acuity is 20/30 in the right eye,  20/30 -2 in the left eye.  Intraocular pressure within normal limits in both eyes.  Pupillary examination was unremarkable without afferent pupillary defect. Patient had full confrontational visual fields.  Patient had full extraocular motility.  Color vision was 14 out of 14 in both eyes. No ARTEMIO on exam.    Visual field 5/12/2022  Right eye: Reliable.  Mean deviation equals -5.3, pattern standard deviation equals 2.9.  Scattered inferior nonspecific deficits seen, not demonstrated on 1/2020.  Left eye: Reliable,  False negative equals 11 percent.  Mean deviation equals -6.0, pattern standard deviation equals 3.7.  Inferior temporal visual field deficit elicited.  Scattered nasal deficits and superotemporal deficits.    OCT RNFL 5/12/2022  Right eye: G equals 88, stable.   Left eye: G equals 64, nasal thinning present, borderline temporal thinning present. Stable          It is my impression that patient has stability of optic atrophy in the setting of prior optic neuritis in the left eye. This is in the setting of multiple sclerosis. Furthermore patient has a stye in the right upper eyelid that developed over a week ago. Advised initiation of warm compresses to help with the lesion. Furthermore patient has cataracts in both eyes, can consider cataract surgery for this.  Patient would like to see a cataract surgeon.  Will refer.       Attending Physician Attestation:  Complete documentation of historical and exam elements from today's encounter can be found in the full encounter summary report (not reduplicated in this progress note).  I personally obtained the chief complaint(s) and history of present illness.  I confirmed and edited as necessary the review of systems, past medical/surgical history, family history, social history, and examination findings as documented by others; and I examined the patient myself.  I personally reviewed the relevant tests, images, and reports as documented above.  I formulated and  edited as necessary the assessment and plan and discussed the findings and management plan with the patient and family. I personally reviewed the ophthalmic test(s) associated with this encounter, agree with the interpretation(s) as documented by the resident/fellow, and have edited the corresponding report(s) as necessary.  - Zach Day MD - PGY3  Department of Ophthalmology  Pager: [phone]

## 2022-05-27 ENCOUNTER — VIRTUAL VISIT (OUTPATIENT)
Dept: PSYCHOLOGY | Facility: CLINIC | Age: 67
End: 2022-05-27
Payer: COMMERCIAL

## 2022-05-27 DIAGNOSIS — E66.01 PSYCHOLOGICAL FACTORS AFFECTING MORBID OBESITY (H): ICD-10-CM

## 2022-05-27 DIAGNOSIS — F54 PSYCHOLOGICAL FACTORS AFFECTING MORBID OBESITY (H): ICD-10-CM

## 2022-05-27 DIAGNOSIS — F33.0 MAJOR DEPRESSIVE DISORDER, RECURRENT EPISODE, MILD (H): Primary | ICD-10-CM

## 2022-05-27 PROCEDURE — 90837 PSYTX W PT 60 MINUTES: CPT | Mod: 95 | Performed by: PSYCHOLOGIST

## 2022-05-27 ASSESSMENT — ANXIETY QUESTIONNAIRES
2. NOT BEING ABLE TO STOP OR CONTROL WORRYING: SEVERAL DAYS
6. BECOMING EASILY ANNOYED OR IRRITABLE: NOT AT ALL
7. FEELING AFRAID AS IF SOMETHING AWFUL MIGHT HAPPEN: NOT AT ALL
GAD7 TOTAL SCORE: 5
7. FEELING AFRAID AS IF SOMETHING AWFUL MIGHT HAPPEN: NOT AT ALL
4. TROUBLE RELAXING: SEVERAL DAYS
1. FEELING NERVOUS, ANXIOUS, OR ON EDGE: SEVERAL DAYS
3. WORRYING TOO MUCH ABOUT DIFFERENT THINGS: SEVERAL DAYS
GAD7 TOTAL SCORE: 5
GAD7 TOTAL SCORE: 5
5. BEING SO RESTLESS THAT IT IS HARD TO SIT STILL: SEVERAL DAYS
8. IF YOU CHECKED OFF ANY PROBLEMS, HOW DIFFICULT HAVE THESE MADE IT FOR YOU TO DO YOUR WORK, TAKE CARE OF THINGS AT HOME, OR GET ALONG WITH OTHER PEOPLE?: SOMEWHAT DIFFICULT

## 2022-05-27 NOTE — PROGRESS NOTES
Health Psychology                    Department of Medicine  Izzy Montaño, Ph.D., L.P. [phone]                         [facility] Sherrie Crowe, Ph.D., L.P. [phone]                     [place] Mail Code 447   Ranulfo Whipple, Ph.D. [phone]      [address] [place] [address] Enriqueta Sorto, Ph.D., A.B.P.P., L.P. [phone]              [place] [address]           Ramin Olivo, Ph.D., A.B.P.P., L.P. [phone]      Kathie Galvan, Ph.D., L.P. [phone]  [facility]   Carmel Farnsworth, Ph.D., A.B.P.P., L.P. [phone]    [address] [place] [address] Psychology Telephone Health Progress Note    Ms. Padilla is a 66-year-old woman self-referred for psychological consultation because her therapist of the last 24 years retired.  She is seen for problem-solving and supportive therapy for depression and multiple health issues.    Intake:  7/28/16     HISTORY OF PRESENTING CONCERN (at intake):  Ms. Padilla reported at intake a  lengthy history of depression.  She sees a psychiatrist, Ban Coleman, at [facility], and is taking Abilify 7.5 mg, trazodone 500 mg, ripazepam 75 mg each day at bedtime, Tegretol 400 mg at bedtime and methylphenidate 10 mg b.i.d.  She discontineud ability and is now taking Rexulti 2 mg.  She has a history of hospitalizations including 3 at [facility] in the late 1990s, early 2000s when she had 2 courses of ECT lasting 7-10 sessions and approximately 3 other single session ECTs.  She stopped due to memory problems.  She kept with Dr. Coleman who she first met as an inpatient and has seen her for the past 18 years.  She had also been hospitalized psychiatrically at [facility] at age 23.  She previously worked with psychiatrist, Duog Sarabia for three years, stopping, in part, because she didn't feel he took her suicide attempt sufficiently seriously.  She reportedher depression tends to vary.      MEDICAL HISTORY:  Ms. Padilla has a complex medical history.  She was diagnosed with MS in 1985.  Her psychiatrist at [facility] in [place], Dr. 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  She has used a scooter since 1992, using it more  than she had previously.  She also can use a walker.  She was diagnosed with fibromyalgia in 1997.   She is seen at the [facility] for her eye care. During 8th grade, she fell on a ski lift, injuring her larynx. She lot 65 lb. When she ws dx'ed with MS, kept it of for 3 years  It also felt good when she  lose weight due to her stomach surgery.        WEIGHT not taken but reported below.  Discussed goal of cutting back by 125 calories per day.   That would get her losing 2 lbs./month.  She has goal of 1750 claories/day. and states she met that goal aout half the days of the interview.  She had only 4 days down to goal in the last 18 days;  the rest were about 2100 janice.   She was at goal in the preceeding days for 11 of 17 days.    She is weighing herself weekly, using more vegetables for snack.  She plans to write down calories in the      Weight    12/23/21  =280     11/3/21  = 282.                    10/13/21=   275                     9/23/21=   273                     9/9/21 =   277                     8/9/21 =   265    She resumed attending OA, then stopped during the winter.     She started treatment for benign paroxysmal positional vertigo treatment in December, 2020, but did not complete it.     She is not calculating calories.  Her weight  Has not been changing lately.     Wt Readings from Last 4 Encounters:   02/08/22 127 kg (280 lb)   07/13/21 120.2 kg (265 lb)   03/24/21 104 kg (229 lb 3.2 oz)   02/23/21 124.7 kg (275 lb)     Past Medical History:   Diagnosis Date     Depression, major, in partial remission (H)      Fibromyalgia syndrome      Gastro-oesophageal reflux disease      Hyperlipemia      Lymphedema       Multiple sclerosis (H)     tremors with MS, all four limbs and head     Multiple sclerosis, secondary progressive (H)      Sleep apnea      Vocal cord paralysis, unilateral complete       Past Surgical History:   Procedure Laterality Date     COLONOSCOPY N/A 7/17/2017    Procedure: COMBINED COLONOSCOPY, SINGLE OR MULTIPLE BIOPSY/POLYPECTOMY BY BIOPSY;;  Surgeon: Wong Beaulieu MD;  Location:  GI     COLONOSCOPY N/A 2/23/2018    Procedure: COMBINED COLONOSCOPY, SINGLE OR MULTIPLE BIOPSY/POLYPECTOMY BY BIOPSY;  COLONOSCOPY ;  Surgeon: Yessica Santana MD;  Location:  GI     ENT SURGERY      throat 1969, vocal cord surgery with skin grafting     ESOPHAGOSCOPY, GASTROSCOPY, DUODENOSCOPY (EGD), COMBINED N/A 12/16/2014    Procedure: COMBINED ENDOSCOPIC ULTRASOUND, ESOPHAGOSCOPY, GASTROSCOPY, DUODENOSCOPY (EGD), FINE NEEDLE ASPIRATE/BIOPSY;  Surgeon: Yessica Santana MD;  Location: [facility]     ESOPHAGOSCOPY, GASTROSCOPY, DUODENOSCOPY (EGD), COMBINED N/A 12/16/2014    Procedure: COMBINED ESOPHAGOSCOPY, GASTROSCOPY, DUODENOSCOPY (EGD), BIOPSY SINGLE OR MULTIPLE;  Surgeon: Yessica Santana MD;  Location: [facility]     LAPAROSCOPIC APPENDECTOMY  5/30/2013    Procedure: LAPAROSCOPIC APPENDECTOMY;;  Surgeon: Salvador Morris MD;  Location:  OR     LAPAROSCOPIC BIOPSY LIVER  5/30/2013    Procedure: LAPAROSCOPIC BIOPSY LIVER;;  Surgeon: Salvador Morris MD;  Location:  OR     LAPAROSCOPIC GASTRIC SLEEVE  5/30/2013    Procedure: LAPAROSCOPIC GASTRIC SLEEVE;  LAPAROSCOPIC SLEEVE GASTRECTOMY/ LAPARSCOPIC  APPENDECTOMY /LIVER BIOPSIES/LIVER CYST DRAINAGE;  Surgeon: Salvador Morris MD;  Location:  OR     ORTHOPEDIC SURGERY      R wrist 2010     Current Outpatient Medications   Medication     ARIPiprazole (ABILIFY) 5 MG tablet     atorvastatin (LIPITOR) 20 MG tablet     bismuth subsalicylate (PEPTO BISMOL) 262 MG chewable tablet     calcium polycarbophil (FIBERCON) 625 MG tablet     carBAMazepine  (TEGRETOL) 200 MG tablet     ciprofloxacin (CIPRO) 250 MG tablet     Cranberry 1000 MG CAPS     cyanocobalamin (VITAMIN B-12) 100 MCG tablet     denosumab (PROLIA) 60 MG/ML SOLN injection     docusate sodium (COLACE) 100 MG tablet     DULoxetine (CYMBALTA) 60 MG capsule     famotidine (PEPCID) 20 MG tablet     famotidine (PEPCID) 40 MG tablet     folic acid (FOLVITE) 400 MCG tablet     guaiFENesin (MUCINEX) 600 MG 12 hr tablet     ketoconazole (NIZORAL) 2 % cream     LACTOBACILLUS RHAMNOSUS, GG, PO     loperamide (IMODIUM A-D) 2 MG tablet     losartan (COZAAR) 50 MG tablet     Melatonin 10 MG TABS     methylphenidate (RITALIN) 20 MG tablet     mirtazapine (REMERON) 30 MG tablet     Multiple Vitamin (MULTIVITAMINS PO)     polyethylene glycol (MIRALAX) 17 GM/Dose powder     Psyllium (METAMUCIL) 0.36 g CAPS     traZODone (DESYREL) 100 MG tablet     triamcinolone (ARISTOCORT HP) 0.5 % external cream     triamcinolone (KENALOG) 0.5 % OINT ointment     trimethoprim (TRIMPEX) 100 MG tablet     trospium (SANCTURA) 20 MG tablet     vitamin B-Complex     Vitamin D, Cholecalciferol, 25 MCG (1000 UT) CAPS     No current facility-administered medications for this visit.     Medication: On Duloxetine and Mirtazpine and Trazodone and Ritalin. She discontinued Effexor and Abilify previously per Dr. Marquise Coleman, her psychiatrist.  On 4/10/19 she informed me that she started Abilify    PHQ-9 SCORE 2019 9/15/2019 2022   PHQ-9 Total Score MyChart 7 (Mild depression) 5 (Mild depression) 6 (Mild depression)   PHQ-9 Total Score 7 5 6     NAS-7 SCORE 3/29/2022 2022 2022   Total Score 3 (minimal anxiety) 4 (minimal anxiety) 5 (mild anxiety)   Total Score 3 4 5      SOCIAL HISTORY:  Ms. Padilla grew up in the [place] and is the oldest of 5 children in her family of origin.  Her father was a dentist who she believes was bipolar.  He  of lung cancer at age 72.  Her mother  of sepsis at age 80.  She had been  diagnosed with breast cancer when Ms. Padilla was 8.  She describes the marriage between her parents as misael.  She is 5 years older than her closest-aged sister and they are all within 4 years of each other.   Her daughter  12/3 and her mother   She tends to feel more depressed in winter.      Ms. Padilla attended [employer] for a year and later went to [employer] school at the [employer].  She felt that she could not continue her education to the point of graduation because she was working full time and raising her daughter.  Her daughter  in  at age 31 of liver failure secondary to an accidental Tylenol overdose.  She had been recovering from a hysterectomy.      Ms. 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  She had to retire at the time secondary to her MS.  She has never .  She has not dated,and states that if she were she would date, she would be interested in a relationship with a woman.  There is no history of  service or legal problems.  She expresses concern about her increasing social isolation.  She has at least 1 friend, Jael, who she met at a therapy group in .  She is active in facilitating groups for people with MS both in [place] and [place].  She lives alone and currently gets help from a home health aide, as well as home health nurse.     She sees Dr. Ban Coleman, psychiatrist and is trying to figure out best antidepressant regimen.  Dr. Coleman prescribes Cymbalta for it.  She feels she has been more depressed since the Fall, but doesn't think it is SAD.   She states that she has recommended case management.  She was hospitalized and has josette suicidal, but not for year.     Special dates that are triggers for sadness:  12/3  Anniversary of daughter's death ([place])    Anniversary of her mother's death      SESSION:  Mili participated in virtual psychotherapy.  We had intended to meet in person but agreed to meet virtually due to this writer's health.     Her physician is concerned about the fall, wonders about need for assisted living.  Given that she tripped over something, she disagrees.  She was concerned about shakiness in her legs, finding if she looks forward she is steadier, and has noticed improvement.     Her left let leg isn't responding to her efforts to lift it.  She can't get on the scale as a result.  She tried steroids, which didn't help.  She is not sure whetheris is a back issue or the MS.  It started April 10.  MRIs are pending. She did not respond to steroids so far.     Her psychiatrist will retire in October.   They are starting to plan a transition. She has seen her since 1978.      Her best friend, Jael, moved to [place], and she visited her, which went well.  She is looking forward to going to her nieces' graduation celebration.  She is worried about needing to use the scooter.      Weight Issues:  She previously stated she is still at 280.  She couldn't weigh herself because of her leg issue so doesn't know, but thins she gained weight.    She is having difficulty  sticking to the 1750 calorie ceiling about half the time, otherwise, going up to >  2000 but feels she has more days under 1750.  She feels she has less time for snacking.  Addressed at length today.  She is concerned she had a hard time getting imaging studies.  Her legs start shaking after a few minutes.  She is  Not able to bend down and pick things up off the floor.     She was vaccinated and boosted.    She continues to mask.  Thus far, she hasn't gotten  COVID.     She participated fully and appeared to derive benefit. Affect is positive.  Rapport was excellent.    This telehealth service is appropriate and effective for delivering services in light of the necessity for social distancing to mitigate the COVID-19 epidemic  and for conservation of PPE.     Patient has agreed to receiving telehealth services after being informed about it: Yes    Patient prefers video invitation/information to be sent by:   email      Time service started: 2:04  Time service ended:  2:57  Extended session due to complexity of case and length of interval.    Mode of transmission: [email or web address].me    Location of originating:  Home of the patient    Distance site:  Home office of provider for [facility]    The patient has been notified that:  Video visits will be conducted via a call with their psychologist to provide the care they need with a video conversation. Video visits may be billed at different rates depending on insurance coverage.  Patients are advised to please contact their insurance provider with any questions about their health insurance coverage. If during the course of a call the psychologist feels a video visit is not appropriate, patients will not be charged for this service.    DIAGNOSES:   Major depression, recurrent, moderate (F33.1).   Behavioral factors affecting morbid obesity (E66.01).     PLAN:  Ms. Padilla will return for in-person visit  6/21 @ 12  for problem-solving and supportive therapy consistent with treatment plan.  Goal per day now that she is tracking will change from 2000/day to 1750 consistently.   Preference for future meetings:             In-person prefers, even if masked              Remote         x     Either    Last treatment plan signed:5/27/22  Last Treatment plan review: 5/27/22  Treatment plan verbal Review due: 8/27/22  Treatment Review due: 5/27/23     Ramin Olivo, PhD, A.B.P.P., L.P.   Director, Health Psychology

## 2022-06-21 ENCOUNTER — VIRTUAL VISIT (OUTPATIENT)
Dept: PSYCHOLOGY | Facility: CLINIC | Age: 67
End: 2022-06-21
Payer: COMMERCIAL

## 2022-06-21 DIAGNOSIS — F33.0 MAJOR DEPRESSIVE DISORDER, RECURRENT EPISODE, MILD (H): Primary | ICD-10-CM

## 2022-06-21 DIAGNOSIS — E66.01 PSYCHOLOGICAL FACTORS AFFECTING MORBID OBESITY (H): ICD-10-CM

## 2022-06-21 DIAGNOSIS — F54 PSYCHOLOGICAL FACTORS AFFECTING MORBID OBESITY (H): ICD-10-CM

## 2022-06-21 PROCEDURE — 90834 PSYTX W PT 45 MINUTES: CPT | Mod: 95 | Performed by: PSYCHOLOGIST

## 2022-06-21 NOTE — PROGRESS NOTES
Health Psychology                    Department of Medicine  Izzy Montaño, Ph.D., L.P. [phone]                         [facility] Sherrie Crowe, Ph.D., L.P. [phone]                     [place] Mail Code 490   Ranulfo Whipple, Ph.D. [phone]      [address] [place] [address] Enriqueta Sorto, Ph.D., A.B.P.P., L.P. [phone]              [place] [address]           Ramin Olivo, Ph.D., A.B.P.P., L.P. [phone]      Kathie Galvan, Ph.D., L.P. [phone]  [facility]   Carmel Farnsworth, Ph.D., A.B.P.P., L.P. [phone]    [address] [place] [address] Psychology Telemental Health Progress Note    Ms. Padilla is a 66-year-old woman self-referred for psychological consultation because her therapist of the last 24 years retired.  She is seen for problem-solving and supportive therapy for depression and multiple health issues.    Intake:  7/28/16     HISTORY OF PRESENTING CONCERN (at intake):  Ms. Padilla reported at intake a  lengthy history of depression.  She saw a psychiatrist, Ban Coleman, at [facility], and was taking Abilify 7.5 mg, trazodone 500 mg, ripazepam 75 mg each day at bedtime, Tegretol 400 mg at bedtime and methylphenidate 10 mg b.i.d.  She discontineud ability and is now taking Rexulti 2 mg.  She has a history of hospitalizations including 3 at [facility] in the late 1990s, early 2000s when she had 2 courses of ECT lasting 7-10 sessions and approximately 3 other single session ECTs.  She stopped due to memory problems.  She kept with Dr. Coleman who she first met as an inpatient and has seen her for the past 18 years.  She had also been hospitalized psychiatrically at [facility] at age 23.  She previously worked with psychiatrist, Doug Sarabia for three years, stopping, in part, because she didn't feel he took her suicide attempt sufficiently seriously.  She reported her depression tends to vary.      MEDICAL HISTORY (at intake):  Ms. Padilla has a complex medical history.  She was diagnosed with MS in 1985.  Her psychiatrist at [facility] in [place], Dr. 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  She has used a scooter since 1992, using it more  than she had previously.  She also can use a walker.  She was diagnosed with fibromyalgia in 1997.   She was seen at the [facility] for her eye care. During 8th grade, she fell on a ski lift, injuring her larynx. She lot 65 lb. When she ws diagnosed with MS, kept it off for 3 years  It also felt good when she  lost weight due to her stomach surgery.     She started treatment for benign paroxysmal positional vertigo treatment in December, 2020, but did not complete it.   She fell3/19/21  and had problems with her legs.  Her legs became shaky and week.  They seem to be improving, but her leg hasn't regained full strength (starts shaking at 7 seconds; prefall was 19 seconds)      WEIGHT not taken but reported below.  Discussed goal of cutting back by 125 calories per day.   That would get her losing 2 lbs./month.  She has goal of 1750 claories/day. and states she met that goal aout half the days of the interview.  She had only 4 days down to goal in the last 18 days;  the rest were about 2100 janice.   She was at goal in the preceeding days for 11 of 17 days.    She is weighing herself weekly, using more vegetables for snack.  She plans to write down calories in the wilmer.  She thinks she gained weight- clothing is tighter.  She was 280 in December and on 6/21/22 thought she was probably more than that.     Weight    12/23/21 =  280     11/03/21 =  282                    10/13/21 =  275                       9/23/21 =  273                      9/09/21 =  277                      8/09/21 =  265    She resumed attending OA, then stopped during the winter.         She is not calculating calories.  Her weight  Has not been  changing lately.     Wt Readings from Last 4 Encounters:   02/08/22 127 kg (280 lb)   07/13/21 120.2 kg (265 lb)   03/24/21 104 kg (229 lb 3.2 oz)   02/23/21 124.7 kg (275 lb)     Past Medical History:   Diagnosis Date     Depression, major, in partial remission (H)      Fibromyalgia syndrome      Gastro-oesophageal reflux disease      Hyperlipemia      Lymphedema      Multiple sclerosis (H)     tremors with MS, all four limbs and head     Multiple sclerosis, secondary progressive (H)      Sleep apnea      Vocal cord paralysis, unilateral complete       Past Surgical History:   Procedure Laterality Date     COLONOSCOPY N/A 7/17/2017    Procedure: COMBINED COLONOSCOPY, SINGLE OR MULTIPLE BIOPSY/POLYPECTOMY BY BIOPSY;;  Surgeon: Wong Beaulieu MD;  Location:  GI     COLONOSCOPY N/A 2/23/2018    Procedure: COMBINED COLONOSCOPY, SINGLE OR MULTIPLE BIOPSY/POLYPECTOMY BY BIOPSY;  COLONOSCOPY ;  Surgeon: Yessica Santana MD;  Location:  GI     ENT SURGERY      throat 1969, vocal cord surgery with skin grafting     ESOPHAGOSCOPY, GASTROSCOPY, DUODENOSCOPY (EGD), COMBINED N/A 12/16/2014    Procedure: COMBINED ENDOSCOPIC ULTRASOUND, ESOPHAGOSCOPY, GASTROSCOPY, DUODENOSCOPY (EGD), FINE NEEDLE ASPIRATE/BIOPSY;  Surgeon: Yessica Santana MD;  Location: [facility]     ESOPHAGOSCOPY, GASTROSCOPY, DUODENOSCOPY (EGD), COMBINED N/A 12/16/2014    Procedure: COMBINED ESOPHAGOSCOPY, GASTROSCOPY, DUODENOSCOPY (EGD), BIOPSY SINGLE OR MULTIPLE;  Surgeon: Yessica Santana MD;  Location:  GI     LAPAROSCOPIC APPENDECTOMY  5/30/2013    Procedure: LAPAROSCOPIC APPENDECTOMY;;  Surgeon: Salvador Morris MD;  Location:  OR     LAPAROSCOPIC BIOPSY LIVER  5/30/2013    Procedure: LAPAROSCOPIC BIOPSY LIVER;;  Surgeon: Salvador Morris MD;  Location:  OR     LAPAROSCOPIC GASTRIC SLEEVE  5/30/2013    Procedure: LAPAROSCOPIC GASTRIC SLEEVE;  LAPAROSCOPIC SLEEVE GASTRECTOMY/ LAPARSCOPIC  APPENDECTOMY /LIVER BIOPSIES/LIVER  CYST DRAINAGE;  Surgeon: Salvador Morris MD;  Location: [facility] OR     ORTHOPEDIC SURGERY      R wrist 2010     Current Outpatient Medications   Medication     ARIPiprazole (ABILIFY) 5 MG tablet     atorvastatin (LIPITOR) 20 MG tablet     bismuth subsalicylate (PEPTO BISMOL) 262 MG chewable tablet     calcium polycarbophil (FIBERCON) 625 MG tablet     carBAMazepine (TEGRETOL) 200 MG tablet     ciprofloxacin (CIPRO) 250 MG tablet     Cranberry 1000 MG CAPS     cyanocobalamin (VITAMIN B-12) 100 MCG tablet     denosumab (PROLIA) 60 MG/ML SOLN injection     docusate sodium (COLACE) 100 MG tablet     DULoxetine (CYMBALTA) 60 MG capsule     famotidine (PEPCID) 20 MG tablet     famotidine (PEPCID) 40 MG tablet     folic acid (FOLVITE) 400 MCG tablet     guaiFENesin (MUCINEX) 600 MG 12 hr tablet     ketoconazole (NIZORAL) 2 % cream     LACTOBACILLUS RHAMNOSUS, GG, PO     loperamide (IMODIUM A-D) 2 MG tablet     losartan (COZAAR) 50 MG tablet     Melatonin 10 MG TABS     methylphenidate (RITALIN) 20 MG tablet     mirtazapine (REMERON) 30 MG tablet     Multiple Vitamin (MULTIVITAMINS PO)     polyethylene glycol (MIRALAX) 17 GM/Dose powder     Psyllium (METAMUCIL) 0.36 g CAPS     traZODone (DESYREL) 100 MG tablet     triamcinolone (ARISTOCORT HP) 0.5 % external cream     triamcinolone (KENALOG) 0.5 % OINT ointment     trimethoprim (TRIMPEX) 100 MG tablet     trospium (SANCTURA) 20 MG tablet     vitamin B-Complex     Vitamin D, Cholecalciferol, 25 MCG (1000 UT) CAPS     No current facility-administered medications for this visit.     Medication: On Duloxetine and Mirtazpine and Trazodone and Ritalin. She discontinued Effexor and Abilify previously per Dr. Marquise Coleman, her psychiatrist.  On 4/10/19 she informed me that she started Abilify    PHQ-9 SCORE 1/28/2019 9/15/2019 4/21/2022   PHQ-9 Total Score MyChart 7 (Mild depression) 5 (Mild depression) 6 (Mild depression)   PHQ-9 Total Score 7 5 6     NAS-7 SCORE 3/29/2022 4/19/2022  2022   Total Score 3 (minimal anxiety) 4 (minimal anxiety) 5 (mild anxiety)   Total Score 3 4 5      SOCIAL HISTORY:  Ms. Padilla grew up in the [place] and is the oldest of 5 children in her family of origin.  Her father was a dentist who she believes was bipolar.  He  of lung cancer at age 72.  Her mother  of sepsis at age 80.  She had been diagnosed with breast cancer when Ms. Padilla was 8.  She describes the marriage between her parents as misael.  She is 5 years older than her closest-aged sister and they are all within 4 years of each other.   Her daughter  12/3 and her mother   She tends to feel more depressed in winter.      Ms. Padilla attended [employer] for a year and later went to [employer] at the [employer].  She felt that she could not continue her education to the point of graduation because she was working full time and raising her daughter.  Her daughter  in  at age 31 of liver failure secondary to an accidental Tylenol overdose.  She had been recovering from a hysterectomy.      Ms. 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  She had to retire at the time secondary to her MS.  She has never .  She has not dated,and states that if she were she would date, she would be interested in a relationship with a woman.  There is no history of  service or legal problems.  She expresses concern about her increasing social isolation.  She has at least 1 friend, Jael, who she met at a therapy group in .  She is active in facilitating groups for people with MS both in [place] and [place].  She lives alone and currently gets help from a home health aide, as well as home health nurse.     She sees Dr. Ban Coleman, psychiatrist and is trying to figure out best antidepressant regimen.  Dr. Coleman  prescribes Cymbalta for it.  She feels she has been more depressed since the Fall, but doesn't think it is SAD.   She states that she has recommended case management.  She was hospitalized and has josette suicidal, but not for year.     Special dates that are triggers for sadness:  12/3  Anniversary of daughter's death ([place])  12/5  Anniversary of her mother's death     SESSION:  Mili participated in virtual psychotherapy.  We had intended to meet in person but agreed to meet virtually due to this writer's health.      She was concerned about shakiness in her legs, finding if she looks forward she is steadier, and has noticed improvement.     It started April 10. Her legs start shaking after a few minutes.  She is  Not able to bend down and pick things up off the floor.    She continues to be stressed by finances.     Her psychiatrist will retire in October.   They are starting to plan a transition. She has seen her since 1978.      Weight:  She stated she is still at 280, or now might be higher..  She couldn't weigh herself because of her leg issue so doesn't know, but thinks she gained weight.    She is having difficulty  sticking to the 1750 calorie ceiling about half the time, otherwise, going up to >  2000 but feels she has more days under 1750.  She feels she has less time for snacking.  Addressed at length today.  We reinforced goals and plan given that she thinks she has gained weight since lst session.    She was vaccinated and boosted.    She continues to mask.  Thus far, she hasn't gotten  COVID.    She did not report medication changes.     She participated fully and appeared to derive benefit. Affect is positive.  Rapport was excellent.  This telehealth service is appropriate and effective for delivering services in light of the necessity for social distancing to mitigate the COVID-19 epidemic and for conservation of PPE.     Patient has agreed to receiving telehealth services after being informed about  it: Yes    Patient prefers video invitation/information to be sent by:   email      Time service started:12:00  Time service ended:  1246      Mode of transmission: "[employer]"    Location of originating:  Home of the patient    Distance site:  Home office of provider for [facility]    The patient has been notified that:  Video visits will be conducted via a call with their psychologist to provide the care they need with a video conversation. Video visits may be billed at different rates depending on insurance coverage.  Patients are advised to please contact their insurance provider with any questions about their health insurance coverage. If during the course of a call the psychologist feels a video visit is not appropriate, patients will not be charged for this service.    DIAGNOSES:   Major depression, recurrent, moderate (F33.1).   Behavioral factors affecting morbid obesity (E66.01).     PLAN:  Ms. Padilla will return for video visit  for problem-solving and supportive therapy 8/9 @ 4 consistent with treatment plan.  Goal per day now that she is tracking daily with goal of 1750 consistently, cutting back on snacks, extending exercise.   Preference for future meetings:             In-person prefers, even if masked              Remote         x    Either    Last treatment plan signed:5/27/22  Last Treatment plan review: 5/27/22  Treatment plan verbal Review due: 8/27/22  Treatment Review due: 5/27/23     Ramin Olivo, PhD, A.B.P.P., L.P.   Director, Health Psychology

## 2022-07-18 ENCOUNTER — OFFICE VISIT (OUTPATIENT)
Dept: NEUROSURGERY | Facility: CLINIC | Age: 67
End: 2022-07-18
Payer: COMMERCIAL

## 2022-07-18 VITALS — DIASTOLIC BLOOD PRESSURE: 57 MMHG | OXYGEN SATURATION: 95 % | SYSTOLIC BLOOD PRESSURE: 115 MMHG | HEART RATE: 138 BPM

## 2022-07-18 DIAGNOSIS — S32.019D CLOSED FRACTURE OF FIRST LUMBAR VERTEBRA WITH ROUTINE HEALING, UNSPECIFIED FRACTURE MORPHOLOGY, SUBSEQUENT ENCOUNTER: Primary | ICD-10-CM

## 2022-07-18 PROCEDURE — 99213 OFFICE O/P EST LOW 20 MIN: CPT | Performed by: NEUROLOGICAL SURGERY

## 2022-07-18 ASSESSMENT — PAIN SCALES - GENERAL: PAINLEVEL: MODERATE PAIN (4)

## 2022-07-18 NOTE — PATIENT INSTRUCTIONS
Patient Next Steps:    Order placed for epidural steroid injection: L5-S1  The steroid can take 10-14 days to reach max effect  Please call our clinic if symptoms persist after this timeframe.  You can call to schedule injection at [phone]     Order placed for physical therapy. You can call the phone number highlighted in the order to schedule your appointment. Please call our clinic if symptoms persist after your course of physical therapy.  If you have not heard from the scheduling office within 2 business days, please call [phone] for [employer], [phone] for [employer] and [phone] for [employer].          Please call us if you have any further questions or concerns.    [facility]   Phone: [phone]  Fax: [phone]

## 2022-07-18 NOTE — PROGRESS NOTES
67F w/ hx MS, chronic left sided symptoms, was seen last year for L1 endplate fracture, returns for follow up.  Notes a few months of worsening episodic left sided weakness in the arm and leg.  Occasionally feels her left leg, particularly proximally, will give out on her.  Chronic throbbing right sided back pain.  New MR Cervical showed a possible cord lesion and mild C5-6 spondylotic change.  New MR Lumbar showed a new mild right L1-2 foraminal disc protrusion, and chronic L5-S1 spondylotic change.    Medical, surgical, family, and social history unchanged since prior exam.  Exam:  Constitutional:  Alert, well nourished, NAD.  HEENT: Normocephalic, atraumatic.   Pulm:  Without shortness of breath   CV:  No pitting edema of BLE.      Vital Signs:  /57   Pulse (!) 138   LMP 12/10/2010   SpO2 95%     Neurological:  Awake  Alert  Oriented x 3  Motor exam:        IP Q DF PF EHL  R   5  5   5   5    5  L   5  5   5   5    5     Able to spontaneously move L/E bilaterally  Sensation intact throughout all L/E dermatomes       A/P:     Will plan for PT  Lumbar JULITO

## 2022-07-18 NOTE — LETTER
7/18/2022         RE: Mili Padilla  [address]  [place] [place] [address]        Dear Colleague,    Thank you for referring your patient, Mili Padilla, to the [facility]. Please see a copy of my visit note below.    67F w/ hx MS, chronic left sided symptoms, was seen last year for L1 endplate fracture, returns for follow up.  Notes a few months of worsening episodic left sided weakness in the arm and leg.  Occasionally feels her left leg, particularly proximally, will give out on her.  Chronic throbbing right sided back pain.  New MR Cervical showed a possible cord lesion and mild C5-6 spondylotic change.  New MR Lumbar showed a new mild right L1-2 foraminal disc protrusion, and chronic L5-S1 spondylotic change.    Medical, surgical, family, and social history unchanged since prior exam.  Exam:  Constitutional:  Alert, well nourished, NAD.  HEENT: Normocephalic, atraumatic.   Pulm:  Without shortness of breath   CV:  No pitting edema of BLE.      Vital Signs:  /57   Pulse (!) 138   LMP 12/10/2010   SpO2 95%     Neurological:  Awake  Alert  Oriented x 3  Motor exam:        IP Q DF PF EHL  R   5  5   5   5    5  L   5  5   5   5    5     Able to spontaneously move L/E bilaterally  Sensation intact throughout all L/E dermatomes       A/P:     Will plan for PT  Lumbar JULITO        Again, thank you for allowing me to participate in the care of your patient.        Sincerely,        El Correa MD

## 2022-07-19 ENCOUNTER — TELEPHONE (OUTPATIENT)
Dept: PALLIATIVE MEDICINE | Facility: CLINIC | Age: 67
End: 2022-07-19

## 2022-07-19 ENCOUNTER — DOCUMENTATION ONLY (OUTPATIENT)
Dept: NEUROSURGERY | Facility: CLINIC | Age: 67
End: 2022-07-19

## 2022-07-19 NOTE — PROGRESS NOTES
Per pt request -    Faxed Dr. Correa's OV note July 19, 2022 to fax number [phone] Dr. Radha Ku    Right Fax confirmed at [phone]    Isabelle Carolina RN

## 2022-07-19 NOTE — TELEPHONE ENCOUNTER
Screening Questions for Radiology Injections:    Injection to be done at which interventional [facility] [place]    Procedure ordered by Madeline    Procedure ordered? L5-S1 Interlaminar JULITO    Transforaminal Cervical JULITO - [place] [facility] - No [facility] providers perform this procedure    What insurance would patient like us to bill for this procedure? [employer]    Worker's comp or MVA (motor vehicle accident) -Any injection DO NOT SCHEDULE and route to Kayden Ellis.      [employer] insurance - For SI joint injections, DO NOT SCHEDULE and route to Imelda Girish.       ALL BCBS, Hum[employer] and [employer] - DO NOT SCHEDULE and route to Imelda Stout    Is an  needed? No     Patient has a  home? (Review Grid) YES: ok    Any chance of pregnancy? NO   If YES, do NOT schedule and route to RN pool    Is patient actively being treated for cancer or immunocompromised? No  If YES, do NOT schedule and route to RN pool     Does the patient have a bleeding or clotting disorder? No     If YES, okay to schedule AND route to RN nurse pool. (For any patients with platelet count <100, RN must forward to provider)    Is patient taking any Blood Thinners OR Antiplatelet medication?  No   If hold needed, do NOT schedule, route to RN pool    Examples:   o Blood Thinners: (Coumadin, Warfarin, Jantoven, Pradaxa, Xarelto, Eliquis, Edoxaban, Enoxaparin, Lovenox, Heparin, Arixtra, Fondaparinux or Fragmin)  o Antiplatelet Medications: (Plavix, Brilinta or Effient)     Is patient taking any aspirin products (includes Excedrin and Fiorinal)? No     If more than 325mg/day, OK to schedule; Instruct Pt to decrease to less than 325 mg for 7 days AND route to RN pool    For CERVICAL procedures, hold all aspirin products for 6 days.     Tell Pt that if aspirin product is not held for 6 days, the procedure WILL BE cancelled.     Any allergies to contrast dye, iodine, shellfish, or numbing and steroid medications?  No    If YES, schedule and add allergy information to appointment notes AND route to the RN pool     If JULITO and Contrast Dye / Iodine Allergy? DO NOT SCHEDULE, route to RN pool    Allergies: Amoxicillin, Augmentin, Betaseron [interferon beta-1b], Dust mite extract, and Mold     Does patient have an active infection or treated for one within the past week? No    Is patient currently taking any antibiotics or steroid medications?  No     For patients on chronic, preventative, or prophylactic antibiotics, procedures may be scheduled.     For patients on antibiotics for active or recent infection, schedule 4 days after completed.    For patients on steroid medications, schedule 4 days after completed.     Has the patient had a flu shot or any other vaccinations within the past 7 days? No  If yes, explain that for the vaccine to work best they need to:       wait 1 week before and 1 week after getting any Vaccine    wait 1 week before and 2 weeks after getting Covid Vaccine #2 or BOOSTER    If patient has concerns about the timing, send to RN pool     Does patient have an MRI/CT?  YES: MRI Include Date and Check Procedure Scheduling Grid to see if required.    Was the MRI/CT done within the last 3 years?  Yes     If no route to RN Pool    If yes, where was the MRI/CT done? [facility]     Refer to PACS Transmissions list for approved external locations and route to RN Pool High Priority    If MRI was not done at approved external location do NOT schedule and route to RN pool.      If patient has an imaging disc, the injection MAY be scheduled but patient must bring disc to appt or appt will be cancelled.    Procedure Specific Instructions:    If celiac plexus block, informed patient NPO for 6 hours and that it is okay to take medications with sips of water, especially blood pressure medications Not Applicable         If this is for a cervical procedure, informed patient that aspirin needs to be held for 6 days.   Not  Applicable      Sedation, If Sedation is ordered for any procedure, patient must be NPO for 6 hours prior to procedure Not Applicable      If IV needed:    Do not schedule procedures requiring IV placement in the first appointment of the day or first appointment after lunch. Do NOT schedule at 0745, 0815 or 1245. ok    Instructed patient to arrive 30 minutes early for IV start if required. (Check Procedure Scheduling Grid)  Not Applicable    Reminders:    If you are started on any steroids or antibiotics between now and your appointment, you must contact us because the procedure may need to be cancelled.  Yes      As a reminder, receiving steroids can decrease your body's ability to fight infection.   Would you still like to move forward with scheduling the injection?  Yes      IV Sedation is not provided for procedures. If oral anti-anxiety medication is needed, the patient should request this from their referring provider.      Instruct patient to arrive as directed prior to the scheduled appointment time:  [place]: 30 minutes before; if IV needed 1 hour before       For patients 85 or older we recommend having an adult stay w/ them for the remainder of the day.       If the patient is Diabetic, remind them to bring their glucometer.      Does the patient have any questions?  NO  Halina Ellis  [facility]

## 2022-08-09 ENCOUNTER — VIRTUAL VISIT (OUTPATIENT)
Dept: PSYCHOLOGY | Facility: CLINIC | Age: 67
End: 2022-08-09
Payer: COMMERCIAL

## 2022-08-09 DIAGNOSIS — F33.1 MAJOR DEPRESSIVE DISORDER, RECURRENT EPISODE, MODERATE (H): Primary | ICD-10-CM

## 2022-08-09 DIAGNOSIS — F54 PSYCHOLOGICAL FACTORS AFFECTING MORBID OBESITY (H): ICD-10-CM

## 2022-08-09 DIAGNOSIS — E66.01 PSYCHOLOGICAL FACTORS AFFECTING MORBID OBESITY (H): ICD-10-CM

## 2022-08-09 PROCEDURE — 90834 PSYTX W PT 45 MINUTES: CPT | Mod: 95 | Performed by: PSYCHOLOGIST

## 2022-08-09 NOTE — PROGRESS NOTES
Health Psychology                    Department of Medicine  Izzy Montaño, Ph.D., L.P. [phone]                         [facility] Sherrie Crowe, Ph.D., L.P. [phone]                     [place] Mail Code 099   Ranulfo Whipple, Ph.D. [phone]      [address] [place] [address] Enriqueta Sorto, Ph.D., A.B.P.P., L.P. [phone]              [place] [address]           Ramin Olivo, Ph.D., A.B.P.P., L.P. [phone]      Kathie Galvan, Ph.D., L.P. [phone]  [facility]   Carmel Farnsworth, Ph.D., A.B.P.P., L.P. [phone]    [address] [place] [address] Psychology Telemental Health Progress Note    Intake:  7/28/16    Demographics   Age 67 year old   Sex female   Gender    Race  White   Ethnicity Not  or      Ms. Padilla is a woman self-referred for psychological consultation because her therapist of the last 24 years retired.  She wass seen for problem-solving and supportive therapy for depression and multiple health issues.     HISTORY OF PRESENTING CONCERN (at intake):  Ms. Padilla reported at intake a  lengthy history of depression.  She has been seeing a psychiatrist, Ban Coleman MD at [facility], and was taking Abilify 7.5 mg, trazodone 500 mg, ripazepam 75 mg each day at bedtime, Tegretol 400 mg at bedtime and methylphenidate 10 mg b.i.d.  She discontineud ability and is now taking Rexulti 2 mg.  She has a history of hospitalizations including 3 at [facility] in the late 1990s, early 2000s when she had 2 courses of ECT lasting 7-10 sessions and approximately 3 other single session ECTs.  She stopped due to memory problems.  She kept with Dr. Coleman who she first met as an inpatient and had seen her for the past 18 years.  She had also been hospitalized psychiatrically at [facility] at age 23.  She previously worked with psychiatrist, Doug Verjovsky for three years, stopping, in part, because  she didn't feel he took her suicide attempt sufficiently seriously.  She reported her depression tends to vary in intensity..     MEDICAL HISTORY (at intake):  Ms. Padilla has a complex medical history.  She was diagnosed with MS in 1985.  Her psychiatrist at [facility] in [place], Dr. 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  She has used a scooter since 1992, using it more  than she had previously.  She also can use a walker.  She was diagnosed with fibromyalgia in 1997.   She was seen at the [facility] for her eye care. During 8th grade, she fell on a ski lift, injuring her larynx. She lot 65 lb. When she ws diagnosed with MS, kept it off for 3 years  It also felt good when she  lost weight due to her stomach surgery.     She started treatment for benign paroxysmal positional vertigo treatment in December, 2020, but did not complete it.   She fell3/19/21  and had problems with her legs.  Her legs became shaky and week.  They seem to be improving, but her leg hasn't regained full strength (starts shaking at 7 seconds; prefall was 19 seconds)      WEIGHT not taken but reported below.  Discussed goal of cutting back by 125 calories per day.   That would get her losing 2 lbs./month.  She has goal of 1750 claories/day. and states she met that goal aout half the days of the interview.  She had only 4 days down to goal in the last 18 days;  the rest were about 2100 janice.   She was at goal in the preceeding days for 11 of 17 days.    She is weighing herself weekly, using more vegetables for snack.  She plans to write down calories in the wilmer.  She thinks she gained weight- clothing is tighter.  She was 280 in December and on 6/21/22 thought she was probably more than that.     Weight    12/23/21 =  280     11/03/21 =  282                    10/13/21 =  275                       9/23/21 =  273                      9/09/21 =  277                      8/09/21 =  265    She  resumed attending OA, then stopped during the winter.         She is not calculating calories.  Her weight  Has not been changing lately.     Wt Readings from Last 4 Encounters:   02/08/22 127 kg (280 lb)   07/13/21 120.2 kg (265 lb)   03/24/21 104 kg (229 lb 3.2 oz)   02/23/21 124.7 kg (275 lb)     Past Medical History:   Diagnosis Date     Depression, major, in partial remission (H)      Fibromyalgia syndrome      Gastro-oesophageal reflux disease      Hyperlipemia      Lymphedema      Multiple sclerosis (H)     tremors with MS, all four limbs and head     Multiple sclerosis, secondary progressive (H)      Sleep apnea      Vocal cord paralysis, unilateral complete       Past Surgical History:   Procedure Laterality Date     COLONOSCOPY N/A 7/17/2017    Procedure: COMBINED COLONOSCOPY, SINGLE OR MULTIPLE BIOPSY/POLYPECTOMY BY BIOPSY;;  Surgeon: Wong Beaulieu MD;  Location:  GI     COLONOSCOPY N/A 2/23/2018    Procedure: COMBINED COLONOSCOPY, SINGLE OR MULTIPLE BIOPSY/POLYPECTOMY BY BIOPSY;  COLONOSCOPY ;  Surgeon: Yessica Santana MD;  Location:  GI     ENT SURGERY      throat 1969, vocal cord surgery with skin grafting     ESOPHAGOSCOPY, GASTROSCOPY, DUODENOSCOPY (EGD), COMBINED N/A 12/16/2014    Procedure: COMBINED ENDOSCOPIC ULTRASOUND, ESOPHAGOSCOPY, GASTROSCOPY, DUODENOSCOPY (EGD), FINE NEEDLE ASPIRATE/BIOPSY;  Surgeon: Yessica Santana MD;  Location: [facility]     ESOPHAGOSCOPY, GASTROSCOPY, DUODENOSCOPY (EGD), COMBINED N/A 12/16/2014    Procedure: COMBINED ESOPHAGOSCOPY, GASTROSCOPY, DUODENOSCOPY (EGD), BIOPSY SINGLE OR MULTIPLE;  Surgeon: Yessica Santana MD;  Location:  GI     LAPAROSCOPIC APPENDECTOMY  5/30/2013    Procedure: LAPAROSCOPIC APPENDECTOMY;;  Surgeon: Salvador Morris MD;  Location:  OR     LAPAROSCOPIC BIOPSY LIVER  5/30/2013    Procedure: LAPAROSCOPIC BIOPSY LIVER;;  Surgeon: Salvador Morris MD;  Location:  OR     LAPAROSCOPIC GASTRIC SLEEVE  5/30/2013     Procedure: LAPAROSCOPIC GASTRIC SLEEVE;  LAPAROSCOPIC SLEEVE GASTRECTOMY/ LAPARSCOPIC  APPENDECTOMY /LIVER BIOPSIES/LIVER CYST DRAINAGE;  Surgeon: Salvador Morris MD;  Location:  OR     ORTHOPEDIC SURGERY      R wrist 2010     Current Outpatient Medications   Medication     ARIPiprazole (ABILIFY) 5 MG tablet     atorvastatin (LIPITOR) 20 MG tablet     bismuth subsalicylate (PEPTO BISMOL) 262 MG chewable tablet     calcium polycarbophil (FIBERCON) 625 MG tablet     carBAMazepine (TEGRETOL) 200 MG tablet     ciprofloxacin (CIPRO) 250 MG tablet     Cranberry 1000 MG CAPS     cyanocobalamin (VITAMIN B-12) 100 MCG tablet     denosumab (PROLIA) 60 MG/ML SOLN injection     docusate sodium (COLACE) 100 MG tablet     DULoxetine (CYMBALTA) 60 MG capsule     famotidine (PEPCID) 20 MG tablet     famotidine (PEPCID) 40 MG tablet     folic acid (FOLVITE) 400 MCG tablet     guaiFENesin (MUCINEX) 600 MG 12 hr tablet     ketoconazole (NIZORAL) 2 % cream     LACTOBACILLUS RHAMNOSUS, GG, PO     loperamide (IMODIUM A-D) 2 MG tablet     losartan (COZAAR) 50 MG tablet     Melatonin 10 MG TABS     methylphenidate (RITALIN) 20 MG tablet     mirtazapine (REMERON) 30 MG tablet     Multiple Vitamin (MULTIVITAMINS PO)     polyethylene glycol (MIRALAX) 17 GM/Dose powder     Psyllium (METAMUCIL) 0.36 g CAPS     traZODone (DESYREL) 100 MG tablet     triamcinolone (ARISTOCORT HP) 0.5 % external cream     triamcinolone (KENALOG) 0.5 % OINT ointment     trimethoprim (TRIMPEX) 100 MG tablet     trospium (SANCTURA) 20 MG tablet     vitamin B-Complex     Vitamin D, Cholecalciferol, 25 MCG (1000 UT) CAPS     No current facility-administered medications for this visit.     Medication: On Duloxetine and Mirtazpine and Trazodone and Ritalin. She discontinued Effexor and Abilify previously per Dr. Marquise Coleman, her psychiatrist.  On 4/10/19 she informed me that she started Abilify    PHQ-9 SCORE 1/28/2019 9/15/2019 4/21/2022   PHQ-9 Total Score MyChart 7  (Mild depression) 5 (Mild depression) 6 (Mild depression)   PHQ-9 Total Score 7 5 6     NAS-7 SCORE 3/29/2022 2022 2022   Total Score 3 (minimal anxiety) 4 (minimal anxiety) 5 (mild anxiety)   Total Score 3 4 5      SOCIAL HISTORY:  Ms. Padilla grew up in the [place] area and is the oldest of 5 children in her family of origin.  Her father was a dentist who she believes was bipolar.  He  of lung cancer at age 72.  Her mother  of sepsis at age 80.  She had been diagnosed with breast cancer when Ms. Padilla was 8.  She describes the marriage between her parents as misael.  She is 5 years older than her closest-aged sister and they are all within 4 years of each other.   Her daughter  12/3 and her mother   She tends to feel more depressed in winter.      Ms. Padilla attended [employer] for a year and later went to [employer] school at the [employer].  She felt that she could not continue her education to the point of graduation because she was working full time and raising her daughter.  Her daughter  in  at age 31 of liver failure secondary to an accidental Tylenol overdose.  She had been recovering from a hysterectomy.      Ms. 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  She had to retire at the time secondary to her MS.  She has never .  She has not dated,and states that if she were she would date, she would be interested in a relationship with a woman.  There is no history of  service or legal problems.  She expresses concern about her increasing social isolation.  She has at least 1 friend, Jael, who she met at a therapy group in .  She is active in facilitating groups for people with MS both in [place] and [place].  She lives alone and currently gets help from a home health aide, as well as home health nurse.      She sees Dr. Ban Coleman, psychiatrist and is trying to figure out best antidepressant regimen.  Dr. Coleman prescribes Cymbalta for it.  She feels she has been more depressed since the Fall, but doesn't think it is SAD.   She states that she has recommended case management.  She was hospitalized and has josette suicidal, but not for years.     Special dates that are triggers for sadness:  12/3  Anniversary of daughter's death ([place])  12/5  Anniversary of her mother's death     SESSION:  Mili participated in virtual psychotherapy.  We had intended to meet in person but agreed to meet virtually due to this writer's health.     Depression:  She feels she is not doing well, low interest, apathy, low energy.  She thinks her apathy devolved in to depression.   She will she Dr. Coleman in 3 weeks, is n a cancellation list, wants sto discuss medication changes with her.      She was concerned about shakiness in her legs, finding if she looks forward she is steadier, and has noticed improvemetn in [place].     It started April 10. Her legs start shaking after a few minutes.  She is  not able to bend down and pick things up off the floor. She states she is unclear whether it is MS or something structural  She saw an orthopedist who reviewed her films and didn't think it was spinal.  He thought it was due to MS.    She continues to be stressed by finances.     Her psychiatrist will retire in October.   They are starting to plan a transition. She has seen her since 1978.  No clarity yet who she will see, but strongly prefers a psychiatrist rather than an NP.     Weight:  She stated she had beenl at 280, and wonders if she gained weight.   She is having difficulty  sticking to the 1750 calorie ceiling about half the time, otherwise, going up to >  2000 but felt she has more days under 1750. Half of the days  She has been under 1750 calories.   She feels she has less time for snacking.  Addressed at length today.  We reinforced  goals and plan given that she thinks she has gained weight since lst session.    New goal 1700 calories per day  With 95% adherence. She wants to resume weighing herself weekly, but has had difficulty due to her problem lifting her foot. Discussed importance of not falling.     Sleep: Get's up at 7:30 ; Goes to bed 12:15 to 1:15.  Discussed goal of 11:30 bedtime.    She was vaccinated and boosted.    She continues to mask.  Thus far, she hasn't gotten  COVID.    She did not report medication changes, but wants to consider medication changes.    She participated fully and appeared to derive benefit. Affect is positive.  Rapport was excellent.  This telehealth service is appropriate and effective for delivering services in light of the necessity for social distancing to mitigate the COVID-19 epidemic and for conservation of PPE.     Patient has agreed to receiving telehealth services after being informed about it: Yes    Patient prefers video invitation/information to be sent by:   email    Time service started:  4:03  Time service ended:  4:54    Mode of transmission: Talking Data    Location of originating:  Home of the patient    Distance site:  Home office of provider for [facility]    The patient has been notified that:  Video visits will be conducted via a call with their psychologist to provide the care they need with a video conversation. Video visits may be billed at different rates depending on insurance coverage.  Patients are advised to please contact their insurance provider with any questions about their health insurance coverage. If during the course of a call the psychologist feels a video visit is not appropriate, patients will not be charged for this service.    DIAGNOSES:   Major depression, recurrent, moderate (F33.1).   Behavioral factors affecting morbid obesity (E66.01).     PLAN:  Ms. Padilla will return for video visit  for problem-solving and supportive therapy 8/31 @ 3 consistent with treatment plan.   Goal per day now that she is tracking daily with goal of 1700 with 95% consistencyly, cutting back on snacks, extending exercise.   Preference for future meetings:             In-person prefers, even if masked              Remote         x    Either    Last treatment plan signed:5/27/22  Last Treatment plan review: 5/27/22  Treatment plan verbal Review due: 8/27/22 (next session)  Treatment Review due: 5/27/23     Ramin Olivo, PhD, A.B.P.P., L.P.   Director, Health Psychology

## 2022-08-26 ENCOUNTER — ANCILLARY PROCEDURE (OUTPATIENT)
Dept: CT IMAGING | Facility: CLINIC | Age: 67
End: 2022-08-26
Attending: INTERNAL MEDICINE
Payer: COMMERCIAL

## 2022-08-26 DIAGNOSIS — Z86.0100 PERSONAL HISTORY OF COLONIC POLYPS: ICD-10-CM

## 2022-08-26 DIAGNOSIS — R19.4 CHANGE IN BOWEL HABITS: ICD-10-CM

## 2022-08-26 DIAGNOSIS — R19.00 SWELLING ABDOMEN: ICD-10-CM

## 2022-08-26 PROCEDURE — 74177 CT ABD & PELVIS W/CONTRAST: CPT

## 2022-08-26 PROCEDURE — 255N000002 HC RX 255 OP 636: Performed by: INTERNAL MEDICINE

## 2022-08-26 RX ADMIN — IOHEXOL 100 ML: 350 INJECTION, SOLUTION INTRAVENOUS at 13:54

## 2022-08-26 ASSESSMENT — ANXIETY QUESTIONNAIRES
2. NOT BEING ABLE TO STOP OR CONTROL WORRYING: SEVERAL DAYS
GAD7 TOTAL SCORE: 5
6. BECOMING EASILY ANNOYED OR IRRITABLE: NOT AT ALL
8. IF YOU CHECKED OFF ANY PROBLEMS, HOW DIFFICULT HAVE THESE MADE IT FOR YOU TO DO YOUR WORK, TAKE CARE OF THINGS AT HOME, OR GET ALONG WITH OTHER PEOPLE?: SOMEWHAT DIFFICULT
7. FEELING AFRAID AS IF SOMETHING AWFUL MIGHT HAPPEN: SEVERAL DAYS
1. FEELING NERVOUS, ANXIOUS, OR ON EDGE: SEVERAL DAYS
7. FEELING AFRAID AS IF SOMETHING AWFUL MIGHT HAPPEN: SEVERAL DAYS
5. BEING SO RESTLESS THAT IT IS HARD TO SIT STILL: NOT AT ALL
4. TROUBLE RELAXING: SEVERAL DAYS
IF YOU CHECKED OFF ANY PROBLEMS ON THIS QUESTIONNAIRE, HOW DIFFICULT HAVE THESE PROBLEMS MADE IT FOR YOU TO DO YOUR WORK, TAKE CARE OF THINGS AT HOME, OR GET ALONG WITH OTHER PEOPLE: SOMEWHAT DIFFICULT
GAD7 TOTAL SCORE: 5
3. WORRYING TOO MUCH ABOUT DIFFERENT THINGS: SEVERAL DAYS

## 2022-08-31 ENCOUNTER — VIRTUAL VISIT (OUTPATIENT)
Dept: PSYCHOLOGY | Facility: CLINIC | Age: 67
End: 2022-08-31
Payer: COMMERCIAL

## 2022-08-31 DIAGNOSIS — F54 PSYCHOLOGICAL FACTORS AFFECTING MORBID OBESITY (H): ICD-10-CM

## 2022-08-31 DIAGNOSIS — F33.1 MAJOR DEPRESSIVE DISORDER, RECURRENT EPISODE, MODERATE (H): Primary | ICD-10-CM

## 2022-08-31 DIAGNOSIS — E66.01 PSYCHOLOGICAL FACTORS AFFECTING MORBID OBESITY (H): ICD-10-CM

## 2022-08-31 PROCEDURE — 90837 PSYTX W PT 60 MINUTES: CPT | Mod: 95 | Performed by: PSYCHOLOGIST

## 2022-08-31 NOTE — PROGRESS NOTES
Health Psychology                    Department of Medicine  Izzy Montaño, Ph.D., L.P. [phone]                         [facility] Sherrie Crowe, Ph.D., L.P. [phone]                     [place] Mail Code 908   Ranulfo Whipple, Ph.D. [phone]      [address] [place] [address] Enriqueta Sorto, Ph.D., A.B.P.P., L.P. [phone]              [place] [address]           Ramin Olivo, Ph.D., A.B.P.P., L.P. [phone]      Kathie Galvan, Ph.D., L.P. [phone]  [facility]   Carmel Farnsworth, Ph.D., A.B.P.P., L.P. [phone]    [address] [place] [address] Psychology Telemental Health Progress Note    Intake:  7/28/16    Demographics   Age 67 year old   Sex female   Gender    Race  White   Ethnicity Not  or      Ms. Padilla is a woman self-referred for psychological consultation because her therapist of the last 24 years retired.  She wass seen for problem-solving and supportive therapy for depression and multiple health issues.     HISTORY OF PRESENTING CONCERN (at intake):  Ms. Padilla reported at intake a  lengthy history of depression.  She has been seeing a psychiatrist, Ban Coleman MD at [facility], and was taking Abilify 7.5 mg, trazodone 500 mg, ripazepam 75 mg each day at bedtime, Tegretol 400 mg at bedtime and methylphenidate 10 mg b.i.d.  She discontineud ability and is now taking Rexulti 2 mg.  She has a history of hospitalizations including 3 at [facility] in the late 1990s, early 2000s when she had 2 courses of ECT lasting 7-10 sessions and approximately 3 other single session ECTs.  She stopped due to memory problems.  She kept with Dr. Coleman who she first met as an inpatient and had seen her for the past 18 years.  She had also been hospitalized psychiatrically at [facility] at age 23.  She previously worked with psychiatrist, Doug Verjovsky for three years, stopping, in part, because  she didn't feel he took her suicide attempt sufficiently seriously.  She reported her depression tends to vary in intensity..     MEDICAL HISTORY (at intake):  Ms. Padilla has a complex medical history.  She was diagnosed with MS in 1985.  Her psychiatrist at [facility] in [place], Dr. 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  She has used a scooter since 1992, using it more  than she had previously.  She also can use a walker.  She was diagnosed with fibromyalgia in 1997.  She was seen at the [facility] for her eye care. During 8th grade, she fell on a ski lift, injuring her larynx. She lot 65 lb. When she ws diagnosed with MS, kept it off for 3 years  It also felt good when she  lost weight due to her stomach surgery.     She started treatment for benign paroxysmal positional vertigo treatment in December, 2020, but did not complete it.   She fell3/19/21  and had problems with her legs.  Her legs became shaky and week.  They seem to be improving, but her leg hasn't regained full strength (starts shaking at 7 seconds; prefall was 19 seconds)      WEIGHT not taken but reported below.  Discussed goal of cutting back by 125 calories per day.   That would get her losing 2 lbs./month.  She has goal of 1700 claories/day  95% of the time.Out of 24 days 5 were below 1700..  6 were  Above 1700 and below 2000;  Most were above 2000. She finds it hard not to snack...often out of boredom.    She is weighing herself weekly, using more vegetables for snack.  She plans to write down calories in the wilmer.  She thinks she gained weight- clothing is tighter.  She was 280 in December and on 6/21/22 thought she was probably more than that.       Weight  She doesn't like how she looks, or how hard it makes using walker, bending, balance.                    [id number]  8/31/22   She thinks she has gained weight;  She had to start using larger underwear.      12/23/21 =  280     11/03/21 =   282                    10/13/21 =  275                       9/23/21 =  273                      9/09/21 =  277                      8/09/21 =  265    She resumed attending OA, then stopped during the winter.     She is not calculating calories.  Her weight  Has not been changing lately.     Wt Readings from Last 4 Encounters:   02/08/22 127 kg (280 lb)   07/13/21 120.2 kg (265 lb)   03/24/21 104 kg (229 lb 3.2 oz)   02/23/21 124.7 kg (275 lb)     Past Medical History:   Diagnosis Date     Depression, major, in partial remission (H)      Fibromyalgia syndrome      Gastro-oesophageal reflux disease      Hyperlipemia      Lymphedema      Multiple sclerosis (H)     tremors with MS, all four limbs and head     Multiple sclerosis, secondary progressive (H)      Sleep apnea      Vocal cord paralysis, unilateral complete       Past Surgical History:   Procedure Laterality Date     COLONOSCOPY N/A 7/17/2017    Procedure: COMBINED COLONOSCOPY, SINGLE OR MULTIPLE BIOPSY/POLYPECTOMY BY BIOPSY;;  Surgeon: Wong Beaulieu MD;  Location:  GI     COLONOSCOPY N/A 2/23/2018    Procedure: COMBINED COLONOSCOPY, SINGLE OR MULTIPLE BIOPSY/POLYPECTOMY BY BIOPSY;  COLONOSCOPY ;  Surgeon: Yessica Santana MD;  Location:  GI     ENT SURGERY      throat 1969, vocal cord surgery with skin grafting     ESOPHAGOSCOPY, GASTROSCOPY, DUODENOSCOPY (EGD), COMBINED N/A 12/16/2014    Procedure: COMBINED ENDOSCOPIC ULTRASOUND, ESOPHAGOSCOPY, GASTROSCOPY, DUODENOSCOPY (EGD), FINE NEEDLE ASPIRATE/BIOPSY;  Surgeon: Yessica Santana MD;  Location:  GI     ESOPHAGOSCOPY, GASTROSCOPY, DUODENOSCOPY (EGD), COMBINED N/A 12/16/2014    Procedure: COMBINED ESOPHAGOSCOPY, GASTROSCOPY, DUODENOSCOPY (EGD), BIOPSY SINGLE OR MULTIPLE;  Surgeon: Yessica Santana MD;  Location:  GI     LAPAROSCOPIC APPENDECTOMY  5/30/2013    Procedure: LAPAROSCOPIC APPENDECTOMY;;  Surgeon: Savlador Morris MD;  Location:  OR     LAPAROSCOPIC BIOPSY  LIVER  5/30/2013    Procedure: LAPAROSCOPIC BIOPSY LIVER;;  Surgeon: Salvador Morris MD;  Location:  OR     LAPAROSCOPIC GASTRIC SLEEVE  5/30/2013    Procedure: LAPAROSCOPIC GASTRIC SLEEVE;  LAPAROSCOPIC SLEEVE GASTRECTOMY/ LAPARSCOPIC  APPENDECTOMY /LIVER BIOPSIES/LIVER CYST DRAINAGE;  Surgeon: Salvador Morris MD;  Location:  OR     ORTHOPEDIC SURGERY      R wrist 2010     Current Outpatient Medications   Medication     ARIPiprazole (ABILIFY) 5 MG tablet     atorvastatin (LIPITOR) 20 MG tablet     bismuth subsalicylate (PEPTO BISMOL) 262 MG chewable tablet     calcium polycarbophil (FIBERCON) 625 MG tablet     carBAMazepine (TEGRETOL) 200 MG tablet     ciprofloxacin (CIPRO) 250 MG tablet     Cranberry 1000 MG CAPS     cyanocobalamin (VITAMIN B-12) 100 MCG tablet     denosumab (PROLIA) 60 MG/ML SOLN injection     docusate sodium (COLACE) 100 MG tablet     DULoxetine (CYMBALTA) 60 MG capsule     famotidine (PEPCID) 20 MG tablet     famotidine (PEPCID) 40 MG tablet     folic acid (FOLVITE) 400 MCG tablet     guaiFENesin (MUCINEX) 600 MG 12 hr tablet     ketoconazole (NIZORAL) 2 % cream     LACTOBACILLUS RHAMNOSUS, GG, PO     loperamide (IMODIUM A-D) 2 MG tablet     losartan (COZAAR) 50 MG tablet     Melatonin 10 MG TABS     methylphenidate (RITALIN) 20 MG tablet     mirtazapine (REMERON) 30 MG tablet     Multiple Vitamin (MULTIVITAMINS PO)     polyethylene glycol (MIRALAX) 17 GM/Dose powder     Psyllium (METAMUCIL) 0.36 g CAPS     traZODone (DESYREL) 100 MG tablet     triamcinolone (ARISTOCORT HP) 0.5 % external cream     triamcinolone (KENALOG) 0.5 % OINT ointment     trimethoprim (TRIMPEX) 100 MG tablet     trospium (SANCTURA) 20 MG tablet     vitamin B-Complex     Vitamin D, Cholecalciferol, 25 MCG (1000 UT) CAPS     No current facility-administered medications for this visit.     Medication: On Duloxetine and Mirtazpine and Trazodone and Ritalin. She discontinued Effexor and Abilify previously per Dr. Camarillo  Jared, her psychiatrist.  On 4/10/19 she informed me that she started Abilify    PHQ-9 SCORE 2019 9/15/2019 2022   PHQ-9 Total Score MyChart 7 (Mild depression) 5 (Mild depression) 6 (Mild depression)   PHQ-9 Total Score 7 5 6     ANS-7 SCORE 2022   Total Score 4 (minimal anxiety) 5 (mild anxiety) 5 (mild anxiety)   Total Score 4 5 5      SOCIAL HISTORY:  Ms. Padilla grew up in the [facility] and is the oldest of 5 children in her family of origin.  Her father was a dentist who she believes was bipolar.  He  of lung cancer at age 72.  Her mother  of sepsis at age 80.  She had been diagnosed with breast cancer when Ms. Padilla was 8.  She describes the marriage between her parents as misael.  She is 5 years older than her closest-aged sister and they are all within 4 years of each other.   Her daughter  12/3 and her mother   She tends to feel more depressed in winter.      Ms. Padilla attended [employer] for a year and later went to night school at the [employer].  She felt that she could not continue her education to the point of graduation because she was working full time and raising her daughter.  Her daughter  in  at age 31 of liver failure secondary to an accidental Tylenol overdose.  She had been recovering from a hysterectomy.      Ms. 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  She had to retire at the time secondary to her MS.  She has never .  She has not dated,and states that if she were she would date, she would be interested in a relationship with a woman.  There is no history of  service or legal problems.  She expresses concern about her increasing social isolation.  She has at least 1 friend, Jael, who she met at a therapy group in .  She is active in facilitating groups  for people with MS both in [place] and [place].  She lives alone and currently gets help from a home health aide, as well as home health nurse.     She sees Dr. Ban Coleman, psychiatrist and is trying to figure out best antidepressant regimen.  Dr. Coleman prescribes Cymbalta for it.  She feels she has been more depressed since the Fall, but doesn't think it is SAD.   She states that she has recommended case management.  She was hospitalized and has josette suicidal, but not for years.     Special dates that are triggers for sadness:  12/3  Anniversary of daughter's death ([place])  12/5  Anniversary of her mother's death     SESSION:  Mili participated in virtual psychotherapy.  We had intended to meet in person but agreed to meet virtually due to this writer's health.     Depression:  Mixed today. She continues to be stressed by finances, health of self, and friend.     Her psychiatrist will retire in October.   They are starting to plan a transition. She has seen her since 1978.  She strongly prefers a psychiatrist rather than an NP.     Weight: Most of the session today.   She stated she had beenl at 280, and wonders if she gained weight.   She is having difficulty  sticking to the 1700 calorie ceiling about half the time, otherwise, going up to >  2000 but felt she has fewer days under 1700. Addressed at length today.  We reinforced goals and plan given that she thinks she has gained weight since lst session.  We appealed to her creativity andresourcefulness.  She is willing to commit to no chips or chocolate for a week.  Discuss need to increase fidelity to plan.   Discuss ed the 1700 as experimental goal as she is so sedentary it is hard to know whether it is a stringent enough goal . Focused on deleting snacking, especially at night, and on not buying the caloric foods.    Discussed tap water with a filter to save money over the bottled water she drinks. She thinks it is dangerous to handle a heavy  mac.  Discussed the AM or PM helpers dong it to help her out and save money.   She dislikes michele kasper in [employer].     Continuing the relatively new goal of 1700 calories per day  With 95% adherence. She wants to resume weighing herself weekly, but has had difficulty due to her problem lifting her foot. Discussed importance of not falling.     She thought in last interval only 5 days were <1700; 6 were between 69756 and 2000, and the rest were above.      Sleep: Get's up at 7:30 ; Goes to bed 12:15 to 1:15.  Discussed goal of 11:30 bedtime.  Not discussed today.    She was vaccinated and boosted.    She continues to mask.  Thus far, she hasn't gotten  COVID.    She did not report medication changes, but wants to consider medication changes.    She participated fully and appeared to derive benefit. Affect is positive.  Rapport was excellent.  This telehealth service is appropriate and effective for delivering services in light of the necessity for social distancing to mitigate the COVID-19 epidemic and for conservation of PPE.     Patient has agreed to receiving telehealth services after being informed about it: Yes    Patient prefers video invitation/information to be sent by:   email    Time service started:  4:03  Time service ended:  4:58    Mode of transmission: Clickatell    Location of originating:  Home of the patient    Distance site:  Home office of provider for [facility]    The patient has been notified that:  Video visits will be conducted via a call with their psychologist to provide the care they need with a video conversation. Video visits may be billed at different rates depending on insurance coverage.  Patients are advised to please contact their insurance provider with any questions about their health insurance coverage. If during the course of a call the psychologist feels a video visit is not appropriate, patients will not be charged for this service.    DIAGNOSES:   Major depression,  recurrent, moderate (F33.1).   Behavioral factors affecting morbid obesity (E66.01).     PLAN:  Ms. Padilla will return for video visit  for problem-solving and supportive therapy 9/29 @ 1 consistent with treatment plan.  Goal per day now that she is tracking daily with goal of 1700 with 95% consistencyly, cutting back on snacks, extending exercise.   Preference for future meetings:             In-person prefers, even if masked              Remote         x    Either    Last treatment plan signed:5/27/22  Last Treatment plan review: 5/27/22  Treatment plan verbal Review due: 8/27/22 (next session)  Treatment Review due: 5/27/23     Ramin Olivo, PhD, A.B.P.P., L.P.   Director, Health Psychology

## 2022-09-22 ASSESSMENT — ANXIETY QUESTIONNAIRES
GAD7 TOTAL SCORE: 3
7. FEELING AFRAID AS IF SOMETHING AWFUL MIGHT HAPPEN: NOT AT ALL
3. WORRYING TOO MUCH ABOUT DIFFERENT THINGS: NOT AT ALL
2. NOT BEING ABLE TO STOP OR CONTROL WORRYING: NOT AT ALL
6. BECOMING EASILY ANNOYED OR IRRITABLE: NOT AT ALL
GAD7 TOTAL SCORE: 3
8. IF YOU CHECKED OFF ANY PROBLEMS, HOW DIFFICULT HAVE THESE MADE IT FOR YOU TO DO YOUR WORK, TAKE CARE OF THINGS AT HOME, OR GET ALONG WITH OTHER PEOPLE?: SOMEWHAT DIFFICULT
4. TROUBLE RELAXING: MORE THAN HALF THE DAYS
1. FEELING NERVOUS, ANXIOUS, OR ON EDGE: SEVERAL DAYS
7. FEELING AFRAID AS IF SOMETHING AWFUL MIGHT HAPPEN: NOT AT ALL
IF YOU CHECKED OFF ANY PROBLEMS ON THIS QUESTIONNAIRE, HOW DIFFICULT HAVE THESE PROBLEMS MADE IT FOR YOU TO DO YOUR WORK, TAKE CARE OF THINGS AT HOME, OR GET ALONG WITH OTHER PEOPLE: SOMEWHAT DIFFICULT
5. BEING SO RESTLESS THAT IT IS HARD TO SIT STILL: NOT AT ALL

## 2022-09-28 ENCOUNTER — OFFICE VISIT (OUTPATIENT)
Dept: OPHTHALMOLOGY | Facility: CLINIC | Age: 67
End: 2022-09-28
Attending: OPHTHALMOLOGY
Payer: COMMERCIAL

## 2022-09-28 DIAGNOSIS — G35 MS (MULTIPLE SCLEROSIS) (H): ICD-10-CM

## 2022-09-28 DIAGNOSIS — H25.013 CORTICAL AGE-RELATED CATARACT OF BOTH EYES: Primary | ICD-10-CM

## 2022-09-28 DIAGNOSIS — H52.4 MYOPIA OF BOTH EYES WITH ASTIGMATISM AND PRESBYOPIA: ICD-10-CM

## 2022-09-28 DIAGNOSIS — H52.203 MYOPIA OF BOTH EYES WITH ASTIGMATISM AND PRESBYOPIA: ICD-10-CM

## 2022-09-28 DIAGNOSIS — H25.813 COMBINED FORMS OF AGE-RELATED CATARACT OF BOTH EYES: ICD-10-CM

## 2022-09-28 DIAGNOSIS — H35.341 LAMELLAR MACULAR HOLE OF RIGHT EYE: ICD-10-CM

## 2022-09-28 DIAGNOSIS — H52.13 MYOPIA OF BOTH EYES WITH ASTIGMATISM AND PRESBYOPIA: ICD-10-CM

## 2022-09-28 PROCEDURE — 92015 DETERMINE REFRACTIVE STATE: CPT

## 2022-09-28 PROCEDURE — 92134 CPTRZ OPH DX IMG PST SGM RTA: CPT | Performed by: OPHTHALMOLOGY

## 2022-09-28 PROCEDURE — G0463 HOSPITAL OUTPT CLINIC VISIT: HCPCS | Mod: 25

## 2022-09-28 PROCEDURE — 92014 COMPRE OPH EXAM EST PT 1/>: CPT | Mod: 25 | Performed by: OPHTHALMOLOGY

## 2022-09-28 PROCEDURE — 76516 ECHO EXAM OF EYE: CPT | Performed by: OPHTHALMOLOGY

## 2022-09-28 RX ORDER — TRIMETHOPRIM 100 MG/1
100 TABLET ORAL 2 TIMES DAILY
Status: ON HOLD | COMMUNITY
End: 2023-01-18

## 2022-09-28 ASSESSMENT — REFRACTION_WEARINGRX
OS_CYLINDER: +0.50
OS_SPHERE: -4.75
OD_ADD: +1.75
OD_CYLINDER: +1.75
OD_SPHERE: -6.25
OS_ADD: +1.75
OS_AXIS: 004
OD_AXIS: 095
SPECS_TYPE: PAL

## 2022-09-28 ASSESSMENT — REFRACTION_MANIFEST
OD_ADD: +2.50
OD_AXIS: 090
OS_CYLINDER: +0.50
OD_SPHERE: -6.00
OD_CYLINDER: +1.75
OS_ADD: +2.50
OS_AXIS: 160
OS_SPHERE: -5.00

## 2022-09-28 ASSESSMENT — CONF VISUAL FIELD
METHOD: COUNTING FINGERS
OD_NORMAL: 1
OS_NORMAL: 1

## 2022-09-28 ASSESSMENT — EXTERNAL EXAM - RIGHT EYE: OD_EXAM: NORMAL

## 2022-09-28 ASSESSMENT — VISUAL ACUITY
CORRECTION_TYPE: GLASSES
OS_CC: 20/40
METHOD: SNELLEN - LINEAR
OD_CC: 20/25

## 2022-09-28 ASSESSMENT — CUP TO DISC RATIO
OD_RATIO: 0.7
OS_RATIO: 0.5

## 2022-09-28 ASSESSMENT — TONOMETRY
OD_IOP_MMHG: 14
OS_IOP_MMHG: 13
IOP_METHOD: TONOPEN

## 2022-09-28 ASSESSMENT — EXTERNAL EXAM - LEFT EYE: OS_EXAM: NORMAL

## 2022-09-28 ASSESSMENT — SLIT LAMP EXAM - LIDS
COMMENTS: NORMAL
COMMENTS: NORMAL

## 2022-09-28 NOTE — PROGRESS NOTES
HPI     Cataract Evaluation     In both eyes.  Associated symptoms include blurred vision, glare and a need for brighter lights.  Treatments tried include no treatments.              Comments     Here for cataracts evaluation in both eyes. Over the last few years, vision has been gradually worsening in both eyes. Glare is bothersome. She requires more lighting when reading. Denies using lubricating drops. No eye pain.    Nicholas Aguillon COT 2:14 PM September 28, 2022             Last edited by Nicholas Aguillon on 9/28/2022  2:14 PM. (History)         Review of systems for the eyes was negative other than the pertinent positives/negatives listed in the HPI.      Assessment & Plan    HPI:  Mili Padilla is a 67 year old female with history of optic neuritis, MS, fibromyalgia, obesity, calculus of kidney, MDD, HLD, HTN, myopia with astigmatism and presbyopia presents from Dr. Pak for cataract evaluation. She notes gradually decreased vision over the years with bothersome glare. She requires more light when reading.     Denies redness, tearing, flashes or floaters.    Initially optic neuritis 1986 and subsequently diagnosed with MS.     POHx: optic neuritis, myopia with astigmatism and presbyopia  PMHx: optic neuritis, MS, fibromyalgia, obesity, calculus of kidney, MDD, HLD, HTN  Current Medications: ARIPiprazole (ABILIFY) 5 MG tablet, Take 5 mg by mouth daily   atorvastatin (LIPITOR) 20 MG tablet, Take 20 mg by mouth daily.  carBAMazepine (TEGRETOL) 200 MG tablet, Take 200 mg by mouth 2 times daily  Cranberry 1000 MG CAPS, Take by mouth daily  cyanocobalamin (VITAMIN B-12) 100 MCG tablet, Take 1,000 mcg by mouth once a week  denosumab (PROLIA) 60 MG/ML SOLN injection, Inject 60 mg Subcutaneous every 6 months On hold in U  docusate sodium (COLACE) 100 MG tablet, Take 100 mg by mouth 2 times daily  DULoxetine (CYMBALTA) 60 MG capsule, Take 120 mg by mouth daily   famotidine (PEPCID) 40 MG tablet, TAKE 1/2 TABLET BY MOUTH TWICE  DAILY  guaiFENesin (MUCINEX) 600 MG 12 hr tablet, Take 600 mg by mouth 2 times daily   ketoconazole (NIZORAL) 2 % cream, Apply topically 2 times daily as needed   loperamide (IMODIUM A-D) 2 MG tablet, Take 1 tablet (2 mg) by mouth 2 times daily  losartan (COZAAR) 50 MG tablet, Take 1 tablet (50 mg) by mouth daily  Melatonin 10 MG TABS, Take 10 mg by mouth At Bedtime Changed to extended release  methylphenidate (RITALIN) 20 MG tablet, Take 1 tablet (20 mg) by mouth daily  mirtazapine (REMERON) 30 MG tablet, Take 75 mg by mouth At Bedtime   Multiple Vitamin (MULTIVITAMINS PO), Take 2 tablets by mouth every evening. WITH IRON  Psyllium (METAMUCIL) 0.36 g CAPS, Metamucil  traZODone (DESYREL) 100 MG tablet, Take 600 mg by mouth At Bedtime   triamcinolone (ARISTOCORT HP) 0.5 % external cream, Apply topically At Bedtime To hands  trimethoprim (TRIMPEX) 100 MG tablet, Take 100 mg by mouth 2 times daily  trospium (SANCTURA) 20 MG tablet, Take 1 tablet (20 mg) by mouth 2 times daily (before meals)  vitamin B-Complex, Take 1 tablet by mouth daily   Vitamin D, Cholecalciferol, 25 MCG (1000 UT) CAPS, Take 1,000 mg by mouth 5 times daily  bismuth subsalicylate (PEPTO BISMOL) 262 MG chewable tablet, Take 1 tablet (262 mg) by mouth 3 times daily (Patient not taking: Reported on 2/8/2022)  calcium polycarbophil (FIBERCON) 625 MG tablet, Take 4 tablets by mouth 2 times daily (Patient not taking: Reported on 2/8/2022)  ciprofloxacin (CIPRO) 250 MG tablet, TAKE 1 TABLET BY MOUTH TWO TIMES DAILY  famotidine (PEPCID) 20 MG tablet, Take 20 mg by mouth 2 times daily  folic acid (FOLVITE) 400 MCG tablet, Take 400 mcg by mouth 2 times daily  LACTOBACILLUS RHAMNOSUS, GG, PO, Give 1 tablet by mouth two times a day for loose stool 30 billion units  polyethylene glycol (MIRALAX) 17 GM/Dose powder, Take 17 g (1 capful) by mouth daily  triamcinolone (KENALOG) 0.5 % OINT ointment, Apply topically 2 times daily as needed for irritation To  legs  trimethoprim (TRIMPEX) 100 MG tablet, Take 100 mg by mouth daily    No current facility-administered medications on file prior to visit.    FHx: refractive error   PSHx: denies history of ocular surgeries       Current Eye Medications:  None    Assessment & Plan:  (H25.013) Cortical age-related cataract of both eyes  (H25.813) Combined forms of age-related cataract of both eyes  Special equipment/needs:  Eye: right  Anesthesia:topical   Dilates to: 7mm  Iris expansion:  No  Pseudoexfoliation: No  Trypan Blue: No  Trauma: No    Able lay to flat: Yes  Blood Thinner: No   Tamsulosin: No  DM: No  Guttae: No    Dominant Eye: right    Plan: [place] right eye, -0.75 left eye     Patient deferred toric  We discussed the benefits, alternatives, and risks to cataract surgery, including blindness, decreased vision, and need for additional surgeries; and informed consent was obtained.  Proceed with CE/IOL right eye followed by left eye .      (H35.341) Lamellar macular hole of right eye  Noted on OCT mac today and from 5/17/16 with minimal change  May affect final BEST CORRECTED VISUAL ACUITY  Repeat oct mac q6 months    (H52.13,  H52.203,  H52.4) Myopia of both eyes with astigmatism and presbyopia  Hold pending Cataract extraction/IOL    Multiple Sclerosis  Patient has a motorized scooter, is able to transfer  Sees Dr. Pak  Diagnosed 1986 with optic neuritis    Return for POD0.        Flaco Pappas MD     Attending Physician Attestation:  Complete documentation of historical and exam elements from today's encounter can be found in the full encounter summary report (not reduplicated in this progress note).  I personally obtained the chief complaint(s) and history of present illness.  I confirmed and edited as necessary the review of systems, past medical/surgical history, family history, social history, and examination findings as documented by others; and I examined the patient myself.  I personally reviewed the  relevant tests, images, and reports as documented above.  I formulated and edited as necessary the assessment and plan and discussed the findings and management plan with the patient and family. - Flaco Pappas MD

## 2022-09-28 NOTE — NURSING NOTE
Chief Complaints and History of Present Illnesses   Patient presents with     Cataract Evaluation     Chief Complaint(s) and History of Present Illness(es)     Cataract Evaluation     Laterality: both eyes    Associated symptoms: blurred vision, glare and a need for brighter lights    Treatments tried: no treatments              Comments     Here for cataracts evaluation in both eyes. Over the last few years, vision has been gradually worsening in both eyes. Glare is bothersome. She requires more lighting when reading. Denies using lubricating drops. No eye pain.    Nicholas Aguillon COT 2:14 PM September 28, 2022

## 2022-09-29 ENCOUNTER — TELEPHONE (OUTPATIENT)
Dept: OPHTHALMOLOGY | Facility: CLINIC | Age: 67
End: 2022-09-29

## 2022-09-29 ENCOUNTER — VIRTUAL VISIT (OUTPATIENT)
Dept: PSYCHOLOGY | Facility: CLINIC | Age: 67
End: 2022-09-29
Payer: COMMERCIAL

## 2022-09-29 DIAGNOSIS — E66.01 PSYCHOLOGICAL FACTORS AFFECTING MORBID OBESITY (H): ICD-10-CM

## 2022-09-29 DIAGNOSIS — F33.1 MAJOR DEPRESSIVE DISORDER, RECURRENT EPISODE, MODERATE (H): Primary | ICD-10-CM

## 2022-09-29 DIAGNOSIS — F54 PSYCHOLOGICAL FACTORS AFFECTING MORBID OBESITY (H): ICD-10-CM

## 2022-09-29 PROBLEM — H25.813 COMBINED FORMS OF AGE-RELATED CATARACT OF BOTH EYES: Status: ACTIVE | Noted: 2022-09-29

## 2022-09-29 PROCEDURE — 90837 PSYTX W PT 60 MINUTES: CPT | Mod: 95 | Performed by: PSYCHOLOGIST

## 2022-09-29 NOTE — PROGRESS NOTES
Health Psychology                    Department of Medicine  Izzy Montaño, Ph.D., L.P. [phone]                         [facility] Sherrie Crowe, Ph.D., L.P. [phone]                     [place] Mail Code 605   Ranulfo Whipple, Ph.D. [phone]      [address] [place] [address] Enriqueta Sorto, Ph.D., A.B.P.P., L.P. [phone]              [place] [address]           Raimn Olivo, Ph.D., A.B.P.P., L.P. [phone]      Kathie Galvan, Ph.D., L.P. [phone]  [facility]   Carmel Farnsworth, Ph.D., A.B.P.P., L.P. [phone]    [address] [place] [address] Psychology Telemental Health Progress Note    Intake:  7/28/16    Demographics   Age 67 year old   Sex female   Race White   Ethnicity Not  or      Ms. Padilla is a woman self-referred for psychological consultation because her therapist of the last 24 years retired.  She wass seen for problem-solving and supportive therapy for depression and multiple health issues.     HISTORY OF PRESENTING CONCERN (at intake):  Ms. Padilla reported at intake a  lengthy history of depression.  She has been seeing a psychiatrist, Ban Coleman MD at [facility], and was taking Abilify 7.5 mg, trazodone 500 mg, ripazepam 75 mg each day at bedtime, Tegretol 400 mg at bedtime and methylphenidate 10 mg b.i.d.  She discontineud ability and is now taking Rexulti 2 mg.  She has a history of hospitalizations including 3 at [facility] in the late 1990s, early 2000s when she had 2 courses of ECT lasting 7-10 sessions and approximately 3 other single session ECTs.  She stopped due to memory problems.  She kept with Dr. Coleman who she first met as an inpatient and had seen her for the past 18 years.  She had also been hospitalized psychiatrically at [facility] at age 23.  She previously worked with psychiatrist, Doug Sarabia for three years, stopping, in part, because she didn't  feel he took her suicide attempt sufficiently seriously.  She reported her depression tends to vary in intensity..     MEDICAL HISTORY (at intake):  Ms. Padilla has a complex medical history.  She was diagnosed with MS in 1985.  Her psychiatrist at [facility] in [place], Dr. 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  She has used a scooter since 1992, using it more  than she had previously.  She also can use a walker.  She was diagnosed with fibromyalgia in 1997.  She was seen at the [facility] for her eye care. During 8th grade, she fell on a ski lift, injuring her larynx. She lot 65 lb. When she ws diagnosed with MS, kept it off for 3 years  It also felt good when she  lost weight due to her stomach surgery.     She started treatment for benign paroxysmal positional vertigo treatment in December, 2020, but did not complete it.   She fell3/19/21  and had problems with her legs.  Her legs became shaky and week.  They seem to be improving, but her leg hasn't regained full strength (starts shaking at 7 seconds; prefall was 19 seconds)      WEIGHT not taken but reported below.  Discussed goal of cutting back by 125 calories per day.   That would get her losing 2 lbs./month.  She has goal of 1700 claories/day  95% of the time.Out of 24 days 5 were below 1700..  6 were  Above 1700 and below 2000;  Most were above 2000. She finds it hard not to snack...often out of boredom.    She is weighing herself weekly, using more vegetables for snack.  She plans to write down calories in the wilmer.  She thinks she gained weight- clothing is tighter.  She was 280 in December and on 6/21/22 thought she was probably more than that.       Weight  She doesn't like how she looks, or how hard it makes using walker, bending, balance.   9/29/22 she thinks most recent weight was 282; she guesses she lost a pound.                    She has trouble lifting foot and holding balance so difficult to  weight;  18/29 (62% of days)   8/31/22   She thinks she has gained weight;  She had to start using larger underwear.  Last time was 5 days/30  (17% of days)     12/23/21 =  280     11/03/21 =  282                    10/13/21 =  275                       9/23/21 =  273                      9/09/21 =  277                      8/09/21 =  265    She resumed attending OA, then stopped during the winter.     She is not calculating calories.  Her weight  Has not been changing lately.     Wt Readings from Last 4 Encounters:   02/08/22 127 kg (280 lb)   07/13/21 120.2 kg (265 lb)   03/24/21 104 kg (229 lb 3.2 oz)   02/23/21 124.7 kg (275 lb)     Past Medical History:   Diagnosis Date     Depression, major, in partial remission (H)      Fibromyalgia syndrome      Gastro-oesophageal reflux disease      Hyperlipemia      Lymphedema      Multiple sclerosis (H)     tremors with MS, all four limbs and head     Multiple sclerosis, secondary progressive (H)      Sleep apnea      Vocal cord paralysis, unilateral complete       Past Surgical History:   Procedure Laterality Date     COLONOSCOPY N/A 7/17/2017    Procedure: COMBINED COLONOSCOPY, SINGLE OR MULTIPLE BIOPSY/POLYPECTOMY BY BIOPSY;;  Surgeon: Wong Beaulieu MD;  Location:  GI     COLONOSCOPY N/A 2/23/2018    Procedure: COMBINED COLONOSCOPY, SINGLE OR MULTIPLE BIOPSY/POLYPECTOMY BY BIOPSY;  COLONOSCOPY ;  Surgeon: Yessica Santana MD;  Location:  GI     ENT SURGERY      throat 1969, vocal cord surgery with skin grafting     ESOPHAGOSCOPY, GASTROSCOPY, DUODENOSCOPY (EGD), COMBINED N/A 12/16/2014    Procedure: COMBINED ENDOSCOPIC ULTRASOUND, ESOPHAGOSCOPY, GASTROSCOPY, DUODENOSCOPY (EGD), FINE NEEDLE ASPIRATE/BIOPSY;  Surgeon: Yessica Santana MD;  Location:  GI     ESOPHAGOSCOPY, GASTROSCOPY, DUODENOSCOPY (EGD), COMBINED N/A 12/16/2014    Procedure: COMBINED ESOPHAGOSCOPY, GASTROSCOPY, DUODENOSCOPY (EGD), BIOPSY SINGLE OR MULTIPLE;  Surgeon: Max  Yessica Rollins MD;  Location:  GI     LAPAROSCOPIC APPENDECTOMY  5/30/2013    Procedure: LAPAROSCOPIC APPENDECTOMY;;  Surgeon: Salvaodr Morris MD;  Location:  OR     LAPAROSCOPIC BIOPSY LIVER  5/30/2013    Procedure: LAPAROSCOPIC BIOPSY LIVER;;  Surgeon: Salvador Morris MD;  Location:  OR     LAPAROSCOPIC GASTRIC SLEEVE  5/30/2013    Procedure: LAPAROSCOPIC GASTRIC SLEEVE;  LAPAROSCOPIC SLEEVE GASTRECTOMY/ LAPARSCOPIC  APPENDECTOMY /LIVER BIOPSIES/LIVER CYST DRAINAGE;  Surgeon: Salvador Morris MD;  Location:  OR     ORTHOPEDIC SURGERY      R wrist 2010     Current Outpatient Medications   Medication     ARIPiprazole (ABILIFY) 5 MG tablet     atorvastatin (LIPITOR) 20 MG tablet     bismuth subsalicylate (PEPTO BISMOL) 262 MG chewable tablet     calcium polycarbophil (FIBERCON) 625 MG tablet     carBAMazepine (TEGRETOL) 200 MG tablet     ciprofloxacin (CIPRO) 250 MG tablet     Cranberry 1000 MG CAPS     cyanocobalamin (VITAMIN B-12) 100 MCG tablet     denosumab (PROLIA) 60 MG/ML SOLN injection     docusate sodium (COLACE) 100 MG tablet     DULoxetine (CYMBALTA) 60 MG capsule     famotidine (PEPCID) 20 MG tablet     famotidine (PEPCID) 40 MG tablet     folic acid (FOLVITE) 400 MCG tablet     guaiFENesin (MUCINEX) 600 MG 12 hr tablet     ketoconazole (NIZORAL) 2 % cream     LACTOBACILLUS RHAMNOSUS, GG, PO     loperamide (IMODIUM A-D) 2 MG tablet     losartan (COZAAR) 50 MG tablet     Melatonin 10 MG TABS     methylphenidate (RITALIN) 20 MG tablet     mirtazapine (REMERON) 30 MG tablet     Multiple Vitamin (MULTIVITAMINS PO)     polyethylene glycol (MIRALAX) 17 GM/Dose powder     Psyllium (METAMUCIL) 0.36 g CAPS     traZODone (DESYREL) 100 MG tablet     triamcinolone (ARISTOCORT HP) 0.5 % external cream     triamcinolone (KENALOG) 0.5 % OINT ointment     trimethoprim (TRIMPEX) 100 MG tablet     trimethoprim (TRIMPEX) 100 MG tablet     trospium (SANCTURA) 20 MG tablet     vitamin B-Complex     Vitamin D,  Cholecalciferol, 25 MCG (1000 UT) CAPS     No current facility-administered medications for this visit.     Medication: On Duloxetine and Mirtazpine and Trazodone and Ritalin. She discontinued Effexor and Abilify previously per Dr. Marquise Coleman, her psychiatrist.  On 4/10/19 she informed me that she started Abilify    PHQ-9 SCORE 2019 9/15/2019 2022   PHQ-9 Total Score MyChart 7 (Mild depression) 5 (Mild depression) 6 (Mild depression)   PHQ-9 Total Score 7 5 6     NAS-7 SCORE 2022   Total Score 5 (mild anxiety) 5 (mild anxiety) 3 (minimal anxiety)   Total Score 5 5 3      SOCIAL HISTORY:  Ms. Padilal grew up in the [place] area and is the oldest of 5 children in her family of origin.  Her father was a dentist who she believes was bipolar.  He  of lung cancer at age 72.  Her mother  of sepsis at age 80.  She had been diagnosed with breast cancer when Ms. Padilla was 8.  She describes the marriage between her parents as misael.  She is 5 years older than her closest-aged sister and they are all within 4 years of each other.   Her daughter  12/3 and her mother   She tends to feel more depressed in winter.      Ms. Padilla attended [employer] for a year and later went to night school at the [employer].  She felt that she could not continue her education to the point of graduation because she was working full time and raising her daughter.  Her daughter  in  at age 31 of liver failure secondary to an accidental Tylenol overdose.  She had been recovering from a hysterectomy.      Ms. 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  She had to retire at the time secondary to her MS.  She has never .  She has not dated,and states that if she were she would date, she would be interested in a relationship with a  "woman.  There is no history of  service or legal problems.  She expresses concern about her increasing social isolation.  She has at least 1 friend, Jael, who she met at a therapy group in 1984.  She is active in facilitating groups for people with MS both in [place] and [place].  She lives alone and currently gets help from a home health aide, as well as home health nurse.     She sees Dr. Ban Coleman, psychiatrist and is trying to figure out best antidepressant regimen.  Dr. Coleman prescribes Cymbalta for it.  She feels she has been more depressed since the Fall, but doesn't think it is SAD.   She states that she has recommended case management.  She was hospitalized and has been suicidal, but not for years.      Special dates that are triggers for sadness:  12/3  Anniversary of daughter's death ([place])  12/5  Anniversary of her mother's death     SESSION:  Mili participated in virtual psychotherapy.  We had intended to meet in person but agreed to meet virtually due to this writer's health.     Depression:  Mixed today; feeling depressed still.  \"I am not doing well with hygiene- physical, oral,  sleep, social\".\"I have to force myself to take a shower.\"  She has increased from 1 to 3 showers/week. She continues to be stressed by finances, health of self, and friend. Abilify did not help. May increase methylphenidate.     Her psychiatrist, who she has seen for 40 years,  will retire in October.   They are starting to plan a transition. She has seen her since 1978.  She strongly prefers a psychiatrist rather than an NP. She will see meseret Clark psychiatrist, at [facility] with appointment in early November.     Weight:  Discussed she had been at 280, and wonders if she gained weight.   She thinks she may have lost a pound in the interval.   She is having difficulty  sticking to the 1700 calorie ceiling about half the time, did better this month. At times,, going up to >  " 2000 but felt she has more days under 1700.  We reinforced goals and plan given that she thinks she has gained weight since lst session.  We appealed to her creativity andresourcefulness.  She is willing to commit to no chips or chocolate for a week and during the interval had fewer snacks.  Discussed need to increase fidelity to plan (from 62% today).   Discussed the 1700 as experimental goal as she is so sedentary it is hard to know whether it is a stringent enough goal . Focused on deleting snacking, especially at night, and on not buying the caloric foods.    Eighteen of last 30 days were 1799 calories or lower.    Eleven were >1800; some as high as 2000    Discussed tap water with a filter to save money over the bottled water she drinks. She thinks it is dangerous to handle a heavy pitcher.  Discussed the AM or PM helpers dong it to help her out and save money.   She dislikes cholorine taste in MPLS H2O.     Continuing the relatively new goal of 1700 calories per day  With 95% adherence. She wants to resume weighing herself weekly, but has had difficulty due to her problem lifting her foot. Discussed importance of not falling. She thought in last interval only 5 days were <1700; 6 were between 09791 and 2000, and the rest were above.      Sleep: Get's up at 7:30 ; Goes to bed 12:45  to 1:15.   Not doig as well with sleep hygiene.   She is willing to set goal of 12:30 rather than 11:30 which she hasn't met.     Health:  She was vaccinated and boosted.    She continues to mask.  Thus far, she hasn't gotten  COVID.  She has some kidney stones, may need surgery, tentativey scheduled for 12/15.  Cataract surgery anticipated for March.   She is concerned increasing the Methylphenidate may increase her blood pressure.  She hurt her leg in March, didn't use it much through June due to shakiness/weakness.   She needs to get to PT and was encouraged to do so.     Social:   Looking forward to  Knitting classes and [employer].  Her PCA is helping a lot.  She hopes to see Jael towards the end of the year.     She participated fully and appeared to derive benefit. Affect is positive.  Rapport was excellent.  This telehealth service is appropriate and effective for delivering services in light of the necessity for social distancing to mitigate the COVID-19 epidemic and for conservation of PPE.     Patient has agreed to receiving telehealth services after being informed about it: Yes    Patient prefers video invitation/information to be sent by:   email    Time service started:  1:02  Time service ended:  1:55    Mode of transmission: [employer]    Location of originating:  Home of the patient    Distance site:  Home office of provider for [facility]    The patient has been notified that:  Video visits will be conducted via a call with their psychologist to provide the care they need with a video conversation. Video visits may be billed at different rates depending on insurance coverage.  Patients are advised to please contact their insurance provider with any questions about their health insurance coverage. If during the course of a call the psychologist feels a video visit is not appropriate, patients will not be charged for this service.    DIAGNOSES:   Major depression, recurrent, moderate (F33.1).   Behavioral factors affecting morbid obesity (E66.01).     PLAN:  Ms. Padilla will return for video visit  for problem-solving and supportive therapy 11/4 @  12 consistent with treatment plan.  Goal per day now that she is tracking daily with goal of 1700 with 95% consistencyly, cutting back on snacks, extending exercise.   Preference for future meetings:             In-person prefers, even if masked              Remote         x    Either    Last treatment plan signed:5/27/22  Last Treatment plan review: 9/29//22  Treatment plan verbal Review due: 12/29//22  Treatment Review due: 5/27/23     Ramin Olivo, PhD, A.B.P.P., L.P.   Director, Health  Psychology

## 2022-10-16 ENCOUNTER — HEALTH MAINTENANCE LETTER (OUTPATIENT)
Age: 67
End: 2022-10-16

## 2022-11-03 ASSESSMENT — ANXIETY QUESTIONNAIRES
GAD7 TOTAL SCORE: 3
7. FEELING AFRAID AS IF SOMETHING AWFUL MIGHT HAPPEN: NOT AT ALL
3. WORRYING TOO MUCH ABOUT DIFFERENT THINGS: NOT AT ALL
4. TROUBLE RELAXING: SEVERAL DAYS
7. FEELING AFRAID AS IF SOMETHING AWFUL MIGHT HAPPEN: NOT AT ALL
8. IF YOU CHECKED OFF ANY PROBLEMS, HOW DIFFICULT HAVE THESE MADE IT FOR YOU TO DO YOUR WORK, TAKE CARE OF THINGS AT HOME, OR GET ALONG WITH OTHER PEOPLE?: SOMEWHAT DIFFICULT
GAD7 TOTAL SCORE: 3
IF YOU CHECKED OFF ANY PROBLEMS ON THIS QUESTIONNAIRE, HOW DIFFICULT HAVE THESE PROBLEMS MADE IT FOR YOU TO DO YOUR WORK, TAKE CARE OF THINGS AT HOME, OR GET ALONG WITH OTHER PEOPLE: SOMEWHAT DIFFICULT
6. BECOMING EASILY ANNOYED OR IRRITABLE: NOT AT ALL
5. BEING SO RESTLESS THAT IT IS HARD TO SIT STILL: NOT AT ALL
1. FEELING NERVOUS, ANXIOUS, OR ON EDGE: SEVERAL DAYS
2. NOT BEING ABLE TO STOP OR CONTROL WORRYING: SEVERAL DAYS

## 2022-11-03 ASSESSMENT — PATIENT HEALTH QUESTIONNAIRE - PHQ9
10. IF YOU CHECKED OFF ANY PROBLEMS, HOW DIFFICULT HAVE THESE PROBLEMS MADE IT FOR YOU TO DO YOUR WORK, TAKE CARE OF THINGS AT HOME, OR GET ALONG WITH OTHER PEOPLE: SOMEWHAT DIFFICULT
SUM OF ALL RESPONSES TO PHQ QUESTIONS 1-9: 5
SUM OF ALL RESPONSES TO PHQ QUESTIONS 1-9: 5

## 2022-11-04 ENCOUNTER — OFFICE VISIT (OUTPATIENT)
Dept: PSYCHOLOGY | Facility: CLINIC | Age: 67
End: 2022-11-04
Payer: COMMERCIAL

## 2022-11-04 VITALS — WEIGHT: 293 LBS | BODY MASS INDEX: 46.33 KG/M2

## 2022-11-04 DIAGNOSIS — F54 PSYCHOLOGICAL FACTORS AFFECTING MORBID OBESITY (H): ICD-10-CM

## 2022-11-04 DIAGNOSIS — E66.01 PSYCHOLOGICAL FACTORS AFFECTING MORBID OBESITY (H): ICD-10-CM

## 2022-11-04 DIAGNOSIS — F33.1 MAJOR DEPRESSIVE DISORDER, RECURRENT EPISODE, MODERATE (H): Primary | ICD-10-CM

## 2022-11-04 PROCEDURE — 90834 PSYTX W PT 45 MINUTES: CPT | Performed by: PSYCHOLOGIST

## 2022-11-04 ASSESSMENT — PAIN SCALES - GENERAL: PAINLEVEL: MILD PAIN (2)

## 2022-11-04 NOTE — PROGRESS NOTES
Health Psychology                    Department of Medicine  Izzy Montaño, Ph.D., L.P. [phone]                         [facility] Sherrie Crowe, Ph.D., L.P. [phone]                     [place] Mail Code 612   Ranulfo Whipple, Ph.D. [phone] [address] [facility] Erniqueta Sorto, Ph.D., A.B.P.P., L.P. [phone]              [place] [address]           Ramin Olivo, Ph.D., A.B.P.P., L.P. [phone]      Kathie Galvan, Ph.D., L.P. [phone]  [facility]   Carmel Farnsworth, Ph.D., A.B.P.P., L.P. [phone]    [address] [place] [address]    Health Psychology Progress Note    Intake:  7/28/16    Demographics   Age 67 year old   Sex female   Race White   Ethnicity Not  or      Ms. Padilla is a woman self-referred for psychological consultation because her therapist of the last 24 years retired.  She wass seen for problem-solving and supportive therapy for depression and multiple health issues.     HISTORY OF PRESENTING CONCERN (at intake):  Ms. Padilla reported at intake a  lengthy history of depression.  She has been seeing a psychiatrist, Ban Coleman MD at [facility], and was taking Abilify 7.5 mg, trazodone 500 mg, ripazepam 75 mg each day at bedtime, Tegretol 400 mg at bedtime and methylphenidate 10 mg b.i.d.  She discontineud ability and is now taking Rexulti 2 mg.  She has a history of hospitalizations including 3 at [facility] in the late 1990s, early 2000s when she had 2 courses of ECT lasting 7-10 sessions and approximately 3 other single session ECTs.  She stopped due to memory problems.  She kept with Dr. Coleman who she first met as an inpatient and had seen her for the past 18 years.  She had also been hospitalized psychiatrically at [facility] at age 23.  She previously worked with psychiatrist, Doug Sarabia for three years, stopping, in part, because she didn't feel he took her  suicide attempt sufficiently seriously.  She reported her depression tends to vary in intensity..     MEDICAL HISTORY (at intake):  Ms. Padilla has a complex medical history.  She was diagnosed with MS in 1985.  Her psychiatrist at [facility] in [place], Dr. 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  She has used a scooter since 1992, using it more  than she had previously.  She also can use a walker.  She was diagnosed with fibromyalgia in 1997.  She was seen at the [facility] for her eye care. During 8th grade, she fell on a ski lift, injuring her larynx. She lot 65 lb. When she ws diagnosed with MS, kept it off for 3 years  It also felt good when she  lost weight due to her stomach surgery.     She started treatment for benign paroxysmal positional vertigo treatment in December, 2020, but did not complete it.   She fell3/19/21  and had problems with her legs.  Her legs became shaky and week.  They seem to be improving, but her leg hasn't regained full strength (starts shaking at 7 seconds; prefall was 19 seconds)      WEIGHT not taken but reported below.  Discussed goal of cutting back by 125 calories per day.   That would get her losing 2 lbs./month.  She has goal of 1700 claories/day  95% of the time.Out of 24 days 5 were below 1700..  6 were  Above 1700 and below 2000;  Most were above 2000. She finds it hard not to snack...often out of boredom.    She is weighing herself weekly, using more vegetables for snack.  She plans to write down calories in the wilmer.  She thinks she gained weight- clothing is tighter.  She was 280 in December and on 6/21/22 thought she was probably more than that.       Weight  She doesn't like how she looks, or how hard it makes using walker, bending, balance.   11/4/22  295.8  9/29/22 she thinks most recent weight was 282;                     She has trouble lifting foot and holding balance so difficult to weight;  18/29 (62% of days)    8/31/22   She thinks she has gained weight;  She had to start using larger underwear.  Last time was 5 days/30  (17% of days)     12/23/21 =  280     11/03/21 =  282                    10/13/21 =  275                       9/23/21 =  273                      9/09/21 =  277                      8/09/21 =  265    She resumed attending OA, then stopped during the winter.     She is not calculating calories.  Her weight  Has not been changing lately.     Wt Readings from Last 4 Encounters:   11/04/22 134.2 kg (295 lb 12.8 oz)   02/08/22 127 kg (280 lb)   07/13/21 120.2 kg (265 lb)   03/24/21 104 kg (229 lb 3.2 oz)     Past Medical History:   Diagnosis Date     Depression, major, in partial remission (H)      Fibromyalgia syndrome      Gastro-oesophageal reflux disease      Hyperlipemia      Lymphedema      Multiple sclerosis (H)     tremors with MS, all four limbs and head     Multiple sclerosis, secondary progressive (H)      Sleep apnea      Vocal cord paralysis, unilateral complete       Past Surgical History:   Procedure Laterality Date     COLONOSCOPY N/A 7/17/2017    Procedure: COMBINED COLONOSCOPY, SINGLE OR MULTIPLE BIOPSY/POLYPECTOMY BY BIOPSY;;  Surgeon: Wong Beaulieu MD;  Location:  GI     COLONOSCOPY N/A 2/23/2018    Procedure: COMBINED COLONOSCOPY, SINGLE OR MULTIPLE BIOPSY/POLYPECTOMY BY BIOPSY;  COLONOSCOPY ;  Surgeon: Yessica Santana MD;  Location:  GI     ENT SURGERY      throat 1969, vocal cord surgery with skin grafting     ESOPHAGOSCOPY, GASTROSCOPY, DUODENOSCOPY (EGD), COMBINED N/A 12/16/2014    Procedure: COMBINED ENDOSCOPIC ULTRASOUND, ESOPHAGOSCOPY, GASTROSCOPY, DUODENOSCOPY (EGD), FINE NEEDLE ASPIRATE/BIOPSY;  Surgeon: Yessica Santana MD;  Location:  GI     ESOPHAGOSCOPY, GASTROSCOPY, DUODENOSCOPY (EGD), COMBINED N/A 12/16/2014    Procedure: COMBINED ESOPHAGOSCOPY, GASTROSCOPY, DUODENOSCOPY (EGD), BIOPSY SINGLE OR MULTIPLE;  Surgeon: Yessica Santana MD;   Location:  GI     LAPAROSCOPIC APPENDECTOMY  5/30/2013    Procedure: LAPAROSCOPIC APPENDECTOMY;;  Surgeon: Salvador Morris MD;  Location:  OR     LAPAROSCOPIC BIOPSY LIVER  5/30/2013    Procedure: LAPAROSCOPIC BIOPSY LIVER;;  Surgeon: Salvador Morris MD;  Location:  OR     LAPAROSCOPIC GASTRIC SLEEVE  5/30/2013    Procedure: LAPAROSCOPIC GASTRIC SLEEVE;  LAPAROSCOPIC SLEEVE GASTRECTOMY/ LAPARSCOPIC  APPENDECTOMY /LIVER BIOPSIES/LIVER CYST DRAINAGE;  Surgeon: Salvador Morris MD;  Location:  OR     ORTHOPEDIC SURGERY      R wrist 2010     Current Outpatient Medications   Medication     ARIPiprazole (ABILIFY) 5 MG tablet     atorvastatin (LIPITOR) 20 MG tablet     bismuth subsalicylate (PEPTO BISMOL) 262 MG chewable tablet     carBAMazepine (TEGRETOL) 200 MG tablet     Cranberry 1000 MG CAPS     cyanocobalamin (VITAMIN B-12) 100 MCG tablet     denosumab (PROLIA) 60 MG/ML SOLN injection     docusate sodium (COLACE) 100 MG tablet     DULoxetine (CYMBALTA) 60 MG capsule     famotidine (PEPCID) 40 MG tablet     guaiFENesin (MUCINEX) 600 MG 12 hr tablet     ketoconazole (NIZORAL) 2 % cream     LACTOBACILLUS RHAMNOSUS, GG, PO     loperamide (IMODIUM A-D) 2 MG tablet     losartan (COZAAR) 50 MG tablet     Melatonin 10 MG TABS     methylphenidate (RITALIN) 20 MG tablet     mirtazapine (REMERON) 30 MG tablet     Multiple Vitamin (MULTIVITAMINS PO)     Psyllium (METAMUCIL) 0.36 g CAPS     traZODone (DESYREL) 100 MG tablet     triamcinolone (ARISTOCORT HP) 0.5 % external cream     trimethoprim (TRIMPEX) 100 MG tablet     trospium (SANCTURA) 20 MG tablet     vitamin B-Complex     Vitamin D, Cholecalciferol, 25 MCG (1000 UT) CAPS     No current facility-administered medications for this visit.     Medication: On Duloxetine and Mirtazpine and Trazodone and Ritalin. She discontinued Effexor and Abilify previously per Dr. Marquise Coleman, her psychiatrist.  On 4/10/19 she informed me that she started Abilify    PHQ-9 SCORE  9/15/2019 2022 11/3/2022   PHQ-9 Total Score Sanjeevhart 5 (Mild depression) 6 (Mild depression) 5 (Mild depression)   PHQ-9 Total Score 5 6 5     NAS-7 SCORE 2022 2022 11/3/2022   Total Score 5 (mild anxiety) 3 (minimal anxiety) 3 (minimal anxiety)   Total Score 5 3 3      SOCIAL HISTORY:  Ms. Padilla grew up in the [facility] and is the oldest of 5 children in her family of origin.  Her father was a dentist who she believes was bipolar.  He  of lung cancer at age 72.  Her mother  of sepsis at age 80.  She had been diagnosed with breast cancer when Ms. Padilla was 8.  She describes the marriage between her parents as misael.  She is 5 years older than her closest-aged sister and they are all within 4 years of each other.   Her daughter  12/3 and her mother   She tends to feel more depressed in winter.      Ms. Padilla attended [employer] for a year and later went to night school at the [employer].  She felt that she could not continue her education to the point of graduation because she was working full time and raising her daughter.  Her daughter  in  at age 31 of liver failure secondary to an accidental Tylenol overdose.  She had been recovering from a hysterectomy.      Ms. 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  She had to retire at the time secondary to her MS.  She has never .  She has not dated,and states that if she were she would date, she would be interested in a relationship with a woman.  There is no history of  service or legal problems.  She expresses concern about her increasing social isolation.  She has at least 1 friend, Jael, who she met at a therapy group in .  She is active in facilitating groups for people with MS both in [place] and [place].  She lives alone and currently gets  help from a home health aide, as well as home health nurse.     She sees Dr. Ban Coleman, psychiatrist and is trying to figure out best antidepressant regimen.  Dr. Coleman prescribes Cymbalta for it.  She feels she has been more depressed since the Fall, but doesn't think it is SAD.   She states that she has recommended case management.  She was hospitalized and has been suicidal, but not for years.      Special dates that are triggers for sadness:  12/3  Anniversary of daughter's death ([place])  12/5  Anniversary of her mother's death     SESSION:  Mili participated in virtual psychotherapy.  We had intended to meet in person but agreed to meet virtually due to this writer's health.     Depression:  Mixed today; feeling depressed.    Her psychiatrist, who she has seen for 40 years, retired in October.   She saw Yariel Hill, a new psychiatrist, at [facility] with appointment in early November.   He I adjusting her  Methyphenidate.  May look at decreasing Abilify.    Weight:  Discussed her surprise about the fact she gained so much weight.  Discussed the 1700 as experimental goal as she is so sedentary it is hard to know whether it is a stringent enough goal . Focused on deleting snacking, especially at night, and on not buying the caloric foods.  Discussed need for destiny adherence.  She kept to 1700 75% of the time.    Continuing the relatively new goal of 1600 calories per day w ith 95% adherence. She wants to resume weighing herself weekly, but has had difficulty due to her problem lifting her foot.  She can finally see that  1700 goal is too much for her to lose weight.    Sleep: Not addressed.    Health:  She was vaccinated and boosted.    She continues to mask.  Thus far, she hasn't gotten  COVID.  She has some kidney stones, may need surgery, tentativey scheduled for 12/15.  Cataract surgery anticipated for March.  Doing PT exercises  Discussed it being insufficient for weight  loss.    Social:  Took 3 [employer] classes. Her PCA is helping a lot.  She hopes to see Jael towards the end of the year.   [facility]  Discussed her history of losses throughout her life.    She participated fully and appeared to derive benefit. Affect is positive.  Rapport was excellent.  Time service started:  12:00  Time service ended:  12:52      DIAGNOSES:   Major depression, recurrent, moderate (F33.1).   Behavioral factors affecting morbid obesity (E66.01).     PLAN:  Ms. Padilla will return for in person CBT,  problem-solving and supportive therapy 12/23 @  1 consistent with treatment plan.  Goal per day now that she is tracking daily with goal of 1700 with 95% consistencyly, cutting back on snacks, extending exercise.   Preference for future meetings:             In-person prefers, even if masked              Remote         x    Either    Last treatment plan signed:5/27/22  Last Treatment plan review: 9/29//22  Treatment plan verbal Review due: 12/29//22  Treatment Review due: 5/27/23     Ramin Olivo, PhD, A.B.P.P., L.P.   Director, Health Psychology

## 2022-12-03 ENCOUNTER — HEALTH MAINTENANCE LETTER (OUTPATIENT)
Age: 67
End: 2022-12-03

## 2022-12-20 ASSESSMENT — ANXIETY QUESTIONNAIRES
5. BEING SO RESTLESS THAT IT IS HARD TO SIT STILL: NOT AT ALL
3. WORRYING TOO MUCH ABOUT DIFFERENT THINGS: SEVERAL DAYS
GAD7 TOTAL SCORE: 4
1. FEELING NERVOUS, ANXIOUS, OR ON EDGE: SEVERAL DAYS
2. NOT BEING ABLE TO STOP OR CONTROL WORRYING: SEVERAL DAYS
8. IF YOU CHECKED OFF ANY PROBLEMS, HOW DIFFICULT HAVE THESE MADE IT FOR YOU TO DO YOUR WORK, TAKE CARE OF THINGS AT HOME, OR GET ALONG WITH OTHER PEOPLE?: SOMEWHAT DIFFICULT
IF YOU CHECKED OFF ANY PROBLEMS ON THIS QUESTIONNAIRE, HOW DIFFICULT HAVE THESE PROBLEMS MADE IT FOR YOU TO DO YOUR WORK, TAKE CARE OF THINGS AT HOME, OR GET ALONG WITH OTHER PEOPLE: SOMEWHAT DIFFICULT
7. FEELING AFRAID AS IF SOMETHING AWFUL MIGHT HAPPEN: NOT AT ALL
7. FEELING AFRAID AS IF SOMETHING AWFUL MIGHT HAPPEN: NOT AT ALL
6. BECOMING EASILY ANNOYED OR IRRITABLE: NOT AT ALL
GAD7 TOTAL SCORE: 4
4. TROUBLE RELAXING: SEVERAL DAYS

## 2022-12-23 ENCOUNTER — VIRTUAL VISIT (OUTPATIENT)
Dept: PSYCHOLOGY | Facility: CLINIC | Age: 67
End: 2022-12-23
Payer: COMMERCIAL

## 2022-12-23 DIAGNOSIS — E66.01 PSYCHOLOGICAL FACTORS AFFECTING MORBID OBESITY (H): ICD-10-CM

## 2022-12-23 DIAGNOSIS — F33.1 MAJOR DEPRESSIVE DISORDER, RECURRENT EPISODE, MODERATE (H): Primary | ICD-10-CM

## 2022-12-23 DIAGNOSIS — F54 PSYCHOLOGICAL FACTORS AFFECTING MORBID OBESITY (H): ICD-10-CM

## 2022-12-23 PROCEDURE — 90837 PSYTX W PT 60 MINUTES: CPT | Mod: 95 | Performed by: PSYCHOLOGIST

## 2023-01-09 ENCOUNTER — TRANSFERRED RECORDS (OUTPATIENT)
Dept: MULTI SPECIALTY CLINIC | Facility: CLINIC | Age: 68
End: 2023-01-09

## 2023-01-09 LAB
CREATININE (EXTERNAL): 0.64 MG/DL (ref 0.5–1.05)
GFR ESTIMATED (EXTERNAL): 96 ML/MIN/1.73M2
GLUCOSE (EXTERNAL): 86 MG/DL (ref 65–99)
HBA1C MFR BLD: 5.4 % (ref 4–6)
POTASSIUM (EXTERNAL): 4.5 MMOL/L (ref 3.5–5.3)

## 2023-01-18 ENCOUNTER — HOSPITAL ENCOUNTER (INPATIENT)
Facility: CLINIC | Age: 68
LOS: 6 days | Discharge: NURSING, REHAB OR LONG-TERM CARE | DRG: 856 | End: 2023-01-25
Attending: UROLOGY | Admitting: UROLOGY
Payer: COMMERCIAL

## 2023-01-18 ENCOUNTER — ANESTHESIA (OUTPATIENT)
Dept: SURGERY | Facility: CLINIC | Age: 68
DRG: 856 | End: 2023-01-18
Payer: COMMERCIAL

## 2023-01-18 ENCOUNTER — APPOINTMENT (OUTPATIENT)
Dept: CT IMAGING | Facility: CLINIC | Age: 68
DRG: 856 | End: 2023-01-18
Attending: HOSPITALIST
Payer: COMMERCIAL

## 2023-01-18 ENCOUNTER — APPOINTMENT (OUTPATIENT)
Dept: GENERAL RADIOLOGY | Facility: CLINIC | Age: 68
DRG: 856 | End: 2023-01-18
Attending: UROLOGY
Payer: COMMERCIAL

## 2023-01-18 ENCOUNTER — ANESTHESIA EVENT (OUTPATIENT)
Dept: SURGERY | Facility: CLINIC | Age: 68
DRG: 856 | End: 2023-01-18
Payer: COMMERCIAL

## 2023-01-18 DIAGNOSIS — R19.7 DIARRHEA, UNSPECIFIED TYPE: ICD-10-CM

## 2023-01-18 DIAGNOSIS — N39.0 SEPSIS DUE TO URINARY TRACT INFECTION (H): ICD-10-CM

## 2023-01-18 DIAGNOSIS — A41.9 SEPSIS DUE TO URINARY TRACT INFECTION (H): ICD-10-CM

## 2023-01-18 DIAGNOSIS — G35 MULTIPLE SCLEROSIS, SECONDARY PROGRESSIVE (H): Primary | ICD-10-CM

## 2023-01-18 LAB
ALBUMIN SERPL BCG-MCNC: 3.2 G/DL (ref 3.5–5.2)
ALBUMIN SERPL BCG-MCNC: 3.3 G/DL (ref 3.5–5.2)
ALP SERPL-CCNC: 64 U/L (ref 35–104)
ALP SERPL-CCNC: 89 U/L (ref 35–104)
ALT SERPL W P-5'-P-CCNC: 24 U/L (ref 10–35)
ALT SERPL W P-5'-P-CCNC: 33 U/L (ref 10–35)
ANION GAP SERPL CALCULATED.3IONS-SCNC: 10 MMOL/L (ref 7–15)
ANION GAP SERPL CALCULATED.3IONS-SCNC: 13 MMOL/L (ref 7–15)
AST SERPL W P-5'-P-CCNC: 29 U/L (ref 10–35)
AST SERPL W P-5'-P-CCNC: 43 U/L (ref 10–35)
BASE EXCESS BLDV CALC-SCNC: -2.1 MMOL/L (ref -7.7–1.9)
BILIRUB SERPL-MCNC: 1 MG/DL
BILIRUB SERPL-MCNC: 1.4 MG/DL
BUN SERPL-MCNC: 12.9 MG/DL (ref 8–23)
BUN SERPL-MCNC: 13 MG/DL (ref 8–23)
CALCIUM SERPL-MCNC: 7.9 MG/DL (ref 8.8–10.2)
CALCIUM SERPL-MCNC: 8 MG/DL (ref 8.8–10.2)
CHLORIDE SERPL-SCNC: 94 MMOL/L (ref 98–107)
CHLORIDE SERPL-SCNC: 96 MMOL/L (ref 98–107)
CREAT SERPL-MCNC: 0.7 MG/DL (ref 0.51–0.95)
CREAT SERPL-MCNC: 0.95 MG/DL (ref 0.51–0.95)
DEPRECATED HCO3 PLAS-SCNC: 21 MMOL/L (ref 22–29)
DEPRECATED HCO3 PLAS-SCNC: 23 MMOL/L (ref 22–29)
ERYTHROCYTE [DISTWIDTH] IN BLOOD BY AUTOMATED COUNT: 11.9 % (ref 10–15)
ERYTHROCYTE [DISTWIDTH] IN BLOOD BY AUTOMATED COUNT: 11.9 % (ref 10–15)
GFR SERPL CREATININE-BSD FRML MDRD: 65 ML/MIN/1.73M2
GFR SERPL CREATININE-BSD FRML MDRD: >90 ML/MIN/1.73M2
GLUCOSE BLDC GLUCOMTR-MCNC: 130 MG/DL (ref 70–99)
GLUCOSE SERPL-MCNC: 116 MG/DL (ref 70–99)
GLUCOSE SERPL-MCNC: 92 MG/DL (ref 70–99)
HCO3 BLDV-SCNC: 24 MMOL/L (ref 21–28)
HCT VFR BLD AUTO: 34 % (ref 35–47)
HCT VFR BLD AUTO: 34.3 % (ref 35–47)
HGB BLD-MCNC: 11.3 G/DL (ref 11.7–15.7)
HGB BLD-MCNC: 11.4 G/DL (ref 11.7–15.7)
LACTATE SERPL-SCNC: 3.8 MMOL/L (ref 0.7–2)
LACTATE SERPL-SCNC: 4 MMOL/L (ref 0.7–2)
LACTATE SERPL-SCNC: 4.2 MMOL/L (ref 0.7–2)
LACTATE SERPL-SCNC: 4.3 MMOL/L (ref 0.7–2)
MCH RBC QN AUTO: 31 PG (ref 26.5–33)
MCH RBC QN AUTO: 31.9 PG (ref 26.5–33)
MCHC RBC AUTO-ENTMCNC: 33.2 G/DL (ref 31.5–36.5)
MCHC RBC AUTO-ENTMCNC: 33.2 G/DL (ref 31.5–36.5)
MCV RBC AUTO: 93 FL (ref 78–100)
MCV RBC AUTO: 96 FL (ref 78–100)
O2/TOTAL GAS SETTING VFR VENT: 2 %
PCO2 BLDV: 48 MM HG (ref 40–50)
PH BLDV: 7.32 [PH] (ref 7.32–7.43)
PLATELET # BLD AUTO: 107 10E3/UL (ref 150–450)
PLATELET # BLD AUTO: 91 10E3/UL (ref 150–450)
PO2 BLDV: 41 MM HG (ref 25–47)
POTASSIUM SERPL-SCNC: 4.2 MMOL/L (ref 3.4–5.3)
POTASSIUM SERPL-SCNC: 4.7 MMOL/L (ref 3.4–5.3)
PROT SERPL-MCNC: 5.3 G/DL (ref 6.4–8.3)
PROT SERPL-MCNC: 5.5 G/DL (ref 6.4–8.3)
RBC # BLD AUTO: 3.54 10E6/UL (ref 3.8–5.2)
RBC # BLD AUTO: 3.68 10E6/UL (ref 3.8–5.2)
SODIUM SERPL-SCNC: 127 MMOL/L (ref 136–145)
SODIUM SERPL-SCNC: 130 MMOL/L (ref 136–145)
WBC # BLD AUTO: 19.1 10E3/UL (ref 4–11)
WBC # BLD AUTO: 6.8 10E3/UL (ref 4–11)

## 2023-01-18 PROCEDURE — 82962 GLUCOSE BLOOD TEST: CPT

## 2023-01-18 PROCEDURE — 93010 ELECTROCARDIOGRAM REPORT: CPT | Performed by: INTERNAL MEDICINE

## 2023-01-18 PROCEDURE — P9045 ALBUMIN (HUMAN), 5%, 250 ML: HCPCS | Performed by: ANESTHESIOLOGY

## 2023-01-18 PROCEDURE — C2617 STENT, NON-COR, TEM W/O DEL: HCPCS | Performed by: UROLOGY

## 2023-01-18 PROCEDURE — 83605 ASSAY OF LACTIC ACID: CPT | Performed by: UROLOGY

## 2023-01-18 PROCEDURE — 82803 BLOOD GASES ANY COMBINATION: CPT | Performed by: HOSPITALIST

## 2023-01-18 PROCEDURE — 0TF48ZZ FRAGMENTATION IN LEFT KIDNEY PELVIS, VIA NATURAL OR ARTIFICIAL OPENING ENDOSCOPIC: ICD-10-PCS | Performed by: UROLOGY

## 2023-01-18 PROCEDURE — 87040 BLOOD CULTURE FOR BACTERIA: CPT | Performed by: ANESTHESIOLOGY

## 2023-01-18 PROCEDURE — 83605 ASSAY OF LACTIC ACID: CPT | Performed by: HOSPITALIST

## 2023-01-18 PROCEDURE — 93005 ELECTROCARDIOGRAM TRACING: CPT

## 2023-01-18 PROCEDURE — 250N000011 HC RX IP 250 OP 636: Performed by: UROLOGY

## 2023-01-18 PROCEDURE — C1769 GUIDE WIRE: HCPCS | Performed by: UROLOGY

## 2023-01-18 PROCEDURE — 84155 ASSAY OF PROTEIN SERUM: CPT | Performed by: ANESTHESIOLOGY

## 2023-01-18 PROCEDURE — 250N000011 HC RX IP 250 OP 636: Performed by: HOSPITALIST

## 2023-01-18 PROCEDURE — 360N000076 HC SURGERY LEVEL 3, PER MIN: Performed by: UROLOGY

## 2023-01-18 PROCEDURE — 999N000179 XR SURGERY CARM FLUORO LESS THAN 5 MIN W STILLS

## 2023-01-18 PROCEDURE — 370N000017 HC ANESTHESIA TECHNICAL FEE, PER MIN: Performed by: UROLOGY

## 2023-01-18 PROCEDURE — 272N000001 HC OR GENERAL SUPPLY STERILE: Performed by: UROLOGY

## 2023-01-18 PROCEDURE — 999N000141 HC STATISTIC PRE-PROCEDURE NURSING ASSESSMENT: Performed by: UROLOGY

## 2023-01-18 PROCEDURE — 83605 ASSAY OF LACTIC ACID: CPT | Performed by: ANESTHESIOLOGY

## 2023-01-18 PROCEDURE — 36415 COLL VENOUS BLD VENIPUNCTURE: CPT | Performed by: UROLOGY

## 2023-01-18 PROCEDURE — 85027 COMPLETE CBC AUTOMATED: CPT | Performed by: ANESTHESIOLOGY

## 2023-01-18 PROCEDURE — 85027 COMPLETE CBC AUTOMATED: CPT | Performed by: UROLOGY

## 2023-01-18 PROCEDURE — 258N000003 HC RX IP 258 OP 636: Performed by: HOSPITALIST

## 2023-01-18 PROCEDURE — 99291 CRITICAL CARE FIRST HOUR: CPT | Performed by: ANESTHESIOLOGY

## 2023-01-18 PROCEDURE — 250N000011 HC RX IP 250 OP 636: Performed by: ANESTHESIOLOGY

## 2023-01-18 PROCEDURE — 74177 CT ABD & PELVIS W/CONTRAST: CPT

## 2023-01-18 PROCEDURE — 250N000011 HC RX IP 250 OP 636

## 2023-01-18 PROCEDURE — 710N000010 HC RECOVERY PHASE 1, LEVEL 2, PER MIN: Performed by: UROLOGY

## 2023-01-18 PROCEDURE — 250N000009 HC RX 250

## 2023-01-18 PROCEDURE — 250N000011 HC RX IP 250 OP 636: Performed by: PHYSICIAN ASSISTANT

## 2023-01-18 PROCEDURE — 250N000013 HC RX MED GY IP 250 OP 250 PS 637: Performed by: UROLOGY

## 2023-01-18 PROCEDURE — 258N000003 HC RX IP 258 OP 636: Performed by: ANESTHESIOLOGY

## 2023-01-18 PROCEDURE — 250N000009 HC RX 250: Performed by: UROLOGY

## 2023-01-18 PROCEDURE — 80053 COMPREHEN METABOLIC PANEL: CPT | Performed by: UROLOGY

## 2023-01-18 PROCEDURE — 250N000025 HC SEVOFLURANE, PER MIN: Performed by: UROLOGY

## 2023-01-18 PROCEDURE — 36415 COLL VENOUS BLD VENIPUNCTURE: CPT | Performed by: HOSPITALIST

## 2023-01-18 PROCEDURE — 36415 COLL VENOUS BLD VENIPUNCTURE: CPT | Performed by: ANESTHESIOLOGY

## 2023-01-18 PROCEDURE — 258N000003 HC RX IP 258 OP 636: Performed by: UROLOGY

## 2023-01-18 PROCEDURE — 0T778DZ DILATION OF LEFT URETER WITH INTRALUMINAL DEVICE, VIA NATURAL OR ARTIFICIAL OPENING ENDOSCOPIC: ICD-10-PCS | Performed by: UROLOGY

## 2023-01-18 PROCEDURE — 99221 1ST HOSP IP/OBS SF/LOW 40: CPT | Performed by: HOSPITALIST

## 2023-01-18 PROCEDURE — 71045 X-RAY EXAM CHEST 1 VIEW: CPT

## 2023-01-18 PROCEDURE — 87040 BLOOD CULTURE FOR BACTERIA: CPT | Performed by: UROLOGY

## 2023-01-18 DEVICE — URETERAL STENT
Type: IMPLANTABLE DEVICE | Site: URETHRA | Status: FUNCTIONAL
Brand: POLARIS™ ULTRA

## 2023-01-18 RX ORDER — ONDANSETRON 2 MG/ML
4 INJECTION INTRAMUSCULAR; INTRAVENOUS EVERY 30 MIN PRN
Status: DISCONTINUED | OUTPATIENT
Start: 2023-01-18 | End: 2023-01-18 | Stop reason: HOSPADM

## 2023-01-18 RX ORDER — HYDROMORPHONE HCL IN WATER/PF 6 MG/30 ML
0.2 PATIENT CONTROLLED ANALGESIA SYRINGE INTRAVENOUS EVERY 5 MIN PRN
Status: DISCONTINUED | OUTPATIENT
Start: 2023-01-18 | End: 2023-01-18 | Stop reason: HOSPADM

## 2023-01-18 RX ORDER — CEFTRIAXONE 1 G/1
1 INJECTION, POWDER, FOR SOLUTION INTRAMUSCULAR; INTRAVENOUS EVERY 24 HOURS
Status: DISCONTINUED | OUTPATIENT
Start: 2023-01-18 | End: 2023-01-18

## 2023-01-18 RX ORDER — ACETAMINOPHEN 325 MG/1
975 TABLET ORAL ONCE
Status: COMPLETED | OUTPATIENT
Start: 2023-01-18 | End: 2023-01-18

## 2023-01-18 RX ORDER — ACETAMINOPHEN 325 MG/1
650 TABLET, CHEWABLE ORAL EVERY 4 HOURS PRN
COMMUNITY

## 2023-01-18 RX ORDER — IOPAMIDOL 755 MG/ML
135 INJECTION, SOLUTION INTRAVASCULAR ONCE
Status: COMPLETED | OUTPATIENT
Start: 2023-01-18 | End: 2023-01-18

## 2023-01-18 RX ORDER — FENTANYL CITRATE 0.05 MG/ML
25 INJECTION, SOLUTION INTRAMUSCULAR; INTRAVENOUS EVERY 5 MIN PRN
Status: DISCONTINUED | OUTPATIENT
Start: 2023-01-18 | End: 2023-01-18 | Stop reason: HOSPADM

## 2023-01-18 RX ORDER — ACETAMINOPHEN 500 MG
1000 TABLET ORAL EVERY 6 HOURS PRN
Status: CANCELLED | OUTPATIENT
Start: 2023-01-18

## 2023-01-18 RX ORDER — SODIUM CHLORIDE, SODIUM LACTATE, POTASSIUM CHLORIDE, CALCIUM CHLORIDE 600; 310; 30; 20 MG/100ML; MG/100ML; MG/100ML; MG/100ML
INJECTION, SOLUTION INTRAVENOUS CONTINUOUS
Status: DISCONTINUED | OUTPATIENT
Start: 2023-01-18 | End: 2023-01-19

## 2023-01-18 RX ORDER — ONDANSETRON 2 MG/ML
4 INJECTION INTRAMUSCULAR; INTRAVENOUS EVERY 6 HOURS PRN
Status: CANCELLED | OUTPATIENT
Start: 2023-01-18

## 2023-01-18 RX ORDER — LOSARTAN POTASSIUM 50 MG/1
50 TABLET ORAL DAILY
Status: CANCELLED | OUTPATIENT
Start: 2023-01-18

## 2023-01-18 RX ORDER — FLUTICASONE PROPIONATE 50 MCG
2 SPRAY, SUSPENSION (ML) NASAL DAILY
COMMUNITY

## 2023-01-18 RX ORDER — LIDOCAINE HYDROCHLORIDE 20 MG/ML
INJECTION, SOLUTION INFILTRATION; PERINEURAL PRN
Status: DISCONTINUED | OUTPATIENT
Start: 2023-01-18 | End: 2023-01-18

## 2023-01-18 RX ORDER — FAMOTIDINE 20 MG/1
40 TABLET, FILM COATED ORAL
Status: DISCONTINUED | OUTPATIENT
Start: 2023-01-18 | End: 2023-01-25 | Stop reason: HOSPADM

## 2023-01-18 RX ORDER — CARBAMAZEPINE 200 MG/1
200 TABLET ORAL 2 TIMES DAILY
Status: DISCONTINUED | OUTPATIENT
Start: 2023-01-18 | End: 2023-01-25 | Stop reason: HOSPADM

## 2023-01-18 RX ORDER — SODIUM CHLORIDE, SODIUM LACTATE, POTASSIUM CHLORIDE, CALCIUM CHLORIDE 600; 310; 30; 20 MG/100ML; MG/100ML; MG/100ML; MG/100ML
INJECTION, SOLUTION INTRAVENOUS CONTINUOUS
Status: DISCONTINUED | OUTPATIENT
Start: 2023-01-18 | End: 2023-01-18 | Stop reason: HOSPADM

## 2023-01-18 RX ORDER — DULOXETIN HYDROCHLORIDE 60 MG/1
120 CAPSULE, DELAYED RELEASE ORAL DAILY
Status: DISCONTINUED | OUTPATIENT
Start: 2023-01-19 | End: 2023-01-25 | Stop reason: HOSPADM

## 2023-01-18 RX ORDER — VERAPAMIL HYDROCHLORIDE 120 MG/1
120 TABLET, FILM COATED, EXTENDED RELEASE ORAL 2 TIMES DAILY
COMMUNITY

## 2023-01-18 RX ORDER — ACETAMINOPHEN 500 MG
1000 TABLET ORAL EVERY 6 HOURS PRN
Status: DISCONTINUED | OUTPATIENT
Start: 2023-01-18 | End: 2023-01-25 | Stop reason: HOSPADM

## 2023-01-18 RX ORDER — CEPHALEXIN 500 MG/1
500 CAPSULE ORAL DAILY
Status: ON HOLD | COMMUNITY
End: 2023-01-18

## 2023-01-18 RX ORDER — CIPROFLOXACIN 500 MG/1
500 TABLET, FILM COATED ORAL 2 TIMES DAILY
Qty: 5 TABLET | Refills: 0 | Status: SHIPPED | OUTPATIENT
Start: 2023-01-18 | End: 2023-01-24

## 2023-01-18 RX ORDER — ONDANSETRON 4 MG/1
4-8 TABLET, ORALLY DISINTEGRATING ORAL EVERY 8 HOURS PRN
Qty: 10 TABLET | Refills: 0 | Status: CANCELLED | OUTPATIENT
Start: 2023-01-18

## 2023-01-18 RX ORDER — SODIUM CHLORIDE 9 MG/ML
INJECTION, SOLUTION INTRAVENOUS CONTINUOUS
Status: DISCONTINUED | OUTPATIENT
Start: 2023-01-18 | End: 2023-01-19

## 2023-01-18 RX ORDER — PROPOFOL 10 MG/ML
INJECTION, EMULSION INTRAVENOUS PRN
Status: DISCONTINUED | OUTPATIENT
Start: 2023-01-18 | End: 2023-01-18

## 2023-01-18 RX ORDER — FENTANYL CITRATE 0.05 MG/ML
50 INJECTION, SOLUTION INTRAMUSCULAR; INTRAVENOUS EVERY 5 MIN PRN
Status: DISCONTINUED | OUTPATIENT
Start: 2023-01-18 | End: 2023-01-18 | Stop reason: HOSPADM

## 2023-01-18 RX ORDER — LIDOCAINE 40 MG/G
CREAM TOPICAL
Status: CANCELLED | OUTPATIENT
Start: 2023-01-18

## 2023-01-18 RX ORDER — ACETAMINOPHEN 325 MG/1
650 TABLET ORAL EVERY 4 HOURS PRN
Status: DISCONTINUED | OUTPATIENT
Start: 2023-01-18 | End: 2023-01-18

## 2023-01-18 RX ORDER — HYDROMORPHONE HCL IN WATER/PF 6 MG/30 ML
0.4 PATIENT CONTROLLED ANALGESIA SYRINGE INTRAVENOUS EVERY 5 MIN PRN
Status: DISCONTINUED | OUTPATIENT
Start: 2023-01-18 | End: 2023-01-18 | Stop reason: HOSPADM

## 2023-01-18 RX ORDER — ONDANSETRON 4 MG/1
4 TABLET, ORALLY DISINTEGRATING ORAL EVERY 6 HOURS PRN
Status: CANCELLED | OUTPATIENT
Start: 2023-01-18

## 2023-01-18 RX ORDER — ACETAMINOPHEN 325 MG/1
650 TABLET ORAL EVERY 4 HOURS PRN
Qty: 100 TABLET | Refills: 0 | Status: CANCELLED | OUTPATIENT
Start: 2023-01-18

## 2023-01-18 RX ORDER — ONDANSETRON 2 MG/ML
INJECTION INTRAMUSCULAR; INTRAVENOUS PRN
Status: DISCONTINUED | OUTPATIENT
Start: 2023-01-18 | End: 2023-01-18

## 2023-01-18 RX ORDER — PROPOFOL 10 MG/ML
INJECTION, EMULSION INTRAVENOUS CONTINUOUS PRN
Status: DISCONTINUED | OUTPATIENT
Start: 2023-01-18 | End: 2023-01-18

## 2023-01-18 RX ORDER — CIPROFLOXACIN 2 MG/ML
400 INJECTION, SOLUTION INTRAVENOUS ONCE
Status: COMPLETED | OUTPATIENT
Start: 2023-01-18 | End: 2023-01-18

## 2023-01-18 RX ORDER — TRAZODONE HYDROCHLORIDE 150 MG/1
600 TABLET ORAL AT BEDTIME
Status: DISCONTINUED | OUTPATIENT
Start: 2023-01-18 | End: 2023-01-25 | Stop reason: HOSPADM

## 2023-01-18 RX ORDER — FENTANYL CITRATE 50 UG/ML
INJECTION, SOLUTION INTRAMUSCULAR; INTRAVENOUS PRN
Status: DISCONTINUED | OUTPATIENT
Start: 2023-01-18 | End: 2023-01-18

## 2023-01-18 RX ORDER — MEROPENEM 1 G/1
1 INJECTION, POWDER, FOR SOLUTION INTRAVENOUS EVERY 8 HOURS
Status: DISCONTINUED | OUTPATIENT
Start: 2023-01-18 | End: 2023-01-20

## 2023-01-18 RX ORDER — SODIUM CHLORIDE, SODIUM LACTATE, POTASSIUM CHLORIDE, CALCIUM CHLORIDE 600; 310; 30; 20 MG/100ML; MG/100ML; MG/100ML; MG/100ML
INJECTION, SOLUTION INTRAVENOUS CONTINUOUS
Status: DISCONTINUED | OUTPATIENT
Start: 2023-01-18 | End: 2023-01-18

## 2023-01-18 RX ORDER — ONDANSETRON 4 MG/1
4 TABLET, ORALLY DISINTEGRATING ORAL EVERY 30 MIN PRN
Status: DISCONTINUED | OUTPATIENT
Start: 2023-01-18 | End: 2023-01-18 | Stop reason: HOSPADM

## 2023-01-18 RX ORDER — FLUTICASONE PROPIONATE 50 MCG
2 SPRAY, SUSPENSION (ML) NASAL DAILY
Status: DISCONTINUED | OUTPATIENT
Start: 2023-01-19 | End: 2023-01-25 | Stop reason: HOSPADM

## 2023-01-18 RX ORDER — LIDOCAINE 40 MG/G
CREAM TOPICAL
Status: DISCONTINUED | OUTPATIENT
Start: 2023-01-18 | End: 2023-01-18 | Stop reason: HOSPADM

## 2023-01-18 RX ADMIN — ACETAMINOPHEN 1000 MG: 500 TABLET ORAL at 12:42

## 2023-01-18 RX ADMIN — SODIUM CHLORIDE, POTASSIUM CHLORIDE, SODIUM LACTATE AND CALCIUM CHLORIDE: 600; 310; 30; 20 INJECTION, SOLUTION INTRAVENOUS at 13:22

## 2023-01-18 RX ADMIN — SODIUM CHLORIDE, POTASSIUM CHLORIDE, SODIUM LACTATE AND CALCIUM CHLORIDE: 600; 310; 30; 20 INJECTION, SOLUTION INTRAVENOUS at 15:52

## 2023-01-18 RX ADMIN — FENTANYL CITRATE 50 MCG: 50 INJECTION, SOLUTION INTRAMUSCULAR; INTRAVENOUS at 09:23

## 2023-01-18 RX ADMIN — LIDOCAINE HYDROCHLORIDE 60 MG: 20 INJECTION, SOLUTION INFILTRATION; PERINEURAL at 09:23

## 2023-01-18 RX ADMIN — SODIUM CHLORIDE, POTASSIUM CHLORIDE, SODIUM LACTATE AND CALCIUM CHLORIDE: 600; 310; 30; 20 INJECTION, SOLUTION INTRAVENOUS at 07:48

## 2023-01-18 RX ADMIN — SODIUM CHLORIDE 100 ML: 9 INJECTION, SOLUTION INTRAVENOUS at 18:30

## 2023-01-18 RX ADMIN — ALBUMIN HUMAN 12.5 G: 0.05 INJECTION, SOLUTION INTRAVENOUS at 22:56

## 2023-01-18 RX ADMIN — CEFTRIAXONE SODIUM 1 G: 1 INJECTION, POWDER, FOR SOLUTION INTRAMUSCULAR; INTRAVENOUS at 13:33

## 2023-01-18 RX ADMIN — VANCOMYCIN HYDROCHLORIDE 2000 MG: 10 INJECTION, POWDER, LYOPHILIZED, FOR SOLUTION INTRAVENOUS at 23:48

## 2023-01-18 RX ADMIN — SODIUM CHLORIDE, POTASSIUM CHLORIDE, SODIUM LACTATE AND CALCIUM CHLORIDE: 600; 310; 30; 20 INJECTION, SOLUTION INTRAVENOUS at 14:04

## 2023-01-18 RX ADMIN — SUGAMMADEX 300 MG: 100 INJECTION, SOLUTION INTRAVENOUS at 10:35

## 2023-01-18 RX ADMIN — SODIUM CHLORIDE: 9 INJECTION, SOLUTION INTRAVENOUS at 15:35

## 2023-01-18 RX ADMIN — ACETAMINOPHEN 1000 MG: 500 TABLET ORAL at 20:07

## 2023-01-18 RX ADMIN — SODIUM CHLORIDE: 9 INJECTION, SOLUTION INTRAVENOUS at 15:45

## 2023-01-18 RX ADMIN — ALBUMIN HUMAN 12.5 G: 0.05 INJECTION, SOLUTION INTRAVENOUS at 20:06

## 2023-01-18 RX ADMIN — CIPROFLOXACIN 400 MG: 2 INJECTION, SOLUTION INTRAVENOUS at 07:53

## 2023-01-18 RX ADMIN — ROCURONIUM BROMIDE 50 MG: 50 INJECTION, SOLUTION INTRAVENOUS at 09:23

## 2023-01-18 RX ADMIN — PROPOFOL 150 MG: 10 INJECTION, EMULSION INTRAVENOUS at 09:23

## 2023-01-18 RX ADMIN — PROPOFOL 30 MCG/KG/MIN: 10 INJECTION, EMULSION INTRAVENOUS at 09:23

## 2023-01-18 RX ADMIN — ONDANSETRON 4 MG: 2 INJECTION INTRAMUSCULAR; INTRAVENOUS at 09:54

## 2023-01-18 RX ADMIN — MEROPENEM 1 G: 1 INJECTION, POWDER, FOR SOLUTION INTRAVENOUS at 20:05

## 2023-01-18 RX ADMIN — IOPAMIDOL 135 ML: 755 INJECTION, SOLUTION INTRAVENOUS at 18:29

## 2023-01-18 ASSESSMENT — ACTIVITIES OF DAILY LIVING (ADL)
ADLS_ACUITY_SCORE: 35

## 2023-01-18 NOTE — OR NURSING
Called hospitalist to make aware heartrate is now 140-150.  EKG ordered stat to rule out afib.  Pt is on telemetry at this time.  Will continue to monitor.  Dr. Coles at the bedside as well.

## 2023-01-18 NOTE — OR NURSING
At approximately 1200, pt c/o increased chills, visible rigors present. Dr Coles notified; order placed for pt to stay. 800ml bolus of LR given, labs drawn, CXR done, rocephin started. Sister updated at bedside. Cares passed off to Mikala VALENCIA.

## 2023-01-18 NOTE — OR NURSING
Lab at bedside to redraw CBC with Platelets and Complete metabolic panel.   states that previous draw clotted.

## 2023-01-18 NOTE — CONSULTS
[facility]  Consult Note - Hospitalist Service  Date of Admission:  1/18/2023  Consult Requested by: Dr. Coles   Reason for Consult: evaluate for possible urosepsis     Assessment & Plan   Mili Padilla is a 67 year old female admitted on 1/18/2023.  Past history of MS with neurogenic bladder, HTN, HLD, OVIDIO, depression who presented for elective left ureteroscopy, lithotripsy and left ureteral stenting.  Hospitalist consulted with concern for sepsis as patient having shaking chills post-op.      Severe sepsis, likely urinary source  * denies any infectious symptoms prior to procedure, particularly any urinary symptoms; developed shaking chills immediately post-op  * tachy (140's), BP trending down ( > 107), temp trending up (97.8 > 100.2) with lactate 4.3  * post-op CXR clear  * received 1L IVF and ceftriaxone in PACU    - CBC and CMP pending  - UA and culture pending collection (obtained after abx)  - blood cultures in process (obtained prior to abx)  - repeat 1L NS bolus over 1 hour, start  ml/hr thereafter  - repeat lactic acid at 1600  - continue ceftriaxone (note single prior urine culture with Enterobacter cloacae resistant to ceftriaxone, though sensitive to ciprofloxacin which she received pre-op)  - will need to perform skin exam once moved to larger bed where she can roll side-to-side    ADDENDUM:  Repeat lactate unchanged at 4.2.  Has now had >3L IVF intra- and post-op, remains tachy to 140's, pressures stable 100-110's.  Evaluated again at bedside, continues to deny any abdominal pain or bloating (?possible altered sensation due to MS) to suggest bowel ischemia.  Is generally immobile at baseline so DVT/PE a possibility, reports chronic JAEGER though no worsening at this time.   - switch ceftriaxone to meropenem given hx resistance above  - obtain CT pulm angio and abd/pelvis to further work-up  - check VBG  - bolus additional 500 ml saline, increase maintenance LR  "to 150 ml/hr  - trend lactate q2h  - will also start vancomycin pending cultures    Nephrolithiasis s/p left ureteroscopy, lithotripsy and left ureteral stenting 1/18  - post-procedure management per Urology    HTN  - hold PTA verapamil, losartan    HLD  - continue PTA atorvastatin    MS with neurogenic bladder  * lives independently with PCA services, uses walker in home, motorized scooter  - continue PTA trospium    Depression  - continue PTA Abilfy, Tegretol, duloxetine, mirtazapine  - hold trazodone if pressures remain soft    GERD  - continue PTA famotidine       Clinically Significant Risk Factors Present on Admission                  # Hypertension: home medication list includes antihypertensive(s)      # Severe Obesity: Estimated body mass index is 47.19 kg/m  as calculated from the following:    Height as of this encounter: 1.676 m (5' 6\").    Weight as of this encounter: 132.6 kg (292 lb 6.4 oz).           Rodger Ruano MD  Hospitalist Service  Securely message with Vocera (more info)  Text page via [facility] Paging/Directory   ______________________________________________________________________    Chief Complaint   Chills     History is obtained from the patient, discussion with PACU RN and chart review.     History of Present Illness   Mili Padilla is a 67 year old female who presented for elective left ureteroscopy, lithotripsy and left ureteral stenting for nephrolithiasis.  She reports feeling well and in her usual state of health prior to procedure.  Immediately after procedure in PACU developed shaking chills.  Denies any lightheadedness, confusion.  Denies infectious symptoms prior to procedure, particularly urinary symptoms.  Known history of neurogenic bladder for which she takes trospium, does not perform catheterization.  Denies any cough, dyspnea, chest pain/pressure, nausea, diarrhea, abdominal pain, new rash or sores, headache, sore throat, generalized arthralgias, myalgias or other " complaints.       Past Medical History    Past Medical History:   Diagnosis Date     Depression, major, in partial remission (H)      Fibromyalgia syndrome      Gastro-oesophageal reflux disease      Hyperlipemia      Hypertension      Low serum sodium      Lymphedema      Mixed incontinence      Multiple sclerosis (H)     tremors with MS, all four limbs and head     Multiple sclerosis, secondary progressive (H)      Neurogenic bladder      Obese      Osteoporosis      Sleep apnea      Vocal cord paralysis, unilateral complete        Past Surgical History   Past Surgical History:   Procedure Laterality Date     COLONOSCOPY N/A 07/17/2017    Procedure: COMBINED COLONOSCOPY, SINGLE OR MULTIPLE BIOPSY/POLYPECTOMY BY BIOPSY;;  Surgeon: Wong Beaulieu MD;  Location:  GI     COLONOSCOPY N/A 02/23/2018    Procedure: COMBINED COLONOSCOPY, SINGLE OR MULTIPLE BIOPSY/POLYPECTOMY BY BIOPSY;  COLONOSCOPY ;  Surgeon: Yessica Santana MD;  Location:  GI     ENT SURGERY      throat 1969, vocal cord surgery with skin grafting     ESOPHAGOSCOPY, GASTROSCOPY, DUODENOSCOPY (EGD), COMBINED N/A 12/16/2014    Procedure: COMBINED ENDOSCOPIC ULTRASOUND, ESOPHAGOSCOPY, GASTROSCOPY, DUODENOSCOPY (EGD), FINE NEEDLE ASPIRATE/BIOPSY;  Surgeon: Yessica Santana MD;  Location:  GI     ESOPHAGOSCOPY, GASTROSCOPY, DUODENOSCOPY (EGD), COMBINED N/A 12/16/2014    Procedure: COMBINED ESOPHAGOSCOPY, GASTROSCOPY, DUODENOSCOPY (EGD), BIOPSY SINGLE OR MULTIPLE;  Surgeon: Yessica Santana MD;  Location: [facility]     GASTRIC BYPASS Bilateral      LAPAROSCOPIC APPENDECTOMY  05/30/2013    Procedure: LAPAROSCOPIC APPENDECTOMY;;  Surgeon: Salvador Morris MD;  Location:  OR     LAPAROSCOPIC BIOPSY LIVER  05/30/2013    Procedure: LAPAROSCOPIC BIOPSY LIVER;;  Surgeon: Salvador Morris MD;  Location:  OR     LAPAROSCOPIC GASTRIC SLEEVE  05/30/2013    Procedure: LAPAROSCOPIC GASTRIC SLEEVE;  LAPAROSCOPIC SLEEVE GASTRECTOMY/  LAPARSCOPIC  APPENDECTOMY /LIVER BIOPSIES/LIVER CYST DRAINAGE;  Surgeon: Salvador Morris MD;  Location:  OR     ORTHOPEDIC SURGERY      R wrist 2010       Medications   I have reviewed this patient's current medications          Physical Exam   Vital Signs: Temp: 100.2  F (37.9  C) Temp src: Temporal BP: 107/47 Pulse: 101   Resp: 15 SpO2: 94 % O2 Device: None (Room air) Oxygen Delivery: 6 LPM  Weight: 292 lbs 6.4 oz    General Appearance: Obese female in NAD  Respiratory: lungs CTAB, no wheezes or crackles  Cardiovascular: mild tachycardia, regular rhythm, normal s1/s2 without murmur  GI: normal bowel sounds, abdomen soft and nontender  Skin: unable to perform skin exam due to limited mobility and small PACU hospital bed  Other: Alert and appropriate, cranial nerves grossly intact      Medical Decision Making       55 MINUTES SPENT BY ME on the date of service doing chart review, history, exam, documentation & further activities per the note.  MANAGEMENT DISCUSSED with the following over the past 24 hours: patient, bedside RN   NOTE(S)/MEDICAL RECORDS REVIEWED over the past 24 hours: PCP pre-op H&P, procedural notes  Tests ORDERED & REVIEWED in the past 24 hours:  - CMP  - CBC  - Lactic Acid  - Procalcitonin      Data     I have personally reviewed the following data over the past 24 hrs:    6.8  \   11.4 (L)   / 107 (L)     N/A N/A N/A /  N/A   N/A N/A N/A \       Procal: N/A CRP: N/A Lactic Acid: 4.3 (HH)         Imaging results reviewed over the past 24 hrs:   Recent Results (from the past 24 hour(s))   XR Surgery KEO L/T 5 Min Fluoro w Stills    Narrative    SURGERY C-ARM FLUORO LESS THAN 5 MINUTES WITH STILLS 1/18/2023 10:38  AM     COMPARISON: None    HISTORY: Left side Holmium laser lithotripsy, left side stent  placement.     FLUOROSCOPY TIME: .2 minute(s)      Impression    IMPRESSION: Two spot images demonstrate a wire and stent in the left  ureter. See operative report for details.    JAK BAEZA,  MD         SYSTEM ID:  [id number]   XR Chest Port 1 View    Narrative    XR CHEST PORT 1 VIEW  1/18/2023 1:21 PM       INDICATION: cough, possible fever  COMPARISON: 12/27/2020       Impression    IMPRESSION: Negative chest.    JAK BAEZA MD         SYSTEM ID:  [id number]

## 2023-01-18 NOTE — ANESTHESIA POSTPROCEDURE EVALUATION
Patient: Mili Padilla    Procedure: Procedure(s):  CYSTOSCOPY, LEFT URETEROSCOPY, HOLMIUM LASER  LITHOTRIPSY, LEFT   STENT PLACEMENT       Anesthesia Type:  General    Note:  Disposition: Admission   Postop Pain Control: Uneventful            Sign Out: Well controlled pain   PONV: No   Neuro/Psych: Uneventful            Sign Out: Acceptable/Baseline neuro status   Airway/Respiratory: Uneventful            Sign Out: AIRWAY IN SITU/Resp. Support   CV/Hemodynamics:             Sign Out: Patient with tachycardia post op.  Signs of post op sepsis with fever.  Blood pressures stable.    Other NRE:    DID A NON-ROUTINE EVENT OCCUR? No    Event details/Postop Comments:  Patient with sepsis following kidney stone removal.  Tachycardia, febrile.   Fluid bolus given.  Lactate elevated.  Waiting for bed            Last vitals:  Vitals Value Taken Time   /47 01/18/23 1400   Temp 37.9  C (100.2  F) 01/18/23 1400   Pulse 101 01/18/23 1411   Resp 13 01/18/23 1411   SpO2 94 % 01/18/23 1411   Vitals shown include unvalidated device data.    Electronically Signed By: Promise Brennan  January 18, 2023  2:13 PM

## 2023-01-18 NOTE — OP NOTE
Procedure Date: 01/18/2023    PREOPERATIVE DIAGNOSIS:  Left kidney stone.    POSTOPERATIVE DIAGNOSIS:  Left kidney stone.    PROCEDURE:  Left ureteroscopy, holmium laser lithotripsy, left stent placement.      SURGEON:  Mahendra Coles MD.    ANESTHESIA:  General.  Preoperatively, she received Ancef antibiotics.    INDICATIONS FOR PROCEDURE:  Mili is a 67-year-old female with multiple sclerosis, who was diagnosed with large stones in her left renal pelvis, 1.4 x 1 cm in the left renal pelvis, with an addition of another 1 cm stone in the lower pole of the kidney.  I consented her for the procedure with the risks of bleeding, infection, injury, need for additional surgeries.  She would like to proceed.    DESCRIPTION OF PROCEDURE:  Mili was brought to the operating room and placed in supine position.  After excellent induction of general anesthesia, her perineum was prepped and draped in the regular fashion.  A 22-Slovenian cystoscope was placed per urethra.  Evaluation of the bladder did not demonstrate any pathology.  Attention was brought to the left ureteral orifice.  Glidewire was passed all the way to the left renal pelvis under fluoroscopic guidance.  Initially, I performed rigid ureteroscopy over a second wire, but subsequently I was followed by flexible ureteroscopy all the way to the left renal pelvis.  A large stone was identified.  I used a 200 micron holmium laser and used boomtrain technology to break down the 1.5 cm stone into multiple fragments.  On the surface, the stone appeared to be quite soft with quite hard core underneath.  Once the stone was broken into smaller fragments, I was able to identify another 1 cm stone in the lower pole, which was also broken down into multiple fragments.  At the completion of the procedure, I did not see any large fragments present more than 5 mm in size.  Visibility was quite good.  At the completion, I checked the ureter, which did not have any fragments in the  ureter at this time.  I placed a 6 x 26 double-J ureteral stent over a Glidewire.  Bladder was drained.  The patient was transferred to recovery in stable condition.  The plan as of now is to give her a 3-day course of antibiotics and see me back in 2 weeks to remove the stent as a second stage procedure in the clinic.    Mahendra Coles MD        D: 2023   T: 2023   MT: [id number]    Name:     JEANNIE VILLARREALVesna  MRN:      [id number]        Account:        [id number]   :      1955           Procedure Date: 2023     Document: [id number]

## 2023-01-18 NOTE — DISCHARGE INSTRUCTIONS
**Because you had anesthesia today and your history of sleep apnea, it is extremely important that you use your CPAP machine for the next 24 hours while napping or sleeping.**     Same Day Surgery Discharge Instructions for  Sedation and General Anesthesia     It's not unusual to feel dizzy, light-headed or faint for up to 24 hours after surgery or while taking pain medication.  If you have these symptoms: sit for a few minutes before standing and have someone assist you when you get up to walk or use the bathroom.    You should rest and relax for the next 24 hours. We recommend you make arrangements to have an adult stay with you for at least 24 hours after your discharge.  Avoid hazardous and strenuous activity.    DO NOT DRIVE any vehicle or operate mechanical equipment for 24 hours following the end of your surgery.  Even though you may feel normal, your reactions may be affected by the medication you have received.    Do not drink alcoholic beverages for 24 hours following surgery.     Slowly progress to your regular diet as you feel able. It's not unusual to feel nauseated and/or vomit after receiving anesthesia.  If you develop these symptoms, drink clear liquids (apple juice, ginger ale, broth, 7-up, etc. ) until you feel better.  If your nausea and vomiting persists for 24 hours, please notify your surgeon.      All narcotic pain medications, along with inactivity and anesthesia, can cause constipation. Drinking plenty of liquids and increasing fiber intake will help.    For any questions of a medical nature, call your surgeon.    Do not make important decisions for 24 hours.    If you had general anesthesia, you may have a sore throat for a couple of days related to the breathing tube used during surgery.  You may use Cepacol lozenges to help with this discomfort.  If it worsens or if you develop a fever, contact your surgeon.     If you feel your pain is not well managed with the pain medications  prescribed by your surgeon, please contact your surgeon's office to let them know so they can address your concerns.      **If you have questions or concerns about your procedure,   call Dr. Coles at [phone]**     Cystoscopy and Stent Placement Discharge Instructions    During surgery, a stent was placed in the ureter.  The ureter is the tube that drains urine from the kidney to the bladder.  The stent is placed to dilate (open) the ureter so the stone fragments can pass easily through the ureter or to decrease ureteral swelling after surgery, or to relieve an obstruction. The stent is made of rubber. The upper end of the stent curls in the kidney while the lower end rests in the bladder      Diet:  Return to the diet that you were on before the procedure, unless you are given specific diet instructions.  It is important to drink 6-8 glasses of fluids per day at home - at least 3-4 glasses should be water.    Activity:  Walk short distances and increase as your strength allows.  You may climb stairs.  Do not do strenuous exercise or heavy lifting until approved by surgeon.  Do not drive while taking narcotic pain medications.    Bathing:  You may take a shower.    While the stent is in place you may experience the following symptoms:  Blood and/or small blood clots in urine.  Bladder spasm (frequency and urgency of urination).  Discomfort or aching in the back or side where the stent is.  Burning or discomfort at the end of urine stream.    To decrease these symptoms you should:  Take pain medication as prescribed.  Drink plenty of fluids.  If you experience pain at the end of urination try not emptying your bladder completely.  If having discomfort in back or side, decrease activity.    Call your physician if these signs/symptoms are present:  Pain that is not relieved by a short rest or ordered pain medications.  Temperature at or above 101.0 F or chills.  Inability or difficulty urinating.   Excessive  blood in urine.  Any questions or concerns.        Please call your County  if you would like to inquire further about additional medical coverage through the state.  You may need to pay a spend down and they will be able to help you calculate what this spend down would be.    If you are able to work with your prescription supplier for mail order discounts (sometimes they will give you 3 months for the price of 2). Usually at the beginning of a new year you will have a higher amount of coinsurance/deductible/out of pocket max's. You can always ask if you can make a payment arrangement for your bills if you have not done so already.

## 2023-01-18 NOTE — OR NURSING
Lab called with critical Lactic Acid value 4.2. Hospitalists notifiied. Lactic acid schedule in two hours

## 2023-01-18 NOTE — BRIEF OP NOTE
[facility] Brief Operative Note    Pre-operative diagnosis: Kidney stone [N20.0]   Post-operative diagnosis * No post-op diagnosis entered *   Procedure: Procedure(s):  CYSTOSCOPY, LEFT URETEROSCOPY, HOLMIUM LASER  LITHOTRIPSY, LEFT   STENT PLACEMENT   Surgeon: Mahendra Coles MD   Assistants(s): None    Estimated blood loss: Minimal    Specimens: None   Findings: large stones in the kidneys, all broken down to small fragments

## 2023-01-18 NOTE — OR NURSING
hospitalist at bedside now.  Lab just called with a critical lab value of Lactic Acid 4.3.  Hospitalist aware, and texted Surgeon with this value.  Ordered to run another Lactated Ringers 500cc bolus now.

## 2023-01-18 NOTE — INTERVAL H&P NOTE
"I have reviewed the surgical (or preoperative) H&P that is linked to this encounter, and examined the patient. There are no significant changes    Clinical Conditions Present on Arrival:  Clinically Significant Risk Factors Present on Admission                    # Severe Obesity: Estimated body mass index is 47.19 kg/m  as calculated from the following:    Height as of this encounter: 1.676 m (5' 6\").    Weight as of this encounter: 132.6 kg (292 lb 6.4 oz).       "

## 2023-01-18 NOTE — ANESTHESIA PROCEDURE NOTES
Airway       Patient location during procedure: OR       Procedure Start/Stop Times: 1/18/2023 9:27 AM  Staff -        Anesthesiologist:  Promise Brennan       CRNA: Faraz Reina APRN CRNA       Performed By: CRNAIndications and Patient Condition       Indications for airway management: ashish-procedural       Induction type:intravenous       Mask difficulty assessment: 1 - vent by mask    Final Airway Details       Final airway type: endotracheal airway       Successful airway: ETT - single  Endotracheal Airway Details        ETT size (mm): 6.5       Cuffed: yes       Successful intubation technique: video laryngoscopy       VL Blade Size: Yeager 3       Grade View of Cords: 1       Adjucts: stylet       Position: Right       Measured from: gums/teeth       Secured at (cm): 21       Bite block used: None    Post intubation assessment        Placement verified by: capnometry, equal breath sounds and chest rise        Number of attempts at approach: 1       Number of other approaches attempted: 0       Secured with: pink tape       Ease of procedure: easy       Dentition: Intact and Unchanged    Medication(s) Administered   Medication Administration Time: 1/18/2023 9:27 AM

## 2023-01-18 NOTE — OR NURSING
Called Dr Brennan, updated Dr on status of patient. Dr Boyer aware of EKG. No new orders at this time.

## 2023-01-18 NOTE — PROGRESS NOTES
Urology post op    In recovery she developed chills; stable vitals    Plan: hydration/add rocephin after blood cultures; medica consult for urosepsis             Admit for observation; sister notified        Melita Coles MD

## 2023-01-18 NOTE — ANESTHESIA PREPROCEDURE EVALUATION
Anesthesia Pre-Procedure Evaluation    Patient: Mili Padilla   MRN: [id number] : 1955        Procedure : Procedure(s):  CYSTOSCOPY, LEFT URETEROSCOPY, HOLMIUM LASER  LITHOTRIPSY, LEFT   STENT PLACEMENT          Past Medical History:   Diagnosis Date     Depression, major, in partial remission (H)      Fibromyalgia syndrome      Gastro-oesophageal reflux disease      Hyperlipemia      Hypertension      Low serum sodium      Lymphedema      Mixed incontinence      Multiple sclerosis (H)     tremors with MS, all four limbs and head     Multiple sclerosis, secondary progressive (H)      Neurogenic bladder      Obese      Osteoporosis      Sleep apnea      Vocal cord paralysis, unilateral complete       Past Surgical History:   Procedure Laterality Date     COLONOSCOPY N/A 2017    Procedure: COMBINED COLONOSCOPY, SINGLE OR MULTIPLE BIOPSY/POLYPECTOMY BY BIOPSY;;  Surgeon: Wong Beaulieu MD;  Location:  GI     COLONOSCOPY N/A 2018    Procedure: COMBINED COLONOSCOPY, SINGLE OR MULTIPLE BIOPSY/POLYPECTOMY BY BIOPSY;  COLONOSCOPY ;  Surgeon: Yessica Santana MD;  Location:  GI     ENT SURGERY      throat 1969, vocal cord surgery with skin grafting     ESOPHAGOSCOPY, GASTROSCOPY, DUODENOSCOPY (EGD), COMBINED N/A 2014    Procedure: COMBINED ENDOSCOPIC ULTRASOUND, ESOPHAGOSCOPY, GASTROSCOPY, DUODENOSCOPY (EGD), FINE NEEDLE ASPIRATE/BIOPSY;  Surgeon: Yessica Santana MD;  Location:  GI     ESOPHAGOSCOPY, GASTROSCOPY, DUODENOSCOPY (EGD), COMBINED N/A 2014    Procedure: COMBINED ESOPHAGOSCOPY, GASTROSCOPY, DUODENOSCOPY (EGD), BIOPSY SINGLE OR MULTIPLE;  Surgeon: Yessica Santana MD;  Location:  GI     GASTRIC BYPASS Bilateral      LAPAROSCOPIC APPENDECTOMY  2013    Procedure: LAPAROSCOPIC APPENDECTOMY;;  Surgeon: Salvador Morris MD;  Location:  OR     LAPAROSCOPIC BIOPSY LIVER  2013    Procedure: LAPAROSCOPIC BIOPSY LIVER;;  Surgeon: Salvador Morris  MD SAMIR;  Location:  OR     LAPAROSCOPIC GASTRIC SLEEVE  2013    Procedure: LAPAROSCOPIC GASTRIC SLEEVE;  LAPAROSCOPIC SLEEVE GASTRECTOMY/ LAPARSCOPIC  APPENDECTOMY /LIVER BIOPSIES/LIVER CYST DRAINAGE;  Surgeon: Salvador Morris MD;  Location:  OR     ORTHOPEDIC SURGERY      R wrist 2010      Allergies   Allergen Reactions     Amoxicillin Hives     Augmentin      Sensitive,  Able to tolerate a few doses, otherwise hives on hands     Betaseron [Interferon Beta-1b]      Necrosis of skin at injection sites     Dust Mite Extract Unknown     Mold Unknown      Social History     Tobacco Use     Smoking status: Former     Types: Cigarettes     Quit date: 1974     Years since quittin.3     Smokeless tobacco: Never   Substance Use Topics     Alcohol use: Not Currently      Wt Readings from Last 1 Encounters:   23 132.6 kg (292 lb 6.4 oz)        Anesthesia Evaluation            ROS/MED HX  ENT/Pulmonary:     (+) sleep apnea, doesn't use CPAP, vocal cord abnormalities (vocal cord paralysis following a ski accident) -  Hoarseness,     Neurologic:     (+) Multiple Sclerosis, limitations: leg weakness, balance issues.     Cardiovascular:     (+) hypertension-----    METS/Exercise Tolerance:     Hematologic:       Musculoskeletal:       GI/Hepatic:     (+) GERD,     Renal/Genitourinary:     (+) Nephrolithiasis ,     Endo:     (+) Obesity (s/p gastric bypass),     Psychiatric/Substance Use:     (+) psychiatric history depression     Infectious Disease:       Malignancy:       Other:            Physical Exam    Airway        Mallampati: III   TM distance: > 3 FB   Neck ROM: full     Respiratory Devices and Support         Dental     Comment: Partial         Cardiovascular   cardiovascular exam normal          Pulmonary   pulmonary exam normal                OUTSIDE LABS:  CBC:   Lab Results   Component Value Date    WBC 9.1 2021    WBC 9.6 2020    HGB 12.5 2021    HGB 11.8 2020    HCT  38.8 07/22/2021    HCT 35.4 12/27/2020     07/22/2021     12/31/2020     BMP:   Lab Results   Component Value Date     (A) 01/04/2021     (L) 12/30/2020    POTASSIUM 3.5 01/04/2021    POTASSIUM 3.9 12/30/2020    CHLORIDE 98 01/04/2021    CHLORIDE 99 12/30/2020    CO2 29 01/04/2021    CO2 29 12/30/2020    BUN 8 01/04/2021    BUN 17 12/30/2020    CR 0.40 (A) 01/04/2021    CR 0.68 12/31/2020     (A) 01/04/2021     (H) 12/30/2020     COAGS:   Lab Results   Component Value Date    PTT 31 12/12/2010    INR 1.07 12/27/2020     POC:   Lab Results   Component Value Date     (H) 05/31/2013     HEPATIC:   Lab Results   Component Value Date    ALBUMIN 2.2 (L) 12/27/2020    PROTTOTAL 6.5 (L) 12/27/2020    ALT 29 12/27/2020    AST 32 12/27/2020    ALKPHOS 79 12/27/2020    BILITOTAL 0.6 12/27/2020     OTHER:   Lab Results   Component Value Date    LACT 1.0 12/25/2020    A1C 5.9 02/11/2013    HEAVENLY 9.9 01/04/2021    MAG 2.3 12/29/2020    LIPASE 227 12/24/2014    TSH 2.07 12/29/2020       Anesthesia Plan    ASA Status:  3      Anesthesia Type: General.     - Airway: ETT         Techniques and Equipment:     - Airway: Video-Laryngoscope         Consents            Postoperative Care       PONV prophylaxis: Ondansetron (or other 5HT-3), Background Propofol Infusion     Comments:    Other Comments: Surgeon reguest patient paralysis- et tube    6.5 et tube            Promise Brennan

## 2023-01-18 NOTE — ANESTHESIA CARE TRANSFER NOTE
Patient: Mili Padilla    Procedure: Procedure(s):  CYSTOSCOPY, LEFT URETEROSCOPY, HOLMIUM LASER  LITHOTRIPSY, LEFT   STENT PLACEMENT       Diagnosis: Kidney stone [N20.0]  Diagnosis Additional Information: No value filed.    Anesthesia Type:   General     Note:    Oropharynx: oropharynx clear of all foreign objects and spontaneously breathing  Level of Consciousness: awake  Oxygen Supplementation: face mask  Level of Supplemental Oxygen (L/min / FiO2): 6  Independent Airway: airway patency satisfactory and stable  Dentition: dentition unchanged  Vital Signs Stable: post-procedure vital signs reviewed and stable  Report to RN Given: handoff report given  Patient transferred to: PACU    Handoff Report: Identifed the Patient, Identified the Reponsible Provider, Reviewed the pertinent medical history, Discussed the surgical course, Reviewed Intra-OP anesthesia mangement and issues during anesthesia, Set expectations for post-procedure period and Allowed opportunity for questions and acknowledgement of understanding      Vitals:  Vitals Value Taken Time   /84 01/18/23 1047   Temp     Pulse 115 01/18/23 1051   Resp 11 01/18/23 1051   SpO2 100 % 01/18/23 1051   Vitals shown include unvalidated device data.    Electronically Signed By: CHANCE Simeon CRNA  January 18, 2023  10:52 AM

## 2023-01-19 ENCOUNTER — APPOINTMENT (OUTPATIENT)
Dept: PHYSICAL THERAPY | Facility: CLINIC | Age: 68
DRG: 856 | End: 2023-01-19
Attending: PHYSICIAN ASSISTANT
Payer: COMMERCIAL

## 2023-01-19 PROBLEM — A41.9 SEPSIS DUE TO URINARY TRACT INFECTION (H): Status: ACTIVE | Noted: 2020-12-25

## 2023-01-19 PROBLEM — N39.0 SEPSIS DUE TO URINARY TRACT INFECTION (H): Status: ACTIVE | Noted: 2020-12-25

## 2023-01-19 LAB
ALBUMIN UR-MCNC: 30 MG/DL
ANION GAP SERPL CALCULATED.3IONS-SCNC: 10 MMOL/L (ref 7–15)
APPEARANCE UR: ABNORMAL
BILIRUB UR QL STRIP: NEGATIVE
BUN SERPL-MCNC: 9.7 MG/DL (ref 8–23)
CALCIUM SERPL-MCNC: 7.8 MG/DL (ref 8.8–10.2)
CHLORIDE SERPL-SCNC: 95 MMOL/L (ref 98–107)
COLOR UR AUTO: YELLOW
CREAT SERPL-MCNC: 0.79 MG/DL (ref 0.51–0.95)
DEPRECATED HCO3 PLAS-SCNC: 27 MMOL/L (ref 22–29)
ERYTHROCYTE [DISTWIDTH] IN BLOOD BY AUTOMATED COUNT: 12.2 % (ref 10–15)
GFR SERPL CREATININE-BSD FRML MDRD: 81 ML/MIN/1.73M2
GLUCOSE SERPL-MCNC: 123 MG/DL (ref 70–99)
GLUCOSE UR STRIP-MCNC: NEGATIVE MG/DL
HCT VFR BLD AUTO: 32.2 % (ref 35–47)
HGB BLD-MCNC: 11.1 G/DL (ref 11.7–15.7)
HGB UR QL STRIP: ABNORMAL
KETONES UR STRIP-MCNC: NEGATIVE MG/DL
LACTATE SERPL-SCNC: 1.1 MMOL/L (ref 0.7–2)
LACTATE SERPL-SCNC: 1.7 MMOL/L (ref 0.7–2)
LACTATE SERPL-SCNC: 2.1 MMOL/L (ref 0.7–2)
LEUKOCYTE ESTERASE UR QL STRIP: ABNORMAL
MCH RBC QN AUTO: 32 PG (ref 26.5–33)
MCHC RBC AUTO-ENTMCNC: 34.5 G/DL (ref 31.5–36.5)
MCV RBC AUTO: 93 FL (ref 78–100)
MUCOUS THREADS #/AREA URNS LPF: PRESENT /LPF
NITRATE UR QL: NEGATIVE
PH UR STRIP: 5.5 [PH] (ref 5–7)
PLATELET # BLD AUTO: 81 10E3/UL (ref 150–450)
POTASSIUM SERPL-SCNC: 4.8 MMOL/L (ref 3.4–5.3)
RBC # BLD AUTO: 3.47 10E6/UL (ref 3.8–5.2)
RBC URINE: 99 /HPF
SODIUM SERPL-SCNC: 132 MMOL/L (ref 136–145)
SP GR UR STRIP: 1.03 (ref 1–1.03)
UROBILINOGEN UR STRIP-MCNC: NORMAL MG/DL
WBC # BLD AUTO: 20.8 10E3/UL (ref 4–11)
WBC URINE: 95 /HPF

## 2023-01-19 PROCEDURE — 258N000003 HC RX IP 258 OP 636: Performed by: ANESTHESIOLOGY

## 2023-01-19 PROCEDURE — 80048 BASIC METABOLIC PNL TOTAL CA: CPT | Performed by: HOSPITALIST

## 2023-01-19 PROCEDURE — 85027 COMPLETE CBC AUTOMATED: CPT | Performed by: HOSPITALIST

## 2023-01-19 PROCEDURE — 36415 COLL VENOUS BLD VENIPUNCTURE: CPT | Performed by: HOSPITALIST

## 2023-01-19 PROCEDURE — 97530 THERAPEUTIC ACTIVITIES: CPT | Mod: GP | Performed by: PHYSICAL THERAPIST

## 2023-01-19 PROCEDURE — 258N000003 HC RX IP 258 OP 636: Performed by: PHYSICIAN ASSISTANT

## 2023-01-19 PROCEDURE — 250N000011 HC RX IP 250 OP 636: Performed by: INTERNAL MEDICINE

## 2023-01-19 PROCEDURE — 250N000013 HC RX MED GY IP 250 OP 250 PS 637: Performed by: PHYSICIAN ASSISTANT

## 2023-01-19 PROCEDURE — 93010 ELECTROCARDIOGRAM REPORT: CPT | Performed by: INTERNAL MEDICINE

## 2023-01-19 PROCEDURE — 258N000003 HC RX IP 258 OP 636: Performed by: INTERNAL MEDICINE

## 2023-01-19 PROCEDURE — 250N000011 HC RX IP 250 OP 636: Performed by: PHYSICIAN ASSISTANT

## 2023-01-19 PROCEDURE — 83605 ASSAY OF LACTIC ACID: CPT | Performed by: PHYSICIAN ASSISTANT

## 2023-01-19 PROCEDURE — 250N000011 HC RX IP 250 OP 636: Performed by: HOSPITALIST

## 2023-01-19 PROCEDURE — 93005 ELECTROCARDIOGRAM TRACING: CPT

## 2023-01-19 PROCEDURE — 200N000001 HC R&B ICU

## 2023-01-19 PROCEDURE — 99233 SBSQ HOSP IP/OBS HIGH 50: CPT | Performed by: PHYSICIAN ASSISTANT

## 2023-01-19 PROCEDURE — 36415 COLL VENOUS BLD VENIPUNCTURE: CPT | Performed by: PHYSICIAN ASSISTANT

## 2023-01-19 PROCEDURE — 87086 URINE CULTURE/COLONY COUNT: CPT | Performed by: ANESTHESIOLOGY

## 2023-01-19 PROCEDURE — 83605 ASSAY OF LACTIC ACID: CPT | Performed by: ANESTHESIOLOGY

## 2023-01-19 PROCEDURE — 258N000003 HC RX IP 258 OP 636: Performed by: HOSPITALIST

## 2023-01-19 PROCEDURE — 81001 URINALYSIS AUTO W/SCOPE: CPT | Performed by: ANESTHESIOLOGY

## 2023-01-19 PROCEDURE — 97161 PT EVAL LOW COMPLEX 20 MIN: CPT | Mod: GP | Performed by: PHYSICAL THERAPIST

## 2023-01-19 PROCEDURE — 250N000013 HC RX MED GY IP 250 OP 250 PS 637: Performed by: UROLOGY

## 2023-01-19 RX ORDER — HYDROXYZINE HYDROCHLORIDE 10 MG/1
10 TABLET, FILM COATED ORAL EVERY 6 HOURS PRN
Qty: 30 TABLET | Refills: 0 | Status: SHIPPED | OUTPATIENT
Start: 2023-01-19 | End: 2023-01-24

## 2023-01-19 RX ORDER — SODIUM CHLORIDE, SODIUM LACTATE, POTASSIUM CHLORIDE, CALCIUM CHLORIDE 600; 310; 30; 20 MG/100ML; MG/100ML; MG/100ML; MG/100ML
INJECTION, SOLUTION INTRAVENOUS CONTINUOUS
Status: DISCONTINUED | OUTPATIENT
Start: 2023-01-19 | End: 2023-01-20

## 2023-01-19 RX ORDER — LOPERAMIDE HCL 2 MG
2 CAPSULE ORAL 4 TIMES DAILY PRN
Status: DISCONTINUED | OUTPATIENT
Start: 2023-01-19 | End: 2023-01-25 | Stop reason: HOSPADM

## 2023-01-19 RX ORDER — ATORVASTATIN CALCIUM 10 MG/1
20 TABLET, FILM COATED ORAL DAILY
Status: DISCONTINUED | OUTPATIENT
Start: 2023-01-19 | End: 2023-01-25 | Stop reason: HOSPADM

## 2023-01-19 RX ORDER — VERAPAMIL HYDROCHLORIDE 120 MG/1
120 TABLET, FILM COATED, EXTENDED RELEASE ORAL 2 TIMES DAILY
Status: DISCONTINUED | OUTPATIENT
Start: 2023-01-19 | End: 2023-01-25 | Stop reason: HOSPADM

## 2023-01-19 RX ORDER — DEXMEDETOMIDINE HYDROCHLORIDE 4 UG/ML
.1-1.2 INJECTION, SOLUTION INTRAVENOUS CONTINUOUS
Status: DISCONTINUED | OUTPATIENT
Start: 2023-01-19 | End: 2023-01-19

## 2023-01-19 RX ADMIN — VANCOMYCIN HYDROCHLORIDE 750 MG: 10 INJECTION, POWDER, LYOPHILIZED, FOR SOLUTION INTRAVENOUS at 13:26

## 2023-01-19 RX ADMIN — MEROPENEM 1 G: 1 INJECTION, POWDER, FOR SOLUTION INTRAVENOUS at 20:30

## 2023-01-19 RX ADMIN — VERAPAMIL HYDROCHLORIDE 120 MG: 120 TABLET, FILM COATED, EXTENDED RELEASE ORAL at 11:40

## 2023-01-19 RX ADMIN — ATORVASTATIN CALCIUM 20 MG: 20 TABLET, FILM COATED ORAL at 11:40

## 2023-01-19 RX ADMIN — CARBAMAZEPINE 200 MG: 200 TABLET ORAL at 09:21

## 2023-01-19 RX ADMIN — LOPERAMIDE HYDROCHLORIDE 2 MG: 2 CAPSULE ORAL at 16:51

## 2023-01-19 RX ADMIN — FLUTICASONE PROPIONATE 2 SPRAY: 50 SPRAY, METERED NASAL at 09:23

## 2023-01-19 RX ADMIN — SODIUM CHLORIDE, POTASSIUM CHLORIDE, SODIUM LACTATE AND CALCIUM CHLORIDE: 600; 310; 30; 20 INJECTION, SOLUTION INTRAVENOUS at 00:07

## 2023-01-19 RX ADMIN — LOPERAMIDE HYDROCHLORIDE 2 MG: 2 CAPSULE ORAL at 21:42

## 2023-01-19 RX ADMIN — CARBAMAZEPINE 200 MG: 200 TABLET ORAL at 20:38

## 2023-01-19 RX ADMIN — TRAZODONE HYDROCHLORIDE 600 MG: 150 TABLET ORAL at 00:20

## 2023-01-19 RX ADMIN — ACETAMINOPHEN 1000 MG: 500 TABLET ORAL at 18:32

## 2023-01-19 RX ADMIN — MEROPENEM 1 G: 1 INJECTION, POWDER, FOR SOLUTION INTRAVENOUS at 04:07

## 2023-01-19 RX ADMIN — SODIUM CHLORIDE 500 ML: 9 INJECTION, SOLUTION INTRAVENOUS at 04:07

## 2023-01-19 RX ADMIN — ARIPIPRAZOLE 7.5 MG: 15 TABLET ORAL at 10:01

## 2023-01-19 RX ADMIN — MIRTAZAPINE 75 MG: 30 TABLET, FILM COATED ORAL at 21:42

## 2023-01-19 RX ADMIN — DULOXETINE 120 MG: 60 CAPSULE, DELAYED RELEASE ORAL at 09:22

## 2023-01-19 RX ADMIN — SODIUM CHLORIDE, POTASSIUM CHLORIDE, SODIUM LACTATE AND CALCIUM CHLORIDE: 600; 310; 30; 20 INJECTION, SOLUTION INTRAVENOUS at 06:56

## 2023-01-19 RX ADMIN — VANCOMYCIN HYDROCHLORIDE 750 MG: 10 INJECTION, POWDER, LYOPHILIZED, FOR SOLUTION INTRAVENOUS at 23:44

## 2023-01-19 RX ADMIN — SODIUM CHLORIDE, POTASSIUM CHLORIDE, SODIUM LACTATE AND CALCIUM CHLORIDE: 600; 310; 30; 20 INJECTION, SOLUTION INTRAVENOUS at 13:28

## 2023-01-19 RX ADMIN — ACETAMINOPHEN 1000 MG: 500 TABLET ORAL at 11:53

## 2023-01-19 RX ADMIN — MEROPENEM 1 G: 1 INJECTION, POWDER, FOR SOLUTION INTRAVENOUS at 11:40

## 2023-01-19 RX ADMIN — CARBAMAZEPINE 200 MG: 200 TABLET ORAL at 00:20

## 2023-01-19 RX ADMIN — TRAZODONE HYDROCHLORIDE 600 MG: 150 TABLET ORAL at 21:42

## 2023-01-19 RX ADMIN — MIRTAZAPINE 75 MG: 30 TABLET, FILM COATED ORAL at 00:19

## 2023-01-19 RX ADMIN — VERAPAMIL HYDROCHLORIDE 120 MG: 120 TABLET, FILM COATED, EXTENDED RELEASE ORAL at 20:38

## 2023-01-19 RX ADMIN — SODIUM CHLORIDE, POTASSIUM CHLORIDE, SODIUM LACTATE AND CALCIUM CHLORIDE: 600; 310; 30; 20 INJECTION, SOLUTION INTRAVENOUS at 21:23

## 2023-01-19 ASSESSMENT — ACTIVITIES OF DAILY LIVING (ADL)
ADLS_ACUITY_SCORE: 30
ADLS_ACUITY_SCORE: 42
WEAR_GLASSES_OR_BLIND: YES
CONCENTRATING,_REMEMBERING_OR_MAKING_DECISIONS_DIFFICULTY: NO
DIFFICULTY_EATING/SWALLOWING: NO
ADLS_ACUITY_SCORE: 30
ADLS_ACUITY_SCORE: 35
ADLS_ACUITY_SCORE: 30

## 2023-01-19 NOTE — PROGRESS NOTES
Critical Care  Note      01/18/2023    Name: Mili Padilla MRN#: [id number]   Age: 67 year old YOB: 1955     [facility] Day# 0  ICU DAY # 0                 Problem List:   Active Problems:    * No active hospital problems. *           Summary/Hospital Course:     67-year-old female admitted to the ICU after lithotripsy and presentation of chills and concern for urosepsis.  Patient remains with soft blood pressures after aggressive fluid resuscitation.  She is complaining of fever and chills at this time.  Past medical history significant for MS with neurogenic bladder, hypertension, hyperlipidemia, OVIDIO and depression.      Assessment and plan :     Mili Padilla IS a 67 year old female admitted on 1/18/2023 for hypotension most likely secondary to sepsis.  This is in the setting of elective left ureteroscopy, lithotripsy and left ureteral stenting..   I have personally reviewed the daily labs, imaging studies, cultures and discussed the case with referring physician and consulting physicians.     My assessment and plan by system for this patient is as follows:    Neurology/Psychiatry:   1.  History of MS present on admission  2.  Neurogenic bladder present on admission    Plan  Patient is status post elective urologic procedure, will treat with as needed opioid for pain.  Can use Tylenol for mild pain.    Cardiovascular:   1.Hemodynamics -soft blood pressure  2.Rhythm -sinus tachycardia  3. Ischemia -no signs of coronary artery disease  Plan  Soft blood pressure most likely secondary to sepsis induced hypotension.  We will continue with volume resuscitation.  If patient does not respond to volume resuscitation we will start norepinephrine.  Patient may need central access at that time.    Pulmonary/Ventilator Management:   1. Airway patent airway  2. Oxygenation/ventilation/mechanics supplemental oxygen  Plan  -Continue aggressive bronchial hygiene    GI and Nutrition :   1.  Clear  liquids    Plan  -Continue patient on clear liquids until hemodynamically stable    Renal/Fluids/Electrolytes:   1.  Nephrolithiasis left status post ureteroscopy and lithotripsy  2.  Hyponatremia  3.  Lactic acid stable at 4 though elevated  4.  Appears hypovolemic  Plan  -No evidence of kidney injury at this time, creatinine within normal limits we will continue to trend lactate  - monitor function and electrolytes as needed with replacement per ICU protocols. - generally avoid nephrotoxic agents such as NSAID, IV contrast unless specifically required  - adjust medications as needed for renal clearance  - follow I/O's as appropriate.    Infectious Disease:   1.  Concern for urosepsis    Plan  -We will panculture while on broad-spectrum antibiotics    Endocrine:   1.  Concern for stress-induced hyperglycemia    Plan  - ICU insulin protocol, goal sugar <180      Hematology/Oncology:   1.  No leukocytosis  2.  No active blood loss  3.  Plan  -Continue to monitor     IV/Access:   1. Venous access -peripheral IVs  2. Arterial access -A-line not indicated  3.  Plan  -Does not need central access at this time      ICU Prophylaxis:   1. DVT: Mechanical  2. VAP: Not indicated  3. Stress Ulcer: PPI/H2 blocker  4. Restraints: Restraints not indicated  5. Wound care  -local  6. Feeding -clear diet  7. Family Update: Discussed plan with patient  8. Disposition -ICU        Key goals for next 24 hours:   1.  Continue volume resuscitation  2.  Panculture  3.  Broad-spectrum antibiotics               Medical History:     Past Medical History:   Diagnosis Date     Depression, major, in partial remission (H)      Fibromyalgia syndrome      Gastro-oesophageal reflux disease      Hyperlipemia      Hypertension      Low serum sodium      Lymphedema      Mixed incontinence      Multiple sclerosis (H)     tremors with MS, all four limbs and head     Multiple sclerosis, secondary progressive (H)      Neurogenic bladder      Obese       Osteoporosis      Sleep apnea      Vocal cord paralysis, unilateral complete      Past Surgical History:   Procedure Laterality Date     COLONOSCOPY N/A 07/17/2017    Procedure: COMBINED COLONOSCOPY, SINGLE OR MULTIPLE BIOPSY/POLYPECTOMY BY BIOPSY;;  Surgeon: Wong Beaulieu MD;  Location:  GI     COLONOSCOPY N/A 02/23/2018    Procedure: COMBINED COLONOSCOPY, SINGLE OR MULTIPLE BIOPSY/POLYPECTOMY BY BIOPSY;  COLONOSCOPY ;  Surgeon: Yessica Santana MD;  Location:  GI     ENT SURGERY      throat 1969, vocal cord surgery with skin grafting     ESOPHAGOSCOPY, GASTROSCOPY, DUODENOSCOPY (EGD), COMBINED N/A 12/16/2014    Procedure: COMBINED ENDOSCOPIC ULTRASOUND, ESOPHAGOSCOPY, GASTROSCOPY, DUODENOSCOPY (EGD), FINE NEEDLE ASPIRATE/BIOPSY;  Surgeon: Yessica Santana MD;  Location:  GI     ESOPHAGOSCOPY, GASTROSCOPY, DUODENOSCOPY (EGD), COMBINED N/A 12/16/2014    Procedure: COMBINED ESOPHAGOSCOPY, GASTROSCOPY, DUODENOSCOPY (EGD), BIOPSY SINGLE OR MULTIPLE;  Surgeon: Yessica Santana MD;  Location:  GI     GASTRIC BYPASS Bilateral      LAPAROSCOPIC APPENDECTOMY  05/30/2013    Procedure: LAPAROSCOPIC APPENDECTOMY;;  Surgeon: Salvador Morris MD;  Location:  OR     LAPAROSCOPIC BIOPSY LIVER  05/30/2013    Procedure: LAPAROSCOPIC BIOPSY LIVER;;  Surgeon: Salvador Morris MD;  Location:  OR     LAPAROSCOPIC GASTRIC SLEEVE  05/30/2013    Procedure: LAPAROSCOPIC GASTRIC SLEEVE;  LAPAROSCOPIC SLEEVE GASTRECTOMY/ LAPARSCOPIC  APPENDECTOMY /LIVER BIOPSIES/LIVER CYST DRAINAGE;  Surgeon: Salvador Morris MD;  Location:  OR     ORTHOPEDIC SURGERY      R wrist 2010     Social History     Socioeconomic History     Marital status: Single     Spouse name: Not on file     Number of children: Not on file     Years of education: Not on file     Highest education level: Not on file   Occupational History     Not on file   Tobacco Use     Smoking status: Former     Types: Cigarettes     Quit date: 9/27/1974      Years since quittin.3     Smokeless tobacco: Never   Vaping Use     Vaping Use: Never used   Substance and Sexual Activity     Alcohol use: Not Currently     Drug use: No     Sexual activity: Not Currently   Other Topics Concern     Parent/sibling w/ CABG, MI or angioplasty before 65F 55M? Not Asked   Social History Narrative     Not on file     Social Determinants of Health     Financial Resource Strain: Not on file   Food Insecurity: Not on file   Transportation Needs: Not on file   Physical Activity: Not on file   Stress: Not on file   Social Connections: Not on file   Intimate Partner Violence: Not on file   Housing Stability: Not on file        Allergies   Allergen Reactions     Amoxicillin Hives     Augmentin      Sensitive,  Able to tolerate a few doses, otherwise hives on hands     Betaseron [Interferon Beta-1b]      Necrosis of skin at injection sites     Dust Mite Extract Unknown     Mold Unknown              Key Medications:       sodium chloride 0.9%  1,000 mL Intravenous Once     sodium chloride 0.9%  500 mL Intravenous Once     meropenem  1 g Intravenous Q8H     vancomycin  2,000 mg Intravenous Once       lactated ringers 150 mL/hr at 23     sodium chloride Stopped (23 [phone])        Home Meds  No current facility-administered medications on file prior to encounter.  acetaminophen (TYLENOL) 500 MG tablet, Take 1,000 mg by mouth every 6 hours as needed for mild pain  ARIPiprazole (ABILIFY) 5 MG tablet, Take 7.5 mg by mouth daily  atorvastatin (LIPITOR) 20 MG tablet, Take 20 mg by mouth daily.  carBAMazepine (TEGRETOL) 200 MG tablet, Take 200 mg by mouth 2 times daily  Cranberry 1000 MG CAPS, Take by mouth daily  cyanocobalamin (VITAMIN B-12) 100 MCG tablet, Take 1,000 mcg by mouth once a week  denosumab (PROLIA) 60 MG/ML SOLN injection, Inject 60 mg Subcutaneous every 6 months On hold in TCU  Dextromethorphan-guaiFENesin (MUCINEX DM MAXIMUM STRENGTH PO), Take by mouth At  Bedtime  docusate sodium (COLACE) 100 MG tablet, Take 100 mg by mouth 2 times daily  DULoxetine (CYMBALTA) 60 MG capsule, Take 120 mg by mouth daily   famotidine (PEPCID) 40 MG tablet, TAKE 1/2 TABLET BY MOUTH TWICE DAILY (Patient taking differently: Take 40 mg by mouth nightly as needed)  fluticasone (FLONASE) 50 MCG/ACT nasal spray, Spray 2 sprays into both nostrils daily  ketoconazole (NIZORAL) 2 % cream, Apply topically 2 times daily as needed   loperamide (IMODIUM A-D) 2 MG tablet, Take 1 tablet (2 mg) by mouth 2 times daily  losartan (COZAAR) 50 MG tablet, Take 1 tablet (50 mg) by mouth daily  Melatonin 10 MG TABS, Take 10 mg by mouth At Bedtime Changed to extended release  mirtazapine (REMERON) 30 MG tablet, Take 75 mg by mouth At Bedtime   Multiple Vitamin (MULTIVITAMINS PO), Take 2 tablets by mouth every evening. WITH IRON  Psyllium (METAMUCIL) 0.36 g CAPS, Metamucil  traZODone (DESYREL) 100 MG tablet, Take 600 mg by mouth At Bedtime   triamcinolone (ARISTOCORT HP) 0.5 % external cream, Apply topically At Bedtime To hands  trospium (SANCTURA) 20 MG tablet, Take 1 tablet (20 mg) by mouth 2 times daily (before meals)  verapamil ER (CALAN-SR) 120 MG CR tablet, Take 120 mg by mouth 2 times daily  vitamin B-Complex, Take 1 tablet by mouth daily                Physical Examination:   Temp:  [97.8  F (36.6  C)-102.9  F (39.4  C)] 101  F (38.3  C)  Pulse:  [] 128  Resp:  [10-24] 21  BP: ()/() 86/54  SpO2:  [92 %-100 %] 97 %    Intake/Output Summary (Last 24 hours) at 1/18/2023 2015  Last data filed at 1/18/2023 1800  Gross per 24 hour   Intake 1500 ml   Output 300 ml   Net 1200 ml     Wt Readings from Last 4 Encounters:   01/18/23 136.2 kg (300 lb 4.3 oz)   02/08/22 127 kg (280 lb)   07/13/21 120.2 kg (265 lb)   03/24/21 104 kg (229 lb 3.2 oz)     BP - Mean:  [] 69  Resp: 21    Recent Labs   Lab 01/18/23  1925   O2PER 2       GEN: no acute distress   HEENT: head ncat, sclera anicteric, OP  patent, trachea midline   PULM: Clear spontaneous breath sounds  CV/COR: RRR S1S2 no gallop,  No rub, no murmur  ABD: soft nontender, hypoactive bowel sounds, no mass, no flank tenderness  EXT:  Edema   warm  NEURO: grossly intact  SKIN: no obvious rash  LINES: clean, dry intact         Data:   All data and imaging reviewed     ROUTINE ICU LABS (Last four results)  CMP  Recent Labs   Lab 01/18/23  1901 01/18/23  1414   NA  --  130*   POTASSIUM  --  4.2   CHLORIDE  --  96*   CO2  --  21*   ANIONGAP  --  13   * 92   BUN  --  13.0   CR  --  0.70   GFRESTIMATED  --  >90   HEAVENLY  --  8.0*   PROTTOTAL  --  5.5*   ALBUMIN  --  3.2*   BILITOTAL  --  1.0   ALKPHOS  --  89   AST  --  29   ALT  --  24     CBC  Recent Labs   Lab 01/18/23  1414   WBC 6.8   RBC 3.68*   HGB 11.4*   HCT 34.3*   MCV 93   MCH 31.0   MCHC 33.2   RDW 11.9   *     INRNo lab results found in last 7 days.  Arterial Blood Gas  Recent Labs   Lab 01/18/23  1925   O2PER 2       All cultures:  No results for input(s): CULT in the last 168 hours.  Recent Results (from the past 24 hour(s))   XR Surgery KEO L/T 5 Min Fluoro w Stills    Narrative    SURGERY C-ARM FLUORO LESS THAN 5 MINUTES WITH STILLS 1/18/2023 10:38  AM     COMPARISON: None    HISTORY: Left side Holmium laser lithotripsy, left side stent  placement.     FLUOROSCOPY TIME: .2 minute(s)      Impression    IMPRESSION: Two spot images demonstrate a wire and stent in the left  ureter. See operative report for details.    JAK BAEZA MD         SYSTEM ID:  [id number]   XR Chest Port 1 View    Narrative    XR CHEST PORT 1 VIEW  1/18/2023 1:21 PM       INDICATION: cough, possible fever  COMPARISON: 12/27/2020       Impression    IMPRESSION: Negative chest.    JAK BAEZA MD         SYSTEM ID:  [id number]   CT Chest (PE) Abdomen Pelvis w Contrast    Narrative    EXAM: CT CHEST PE ABDOMEN PELVIS W CONTRAST  LOCATION: [facility]  DATE/TIME: 1/18/2023 6:58  PM    INDICATION: Postoperative chills. Abnormal lactate.  COMPARISON: CT chest 12/25/2020.  TECHNIQUE: CT chest pulmonary angiogram and routine CT abdomen pelvis with IV contrast. Arterial phase through the chest and venous phase through the abdomen and pelvis. Multiplanar reformats and MIP reconstructions were performed. Dose reduction   techniques were used.   CONTRAST: 135mL Isovue 370    FINDINGS:  ANGIOGRAM CHEST: Pulmonary arteries are normal caliber and negative for pulmonary emboli. Thoracic aorta is negative for dissection. No CT evidence of right heart strain.     LUNGS AND PLEURA: No effusions. Mild bibasilar atelectasis. No acute airspace disease. Calcified nodule consistent with a stable granuloma along the anterior left lower lobe series 9 image No specific follow-up recommended.    MEDIASTINUM/AXILLAE: No suspicious lymph node. Right posterior thyroid nodule appears stable. No acute abnormality.    CORONARY ARTERY CALCIFICATION: None.    HEPATOBILIARY: There are stable indeterminant hypodense nodules in the liver. One of these appears to represent a cyst that is unchanged at the right hepatic dome series 13 image 33. Indeterminant stable central hepatic nodule is 0.7 cm image 31. Another   stable nodule is 0.7 cm lateral left liver image 58. Another stable nodule is 0.7 cm inferior right liver image 92. Small cholelithiasis.    PANCREAS: Normal.    SPLEEN: Normal.    ADRENAL GLANDS: Normal.    KIDNEYS/BLADDER: Left ureter stent in place. No left ureter stone, but there is mild left renal pelvis region edema and similar appearing pelviectasis. Previously noted left renal pelvis stone is not seen. However, prominent 1.3 cm calculus with staghorn   morphology noted at a few locations within the left collecting system. One of these at the posterior mid kidney is 1.3 cm series 13 image 97. Left upper pole stone is 1.3 cm image 85. Lower pole stone on the left is 0.7 cm series 14 image 70. Bubbles of   gas  within the left renal collecting system proximally. No right urolithiasis. Homogeneous enhancement of the kidneys. Mildly increased left renal edema. Catheter decompresses the bladder. Bladder contains a few bubbles of gas.    BOWEL: Prominent gas and stool throughout the colon. No small bowel obstruction. Appendix not seen. No signs of appendicitis. No free air.    LYMPH NODES: Normal.    VASCULATURE: Scattered calcifications.    PELVIC ORGANS: Otherwise unremarkable.    MUSCULOSKELETAL: Spine degenerative changes diffusely.      Impression    IMPRESSION:  1.  Multiple new prominent stones within the left kidney compared to 8/26/22. There is a left ureter stent in place. There is persistent left-sided pelviectasis and mildly increasing edema of the left kidney.  2.  Bubbles of gas in the bladder and proximal left collecting system noted. Correlate with urinalysis to assess for urinary tract infection.  3.  No evidence for pulmonary embolism or acute airspace disease.  4.  A few indeterminant but stable small hypodensities in the liver. Recommend nonurgent liver MRI for assessment.  5.  Cholelithiasis.         Billing: This patient is critically ill: yes Total critical care time today 32 min.

## 2023-01-19 NOTE — CONSULTS
Care Management Initial Consult    General Information  Assessment completed with: Patient, Patient and sister Mala at discharge  Type of CM/SW Visit: CM Role Introduction  [facility] CM: Arcenio Short  Phone#[phone]   Fax#[phone](VM full left message for Diana Sequeira)  [facility] Main#[phone]  Transportation: [employer]  Patient has PCA services BID 1 hour (laundry, Vacuuming, bathing, breakfast/dinner set up)    Primary Care Provider verified and updated as needed: Yes   Readmission within the last 30 days: no previous admission in last 30 days      Reason for Consult: discharge planning  Advance Care Planning:            Communication Assessment  Patient's communication style: spoken language (English or Bilingual)    Hearing Difficulty or Deaf: no   Wear Glasses or Blind: yes    Cognitive  Cognitive/Neuro/Behavioral: WDL                      Living Environment:   People in home: alone     Current living Arrangements: apartment      Able to return to prior arrangements: other (see comments) (awaiting PT/OT)       Family/Social Support:  Care provided by: self, other (see comments) (PCA BID 1 hour HHA 1xweek)  Provides care for: no one, unable/limited ability to care for self  Marital Status: Single  Sibling(s), Other (specify) (PCA and HHA through [facility])          Description of Support System: Supportive, Involved    Support Assessment: Adequate family and caregiver support, Adequate social supports    Current Resources:   Patient receiving home care services: No (PCA HHA through [facility])     Community Resources: County Worker, County Programs, Financial/Insurance, Housekeeping/Chore Agency, PCA, Transportation Services  Equipment currently used at home: walker, standard, other (see comments) (motorized scooter at bedside)  Supplies currently used at home: Incontinence Supplies    Employment/Financial:  Employment Status: retired        Financial Concerns: unable to afford  medication(s), other (see comments) (patient will reach out to her County  for additional eligibility)   Referral to Financial Worker: No       Lifestyle & Psychosocial Needs:  Social Determinants of Health     Tobacco Use: Medium Risk     Smoking Tobacco Use: Former     Smokeless Tobacco Use: Never     Passive Exposure: Not on file   Alcohol Use: Not on file   Financial Resource Strain: Not on file   Food Insecurity: Not on file   Transportation Needs: Not on file   Physical Activity: Not on file   Stress: Not on file   Social Connections: Not on file   Intimate Partner Violence: Not on file   Depression: Not at risk     PHQ-2 Score: 2   Housing Stability: Not on file       Functional Status:  Prior to admission patient needed assistance: patient is independent within her apartment with a walker with seated bench, she uses a motorized scooter int he community.               Mental Health Status:          Chemical Dependency Status:                Values/Beliefs:  Spiritual, Cultural Beliefs, Spiritism Practices, Values that affect care:                 Additional Information:  Writer met with patient and her sister Mala at the bedside. Writer went over role and scope of safe discharge planning.  Patient is most likely ready for transfer off of the ICU and may be able to discharge in the next few days.  Per patient at baseline she resides independently in her apartment with use of a walker with seated bench. She uses a motorized scooter in the community and uses [employer] for transportation.   Patient does have services that are managed through [facility] that cover for a twice daily PCA 1 hour per session for bathing, laundry, vacuuming, a.m./p.m. meal assistance, life alert product, HHA once weekly.  Patient is aware that PT/OT will work with her regarding discharge planning.    Patient stated she feels very deconditioned from this infection and feels she would not be ready to go home in the next  "day. She also mentions that she has never had to go home with a hutchins catheter and would be unable to straight cath if needed.  Writer went over both homecare (assessement/frequency/homebound status) verses TCU.  Patient stated she was at [facility] for a back surgery and is aware that the goal of TCU is for 7-14 days to return to home.    Writer also discussed that if Homecare was recommended we have been having difficulty in finding agencies or a start of care is >1 week.  Patient is agreeable to have this writer send a \"heads up\" to [facility] homecare in case homecare is recommended.  Writer explained that we might need to send additional referrals to other agencies pending eligibility.  Patient acknowledged that she has Medicaid however she stated it does not cover her medication and she has concerns about the costs of her medication (both prescription and OTC). Patient was encouraged to call her [facility] to see if she would qualify for any additional assistance through the [facility] as patient stated it has been a while since she worked with her coverage and was notified she would have a spend down.  Patient and her sister did not have any additional questions at this time. Patient is aware that we will continue to follow her for further discharge planning.    Addendum: 1/19/2023  Writer received a call back from [facility] she confirmed the above services and would like a call and discharge summary faxed when disposition is known.     Mili العراقي RN, BSN, [facility]   Care Transitions Specialist   [facility] Transitions Specialist   [address] [place]. [address]  [email or web address]  Office:[phone] Fax: [phone]  [facility]        "

## 2023-01-19 NOTE — PROGRESS NOTES
"UROLOGY PROGRESS NOTE    January 19, 2023     HPI/SUBJECTIVE:   POD#1 - Admitted to ICU following procedure yesterday given post-op sepsis. Aggressive fluid resuscitation overnight with improved pressures this morning. Afebrile today. Feels generally well with occasional chills. Denies abdominal/flank pain. Waggoner present. Planning transfer out of ICU later today.    Afebrile, 's/60's, remains tachycardic  WBC 19.1 --> 20.8  Creat 0.79  Lactic acid 4.0 --> 2.1      OBJECTIVE:          /63   Pulse 85   Temp 98.6  F (37  C) (Oral)   Resp 16   Ht 1.676 m (5' 6\")   Wt 136.2 kg (300 lb 4.3 oz)   LMP 12/10/2010   SpO2 96%   BMI 48.46 kg/m      Intake/Output Summary (Last 24 hours) at 1/19/2023 0923  Last data filed at 1/19/2023 0800  Gross per 24 hour   Intake 1800 ml   Output 2050 ml   Net -250 ml       GENERAL: Awake alert, oriented, NAD. Sitting upright in bed.  HEAD, EARS, NOSE, MOUTH, AND THROAT: atraumatic, normocephalic  CARDIAC: skin well perfused  RESPIRATORY: breathing unlabored, speaking in full sentences  ABDOMEN: obese, soft, non tender  : Waggoner draining dark barbara urine.   PSYCHIATRIC: Speech: normal Mood: normal Affect: normal             IMAGING:     CT CHEST PE ABDOMEN PELVIS W CONTRAST (1/18/2023)  IMPRESSION:  1.  Multiple new prominent stones within the left kidney compared to 8/26/22. There is a left ureter stent in place. There is persistent left-sided pelviectasis and mildly increasing edema of the left kidney.  2.  Bubbles of gas in the bladder and proximal left collecting system noted. Correlate with urinalysis to assess for urinary tract infection.  3.  No evidence for pulmonary embolism or acute airspace disease.  4.  A few indeterminant but stable small hypodensities in the liver. Recommend nonurgent liver MRI for assessment.  5.  Cholelithiasis.    ASSESSMENT:  67 year old female with left nephrolithiasis, POD#1 s/p left ureteroscopy, HLL, left stent placement with Dr." Candyenrrique. Admitted to ICU for post-op sepsis. Improving.  Pressures stabilized after aggressive volume resuscitation. Afebrile. On broad spectrum abx. Cultures pending. Leukocytosis.        PLAN:    - Transfer out of floor to ICU when deemed appropriate by critical care team - appreciate assistance  - Continue broad spectrum abx - culture specific abx when avail, per IM. Will need 14 day course PO antibiotics prior to stent removal in clinic. She is scheduled for stent removal on 2/9.   - Discussed possible symptoms with indwelling stent including urgency/frequency of urination, intermittent flank discomfort especially during voids, and gross hematuria.   - Waggoner removal, voiding trial on day of planned discharge    Oralia Messina PA-C will be rounding for our team tomorrow    Cheryl Casiano PA-C  [facility]   [email or web address]  Text Page (7:30am to 4:30pm)

## 2023-01-19 NOTE — PLAN OF CARE
Neuro: A&Ox4  CV: ST 130s. BP stable  Resp:  On room air  GI/: hutchins in place, barbara/pink output. Multiple loose BM, imodium given. Tolerating regular diet.   Skin: small red opening in between gluteal folds, mepilex on  Pain/Gtts: tylenol x1 for headache  Family: updated at bedside  POC:  Transfer to floor.    Notified provider about indwelling hutchins catheter discussed removal or continued need.    Did provider choose to remove indwelling hutchins catheter? No    Provider's hutchins indication for keeping indwelling hutchins catheter: I&O    Is there an order for indwelling hutchins catheter? yes    *If there is a plan to keep hutchins catheter in place at discharge daily notification with provider is not necessary.

## 2023-01-19 NOTE — PROGRESS NOTES
01/19/23 1533   Appointment Info   Signing Clinician's Name / Credentials (PT) Karolina Del Toro, PT   Rehab Comments (PT) PCA assists with bathing, meals, cleaning (1x/week), laundry   Living Environment   People in Home alone   Current Living Arrangements apartment   Transportation Anticipated health plan transportation  (Uses [employer])   Living Environment Comments Goes to the grocery store and other stores in her neighborhood on her scooter. She has a PCA that comes 2x/day 7 days/week.   Self-Care   Usual Activity Tolerance moderate   Current Activity Tolerance fair   Equipment Currently Used at Home   (Motorized scooter.)   Fall history within last six months yes   Activity/Exercise/Self-Care Comment Kristyn walks short distances on her own in the apt. but uses scooter outside.   General Information   Onset of Illness/Injury or Date of Surgery 01/18/23   Referring Physician Mahendra Coles   Patient/Family Therapy Goals Statement (PT) none stated   Pertinent History of Current Problem (include personal factors and/or comorbidities that impact the POC) 68 yo female with history including morbid obesity; MS with neurogenic bladder; HTN; HLD; OVIDIO; depression/anxiety; GERD; and nephrolithiasis; who underwent elective left ureteroscopy, lithotripsy and left ureteral stent on 1/18/2023. After the surgery, developed shaking chills with tachycardia and found with signs of UTI with septic shock. Treated with aggressive IVF's and broad antibiotics (meropenem and vancomycin) with subsequently improvement.   Existing Precautions/Restrictions fall   Cognition   Affect/Mental Status (Cognition) WNL   Orientation Status (Cognition) oriented x 4   Follows Commands (Cognition) WFL   Pain Assessment   Patient Currently in Pain No   Integumentary/Edema   Integumentary/Edema Comments Bilateral 2+ LE edema. Reports she wears compression stockings at home.   Posture    Posture Not impaired   Range of Motion (ROM)   ROM Comment  Bilateral hip/knee flex limited by obesity.   Strength (Manual Muscle Testing)   Strength Comments Quad strength adequate enough to support her in standing with support of 4ww. No deficits noted in UE strength. Needed A to move LE's off the bed in supine.   Bed Mobility   Comment, (Bed Mobility) Sup to sit with mod A to move LE's to edge and dependent to scoot to edge. Max A to lift LE's back onto the bed. Reports she sleeps in a recliner.   Transfers   Comment, (Transfers) Sit to stand from bed with mod A of 2. Only tolerated briefly due to having diahrrea.   Gait/Stairs (Locomotion)   Comment, (Gait/Stairs) Unable to attempt due to diahrrea   Balance   Balance Comments Good sitting balance. Able to maintain balance in standing with support of ww with just CGA.   Clinical Impression   Criteria for Skilled Therapeutic Intervention Yes, treatment indicated   PT Diagnosis (PT) Impaired functional independence.   Influenced by the following impairments Generalized weakness from illness, decreased endurnace, decreased LE strength.   Functional limitations due to impairments Needs assist for bed mobility, transfers and anticipate for amb.   Clinical Presentation (PT Evaluation Complexity) Stable/Uncomplicated   Clinical Presentation Rationale <3 factors affecting mobility   Clinical Decision Making (Complexity) low complexity   Planned Therapy Interventions (PT) gait training;transfer training   Risk & Benefits of therapy have been explained evaluation/treatment results reviewed;care plan/treatment goals reviewed;risks/benefits reviewed;current/potential barriers reviewed;patient;sibling   PT Total Evaluation Time   PT Eval, Low Complexity Minutes (95088) 15   Physical Therapy Goals   PT Frequency Daily   PT Predicted Duration/Target Date for Goal Attainment 01/23/23   PT Goals Transfers;Gait;PT Goal 1   PT: Transfers Modified independent;Bed to/from chair;Sit to/from stand;Assistive device   PT: Gait Modified  independent;Rolling walker;50 feet   Interventions   Interventions Quick Adds Therapeutic Activity   Therapeutic Activity   Therapeutic Activities: dynamic activities to improve functional performance Minutes (56326) 25   Symptoms Noted During/After Treatment Fatigue   Treatment Detail/Skilled Intervention Educated patient on easiest technique for sup to sit. Reports she sleeps in a recliner so will not need to perform at home. Mod A to get to sitting. Sit to stand with min A of 2. Patient incontinent of bowel and unable to tolerate standing long enough to get cleaned up. Returned to sitting and then supine with max A of 2. Cleaned patient up but she continued to ooze stool so placed on bedpan with nursing present at end of session and aware.   PT Discharge Planning   PT Plan Initiate transfers bed to chair and gait with 4ww. Bring bariatric 4ww and white transfer belt to the room.   PT Discharge Recommendation (DC Rec) Transitional Care Facility   PT Rationale for DC Rec Patient below baseline with not needing mod A for bed mobilty, mod A of 2 for sit to stand transfers and currently unable to assess gait yet. Patient would benefit from continued skilled PT at d/c to progress strength, endurance and independence prior to discharging to home.   PT Brief overview of current status A of 2 for bed mobilty, transfers with A of 2 with ww.   Total Session Time   Timed Code Treatment Minutes 25   Total Session Time (sum of timed and untimed services) 40

## 2023-01-19 NOTE — PROGRESS NOTES
[facility]    Medicine Progress Note - Hospitalist Service    Date of Admission:  1/18/2023    Assessment & Plan   Mili Padilla is a 67 year old female admitted on 1/18/2023.  Past history of MS with neurogenic bladder, HTN, HLD, OVIDIO, depression who presented for elective left ureteroscopy, lithotripsy and left ureteral stenting.  Hospitalist consulted with concern for sepsis as patient having shaking chills post-op.  Patient was admitted to the intensive care unit and received aggressive IV fluid resuscitation.  Patient was going to be placed on pressors, however she is stabilized with fluids, and it appears she did not receive pressors.  She is on broad-spectrum antibiotics for treatment of sepsis of likely urinary source, with cultures pending.  The intensivist contacted the hospitalist service on 1/19/2023 for transfer of care.        Severe sepsis of likely urinary source with septic shock  Lactic acidosis  * Denies any infectious symptoms prior to procedure, particularly any urinary symptoms; developed shaking chills immediately post-op  * tachy (140's), BP trended down ( > 107>79), temp trended up (97.8 > 100.2, T-max 102.9) with lactate 4.3  * post-op CXR clear  * received 1L IVF and ceftriaxone in PACU  * CBC with leukocytosis, WBC 19.1--20.8, platelets low 91--81.  CMP with mildly elevated AST 43, total bilirubin 1.4, and hyponatremia/hypochloremia.  VBG within normal limits.  * UA abnormal with large blood, large leukocyte esterase, 95 WBC  * CT pulm angio and abd/pelvis: Multiple new prominent stones within the left kidney, left ureter stent in place, persistent left-sided pelviectasis and mildly increasing edema of the left kidney, bubbles of gas in the bladder could correlate to UTI.  No evidence for PE or acute airspace disease, few indeterminant stable small hypodensities in the liver, cholelithiasis.  * Ceftriaxone IV was given (note single prior urine culture with  Enterobacter cloacae resistant to ceftriaxone, though sensitive to ciprofloxacin which she received pre-op)  --Urine culture pending (obtained after abx)  --Blood cultures in process (obtained prior to abx)  --Patient given multiple IV fluid boluses, and is now on LR at 150 mL/h  --Lactic acid 4.2--4.0--3.8--2.1.  We will continue to trend.  --Ceftriaxone discontinued and patient started on meropenem given hx resistance above.  Vancomycin added.  Follow culture results and adjust antibiotics accordingly.  --- Patient has improved vital signs with BP now 125/63, heart rate 85, temperature 98.6.  She is on room air.  Given her significant improvement in clinical stability, she will be transferred from ICU to general medicine for continued monitoring and cares.  --Continue telemetry, pulse oximetry, pulmonary hygiene, and vital signs per routine  --We will obtain PT/OT and SW/CC consults     Nephrolithiasis s/p left ureteroscopy, lithotripsy and left ureteral stenting 1/18  - post-procedure management per Urology    Hyponatremia  Hypochloremia  Most likely related to hypovolemia.  Improving with LR IV fluids, sodium 127--132.  Chloride 94--95.  --Continue IV fluids for today  --Repeat BMP in a.m.    Mild LFT abnormalities  On 1/18, noted to have AST of 43, total bilirubin 1.4.  Remainder of LFTs unremarkable.  No abdominal pain currently.  Consider related to FLD.  --Add on LFTs     HTN  --Resume PTA verapamil with hold parameters.  Hold losartan.     HLD  --Continue PTA atorvastatin     MS with neurogenic bladder  * lives independently with PCA services, uses walker in home, motorized scooter  --Continue PTA trospium     Depression  --Continue PTA Abilify, Tegretol, duloxetine, mirtazapine  --Resume trazodone     GERD  --Continue PTA famotidine       Diet: Diet  Regular Diet Adult    DVT Prophylaxis: Pneumatic Compression Devices  Waggoner Catheter: PRESENT, indication:    Lines: None     Cardiac Monitoring: None  Code  "Status: Full Code      Clinically Significant Risk Factors Present on Admission         # Hyponatremia: Lowest Na = 127 mmol/L in last 2 days, will monitor as appropriate  # Hypocalcemia: Lowest Ca = 7.8 mg/dL in last 2 days, will monitor and replace as appropriate     # Hypoalbuminemia: Lowest albumin = 3.2 g/dL at 1/18/2023  2:14 PM, will monitor as appropriate   # Thrombocytopenia: Lowest platelets = 81 in last 2 days, will monitor for bleeding   # Hypertension: home medication list includes antihypertensive(s)      # Severe Obesity: Estimated body mass index is 48.46 kg/m  as calculated from the following:    Height as of this encounter: 1.676 m (5' 6\").    Weight as of this encounter: 136.2 kg (300 lb 4.3 oz).           Disposition Plan      Expected Discharge Date: 01/20/2023      Destination: home           Will have PT/OT to assess if she is back to baseline mobility.  Lives independently at baseline, uses electric wheelchair.  SW/CC consulted.    The patient's care was discussed with the Bedside Nurse and Patient.    This patient was discussed with Dr. Rivera of the hospitalist service.  He is in agreement with my assessment and plan of care.    NITISH Driscoll  Hospitalist Service  [facility]  Securely message with [employer] (more info)  Text page via [email or web address] Paging/Directory   ______________________________________________________________________    Interval History   Patient continues to show improvement.  Blood pressure is now stable and tachycardia has resolved.  She reports feeling warm, questions if she has a fever.  Temperature checked and she is afebrile.  Currently denies any chest pain, cough, shortness of breath, abdominal pain, N/V/D.  Updated regarding plan of care.    Physical Exam   Vital Signs: Temp: 98.8  F (37.1  C) Temp src: Axillary BP: 116/65 Pulse: (!) 121   Resp: 16 SpO2: 95 % O2 Device: None (Room air) Oxygen Delivery: 2 LPM  Weight: 300 lbs 4.26 " oz    Constitutional: Alert, oriented to person, place, situation.  Cooperative, lying in bed in NAD.   Respiratory:  Lungs CTAB.  No crackles, wheezes, no labored breathing.  Cardiovascular:  Heart RRR, no murmur, no edema.  GI:  Abdomen soft, obese, NT/ND and with normoactive BS  Skin/Integumen:  Warm, dry, non-diaphoretic.  MSK: Moves all 4 extremities, limbs atraumatic.      Medical Decision Making       50 MINUTES SPENT BY ME on the date of service doing chart review, history, exam, documentation & further activities per the note.  MANAGEMENT DISCUSSED with the following over the past 24 hours: Intensivist   NOTE(S)/MEDICAL RECORDS REVIEWED over the past 24 hours: Previous hospitalist and intensivist notes have been reviewed, as well as nursing documentation.  Tests ORDERED & REVIEWED in the past 24 hours:  - BMP  - CBC  - Lactic Acid  - VBG  Tests personally interpreted in the past 24 hours:  - CT chest with contrast, CT abdomen and pelvis showing Renal calculi, left ureter stent, gas bubbles in bladder indicative of possible UTI, and no evidence of pulmonary disease or PE      Data   ------------------------- PAST 24 HR DATA REVIEWED -----------------------------------------------    I have personally reviewed the following data over the past 24 hrs:    20.8 (H)  \   11.1 (L)   / 81 (L)     132 (L) 95 (L) 9.7 /  123 (H)   4.8 27 0.79 \       ALT: 33 AST: 43 (H) AP: 64 TBILI: 1.4 (H)   ALB: 3.3 (L) TOT PROTEIN: 5.3 (L) LIPASE: N/A       Procal: N/A CRP: N/A Lactic Acid: 2.1 (H)         Imaging results reviewed over the past 24 hrs:   Recent Results (from the past 24 hour(s))   XR Surgery KEO L/T 5 Min Fluoro w Stills    Narrative    SURGERY C-ARM FLUORO LESS THAN 5 MINUTES WITH STILLS 1/18/2023 10:38  AM     COMPARISON: None    HISTORY: Left side Holmium laser lithotripsy, left side stent  placement.     FLUOROSCOPY TIME: .2 minute(s)      Impression    IMPRESSION: Two spot images demonstrate a wire and stent  in the left  ureter. See operative report for details.    JAK BAEZA MD         SYSTEM ID:  [id number]   XR Chest Port 1 View    Narrative    XR CHEST PORT 1 VIEW  1/18/2023 1:21 PM       INDICATION: cough, possible fever  COMPARISON: 12/27/2020       Impression    IMPRESSION: Negative chest.    JAK BAEZA MD         SYSTEM ID:  [id number]   CT Chest (PE) Abdomen Pelvis w Contrast    Narrative    EXAM: CT CHEST PE ABDOMEN PELVIS W CONTRAST  LOCATION: [facility]  DATE/TIME: 1/18/2023 6:58 PM    INDICATION: Postoperative chills. Abnormal lactate.  COMPARISON: CT chest 12/25/2020.  TECHNIQUE: CT chest pulmonary angiogram and routine CT abdomen pelvis with IV contrast. Arterial phase through the chest and venous phase through the abdomen and pelvis. Multiplanar reformats and MIP reconstructions were performed. Dose reduction   techniques were used.   CONTRAST: 135mL Isovue 370    FINDINGS:  ANGIOGRAM CHEST: Pulmonary arteries are normal caliber and negative for pulmonary emboli. Thoracic aorta is negative for dissection. No CT evidence of right heart strain.     LUNGS AND PLEURA: No effusions. Mild bibasilar atelectasis. No acute airspace disease. Calcified nodule consistent with a stable granuloma along the anterior left lower lobe. No specific follow-up recommended.    MEDIASTINUM/AXILLAE: No suspicious lymph node. Right posterior thyroid nodule appears stable. No acute abnormality.    CORONARY ARTERY CALCIFICATION: None.    HEPATOBILIARY: There are stable indeterminant hypodense nodules in the liver. One of these appears to represent a cyst that is unchanged at the right hepatic dome series 13 image 33. Indeterminant stable central hepatic nodule is 0.7 cm image 31. Another   stable nodule is 0.7 cm lateral left liver image 58. Another stable nodule is 0.7 cm inferior right liver image 92. Small cholelithiasis.    PANCREAS: Normal.    SPLEEN: Normal.    ADRENAL GLANDS:  Normal.    KIDNEYS/BLADDER: Left ureter stent in place. No left ureter stone, but there is mild left renal pelvis region edema and similar appearing pelviectasis. Previously noted left renal pelvis stone is not seen. However, prominent 1.3 cm calculus with staghorn   morphology noted at a few locations within the left collecting system. One of these at the posterior mid kidney is 1.3 cm series 13 image 97. Left upper pole stone is 1.3 cm image 85. Lower pole stone on the left is 0.7 cm series 14 image 70. Bubbles of   gas within the left renal collecting system proximally. No right urolithiasis. Homogeneous enhancement of the kidneys. Mildly increased left renal edema. Catheter decompresses the bladder. Bladder contains a few bubbles of gas.    BOWEL: Prominent gas and stool throughout the colon. No small bowel obstruction. Appendix not seen. No signs of appendicitis. No free air.    LYMPH NODES: Normal.    VASCULATURE: Scattered calcifications.    PELVIC ORGANS: Otherwise unremarkable.    MUSCULOSKELETAL: Spine degenerative changes diffusely.      Impression    IMPRESSION:  1.  Multiple new prominent stones within the left kidney compared to 8/26/22. There is a left ureter stent in place. There is persistent left-sided pelviectasis and mildly increasing edema of the left kidney.  2.  Bubbles of gas in the bladder and proximal left collecting system noted. Correlate with urinalysis to assess for urinary tract infection.  3.  No evidence for pulmonary embolism or acute airspace disease.  4.  A few indeterminant but stable small hypodensities in the liver. Recommend nonurgent liver MRI for assessment.  5.  Cholelithiasis.

## 2023-01-19 NOTE — PROGRESS NOTES
Cross Cover Note    68 yo underwent lithotripsy and stenting.   Developed sepsis post-procedure.  CT Chest negative for PE. No clear etiology on abd/pelvis.  Cultures ordered and antibiotics started. Lactic acid 4.2 to 4.3 earlier today and pending now.     Called by RN due to persistently low MAPS (50s) despite over 3 liters of fluid post-operatively.  Discussed with intensivist who is agreeable to assume care for the patient.    Please call the hospitalist service if we can be of additional assistance.    Jhoana Prescott PA-C

## 2023-01-19 NOTE — PROGRESS NOTES
Pt rested intermittently through shift. BP was low at the beginning of shift, however after 2 albumin and additional fluids, BP improved. Still tachycardic. Adequate UOP. Hematuria improved during the shift. Temp also improved to normal range. A&Ox4. RA. No other events. See Flowsheet for further information.

## 2023-01-19 NOTE — PHARMACY-VANCOMYCIN DOSING SERVICE
Pharmacy Vancomycin Initial Note  Date of Service 2023  Patient's  1955  67 year old, female    Indication: Sepsis    Current estimated CrCl = Estimated Creatinine Clearance: 109.4 mL/min (based on SCr of 0.7 mg/dL).    Creatinine for last 3 days  2023:  2:14 PM Creatinine 0.70 mg/dL    Recent Vancomycin Level(s) for last 3 days  No results found for requested labs within last 72 hours.      Vancomycin IV Administrations (past 72 hours)      No vancomycin orders with administrations in past 72 hours.                Nephrotoxins and other renal medications (From now, onward)    Start     Dose/Rate Route Frequency Ordered Stop    23 1900  vancomycin (VANCOCIN) 2,000 mg in 0.9% NaCl 500 mL intermittent infusion         2,000 mg  over 2 Hours Intravenous ONCE 23 1858            Contrast Orders - past 72 hours (72h ago, onward)    Start     Dose/Rate Route Frequency Stop    23 1830  iopamidol (ISOVUE-370) solution 135 mL         135 mL Intravenous ONCE 23 1829          [employer] Prediction of Planned Initial Vancomycin Regimen  Loading dose: N/A  Regimen: 1000 mg IV every 12 hours.  Start time: 21:26 on 2023  Exposure target: AUC24 (range)400-600 mg/L.hr   AUC24,ss: 488 mg/L.hr  Probability of AUC24 > 400: 65 %  Ctrough,ss: 14 mg/L  Probability of Ctrough,ss > 20: 31 %  Probability of nephrotoxicity (Lodise LEEANNE ): 9 %          Plan:  1. Start vancomycin  200 mg IV load and then 1 gm q12h pending creat/renal function on  AM  2. Vancomycin monitoring method: AUC  3. Vancomycin therapeutic monitoring goal: 400-600 mg*h/L  4. Pharmacy will check vancomycin levels as appropriate in 1-3 Days.    5. Serum creatinine levels will be ordered daily for the first week of therapy and at least twice weekly for subsequent weeks.      Deya Caballero [facility]    Addendum:  Based on patient's renal function on the morning of , scheduled vancomycin as 750 mg IV q12hr.      Neva Stout, PharmD

## 2023-01-20 ENCOUNTER — APPOINTMENT (OUTPATIENT)
Dept: PHYSICAL THERAPY | Facility: CLINIC | Age: 68
DRG: 856 | End: 2023-01-20
Attending: UROLOGY
Payer: COMMERCIAL

## 2023-01-20 ENCOUNTER — APPOINTMENT (OUTPATIENT)
Dept: CARDIOLOGY | Facility: CLINIC | Age: 68
DRG: 856 | End: 2023-01-20
Attending: PHYSICIAN ASSISTANT
Payer: COMMERCIAL

## 2023-01-20 LAB
ALBUMIN SERPL BCG-MCNC: 3.1 G/DL (ref 3.5–5.2)
ALP SERPL-CCNC: 73 U/L (ref 35–104)
ALT SERPL W P-5'-P-CCNC: 32 U/L (ref 10–35)
ANION GAP SERPL CALCULATED.3IONS-SCNC: 6 MMOL/L (ref 7–15)
AST SERPL W P-5'-P-CCNC: 38 U/L (ref 10–35)
ATRIAL RATE - MUSE: 142 BPM
BACTERIA UR CULT: NO GROWTH
BILIRUB SERPL-MCNC: 0.9 MG/DL
BUN SERPL-MCNC: 10.4 MG/DL (ref 8–23)
CALCIUM SERPL-MCNC: 7.9 MG/DL (ref 8.8–10.2)
CHLORIDE SERPL-SCNC: 96 MMOL/L (ref 98–107)
CREAT SERPL-MCNC: 0.62 MG/DL (ref 0.51–0.95)
DEPRECATED HCO3 PLAS-SCNC: 26 MMOL/L (ref 22–29)
DIASTOLIC BLOOD PRESSURE - MUSE: NORMAL MMHG
ERYTHROCYTE [DISTWIDTH] IN BLOOD BY AUTOMATED COUNT: 12.1 % (ref 10–15)
GFR SERPL CREATININE-BSD FRML MDRD: >90 ML/MIN/1.73M2
GLUCOSE SERPL-MCNC: 110 MG/DL (ref 70–99)
HCT VFR BLD AUTO: 32.4 % (ref 35–47)
HGB BLD-MCNC: 10.5 G/DL (ref 11.7–15.7)
INTERPRETATION ECG - MUSE: NORMAL
LVEF ECHO: NORMAL
MCH RBC QN AUTO: 31 PG (ref 26.5–33)
MCHC RBC AUTO-ENTMCNC: 32.4 G/DL (ref 31.5–36.5)
MCV RBC AUTO: 96 FL (ref 78–100)
P AXIS - MUSE: NORMAL DEGREES
PLATELET # BLD AUTO: 54 10E3/UL (ref 150–450)
POTASSIUM SERPL-SCNC: 3.7 MMOL/L (ref 3.4–5.3)
PR INTERVAL - MUSE: 180 MS
PROT SERPL-MCNC: 5.3 G/DL (ref 6.4–8.3)
QRS DURATION - MUSE: 84 MS
QT - MUSE: 324 MS
QTC - MUSE: 498 MS
R AXIS - MUSE: 57 DEGREES
RBC # BLD AUTO: 3.39 10E6/UL (ref 3.8–5.2)
SODIUM SERPL-SCNC: 128 MMOL/L (ref 136–145)
SYSTOLIC BLOOD PRESSURE - MUSE: NORMAL MMHG
T AXIS - MUSE: 60 DEGREES
VENTRICULAR RATE- MUSE: 142 BPM
WBC # BLD AUTO: 11.8 10E3/UL (ref 4–11)

## 2023-01-20 PROCEDURE — 250N000013 HC RX MED GY IP 250 OP 250 PS 637: Performed by: UROLOGY

## 2023-01-20 PROCEDURE — 99233 SBSQ HOSP IP/OBS HIGH 50: CPT | Performed by: INTERNAL MEDICINE

## 2023-01-20 PROCEDURE — 93306 TTE W/DOPPLER COMPLETE: CPT | Mod: 26 | Performed by: INTERNAL MEDICINE

## 2023-01-20 PROCEDURE — 85027 COMPLETE CBC AUTOMATED: CPT | Performed by: PHYSICIAN ASSISTANT

## 2023-01-20 PROCEDURE — 120N000001 HC R&B MED SURG/OB

## 2023-01-20 PROCEDURE — 80053 COMPREHEN METABOLIC PANEL: CPT | Performed by: PHYSICIAN ASSISTANT

## 2023-01-20 PROCEDURE — 97530 THERAPEUTIC ACTIVITIES: CPT | Mod: GP

## 2023-01-20 PROCEDURE — 255N000002 HC RX 255 OP 636: Performed by: UROLOGY

## 2023-01-20 PROCEDURE — 250N000013 HC RX MED GY IP 250 OP 250 PS 637: Performed by: INTERNAL MEDICINE

## 2023-01-20 PROCEDURE — 36415 COLL VENOUS BLD VENIPUNCTURE: CPT | Performed by: PHYSICIAN ASSISTANT

## 2023-01-20 PROCEDURE — 250N000013 HC RX MED GY IP 250 OP 250 PS 637: Performed by: PHYSICIAN ASSISTANT

## 2023-01-20 PROCEDURE — 999N000208 ECHOCARDIOGRAM COMPLETE

## 2023-01-20 PROCEDURE — 250N000011 HC RX IP 250 OP 636: Performed by: PHYSICIAN ASSISTANT

## 2023-01-20 PROCEDURE — 258N000003 HC RX IP 258 OP 636: Performed by: PHYSICIAN ASSISTANT

## 2023-01-20 RX ORDER — CIPROFLOXACIN 250 MG/1
250 TABLET, FILM COATED ORAL EVERY 12 HOURS SCHEDULED
Status: DISCONTINUED | OUTPATIENT
Start: 2023-01-20 | End: 2023-01-25 | Stop reason: HOSPADM

## 2023-01-20 RX ADMIN — LOPERAMIDE HYDROCHLORIDE 2 MG: 2 CAPSULE ORAL at 08:47

## 2023-01-20 RX ADMIN — MEROPENEM 1 G: 1 INJECTION, POWDER, FOR SOLUTION INTRAVENOUS at 03:40

## 2023-01-20 RX ADMIN — SODIUM CHLORIDE, POTASSIUM CHLORIDE, SODIUM LACTATE AND CALCIUM CHLORIDE: 600; 310; 30; 20 INJECTION, SOLUTION INTRAVENOUS at 05:18

## 2023-01-20 RX ADMIN — ACETAMINOPHEN 1000 MG: 500 TABLET ORAL at 08:47

## 2023-01-20 RX ADMIN — CARBAMAZEPINE 200 MG: 200 TABLET ORAL at 08:47

## 2023-01-20 RX ADMIN — FLUTICASONE PROPIONATE 2 SPRAY: 50 SPRAY, METERED NASAL at 05:14

## 2023-01-20 RX ADMIN — MIRTAZAPINE 75 MG: 30 TABLET, FILM COATED ORAL at 21:24

## 2023-01-20 RX ADMIN — VERAPAMIL HYDROCHLORIDE 120 MG: 120 TABLET, FILM COATED, EXTENDED RELEASE ORAL at 20:12

## 2023-01-20 RX ADMIN — TRAZODONE HYDROCHLORIDE 600 MG: 150 TABLET ORAL at 21:24

## 2023-01-20 RX ADMIN — ARIPIPRAZOLE 7.5 MG: 15 TABLET ORAL at 08:47

## 2023-01-20 RX ADMIN — VERAPAMIL HYDROCHLORIDE 120 MG: 120 TABLET, FILM COATED, EXTENDED RELEASE ORAL at 08:47

## 2023-01-20 RX ADMIN — CIPROFLOXACIN HYDROCHLORIDE 250 MG: 250 TABLET, FILM COATED ORAL at 20:12

## 2023-01-20 RX ADMIN — ACETAMINOPHEN 1000 MG: 500 TABLET ORAL at 17:13

## 2023-01-20 RX ADMIN — CARBAMAZEPINE 200 MG: 200 TABLET ORAL at 20:12

## 2023-01-20 RX ADMIN — LOPERAMIDE HYDROCHLORIDE 2 MG: 2 CAPSULE ORAL at 21:47

## 2023-01-20 RX ADMIN — LOPERAMIDE HYDROCHLORIDE 2 MG: 2 CAPSULE ORAL at 14:49

## 2023-01-20 RX ADMIN — ATORVASTATIN CALCIUM 20 MG: 20 TABLET, FILM COATED ORAL at 08:47

## 2023-01-20 RX ADMIN — CIPROFLOXACIN HYDROCHLORIDE 250 MG: 250 TABLET, FILM COATED ORAL at 11:42

## 2023-01-20 RX ADMIN — DULOXETINE 120 MG: 60 CAPSULE, DELAYED RELEASE ORAL at 08:47

## 2023-01-20 RX ADMIN — HUMAN ALBUMIN MICROSPHERES AND PERFLUTREN 9 ML: 10; .22 INJECTION, SOLUTION INTRAVENOUS at 10:21

## 2023-01-20 ASSESSMENT — ACTIVITIES OF DAILY LIVING (ADL)
WALKING_OR_CLIMBING_STAIRS_DIFFICULTY: YES
WEAR_GLASSES_OR_BLIND: YES
TOILETING: 1-->ASSISTANCE (EQUIPMENT/PERSON) NEEDED
CHANGE_IN_FUNCTIONAL_STATUS_SINCE_ONSET_OF_CURRENT_ILLNESS/INJURY: YES
DOING_ERRANDS_INDEPENDENTLY_DIFFICULTY: YES
TOILETING_ASSISTANCE: TOILETING DIFFICULTY, ASSISTANCE 1 PERSON
EATING: 0-->INDEPENDENT
DRESS: 0-->INDEPENDENT
ADLS_ACUITY_SCORE: 42
TRANSFERRING: 1-->ASSISTANCE (EQUIPMENT/PERSON) NEEDED
ADLS_ACUITY_SCORE: 44
ADLS_ACUITY_SCORE: 52
ADLS_ACUITY_SCORE: 42
TOILETING: 1-->ASSISTANCE (EQUIPMENT/PERSON) NEEDED (NOT DEVELOPMENTALLY APPROPRIATE)
NUMBER_OF_TIMES_PATIENT_HAS_FALLEN_WITHIN_LAST_SIX_MONTHS: 1
TOILETING_ISSUES: YES
EATING/SWALLOWING: SWALLOWING SOLID FOOD
ADLS_ACUITY_SCORE: 52
TOILETING_MANAGEMENT: INCONTINENCE
ADLS_ACUITY_SCORE: 42
TRANSFERRING: 1-->ASSISTANCE (EQUIPMENT/PERSON) NEEDED (NOT DEVELOPMENTALLY APPROPRIATE)
ADLS_ACUITY_SCORE: 42
BATHING: 1-->ASSISTANCE NEEDED
VISION_MANAGEMENT: GLASSES
DRESS: 0-->INDEPENDENT
ADLS_ACUITY_SCORE: 42
DIFFICULTY_EATING/SWALLOWING: YES
ADLS_ACUITY_SCORE: 42
WALKING_OR_CLIMBING_STAIRS: OTHER (SEE COMMENTS)
EATING: 0-->INDEPENDENT
DIFFICULTY_COMMUNICATING: NO
DRESSING/BATHING_DIFFICULTY: YES
ADLS_ACUITY_SCORE: 42
FALL_HISTORY_WITHIN_LAST_SIX_MONTHS: YES
CONCENTRATING,_REMEMBERING_OR_MAKING_DECISIONS_DIFFICULTY: YES
SWALLOWING: 2-->DIFFICULTY SWALLOWING FOODS
ADLS_ACUITY_SCORE: 42
SWALLOWING: 2-->DIFFICULTY SWALLOWING FOODS
DRESSING/BATHING: BATHING DIFFICULTY, ASSISTANCE 1 PERSON
ADLS_ACUITY_SCORE: 52
EQUIPMENT_CURRENTLY_USED_AT_HOME: WHEELCHAIR, POWER;WALKER, ROLLING

## 2023-01-20 NOTE — PROGRESS NOTES
Notified provider about indwelling hutchins catheter discussed removal or continued need.    Did provider choose to remove indwelling hutchins catheter? No    Provider's hutchins indication for keeping indwelling hutchins catheter: Strict 1-2 Hour I & O if external catheters are not an option.    Is there an order for indwelling hutchins catheter? Yes    *If there is a plan to keep hutchins catheter in place at discharge daily notification with provider is not necessary.

## 2023-01-20 NOTE — PROGRESS NOTES
[facility]    Internal Medicine Hospitalist Progress Note  01/20/2023  I evaluated patient on the above date.    Clint Rivera Jr., MD  [phone] (p)  Text Page  Vocera        Assessment & Plan New actions/orders today (01/20/2023) are underlined.    Mili Padilla is a 66 yo female with history including morbid obesity; MS with neurogenic bladder; HTN; HLD; OVIDIO; depression/anxiety; GERD; and nephrolithiasis; who underwent elective left ureteroscopy, lithotripsy and left ureteral stent on 1/18/2023. After the surgery, developed shaking chills with tachycardia and found with signs of UTI with septic shock. Treated with aggressive IVF's and broad antibiotics (meropenem and vancomycin) with subsequently improvement.        Nephrolithiasis s/p left ureteroscopy, lithotripsy and left ureteral stenting 1/18/2023.  - Post-op management per Urology.  - Waggoner management per Urology.     Severe sepsis of likely urinary source related to recent surgery/instrumentation.  Lactic acidosis due to above.  * Denied any infectious symptoms prior to procedure, particularly any urinary symptoms; developed shaking chills immediately post-op.  * After surgery 1/18, pt was tachy (140's), BP trended down ( --> 107 --> 79), temp trended up (97.8-- > 100.2, T-max 102.9) with lactate 4.3, WBC 19.1. Post-op CXR clear. Received 1L IVF and ceftriaxone in PACU (had received ciprofloxacin pre-op). CT CAP with contrast 1/18 showed no PE or acute airspace disease; multiple new prominent stones within the left kidney (from 8/2022), left ureter stent in place, persistent left-sided pelviectasis and mildly increasing edema of the left kidney; bubbles of gas in the bladder and proximal left collecting system noted, correlate with urinalysis to assess for urinary tract infection; few indeterminant stable small hypodensities in the liver, nonurgent liver MRI recommended; cholelithiasis. Antibiotics broadened to meropenem  and vancomycin 1/18 (noted single prior urine culture with Enterobacter cloacae resistant to ceftriaxone, though sensitive to ciprofloxacin which she received pre-op).   * Was transferred to the ICU for possible pressors, but did not need, improved with IVF's.  * BC's 1/18 NGTD. UA early am 1/19 showed WBC 95, large LE, +blood. UC 1/19 no growth.  * On 1/19, pt hemodynamically stable and lactate normalized; ready to transfer out of the ICU.  Recent Labs   Lab 01/20/23  0503 01/19/23  1900 01/19/23  1148 01/19/23  0640 01/18/23 2113 01/18/23  1925 01/18/23  1618 01/18/23  1414 01/18/23  1309   WBC 11.8*  --   --  20.8* 19.1*  --   --  6.8  --    LACT  --  1.1 1.7 2.1* 3.8* 4.0* 4.2*  --  4.3*   - Discontinue vancomycin.  - Discontinue meropenem.  - Start ciprofloxacin for 5 days (stop after 1/24).  - Recheck CBC in am.  - Continue to monitor cultures.     Hyponatremia, suspect related to hypovolemia, other causes not ruled out.  * Sodium 130 on 1/18.  * Despite IVF's including NS and LR, sodium continues to be low on 1/20.  Recent Labs   Lab 01/20/23  0503 01/19/23  0640 01/18/23 2113 01/18/23  1414   * 132* 127* 130*   - Discontinue IVF's.  - Continue to monitor BMP - recheck in am.    Mild LFT abnormalities, question related to sepsis.  * On 1/18, noted to have AST of 43, total bilirubin 1.4, remainder of LFTs unremarkable. Had CT CAP 1/18 as above.  * LFT's improved 1/20.  Recent Labs   Lab 01/20/23  0503 01/19/23  0039 01/18/23 2113 01/18/23  1414   ALBUMIN 3.1*  --  3.3* 3.2*   BILITOTAL 0.9  --  1.4* 1.0   ALT 32  --  33 24   AST 38*  --  43* 29   ALKPHOS 73  --  64 89   PROTEIN  --  30*  --   --    - Continue to monitor LFT's periodically.    Thrombocytopenia, unclear etiology, possibly medication effect or chronic from other cause.  * Plts low on 107K 1/18 afternoon.  Recent Labs   Lab 01/20/23  0503 01/19/23  0640 01/18/23  2113 01/18/23  1414   PLT 54* 81* 91* 107*   - Monitor CBC - recheck in  "am.  - Consider platelet transfusion if needs a procedure and less than 50,000; or if less than 10,000.    Anemia, suspect chronic component.  * Hgb 11.3 on 1/18. No overt clinical signs of major bleeding.  Recent Labs   Lab 01/20/23  0503 01/19/23  0640 01/18/23  2113 01/18/23  1414   HGB 10.5* 11.1* 11.3* 11.4*   - Monitor CBC - recheck in am.  - Consider prbc transfusion if hgb </= 7.0 or if significant bleeding with hemodynamic instability or if symptomatic.    H/o hypertension (benign essential).  [PTA: losartan 50 mg daily; verapamil  mg BID.]  * BP meds held 1/18 due to sepsis.  * Verapamil restarted 1/19.  - Continue verapamil.  - Continue to hold losartan for now.    Weakness and physical deconditioning due to multiple acute and chronic medical issues.  MS with neurogenic bladder.  * Lives independently with PCA services (twice daily); uses walker in home, motorized scooter.  * Waggoner in place post surgery.  * On 1/19, pt very weak, needing a lift. PT, OT ordered.  - Waggoner management per Urology.  - Continue PTA trospium.  - Continue PT, OT.     HLD.  - Continue PTA atorvastatin.     Depression/anxiety.  - Continue PTA aripiprazole; carbamazepine, duloxetine, mirtazapine, trazodone.     GERD  - Continue PTA famotidine.    OVIDIO.  * Noted does not use/non-compliant with CPAP.  - Follow-up outpatient.    Severe obesity.  Body mass index is 47.68 kg/m .  - Needs to pursue aggressive dietary and lifestyle modifications.    Clinically Significant Risk Factors         # Hyponatremia: Lowest Na = 127 mmol/L in last 2 days, will monitor as appropriate      # Hypoalbuminemia: Lowest albumin = 3.1 g/dL at 1/20/2023  5:03 AM, will monitor as appropriate   # Thrombocytopenia: Lowest platelets = 54 in last 2 days, will monitor for bleeding         # Severe Obesity: Estimated body mass index is 47.68 kg/m  as calculated from the following:    Height as of this encounter: 1.676 m (5' 6\").    Weight as of this " "encounter: 134 kg (295 lb 6.7 oz)., PRESENT ON ADMISSION           COVID-19 testing.  COVID-19 PCR Results    COVID-19 PCR Results   No data to display.         COVID-19 Antibody Results, Testing for Immunity    COVID-19 Antibody Results, Testing for Immunity   No data to display.             Diet: Diet  Regular Diet Adult    Prophylaxis: PCD's, ambulation.   Waggoner Catheter: PRESENT, indication: Strict 1-2 Hour I&O  Lines: None     Code Status: Full Code    Disposition Plan   Expected discharge: Possibly tomorrow recommended to home vs TCU pending continued stability, Urology rec's.  Entered: Clint Rivera MD 01/20/2023, 7:14 AM         Interval History   Doing about the same.  Has headache on and off, otherwise, no pain.  Tolerating diet.  Quite weak from baseline, particularly in bilateral lower extremities.    -Data reviewed today: I reviewed all new labs and imaging over the last 24 hours. I personally reviewed no images or EKG's today.    Physical Exam    , Blood pressure 129/57, pulse 75, temperature 99  F (37.2  C), temperature source Oral, resp. rate 14, height 1.676 m (5' 6\"), weight 134 kg (295 lb 6.7 oz), last menstrual period 12/10/2010, SpO2 93 %, not currently breastfeeding. O2 Device: None (Room air)    Vitals:    01/18/23 0759 01/18/23 1857 01/20/23 0600   Weight: 132.6 kg (292 lb 6.4 oz) 136.2 kg (300 lb 4.3 oz) 134 kg (295 lb 6.7 oz)     Vital Signs with Ranges  Temp:  [98.6  F (37  C)-99.2  F (37.3  C)] 99  F (37.2  C)  Pulse:  [] 75  Resp:  [14] 14  BP: (108-140)/() 129/57  SpO2:  [91 %-96 %] 93 %  Patient Vitals for the past 24 hrs:   BP Temp Temp src Pulse Resp SpO2 Weight   01/20/23 0600 129/57 -- -- 75 -- 93 % 134 kg (295 lb 6.7 oz)   01/20/23 0500 -- -- -- 75 -- 94 % --   01/20/23 0400 127/58 -- -- 78 -- 92 % --   01/20/23 0300 127/67 -- -- 74 -- 92 % --   01/20/23 0200 117/62 -- -- 77 -- 92 % --   01/20/23 0100 109/53 -- -- 81 -- 93 % --   01/20/23 0000 118/55 99  F (37.2 "  C) Oral 74 -- 92 % --   01/19/23 2300 113/53 -- -- 79 -- 91 % --   01/19/23 2200 126/55 -- -- 81 -- 93 % --   01/19/23 2100 108/82 -- -- 81 -- 93 % --   01/19/23 2000 112/57 99.2  F (37.3  C) Oral 79 14 92 % --   01/19/23 1941 -- -- -- 80 -- 92 % --   01/19/23 1940 -- -- -- (!) 129 -- 93 % --   01/19/23 1900 113/52 -- -- (!) 131 -- 94 % --   01/19/23 1800 118/52 -- -- 80 -- 94 % --   01/19/23 1700 108/57 -- -- 81 -- 95 % --   01/19/23 1600 -- 98.9  F (37.2  C) Axillary 87 -- -- --   01/19/23 1500 129/69 -- -- (!) 129 -- 95 % --   01/19/23 1400 116/68 -- -- (!) 130 -- 94 % --   01/19/23 1300 116/58 -- -- (!) 131 -- 92 % --   01/19/23 1200 (!) 132/114 98.9  F (37.2  C) Oral (!) 137 -- 93 % --   01/19/23 1100 (!) 140/67 -- -- 88 -- 94 % --   01/19/23 1000 132/63 -- -- 87 -- 94 % --   01/19/23 0900 124/64 -- -- 87 -- 96 % --   01/19/23 0800 125/63 98.6  F (37  C) Oral 85 -- 96 % --     I/O's Last 24 hours  I/O last 3 completed shifts:  In: 4383 [P.O.:520; I.V.:3863]  Out: 2550 [Urine:2550]    Constitutional: Awake, alert, pleasant, fatigued.  Respiratory: Diminished in bases. No crackles or wheezes.  Cardiovascular: RRR, no m/r/g.  GI: Soft, nt, nd, +BS.  Skin/Integumen: Trace to 1+ leg edema.  Other:        Data   Recent Labs   Lab 01/20/23  0503 01/19/23  0640 01/18/23 2113   WBC 11.8* 20.8* 19.1*   HGB 10.5* 11.1* 11.3*   MCV 96 93 96   PLT 54* 81* 91*   * 132* 127*   POTASSIUM 3.7 4.8 4.7   CHLORIDE 96* 95* 94*   CO2 26 27 23   BUN 10.4 9.7 12.9   CR 0.62 0.79 0.95   ANIONGAP 6* 10 10   HEAVENLY 7.9* 7.8* 7.9*   * 123* 116*   ALBUMIN 3.1*  --  3.3*   PROTTOTAL 5.3*  --  5.3*   BILITOTAL 0.9  --  1.4*   ALKPHOS 73  --  64   ALT 32  --  33   AST 38*  --  43*     Recent Labs   Lab Test 01/20/23  0503 01/19/23  0640 01/18/23 2113 01/18/23  1901 01/18/23  1414   * 123* 116* 130* 92     Recent Labs   Lab 01/20/23  0503 01/19/23  1900 01/19/23  1148 01/19/23  0640 01/18/23 2113 01/18/23  1925  01/18/23  1618 01/18/23  1414   WBC 11.8*  --   --  20.8* 19.1*  --   --  6.8   LACT  --  1.1 1.7 2.1* 3.8* 4.0* 4.2*  --          No results found for this or any previous visit (from the past 24 hour(s)).    Medications   All medications were reviewed.    lactated ringers 125 mL/hr at 01/20/23 0518       ARIPiprazole  7.5 mg Oral Daily     atorvastatin  20 mg Oral Daily     carBAMazepine  200 mg Oral BID     DULoxetine  120 mg Oral Daily     fluticasone  2 spray Both Nostrils Daily     meropenem  1 g Intravenous Q8H     mirtazapine  75 mg Oral At Bedtime     sodium chloride (PF)  3 mL Intracatheter Q8H     traZODone  600 mg Oral At Bedtime     vancomycin  750 mg Intravenous Q12H     verapamil ER  120 mg Oral BID     acetaminophen, famotidine, loperamide, sodium chloride (PF)

## 2023-01-20 NOTE — PLAN OF CARE
January 19, 2023 [id number]:          Neuro- LOC: A/O x4, follows commands, ALDANA: BUE 5/5, BLE 4/5. Speech: clear. Pupils: equal, round, brisk.    Cardiac- SR, HR 60s-80s, SBP 100s-110s, MAP >65. Pulses: UE +2, LE +2/+1. Edema: +3 Bilateral ankles/feet.    Resp- Room Air, SpO2 >90%. Regular/Unlabored, w/o SOB. LS: Clear/Dim.    GI- Diet: Regular. Tolerating fluids, PO meds. Abdomen: normoactive, soft, nontender, obese. Multiple loose BM- Imodium PRN given x1 per pt. request.    - Waggoner catheter in place, Urine- ongoing Red/Lashonda, UOP- adequate.    Skin- Open area/tear, pink- Saccrum/gluteal fold. Bruising- scattered. BLE- chuy.    Pain- Generalized pain.    Gtts/Infusions- LR @ 125mL/hr.

## 2023-01-20 NOTE — PLAN OF CARE
Goal Outcome Evaluation:  Temp max 99.3. HR ranges from 70's-80's to 120-130's. Tylenol prn for HA with relief. Good urine output per hutchins (hutchins to remain in per Urology). Pt up to chair with lift and assist of 2. Family updated at bedside. Pt transferring to 66 this evening.

## 2023-01-20 NOTE — PROGRESS NOTES
UROLOGY PROGRESS NOTE    January 20, 2023  Post-op Day # 2    SUBJECTIVE:  Feeling better today.  Reports intermittent headaches.  Denies abdominal pain.  Denies N/V.  Tolerating hutchins.    AVSS  WBC 20/8 -> 11.8  Na 132 -> 128  Cr 0.62  1/19 UCx no growth  1/18 blood cx NGTD    OBJECTIVE:  Temp:  [98.8  F (37.1  C)-99.2  F (37.3  C)] 98.8  F (37.1  C)  Pulse:  [] 86  Resp:  [14-22] 22  BP: (108-141)/() 141/62  SpO2:  [91 %-96 %] 95 %    Intake/Output Summary (Last 24 hours) at 1/20/2023 0857  Last data filed at 1/20/2023 0600  Gross per 24 hour   Intake 4083 ml   Output 2200 ml   Net 1883 ml       GENERAL:  Awake, alert, no acute distress, resting in bed  HEAD: Normocephalic atraumatic  SCLERA: Anicteric  EXTREMITIES: Warm and well perfused  ABDOMEN:  Soft, nontender, non-distended. No guarding, rigidity, or peritoneal signs.   : hutchins with barbara output, no hematuria or clots    LABS:  Lab Results   Component Value Date    WBC 11.8 01/20/2023    WBC 9.6 12/27/2020     Lab Results   Component Value Date    HGB 10.5 01/20/2023    HGB 11.8 12/27/2020     Lab Results   Component Value Date    HCT 32.4 01/20/2023    HCT 35.4 12/27/2020     Lab Results   Component Value Date    PLT 54 01/20/2023     12/31/2020     Last Basic Metabolic Panel:  Lab Results   Component Value Date     01/20/2023     01/04/2021      Lab Results   Component Value Date    POTASSIUM 3.7 01/20/2023    POTASSIUM 3.5 01/04/2021     Lab Results   Component Value Date    CHLORIDE 96 01/20/2023    CHLORIDE 98 01/04/2021     Lab Results   Component Value Date    HEAVENLY 7.9 01/20/2023    HEAVENLY 9.9 01/04/2021     Lab Results   Component Value Date    CO2 26 01/20/2023    CO2 29 01/04/2021     Lab Results   Component Value Date    BUN 10.4 01/20/2023    BUN 8 01/04/2021     Lab Results   Component Value Date    CR 0.62 01/20/2023    CR 0.40 01/04/2021     Lab Results   Component Value Date     01/20/2023      01/18/2023     01/04/2021       ASSESSMENT:  67 year old female with left nephrolithiasis, POD #2 s/p left ureteroscopy, HLL, left stent placement with Dr. Coles. Admitted to ICU for post-op sepsis. Improving.  Afebrile. On broad spectrum abx. 1/19 UCx no growth, 1/18 blood cx NGTD. Leukocytosis improving.         PLAN:    - Transfer out of floor to ICU when deemed appropriate by critical care team - appreciate assistance  - Continue broad spectrum abx - culture specific abx when avail, per IM. Dr. Coles recommends 14 day course PO antibiotics prior to stent removal in clinic. She is scheduled for stent removal on 2/9.   - Reviewed possible symptoms with indwelling stent including urgency/frequency of urination, intermittent flank discomfort especially during voids, and gross hematuria.   - Continue Waggoner for now.  Will plan for Waggoner removal and voiding trial on day of planned discharge        Oralia Messina PA-C  [facility], [facility]  Office Phone: [phone]  After 4pm and on weekends, please call [phone]

## 2023-01-21 ENCOUNTER — APPOINTMENT (OUTPATIENT)
Dept: OCCUPATIONAL THERAPY | Facility: CLINIC | Age: 68
DRG: 856 | End: 2023-01-21
Attending: PHYSICIAN ASSISTANT
Payer: COMMERCIAL

## 2023-01-21 LAB
ANION GAP SERPL CALCULATED.3IONS-SCNC: 7 MMOL/L (ref 7–15)
ATRIAL RATE - MUSE: 130 BPM
BASOPHILS # BLD AUTO: 0 10E3/UL (ref 0–0.2)
BASOPHILS NFR BLD AUTO: 0 %
BUN SERPL-MCNC: 8.8 MG/DL (ref 8–23)
C DIFF TOX B STL QL: NEGATIVE
CALCIUM SERPL-MCNC: 8.5 MG/DL (ref 8.8–10.2)
CHLORIDE SERPL-SCNC: 100 MMOL/L (ref 98–107)
CREAT SERPL-MCNC: 0.53 MG/DL (ref 0.51–0.95)
DEPRECATED HCO3 PLAS-SCNC: 26 MMOL/L (ref 22–29)
DIASTOLIC BLOOD PRESSURE - MUSE: NORMAL MMHG
EOSINOPHIL # BLD AUTO: 0.2 10E3/UL (ref 0–0.7)
EOSINOPHIL NFR BLD AUTO: 2 %
ERYTHROCYTE [DISTWIDTH] IN BLOOD BY AUTOMATED COUNT: 12.1 % (ref 10–15)
GFR SERPL CREATININE-BSD FRML MDRD: >90 ML/MIN/1.73M2
GLUCOSE BLDC GLUCOMTR-MCNC: 123 MG/DL (ref 70–99)
GLUCOSE SERPL-MCNC: 106 MG/DL (ref 70–99)
HCT VFR BLD AUTO: 32.1 % (ref 35–47)
HGB BLD-MCNC: 10.8 G/DL (ref 11.7–15.7)
IMM GRANULOCYTES # BLD: 0.1 10E3/UL
IMM GRANULOCYTES NFR BLD: 1 %
INTERPRETATION ECG - MUSE: NORMAL
LYMPHOCYTES # BLD AUTO: 0.7 10E3/UL (ref 0.8–5.3)
LYMPHOCYTES NFR BLD AUTO: 7 %
MCH RBC QN AUTO: 30.8 PG (ref 26.5–33)
MCHC RBC AUTO-ENTMCNC: 33.6 G/DL (ref 31.5–36.5)
MCV RBC AUTO: 92 FL (ref 78–100)
MONOCYTES # BLD AUTO: 0.6 10E3/UL (ref 0–1.3)
MONOCYTES NFR BLD AUTO: 7 %
NEUTROPHILS # BLD AUTO: 7.8 10E3/UL (ref 1.6–8.3)
NEUTROPHILS NFR BLD AUTO: 83 %
NRBC # BLD AUTO: 0 10E3/UL
NRBC BLD AUTO-RTO: 0 /100
P AXIS - MUSE: NORMAL DEGREES
PLATELET # BLD AUTO: 54 10E3/UL (ref 150–450)
POTASSIUM SERPL-SCNC: 3.6 MMOL/L (ref 3.4–5.3)
PR INTERVAL - MUSE: 152 MS
QRS DURATION - MUSE: 100 MS
QT - MUSE: 330 MS
QTC - MUSE: 485 MS
R AXIS - MUSE: 62 DEGREES
RBC # BLD AUTO: 3.51 10E6/UL (ref 3.8–5.2)
SODIUM SERPL-SCNC: 133 MMOL/L (ref 136–145)
SYSTOLIC BLOOD PRESSURE - MUSE: NORMAL MMHG
T AXIS - MUSE: 61 DEGREES
VENTRICULAR RATE- MUSE: 130 BPM
WBC # BLD AUTO: 9.4 10E3/UL (ref 4–11)

## 2023-01-21 PROCEDURE — 250N000013 HC RX MED GY IP 250 OP 250 PS 637: Performed by: INTERNAL MEDICINE

## 2023-01-21 PROCEDURE — 99232 SBSQ HOSP IP/OBS MODERATE 35: CPT | Performed by: INTERNAL MEDICINE

## 2023-01-21 PROCEDURE — 85004 AUTOMATED DIFF WBC COUNT: CPT | Performed by: INTERNAL MEDICINE

## 2023-01-21 PROCEDURE — 36415 COLL VENOUS BLD VENIPUNCTURE: CPT | Performed by: INTERNAL MEDICINE

## 2023-01-21 PROCEDURE — 120N000001 HC R&B MED SURG/OB

## 2023-01-21 PROCEDURE — 250N000013 HC RX MED GY IP 250 OP 250 PS 637: Performed by: PHYSICIAN ASSISTANT

## 2023-01-21 PROCEDURE — 97166 OT EVAL MOD COMPLEX 45 MIN: CPT | Mod: GO | Performed by: OCCUPATIONAL THERAPIST

## 2023-01-21 PROCEDURE — 250N000013 HC RX MED GY IP 250 OP 250 PS 637: Performed by: UROLOGY

## 2023-01-21 PROCEDURE — 80048 BASIC METABOLIC PNL TOTAL CA: CPT | Performed by: INTERNAL MEDICINE

## 2023-01-21 PROCEDURE — 97535 SELF CARE MNGMENT TRAINING: CPT | Mod: GO | Performed by: OCCUPATIONAL THERAPIST

## 2023-01-21 PROCEDURE — 87493 C DIFF AMPLIFIED PROBE: CPT | Performed by: INTERNAL MEDICINE

## 2023-01-21 RX ORDER — ONDANSETRON 4 MG/1
4 TABLET, ORALLY DISINTEGRATING ORAL EVERY 6 HOURS PRN
Status: DISCONTINUED | OUTPATIENT
Start: 2023-01-21 | End: 2023-01-25 | Stop reason: HOSPADM

## 2023-01-21 RX ORDER — ONDANSETRON 2 MG/ML
4 INJECTION INTRAMUSCULAR; INTRAVENOUS EVERY 6 HOURS PRN
Status: DISCONTINUED | OUTPATIENT
Start: 2023-01-21 | End: 2023-01-25 | Stop reason: HOSPADM

## 2023-01-21 RX ORDER — LACTOBACILLUS RHAMNOSUS GG 10B CELL
1 CAPSULE ORAL 2 TIMES DAILY
Status: DISCONTINUED | OUTPATIENT
Start: 2023-01-21 | End: 2023-01-25 | Stop reason: HOSPADM

## 2023-01-21 RX ORDER — PROCHLORPERAZINE 25 MG
12.5 SUPPOSITORY, RECTAL RECTAL EVERY 12 HOURS PRN
Status: DISCONTINUED | OUTPATIENT
Start: 2023-01-21 | End: 2023-01-25 | Stop reason: HOSPADM

## 2023-01-21 RX ORDER — PROCHLORPERAZINE MALEATE 5 MG
5 TABLET ORAL EVERY 6 HOURS PRN
Status: DISCONTINUED | OUTPATIENT
Start: 2023-01-21 | End: 2023-01-25 | Stop reason: HOSPADM

## 2023-01-21 RX ADMIN — LOPERAMIDE HYDROCHLORIDE 2 MG: 2 CAPSULE ORAL at 08:08

## 2023-01-21 RX ADMIN — CARBAMAZEPINE 200 MG: 200 TABLET ORAL at 08:01

## 2023-01-21 RX ADMIN — TRAZODONE HYDROCHLORIDE 600 MG: 150 TABLET ORAL at 22:05

## 2023-01-21 RX ADMIN — MIRTAZAPINE 75 MG: 30 TABLET, FILM COATED ORAL at 22:05

## 2023-01-21 RX ADMIN — FLUTICASONE PROPIONATE 2 SPRAY: 50 SPRAY, METERED NASAL at 20:13

## 2023-01-21 RX ADMIN — VERAPAMIL HYDROCHLORIDE 120 MG: 120 TABLET, FILM COATED, EXTENDED RELEASE ORAL at 08:01

## 2023-01-21 RX ADMIN — ATORVASTATIN CALCIUM 20 MG: 20 TABLET, FILM COATED ORAL at 08:01

## 2023-01-21 RX ADMIN — ARIPIPRAZOLE 7.5 MG: 15 TABLET ORAL at 08:00

## 2023-01-21 RX ADMIN — ACETAMINOPHEN 1000 MG: 500 TABLET ORAL at 13:36

## 2023-01-21 RX ADMIN — CIPROFLOXACIN HYDROCHLORIDE 250 MG: 250 TABLET, FILM COATED ORAL at 08:01

## 2023-01-21 RX ADMIN — DULOXETINE 120 MG: 60 CAPSULE, DELAYED RELEASE ORAL at 08:01

## 2023-01-21 RX ADMIN — VERAPAMIL HYDROCHLORIDE 120 MG: 120 TABLET, FILM COATED, EXTENDED RELEASE ORAL at 20:07

## 2023-01-21 RX ADMIN — Medication 1 CAPSULE: at 20:07

## 2023-01-21 RX ADMIN — CARBAMAZEPINE 200 MG: 200 TABLET ORAL at 20:07

## 2023-01-21 RX ADMIN — MICONAZOLE NITRATE: 2 POWDER TOPICAL at 08:09

## 2023-01-21 RX ADMIN — MICONAZOLE NITRATE: 2 POWDER TOPICAL at 20:13

## 2023-01-21 RX ADMIN — CIPROFLOXACIN HYDROCHLORIDE 250 MG: 250 TABLET, FILM COATED ORAL at 20:07

## 2023-01-21 ASSESSMENT — ACTIVITIES OF DAILY LIVING (ADL)
ADLS_ACUITY_SCORE: 52
ADLS_ACUITY_SCORE: 48
ADLS_ACUITY_SCORE: 52
ADLS_ACUITY_SCORE: 48
PREVIOUS_RESPONSIBILITIES: MEDICATION MANAGEMENT
ADLS_ACUITY_SCORE: 52
ADLS_ACUITY_SCORE: 48
ADLS_ACUITY_SCORE: 52

## 2023-01-21 NOTE — PROGRESS NOTES
[facility]    Internal Medicine Hospitalist Progress Note  01/21/2023  I evaluated patient on the above date.    Clint Rivera Jr., MD  [phone] (p)  Text Page  Vocera        Assessment & Plan New actions/orders today (01/21/2023) are underlined.    Mili Padilla is a 66 yo female with history including morbid obesity; MS with neurogenic bladder; HTN; HLD; OVIDIO; depression/anxiety; GERD; and nephrolithiasis; who underwent elective left ureteroscopy, lithotripsy and left ureteral stent on 1/18/2023. After the surgery, developed shaking chills with tachycardia and found with signs of UTI with septic shock. Treated with aggressive IVF's and broad antibiotics (meropenem and vancomycin) with subsequently improvement.        Nephrolithiasis s/p left ureteroscopy, lithotripsy and left ureteral stenting 1/18/2023.  - Post-op management per Urology.  - Waggoner management per Urology; plan voiding trial on day of discharge.  - Plan stent removal 2/9.    Severe sepsis of likely urinary source related to recent surgery/instrumentation, resolved.  Lactic acidosis due to above, resolved.  * Denied any infectious symptoms prior to procedure, particularly any urinary symptoms; developed shaking chills immediately post-op.  * After surgery 1/18, pt was tachy (140's), BP trended down ( --> 107 --> 79), temp trended up (97.8-- > 100.2, T-max 102.9) with lactate 4.3, WBC 19.1. Post-op CXR clear. Received 1L IVF and ceftriaxone in PACU (had received ciprofloxacin pre-op). CT CAP with contrast 1/18 showed no PE or acute airspace disease; multiple new prominent stones within the left kidney (from 8/2022), left ureter stent in place, persistent left-sided pelviectasis and mildly increasing edema of the left kidney; bubbles of gas in the bladder and proximal left collecting system noted, correlate with urinalysis to assess for urinary tract infection; few indeterminant stable small hypodensities in the liver,  nonurgent liver MRI recommended; cholelithiasis. Antibiotics broadened to meropenem and vancomycin 1/18 (noted single prior urine culture with Enterobacter cloacae resistant to ceftriaxone, though sensitive to ciprofloxacin which she received pre-op).   * Was transferred to the ICU for possible pressors, but did not need, improved with IVF's.  * BC's 1/18 NGTD. UA early am 1/19 showed WBC 95, large LE, +blood. UC 1/19 no growth.  * On 1/19, pt hemodynamically stable and lactate normalized; ready to transfer out of the ICU.  * On 1/20, vanco and meropenem discontinued and started on ciprofloxacin. Echo 1/20 showed LVEF 65%; RV OK; no valve disease.  * On 1/21, WBC normalized.  Recent Labs   Lab 01/21/23  1206 01/20/23  0503 01/19/23  1900 01/19/23  1148 01/19/23  0640 01/18/23  2113 01/18/23  1925 01/18/23  1618 01/18/23  1414 01/18/23  1309   WBC 9.4 11.8*  --   --  20.8* 19.1*  --   --  6.8  --    LACT  --   --  1.1 1.7 2.1* 3.8* 4.0* 4.2*  --  4.3*   - Continue ciprofloxacin (started 1/20) for 2 weeks total to stop after 2/2/2023.  - Continue to monitor cultures.    Loose stools/diarrhea, suspect from antibiotic side effect.  * On 1/21, pt c/o loose stools/diarrhea; notes some abdominal pain.  - Check C. Diff toxin B.  - Order lactobacillus TID with meals.  - Continue PRN loperamide for now.     Hyponatremia, suspect related to hypovolemia, other causes not ruled out.  * Sodium 130 on 1/18.  * On 1/20, despite IVF's including NS and LR, sodium continued to be low. IVF's discontinued.  * On 1/21, sodium improved.  Recent Labs   Lab 01/21/23  1206 01/20/23  0503 01/19/23  0640 01/18/23  2113 01/18/23  1414   * 128* 132* 127* 130*   - Continue to monitor BMP - recheck in am.    Mild LFT abnormalities, question related to sepsis.  * On 1/18, noted to have AST of 43, total bilirubin 1.4, remainder of LFTs unremarkable. Had CT CAP 1/18 as above.  * LFT's improved 1/20.  Recent Labs   Lab 01/20/23  1380  01/19/23  0039 01/18/23 2113 01/18/23  1414   ALBUMIN 3.1*  --  3.3* 3.2*   BILITOTAL 0.9  --  1.4* 1.0   ALT 32  --  33 24   AST 38*  --  43* 29   ALKPHOS 73  --  64 89   PROTEIN  --  30*  --   --    - Continue to monitor LFT's periodically.    Thrombocytopenia, unclear etiology, possibly medication effect or chronic from other cause.  * Plts low on 107K 1/18 afternoon.  Recent Labs   Lab 01/21/23  1206 01/20/23  0503 01/19/23  0640 01/18/23 2113 01/18/23  1414   PLT 54* 54* 81* 91* 107*   - Monitor CBC - recheck in am.  - Consider platelet transfusion if needs a procedure and less than 50,000; or if less than 10,000.    Anemia, suspect chronic component.  * Hgb 11.3 on 1/18. No overt clinical signs of major bleeding.  Recent Labs   Lab 01/21/23  1206 01/20/23  0503 01/19/23  0640 01/18/23 2113 01/18/23  1414   HGB 10.8* 10.5* 11.1* 11.3* 11.4*   - Monitor CBC.    H/o hypertension (benign essential).  [PTA: losartan 50 mg daily; verapamil  mg BID.]  * BP meds held 1/18 due to sepsis.  * Verapamil restarted 1/19.   * Echo 1/20 showed LVEF 65%; RV OK; no valve disease.  - Continue verapamil.  - Continue to hold losartan for now.    Weakness and physical deconditioning due to multiple acute and chronic medical issues.  MS with neurogenic bladder.  * Lives independently with PCA services (twice daily); uses walker in home, motorized scooter.  * Waggoner in place post surgery.  * On 1/19, pt very weak, needing a lift. PT, OT ordered. PT recommended TCU.  - Waggoner management per Urology.  - Continue PTA trospium.  - Continue PT, OT.  - May need TCU.     HLD.  - Continue PTA atorvastatin.     Depression/anxiety.  - Continue PTA aripiprazole; carbamazepine, duloxetine, mirtazapine, trazodone.     GERD  - Continue PTA famotidine.    OVIDIO.  * Noted does not use/non-compliant with CPAP.  - Follow-up outpatient.    Severe obesity.  Body mass index is 47.68 kg/m .  - Needs to pursue aggressive dietary and lifestyle  "modifications.    Clinically Significant Risk Factors         # Hyponatremia: Lowest Na = 128 mmol/L in last 2 days, will monitor as appropriate      # Hypoalbuminemia: Lowest albumin = 3.1 g/dL at 1/20/2023  5:03 AM, will monitor as appropriate   # Thrombocytopenia: Lowest platelets = 54 in last 2 days, will monitor for bleeding         # Severe Obesity: Estimated body mass index is 47.68 kg/m  as calculated from the following:    Height as of this encounter: 1.676 m (5' 6\").    Weight as of this encounter: 134 kg (295 lb 6.7 oz)., PRESENT ON ADMISSION           COVID-19 testing.  COVID-19 PCR Results    COVID-19 PCR Results   No data to display.         COVID-19 Antibody Results, Testing for Immunity    COVID-19 Antibody Results, Testing for Immunity   No data to display.             Diet: Diet  Regular Diet Adult    Prophylaxis: PCD's, ambulation.   Waggoner Catheter: PRESENT, indication: Retention  Lines: None     Code Status: Full Code    Disposition Plan   Expected discharge: Possibly 1-2d recommended to home vs TCU pending continued stability, Urology rec's.  Entered: Clint Rivera MD 01/21/2023, 12:51 PM         Interval History   Pt c/o loose stools/diarrhea; notes some abdominal pain. Very worried about this.  Otherwise, feels about the same.  Still very weak.  Tolerating diet, but decreased appetite.    -Data reviewed today: I reviewed all new labs and imaging over the last 24 hours. I personally reviewed no images or EKG's today.    Physical Exam    , Blood pressure 134/60, pulse 83, temperature 97.9  F (36.6  C), temperature source Oral, resp. rate 18, height 1.676 m (5' 6\"), weight 134 kg (295 lb 6.7 oz), last menstrual period 12/10/2010, SpO2 91 %, not currently breastfeeding. O2 Device: None (Room air)    Vitals:    01/18/23 0759 01/18/23 1857 01/20/23 0600   Weight: 132.6 kg (292 lb 6.4 oz) 136.2 kg (300 lb 4.3 oz) 134 kg (295 lb 6.7 oz)     Vital Signs with Ranges  Temp:  [97.9  F (36.6 "  C)-98.4  F (36.9  C)] 97.9  F (36.6  C)  Pulse:  [78-87] 83  Resp:  [15-20] 18  BP: (104-134)/(53-60) 134/60  SpO2:  [91 %-95 %] 91 %  Patient Vitals for the past 24 hrs:   BP Temp Temp src Pulse Resp SpO2   01/21/23 0726 134/60 97.9  F (36.6  C) Oral 83 18 91 %   01/20/23 2300 116/54 98.1  F (36.7  C) Oral 78 18 94 %   01/20/23 1826 104/53 98.4  F (36.9  C) Oral 82 -- 93 %   01/20/23 1600 129/58 98.4  F (36.9  C) Oral 87 20 92 %   01/20/23 1400 -- -- -- 80 15 95 %     I/O's Last 24 hours  I/O last 3 completed shifts:  In: 960 [P.O.:960]  Out: 3975 [Urine:3975]    Constitutional: Awake, alert, pleasant, fatigued, more conversant.  Respiratory: Diminished in bases. No crackles or wheezes.  Cardiovascular: RRR, no m/r/g.  GI: Mild distension, obese, nt, +BS.  Skin/Integumen:  Other:        Data   Recent Labs   Lab 01/21/23  1206 01/21/23  0533 01/20/23  0503 01/19/23  0640 01/18/23  2113   WBC 9.4  --  11.8* 20.8* 19.1*   HGB 10.8*  --  10.5* 11.1* 11.3*   MCV 92  --  96 93 96   PLT 54*  --  54* 81* 91*   *  --  128* 132* 127*   POTASSIUM 3.6  --  3.7 4.8 4.7   CHLORIDE 100  --  96* 95* 94*   CO2 26  --  26 27 23   BUN 8.8  --  10.4 9.7 12.9   CR 0.53  --  0.62 0.79 0.95   ANIONGAP 7  --  6* 10 10   HEAVENLY 8.5*  --  7.9* 7.8* 7.9*   * 123* 110* 123* 116*   ALBUMIN  --   --  3.1*  --  3.3*   PROTTOTAL  --   --  5.3*  --  5.3*   BILITOTAL  --   --  0.9  --  1.4*   ALKPHOS  --   --  73  --  64   ALT  --   --  32  --  33   AST  --   --  38*  --  43*     Recent Labs   Lab Test 01/21/23  1206 01/21/23  0533 01/20/23  0503 01/19/23  0640 01/18/23  2113   * 123* 110* 123* 116*     Recent Labs   Lab 01/21/23  1206 01/20/23  0503 01/19/23  1900 01/19/23  1148 01/19/23  0640 01/18/23  2113 01/18/23  1925 01/18/23  1618 01/18/23  1414   WBC 9.4 11.8*  --   --  20.8* 19.1*  --   --  6.8   LACT  --   --  1.1 1.7 2.1* 3.8* 4.0* 4.2*  --          No results found for this or any previous visit (from the past 24  hour(s)).    Medications   All medications were reviewed.      ARIPiprazole  7.5 mg Oral Daily     atorvastatin  20 mg Oral Daily     carBAMazepine  200 mg Oral BID     ciprofloxacin  250 mg Oral Q12H KELLI (08/20)     DULoxetine  120 mg Oral Daily     fluticasone  2 spray Both Nostrils Daily     lactobacillus acidophilus  1 tablet Oral TID AC     miconazole   Topical BID     mirtazapine  75 mg Oral At Bedtime     sodium chloride (PF)  3 mL Intracatheter Q8H     traZODone  600 mg Oral At Bedtime     verapamil ER  120 mg Oral BID     acetaminophen, famotidine, loperamide, ondansetron **OR** ondansetron, prochlorperazine **OR** prochlorperazine **OR** prochlorperazine, sodium chloride (PF)

## 2023-01-21 NOTE — PROGRESS NOTES
01/21/23 1452   Appointment Info   Signing Clinician's Name / Credentials (OT) Lin Nicole OTR/L   Living Environment   People in Home alone   Current Living Arrangements apartment   Home Accessibility no concerns   Transportation Anticipated health plan transportation   Living Environment Comments Goes to the grocery store and other stores in her neighborhood on her scooter. She has a PCA that comes 2x/day 7 days/week.   Self-Care   Usual Activity Tolerance moderate   Current Activity Tolerance fair   Equipment Currently Used at Home grab bar, toilet;grab bar, tub/shower;raised toilet seat;tub bench;walker, rolling;wheelchair, power  (scooter)   Fall history within last six months yes   Number of times patient has fallen within last six months 1   Activity/Exercise/Self-Care Comment Pt reports walking in apt with 4WW, uses scooter out of apt.  Pt can dress herself, PCA sets her up.   Instrumental Activities of Daily Living (IADL)   Previous Responsibilities medication management   IADL Comments PCA assists with most home chores   General Information   Onset of Illness/Injury or Date of Surgery 01/19/23   Referring Physician Keo Tran   Patient/Family Therapy Goal Statement (OT) prefers home, realizes she may need rehab   Additional Occupational Profile Info/Pertinent History of Current Problem 68 yo female with history including morbid obesity; MS with neurogenic bladder; HTN; HLD; OVIDIO; depression/anxiety; GERD; and nephrolithiasis; who underwent elective left ureteroscopy, lithotripsy and left ureteral stent on 1/18/2023. After the surgery, developed shaking chills with tachycardia and found with signs of UTI with septic shock. Treated with aggressive IVF's and broad antibiotics (meropenem and vancomycin) with subsequently improvement.   Existing Precautions/Restrictions fall   Left Upper Extremity (Weight-bearing Status) full weight-bearing (FWB)   Right Upper Extremity (Weight-bearing Status) full  weight-bearing (FWB)   Left Lower Extremity (Weight-bearing Status) full weight-bearing (FWB)   Right Lower Extremity (Weight-bearing Status) full weight-bearing (FWB)   Cognitive Status Examination   Orientation Status orientation to person, place and time   Affect/Mental Status (Cognitive) WNL   Follows Commands follows two-step commands;over 90% accuracy   Sensory   Sensory Comments PN B feet   Pain Assessment   Patient Currently in Pain Yes, see Vital Sign flowsheet   Posture   Posture forward head position;protracted shoulders   Range of Motion Comprehensive   Comment, General Range of Motion BUE WFL   Strength Comprehensive (MMT)   Comment, General Manual Muscle Testing (MMT) Assessment BUE generally 4/5; BUE unable to SLR off bed; WFL ankle flex/extn   Coordination   Upper Extremity Coordination No deficits were identified   Bed Mobility   Bed Mobility rolling left;rolling right   Rolling Left [place] (Bed Mobility) moderate assist (50% patient effort);2 person assist   Rolling Right [place] (Bed Mobility) moderate assist (50% patient effort)   Assistive Device (Bed Mobility) bed rails;draw sheet   Transfers   Transfers bed-chair transfer;sit-stand transfer;toilet transfer;shower transfer   Transfer Skill: Bed to Chair/Chair to Bed   Bed-Chair [place] (Transfers) dependent (less than 25% patient effort)   Transfer Comments Lift used   Sit-Stand Transfer   Sit-Stand [place] (Transfers) maximum assist (25% patient effort)   Shower Transfer   [place] Level (Shower Transfer) maximum assist (25% patient effort)   Shower Transfer Comments tub/shower w/tub bench; PCA A 2x/week   Toilet Transfer   [place] Level (Toilet Transfer) maximum assist (25% patient effort)   Toilet Transfer Comments raised toilet and grab bars; pt Daniel at baseline   Balance   Balance Comments NT   Activities of Daily Living   BADL Assessment/Intervention upper body dressing;lower body dressing;grooming;toileting    Upper Body Dressing Assessment/Training   [place] Level (Upper Body Dressing) moderate assist (50% patient effort)   Lower Body Dressing Assessment/Training   [place] Level (Lower Body Dressing) maximum assist (25% patient effort)   Grooming Assessment/Training   [place] Level (Grooming) moderate assist (50% patient effort)   Toileting   [place] Level (Toileting) dependent (less than 25% patient effort)   Clinical Impression   Criteria for Skilled Therapeutic Interventions Met (OT) Yes, treatment indicated   OT Diagnosis impaired ADLs   OT Problem List-Impairments impacting ADL problems related to;activity tolerance impaired;balance;strength;pain   Assessment of Occupational Performance 3-5 Performance Deficits   Identified Performance Deficits dressing, toilet transfer, toileting, tub/shower transfer   Planned Therapy Interventions (OT) ADL retraining;strengthening;transfer training   Clinical Decision Making Complexity (OT) moderate complexity   Risk & Benefits of therapy have been explained evaluation/treatment results reviewed;care plan/treatment goals reviewed;risks/benefits reviewed;participants voiced agreement with care plan;participants included   OT Total Evaluation Time   OT Eval, Moderate Complexity Minutes (06515) 10   OT Goals   Therapy Frequency (OT) 5 times/wk   OT Predicted Duration/Target Date for Goal Attainment 01/28/23   OT Goals Upper Body Dressing;Lower Body Dressing;Toilet Transfer/Toileting;Transfers;Hygiene/Grooming   OT: Hygiene/Grooming supervision/stand-by assist   OT: Upper Body Dressing Supervision/stand-by assist   OT: Lower Body Dressing Minimal assist   OT: Transfer Moderate assist;with assistive device   OT: Toilet Transfer/Toileting Modified independent;toilet transfer;cleaning and garment management;using adaptive equipment   OT Discharge Planning   OT Plan STS from recliner; progress to tx to BSC; g/h seated in recliner; dressing seated in recliner   OT  Discharge Recommendation (DC Rec) Transitional Care Facility   OT Rationale for DC Rec Pt well below baseline, limited by weakness and pain, who would benefit from continued therapy to increase safety and independence with ADLs.   OT Brief overview of current status Ax2 bed mobility, MaxA ashish-cares, Lift to chair   Total Session Time   Total Session Time (sum of timed and untimed services) 10

## 2023-01-21 NOTE — PLAN OF CARE
Goal Outcome Evaluation:         Pt transfer from ICU .Settle pt in the recliner..Waggoner intact.Will monitor

## 2023-01-21 NOTE — PLAN OF CARE
Goal Outcome Evaluation:     Summary:  Post-op day 3 (ureteroscopy,lithotripsy,ureteral stenting)  DATE & TIME: -1/21/23 0700- 1530  Cognitive Concerns/ Orientation : A/O x4  BEHAVIOR & AGGRESSION TOOL COLOR: Green  CIWA SCORE: NA  ABNL VS/O2: on RA  MOBILITY: up with lift.Turn and repo in bed.  PAIN MANAGMENT:Tylenol x 1 for  HA and jaw pain  DIET: Reg, good appetite  BOWEL/BLADDER: Waggoner , Incontinent large loose  BM x2. PRN Imodium given x1  ABNL LAB/BG: Na 133 calcium 7.9, Albumin 3.1, AST 38, PLT 54, pending c.diff.  DRAIN/DEVICES: Waggoner/PIV x2 SL  TELEMETRY RHYTHM: NA  SKIN: scattered bruises on arms and R armpit area, R face.Rash to Abd. folds, applied antifungal powder.  TESTS/PROCEDURES: Post-op day 3  D/C DAY/GOALS/PLACE: Pending   OTHER IMPORTANT INFO: Needs voiding trail prior to planned discharge date, per Urology note.   PT, OT ,Urology following, Enteric precautions initiated . Sent stool sample  to R/O C.Diff.

## 2023-01-21 NOTE — PROGRESS NOTES
"Urology Brief Follow-up Note:  Dx:  Sepsis after stone procedure    Subjective:  Feeling OK but getting some diarrhea, concerned about this as she had protracted diarrhea after similar episode in past.         Objective:    /60 (BP Location: Right arm)   Pulse 83   Temp 97.9  F (36.6  C) (Oral)   Resp 18   Ht 1.676 m (5' 6\")   Wt 134 kg (295 lb 6.7 oz)   LMP 12/10/2010   SpO2 91%   BMI 47.68 kg/m        Intake/Output Summary (Last 24 hours) at 1/21/2023 1125  Last data filed at 1/21/2023 0731  Gross per 24 hour   Intake 600 ml   Output 3825 ml   Net -3225 ml       Labs:    ROUTINE IP LABS (Last four results)  BMP  Recent Labs   Lab 01/21/23  0533 01/20/23  0503 01/19/23  0640 01/18/23 2113 01/18/23  1901 01/18/23  1414   NA  --  128* 132* 127*  --  130*   POTASSIUM  --  3.7 4.8 4.7  --  4.2   CHLORIDE  --  96* 95* 94*  --  96*   HEAVENLY  --  7.9* 7.8* 7.9*  --  8.0*   CO2  --  26 27 23  --  21*   BUN  --  10.4 9.7 12.9  --  13.0   CR  --  0.62 0.79 0.95  --  0.70   * 110* 123* 116*   < > 92    < > = values in this interval not displayed.     CBC  Recent Labs   Lab 01/20/23  0503 01/19/23  0640 01/18/23 2113 01/18/23  1414   WBC 11.8* 20.8* 19.1* 6.8   RBC 3.39* 3.47* 3.54* 3.68*   HGB 10.5* 11.1* 11.3* 11.4*   HCT 32.4* 32.2* 34.0* 34.3*   MCV 96 93 96 93   MCH 31.0 32.0 31.9 31.0   MCHC 32.4 34.5 33.2 33.2   RDW 12.1 12.2 11.9 11.9   PLT 54* 81* 91* 107*     INRNo lab results found in last 7 days.      Exam:    Gen: awake and alert  Ab:soft  : hutchins in place  Ext: normal      Assessment/Plan:     1.  Septic episode after stone procedure.  Stent in place and hutchins in place    2.  Continue hutchins until able to move around more   3.  abx 2 weeks before stent out        Rodney Terry MD  [facility]  Pager:  [phone]  After 5pm and on weekends, please call [phone]    "

## 2023-01-22 ENCOUNTER — APPOINTMENT (OUTPATIENT)
Dept: OCCUPATIONAL THERAPY | Facility: CLINIC | Age: 68
DRG: 856 | End: 2023-01-22
Attending: UROLOGY
Payer: COMMERCIAL

## 2023-01-22 ENCOUNTER — APPOINTMENT (OUTPATIENT)
Dept: PHYSICAL THERAPY | Facility: CLINIC | Age: 68
DRG: 856 | End: 2023-01-22
Attending: UROLOGY
Payer: COMMERCIAL

## 2023-01-22 LAB
ANION GAP SERPL CALCULATED.3IONS-SCNC: 7 MMOL/L (ref 7–15)
BASOPHILS # BLD AUTO: 0.1 10E3/UL (ref 0–0.2)
BASOPHILS NFR BLD AUTO: 1 %
BUN SERPL-MCNC: 8.2 MG/DL (ref 8–23)
CALCIUM SERPL-MCNC: 8.6 MG/DL (ref 8.8–10.2)
CHLORIDE SERPL-SCNC: 97 MMOL/L (ref 98–107)
CREAT SERPL-MCNC: 0.51 MG/DL (ref 0.51–0.95)
DEPRECATED HCO3 PLAS-SCNC: 28 MMOL/L (ref 22–29)
EOSINOPHIL # BLD AUTO: 0.2 10E3/UL (ref 0–0.7)
EOSINOPHIL NFR BLD AUTO: 3 %
ERYTHROCYTE [DISTWIDTH] IN BLOOD BY AUTOMATED COUNT: 11.9 % (ref 10–15)
GFR SERPL CREATININE-BSD FRML MDRD: >90 ML/MIN/1.73M2
GLUCOSE SERPL-MCNC: 159 MG/DL (ref 70–99)
HCT VFR BLD AUTO: 33.1 % (ref 35–47)
HGB BLD-MCNC: 11.1 G/DL (ref 11.7–15.7)
IMM GRANULOCYTES # BLD: 0.1 10E3/UL
IMM GRANULOCYTES NFR BLD: 1 %
LYMPHOCYTES # BLD AUTO: 1.1 10E3/UL (ref 0.8–5.3)
LYMPHOCYTES NFR BLD AUTO: 13 %
MCH RBC QN AUTO: 30.9 PG (ref 26.5–33)
MCHC RBC AUTO-ENTMCNC: 33.5 G/DL (ref 31.5–36.5)
MCV RBC AUTO: 92 FL (ref 78–100)
MONOCYTES # BLD AUTO: 0.6 10E3/UL (ref 0–1.3)
MONOCYTES NFR BLD AUTO: 7 %
NEUTROPHILS # BLD AUTO: 6.8 10E3/UL (ref 1.6–8.3)
NEUTROPHILS NFR BLD AUTO: 75 %
NRBC # BLD AUTO: 0 10E3/UL
NRBC BLD AUTO-RTO: 0 /100
PLATELET # BLD AUTO: 79 10E3/UL (ref 150–450)
POTASSIUM SERPL-SCNC: 3.4 MMOL/L (ref 3.4–5.3)
RBC # BLD AUTO: 3.59 10E6/UL (ref 3.8–5.2)
SODIUM SERPL-SCNC: 132 MMOL/L (ref 136–145)
WBC # BLD AUTO: 8.9 10E3/UL (ref 4–11)

## 2023-01-22 PROCEDURE — 250N000013 HC RX MED GY IP 250 OP 250 PS 637: Performed by: INTERNAL MEDICINE

## 2023-01-22 PROCEDURE — 120N000001 HC R&B MED SURG/OB

## 2023-01-22 PROCEDURE — 97530 THERAPEUTIC ACTIVITIES: CPT | Mod: GO | Performed by: OCCUPATIONAL THERAPIST

## 2023-01-22 PROCEDURE — 97110 THERAPEUTIC EXERCISES: CPT | Mod: GO | Performed by: OCCUPATIONAL THERAPIST

## 2023-01-22 PROCEDURE — 250N000013 HC RX MED GY IP 250 OP 250 PS 637: Performed by: UROLOGY

## 2023-01-22 PROCEDURE — 36415 COLL VENOUS BLD VENIPUNCTURE: CPT | Performed by: INTERNAL MEDICINE

## 2023-01-22 PROCEDURE — 250N000011 HC RX IP 250 OP 636: Performed by: INTERNAL MEDICINE

## 2023-01-22 PROCEDURE — 85025 COMPLETE CBC W/AUTO DIFF WBC: CPT | Performed by: INTERNAL MEDICINE

## 2023-01-22 PROCEDURE — 99232 SBSQ HOSP IP/OBS MODERATE 35: CPT | Performed by: INTERNAL MEDICINE

## 2023-01-22 PROCEDURE — 250N000013 HC RX MED GY IP 250 OP 250 PS 637: Performed by: PHYSICIAN ASSISTANT

## 2023-01-22 PROCEDURE — 80048 BASIC METABOLIC PNL TOTAL CA: CPT | Performed by: INTERNAL MEDICINE

## 2023-01-22 PROCEDURE — 97535 SELF CARE MNGMENT TRAINING: CPT | Mod: GO | Performed by: OCCUPATIONAL THERAPIST

## 2023-01-22 PROCEDURE — 97530 THERAPEUTIC ACTIVITIES: CPT | Mod: GP | Performed by: PHYSICAL THERAPIST

## 2023-01-22 RX ORDER — LOSARTAN POTASSIUM 50 MG/1
50 TABLET ORAL DAILY
Status: DISCONTINUED | OUTPATIENT
Start: 2023-01-22 | End: 2023-01-25 | Stop reason: HOSPADM

## 2023-01-22 RX ADMIN — CIPROFLOXACIN HYDROCHLORIDE 250 MG: 250 TABLET, FILM COATED ORAL at 20:19

## 2023-01-22 RX ADMIN — MIRTAZAPINE 75 MG: 30 TABLET, FILM COATED ORAL at 22:27

## 2023-01-22 RX ADMIN — MICONAZOLE NITRATE: 2 POWDER TOPICAL at 08:04

## 2023-01-22 RX ADMIN — VERAPAMIL HYDROCHLORIDE 120 MG: 120 TABLET, FILM COATED, EXTENDED RELEASE ORAL at 20:19

## 2023-01-22 RX ADMIN — ATORVASTATIN CALCIUM 20 MG: 20 TABLET, FILM COATED ORAL at 08:02

## 2023-01-22 RX ADMIN — CARBAMAZEPINE 200 MG: 200 TABLET ORAL at 08:02

## 2023-01-22 RX ADMIN — Medication 1 CAPSULE: at 08:02

## 2023-01-22 RX ADMIN — VERAPAMIL HYDROCHLORIDE 120 MG: 120 TABLET, FILM COATED, EXTENDED RELEASE ORAL at 08:02

## 2023-01-22 RX ADMIN — CARBAMAZEPINE 200 MG: 200 TABLET ORAL at 20:19

## 2023-01-22 RX ADMIN — LOSARTAN POTASSIUM 50 MG: 50 TABLET, FILM COATED ORAL at 12:15

## 2023-01-22 RX ADMIN — TRAZODONE HYDROCHLORIDE 600 MG: 150 TABLET ORAL at 22:27

## 2023-01-22 RX ADMIN — Medication 1 CAPSULE: at 20:19

## 2023-01-22 RX ADMIN — DULOXETINE 120 MG: 60 CAPSULE, DELAYED RELEASE ORAL at 08:02

## 2023-01-22 RX ADMIN — FLUTICASONE PROPIONATE 2 SPRAY: 50 SPRAY, METERED NASAL at 20:24

## 2023-01-22 RX ADMIN — ONDANSETRON 4 MG: 4 TABLET, ORALLY DISINTEGRATING ORAL at 07:53

## 2023-01-22 RX ADMIN — MICONAZOLE NITRATE: 2 POWDER TOPICAL at 20:27

## 2023-01-22 RX ADMIN — LOPERAMIDE HYDROCHLORIDE 2 MG: 2 CAPSULE ORAL at 20:24

## 2023-01-22 RX ADMIN — ARIPIPRAZOLE 7.5 MG: 15 TABLET ORAL at 08:02

## 2023-01-22 RX ADMIN — LOPERAMIDE HYDROCHLORIDE 2 MG: 2 CAPSULE ORAL at 07:58

## 2023-01-22 RX ADMIN — CIPROFLOXACIN HYDROCHLORIDE 250 MG: 250 TABLET, FILM COATED ORAL at 08:02

## 2023-01-22 ASSESSMENT — ACTIVITIES OF DAILY LIVING (ADL)
ADLS_ACUITY_SCORE: 48
ADLS_ACUITY_SCORE: 52
ADLS_ACUITY_SCORE: 48
ADLS_ACUITY_SCORE: 52
ADLS_ACUITY_SCORE: 48
ADLS_ACUITY_SCORE: 52

## 2023-01-22 NOTE — PROGRESS NOTES
Summary: Post-op day 4 (ureteroscopy,lithotripsy,ureteral stenting)  DATE & TIME: 1/21/23-1/22/23 7962-9040  Cognitive Concerns/ Orientation : A/O x4  BEHAVIOR & AGGRESSION TOOL COLOR: Green  CIWA SCORE: NA  ABNL VS/O2: VSS no RA  MOBILITY: up with lift. T/R.   PAIN MANAGMENT: Denies at this time  DIET: Reg  BOWEL/BLADDER: Waggoner (post-op management), Incontinent of bowel, Had 3 loose BM. Enteric panel in process.  ABNL LAB/BG: Na 133, calcium 8.5  DRAIN/DEVICES: Waggoner, PIV x2 SL  TELEMETRY RHYTHM: NA  SKIN: scattered bruises on arms and R armpit area, R face.Rash/crack/resdness to Abd. folds, applied antifungal powder.  TESTS/PROCEDURES: none this shift  D/C DAY/GOALS/PLACE: Pending. P/TOT recommend TCU  OTHER IMPORTANT INFO: Needs voiding trail prior to discharge date, per Urology note. PT, urology following.

## 2023-01-22 NOTE — PLAN OF CARE
Goal Outcome Evaluation:Summary: Post-op day 4 (ureteroscopy,lithotripsy,ureteral stenting)  DATE & TIME: -1/22/23 7022-5269  Cognitive Concerns/ Orientation : A/O x4  BEHAVIOR & AGGRESSION TOOL COLOR: Green  CIWA SCORE: NA  ABNL VS/O2: VSS no RA, BP elevated, started on PTA BP meds  MOBILITY: up with lift. T/R.in Bed.   PAIN MANAGMENT: Denies at this time  DIET: Reg  BOWEL/BLADDER: Waggoner (post-op management), Incontinent of bowel, given Imodium x 1 for loose stools.  ABNL LAB/BG: Na 133, calcium 8.5  DRAIN/DEVICES: Waggoner, PIV  SL  TELEMETRY RHYTHM: NA  SKIN: scattered bruises on arms and R armpit area, R face.Rash to Abd. folds, applied antifungal powder.  TESTS/PROCEDURES: none this shift  D/C DAY/GOALS/PLACE: Pending. PT/OT recommend TCU.  OTHER IMPORTANT INFO: Needs voiding trail prior to discharge date, per Urology note. PT/OT, Urology following.

## 2023-01-22 NOTE — PLAN OF CARE
Summary: Post-op day 3 (ureteroscopy,lithotripsy,ureteral stenting)  DATE & TIME: 1/21/23 4644-5437  Cognitive Concerns/ Orientation : A/O x4  BEHAVIOR & AGGRESSION TOOL COLOR: Green  CIWA SCORE: NA  ABNL VS/O2: VSS no RA  MOBILITY: up with lift. T/R.   PAIN MANAGMENT: Denies at this time  DIET: Reg  BOWEL/BLADDER: Waggoner (post-op management), Incontinent of bowel, loose stool. PRM imodium, stool sample in process.  ABNL LAB/BG: Na 133, calcium 8.5  DRAIN/DEVICES: Waggoner, PIV x2 SL  TELEMETRY RHYTHM: NA  SKIN: scattered bruises on arms and R armpit area, R face.Rash to Abd. folds, applied antifungal powder.  TESTS/PROCEDURES: Post-op day 3  D/C DAY/GOALS/PLACE: Pending   OTHER IMPORTANT INFO: Needs voiding trail prior to discharge date, per Urology note. PT, uro following

## 2023-01-22 NOTE — PROGRESS NOTES
[facility]    Internal Medicine Hospitalist Progress Note  01/22/2023  I evaluated patient on the above date.    Clint Rivera Jr., MD  [phone] (p)  Text Page  Vocera        Assessment & Plan New actions/orders today (01/22/2023) are underlined.    Mili Padilla is a 66 yo female with history including morbid obesity; MS with neurogenic bladder; HTN; HLD; OVIDIO; depression/anxiety; GERD; and nephrolithiasis; who underwent elective left ureteroscopy, lithotripsy and left ureteral stent on 1/18/2023. After the surgery, developed shaking chills with tachycardia and found with signs of UTI with septic shock. Treated with aggressive IVF's and broad antibiotics (meropenem and vancomycin) with subsequently improvement.        Nephrolithiasis s/p left ureteroscopy, lithotripsy and left ureteral stenting 1/18/2023.  - Post-op management per Urology.  - Waggoner management per Urology; plan voiding trial on day of discharge.  - Plan stent removal 2/9.    Severe sepsis of likely urinary source related to recent surgery/instrumentation, resolved.  Lactic acidosis due to above, resolved.  * Denied any infectious symptoms prior to procedure, particularly any urinary symptoms; developed shaking chills immediately post-op.  * After surgery 1/18, pt was tachy (140's), BP trended down ( --> 107 --> 79), temp trended up (97.8-- > 100.2, T-max 102.9) with lactate 4.3, WBC 19.1. Post-op CXR clear. Received 1L IVF and ceftriaxone in PACU (had received ciprofloxacin pre-op). CT CAP with contrast 1/18 showed no PE or acute airspace disease; multiple new prominent stones within the left kidney (from 8/2022), left ureter stent in place, persistent left-sided pelviectasis and mildly increasing edema of the left kidney; bubbles of gas in the bladder and proximal left collecting system noted, correlate with urinalysis to assess for urinary tract infection; few indeterminant stable small hypodensities in the liver,  nonurgent liver MRI recommended; cholelithiasis. Antibiotics broadened to meropenem and vancomycin 1/18 (noted single prior urine culture with Enterobacter cloacae resistant to ceftriaxone, though sensitive to ciprofloxacin which she received pre-op).   * Was transferred to the ICU for possible pressors, but did not need, improved with IVF's.  * BC's 1/18 NGTD. UA early am 1/19 showed WBC 95, large LE, +blood. UC 1/19 no growth.  * On 1/19, pt hemodynamically stable and lactate normalized; ready to transfer out of the ICU.  * On 1/20, vanco and meropenem discontinued and started on ciprofloxacin. Echo 1/20 showed LVEF 65%; RV OK; no valve disease.  * On 1/21, WBC normalized.  Recent Labs   Lab 01/22/23  0918 01/21/23  1206 01/20/23  0503 01/19/23  1900 01/19/23  1148 01/19/23  0640 01/18/23  2113 01/18/23  1925 01/18/23  1618 01/18/23  1414 01/18/23  1309   WBC 8.9 9.4 11.8*  --   --  20.8* 19.1*  --   --  6.8  --    LACT  --   --   --  1.1 1.7 2.1* 3.8* 4.0* 4.2*  --  4.3*   - Continue ciprofloxacin (started 1/20) for 2 weeks total to stop after 2/2/2023.  - Continue to monitor cultures.    Loose stools/diarrhea, suspect from antibiotic side effect.  * On 1/21, pt c/o loose stools/diarrhea; notes some abdominal pain. Started lactobacillus. C. DIff toxin B 1/21 negative.  * Continues to have loose stools/diarrhea, but not large amounts.  - Continue lactobacillus TID with meals.  - Continue PRN loperamide.     Hyponatremia, suspect related to hypovolemia, other causes not ruled out.  * Sodium 130 on 1/18.  * On 1/20, despite IVF's including NS and LR, sodium continued to be low. IVF's discontinued.  * On 1/21, sodium improved.  Recent Labs   Lab 01/22/23  0918 01/21/23  1206 01/20/23  0503 01/19/23  0640 01/18/23  2113 01/18/23  1414   * 133* 128* 132* 127* 130*   - Continue to monitor BMP - repeat in am.    Mild LFT abnormalities, question related to sepsis.  * On 1/18, noted to have AST of 43, total bilirubin  1.4, remainder of LFTs unremarkable. Had CT CAP 1/18 as above.  * LFT's improved 1/20.  Recent Labs   Lab 01/20/23  0503 01/19/23  0039 01/18/23 2113 01/18/23  1414   ALBUMIN 3.1*  --  3.3* 3.2*   BILITOTAL 0.9  --  1.4* 1.0   ALT 32  --  33 24   AST 38*  --  43* 29   ALKPHOS 73  --  64 89   PROTEIN  --  30*  --   --    - Continue to monitor LFT's periodically.    Thrombocytopenia, unclear etiology, possibly medication effect or chronic from other cause.  * Plts low on 107K 1/18 afternoon.  Recent Labs   Lab 01/22/23  0918 01/21/23  1206 01/20/23  0503 01/19/23  0640 01/18/23 2113 01/18/23  1414   PLT 79* 54* 54* 81* 91* 107*   - Monitor CBC - repeat in am.  - Consider platelet transfusion if needs a procedure and less than 50,000; or if less than 10,000.    Anemia, suspect chronic component.  * Hgb 11.3 on 1/18. No overt clinical signs of major bleeding.  Recent Labs   Lab 01/22/23  0918 01/21/23  1206 01/20/23  0503 01/19/23  0640 01/18/23 2113 01/18/23  1414   HGB 11.1* 10.8* 10.5* 11.1* 11.3* 11.4*   - Monitor CBC.    Hypertension (benign essential).  [PTA: losartan 50 mg daily; verapamil  mg BID.]  * BP meds held 1/18 due to sepsis.  * Verapamil restarted 1/19.   * Echo 1/20 showed LVEF 65%; RV OK; no valve disease.  - Restart losartan.  - Continue verapamil.    Weakness and physical deconditioning due to multiple acute and chronic medical issues.  MS with neurogenic bladder.  * Lives independently with PCA services (twice daily); uses walker in home, motorized scooter.  * Waggoner in place post surgery.  * On 1/19, pt very weak, needing a lift. PT, OT ordered. PT recommended TCU.  - Waggoner management per Urology.  - Continue PTA trospium.  - Continue PT, OT.  - Suspect will need TCU; pt agreeable - consult SW.     HLD.  - Continue PTA atorvastatin.     Depression/anxiety.  - Continue PTA aripiprazole; carbamazepine, duloxetine, mirtazapine, trazodone.     GERD  - Continue PTA famotidine.    OVIDIO.  * Noted  "does not use/non-compliant with CPAP.  - Follow-up outpatient.    Severe obesity.  Body mass index is 47.68 kg/m .  - Needs to pursue aggressive dietary and lifestyle modifications.    Clinically Significant Risk Factors              # Hypoalbuminemia: Lowest albumin = 3.1 g/dL at 1/20/2023  5:03 AM, will monitor as appropriate   # Thrombocytopenia: Lowest platelets = 54 in last 2 days, will monitor for bleeding         # Severe Obesity: Estimated body mass index is 47.68 kg/m  as calculated from the following:    Height as of this encounter: 1.676 m (5' 6\").    Weight as of this encounter: 134 kg (295 lb 6.7 oz)., PRESENT ON ADMISSION           COVID-19 testing.  COVID-19 PCR Results    COVID-19 PCR Results   No data to display.         COVID-19 Antibody Results, Testing for Immunity    COVID-19 Antibody Results, Testing for Immunity   No data to display.             Diet: Diet  Regular Diet Adult    Prophylaxis: PCD's, ambulation.   Waggoner Catheter: PRESENT, indication: Retention  Lines: None     Code Status: Full Code    Disposition Plan   Expected discharge: Possibly tomorrow recommended to transitional care unit pending continued stability, Urology rec's.  Entered: Clint Rivera MD 01/22/2023, 1:01 PM         Interval History   No acute events overnight.  Has had 2 loose stools today.  None overnight.    -Data reviewed today: I reviewed all new labs and imaging over the last 24 hours. I personally reviewed no images or EKG's today.    Physical Exam    , Blood pressure 136/65, pulse 74, temperature 97.8  F (36.6  C), temperature source Oral, resp. rate 18, height 1.676 m (5' 6\"), weight 134 kg (295 lb 6.7 oz), last menstrual period 12/10/2010, SpO2 90 %, not currently breastfeeding. O2 Device: None (Room air)    Vitals:    01/18/23 0759 01/18/23 1857 01/20/23 0600   Weight: 132.6 kg (292 lb 6.4 oz) 136.2 kg (300 lb 4.3 oz) 134 kg (295 lb 6.7 oz)     Vital Signs with Ranges  Temp:  [97.8  F (36.6  C)-99.2 "  F (37.3  C)] 97.8  F (36.6  C)  Pulse:  [73-75] 74  Resp:  [18] 18  BP: (110-155)/(55-76) 136/65  SpO2:  [90 %-92 %] 90 %  Patient Vitals for the past 24 hrs:   BP Temp Temp src Pulse Resp SpO2   01/22/23 1209 136/65 -- -- 74 -- --   01/22/23 0741 (!) 155/76 97.8  F (36.6  C) Oral 73 18 90 %   01/22/23 0017 124/64 99.2  F (37.3  C) Axillary 74 18 92 %   01/21/23 1536 110/55 98.5  F (36.9  C) Axillary 75 18 92 %     I/O's Last 24 hours  I/O last 3 completed shifts:  In: 2120 [P.O.:2120]  Out: 4750 [Urine:4750]    Constitutional: Awake, alert, pleasant, conversant.  Respiratory: Diminished in bases. No crackles or wheezes.  Cardiovascular: RRR, no m/r/g.  GI: Less distended, obese, nt, +BS.  Skin/Integumen:  Other:        Data   Recent Labs   Lab 01/22/23  0918 01/21/23  1206 01/21/23  0533 01/20/23  0503 01/19/23  0640 01/18/23  2113   WBC 8.9 9.4  --  11.8*   < > 19.1*   HGB 11.1* 10.8*  --  10.5*   < > 11.3*   MCV 92 92  --  96   < > 96   PLT 79* 54*  --  54*   < > 91*   * 133*  --  128*   < > 127*   POTASSIUM 3.4 3.6  --  3.7   < > 4.7   CHLORIDE 97* 100  --  96*   < > 94*   CO2 28 26  --  26   < > 23   BUN 8.2 8.8  --  10.4   < > 12.9   CR 0.51 0.53  --  0.62   < > 0.95   ANIONGAP 7 7  --  6*   < > 10   HEAVENLY 8.6* 8.5*  --  7.9*   < > 7.9*   * 106* 123* 110*   < > 116*   ALBUMIN  --   --   --  3.1*  --  3.3*   PROTTOTAL  --   --   --  5.3*  --  5.3*   BILITOTAL  --   --   --  0.9  --  1.4*   ALKPHOS  --   --   --  73  --  64   ALT  --   --   --  32  --  33   AST  --   --   --  38*  --  43*    < > = values in this interval not displayed.     Recent Labs   Lab Test 01/22/23  0918 01/21/23  1206 01/21/23  0533 01/20/23  0503 01/19/23  0640   * 106* 123* 110* 123*     Recent Labs   Lab 01/22/23  0918 01/21/23  1206 01/20/23  0503 01/19/23  1900 01/19/23  1148 01/19/23  0640 01/18/23  2113 01/18/23  1925 01/18/23  1618 01/18/23  1414   WBC 8.9 9.4 11.8*  --   --  20.8* 19.1*  --   --  6.8   LACT  --    --   --  1.1 1.7 2.1* 3.8* 4.0* 4.2*  --          No results found for this or any previous visit (from the past 24 hour(s)).    Medications   All medications were reviewed.      ARIPiprazole  7.5 mg Oral Daily     atorvastatin  20 mg Oral Daily     carBAMazepine  200 mg Oral BID     ciprofloxacin  250 mg Oral Q12H KELLI (08/20)     DULoxetine  120 mg Oral Daily     fluticasone  2 spray Both Nostrils Daily     lactobacillus rhamnosus (GG)  1 capsule Oral BID     losartan  50 mg Oral Daily     miconazole   Topical BID     mirtazapine  75 mg Oral At Bedtime     sodium chloride (PF)  3 mL Intracatheter Q8H     traZODone  600 mg Oral At Bedtime     verapamil ER  120 mg Oral BID     acetaminophen, famotidine, loperamide, ondansetron **OR** ondansetron, prochlorperazine **OR** prochlorperazine **OR** prochlorperazine, sodium chloride (PF)

## 2023-01-23 ENCOUNTER — APPOINTMENT (OUTPATIENT)
Dept: OCCUPATIONAL THERAPY | Facility: CLINIC | Age: 68
DRG: 856 | End: 2023-01-23
Attending: UROLOGY
Payer: COMMERCIAL

## 2023-01-23 ENCOUNTER — APPOINTMENT (OUTPATIENT)
Dept: PHYSICAL THERAPY | Facility: CLINIC | Age: 68
DRG: 856 | End: 2023-01-23
Attending: UROLOGY
Payer: COMMERCIAL

## 2023-01-23 LAB
ANION GAP SERPL CALCULATED.3IONS-SCNC: 10 MMOL/L (ref 7–15)
BACTERIA BLD CULT: NO GROWTH
BASOPHILS # BLD AUTO: 0 10E3/UL (ref 0–0.2)
BASOPHILS NFR BLD AUTO: 0 %
BUN SERPL-MCNC: 8.8 MG/DL (ref 8–23)
CALCIUM SERPL-MCNC: 8.5 MG/DL (ref 8.8–10.2)
CHLORIDE SERPL-SCNC: 96 MMOL/L (ref 98–107)
CREAT SERPL-MCNC: 0.49 MG/DL (ref 0.51–0.95)
DEPRECATED HCO3 PLAS-SCNC: 26 MMOL/L (ref 22–29)
EOSINOPHIL # BLD AUTO: 0.2 10E3/UL (ref 0–0.7)
EOSINOPHIL NFR BLD AUTO: 2 %
ERYTHROCYTE [DISTWIDTH] IN BLOOD BY AUTOMATED COUNT: 11.9 % (ref 10–15)
GFR SERPL CREATININE-BSD FRML MDRD: >90 ML/MIN/1.73M2
GLUCOSE SERPL-MCNC: 102 MG/DL (ref 70–99)
HCT VFR BLD AUTO: 35 % (ref 35–47)
HGB BLD-MCNC: 11.6 G/DL (ref 11.7–15.7)
IMM GRANULOCYTES # BLD: 0.3 10E3/UL
IMM GRANULOCYTES NFR BLD: 3 %
LYMPHOCYTES # BLD AUTO: 1.5 10E3/UL (ref 0.8–5.3)
LYMPHOCYTES NFR BLD AUTO: 15 %
MCH RBC QN AUTO: 30.9 PG (ref 26.5–33)
MCHC RBC AUTO-ENTMCNC: 33.1 G/DL (ref 31.5–36.5)
MCV RBC AUTO: 93 FL (ref 78–100)
MONOCYTES # BLD AUTO: 0.9 10E3/UL (ref 0–1.3)
MONOCYTES NFR BLD AUTO: 9 %
NEUTROPHILS # BLD AUTO: 7.4 10E3/UL (ref 1.6–8.3)
NEUTROPHILS NFR BLD AUTO: 71 %
NRBC # BLD AUTO: 0 10E3/UL
NRBC BLD AUTO-RTO: 0 /100
PLATELET # BLD AUTO: 94 10E3/UL (ref 150–450)
POTASSIUM SERPL-SCNC: 3.4 MMOL/L (ref 3.4–5.3)
POTASSIUM SERPL-SCNC: 3.6 MMOL/L (ref 3.4–5.3)
RBC # BLD AUTO: 3.75 10E6/UL (ref 3.8–5.2)
SODIUM SERPL-SCNC: 132 MMOL/L (ref 136–145)
WBC # BLD AUTO: 10.4 10E3/UL (ref 4–11)

## 2023-01-23 PROCEDURE — 120N000001 HC R&B MED SURG/OB

## 2023-01-23 PROCEDURE — 80048 BASIC METABOLIC PNL TOTAL CA: CPT | Performed by: INTERNAL MEDICINE

## 2023-01-23 PROCEDURE — 85041 AUTOMATED RBC COUNT: CPT | Performed by: INTERNAL MEDICINE

## 2023-01-23 PROCEDURE — 250N000013 HC RX MED GY IP 250 OP 250 PS 637: Performed by: PHYSICIAN ASSISTANT

## 2023-01-23 PROCEDURE — 85014 HEMATOCRIT: CPT | Performed by: INTERNAL MEDICINE

## 2023-01-23 PROCEDURE — 97530 THERAPEUTIC ACTIVITIES: CPT | Mod: GP | Performed by: PHYSICAL THERAPIST

## 2023-01-23 PROCEDURE — 250N000013 HC RX MED GY IP 250 OP 250 PS 637: Performed by: INTERNAL MEDICINE

## 2023-01-23 PROCEDURE — 250N000013 HC RX MED GY IP 250 OP 250 PS 637: Performed by: UROLOGY

## 2023-01-23 PROCEDURE — 97535 SELF CARE MNGMENT TRAINING: CPT | Mod: GO

## 2023-01-23 PROCEDURE — 36415 COLL VENOUS BLD VENIPUNCTURE: CPT | Performed by: INTERNAL MEDICINE

## 2023-01-23 PROCEDURE — 97530 THERAPEUTIC ACTIVITIES: CPT | Mod: GO

## 2023-01-23 PROCEDURE — 99232 SBSQ HOSP IP/OBS MODERATE 35: CPT | Performed by: INTERNAL MEDICINE

## 2023-01-23 PROCEDURE — 84132 ASSAY OF SERUM POTASSIUM: CPT | Performed by: INTERNAL MEDICINE

## 2023-01-23 RX ORDER — POTASSIUM CHLORIDE 1500 MG/1
40 TABLET, EXTENDED RELEASE ORAL ONCE
Status: COMPLETED | OUTPATIENT
Start: 2023-01-23 | End: 2023-01-23

## 2023-01-23 RX ADMIN — CIPROFLOXACIN HYDROCHLORIDE 250 MG: 250 TABLET, FILM COATED ORAL at 21:19

## 2023-01-23 RX ADMIN — CIPROFLOXACIN HYDROCHLORIDE 250 MG: 250 TABLET, FILM COATED ORAL at 07:38

## 2023-01-23 RX ADMIN — MIRTAZAPINE 75 MG: 30 TABLET, FILM COATED ORAL at 22:42

## 2023-01-23 RX ADMIN — MICONAZOLE NITRATE: 2 POWDER TOPICAL at 21:19

## 2023-01-23 RX ADMIN — ACETAMINOPHEN 1000 MG: 500 TABLET ORAL at 16:03

## 2023-01-23 RX ADMIN — FLUTICASONE PROPIONATE 2 SPRAY: 50 SPRAY, METERED NASAL at 21:22

## 2023-01-23 RX ADMIN — CARBAMAZEPINE 200 MG: 200 TABLET ORAL at 21:19

## 2023-01-23 RX ADMIN — ARIPIPRAZOLE 7.5 MG: 15 TABLET ORAL at 09:50

## 2023-01-23 RX ADMIN — MICONAZOLE NITRATE: 2 POWDER TOPICAL at 09:54

## 2023-01-23 RX ADMIN — VERAPAMIL HYDROCHLORIDE 120 MG: 120 TABLET, FILM COATED, EXTENDED RELEASE ORAL at 09:51

## 2023-01-23 RX ADMIN — POTASSIUM CHLORIDE 40 MEQ: 1500 TABLET, EXTENDED RELEASE ORAL at 17:06

## 2023-01-23 RX ADMIN — ATORVASTATIN CALCIUM 20 MG: 20 TABLET, FILM COATED ORAL at 09:51

## 2023-01-23 RX ADMIN — LOPERAMIDE HYDROCHLORIDE 2 MG: 2 CAPSULE ORAL at 09:51

## 2023-01-23 RX ADMIN — CARBAMAZEPINE 200 MG: 200 TABLET ORAL at 09:50

## 2023-01-23 RX ADMIN — DULOXETINE 120 MG: 60 CAPSULE, DELAYED RELEASE ORAL at 09:51

## 2023-01-23 RX ADMIN — LOSARTAN POTASSIUM 50 MG: 50 TABLET, FILM COATED ORAL at 09:50

## 2023-01-23 RX ADMIN — LOPERAMIDE HYDROCHLORIDE 2 MG: 2 CAPSULE ORAL at 16:04

## 2023-01-23 RX ADMIN — Medication 1 CAPSULE: at 21:19

## 2023-01-23 RX ADMIN — Medication 1 CAPSULE: at 09:51

## 2023-01-23 RX ADMIN — VERAPAMIL HYDROCHLORIDE 120 MG: 120 TABLET, FILM COATED, EXTENDED RELEASE ORAL at 21:19

## 2023-01-23 RX ADMIN — TRAZODONE HYDROCHLORIDE 600 MG: 150 TABLET ORAL at 22:43

## 2023-01-23 ASSESSMENT — ACTIVITIES OF DAILY LIVING (ADL)
ADLS_ACUITY_SCORE: 56
ADLS_ACUITY_SCORE: 53
ADLS_ACUITY_SCORE: 57
ADLS_ACUITY_SCORE: 57
ADLS_ACUITY_SCORE: 53
ADLS_ACUITY_SCORE: 57
ADLS_ACUITY_SCORE: 57
ADLS_ACUITY_SCORE: 56
ADLS_ACUITY_SCORE: 57
ADLS_ACUITY_SCORE: 53

## 2023-01-23 NOTE — PROGRESS NOTES
Urology Brief Progress Note    67 year old female with left nephrolithiasis, POD #5 s/p left ureteroscopy, HLL, left stent placement with Dr. Coles. Admitted to ICU for post-op sepsis. Improving.  Afebrile. Out of ICU.        PLAN:     - TOV today, nursing communication placed.    - Monitor voids and PVR post hutchins removal. Goal is at least 2 voids with PVR <350 ml prior to discharge home.   - Continue broad spectrum abx - culture specific abx when avail, per IM. Dr. Coles recommends 14 day course PO antibiotics prior to stent removal in clinic. She is scheduled for stent removal on 2/9.   - Previously reviewed possible symptoms with indwelling stent including urgency/frequency of urination, intermittent flank discomfort especially during voids, and gross hematuria.       Addendum 11: 00 am  Patient seen. She is feeling improved in comparison to prior. Not having much flank or abdominal pain.   Hutchins was removed. She has been able to void. Unsure of what PVR was. She does not feel like she is having any retention.   No abdominal pain on exam. Resting in bed. Appears comfortable.   Discharge pending TCU placement. Okay to discharge from urology perspective once has placement and cleared by IM.     Urology will sign off for now. Please page on call provider with questions.     Sherry Link PA-C   [facility], a division [facility]  Office Phone: [phone]  After 4pm and on weekends, please call [phone].

## 2023-01-23 NOTE — PROGRESS NOTES
Summary: Post-op day 5 (ureteroscopy,lithotripsy,ureteral stenting)  DATE & TIME: -1/22/23-1/23/23 0261-5618  Cognitive Concerns/ Orientation : A/O x4  BEHAVIOR & AGGRESSION TOOL COLOR: Green  CIWA SCORE: NA  ABNL VS/O2: VSS no RA  MOBILITY: up with lift. Prefers to lat on her back. Education provided  PAIN MANAGMENT: Denies at this time  DIET: Reg  BOWEL/BLADDER: Waggoner (post-op management), Incontinent of bowel x3, given Imodium x 1 for loose stools.  ABNL LAB/BG: Na 132, calcium 8.6  DRAIN/DEVICES: Waggoner, PIV x2 SL  TELEMETRY RHYTHM: NA  SKIN: scattered bruises on arms and R armpit area, R face.Rash to Abd folds, applied antifungal powder, Blisters to L inner thigh  TESTS/PROCEDURES: none this shift  D/C DAY/GOALS/PLACE: Pending, possibly 1/23  PT/OT recommend TCU.  OTHER IMPORTANT INFO: Needs voiding trail prior to discharge date, per Urology note. PT/OT, Urology following.

## 2023-01-23 NOTE — PROGRESS NOTES
A/O x4. VSS except HTN. On RA. Waggoner in place and patent. Incont of bowel. Had BM x1. Blisters noted on her L thigh. Pt take med in whole with apple sauce.T/R. Assist of 2 with a lift. Denies pain and will cont to monitor.

## 2023-01-23 NOTE — PLAN OF CARE
Summary: Post-op day 5 (ureteroscopy,lithotripsy,ureteral stenting)  DATE & TIME: -1/23/23 9992-7685  Cognitive Concerns/ Orientation : A/O x4; hoarse speech  BEHAVIOR & AGGRESSION TOOL COLOR: Green  CIWA SCORE: NA  ABNL VS/O2: VSS no RA  MOBILITY: A2-lift  PAIN MANAGMENT: Denies  DIET: Reg. Takes pills whole with applesauce  BOWEL/BLADDER: Incont B/B. Waggoner removed; purewic in place; voiding adequately; PVR 0, 0. X3 loose BM today-PRN imodium x1   ABNL LAB/BG: Na 132  DRAIN/DEVICES: L-PIV-SL  TELEMETRY RHYTHM: NA  SKIN: scattered bruises - Arms and R armpit area, R face. Rash-pannus- antifungal applied. Abrasion L side pannus. Blisters to L inner thigh-mepilex CDI. Perianal bleeding   CMS: baseline numbness BLE and fingertips  TESTS/PROCEDURES: n/a   D/C DAY/GOALS/PLACE: Pending to TCU- SW following  OTHER IMPORTANT INFO: PT/OT, Urology following- okay to discharge from urology perspective.

## 2023-01-23 NOTE — PROGRESS NOTES
[facility]    Internal Medicine Hospitalist Progress Note  01/23/2023  I evaluated patient on the above date.    Clint Rivera Jr., MD  [phone] (p)  Text Page  Vocera        Assessment & Plan New actions/orders today (01/23/2023) are underlined.    Mili Padilla is a 66 yo female with history including morbid obesity; MS with neurogenic bladder; HTN; HLD; OVIDIO; depression/anxiety; GERD; and nephrolithiasis; who underwent elective left ureteroscopy, lithotripsy and left ureteral stent on 1/18/2023. After the surgery, developed shaking chills with tachycardia and found with signs of UTI with septic shock. Treated with aggressive IVF's and broad antibiotics (meropenem and vancomycin) with subsequently improvement.        Nephrolithiasis s/p left ureteroscopy, lithotripsy and left ureteral stenting 1/18/2023.  * Waggoner removed 1/22.  - Post-op management per Urology.  - Plan stent removal 2/9.    Severe sepsis of likely urinary source related to recent surgery/instrumentation, resolved.  Lactic acidosis due to above, resolved.  * Denied any infectious symptoms prior to procedure, particularly any urinary symptoms; developed shaking chills immediately post-op.  * After surgery 1/18, pt was tachy (140's), BP trended down ( --> 107 --> 79), temp trended up (97.8-- > 100.2, T-max 102.9) with lactate 4.3, WBC 19.1. Post-op CXR clear. Received 1L IVF and ceftriaxone in PACU (had received ciprofloxacin pre-op). CT CAP with contrast 1/18 showed no PE or acute airspace disease; multiple new prominent stones within the left kidney (from 8/2022), left ureter stent in place, persistent left-sided pelviectasis and mildly increasing edema of the left kidney; bubbles of gas in the bladder and proximal left collecting system noted, correlate with urinalysis to assess for urinary tract infection; few indeterminant stable small hypodensities in the liver, nonurgent liver MRI recommended; cholelithiasis.  Antibiotics broadened to meropenem and vancomycin 1/18 (noted single prior urine culture with Enterobacter cloacae resistant to ceftriaxone, though sensitive to ciprofloxacin which she received pre-op).   * Was transferred to the ICU for possible pressors, but did not need, improved with IVF's.  * BC's 1/18 NGTD. UA early am 1/19 showed WBC 95, large LE, +blood. UC 1/19 no growth.  * On 1/19, pt hemodynamically stable and lactate normalized; ready to transfer out of the ICU.  * On 1/20, vanco and meropenem discontinued and started on ciprofloxacin. Echo 1/20 showed LVEF 65%; RV OK; no valve disease.  * On 1/21, WBC normalized.  - Continue ciprofloxacin (started 1/20) for 2 weeks total to stop after 2/2/2023.  - Continue to monitor cultures.    Diarrhea, suspect from antibiotic side effect.  * On 1/21, pt c/o loose stools/diarrhea; notes some abdominal pain. Started lactobacillus. C. DIff toxin B 1/21 negative.  * Continues to have loose stools/diarrhea,  - Continue lactobacillus TID with meals.  - Continue PRN loperamide.     Hyponatremia, suspect related to hypovolemia, other causes not ruled out.  * Sodium 130 on 1/18.  * On 1/20, despite IVF's including NS and LR, sodium continued to be low. IVF's discontinued.  * On 1/21, sodium improved.  Recent Labs   Lab 01/22/23  0918 01/21/23  1206 01/20/23  0503 01/19/23  0640 01/18/23 2113 01/18/23  1414   * 133* 128* 132* 127* 130*   - Continue to monitor BMP - repeat in am..    Mild LFT abnormalities, question related to sepsis.  * On 1/18, noted to have AST of 43, total bilirubin 1.4, remainder of LFTs unremarkable. Had CT CAP 1/18 as above.  * LFT's improved 1/20.  Recent Labs   Lab 01/20/23  0503 01/19/23  0039 01/18/23 2113 01/18/23  1414   ALBUMIN 3.1*  --  3.3* 3.2*   BILITOTAL 0.9  --  1.4* 1.0   ALT 32  --  33 24   AST 38*  --  43* 29   ALKPHOS 73  --  64 89   PROTEIN  --  30*  --   --    - Continue to monitor LFT's periodically.    Thrombocytopenia,  unclear etiology, possibly medication effect or chronic from other cause.  * Plts low on 107K 1/18 afternoon.  * Jacek of 54K on 1/20 and 1/21.  * Plts improved 1/22.  Recent Labs   Lab 01/22/23  0918 01/21/23  1206 01/20/23  0503 01/19/23  0640 01/18/23  2113 01/18/23  1414   PLT 79* 54* 54* 81* 91* 107*   - Monitor CBC - repeat in am.  - Consider platelet transfusion if needs a procedure and less than 50,000; or if less than 10,000.    Anemia, suspect chronic component.  * Hgb 11.3 on 1/18. No overt clinical signs of major bleeding.  Recent Labs   Lab 01/22/23  0918 01/21/23  1206 01/20/23  0503 01/19/23  0640 01/18/23 2113 01/18/23  1414   HGB 11.1* 10.8* 10.5* 11.1* 11.3* 11.4*   - Monitor CBC.    Hypertension (benign essential).  [PTA: losartan 50 mg daily; verapamil  mg BID.]  * BP meds held 1/18 due to sepsis.  * Verapamil restarted 1/19.   * Echo 1/20 showed LVEF 65%; RV OK; no valve disease.  * Restarted losartan 1/22.  - Continue losartan, verapamil.    Weakness and physical deconditioning due to multiple acute and chronic medical issues.  MS with neurogenic bladder.  * Lives independently with PCA services (twice daily); uses walker in home, motorized scooter.  * Waggoner in place post surgery.  * On 1/19, pt very weak, needing a lift. PT, OT ordered. PT recommended TCU.  - Waggoner management per Urology.  - Continue PTA trospium.  - Continue PT, OT.  - Suspect will need TCU; pt agreeable.     HLD.  - Continue PTA atorvastatin.     Depression/anxiety.  - Continue PTA aripiprazole; carbamazepine, duloxetine, mirtazapine, trazodone.     GERD  - Continue PTA famotidine.    OVIDIO.  * Noted does not use/non-compliant with CPAP.  - Follow-up outpatient.    Severe obesity.  Body mass index is 47.68 kg/m .  - Needs to pursue aggressive dietary and lifestyle modifications.    Clinically Significant Risk Factors              # Hypoalbuminemia: Lowest albumin = 3.1 g/dL at 1/20/2023  5:03 AM, will monitor as  "appropriate   # Thrombocytopenia: Lowest platelets = 54 in last 2 days, will monitor for bleeding         # Severe Obesity: Estimated body mass index is 47.68 kg/m  as calculated from the following:    Height as of this encounter: 1.676 m (5' 6\").    Weight as of this encounter: 134 kg (295 lb 6.7 oz).            COVID-19 testing.  COVID-19 PCR Results    COVID-19 PCR Results   No data to display.         COVID-19 Antibody Results, Testing for Immunity    COVID-19 Antibody Results, Testing for Immunity   No data to display.             Diet: Diet  Regular Diet Adult    Prophylaxis: PCD's, ambulation.   Waggoner Catheter: Not present  Lines: None     Code Status: Full Code    Disposition Plan   Expected discharge: Recommended to transitional care unit pending continued stability, Urology rec's, TCU found.  Entered: Clint Rivera MD 01/23/2023, 9:51 AM         Interval History   Continues to have loose stools/diarrhea.  Had 8 BM's yesterday.  3 so far today.    -Data reviewed today: I reviewed all new labs and imaging over the last 24 hours. I personally reviewed no images or EKG's today.    Physical Exam    , Blood pressure (!) 151/70, pulse 70, temperature 98.6  F (37  C), temperature source Oral, resp. rate 17, height 1.676 m (5' 6\"), weight 134 kg (295 lb 6.7 oz), last menstrual period 12/10/2010, SpO2 93 %, not currently breastfeeding. O2 Device: None (Room air)    Vitals:    01/18/23 0759 01/18/23 1857 01/20/23 0600   Weight: 132.6 kg (292 lb 6.4 oz) 136.2 kg (300 lb 4.3 oz) 134 kg (295 lb 6.7 oz)     Vital Signs with Ranges  Temp:  [97.8  F (36.6  C)-98.6  F (37  C)] 98.6  F (37  C)  Pulse:  [70-74] 70  Resp:  [16-18] 17  BP: (133-151)/(61-70) 151/70  SpO2:  [90 %-93 %] 93 %  Patient Vitals for the past 24 hrs:   BP Temp Temp src Pulse Resp SpO2   01/23/23 0803 (!) 151/70 98.6  F (37  C) Oral 70 17 93 %   01/23/23 0023 133/61 97.8  F (36.6  C) Axillary 74 16 90 %   01/22/23 1600 (!) 145/69 98.3  F (36.8  C) " Oral 73 18 91 %   01/22/23 1209 136/65 -- -- 74 -- --     I/O's Last 24 hours  I/O last 3 completed shifts:  In: 1480 [P.O.:1480]  Out: 4725 [Urine:4725]    Constitutional: Awake, alert, pleasant, conversant.  Respiratory: Diminished in bases. No crackles or wheezes.  Cardiovascular: RRR, no m/r/g.  GI: Mild distension, obese, nt, +BS.  Skin/Integumen:  Other:        Data   Recent Labs   Lab 01/22/23 0918 01/21/23  1206 01/21/23  0533 01/20/23  0503 01/19/23  0640 01/18/23  2113   WBC 8.9 9.4  --  11.8*   < > 19.1*   HGB 11.1* 10.8*  --  10.5*   < > 11.3*   MCV 92 92  --  96   < > 96   PLT 79* 54*  --  54*   < > 91*   * 133*  --  128*   < > 127*   POTASSIUM 3.4 3.6  --  3.7   < > 4.7   CHLORIDE 97* 100  --  96*   < > 94*   CO2 28 26  --  26   < > 23   BUN 8.2 8.8  --  10.4   < > 12.9   CR 0.51 0.53  --  0.62   < > 0.95   ANIONGAP 7 7  --  6*   < > 10   HEAVENLY 8.6* 8.5*  --  7.9*   < > 7.9*   * 106* 123* 110*   < > 116*   ALBUMIN  --   --   --  3.1*  --  3.3*   PROTTOTAL  --   --   --  5.3*  --  5.3*   BILITOTAL  --   --   --  0.9  --  1.4*   ALKPHOS  --   --   --  73  --  64   ALT  --   --   --  32  --  33   AST  --   --   --  38*  --  43*    < > = values in this interval not displayed.     Recent Labs   Lab Test 01/22/23 0918 01/21/23  1206 01/21/23  0533 01/20/23  0503 01/19/23  0640   * 106* 123* 110* 123*     Recent Labs   Lab 01/22/23  0918 01/21/23  1206 01/20/23  0503 01/19/23  1900 01/19/23  1148 01/19/23  0640 01/18/23  2113 01/18/23  1925 01/18/23  1618 01/18/23  1414   WBC 8.9 9.4 11.8*  --   --  20.8* 19.1*  --   --  6.8   LACT  --   --   --  1.1 1.7 2.1* 3.8* 4.0* 4.2*  --          No results found for this or any previous visit (from the past 24 hour(s)).    Medications   All medications were reviewed.      ARIPiprazole  7.5 mg Oral Daily     atorvastatin  20 mg Oral Daily     carBAMazepine  200 mg Oral BID     ciprofloxacin  250 mg Oral Q12H [facility] (08/20)     DULoxetine  120 mg Oral  Daily     fluticasone  2 spray Both Nostrils Daily     lactobacillus rhamnosus (GG)  1 capsule Oral BID     losartan  50 mg Oral Daily     miconazole   Topical BID     mirtazapine  75 mg Oral At Bedtime     sodium chloride (PF)  3 mL Intracatheter Q8H     traZODone  600 mg Oral At Bedtime     verapamil ER  120 mg Oral BID     acetaminophen, famotidine, loperamide, ondansetron **OR** ondansetron, prochlorperazine **OR** prochlorperazine **OR** prochlorperazine, sodium chloride (PF)

## 2023-01-23 NOTE — CONSULTS
Care Management Follow Up    Length of Stay (days): 4    Expected Discharge Date: 01/24/2023     Concerns to be Addressed: TCU placement   Patient plan of care discussed at interdisciplinary rounds: Yes    Anticipated Discharge Disposition: Transitional Care     Anticipated Discharge Services: Transportation Services  Anticipated Discharge DME: None    Patient/family educated on Medicare website which has current facility and service quality ratings: yes   Education Provided on the Discharge Plan:  yes  Patient/Family in Agreement with the Plan: yes    Referrals Placed by CM/SW: TCUs  Private pay costs discussed: Not applicable    Additional Information:  Please see Care Management consult note from 1/19/23 for more information.  Care Coordinator met with patient this AM concerning therapy recommendations for a TCU and no referrals had been put out yet.  Patient is in agreement for a TCU.  She was provided with a list of facilities.  Her choices are 1:[facility]; 2. [facility]; 3.[facility] and referrals have been placed.    Care Management will continue to follow and are aware patient is medically stable for hospital discharge.      Aisha Trejo, RN   Inpatient Care Management  [phone]

## 2023-01-24 ENCOUNTER — APPOINTMENT (OUTPATIENT)
Dept: PHYSICAL THERAPY | Facility: CLINIC | Age: 68
DRG: 856 | End: 2023-01-24
Attending: UROLOGY
Payer: COMMERCIAL

## 2023-01-24 ENCOUNTER — APPOINTMENT (OUTPATIENT)
Dept: OCCUPATIONAL THERAPY | Facility: CLINIC | Age: 68
DRG: 856 | End: 2023-01-24
Attending: UROLOGY
Payer: COMMERCIAL

## 2023-01-24 LAB
ANION GAP SERPL CALCULATED.3IONS-SCNC: 10 MMOL/L (ref 7–15)
BASOPHILS # BLD MANUAL: 0.1 10E3/UL (ref 0–0.2)
BASOPHILS NFR BLD MANUAL: 1 %
BUN SERPL-MCNC: 7.9 MG/DL (ref 8–23)
CALCIUM SERPL-MCNC: 8.7 MG/DL (ref 8.8–10.2)
CHLORIDE SERPL-SCNC: 95 MMOL/L (ref 98–107)
CREAT SERPL-MCNC: 0.53 MG/DL (ref 0.51–0.95)
DEPRECATED HCO3 PLAS-SCNC: 30 MMOL/L (ref 22–29)
EOSINOPHIL # BLD MANUAL: 0.6 10E3/UL (ref 0–0.7)
EOSINOPHIL NFR BLD MANUAL: 6 %
ERYTHROCYTE [DISTWIDTH] IN BLOOD BY AUTOMATED COUNT: 12.1 % (ref 10–15)
GFR SERPL CREATININE-BSD FRML MDRD: >90 ML/MIN/1.73M2
GLUCOSE SERPL-MCNC: 118 MG/DL (ref 70–99)
HCT VFR BLD AUTO: 36.9 % (ref 35–47)
HGB BLD-MCNC: 12.5 G/DL (ref 11.7–15.7)
LYMPHOCYTES # BLD MANUAL: 1.2 10E3/UL (ref 0.8–5.3)
LYMPHOCYTES NFR BLD MANUAL: 12 %
MCH RBC QN AUTO: 31.6 PG (ref 26.5–33)
MCHC RBC AUTO-ENTMCNC: 33.9 G/DL (ref 31.5–36.5)
MCV RBC AUTO: 93 FL (ref 78–100)
METAMYELOCYTES # BLD MANUAL: 0.1 10E3/UL
METAMYELOCYTES NFR BLD MANUAL: 1 %
MONOCYTES # BLD MANUAL: 0.6 10E3/UL (ref 0–1.3)
MONOCYTES NFR BLD MANUAL: 6 %
NEUTROPHILS # BLD MANUAL: 7.5 10E3/UL (ref 1.6–8.3)
NEUTROPHILS NFR BLD MANUAL: 74 %
PLAT MORPH BLD: ABNORMAL
PLATELET # BLD AUTO: 120 10E3/UL (ref 150–450)
POTASSIUM SERPL-SCNC: 3.6 MMOL/L (ref 3.4–5.3)
RBC # BLD AUTO: 3.96 10E6/UL (ref 3.8–5.2)
RBC MORPH BLD: ABNORMAL
SODIUM SERPL-SCNC: 135 MMOL/L (ref 136–145)
WBC # BLD AUTO: 10.1 10E3/UL (ref 4–11)

## 2023-01-24 PROCEDURE — 93010 ELECTROCARDIOGRAM REPORT: CPT | Performed by: INTERNAL MEDICINE

## 2023-01-24 PROCEDURE — 250N000013 HC RX MED GY IP 250 OP 250 PS 637: Performed by: STUDENT IN AN ORGANIZED HEALTH CARE EDUCATION/TRAINING PROGRAM

## 2023-01-24 PROCEDURE — 250N000013 HC RX MED GY IP 250 OP 250 PS 637: Performed by: PHYSICIAN ASSISTANT

## 2023-01-24 PROCEDURE — 250N000013 HC RX MED GY IP 250 OP 250 PS 637: Performed by: UROLOGY

## 2023-01-24 PROCEDURE — 93005 ELECTROCARDIOGRAM TRACING: CPT

## 2023-01-24 PROCEDURE — 97530 THERAPEUTIC ACTIVITIES: CPT | Mod: GP

## 2023-01-24 PROCEDURE — 97110 THERAPEUTIC EXERCISES: CPT | Mod: GO | Performed by: OCCUPATIONAL THERAPIST

## 2023-01-24 PROCEDURE — 97535 SELF CARE MNGMENT TRAINING: CPT | Mod: GO | Performed by: OCCUPATIONAL THERAPIST

## 2023-01-24 PROCEDURE — 36415 COLL VENOUS BLD VENIPUNCTURE: CPT | Performed by: INTERNAL MEDICINE

## 2023-01-24 PROCEDURE — 85007 BL SMEAR W/DIFF WBC COUNT: CPT | Performed by: INTERNAL MEDICINE

## 2023-01-24 PROCEDURE — 99232 SBSQ HOSP IP/OBS MODERATE 35: CPT | Performed by: STUDENT IN AN ORGANIZED HEALTH CARE EDUCATION/TRAINING PROGRAM

## 2023-01-24 PROCEDURE — 85027 COMPLETE CBC AUTOMATED: CPT | Performed by: INTERNAL MEDICINE

## 2023-01-24 PROCEDURE — 80048 BASIC METABOLIC PNL TOTAL CA: CPT | Performed by: INTERNAL MEDICINE

## 2023-01-24 PROCEDURE — 120N000001 HC R&B MED SURG/OB

## 2023-01-24 PROCEDURE — 250N000013 HC RX MED GY IP 250 OP 250 PS 637: Performed by: INTERNAL MEDICINE

## 2023-01-24 RX ORDER — CIPROFLOXACIN 250 MG/1
250 TABLET, FILM COATED ORAL EVERY 12 HOURS
Qty: 18 TABLET | Refills: 0 | DISCHARGE
Start: 2023-01-24 | End: 2023-02-02

## 2023-01-24 RX ADMIN — FLUTICASONE PROPIONATE 2 SPRAY: 50 SPRAY, METERED NASAL at 20:01

## 2023-01-24 RX ADMIN — MELATONIN 5 MG TABLET 5 MG: at 21:59

## 2023-01-24 RX ADMIN — CARBAMAZEPINE 200 MG: 200 TABLET ORAL at 08:18

## 2023-01-24 RX ADMIN — CIPROFLOXACIN HYDROCHLORIDE 250 MG: 250 TABLET, FILM COATED ORAL at 20:00

## 2023-01-24 RX ADMIN — CARBAMAZEPINE 200 MG: 200 TABLET ORAL at 20:00

## 2023-01-24 RX ADMIN — LOSARTAN POTASSIUM 50 MG: 50 TABLET, FILM COATED ORAL at 08:19

## 2023-01-24 RX ADMIN — LOPERAMIDE HYDROCHLORIDE 2 MG: 2 CAPSULE ORAL at 16:27

## 2023-01-24 RX ADMIN — MIRTAZAPINE 75 MG: 30 TABLET, FILM COATED ORAL at 21:59

## 2023-01-24 RX ADMIN — MICONAZOLE NITRATE: 2 POWDER TOPICAL at 20:00

## 2023-01-24 RX ADMIN — ARIPIPRAZOLE 7.5 MG: 15 TABLET ORAL at 08:18

## 2023-01-24 RX ADMIN — ATORVASTATIN CALCIUM 20 MG: 20 TABLET, FILM COATED ORAL at 08:19

## 2023-01-24 RX ADMIN — CIPROFLOXACIN HYDROCHLORIDE 250 MG: 250 TABLET, FILM COATED ORAL at 08:18

## 2023-01-24 RX ADMIN — TRAZODONE HYDROCHLORIDE 600 MG: 150 TABLET ORAL at 21:59

## 2023-01-24 RX ADMIN — DULOXETINE 120 MG: 60 CAPSULE, DELAYED RELEASE ORAL at 08:18

## 2023-01-24 RX ADMIN — VERAPAMIL HYDROCHLORIDE 120 MG: 120 TABLET, FILM COATED, EXTENDED RELEASE ORAL at 08:18

## 2023-01-24 RX ADMIN — LOPERAMIDE HYDROCHLORIDE 2 MG: 2 CAPSULE ORAL at 08:23

## 2023-01-24 RX ADMIN — Medication 1 CAPSULE: at 08:18

## 2023-01-24 RX ADMIN — VERAPAMIL HYDROCHLORIDE 120 MG: 120 TABLET, FILM COATED, EXTENDED RELEASE ORAL at 20:00

## 2023-01-24 RX ADMIN — Medication 1 CAPSULE: at 20:00

## 2023-01-24 RX ADMIN — LOPERAMIDE HYDROCHLORIDE 2 MG: 2 CAPSULE ORAL at 22:03

## 2023-01-24 RX ADMIN — ACETAMINOPHEN 1000 MG: 500 TABLET ORAL at 16:30

## 2023-01-24 RX ADMIN — MICONAZOLE NITRATE: 2 POWDER TOPICAL at 08:23

## 2023-01-24 ASSESSMENT — ACTIVITIES OF DAILY LIVING (ADL)
ADLS_ACUITY_SCORE: 52
ADLS_ACUITY_SCORE: 46
ADLS_ACUITY_SCORE: 52
ADLS_ACUITY_SCORE: 56
ADLS_ACUITY_SCORE: 52
ADLS_ACUITY_SCORE: 46
ADLS_ACUITY_SCORE: 52
ADLS_ACUITY_SCORE: 56
ADLS_ACUITY_SCORE: 56

## 2023-01-24 NOTE — PLAN OF CARE
Goal Outcome Evaluation:         Summary: Post-op day 6 (ureteroscopy,lithotripsy,ureteral stenting)  DATE & TIME: 1/23/23-1/24/23 [id number]  Cognitive Concerns/ Orientation : A/O x4;   BEHAVIOR & AGGRESSION TOOL COLOR: Green  CIWA SCORE: NA  ABNL VS/O2: VSS on RA  MOBILITY: Assist x2-lift   PAIN MANAGMENT: Denied pain overnight   DIET: Reg. Takes larger pills w/applesauce  BOWEL/BLADDER: Incont B/B. Purewick in place; 1 BM this shift   ABNL LAB/BG: Na 132, K 3.6  DRAIN/DEVICES: L-PIV-SL  TELEMETRY RHYTHM: NA  SKIN: scattered bruising - Redness/rash to pannus/abd. Fold and moisture to breast  Abrasion L side pannus. Blister to L inner thigh-mepilex CDI. No s/s of perianal bleeding - fissure -  mepilex in place.   CMS: baseline numbness to BLE - baseline tingling to fingertips  TESTS/PROCEDURES: n/a   D/C DAY/GOALS/PLACE: Pending to TCU- SW following  OTHER IMPORTANT INFO: PT/OT, Urology following- okay to discharge from urology perspective.

## 2023-01-24 NOTE — PLAN OF CARE
Goal Outcome Evaluation:    Summary: Post-op day 5 (ureteroscopy,lithotripsy,ureteral stenting)  DATE & TIME: -1/23/23 0461-4743  Cognitive Concerns/ Orientation : A/O x4;   BEHAVIOR & AGGRESSION TOOL COLOR: Green  CIWA SCORE: NA  ABNL VS/O2: VSS on RA  MOBILITY: A2-lift - was up in chair for dinner   PAIN MANAGMENT: C/O intercostal pain received tylenol x1 with much relief  DIET: Reg. Takes larger pills w/applesauce  BOWEL/BLADDER: Incont B/B. Waggoner removed; purewic in place; voiding adequately; No BM this shift -PRN imodium x1 per pt request d/t frequent BM in AM  ABNL LAB/BG: Na 132, K replaced 3.6  DRAIN/DEVICES: L-PIV-SL  TELEMETRY RHYTHM: NA  SKIN: scattered bruising - Redness/rash to pannus/abd. Fold and moisture to breast  Abrasion L side pannus. Blister to L inner thigh-mepilex CDI. No s/s of perianal bleeding - fissure -  mepilex in place.   CMS: baseline numbness to BLE - baseline tingling to fingertips  TESTS/PROCEDURES: n/a   D/C DAY/GOALS/PLACE: Pending to TCU- SW following  OTHER IMPORTANT INFO: PT/OT, Urology following- okay to discharge from urology perspective.

## 2023-01-24 NOTE — PROGRESS NOTES
[facility]    Medicine Progress Note - Hospitalist Service    Date of Admission:  1/18/2023    Assessment & Plan    Patient is a 67-year-old female with a history of morbid obesity, multiple sclerosis with neurogenic bladder, hypertension, hyperlipidemia, depression anxiety, GERD, nephrolithiasis.  Patient underwent elective left ureteroscopy, lithotripsy and left ureteral stent on and currently her sodium is 135.  After surgery patient developed tachycardia and chills and found to have sepsis. with source being UTI.  Patient treated with IV fluids and broad-spectrum antibiotics including Vanco and meropenem with subsequent improvement.          # Nephrolithiasis s/p left ureteroscopy and lithotripsy and left ureteral stenting  Her Waggoner's catheter was removed on January 22.  Postop management as per urology.  Currently she has a pure wick catheter.  Plan is for stent removal on 2/9        #Severe sepsis  #Lactic acidosis  #UTI related to surgery/instrumentation  *Patient developed chills and urinary tract symptoms postop.  She is found to be septic she received 1 L of IV fluids and ceftriaxone in the PACU and had received ciprofloxacin preop.        Her antibiotics were broadened to meropenem and vancomycin. ( Prior records her prior urine cultures grew Enterobacter cloacae resistant to ceftriaxone but sensitive to ciprofloxacin)      *Was transferred to the ICU for possible pressors.  She did not require any pressors improved with antibiotics and  fluid management.  Patient's lactate normalized and on Jan 19 was transferred out of the ICU  Her antibiotics were narrowed down to ciprofloxacin.  Echocardiogram done showed an EF of 65% with normal right ventricle with no valvular disease.  January 21-WBC normalized  1/ currently she is on ciprofloxacin and awaiting placement to TCU.  I reviewed all her cultures with no growth so far      #Diarrhea  Likely due to antibiotics.  Her  Clostridium dificie On 1/21 was negative  Currently she is still having diarrhea continues to be on probiotics.  Continue as needed Imodium.    #Hyponatremia  Etiology likely due to hypovolemic hyponatremia  Sodium 135 today.  We will stop checking BMP.    #Mild LFT abnormalities  Likely due to sepsis  Her LFTs have improved.  Will recheck another CMP in a week's time.    #Thrombocytopenia  Likely due to underlying sepsis.  1/21 platelet count is 5  1/24 platelet count is 120.    #Anemia hemoglobin was 11.3 on 1/18        No active bleeding and hemoglobin today 12.5.       #Hypertension  Home blood pressure medications were held due to sepsis.  Xarelto was started on January 19 and losartan was restarted on January 22  Echocardiogram done showed an EF of 65%      #Weakness physical deconditioning due to multiple acute and chronic medical issues  #Multiple sclerosis with neurogenic bladder    Lives independently in an apartment and has PCA service twice daily.  Patient has been seen by physical and Occupational Therapy recommending her TCU stay.  Patient medically stable awaiting placement    #Hyperlipidemia  Continue atorvastatin this is a home med     #depression/anxiety    Continue PTA Abilify, duloxetine, Remeron, trazodone and carbamazepine.    #GERD  Continue PTA famotidine    #OVIDIO  Noncompliant with CPAP  Follow-up with outpatient    #Severe obesity  BMI of 47.68  Will encourage lifestyle modifications         Diet: Diet  Regular Diet Adult    DVT Prophylaxis: Pneumatic Compression Devices  Waggoner Catheter: Not present  Lines: None     Cardiac Monitoring: None  Code Status: Full Code      Clinically Significant Risk Factors              # Hypoalbuminemia: Lowest albumin = 3.1 g/dL at 1/20/2023  5:03 AM, will monitor as appropriate   # Thrombocytopenia: Lowest platelets = 94 in last 2 days, will monitor for bleeding         # Severe Obesity: Estimated body mass index is 47.68 kg/m  as calculated from the  "following:    Height as of this encounter: 1.676 m (5' 6\").    Weight as of this encounter: 134 kg (295 lb 6.7 oz).          Disposition Plan      Expected Discharge Date: 01/25/2023    Discharge Delays: *Medically Ready for Discharge  Placement - TCU  Destination: home with help/services;inpatient rehabilitation facility            Arsenio Enrique MD  Hospitalist Service  [facility]  Securely message with [employer] (more info)  Text page via [employer] Paging/Directory   ______________________________________________________________________    Interval History   Patient seen and examined at bedside.  No acute overnight events.  Currently she does not have any complaints.  She is working with physical therapy and Occupational Therapy.  I did an EKG this morning and then discussed about her QTC and will monitor QTC as she is on ciprofloxacin.    Physical Exam   Vital Signs: Temp: 97.9  F (36.6  C) Temp src: Oral BP: (!) 154/81 Pulse: 70   Resp: 17 SpO2: 94 % O2 Device: None (Room air)    Weight: 295 lbs 6.66 oz    Physical Exam  Cardiovascular:      Rate and Rhythm: Normal rate and regular rhythm.   Pulmonary:      Effort: Pulmonary effort is normal. No respiratory distress.      Breath sounds: Normal breath sounds.   Abdominal:      General: There is no distension.      Palpations: Abdomen is soft.      Tenderness: There is no abdominal tenderness.         Medical Decision Making       Data     I have personally reviewed the following data over the past 24 hrs:    10.1  \   12.5   / 120 (L)     135 (L) 95 (L) 7.9 (L) /  118 (H)   3.6 30 (H) 0.53 \       "

## 2023-01-24 NOTE — DISCHARGE SUMMARY
[facility]  Hospitalist Discharge Summary      Date of Admission:  1/18/2023  Date of Discharge:  1/25/2023  Discharging Provider: Arsenio Enrique MD  Discharge Service: Hospitalist Service    Discharge Diagnoses   Sepsis secondary to UTI    Follow-ups Needed After Discharge   Follow-up Appointments     Follow Up and recommended labs and tests      Follow up with [facility]iary physician.  The following labs/tests are   recommended: cbc, bmp and ekg to look at qtc.  Follow with urology on 2/9         Follow-up and recommended labs and tests       Follow-up in 2 weeks for stent removal           Unresulted Labs Ordered in the Past 30 Days of this Admission     No orders found from 12/19/2022 to 1/19/2023.          Discharge Disposition   Discharged to short-term care facility  Condition at discharge: Stable  Patient ready to discharge to a skilled nursing facility  Hospital Course      Patient is a 67-year-old female with a history of morbid obesity, multiple sclerosis with neurogenic bladder, hypertension, hyperlipidemia, depression anxiety, GERD, nephrolithiasis.  Patient underwent elective left ureteroscopy, lithotripsy and left ureteral stent on and currently her sodium is 135.  After surgery patient developed tachycardia and chills and found to have sepsis. with source being UTI.  Patient treated with IV fluids and broad-spectrum antibiotics including Vanco and meropenem with subsequent improvement.             # Nephrolithiasis s/p left ureteroscopy and lithotripsy and left ureteral stenting  Her Waggoner's catheter was removed on January 22.      Plan is for stent removal on 2/9 and outpatient urology          #Severe sepsis  #Lactic acidosis  #UTI related to surgery/instrumentation  *Patient developed chills and urinary tract symptoms postop.  She is found to be septic she received 1 L of IV fluids and ceftriaxone in the PACU and had received ciprofloxacin preop.           Her  antibiotics were broadened to meropenem and vancomycin. ( Prior records her prior urine cultures grew Enterobacter cloacae resistant to ceftriaxone but sensitive to ciprofloxacin)        *Was transferred to the ICU for possible pressors.  She did not require any pressors improved with antibiotics and  fluid management.  Patient's lactate normalized and on Jan 19 was transferred out of the ICU  Her antibiotics were narrowed down to ciprofloxacin.  Echocardiogram done showed an EF of 65% with normal right ventricle with no valvular disease.  January 21-WBC normalized   1/ currently she is on ciprofloxacin and awaiting placement to TCU  I reviewed all her cultures with no growth so far        #Diarrhea  Likely due to antibiotics.  Her Clostridium dificie On 1/21 was negative  Currently she is still having diarrhea continues to be on probiotics.  Continue as needed Imodium.     #Hyponatremia  Etiology likely due to hypovolemic hyponatremia  Sodium 135 on 1/24/22  We will stop checking BMP.     #Mild LFT abnormalities  Likely due to sepsis  Her LFTs have improved.  Will recheck another CMP in a week's time.     #Thrombocytopenia  Likely due to underlying sepsis.  1/21 platelet count is 5  1/24 platelet count is 120.     #Anemia hemoglobin was 11.3 on 1/18           No active bleeding and hemoglobin today 12.5.         #Hypertension  Home blood pressure medications were held due to sepsis.  Xarelto was started on January 19 and losartan was restarted on January 22  Echocardiogram done showed an EF of 65%        #Weakness physical deconditioning due to multiple acute and chronic medical issues  #Multiple sclerosis with neurogenic bladder     Lives independently in an apartment and has PCA service twice daily.  Patient has been seen by physical and Occupational Therapy recommending   Patient medically stable and being discharged to TCU   #Hyperlipidemia  Continue atorvastatin this is a home med       #depression/anxiety     Continue PTA Abilify, duloxetine, Remeron, trazodone and carbamazepine.     #GERD  Continue PTA famotidine     #OVIDIO  Noncompliant with CPAP  Follow-up with outpatient     #Severe obesity  BMI of 47.68  Will encourage lifestyle modifications            Consultations This Hospital Stay   HOSPITALIST IP CONSULT  PHARMACY TO DOSE VANCO  INTENSIVIST IP CONSULT  VASCULAR ACCESS ADULT IP CONSULT  PHYSICAL THERAPY ADULT IP CONSULT  OCCUPATIONAL THERAPY ADULT IP CONSULT  CARE MANAGEMENT / SOCIAL WORK IP CONSULT  CARE MANAGEMENT / SOCIAL WORK IP CONSULT  PHYSICAL THERAPY ADULT IP CONSULT  OCCUPATIONAL THERAPY ADULT IP CONSULT    Code Status   Full Code    Time Spent on this Encounter   I, Arsenio Enrique MD, personally saw the patient today and spent greater than 30 minutes discharging this patient.       Arsenio Enrique MD  [place] [address] [facility]  [address] [place] [address]  Phone: [phone]  ______________________________________________________________________    Physical Exam   Vital Signs: Temp: 97.9  F (36.6  C) Temp src: Oral BP: (!) 154/81 Pulse: 70   Resp: 17 SpO2: 94 % O2 Device: None (Room air)    Weight: 295 lbs 6.66 oz     Physical Exam  Cardiovascular:      Rate and Rhythm: Normal rate and regular rhythm.   Pulmonary:      Effort: Pulmonary effort is normal. No respiratory distress.      Breath sounds: Normal breath sounds.   Abdominal:      General: There is no distension.      Palpations: Abdomen is soft.      Tenderness: There is no abdominal tenderness.   Primary Care Physician   Jasmyn Márquez    Discharge Orders      Reason for your hospital stay    Regular diet     Follow-up and recommended labs and tests     Follow-up in 2 weeks for stent removal     Activity    As tolerated     When to call - Contact Surgeon Team    You may experience symptoms that require follow-up before your scheduled appointment.  Contact your Surgeon Team if you are concerned about pain control, large amount of bleeding, blood clots, constipation, or if you experience signs of infection (fever, growing tenderness at the surgery site, a large amount of drainage, severe pain, foul-smelling drainage, redness or swelling.     Symptoms - Fever Management    A low grade fever can be expected after surgery. Your Provider many have prescribed an Opioid pain medication that also contains acetaminophen (TYLENOL) that may help with Fever management.  Do NOT take additional acetaminophen (TYLENOL) in combination with an Opioid/acetaminophen (TYLENOL) product. Read the labels on your Over The Counter (OTC) medications with care.     Symptoms - Reduced Urine Output    If it has been greater than 8 hours since you have urinated despite drinking plenty of water, call your Surgeon Team.     Diet Instructions    Follow your surgeon's orders for any diet restrictions.  If you did not receive any diet restrictions, you may drink clear liquids (apple juice, ginger ale, 7-up, broth, etc.), and progress to your regular diet as you feel able. It is important to stay well-hydrated after surgery and drink plenty of water.     Discharge Instructions - Comfort and Pain Management    Pain after surgery is normal and expected. You will have some amount of pain after surgery. Your pain will improve with time. There are several things you can do to help reduce your pain including: rest, ice, and using pain medications as needed. Use pain interventions and don't wait until pain level is out of control. Contact your Surgeon Team if you have pain that persists or worsens after surgery despite rest, ice, and taking your medication(s) as prescribed. You may have a dry mouth, a sore throat, muscles aches or trouble sleeping, and these symptoms should go away after 24 hours.     Discharge Instructions - Rest    Rest and relax for the next 24 hours. Make arrangements to have  someone stay with you overnight, and avoid hazardous and strenuous activities.  Do NOT make any important decisions for the next 24 hours.     General info for SNF    Length of Stay Estimate: Short Term Care: Estimated # of Days <30  Condition at Discharge: Improving  Level of care:skilled   Rehabilitation Potential: Good  Admission H&P remains valid and up-to-date: Yes  Recent Chemotherapy: N/A  Use Nursing Home Standing Orders: Yes     Mantoux instructions    Give two-step Mantoux (PPD) Per Facility Policy Yes     Follow Up and recommended labs and tests    Follow up with correction physician.  The following labs/tests are recommended: cbc, bmp and ekg to look at qtc.  Follow with urology on 2/9     Reason for your hospital stay    UTI infection and Sepsis     Bladder scan    X 2 for post void residual     Activity - Up with nursing assistance     Full Code     Physical Therapy Adult Consult    Evaluate and treat as clinically indicated.    Reason:  Weakness     Occupational Therapy Adult Consult    Evaluate and treat as clinically indicated.    Reason:  Weakness     Fall precautions     Diet    Regular     Diet    Follow this diet upon discharge: Orders Placed This Encounter      Diet      Regular Diet Adult       Significant Results and Procedures   Results for orders placed or performed during the hospital encounter of 01/18/23   XR Surgery KEO L/T 5 Min Fluoro w Stills    Narrative    SURGERY C-ARM FLUORO LESS THAN 5 MINUTES WITH STILLS 1/18/2023 10:38  AM     COMPARISON: None    HISTORY: Left side Holmium laser lithotripsy, left side stent  placement.     FLUOROSCOPY TIME: .2 minute(s)      Impression    IMPRESSION: Two spot images demonstrate a wire and stent in the left  ureter. See operative report for details.    JAK BAEZA MD         SYSTEM ID:  [id number]   XR Chest Port 1 View    Narrative    XR CHEST PORT 1 VIEW  1/18/2023 1:21 PM       INDICATION: cough, possible fever  COMPARISON: 12/27/2020        Impression    IMPRESSION: Negative chest.    JAK BAEZA MD         SYSTEM ID:  [id number]   CT Chest (PE) Abdomen Pelvis w Contrast    Narrative    EXAM: CT CHEST PE ABDOMEN PELVIS W CONTRAST  LOCATION: [facility]  DATE/TIME: 1/18/2023 6:58 PM    INDICATION: Postoperative chills. Abnormal lactate.  COMPARISON: CT chest 12/25/2020.  TECHNIQUE: CT chest pulmonary angiogram and routine CT abdomen pelvis with IV contrast. Arterial phase through the chest and venous phase through the abdomen and pelvis. Multiplanar reformats and MIP reconstructions were performed. Dose reduction   techniques were used.   CONTRAST: 135mL Isovue 370    FINDINGS:  ANGIOGRAM CHEST: Pulmonary arteries are normal caliber and negative for pulmonary emboli. Thoracic aorta is negative for dissection. No CT evidence of right heart strain.     LUNGS AND PLEURA: No effusions. Mild bibasilar atelectasis. No acute airspace disease. Calcified nodule consistent with a stable granuloma along the anterior left lower lobe. No specific follow-up recommended.    MEDIASTINUM/AXILLAE: No suspicious lymph node. Right posterior thyroid nodule appears stable. No acute abnormality.    CORONARY ARTERY CALCIFICATION: None.    HEPATOBILIARY: There are stable indeterminant hypodense nodules in the liver. One of these appears to represent a cyst that is unchanged at the right hepatic dome series 13 image 33. Indeterminant stable central hepatic nodule is 0.7 cm image 31. Another   stable nodule is 0.7 cm lateral left liver image 58. Another stable nodule is 0.7 cm inferior right liver image 92. Small cholelithiasis.    PANCREAS: Normal.    SPLEEN: Normal.    ADRENAL GLANDS: Normal.    KIDNEYS/BLADDER: Left ureter stent in place. No left ureter stone, but there is mild left renal pelvis region edema and similar appearing pelviectasis. Previously noted left renal pelvis stone is not seen. However, prominent 1.3 cm calculus with  staghorn   morphology noted at a few locations within the left collecting system. One of these at the posterior mid kidney is 1.3 cm series 13 image 97. Left upper pole stone is 1.3 cm image 85. Lower pole stone on the left is 0.7 cm series 14 image 70. Bubbles of   gas within the left renal collecting system proximally. No right urolithiasis. Homogeneous enhancement of the kidneys. Mildly increased left renal edema. Catheter decompresses the bladder. Bladder contains a few bubbles of gas.    BOWEL: Prominent gas and stool throughout the colon. No small bowel obstruction. Appendix not seen. No signs of appendicitis. No free air.    LYMPH NODES: Normal.    VASCULATURE: Scattered calcifications.    PELVIC ORGANS: Otherwise unremarkable.    MUSCULOSKELETAL: Spine degenerative changes diffusely.      Impression    IMPRESSION:  1.  Multiple new prominent stones within the left kidney compared to 22. There is a left ureter stent in place. There is persistent left-sided pelviectasis and mildly increasing edema of the left kidney.  2.  Bubbles of gas in the bladder and proximal left collecting system noted. Correlate with urinalysis to assess for urinary tract infection.  3.  No evidence for pulmonary embolism or acute airspace disease.  4.  A few indeterminant but stable small hypodensities in the liver. Recommend nonurgent liver MRI for assessment.  5.  Cholelithiasis.   Echocardiogram Complete     Value    LVEF  65%    [facility]    [id number]  [id number]  [id number]^^REFUGIO^TOMASA     [facility]  Echocardiography Laboratory  [address] [place] [address]     Name: JEANNIE VILLARREAL  MRN: [id number]  : 1955  Study Date: 2023 09:58 AM  Age: 67 yrs  Gender: Female  Patient Location: [facility]  Reason For Study: Shock  Ordering Physician: REFUGIO DELANEY  Referring Physician: Jasmyn Márquez  Performed By: Carol Pastor     BSA: 2.4 m2  Height: 66 in  Weight: 295  lb  HR: 137  BP: 141/62 mmHg  ______________________________________________________________________________  Procedure  Complete Portable Echo Adult. Optison (NDC #5012-2901) given intravenously.  ______________________________________________________________________________  Interpretation Summary     1. The left ventricle is normal in size. The visual ejection fraction is  estimated at 65%.  2. The right ventricle is normal in structure, function and size.  3. No valve disease.     No changes from echo 9/2021.  ______________________________________________________________________________  Left Ventricle  The left ventricle is normal in size. There is normal left ventricular wall  thickness. The visual ejection fraction is estimated at 65%. Diastolic  function not assessed due to tachycardia. Normal left ventricular wall motion.     Right Ventricle  The right ventricle is normal in structure, function and size.     Atria  Normal left atrial size. Right atrial size is normal. There is no atrial shunt  seen.     Mitral Valve  The mitral valve is normal in structure and function.     Tricuspid Valve  There is mild (1+) tricuspid regurgitation. The right ventricular systolic  pressure is elevated at 38.3 mmHg.     Aortic Valve  The aortic valve is normal in structure and function.     Pulmonic Valve  The pulmonic valve is normal in structure and function.     Vessels  The ascending aorta is Borderline dilated. The inferior vena cava was normal  in size with preserved respiratory variability.     Pericardium  There is no pericardial effusion.     Rhythm  Rhythm uncertain, rate 135.  ______________________________________________________________________________  MMode/2D Measurements & Calculations  Ao root diam: 3.0 cm     asc Aorta Diam: 3.6 cm  LVOT diam: 2.0 cm  LVOT area: 3.2 cm2  LA Volume (BP): 27.5 ml  LA Volume Index (BP): 11.7 ml/m2     Doppler Measurements & Calculations  MV E max rk: 111.8 cm/sec  MV dec  time: 0.11 sec  Ao V2 max: 148.0 cm/sec  Ao max P.0 mmHg  Ao V2 mean: 108.5 cm/sec  Ao mean P.0 mmHg  Ao V2 VTI: 22.6 cm  EDWIN(I,D): 2.3 cm2  EDWIN(V,D): 2.3 cm2  LV V1 max P.5 mmHg  LV V1 max: 106.5 cm/sec  LV V1 VTI: 16.0 cm     SV(LVOT): 51.7 ml  SI(LVOT): 21.9 ml/m2  PA acc time: 0.13 sec  TR max doni: 309.3 cm/sec  TR max P.3 mmHg  AV Doni Ratio (DI): 0.72  EDWIN Index (cm2/m2): 0.97  E/E' av.2  Lateral E/e': 4.8  Medial E/e': 5.7     ______________________________________________________________________________  Report approved by: Quyen Mckinney 2023 12:03 PM               Discharge Medications   Current Discharge Medication List      START taking these medications    Details   ciprofloxacin (CIPRO) 250 MG tablet Take 1 tablet (250 mg) by mouth every 12 hours for 9 days  Qty: 18 tablet, Refills: 0    Associated Diagnoses: Sepsis due to urinary tract infection (H)         CONTINUE these medications which have NOT CHANGED    Details   acetaminophen (TYLENOL) 500 MG tablet Take 1,000 mg by mouth every 6 hours as needed for mild pain      ARIPiprazole (ABILIFY) 5 MG tablet Take 7.5 mg by mouth daily  Refills: 2      atorvastatin (LIPITOR) 20 MG tablet Take 20 mg by mouth daily.      carBAMazepine (TEGRETOL) 200 MG tablet Take 200 mg by mouth 2 times daily      Cranberry 1000 MG CAPS Take by mouth daily      cyanocobalamin (VITAMIN B-12) 100 MCG tablet Take 1,000 mcg by mouth once a week      denosumab (PROLIA) 60 MG/ML SOLN injection Inject 60 mg Subcutaneous every 6 months On hold in U      Dextromethorphan-guaiFENesin (MUCINEX DM MAXIMUM STRENGTH PO) Take by mouth At Bedtime      docusate sodium (COLACE) 100 MG tablet Take 100 mg by mouth 2 times daily      DULoxetine (CYMBALTA) 60 MG capsule Take 120 mg by mouth daily       famotidine (PEPCID) 40 MG tablet TAKE 1/2 TABLET BY MOUTH TWICE DAILY  Qty: 90 tablet, Refills: 0    Associated Diagnoses: GERD without esophagitis       fluticasone (FLONASE) 50 MCG/ACT nasal spray Spray 2 sprays into both nostrils daily      ketoconazole (NIZORAL) 2 % cream Apply topically 2 times daily as needed     Associated Diagnoses: Bariatric surgery status; Obesity, Class III, BMI 40-49.9 (morbid obesity) (H)      loperamide (IMODIUM A-D) 2 MG tablet Take 1 tablet (2 mg) by mouth 2 times daily    Associated Diagnoses: Diarrhea, unspecified type      losartan (COZAAR) 50 MG tablet Take 1 tablet (50 mg) by mouth daily  Qty:      Associated Diagnoses: Benign essential hypertension      Melatonin 10 MG TABS Take 10 mg by mouth At Bedtime Changed to extended release      mirtazapine (REMERON) 30 MG tablet Take 75 mg by mouth At Bedtime       Multiple Vitamin (MULTIVITAMINS PO) Take 2 tablets by mouth every evening. WITH IRON      Psyllium (METAMUCIL) 0.36 g CAPS Metamucil      traZODone (DESYREL) 100 MG tablet Take 600 mg by mouth At Bedtime       triamcinolone (ARISTOCORT HP) 0.5 % external cream Apply topically At Bedtime To hands      trospium (SANCTURA) 20 MG tablet Take 1 tablet (20 mg) by mouth 2 times daily (before meals)      verapamil ER (CALAN-SR) 120 MG CR tablet Take 120 mg by mouth 2 times daily      vitamin B-Complex Take 1 tablet by mouth daily          STOP taking these medications       cephALEXin (KEFLEX) 500 MG capsule Comments:   Reason for Stopping:         Vitamin D, Cholecalciferol, 25 MCG (1000 UT) CAPS Comments:   Reason for Stopping:             Allergies   Allergies   Allergen Reactions     Amoxicillin Hives     Augmentin      Sensitive,  Able to tolerate a few doses, otherwise hives on hands     Betaseron [Interferon Beta-1b]      Necrosis of skin at injection sites     Dust Mite Extract Unknown     Mold Unknown

## 2023-01-24 NOTE — PROGRESS NOTES
Care Management Follow Up    Length of Stay (days): 5    Expected Discharge Date: 01/25/2023     Concerns to be Addressed: discharge planning     Patient plan of care discussed at interdisciplinary rounds: Yes    Anticipated Discharge Disposition: Home, Home Care, Transitional Care     Anticipated Discharge Services: Transportation Services  Anticipated Discharge DME: None    Patient/family educated on Medicare website which has current facility and service quality ratings:    Education Provided on the Discharge Plan:    Patient/Family in Agreement with the Plan: yes    Referrals Placed by CM/SW: External Care Coordination, Homecare, Transportation, Community Resources  Private pay costs discussed: Not applicable    Additional Information:  [facility] declined- no beds  [facility] declined for bariatric need  [facility] in process of assessing patient.  Patient updated.    Update at 1440  [facility] is offering patient admission today or tomorrow into a private room due to bariatric DME. They will waive the private room fee.  Patient has been at [facility] before and they did order a bariatric bed the previous admission for patient's comfort even though her weight does not necessitate the need.  Writer is paging MD to update and ask if he plans to discharge patient today or tomorrow.    Update at 1500  Patient was ready for discharge however [facility] could not come till 2315 which was too late for [facility].  [facility] will be here 1/25 Wed. At 0930.  Nursing staff, MD and patient aware.   [facility] explains they cannot transport patient in the type of electric scooter patient has.  Writer will need to problem solve with patient how we will get her scooter to her home or [facility].       Renita Gracia [facility] Hospital[facility]

## 2023-01-25 VITALS
HEART RATE: 72 BPM | BODY MASS INDEX: 47.09 KG/M2 | WEIGHT: 293 LBS | TEMPERATURE: 97.9 F | OXYGEN SATURATION: 92 % | DIASTOLIC BLOOD PRESSURE: 75 MMHG | SYSTOLIC BLOOD PRESSURE: 147 MMHG | HEIGHT: 66 IN | RESPIRATION RATE: 18 BRPM

## 2023-01-25 VITALS
RESPIRATION RATE: 16 BRPM | SYSTOLIC BLOOD PRESSURE: 157 MMHG | HEIGHT: 66 IN | TEMPERATURE: 98.3 F | HEART RATE: 73 BPM | DIASTOLIC BLOOD PRESSURE: 83 MMHG | WEIGHT: 293 LBS | BODY MASS INDEX: 47.09 KG/M2 | OXYGEN SATURATION: 90 %

## 2023-01-25 PROCEDURE — 250N000013 HC RX MED GY IP 250 OP 250 PS 637: Performed by: INTERNAL MEDICINE

## 2023-01-25 PROCEDURE — 250N000013 HC RX MED GY IP 250 OP 250 PS 637: Performed by: UROLOGY

## 2023-01-25 PROCEDURE — 250N000013 HC RX MED GY IP 250 OP 250 PS 637: Performed by: PHYSICIAN ASSISTANT

## 2023-01-25 PROCEDURE — 99239 HOSP IP/OBS DSCHRG MGMT >30: CPT | Performed by: STUDENT IN AN ORGANIZED HEALTH CARE EDUCATION/TRAINING PROGRAM

## 2023-01-25 RX ORDER — LACTOBACILLUS RHAMNOSUS GG 10B CELL
1 CAPSULE ORAL 2 TIMES DAILY
DISCHARGE
Start: 2023-01-25 | End: 2023-09-12

## 2023-01-25 RX ADMIN — ARIPIPRAZOLE 7.5 MG: 15 TABLET ORAL at 08:08

## 2023-01-25 RX ADMIN — LOSARTAN POTASSIUM 50 MG: 50 TABLET, FILM COATED ORAL at 08:09

## 2023-01-25 RX ADMIN — DULOXETINE 120 MG: 60 CAPSULE, DELAYED RELEASE ORAL at 08:08

## 2023-01-25 RX ADMIN — MICONAZOLE NITRATE: 2 POWDER TOPICAL at 08:09

## 2023-01-25 RX ADMIN — LOPERAMIDE HYDROCHLORIDE 2 MG: 2 CAPSULE ORAL at 08:08

## 2023-01-25 RX ADMIN — VERAPAMIL HYDROCHLORIDE 120 MG: 120 TABLET, FILM COATED, EXTENDED RELEASE ORAL at 08:09

## 2023-01-25 RX ADMIN — CARBAMAZEPINE 200 MG: 200 TABLET ORAL at 08:09

## 2023-01-25 RX ADMIN — CIPROFLOXACIN HYDROCHLORIDE 250 MG: 250 TABLET, FILM COATED ORAL at 08:09

## 2023-01-25 RX ADMIN — Medication 1 CAPSULE: at 08:09

## 2023-01-25 RX ADMIN — ATORVASTATIN CALCIUM 20 MG: 20 TABLET, FILM COATED ORAL at 08:08

## 2023-01-25 ASSESSMENT — ACTIVITIES OF DAILY LIVING (ADL)
ADLS_ACUITY_SCORE: 52
ADLS_ACUITY_SCORE: 56
ADLS_ACUITY_SCORE: 56
ADLS_ACUITY_SCORE: 52
ADLS_ACUITY_SCORE: 56

## 2023-01-25 NOTE — PLAN OF CARE
Goal Outcome Evaluation:       DATE & TIME: 1/24/23 [id number]  Cognitive Concerns/ Orientation : A/O x4;   BEHAVIOR & AGGRESSION TOOL COLOR: Green  CIWA SCORE: NA  ABNL VS/O2: VSS on RA  MOBILITY: Assist x2-lift   PAIN MANAGMENT: Tylenol x 1 for rib cage pain  DIET: Reg. Takes pills w/applesauce  BOWEL/BLADDER: Incont B/B. Purewick in place;BM x 1  this shift .  ABNL LAB/BG: Na 135, K 3.6, Plat 94.  DRAIN/DEVICES: no iv access, MD aware.  TELEMETRY RHYTHM: NA  SKIN: scattered bruising - Redness/rash to pannus/abd.fold and moisture to breast  Abrasion L side pannus.  Anti fungal powder applied.Blister to L inner thigh-mepilex CDI.  Mepilex to coccyx  in place.   CMS: baseline numbness to BLE - baseline tingling to fingertips  TESTS/PROCEDURES: n/a   D/C DAY/GOALS/PLACE: discharge to TCU tomorrow at 930 am  OTHER IMPORTANT INFO: PT/OT recommending TCU. okay to discharge from urology perspective. Given Imodium x 2 per patients request.

## 2023-01-25 NOTE — PLAN OF CARE
Goal Outcome Evaluation:  Discharge    Patient discharged to TCU via [facility]  Care plan note;done  Listed belongings gathered and given to patient (including from security/pharmacy). yes  Care Plan and Patient education resolved:yes  Prescriptions if needed, hard copies sent with patient  NA  Medication Bin checked and emptied on discharge yes  SW/care coordinator/charge RN aware of discharge: yes     Vss and patient was comfortable att he time of discharge.

## 2023-01-25 NOTE — PLAN OF CARE
Goal Outcome Evaluation:    Summary: Post-op day 6 (ureteroscopy,lithotripsy,ureteral stenting)  DATE & TIME: 1/24/23 1900-2330  Cognitive Concerns/ Orientation : A/O x4;   BEHAVIOR & AGGRESSION TOOL COLOR: Green  CIWA SCORE: NA  ABNL VS/O2: VSS on RA  MOBILITY: Assist x2-lift   PAIN MANAGMENT: no intervention required - left intercostal pain resolved  DIET: Reg. Takes pills w/applesauce  BOWEL/BLADDER: Incont B/B. Purewick in place;BM x 1 (diarrhea)  this shift .  ABNL LAB/BG: Na 135, K 3.6  DRAIN/DEVICES: no iv access, MD aware.  TELEMETRY RHYTHM: NA  SKIN: scattered bruising - Redness/rash to pannus/abd. Fold -moisture under breast  Abrasion L side pannus.  Anti fungal powder applied.Blister to L inner thigh-mepilex CDI.  New mepilex applied to sacrum CDI  CMS: baseline numbness to BLE - baseline tingling to fingertips  TESTS/PROCEDURES: n/a   D/C DAY/GOALS/PLACE: Pending to TCU- SW following  OTHER IMPORTANT INFO: PT/OT,  okay to discharge from urology perspective. Had imodium x 2 per patients request.

## 2023-01-25 NOTE — PLAN OF CARE
Physical Therapy Discharge Summary    Reason for therapy discharge:    Discharged to transitional care facility.    Progress towards therapy goal(s). See goals on Care Plan in [facility] electronic health record for goal details.  Goals partially met.  Barriers to achieving goals:   discharge from facility.    Therapy recommendation(s):    Continued therapy is recommended.  Rationale/Recommendations: Pt below baseline but progressing well; needing Min A for supine <> sit, Mod A x 2 to stand from recliner with opal FWW, unable to intiate gait d/t B LE weakness. Patient would benefit from continued skilled PT at d/c to progress strength, endurance and independence prior to discharging to home.  PT Brief overview of current status: A of 2 for bed mobilty, transfers with A of 2 with ww.

## 2023-01-25 NOTE — PROGRESS NOTES
Care Management Discharge Note    Discharge Date: 01/25/2023       Discharge Disposition:  ([facility] TCU)    Discharge Services: Transportation Services    Discharge DME: None    Discharge Transportation: health plan transportation    Private pay costs discussed: Not applicable    PAS Confirmation Code: [id number]  Patient/family educated on Medicare website which has current facility and service quality ratings:  yes    Education Provided on the Discharge Plan:  yes  Persons Notified of Discharge Plans: patient  Patient/Family in Agreement with the Plan: yes    Handoff Referral Completed: yes    Additional Information:  Discharge to [facility] is occurring as planned.  Orders have been sent via In Basket.  [employer] will transport at 0930 in one of their w/c's. Patient doesn't feel comfortable transporting in her motorized scooter and in addition [employer] does not allow the use of her particular scooter with handles being used for transport.   Will arrange for patient's scooter to be transported from hospital to [facility].         ALYSON JiangSW

## 2023-01-25 NOTE — PLAN OF CARE
Goal Outcome Evaluation:         Summary: Post-op day 7 (ureteroscopy, lithotripsy,ureteral stenting)  DATE & TIME: 1/24/23-1/25/23 [id number]  Cognitive Concerns/ Orientation: A/O x4;   BEHAVIOR & AGGRESSION TOOL COLOR: Green  CIWA SCORE: N/A  ABNL VS/O2: VSS on RA  MOBILITY: Assist x2-lift   PAIN MANAGMENT: Denied pain overnight   DIET: Reg. Takes pills w/applesauce  BOWEL/BLADDER: Incont B/B. Purewick in place. 1 BM this shift  ABNL LAB/BG: Na 135, K 3.6  DRAIN/DEVICES: No iv access, MD aware.  TELEMETRY RHYTHM: NA  SKIN: scattered bruising - Redness/rash to pannus/abd. Fold -moisture under breast  Abrasion L side pannus.  Anti fungal powder applied.Blister to L inner thigh-mepilex CDI.  New mepilex applied to sacrum CDI  CMS: baseline numbness to BLE - baseline tingling to fingertips  TESTS/PROCEDURES: n/a   D/C DAY/GOALS/PLACE: Pending to TCU 1/25/23 - SW following  OTHER IMPORTANT INFO: PT/OT, okay to discharge from urology perspective.

## 2023-01-26 ENCOUNTER — TRANSITIONAL CARE UNIT VISIT (OUTPATIENT)
Dept: GERIATRICS | Facility: CLINIC | Age: 68
End: 2023-01-26
Payer: COMMERCIAL

## 2023-01-26 DIAGNOSIS — G35 MS (MULTIPLE SCLEROSIS) (H): ICD-10-CM

## 2023-01-26 DIAGNOSIS — R53.81 PHYSICAL DECONDITIONING: ICD-10-CM

## 2023-01-26 DIAGNOSIS — N31.9 NEUROGENIC BLADDER: ICD-10-CM

## 2023-01-26 DIAGNOSIS — N20.0 NEPHROLITHIASIS: ICD-10-CM

## 2023-01-26 DIAGNOSIS — I10 BENIGN ESSENTIAL HYPERTENSION: ICD-10-CM

## 2023-01-26 DIAGNOSIS — E87.1 HYPONATREMIA: ICD-10-CM

## 2023-01-26 DIAGNOSIS — M54.50 RIGHT-SIDED LOW BACK PAIN WITHOUT SCIATICA, UNSPECIFIED CHRONICITY: ICD-10-CM

## 2023-01-26 DIAGNOSIS — R19.7 DIARRHEA, UNSPECIFIED TYPE: ICD-10-CM

## 2023-01-26 DIAGNOSIS — N39.0 URINARY TRACT INFECTION WITHOUT HEMATURIA, SITE UNSPECIFIED: Primary | ICD-10-CM

## 2023-01-26 DIAGNOSIS — D69.6 THROMBOCYTOPENIA (H): ICD-10-CM

## 2023-01-26 LAB
ATRIAL RATE - MUSE: 79 BPM
DIASTOLIC BLOOD PRESSURE - MUSE: NORMAL MMHG
INTERPRETATION ECG - MUSE: NORMAL
P AXIS - MUSE: 50 DEGREES
PR INTERVAL - MUSE: 142 MS
QRS DURATION - MUSE: 106 MS
QT - MUSE: 424 MS
QTC - MUSE: 486 MS
R AXIS - MUSE: 60 DEGREES
SYSTOLIC BLOOD PRESSURE - MUSE: NORMAL MMHG
T AXIS - MUSE: 72 DEGREES
VENTRICULAR RATE- MUSE: 79 BPM

## 2023-01-26 PROCEDURE — 99310 SBSQ NF CARE HIGH MDM 45: CPT | Performed by: NURSE PRACTITIONER

## 2023-01-26 NOTE — PROGRESS NOTES
"[facility] GERIATRICS    PRIMARY CARE PROVIDER AND CLINIC:  Jasmyn Márquez MD, [address] / [place] [address]  Chief Complaint   Patient presents with     Hospital F/U      [place] Medical Record Number:  [id number]  Place of Service where encounter took place:  [facility]) [25]    Mili Padilla  is a 67 year old  (1955), admitted to the above facility from  [facility]. Hospital stay 1/15/23 through 1/25/23..   HPI:    PMH of morbid obesity, MS with neurogenic bladder, HTN, HLD, depression, anxiety, GERD, nephrolithiasis who underwent elective left ureteroscopy, lithotripsy and left ureteral stent, developed tachycardia and chills post op, found to have a UTI with sepsis, Tx with IVF and IV vanco and meropenem then narrowed down to cipro. Will f/u with urology as OP for stent removal  Other complications  Diarrhea: likely from Abx, C-diff negative  Hyponatremia: likely due to hypovolemia, Na 135 on 1/24/23  Thrombocytosis: mild likely due to sepsis Plt 120K on 1/24/23  On exam today: patient resting in bed, alert, pleasant, states she has ongoing pain in right low back which is ongoing but \"exacerbated\" in TCU, denies fever, chills, urinary retention, dysuria, other pain or discomfort, SOB, states she has ongoing diarrhea, approx 2-3 stools per day, denies N/V.       CODE STATUS/ADVANCE DIRECTIVES DISCUSSION:  Full Code  CPR/Full code   ALLERGIES:   Allergies   Allergen Reactions     Amoxicillin Hives     Augmentin      Sensitive,  Able to tolerate a few doses, otherwise hives on hands     Betaseron [Interferon Beta-1b]      Necrosis of skin at injection sites     Dust Mite Extract Unknown     Mold Unknown      PAST MEDICAL HISTORY:   Past Medical History:   Diagnosis Date     Depression, major, in partial remission (H)      Fibromyalgia syndrome      Gastro-oesophageal reflux disease      Hyperlipemia      Hypertension      Low serum sodium      " Lymphedema      Mixed incontinence      Multiple sclerosis (H)     tremors with MS, all four limbs and head     Multiple sclerosis, secondary progressive (H)      Neurogenic bladder      Obese      Osteoporosis      Sleep apnea      Vocal cord paralysis, unilateral complete       PAST SURGICAL HISTORY:   has a past surgical history that includes orthopedic surgery; ENT surgery; Laparoscopic gastric sleeve (05/30/2013); Laparoscopic appendectomy (05/30/2013); Laparoscopic Biopsy Liver (05/30/2013); Esophagoscopy, gastroscopy, duodenoscopy (EGD), combined (N/A, 12/16/2014); Esophagoscopy, gastroscopy, duodenoscopy (EGD), combined (N/A, 12/16/2014); Colonoscopy (N/A, 07/17/2017); Colonoscopy (N/A, 02/23/2018); gastric bypass (Bilateral); and Laser holmium lithotripsy ureter(s), insert stent, combined (Left, 1/18/2023).  FAMILY HISTORY: family history includes Breast Cancer in her maternal aunt, maternal aunt, maternal aunt, maternal aunt, and mother; Other Cancer in her father.  SOCIAL HISTORY:   reports that she quit smoking about 48 years ago. Her smoking use included cigarettes. She has never used smokeless tobacco. She reports that she does not currently use alcohol. She reports that she does not use drugs.  Patient's living condition: lives alone    Post Discharge Medication Reconciliation Status:   MED REC REQUIRED  Post Medication Reconciliation Status: discharge medications reconciled and changed, per note/orders       Current Outpatient Medications   Medication Sig     acetaminophen (TYLENOL) 500 MG tablet Take 1,000 mg by mouth 3 times daily     ARIPiprazole (ABILIFY) 5 MG tablet Take 7.5 mg by mouth daily     atorvastatin (LIPITOR) 20 MG tablet Take 20 mg by mouth daily.     carBAMazepine (TEGRETOL) 200 MG tablet Take 200 mg by mouth 2 times daily     ciprofloxacin (CIPRO) 250 MG tablet Take 1 tablet (250 mg) by mouth every 12 hours for 9 days     Cranberry 1000 MG CAPS Take by mouth daily     cyanocobalamin  (VITAMIN B-12) 100 MCG tablet Take 1,000 mcg by mouth once a week     denosumab (PROLIA) 60 MG/ML SOLN injection Inject 60 mg Subcutaneous every 6 months On hold in [facility]     Dextromethorphan-guaiFENesin (MUCINEX DM MAXIMUM STRENGTH PO) Take by mouth At Bedtime     docusate sodium (COLACE) 100 MG tablet Take 100 mg by mouth 2 times daily     DULoxetine (CYMBALTA) 60 MG capsule Take 120 mg by mouth daily      famotidine (PEPCID) 40 MG tablet TAKE 1/2 TABLET BY MOUTH TWICE DAILY (Patient taking differently: Take 40 mg by mouth nightly as needed)     fluticasone (FLONASE) 50 MCG/ACT nasal spray Spray 2 sprays into both nostrils daily     ketoconazole (NIZORAL) 2 % cream Apply topically 2 times daily as needed      lactobacillus rhamnosus, GG, (CULTURELL) capsule Take 1 capsule by mouth 2 times daily     loperamide (IMODIUM A-D) 2 MG tablet Take 1 tablet (2 mg) by mouth 2 times daily     losartan (COZAAR) 50 MG tablet Take 1 tablet (50 mg) by mouth daily     Melatonin 10 MG TABS Take 10 mg by mouth At Bedtime Changed to extended release     mirtazapine (REMERON) 30 MG tablet Take 75 mg by mouth At Bedtime      Multiple Vitamin (MULTIVITAMINS PO) Take 2 tablets by mouth every evening. WITH IRON     Psyllium (METAMUCIL) 0.36 g CAPS Metamucil     traZODone (DESYREL) 100 MG tablet Take 600 mg by mouth At Bedtime      triamcinolone (ARISTOCORT HP) 0.5 % external cream Apply topically At Bedtime To hands     trospium (SANCTURA) 20 MG tablet Take 1 tablet (20 mg) by mouth 2 times daily (before meals)     verapamil ER (CALAN-SR) 120 MG CR tablet Take 120 mg by mouth 2 times daily     vitamin B-Complex Take 1 tablet by mouth daily      No current facility-administered medications for this visit.       ROS:  10 point ROS of systems including Constitutional, Eyes, Respiratory, Cardiovascular, Gastroenterology, Genitourinary, Integumentary, Musculoskeletal, Psychiatric were all negative except for pertinent positives noted in my  "HPI.    Vitals:  BP (!) 157/83   Pulse 73   Temp 98.3  F (36.8  C)   Resp 16   Ht 1.676 m (5' 6\")   Wt 134.9 kg (297 lb 8 oz)   LMP 12/10/2010   SpO2 90%   BMI 48.02 kg/m    Exam:  GENERAL APPEARANCE:  Alert, in no distress, morbidly obese  ENT:  Mouth and posterior oropharynx normal, moist mucous membranes, normal hearing acuity  EYES:  EOM, conjunctivae, lids, pupils and irises normal, PERRL  RESP:  respiratory effort and palpation of chest normal, lungs clear to auscultation , no respiratory distress  CV:  Palpation and auscultation of heart done , regular rate and rhythm, no murmur, rub, or gallop, peripheral edema 1+ in LE bilaterally  ABDOMEN:  normal bowel sounds, soft, nontender, no hepatosplenomegaly or other masses  M/S:   patient resting in bed  SKIN:  Inspection of skin and subcutaneous tissue baseline  NEURO:   speech wnl  PSYCH:  affect and mood normal    Lab/Diagnostic data:  Recent labs in [facility] reviewed by me today.  and   Most Recent 3 CBC's:  Recent Labs   Lab Test 01/24/23  0919 01/23/23  1121 01/22/23  0918   WBC 10.1 10.4 8.9   HGB 12.5 11.6* 11.1*   MCV 93 93 92   * 94* 79*     Most Recent 3 BMP's:  Recent Labs   Lab Test 01/24/23  0919 01/23/23  1633 01/23/23  1121 01/22/23  0918   *  --  132* 132*   POTASSIUM 3.6 3.6 3.4 3.4   CHLORIDE 95*  --  96* 97*   CO2 30*  --  26 28   BUN 7.9*  --  8.8 8.2   CR 0.53  --  0.49* 0.51   ANIONGAP 10  --  10 7   HEAVENLY 8.7*  --  8.5* 8.6*   *  --  102* 159*       ASSESSMENT/PLAN:    (N39.0) UTI  Comment: acute/ongoing  Plan: PT and OT, cipro 250mg BID for 9 days, f/u with urology as OP    (G35) MS (multiple sclerosis) (H)  (N31.9) Neurogenic bladder  (R53.81) Physical deconditioning  Comment: acute/ongoing  Plan: PT and OT, continue trospium 20mg BID,     (N20.0) Nephrolithiasis  Comment: acute/ongoing s/p elective left ureteroscopy, lithotripsy and left ureteral stent,  Plan: f/u as OP with urology for stent removal    (I10) Benign " essential hypertension  Comment: ongoing  Plan: BMP follow, continue verapamil 120mg BID, losartan 50mg QD    (R19.7) Diarrhea, unspecified type  Comment: acute/ongoing  Plan: continue culturelle 10bi BID    (E87.1) Hyponatremia  Comment: acute/ongoing  Plan: BMP follow, consider fluid restriction if persists    (D69.6) Thrombocytopenia (H)  Comment: ongoing  Plan: CBC on monday    (M54.50) right sided low back pain without sciatica  Acute/ongoing  Plan: schedule tylenol 1000mg TID, K-pad prn for pain    Orders:  CBC and BMP on Monday  Tylenol 1000mg TID scheduled  K-pad prn for pain      Total time spent with patient visit at the skilled nursing facility was 45 min including patient visit and review of past records. Greater than 50% of total time spent with counseling and coordinating care due to discussed back pain, medication and lab monitoring.     Electronically signed by:  Tonya Lynn Haase, APRN CNP

## 2023-01-26 NOTE — LETTER
"    1/26/2023        RE: Mili Padilla  [address]  [facility] [address]        [facility] GERIATRICS    PRIMARY CARE PROVIDER AND CLINIC:  Jasmyn Márquez MD, [address] / [place] [address]  Chief Complaint   Patient presents with     Hospital F/U      [place] Medical Record Number:  [id number]  Place of Service where encounter took place:  [facility]) [25]    Mili Padilla  is a 67 year old  (1955), admitted to the above facility from  [facility]. Hospital stay 1/15/23 through 1/25/23..   HPI:    PMH of morbid obesity, MS with neurogenic bladder, HTN, HLD, depression, anxiety, GERD, nephrolithiasis who underwent elective left ureteroscopy, lithotripsy and left ureteral stent, developed tachycardia and chills post op, found to have a UTI with sepsis, Tx with IVF and IV vanco and meropenem then narrowed down to cipro. Will f/u with urology as OP for stent removal  Other complications  Diarrhea: likely from Abx, C-diff negative  Hyponatremia: likely due to hypovolemia, Na 135 on 1/24/23  Thrombocytosis: mild likely due to sepsis Plt 120K on 1/24/23  On exam today: patient resting in bed, alert, pleasant, states she has ongoing pain in right low back which is ongoing but \"exacerbated\" in TCU, denies fever, chills, urinary retention, dysuria, other pain or discomfort, SOB, states she has ongoing diarrhea, approx 2-3 stools per day, denies N/V.       CODE STATUS/ADVANCE DIRECTIVES DISCUSSION:  Full Code  CPR/Full code   ALLERGIES:   Allergies   Allergen Reactions     Amoxicillin Hives     Augmentin      Sensitive,  Able to tolerate a few doses, otherwise hives on hands     Betaseron [Interferon Beta-1b]      Necrosis of skin at injection sites     Dust Mite Extract Unknown     Mold Unknown      PAST MEDICAL HISTORY:   Past Medical History:   Diagnosis Date     Depression, major, in partial remission (H)      Fibromyalgia syndrome      " Gastro-oesophageal reflux disease      Hyperlipemia      Hypertension      Low serum sodium      Lymphedema      Mixed incontinence      Multiple sclerosis (H)     tremors with MS, all four limbs and head     Multiple sclerosis, secondary progressive (H)      Neurogenic bladder      Obese      Osteoporosis      Sleep apnea      Vocal cord paralysis, unilateral complete       PAST SURGICAL HISTORY:   has a past surgical history that includes orthopedic surgery; ENT surgery; Laparoscopic gastric sleeve (05/30/2013); Laparoscopic appendectomy (05/30/2013); Laparoscopic Biopsy Liver (05/30/2013); Esophagoscopy, gastroscopy, duodenoscopy (EGD), combined (N/A, 12/16/2014); Esophagoscopy, gastroscopy, duodenoscopy (EGD), combined (N/A, 12/16/2014); Colonoscopy (N/A, 07/17/2017); Colonoscopy (N/A, 02/23/2018); gastric bypass (Bilateral); and Laser holmium lithotripsy ureter(s), insert stent, combined (Left, 1/18/2023).  FAMILY HISTORY: family history includes Breast Cancer in her maternal aunt, maternal aunt, maternal aunt, maternal aunt, and mother; Other Cancer in her father.  SOCIAL HISTORY:   reports that she quit smoking about 48 years ago. Her smoking use included cigarettes. She has never used smokeless tobacco. She reports that she does not currently use alcohol. She reports that she does not use drugs.  Patient's living condition: lives alone    Post Discharge Medication Reconciliation Status:   MED REC REQUIRED  Post Medication Reconciliation Status: discharge medications reconciled and changed, per note/orders       Current Outpatient Medications   Medication Sig     acetaminophen (TYLENOL) 500 MG tablet Take 1,000 mg by mouth every 6 hours as needed for mild pain     ARIPiprazole (ABILIFY) 5 MG tablet Take 7.5 mg by mouth daily     atorvastatin (LIPITOR) 20 MG tablet Take 20 mg by mouth daily.     carBAMazepine (TEGRETOL) 200 MG tablet Take 200 mg by mouth 2 times daily     ciprofloxacin (CIPRO) 250 MG tablet  Take 1 tablet (250 mg) by mouth every 12 hours for 9 days     Cranberry 1000 MG CAPS Take by mouth daily     cyanocobalamin (VITAMIN B-12) 100 MCG tablet Take 1,000 mcg by mouth once a week     denosumab (PROLIA) 60 MG/ML SOLN injection Inject 60 mg Subcutaneous every 6 months On hold in [facility]     Dextromethorphan-guaiFENesin (MUCINEX DM MAXIMUM STRENGTH PO) Take by mouth At Bedtime     docusate sodium (COLACE) 100 MG tablet Take 100 mg by mouth 2 times daily     DULoxetine (CYMBALTA) 60 MG capsule Take 120 mg by mouth daily      famotidine (PEPCID) 40 MG tablet TAKE 1/2 TABLET BY MOUTH TWICE DAILY (Patient taking differently: Take 40 mg by mouth nightly as needed)     fluticasone (FLONASE) 50 MCG/ACT nasal spray Spray 2 sprays into both nostrils daily     ketoconazole (NIZORAL) 2 % cream Apply topically 2 times daily as needed      lactobacillus rhamnosus, GG, (CULTURELL) capsule Take 1 capsule by mouth 2 times daily     loperamide (IMODIUM A-D) 2 MG tablet Take 1 tablet (2 mg) by mouth 2 times daily     losartan (COZAAR) 50 MG tablet Take 1 tablet (50 mg) by mouth daily     Melatonin 10 MG TABS Take 10 mg by mouth At Bedtime Changed to extended release     mirtazapine (REMERON) 30 MG tablet Take 75 mg by mouth At Bedtime      Multiple Vitamin (MULTIVITAMINS PO) Take 2 tablets by mouth every evening. WITH IRON     Psyllium (METAMUCIL) 0.36 g CAPS Metamucil     traZODone (DESYREL) 100 MG tablet Take 600 mg by mouth At Bedtime      triamcinolone (ARISTOCORT HP) 0.5 % external cream Apply topically At Bedtime To hands     trospium (SANCTURA) 20 MG tablet Take 1 tablet (20 mg) by mouth 2 times daily (before meals)     verapamil ER (CALAN-SR) 120 MG CR tablet Take 120 mg by mouth 2 times daily     vitamin B-Complex Take 1 tablet by mouth daily      No current facility-administered medications for this visit.       ROS:  10 point ROS of systems including Constitutional, Eyes, Respiratory, Cardiovascular, Gastroenterology,  "Genitourinary, Integumentary, Musculoskeletal, Psychiatric were all negative except for pertinent positives noted in my HPI.    Vitals:  BP (!) 157/83   Pulse 73   Temp 98.3  F (36.8  C)   Resp 16   Ht 1.676 m (5' 6\")   Wt 134.9 kg (297 lb 8 oz)   LMP 12/10/2010   SpO2 90%   BMI 48.02 kg/m    Exam:  GENERAL APPEARANCE:  Alert, in no distress, morbidly obese  ENT:  Mouth and posterior oropharynx normal, moist mucous membranes, normal hearing acuity  EYES:  EOM, conjunctivae, lids, pupils and irises normal, PERRL  RESP:  respiratory effort and palpation of chest normal, lungs clear to auscultation , no respiratory distress  CV:  Palpation and auscultation of heart done , regular rate and rhythm, no murmur, rub, or gallop, peripheral edema 1+ in LE bilaterally  ABDOMEN:  normal bowel sounds, soft, nontender, no hepatosplenomegaly or other masses  M/S:   patient resting in bed  SKIN:  Inspection of skin and subcutaneous tissue baseline  NEURO:   speech wnl  PSYCH:  affect and mood normal    Lab/Diagnostic data:  Recent labs in [facility] reviewed by me today.  and   Most Recent 3 CBC's:Recent Labs   Lab Test 01/24/23  0919 01/23/23  1121 01/22/23  0918   WBC 10.1 10.4 8.9   HGB 12.5 11.6* 11.1*   MCV 93 93 92   * 94* 79*     Most Recent 3 BMP's:Recent Labs   Lab Test 01/24/23  0919 01/23/23  1633 01/23/23  1121 01/22/23  0918   *  --  132* 132*   POTASSIUM 3.6 3.6 3.4 3.4   CHLORIDE 95*  --  96* 97*   CO2 30*  --  26 28   BUN 7.9*  --  8.8 8.2   CR 0.53  --  0.49* 0.51   ANIONGAP 10  --  10 7   HEAVENLY 8.7*  --  8.5* 8.6*   *  --  102* 159*       ASSESSMENT/PLAN:    (A41.9,  N39.0) Sepsis due to urinary tract infection (H)  (primary encounter diagnosis)  Comment: acute/ongoing  Plan: PT and OT, cipro 250mg BID for 9 days, f/u with urology as OP    (G35) MS (multiple sclerosis) (H)  (N31.9) Neurogenic bladder  (R53.81) Physical deconditioning  Comment: acute/ongoing  Plan: PT and OT, continue trospium " 20mg BID,     (N20.0) Nephrolithiasis  Comment: acute/ongoing s/p elective left ureteroscopy, lithotripsy and left ureteral stent,  Plan: f/u as OP with urology for stent removal    (I10) Benign essential hypertension  Comment: ongoing  Plan: BMP follow, continue verapamil 120mg BID, losartan 50mg QD    (R19.7) Diarrhea, unspecified type  Comment: acute/ongoing  Plan: continue culturelle 10bi BID    (E87.1) Hyponatremia  Comment: acute/ongoing  Plan: BMP follow, consider fluid restriction if persists    (D69.6) Thrombocytopenia (H)  Comment: ongoing  Plan: CBC on monday    (M54.50) right sided low back pain without sciatica  Acute/ongoing  Plan: schedule tylenol 1000mg TID, K-pad prn for pain    Orders:  CBC and BMP on Monday  Tylenol 1000mg TID scheduled  K-pad prn for pain      Total time spent with patient visit at the [facility] nursing facility was 45 min including patient visit and review of past records. Greater than 50% of total time spent with counseling and coordinating care due to discussed back pain, medication and lab monitoring.     Electronically signed by:  Tonya Lynn Haase, APRN CNP                       Sincerely,        Tonya Lynn Haase, APRN CNP

## 2023-01-30 ENCOUNTER — TRANSITIONAL CARE UNIT VISIT (OUTPATIENT)
Dept: GERIATRICS | Facility: CLINIC | Age: 68
End: 2023-01-30
Payer: COMMERCIAL

## 2023-01-30 VITALS
HEART RATE: 69 BPM | TEMPERATURE: 98.2 F | RESPIRATION RATE: 18 BRPM | DIASTOLIC BLOOD PRESSURE: 87 MMHG | SYSTOLIC BLOOD PRESSURE: 169 MMHG | BODY MASS INDEX: 47.09 KG/M2 | OXYGEN SATURATION: 93 % | HEIGHT: 66 IN | WEIGHT: 293 LBS

## 2023-01-30 DIAGNOSIS — I10 BENIGN ESSENTIAL HYPERTENSION: ICD-10-CM

## 2023-01-30 DIAGNOSIS — N20.0 NEPHROLITHIASIS: ICD-10-CM

## 2023-01-30 DIAGNOSIS — N31.9 NEUROGENIC BLADDER: ICD-10-CM

## 2023-01-30 DIAGNOSIS — E87.1 HYPONATREMIA: ICD-10-CM

## 2023-01-30 DIAGNOSIS — G35 MS (MULTIPLE SCLEROSIS) (H): ICD-10-CM

## 2023-01-30 DIAGNOSIS — D69.6 THROMBOCYTOPENIA (H): ICD-10-CM

## 2023-01-30 DIAGNOSIS — R53.81 PHYSICAL DECONDITIONING: ICD-10-CM

## 2023-01-30 DIAGNOSIS — N39.0 URINARY TRACT INFECTION WITHOUT HEMATURIA, SITE UNSPECIFIED: Primary | ICD-10-CM

## 2023-01-30 DIAGNOSIS — M54.50 RIGHT-SIDED LOW BACK PAIN WITHOUT SCIATICA, UNSPECIFIED CHRONICITY: ICD-10-CM

## 2023-01-30 DIAGNOSIS — R19.7 DIARRHEA, UNSPECIFIED TYPE: ICD-10-CM

## 2023-01-30 PROCEDURE — 99310 SBSQ NF CARE HIGH MDM 45: CPT | Performed by: NURSE PRACTITIONER

## 2023-01-30 RX ORDER — CHOLESTYRAMINE 4 G/9G
1 POWDER, FOR SUSPENSION ORAL 2 TIMES DAILY
Start: 2023-01-30 | End: 2023-02-08

## 2023-01-30 NOTE — LETTER
"    1/30/2023        RE: Mili Padilla  [address]  [facility] [address]        [facility]    Chief Complaint   Patient presents with     RECHECK     HPI:  Mili Padilla is a 67 year old  (1955), who is being seen today for an episodic care visit at: [facility] ([facility]) [[id number]]. Today's concern is:   UTI/deconditioning denies dysuria, fever, chills, in therapy patient is working on stand pivot transfers, was able to walk up to 3 feet using a 4ww with CGA  MS: working on strengthening exercises in therapy, uses a w/c at baseline for mobility  HTN: /76, 169/87, 181/91 with HR 70-80 range, denies CP, palpitations or SOB  Nephrolithiasis: no c/o abdominal pain, N/V  Diarrhea ongoing loose stool, patient states 3-4 loose stool per day, denies loss of appetite  Thrombocytopenia: see labs  Neurogenic bladder patient states she is urinating without difficulty  Hyponatremia: see labs  Low back pain: denies pain at time of exam    Allergies, and PMH/PSH reviewed in EPIC today.  REVIEW OF SYSTEMS:  10 point ROS of systems including Constitutional, Eyes, Respiratory, Cardiovascular, Gastroenterology, Genitourinary, Integumentary, Musculoskeletal, Psychiatric were all negative except for pertinent positives noted in my HPI.    Objective:   BP (!) 169/87   Pulse 69   Temp 98.2  F (36.8  C)   Resp 18   Ht 1.676 m (5' 6\")   Wt 134.9 kg (297 lb 8 oz)   LMP 12/10/2010   SpO2 93%   BMI 48.02 kg/m    GENERAL APPEARANCE:  Alert, in no distress, morbidly obese  ENT:  Mouth and posterior oropharynx normal, moist mucous membranes, normal hearing acuity  EYES:  EOM, conjunctivae, lids, pupils and irises normal, PERRL  RESP:  respiratory effort and palpation of chest normal, lungs clear to auscultation , no respiratory distress  CV:  Palpation and auscultation of heart done , regular rate and rhythm, no murmur, rub, or gallop, peripheral edema 1+ in LE bilaterally  ABDOMEN: "  normal bowel sounds, soft, nontender, no hepatosplenomegaly or other masses  M/S:   patient resting in bed  SKIN:  Inspection of skin and subcutaneous tissue baseline  NEURO:   speech wnl  PSYCH:  affect and mood normal    Recent labs in [facility] reviewed by me today.  and   Most Recent 3 CBC's:Recent Labs   Lab Test 01/24/23  0919 01/23/23  1121 01/22/23  0918   WBC 10.1 10.4 8.9   HGB 12.5 11.6* 11.1*   MCV 93 93 92   * 94* 79*     Most Recent 3 BMP's:Recent Labs   Lab Test 01/24/23  0919 01/23/23  1633 01/23/23  1121 01/22/23  0918   *  --  132* 132*   POTASSIUM 3.6 3.6 3.4 3.4   CHLORIDE 95*  --  96* 97*   CO2 30*  --  26 28   BUN 7.9*  --  8.8 8.2   CR 0.53  --  0.49* 0.51   ANIONGAP 10  --  10 7   HEAVENLY 8.7*  --  8.5* 8.6*   *  --  102* 159*       Assessment/Plan:  N39.0) UTI  Comment: acute/ongoing, no change  Plan: PT and OT, cipro 250mg BID for 9 days, f/u with urology as OP  Labs pending for today     (G35) MS (multiple sclerosis) (H)  (N31.9) Neurogenic bladder  (R53.81) Physical deconditioning  Comment: acute/ongoing, no change  Plan: PT and OT, continue trospium 20mg BID,      (N20.0) Nephrolithiasis  Comment: acute/ongoing s/p elective left ureteroscopy, lithotripsy and left ureteral stent, no change  Plan: f/u as OP with urology for stent removal     (I10) Benign essential hypertension  Comment: ongoing, no change  Plan: BMP follow, continue verapamil 120mg BID, losartan 50mg QD     (R19.7) Diarrhea, unspecified type  Comment: acute/ongoing  Plan: continue culturelle 10bi BID  Start questran 4g BID, continue imodium 2mg BID prn     (E87.1) Hyponatremia  Comment: acute/ongoing, no change  Plan: BMP follow, labs pending for today consider fluid restriction if persists     (D69.6) Thrombocytopenia (H)  Comment: ongoing, no change  Plan: CBC pending for today     (M54.50) right sided low back pain without sciatica  Acute/ongoing, no change  Plan: schedule tylenol 1000mg TID, K-pad prn for  pain       MED REC REQUIRED  Post Medication Reconciliation Status: medication reconcilation previously completed during another office visit      Orders:  Questran 4g BID scheduled      Electronically signed by: Tonya Lynn Haase, APRN CNP             Sincerely,        Tonya Lynn Haase, APRN CNP

## 2023-01-30 NOTE — PROGRESS NOTES
"[facility] GERIATRICS    Chief Complaint   Patient presents with     RECHECK     HPI:  Mili Padilla is a 67 year old  (1955), who is being seen today for an episodic care visit at: [facility] ([facility]) [[id number]]. Today's concern is:   UTI/deconditioning denies dysuria, fever, chills, in therapy patient is working on stand pivot transfers, was able to walk up to 3 feet using a 4ww with CGA  MS: working on strengthening exercises in therapy, uses a w/c at baseline for mobility  HTN: /76, 169/87, 181/91 with HR 70-80 range, denies CP, palpitations or SOB  Nephrolithiasis: no c/o abdominal pain, N/V  Diarrhea ongoing loose stool, patient states 3-4 loose stool per day, denies loss of appetite  Thrombocytopenia: see labs  Neurogenic bladder patient states she is urinating without difficulty  Hyponatremia: see labs  Low back pain: denies pain at time of exam    Allergies, and PMH/PSH reviewed in [facility] today.  REVIEW OF SYSTEMS:  10 point ROS of systems including Constitutional, Eyes, Respiratory, Cardiovascular, Gastroenterology, Genitourinary, Integumentary, Musculoskeletal, Psychiatric were all negative except for pertinent positives noted in my HPI.    Objective:   BP (!) 169/87   Pulse 69   Temp 98.2  F (36.8  C)   Resp 18   Ht 1.676 m (5' 6\")   Wt 134.9 kg (297 lb 8 oz)   LMP 12/10/2010   SpO2 93%   BMI 48.02 kg/m    GENERAL APPEARANCE:  Alert, in no distress, morbidly obese  ENT:  Mouth and posterior oropharynx normal, moist mucous membranes, normal hearing acuity  EYES:  EOM, conjunctivae, lids, pupils and irises normal, PERRL  RESP:  respiratory effort and palpation of chest normal, lungs clear to auscultation , no respiratory distress  CV:  Palpation and auscultation of heart done , regular rate and rhythm, no murmur, rub, or gallop, peripheral edema 1+ in LE bilaterally  ABDOMEN:  normal bowel sounds, soft, nontender, no hepatosplenomegaly or other masses  M/S:   patient " resting in bed  SKIN:  Inspection of skin and subcutaneous tissue baseline  NEURO:   speech wnl  PSYCH:  affect and mood normal    Recent labs in [facility] reviewed by me today.  and   Most Recent 3 CBC's:Recent Labs   Lab Test 01/24/23  0919 01/23/23  1121 01/22/23  0918   WBC 10.1 10.4 8.9   HGB 12.5 11.6* 11.1*   MCV 93 93 92   * 94* 79*     Most Recent 3 BMP's:Recent Labs   Lab Test 01/24/23  0919 01/23/23  1633 01/23/23  1121 01/22/23  0918   *  --  132* 132*   POTASSIUM 3.6 3.6 3.4 3.4   CHLORIDE 95*  --  96* 97*   CO2 30*  --  26 28   BUN 7.9*  --  8.8 8.2   CR 0.53  --  0.49* 0.51   ANIONGAP 10  --  10 7   HEAVENLY 8.7*  --  8.5* 8.6*   *  --  102* 159*       Assessment/Plan:  N39.0) UTI  Comment: acute/ongoing, no change  Plan: PT and OT, cipro 250mg BID for 9 days, f/u with urology as OP  Labs pending for today     (G35) MS (multiple sclerosis) (H)  (N31.9) Neurogenic bladder  (R53.81) Physical deconditioning  Comment: acute/ongoing, no change  Plan: PT and OT, continue trospium 20mg BID,      (N20.0) Nephrolithiasis  Comment: acute/ongoing s/p elective left ureteroscopy, lithotripsy and left ureteral stent, no change  Plan: f/u as OP with urology for stent removal     (I10) Benign essential hypertension  Comment: ongoing, no change  Plan: BMP follow, continue verapamil 120mg BID, losartan 50mg QD     (R19.7) Diarrhea, unspecified type  Comment: acute/ongoing  Plan: continue culturelle 10bi BID  Start questran 4g BID, continue imodium 2mg BID prn     (E87.1) Hyponatremia  Comment: acute/ongoing, no change  Plan: BMP follow, labs pending for today consider fluid restriction if persists     (D69.6) Thrombocytopenia (H)  Comment: ongoing, no change  Plan: CBC pending for today     (M54.50) right sided low back pain without sciatica  Acute/ongoing, no change  Plan: schedule tylenol 1000mg TID, K-pad prn for pain       MED REC REQUIRED  Post Medication Reconciliation Status: medication reconcilation  previously completed during another office visit      Orders:  Questran 4g BID scheduled      Electronically signed by: Tonya Lynn Haase, APRN CNP

## 2023-01-31 ENCOUNTER — TELEPHONE (OUTPATIENT)
Dept: GERIATRICS | Facility: CLINIC | Age: 68
End: 2023-01-31
Payer: COMMERCIAL

## 2023-01-31 ENCOUNTER — DOCUMENTATION ONLY (OUTPATIENT)
Dept: GERIATRICS | Facility: CLINIC | Age: 68
End: 2023-01-31
Payer: COMMERCIAL

## 2023-01-31 NOTE — TELEPHONE ENCOUNTER
[facility] Geriatrics Triage Nurse Telephone Encounter    Provider: Tonya Haase, APRN CNP  Facility: [facility] Facility Type:  TCU    Caller: Sangeetha   Call Back Number: [phone]    Allergies:    Allergies   Allergen Reactions     Amoxicillin Hives     Augmentin      Sensitive,  Able to tolerate a few doses, otherwise hives on hands     Betaseron [Interferon Beta-1b]      Necrosis of skin at injection sites     Dust Mite Extract Unknown     Mold Unknown        Reason for call:     Pt has trazodone 600mg @ HS and melatonin 10mg @ HS. Pt gets woken up in the middle of the night to be changed and she can t fall back to sleep at night. Pt wanted to know if she could possibly cut her trazadone in half and take the other half when she is woken up?     Also, pt has a yeasty rash in her groin.   Verbal Order/Direction given by Provider: Trazodone 300mg @ HS may repeat x1   Nystatin bid to groin and abd folds bid until resolved.     Provider giving Order:  Tonya Haase, APRN CNP    Verbal Order given to: Sangeetha Hull RN

## 2023-02-03 ENCOUNTER — VIRTUAL VISIT (OUTPATIENT)
Dept: PSYCHOLOGY | Facility: CLINIC | Age: 68
End: 2023-02-03
Payer: COMMERCIAL

## 2023-02-03 DIAGNOSIS — F54 PSYCHOLOGICAL FACTORS AFFECTING MORBID OBESITY (H): ICD-10-CM

## 2023-02-03 DIAGNOSIS — E66.01 PSYCHOLOGICAL FACTORS AFFECTING MORBID OBESITY (H): ICD-10-CM

## 2023-02-03 DIAGNOSIS — F33.1 MAJOR DEPRESSIVE DISORDER, RECURRENT EPISODE, MODERATE (H): Primary | ICD-10-CM

## 2023-02-03 PROCEDURE — 90834 PSYTX W PT 45 MINUTES: CPT | Mod: 95 | Performed by: PSYCHOLOGIST

## 2023-02-03 NOTE — PROGRESS NOTES
Health Psychology                     Department of Medicine  Izzy Montaño, Ph.D., L.P. [phone]                          [facility] Sherrie Crowe, Ph.D., L.P. [phone]                   [place] Mail Code 080   Ranulfo Whipple, Ph.D. [phone]        [address] [place] [address] Enriqueta Sorto, Ph.D., L.P. [phone]    [place] [address]           Ramin Olivo, Ph.D., A.B.P.P., L.P. [phone]        Kathie Galvan, Ph.D., L.P. [phone]  [address] Psychology Telemental Health Note    Ms. Padilla is a 66-year-old woman self-referred for psychological consultation because her therapist of the last 24 years retired.  She is seen for problem-solving and supportive therapy for depression and multiple health issues.     HISTORY OF PRESENTING CONCERN:  Ms. Padilla reported at intake (7/28/16) a  lengthy history of depression.  She sees a psychiatrist, Ban Coleman, at [facility], and is taking Abilify 7.5 mg, trazodone 500 mg, ripazepam 75 mg each day at bedtime, Tegretol 400 mg at bedtime and methylphenidate 10 mg b.i.d.  She discontineud ability and is now taking Rexulti 2 mg.  She has a history of hospitalizations including 3 at [facility] in the late 1990s, early 2000s when she had 2 courses of ECT lasting 7-10 sessions and approximately 3 other single session ECTs.  She stopped due to memory problems.  She kept with Dr. Coleman who she first met as an inpatient and has seen her for the past 18 years.  She had also been hospitalized psychiatrically at [facility] at age 23.  She previously worked with psychiatrist, Doug Sarabia for three years, stopping, in part, because she didn't feel he took her suicide attempt sufficiently seriously.  She reports her depression tends to vary.     MEDICAL HISTORY (at intake)  Ms. Padilla has a complex medical history.  She was diagnosed with  MS in 1985.  Her psychiatrist at [facility] in [place], Dr. 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  She has used a scooter since 1992, using it more  than she had previously.  She also can use a walker.  She was diagnosed with fibromyalgia in 1997.   She is seen at the [facility] for her eye care. During 8th grade, she fell on a ski lift, injuring her larynx.     WEIGHT not taken. iscussed goal of cutting back by 125 calories per day.   That would get her losing 2 lbs./month.  She has goal of 1750 claories/day. and states she met that goal aout half the days of the interview.  She had only 4 days down to goal in the last 18 days;  the rest were about 2100 janice.   She was at goal in the preceeding days for 11 of 17 days.     She is weighing herself weekly, using more vegetables for snack.  She plans to write down calories in the morning. She acknowledges she needs to do it daily.  On 8/9/21, she reports weight is 265 down from 270 up from 268 down from  277.    She resumed attending OA, then stopped during the winter.     Wt Readings from Last 4 Encounters:   01/30/23 134.9 kg (297 lb 8 oz)   01/25/23 134.9 kg (297 lb 8 oz)   01/20/23 134 kg (295 lb 6.7 oz)   11/04/22 134.2 kg (295 lb 12.8 oz)     Past Medical History:   Diagnosis Date     Depression, major, in partial remission (H)      Fibromyalgia syndrome      Gastro-oesophageal reflux disease      Hyperlipemia      Hypertension      Low serum sodium      Lymphedema      Mixed incontinence      Multiple sclerosis (H)     tremors with MS, all four limbs and head     Multiple sclerosis, secondary progressive (H)      Neurogenic bladder      Obese      Osteoporosis      Sleep apnea      Vocal cord paralysis, unilateral complete         Past Surgical History:   Procedure Laterality Date     COLONOSCOPY N/A 07/17/2017    Procedure: COMBINED COLONOSCOPY, SINGLE OR MULTIPLE BIOPSY/POLYPECTOMY BY BIOPSY;;  Surgeon:  Wong Beaulieu MD;  Location:  GI     COLONOSCOPY N/A 02/23/2018    Procedure: COMBINED COLONOSCOPY, SINGLE OR MULTIPLE BIOPSY/POLYPECTOMY BY BIOPSY;  COLONOSCOPY ;  Surgeon: Yessica Santana MD;  Location:  GI     ENT SURGERY      throat 1969, vocal cord surgery with skin grafting     ESOPHAGOSCOPY, GASTROSCOPY, DUODENOSCOPY (EGD), COMBINED N/A 12/16/2014    Procedure: COMBINED ENDOSCOPIC ULTRASOUND, ESOPHAGOSCOPY, GASTROSCOPY, DUODENOSCOPY (EGD), FINE NEEDLE ASPIRATE/BIOPSY;  Surgeon: Yessica Santana MD;  Location:  GI     ESOPHAGOSCOPY, GASTROSCOPY, DUODENOSCOPY (EGD), COMBINED N/A 12/16/2014    Procedure: COMBINED ESOPHAGOSCOPY, GASTROSCOPY, DUODENOSCOPY (EGD), BIOPSY SINGLE OR MULTIPLE;  Surgeon: Yessica Santana MD;  Location:  GI     GASTRIC BYPASS Bilateral      LAPAROSCOPIC APPENDECTOMY  05/30/2013    Procedure: LAPAROSCOPIC APPENDECTOMY;;  Surgeon: Salvador Morris MD;  Location:  OR     LAPAROSCOPIC BIOPSY LIVER  05/30/2013    Procedure: LAPAROSCOPIC BIOPSY LIVER;;  Surgeon: Salvador Morris MD;  Location:  OR     LAPAROSCOPIC GASTRIC SLEEVE  05/30/2013    Procedure: LAPAROSCOPIC GASTRIC SLEEVE;  LAPAROSCOPIC SLEEVE GASTRECTOMY/ LAPARSCOPIC  APPENDECTOMY /LIVER BIOPSIES/LIVER CYST DRAINAGE;  Surgeon: Salvador Morris MD;  Location:  OR     LASER HOLMIUM LITHOTRIPSY URETER(S), INSERT STENT, COMBINED Left 1/18/2023    Procedure: CYSTOSCOPY, LEFT URETEROSCOPY, HOLMIUM LASER  LITHOTRIPSY, LEFT   STENT PLACEMENT;  Surgeon: Mahendra Coles MD;  Location:  OR     ORTHOPEDIC SURGERY      R wrist 2010       Current Outpatient Medications   Medication     acetaminophen (TYLENOL) 500 MG tablet     ARIPiprazole (ABILIFY) 5 MG tablet     atorvastatin (LIPITOR) 20 MG tablet     carBAMazepine (TEGRETOL) 200 MG tablet     cholestyramine (QUESTRAN) 4 g packet     Cranberry 1000 MG CAPS     cyanocobalamin (VITAMIN B-12) 100 MCG tablet     denosumab (PROLIA) 60 MG/ML SOLN  injection     Dextromethorphan-guaiFENesin (MUCINEX DM MAXIMUM STRENGTH PO)     docusate sodium (COLACE) 100 MG tablet     DULoxetine (CYMBALTA) 60 MG capsule     famotidine (PEPCID) 40 MG tablet     fluticasone (FLONASE) 50 MCG/ACT nasal spray     ketoconazole (NIZORAL) 2 % cream     lactobacillus rhamnosus, GG, (CULTURELL) capsule     loperamide (IMODIUM A-D) 2 MG tablet     losartan (COZAAR) 50 MG tablet     Melatonin 10 MG TABS     mirtazapine (REMERON) 30 MG tablet     Multiple Vitamin (MULTIVITAMINS PO)     Psyllium (METAMUCIL) 0.36 g CAPS     traZODone (DESYREL) 100 MG tablet     triamcinolone (ARISTOCORT HP) 0.5 % external cream     trospium (SANCTURA) 20 MG tablet     verapamil ER (CALAN-SR) 120 MG CR tablet     vitamin B-Complex     No current facility-administered medications for this visit.     Medication: On Duloxetine and Mirtazpine and Trazodone and Ritalin. She discontinued Effexor and Abilify previously per Dr. Marquise Coleman, her psychiatrist.  On 4/10/19 she informed me that she started Abilify    PHQ-9 SCORE 9/15/2019 2022 11/3/2022   PHQ-9 Total Score MyChart 5 (Mild depression) 6 (Mild depression) 5 (Mild depression)   PHQ-9 Total Score 5 6 5     NAS-7 SCORE 2022 11/3/2022 2022   Total Score 3 (minimal anxiety) 3 (minimal anxiety) 4 (minimal anxiety)   Total Score 3 3 4     SOCIAL HISTORY (at [facility])  Ms. Padilla grew up in the [place] area and is the oldest of 5 children in her family of origin.  Her father was a dentist who she believes was bipolar.  He  of lung cancer at age 72.  Her mother  of sepsis at age 80.  She had been diagnosed with breast cancer when Ms. Padilla was 8.  She describes the marriage between her parents as misael.  She is 5 years older than her closest-aged sister and they are all within 4 years of each other.   Her daughter  12/3 and her mother .  She tends to feel more depressed in winter.      Ms. Padilla attended [employer] for  a year and later went to night school at the [employer].  She felt that she could not continue her education to the point of graduation because she was working full time and raising her daughter.  Her daughter  in  at age 31 of liver failure secondary to an accidental Tylenol overdose.  She had been recovering from a hysterectomy.      Ms. 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  She had to retire at the time secondary to her MS.  She has never .  She has not dated,and states that if she were she would date, she would be interested in a relationship with a woman.  There is no history of  service or legal problems.  She expresses concern about her increasing social isolation.  She has at least 1 friend, Jael, who she met at a therapy group in .  She is active in facilitating groups for people with MS both in [place] and [place].  She lives alone and currently gets help from a home health aide, as well as home health nurse.     She had  seen Dr. Ban Coleman, psychiatrist   Dr. Coleman prescribed Cymbalta for it.  She feels she has been more depressed since the Fall, but doesn't think it is SAD.   She stated that she has recommended case management.     SESSION:  Mili participated in a telehealth session of psychotherapy. The depression varies and has worsened given her health issues.    She was hospitalized at [facility] for seven days. She  developed sepsis as part of recovery from surgery for a kidney stone.  She is now in the [facility]. Hope to go home .  Last night  slept 11-7; 10-6 the night before. Giong to bed at 9, but has difficulty falling asleep.      Recent stressors:     1.A niece, Shalini  (38) is starting to develop problems consistent with MS.  She has two daughters.  She can't work.   Her mother is  Mili Lynne's sister.   2. Her best friend, Jael, is going into hospice.  Medications stopped working.  Trying to find a clniical trial.   3.  Still on abx, having diarrhea; using Imodium.    Weight Issues: She is having difficulty  sticking to the 1600 calorie ceiling, but progressingt.   She states she is down to  284.9, losing weight due to diarrhea.   She is trying to order lower calorie meals.     She started a meal delivery service, [employer], while living at home for a year.  She gets groceries delivered too.   Some concrete steps include having just 1 tangerine and purchasing smaller bananas. Reinforced keeping it to 1600 every day. She felt she was improving.  She plans to resume when returning home.     She participated fully and appeared to derive benefit. Affect is positive.  Rapport was excellent.   This telehealth (telephone) service is appropriate and effective for delivering services in light of the necessity for social distancing to mitigate the COVID-19 epidemic and for conservation of PPE.     Patient has agreed to receiving telehealth services after being informed about it: Yes    Patient prefers video invitation/information to be sent by:   email    Time service started: 3:06  Time service ended:  3:44  Extended session due to complexity of case and length of interval.    Mode of transmission: [employer]    Location of originating:  [facility] where  the patient is undergoing rehabilitation    Distance site:  Home office of provider for [facility]    The patient has been notified that:  Video visits will be conducted via a call with their psychologist to provide the care they need with a video conversation. Video visits may be billed at different rates depending on insurance coverage.  Patients are advised to please contact their insurance provider with any questions about their health insurance coverage. If during the course of a call the psychologist feels a video visit is not appropriate,  patients will not be charged for this service  DIAGNOSES:   Major depression, recurrent, oderate (F33.1).   Behavioral factors affecting morbid obesity (E66.01).     PLAN:  Ms. Padilla will return for video visit  on 2/23  @ 12 for problem-solving and supportive therapy consistent with treatment plan.  Goal per day now that she is tracking is  1600 consistently.     Preference for future meetings:             In-person   prefers, even if masked              Remote              Either    Last treatment plan signed:5/27/22/  Last Treatment plan review: 12/23/22  Treatment plan verbal Review due: 3/23/23  Treatment Review due: 5/27/23     Ramin Olivo, PhD, A.B.P.P., L.P.   Director, Health Psychology

## 2023-02-03 NOTE — PROGRESS NOTES
HISTORY OF PRESENT ILLNESS: Mili Padilla is a 67 year old female with a history of  trauma to the larynx at age 13 from a skiing accident.  She was reconstructed by Dr. Hammer in the past and subsequently followed by Dr. Day.  I had seen her initially in 2008 and I have been following her since then.  Her airway has been stable since I last seen her on 2/4/2020 in clinic.  We did have a video visit on February 23, 2021.  She has had no new difficulties since her last visit.  She notes that she swallows liquids without any difficulties.  Solid and dry foods sometimes will get slightly increased difficulty but all her swallowing well.  She has no changes in her voice no new breathing issues.  She has a long-standing left vocal fold immobility. She has had some increase in dysphagia over the last 5-6 years. 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  On her last exam on February 23, 2021 we discussed alternating between video exams and laryngeal exams.  I last saw her on the video exam on 2/8/2022.  At that time her airway was stable.  She will get short of breath with some activity but had no stridor or upper airway turbulence with deep breathing.  She also had some difficulties with swallowing but primarily with dry foods.      She comes back today for an office examination to examine her airway.  Since seeing her she has had a significant illness and rehab involving sepsis but unrelated to her upper airway.  She continues to breathe comfortably.  Her swallowing is stable and her voice is stable.    Last 2 Scores for Patient-Answered VHI Questionnaire  VHI Total Score 12/8/2016   VHI Total Score 21       Last 2 Scores for Patient-Answered CSI Questionnaire  CSI Total Score 1/28/2019 2/4/2020   CSI Total Score 4 8         Last 2 Scores for Patient-Answered EAT Questionnaire  EAT Total Score 1/28/2019 2/4/2020   EAT Total Score 5  Incomplete           PAST MEDICAL HISTORY:   Past Medical History:   Diagnosis Date     Depression, major, in partial remission (H)      Fibromyalgia syndrome      Gastro-oesophageal reflux disease      Hyperlipemia      Hypertension      Low serum sodium      Lymphedema      Mixed incontinence      Multiple sclerosis (H)     tremors with MS, all four limbs and head     Multiple sclerosis, secondary progressive (H)      Neurogenic bladder      Obese      Osteoporosis      Sleep apnea      Vocal cord paralysis, unilateral complete        PAST SURGICAL HISTORY:   Past Surgical History:   Procedure Laterality Date     COLONOSCOPY N/A 07/17/2017    Procedure: COMBINED COLONOSCOPY, SINGLE OR MULTIPLE BIOPSY/POLYPECTOMY BY BIOPSY;;  Surgeon: Wong Beaulieu MD;  Location:  GI     COLONOSCOPY N/A 02/23/2018    Procedure: COMBINED COLONOSCOPY, SINGLE OR MULTIPLE BIOPSY/POLYPECTOMY BY BIOPSY;  COLONOSCOPY ;  Surgeon: Yessica Santana MD;  Location:  GI     ENT SURGERY      throat 1969, vocal cord surgery with skin grafting     ESOPHAGOSCOPY, GASTROSCOPY, DUODENOSCOPY (EGD), COMBINED N/A 12/16/2014    Procedure: COMBINED ENDOSCOPIC ULTRASOUND, ESOPHAGOSCOPY, GASTROSCOPY, DUODENOSCOPY (EGD), FINE NEEDLE ASPIRATE/BIOPSY;  Surgeon: Yessica Santana MD;  Location:  GI     ESOPHAGOSCOPY, GASTROSCOPY, DUODENOSCOPY (EGD), COMBINED N/A 12/16/2014    Procedure: COMBINED ESOPHAGOSCOPY, GASTROSCOPY, DUODENOSCOPY (EGD), BIOPSY SINGLE OR MULTIPLE;  Surgeon: Yessica Santana MD;  Location: [facility]     GASTRIC BYPASS Bilateral      LAPAROSCOPIC APPENDECTOMY  05/30/2013    Procedure: LAPAROSCOPIC APPENDECTOMY;;  Surgeon: Salvador Morris MD;  Location:  OR     LAPAROSCOPIC BIOPSY LIVER  05/30/2013    Procedure: LAPAROSCOPIC BIOPSY LIVER;;  Surgeon: Salvador Morris MD;  Location:  OR     LAPAROSCOPIC GASTRIC SLEEVE  05/30/2013    Procedure: LAPAROSCOPIC GASTRIC SLEEVE;  LAPAROSCOPIC SLEEVE GASTRECTOMY/  LAPARSCOPIC  APPENDECTOMY /LIVER BIOPSIES/LIVER CYST DRAINAGE;  Surgeon: Salvador Morris MD;  Location:  OR     LASER HOLMIUM LITHOTRIPSY URETER(S), INSERT STENT, COMBINED Left 2023    Procedure: CYSTOSCOPY, LEFT URETEROSCOPY, HOLMIUM LASER  LITHOTRIPSY, LEFT   STENT PLACEMENT;  Surgeon: Mahendra Coles MD;  Location:  OR     ORTHOPEDIC SURGERY      R wrist        FAMILY HISTORY:   Family History   Problem Relation Age of Onset     Breast Cancer Mother      Other Cancer Father      Breast Cancer Maternal Aunt      Breast Cancer Maternal Aunt      Breast Cancer Maternal Aunt      Breast Cancer Maternal Aunt      Glaucoma No family hx of      Macular Degeneration No family hx of      Amblyopia No family hx of        SOCIAL HISTORY:   Social History     Tobacco Use     Smoking status: Former     Types: Cigarettes     Quit date: 1974     Years since quittin.3     Smokeless tobacco: Never   Substance Use Topics     Alcohol use: Not Currently       REVIEW OF SYSTEMS: Ten point review of systems was performed and is negative except for:   [facility] ROS 2020   Constitutional: -   Neurology: Dizzy spells, Numbness   Psychology: Frequently feeling depressed or sad   Eyes: -   Ears, Nose, Throat: Hoarseness   Cardiopulmonary: Cough   Gastrointestinal/Genitourinary: Heartburn/indigestion, Constipation   Musculoskeletal: Swollen legs/feet   Endocrine: Heat or cold intolerance        ALLERGIES: Amoxicillin, Augmentin, Betaseron [interferon beta-1b], Dust mite extract, and Mold    MEDICATIONS:   Current Outpatient Medications   Medication Sig Dispense Refill     acetaminophen (TYLENOL) 500 MG tablet Take 1,000 mg by mouth 3 times daily       ARIPiprazole (ABILIFY) 5 MG tablet Take 7.5 mg by mouth daily  2     atorvastatin (LIPITOR) 20 MG tablet Take 20 mg by mouth daily.       carBAMazepine (TEGRETOL) 200 MG tablet Take 200 mg by mouth 2 times daily       cholestyramine (QUESTRAN) 4 g packet Take 1  packet (4 g) by mouth 2 times daily       Cranberry 1000 MG CAPS Take by mouth daily       cyanocobalamin (VITAMIN B-12) 100 MCG tablet Take 1,000 mcg by mouth once a week       denosumab (PROLIA) 60 MG/ML SOLN injection Inject 60 mg Subcutaneous every 6 months On hold in [facility]       Dextromethorphan-guaiFENesin (MUCINEX DM MAXIMUM STRENGTH PO) Take by mouth At Bedtime       docusate sodium (COLACE) 100 MG tablet Take 100 mg by mouth 2 times daily       DULoxetine (CYMBALTA) 60 MG capsule Take 120 mg by mouth daily        famotidine (PEPCID) 40 MG tablet TAKE 1/2 TABLET BY MOUTH TWICE DAILY (Patient taking differently: Take 40 mg by mouth nightly as needed) 90 tablet 0     fluticasone (FLONASE) 50 MCG/ACT nasal spray Spray 2 sprays into both nostrils daily       ketoconazole (NIZORAL) 2 % cream Apply topically 2 times daily as needed        lactobacillus rhamnosus, GG, (CULTURELL) capsule Take 1 capsule by mouth 2 times daily       loperamide (IMODIUM A-D) 2 MG tablet Take 1 tablet (2 mg) by mouth 2 times daily       losartan (COZAAR) 50 MG tablet Take 1 tablet (50 mg) by mouth daily       Melatonin 10 MG TABS Take 10 mg by mouth At Bedtime Changed to extended release       mirtazapine (REMERON) 30 MG tablet Take 75 mg by mouth At Bedtime        Multiple Vitamin (MULTIVITAMINS PO) Take 2 tablets by mouth every evening. WITH IRON       Psyllium (METAMUCIL) 0.36 g CAPS Metamucil       traZODone (DESYREL) 100 MG tablet Take 600 mg by mouth At Bedtime May split dose, 300mg @ HS and repeat x1 if needed        triamcinolone (ARISTOCORT HP) 0.5 % external cream Apply topically At Bedtime To hands       trospium (SANCTURA) 20 MG tablet Take 1 tablet (20 mg) by mouth 2 times daily (before meals)       verapamil ER (CALAN-SR) 120 MG CR tablet Take 120 mg by mouth 2 times daily       vitamin B-Complex Take 1 tablet by mouth daily            PHYSICAL EXAMINATION:  She  is awake, alert and in no apparent distress.  She is in a  wheelchair.  Her voice is rough but stable and articulate.  Her tympanic membranes are clear and intact bilaterally. External auditory canals are clear.  Nasal exam shows a mild septal deviation without obstruction.  Examination of the oral cavity shows no suspicious lesions.  There is symmetric movement of the tongue and soft palate.    The oropharynx is clear.  Her neck is supple without significant adenopathy.  Pulse is regular.  Upper airway is clear.  Cranial nerves II-XII are grossly intact.       PROCEDURE: A flexible laryngoscopy  was performed.  Informed consent was obtained and a time out was performed. 2% lidocaine and 0.025% oxymetazoline was sprayed into the nasal cavity and allowed 3 to 5 minutes for effect. The scope was passed through the right sided nostril.  Examination showed the right vocal fold to be fully mobile.  The left arytenoid has been surgically removed in the remote past.  The area epiglottic fold is draped over the larynx.  There continues to be a stable and adequate airway. No nodules, polyps or ulcerations are seen. There is mild inflammation at the posterior commissure.   The right vocal fold shows minimal to no inflammation. With phonation there is severe contraction of the supraglottic larynx.  This is primarily due to closure of the right arytenoid and false vocal fold approximating the left aryepiglottic fold.   Hypopharynx is clear and well visualized with some rotation of the larynx.  The posterior cricoid space is also well visualized and no pooling of saliva or other material collection is seen.  No suspicious lesions are present. The hypopharynx is otherwise clear as is the subglottis.       2/7/2023 Exam       Phonation      IMPRESSION/PLAN: Stable laryngeal exam with stable dysphagia but she is tolerating well.  She is recovering nicely from her recent hospitalization.  All questions were answered and she is comfortable with the exam provided today.  I will have her  follow-up with the virtual visit in 1 year.

## 2023-02-07 ENCOUNTER — MEDICAL CORRESPONDENCE (OUTPATIENT)
Dept: HEALTH INFORMATION MANAGEMENT | Facility: CLINIC | Age: 68
End: 2023-02-07

## 2023-02-07 ENCOUNTER — OFFICE VISIT (OUTPATIENT)
Dept: OTOLARYNGOLOGY | Facility: CLINIC | Age: 68
End: 2023-02-07
Payer: COMMERCIAL

## 2023-02-07 VITALS
BODY MASS INDEX: 45.64 KG/M2 | SYSTOLIC BLOOD PRESSURE: 152 MMHG | HEART RATE: 90 BPM | DIASTOLIC BLOOD PRESSURE: 85 MMHG | WEIGHT: 284 LBS | HEIGHT: 66 IN | OXYGEN SATURATION: 98 % | TEMPERATURE: 97.6 F

## 2023-02-07 DIAGNOSIS — J38.01 VOCAL CORD PARALYSIS, UNILATERAL COMPLETE: ICD-10-CM

## 2023-02-07 DIAGNOSIS — R49.0 DYSPHONIA: Primary | ICD-10-CM

## 2023-02-07 DIAGNOSIS — R13.19 OTHER DYSPHAGIA: Primary | ICD-10-CM

## 2023-02-07 PROCEDURE — 99207 PR NO CHARGE LOS: CPT

## 2023-02-07 PROCEDURE — 99213 OFFICE O/P EST LOW 20 MIN: CPT | Mod: 25 | Performed by: OTOLARYNGOLOGY

## 2023-02-07 PROCEDURE — 31575 DIAGNOSTIC LARYNGOSCOPY: CPT | Performed by: OTOLARYNGOLOGY

## 2023-02-07 ASSESSMENT — PAIN SCALES - GENERAL: PAINLEVEL: NO PAIN (0)

## 2023-02-07 NOTE — PROGRESS NOTES
"[facility] GERIATRICS    Chief Complaint   Patient presents with     Nursing Home Acute     HPI:  Mili Padilla is a 67 year old  (1955), who is being seen today for an episodic care visit at: [facility]) [25]. Today's concern is:   UTI/deconditioning denies dysuria, fever, chills, in therapy patient is making progress, walking up to 20 feet using a 4ww with CGA  MS: working on strengthening exercises in therapy, uses a w/c at baseline for mobility  HTN: /77, 146/80, 138/76 with HR 70-80 range, denies CP, palpitations or SOB  Nephrolithiasis: no c/o abdominal pain, N/V  Diarrhea ongoing loose stool, patient states 3-4 loose stool per day, states questran seems to be helping slow things down,  denies loss of appetite  Thrombocytopenia: Plt 205K 1/31/23  Neurogenic bladder patient states she is urinating without difficulty  Hyponatremia: N 142 on 1/30/23  Low back pain: denies pain at time of exam    Allergies, and PMH/PSH reviewed in EPIC today.  REVIEW OF SYSTEMS:  10 point ROS of systems including Constitutional, Eyes, Respiratory, Cardiovascular, Gastroenterology, Genitourinary, Integumentary, Musculoskeletal, Psychiatric were all negative except for pertinent positives noted in my HPI.    Objective:   BP (!) 165/77   Pulse 73   Temp 98  F (36.7  C)   Resp 18   Ht 1.676 m (5' 6\")   Wt 128.9 kg (284 lb 3.2 oz)   LMP 12/10/2010   SpO2 94%   BMI 45.87 kg/m    GENERAL APPEARANCE:  Alert, in no distress, morbidly obese  ENT:  Mouth and posterior oropharynx normal, moist mucous membranes, normal hearing acuity  EYES:  EOM, conjunctivae, lids, pupils and irises normal, PERRL  RESP:  respiratory effort and palpation of chest normal, lungs clear to auscultation , no respiratory distress  CV:  Palpation and auscultation of heart done , regular rate and rhythm, no murmur, rub, or gallop, peripheral edema 1+ in LE bilaterally  ABDOMEN:  normal bowel sounds, soft, nontender, no " hepatosplenomegaly or other masses  M/S:   patient sitting up in w/c  SKIN:  Inspection of skin and subcutaneous tissue baseline  NEURO:   speech wnl  PSYCH:  affect and mood normal    Recent labs in [facility] reviewed by me today.  and   Most Recent 3 CBC's:Recent Labs   Lab Test 01/24/23  0919 01/23/23  1121 01/22/23  0918   WBC 10.1 10.4 8.9   HGB 12.5 11.6* 11.1*   MCV 93 93 92   * 94* 79*     Most Recent 3 BMP's:Recent Labs   Lab Test 01/24/23  0919 01/23/23  1633 01/23/23  1121 01/22/23  0918   *  --  132* 132*   POTASSIUM 3.6 3.6 3.4 3.4   CHLORIDE 95*  --  96* 97*   CO2 30*  --  26 28   BUN 7.9*  --  8.8 8.2   CR 0.53  --  0.49* 0.51   ANIONGAP 10  --  10 7   HEAVENLY 8.7*  --  8.5* 8.6*   *  --  102* 159*       Assessment/Plan:  N39.0) UTI  Comment: acute/ongoing, no change  Plan: PT and OT, cipro 250mg BID until f/u with urology, f/u with urology as OP   WBC 6.3 on 1/31/23     (G35) MS (multiple sclerosis) (H)  (N31.9) Neurogenic bladder  (R53.81) Physical deconditioning  Comment: acute/ongoing, no change  Plan: PT and OT, continue trospium 20mg BID,      (N20.0) Nephrolithiasis  Comment: acute/ongoing s/p elective left ureteroscopy, lithotripsy and left ureteral stent, no change  Plan: f/u as OP with urology for stent removal     (I10) Benign essential hypertension  Comment: acute/ongoing  Plan: BMP follow, continue verapamil 120mg BID, increase losartan to 50 mg BID     (R19.7) Diarrhea, unspecified type  Comment: acute/ongoing  Plan: continue culturelle 10bi BID  increase questran to 4g TID, change  imodium  To 2mg q 6 hours prn     (E87.1) Hyponatremia  Comment: acute/ongoing, no change  Plan: BMP follow, Na 142 wnl on 1/30/23     (D69.6) Thrombocytopenia (H)  Comment: ongoing, no change  Plan: CBC, follow  Plt 205K on 1/31/23     (M54.50) right sided low back pain without sciatica  Acute/ongoing, no change  Plan: schedule tylenol 1000mg TID, K-pad prn for pain    MED REC REQUIRED  Post  Medication Reconciliation Status: medication reconcilation previously completed during another office visit      Orders:  Increase losartan to 50 mg BID  Increase questran to 4g TID  Increase imodium to 2mg q 6 hours prn  Hold metamucil tabs      Electronically signed by: Tonya Lynn Haase, APRN CNP

## 2023-02-07 NOTE — LETTER
2/7/2023       RE: Mili Padilla  [address]  [facility] [address]     Dear Colleague,    Thank you for referring your patient, Mili Padilla, to the [facility] EAR NOSE [facility] THROAT [facility]INIC [place] at [facility]. Please see a copy of my visit note below.    HISTORY OF PRESENT ILLNESS: Mili Padilla is a 67 year old female with a history of  trauma to the larynx at age 13 from a skiing accident.  She was reconstructed by Dr. Hammer in the past and subsequently followed by Dr. Day.  I had seen her initially in 2008 and I have been following her since then.  Her airway has been stable since I last seen her on 2/4/2020 in clinic.  We did have a video visit on February 23, 2021.  She has had no new difficulties since her last visit.  She notes that she swallows liquids without any difficulties.  Solid and dry foods sometimes will get slightly increased difficulty but all her swallowing well.  She has no changes in her voice no new breathing issues.  She has a long-standing left vocal fold immobility. She has had some increase in dysphagia over the last 5-6 years. 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  On her last exam on February 23, 2021 we discussed alternating between video exams and laryngeal exams.  I last saw her on the video exam on 2/8/2022.  At that time her airway was stable.  She will get short of breath with some activity but had no stridor or upper airway turbulence with deep breathing.  She also had some difficulties with swallowing but primarily with dry foods.      She comes back today for an office examination to examine her airway.  Since seeing her she has had a significant illness and rehab involving sepsis but unrelated to her upper airway.  She continues to breathe comfortably.  Her swallowing is stable and her voice is stable.    Last 2 Scores  for Patient-Answered VHI Questionnaire  VHI Total Score 12/8/2016   VHI Total Score 21       Last 2 Scores for Patient-Answered CSI Questionnaire  CSI Total Score 1/28/2019 2/4/2020   CSI Total Score 4 8         Last 2 Scores for Patient-Answered EAT Questionnaire  EAT Total Score 1/28/2019 2/4/2020   EAT Total Score 5 Incomplete           PAST MEDICAL HISTORY:   Past Medical History:   Diagnosis Date     Depression, major, in partial remission (H)      Fibromyalgia syndrome      Gastro-oesophageal reflux disease      Hyperlipemia      Hypertension      Low serum sodium      Lymphedema      Mixed incontinence      Multiple sclerosis (H)     tremors with MS, all four limbs and head     Multiple sclerosis, secondary progressive (H)      Neurogenic bladder      Obese      Osteoporosis      Sleep apnea      Vocal cord paralysis, unilateral complete        PAST SURGICAL HISTORY:   Past Surgical History:   Procedure Laterality Date     COLONOSCOPY N/A 07/17/2017    Procedure: COMBINED COLONOSCOPY, SINGLE OR MULTIPLE BIOPSY/POLYPECTOMY BY BIOPSY;;  Surgeon: Wong Beaulieu MD;  Location:  GI     COLONOSCOPY N/A 02/23/2018    Procedure: COMBINED COLONOSCOPY, SINGLE OR MULTIPLE BIOPSY/POLYPECTOMY BY BIOPSY;  COLONOSCOPY ;  Surgeon: Yessica Santana MD;  Location:  GI     ENT SURGERY      throat 1969, vocal cord surgery with skin grafting     ESOPHAGOSCOPY, GASTROSCOPY, DUODENOSCOPY (EGD), COMBINED N/A 12/16/2014    Procedure: COMBINED ENDOSCOPIC ULTRASOUND, ESOPHAGOSCOPY, GASTROSCOPY, DUODENOSCOPY (EGD), FINE NEEDLE ASPIRATE/BIOPSY;  Surgeon: Yessica Santana MD;  Location:  GI     ESOPHAGOSCOPY, GASTROSCOPY, DUODENOSCOPY (EGD), COMBINED N/A 12/16/2014    Procedure: COMBINED ESOPHAGOSCOPY, GASTROSCOPY, DUODENOSCOPY (EGD), BIOPSY SINGLE OR MULTIPLE;  Surgeon: Yessica Santana MD;  Location:  GI     GASTRIC BYPASS Bilateral      LAPAROSCOPIC APPENDECTOMY  05/30/2013    Procedure:  LAPAROSCOPIC APPENDECTOMY;;  Surgeon: Salvador Morris MD;  Location:  OR     LAPAROSCOPIC BIOPSY LIVER  2013    Procedure: LAPAROSCOPIC BIOPSY LIVER;;  Surgeon: Salvador Morris MD;  Location:  OR     LAPAROSCOPIC GASTRIC SLEEVE  2013    Procedure: LAPAROSCOPIC GASTRIC SLEEVE;  LAPAROSCOPIC SLEEVE GASTRECTOMY/ LAPARSCOPIC  APPENDECTOMY /LIVER BIOPSIES/LIVER CYST DRAINAGE;  Surgeon: Salvador Morris MD;  Location:  OR     LASER HOLMIUM LITHOTRIPSY URETER(S), INSERT STENT, COMBINED Left 2023    Procedure: CYSTOSCOPY, LEFT URETEROSCOPY, HOLMIUM LASER  LITHOTRIPSY, LEFT   STENT PLACEMENT;  Surgeon: Mahendra Coles MD;  Location:  OR     ORTHOPEDIC SURGERY      R wrist        FAMILY HISTORY:   Family History   Problem Relation Age of Onset     Breast Cancer Mother      Other Cancer Father      Breast Cancer Maternal Aunt      Breast Cancer Maternal Aunt      Breast Cancer Maternal Aunt      Breast Cancer Maternal Aunt      Glaucoma No family hx of      Macular Degeneration No family hx of      Amblyopia No family hx of        SOCIAL HISTORY:   Social History     Tobacco Use     Smoking status: Former     Types: Cigarettes     Quit date: 1974     Years since quittin.3     Smokeless tobacco: Never   Substance Use Topics     Alcohol use: Not Currently       REVIEW OF SYSTEMS: Ten point review of systems was performed and is negative except for:   [facility] ROS 2020   Constitutional: -   Neurology: Dizzy spells, Numbness   Psychology: Frequently feeling depressed or sad   Eyes: -   Ears, Nose, Throat: Hoarseness   Cardiopulmonary: Cough   Gastrointestinal/Genitourinary: Heartburn/indigestion, Constipation   Musculoskeletal: Swollen legs/feet   Endocrine: Heat or cold intolerance        ALLERGIES: Amoxicillin, Augmentin, Betaseron [interferon beta-1b], Dust mite extract, and Mold    MEDICATIONS:   Current Outpatient Medications   Medication Sig Dispense Refill     acetaminophen  (TYLENOL) 500 MG tablet Take 1,000 mg by mouth 3 times daily       ARIPiprazole (ABILIFY) 5 MG tablet Take 7.5 mg by mouth daily  2     atorvastatin (LIPITOR) 20 MG tablet Take 20 mg by mouth daily.       carBAMazepine (TEGRETOL) 200 MG tablet Take 200 mg by mouth 2 times daily       cholestyramine (QUESTRAN) 4 g packet Take 1 packet (4 g) by mouth 2 times daily       Cranberry 1000 MG CAPS Take by mouth daily       cyanocobalamin (VITAMIN B-12) 100 MCG tablet Take 1,000 mcg by mouth once a week       denosumab (PROLIA) 60 MG/ML SOLN injection Inject 60 mg Subcutaneous every 6 months On hold in [facility]       Dextromethorphan-guaiFENesin (MUCINEX DM MAXIMUM STRENGTH PO) Take by mouth At Bedtime       docusate sodium (COLACE) 100 MG tablet Take 100 mg by mouth 2 times daily       DULoxetine (CYMBALTA) 60 MG capsule Take 120 mg by mouth daily        famotidine (PEPCID) 40 MG tablet TAKE 1/2 TABLET BY MOUTH TWICE DAILY (Patient taking differently: Take 40 mg by mouth nightly as needed) 90 tablet 0     fluticasone (FLONASE) 50 MCG/ACT nasal spray Spray 2 sprays into both nostrils daily       ketoconazole (NIZORAL) 2 % cream Apply topically 2 times daily as needed        lactobacillus rhamnosus, GG, (CULTURELL) capsule Take 1 capsule by mouth 2 times daily       loperamide (IMODIUM A-D) 2 MG tablet Take 1 tablet (2 mg) by mouth 2 times daily       losartan (COZAAR) 50 MG tablet Take 1 tablet (50 mg) by mouth daily       Melatonin 10 MG TABS Take 10 mg by mouth At Bedtime Changed to extended release       mirtazapine (REMERON) 30 MG tablet Take 75 mg by mouth At Bedtime        Multiple Vitamin (MULTIVITAMINS PO) Take 2 tablets by mouth every evening. WITH IRON       Psyllium (METAMUCIL) 0.36 g CAPS Metamucil       traZODone (DESYREL) 100 MG tablet Take 600 mg by mouth At Bedtime May split dose, 300mg @ HS and repeat x1 if needed        triamcinolone (ARISTOCORT HP) 0.5 % external cream Apply topically At Bedtime To hands        trospium (SANCTURA) 20 MG tablet Take 1 tablet (20 mg) by mouth 2 times daily (before meals)       verapamil ER (CALAN-SR) 120 MG CR tablet Take 120 mg by mouth 2 times daily       vitamin B-Complex Take 1 tablet by mouth daily            PHYSICAL EXAMINATION:  She  is awake, alert and in no apparent distress.  She is in a wheelchair.  Her voice is rough but stable and articulate.  Her tympanic membranes are clear and intact bilaterally. External auditory canals are clear.  Nasal exam shows a mild septal deviation without obstruction.  Examination of the oral cavity shows no suspicious lesions.  There is symmetric movement of the tongue and soft palate.    The oropharynx is clear.  Her neck is supple without significant adenopathy.  Pulse is regular.  Upper airway is clear.  Cranial nerves II-XII are grossly intact.       PROCEDURE: A flexible laryngoscopy  was performed.  Informed consent was obtained and a time out was performed. 2% lidocaine and 0.025% oxymetazoline was sprayed into the nasal cavity and allowed 3 to 5 minutes for effect. The scope was passed through the right sided nostril.  Examination showed the right vocal fold to be fully mobile.  The left arytenoid has been surgically removed in the remote past.  The area epiglottic fold is draped over the larynx.  There continues to be a stable and adequate airway. No nodules, polyps or ulcerations are seen. There is mild inflammation at the posterior commissure.   The right vocal fold shows minimal to no inflammation. With phonation there is severe contraction of the supraglottic larynx.  This is primarily due to closure of the right arytenoid and false vocal fold approximating the left aryepiglottic fold.   Hypopharynx is clear and well visualized with some rotation of the larynx.  The posterior cricoid space is also well visualized and no pooling of saliva or other material collection is seen.  No suspicious lesions are present. The hypopharynx is  otherwise clear as is the subglottis.       2/7/2023 Exam       Phonation      IMPRESSION/PLAN: Stable laryngeal exam with stable dysphagia but she is tolerating well.  She is recovering nicely from her recent hospitalization.  All questions were answered and she is comfortable with the exam provided today.  I will have her follow-up with the virtual visit in 1 year.        Again, thank you for allowing me to participate in the care of your patient.      Sincerely,    Clint Still MD

## 2023-02-07 NOTE — PATIENT INSTRUCTIONS
1.  You were seen in the ENT Clinic today by . If you have any questions or concerns after your appointment, please call [phone]. Press option #1 for scheduling related needs. Press option #3 for Nurse advice.    2. Plan is for a virtual visit in one year      Doris Pulido LPN  [phone]  [facility] - Otolaryngology

## 2023-02-08 ENCOUNTER — TRANSITIONAL CARE UNIT VISIT (OUTPATIENT)
Dept: GERIATRICS | Facility: CLINIC | Age: 68
End: 2023-02-08
Payer: COMMERCIAL

## 2023-02-08 VITALS
TEMPERATURE: 98 F | DIASTOLIC BLOOD PRESSURE: 77 MMHG | RESPIRATION RATE: 18 BRPM | HEART RATE: 73 BPM | SYSTOLIC BLOOD PRESSURE: 165 MMHG | BODY MASS INDEX: 45.67 KG/M2 | OXYGEN SATURATION: 94 % | WEIGHT: 284.2 LBS | HEIGHT: 66 IN

## 2023-02-08 DIAGNOSIS — R19.7 DIARRHEA, UNSPECIFIED TYPE: ICD-10-CM

## 2023-02-08 DIAGNOSIS — G35 MS (MULTIPLE SCLEROSIS) (H): ICD-10-CM

## 2023-02-08 DIAGNOSIS — M54.50 RIGHT-SIDED LOW BACK PAIN WITHOUT SCIATICA, UNSPECIFIED CHRONICITY: ICD-10-CM

## 2023-02-08 DIAGNOSIS — N20.0 NEPHROLITHIASIS: ICD-10-CM

## 2023-02-08 DIAGNOSIS — R53.81 PHYSICAL DECONDITIONING: ICD-10-CM

## 2023-02-08 DIAGNOSIS — E87.1 HYPONATREMIA: ICD-10-CM

## 2023-02-08 DIAGNOSIS — N31.9 NEUROGENIC BLADDER: ICD-10-CM

## 2023-02-08 DIAGNOSIS — I10 BENIGN ESSENTIAL HYPERTENSION: ICD-10-CM

## 2023-02-08 DIAGNOSIS — D69.6 THROMBOCYTOPENIA (H): ICD-10-CM

## 2023-02-08 DIAGNOSIS — N39.0 URINARY TRACT INFECTION WITHOUT HEMATURIA, SITE UNSPECIFIED: Primary | ICD-10-CM

## 2023-02-08 PROCEDURE — 99310 SBSQ NF CARE HIGH MDM 45: CPT | Performed by: NURSE PRACTITIONER

## 2023-02-08 RX ORDER — LOPERAMIDE HYDROCHLORIDE 2 MG/1
2 TABLET ORAL EVERY 6 HOURS PRN
Start: 2023-02-08 | End: 2023-09-20

## 2023-02-08 RX ORDER — CHOLESTYRAMINE 4 G/9G
1 POWDER, FOR SUSPENSION ORAL 3 TIMES DAILY
Start: 2023-02-08 | End: 2023-02-24

## 2023-02-08 NOTE — LETTER
"    2/8/2023        RE: Mili Padilla  [address]  [facility] [address]        [facility] GERIATRICS    Chief Complaint   Patient presents with     Nursing Home Acute     HPI:  Mili Padilla is a 67 year old  (1955), who is being seen today for an episodic care visit at: [facility]) [25]. Today's concern is:   UTI/deconditioning denies dysuria, fever, chills, in therapy patient is making progress, walking up to 20 feet using a 4ww with CGA  MS: working on strengthening exercises in therapy, uses a w/c at baseline for mobility  HTN: /77, 146/80, 138/76 with HR 70-80 range, denies CP, palpitations or SOB  Nephrolithiasis: no c/o abdominal pain, N/V  Diarrhea ongoing loose stool, patient states 3-4 loose stool per day, states questran seems to be helping slow things down,  denies loss of appetite  Thrombocytopenia: Plt 205K 1/31/23  Neurogenic bladder patient states she is urinating without difficulty  Hyponatremia: N 142 on 1/30/23  Low back pain: denies pain at time of exam    Allergies, and PMH/PSH reviewed in EPIC today.  REVIEW OF SYSTEMS:  10 point ROS of systems including Constitutional, Eyes, Respiratory, Cardiovascular, Gastroenterology, Genitourinary, Integumentary, Musculoskeletal, Psychiatric were all negative except for pertinent positives noted in my HPI.    Objective:   BP (!) 165/77   Pulse 73   Temp 98  F (36.7  C)   Resp 18   Ht 1.676 m (5' 6\")   Wt 128.9 kg (284 lb 3.2 oz)   LMP 12/10/2010   SpO2 94%   BMI 45.87 kg/m    GENERAL APPEARANCE:  Alert, in no distress, morbidly obese  ENT:  Mouth and posterior oropharynx normal, moist mucous membranes, normal hearing acuity  EYES:  EOM, conjunctivae, lids, pupils and irises normal, PERRL  RESP:  respiratory effort and palpation of chest normal, lungs clear to auscultation , no respiratory distress  CV:  Palpation and auscultation of heart done , regular rate and rhythm, no murmur, rub, or gallop, " peripheral edema 1+ in LE bilaterally  ABDOMEN:  normal bowel sounds, soft, nontender, no hepatosplenomegaly or other masses  M/S:   patient sitting up in w/c  SKIN:  Inspection of skin and subcutaneous tissue baseline  NEURO:   speech wnl  PSYCH:  affect and mood normal    Recent labs in [facility] reviewed by me today.  and   Most Recent 3 CBC's:Recent Labs   Lab Test 01/24/23  0919 01/23/23  1121 01/22/23  0918   WBC 10.1 10.4 8.9   HGB 12.5 11.6* 11.1*   MCV 93 93 92   * 94* 79*     Most Recent 3 BMP's:Recent Labs   Lab Test 01/24/23  0919 01/23/23  1633 01/23/23  1121 01/22/23  0918   *  --  132* 132*   POTASSIUM 3.6 3.6 3.4 3.4   CHLORIDE 95*  --  96* 97*   CO2 30*  --  26 28   BUN 7.9*  --  8.8 8.2   CR 0.53  --  0.49* 0.51   ANIONGAP 10  --  10 7   HEAVENLY 8.7*  --  8.5* 8.6*   *  --  102* 159*       Assessment/Plan:  N39.0) UTI  Comment: acute/ongoing, no change  Plan: PT and OT, cipro 250mg BID until f/u with urology, f/u with urology as OP   WBC 6.3 on 1/31/23     (G35) MS (multiple sclerosis) (H)  (N31.9) Neurogenic bladder  (R53.81) Physical deconditioning  Comment: acute/ongoing, no change  Plan: PT and OT, continue trospium 20mg BID,      (N20.0) Nephrolithiasis  Comment: acute/ongoing s/p elective left ureteroscopy, lithotripsy and left ureteral stent, no change  Plan: f/u as OP with urology for stent removal     (I10) Benign essential hypertension  Comment: acute/ongoing  Plan: BMP follow, continue verapamil 120mg BID, increase losartan to 50 mg BID     (R19.7) Diarrhea, unspecified type  Comment: acute/ongoing  Plan: continue culturelle 10bi BID  increase questran to 4g TID, change  imodium  To 2mg q 6 hours prn     (E87.1) Hyponatremia  Comment: acute/ongoing, no change  Plan: BMP follow, Na 142 wnl on 1/30/23     (D69.6) Thrombocytopenia (H)  Comment: ongoing, no change  Plan: CBC, follow  Plt 205K on 1/31/23     (M54.50) right sided low back pain without sciatica  Acute/ongoing, no  change  Plan: schedule tylenol 1000mg TID, K-pad prn for pain    MED REC REQUIRED  Post Medication Reconciliation Status: medication reconcilation previously completed during another office visit      Orders:  Increase losartan to 50 mg BID  Increase questran to 4g TID  Increase imodium to 2mg q 6 hours prn  Hold metamucil tabs      Electronically signed by: Tonya Lynn Haase, APRN CNP           Sincerely,        Tonya Lynn Haase, APRN CNP

## 2023-02-08 NOTE — PROGRESS NOTES
[facility] GERIATRICS    PRIMARY CARE PROVIDER AND CLINIC:  Jasmyn Márquez MD, [address] / [facility] [address]  Chief Complaint   Patient presents with     Hospital F/U      [place] Medical Record Number:  [id number]  Place of Service where encounter took place:  [facility] ([facility])     Mili Padilla  is a 67 year old  (1955), admitted to the above facility from  [facility]. Hospital stay 1/18/23 through 1/25/23..     Hospital course was reviewed by me, is as per the hospital discharge summary and  NP note.    PMH of  obesity, MS with neurogenic bladder, HTN, HLD, depression, anxiety, GERD, nephrolithiasis who underwent an elective left ureteroscopy, lithotripsy and left ureteral stent placement.  Post op Pt developed sepsis picture (UC neg, but Pt was receiving cipro preop)  Course complicated by diarrhea, mild hyponatremia, thrombocytopenia.  C diff neg    Pt notes continue diarrhea, 2-3 times per day  She denies abd pain, nausea or vomiting  She has chronic urinary incontinence, denies dysuria, fevers, chills, cough, SOB  She has been walking short distances with walker in therapy      CODE STATUS/ADVANCE DIRECTIVES DISCUSSION:  Full Code  CPR/Full code   ALLERGIES:   Allergies   Allergen Reactions     Amoxicillin Hives     Augmentin      Sensitive,  Able to tolerate a few doses, otherwise hives on hands     Betaseron [Interferon Beta-1b]      Necrosis of skin at injection sites     Dust Mite Extract Unknown     Mold Unknown      PAST MEDICAL HISTORY:   Past Medical History:   Diagnosis Date     Depression, major, in partial remission (H)      Fibromyalgia syndrome      Gastro-oesophageal reflux disease      Hyperlipemia      Hypertension      Low serum sodium      Lymphedema      Mixed incontinence      Multiple sclerosis (H)     tremors with MS, all four limbs and head     Multiple sclerosis, secondary progressive (H)      Neurogenic bladder      Obese       Osteoporosis      Sleep apnea      Vocal cord paralysis, unilateral complete       PAST SURGICAL HISTORY:   has a past surgical history that includes orthopedic surgery; ENT surgery; Laparoscopic gastric sleeve (05/30/2013); Laparoscopic appendectomy (05/30/2013); Laparoscopic Biopsy Liver (05/30/2013); Esophagoscopy, gastroscopy, duodenoscopy (EGD), combined (N/A, 12/16/2014); Esophagoscopy, gastroscopy, duodenoscopy (EGD), combined (N/A, 12/16/2014); Colonoscopy (N/A, 07/17/2017); Colonoscopy (N/A, 02/23/2018); gastric bypass (Bilateral); and Laser holmium lithotripsy ureter(s), insert stent, combined (Left, 1/18/2023).  FAMILY HISTORY: family history includes Breast Cancer in her maternal aunt, maternal aunt, maternal aunt, maternal aunt, and mother; Other Cancer in her father.  SOCIAL HISTORY:   reports that she quit smoking about 48 years ago. Her smoking use included cigarettes. She has never used smokeless tobacco. She reports that she does not currently use alcohol. She reports that she does not use drugs.  Patient's living condition: lives alone    Current medications were reviewed by me today.    Current Outpatient Medications   Medication Sig     acetaminophen (TYLENOL) 500 MG tablet Take 1,000 mg by mouth 3 times daily     ARIPiprazole (ABILIFY) 5 MG tablet Take 7.5 mg by mouth daily     atorvastatin (LIPITOR) 20 MG tablet Take 20 mg by mouth daily.     carBAMazepine (TEGRETOL) 200 MG tablet Take 200 mg by mouth 2 times daily     cholestyramine (QUESTRAN) 4 g packet Take 1 packet (4 g) by mouth 3 times daily     Cranberry 1000 MG CAPS Take by mouth daily     cyanocobalamin (VITAMIN B-12) 100 MCG tablet Take 1,000 mcg by mouth once a week     denosumab (PROLIA) 60 MG/ML SOLN injection Inject 60 mg Subcutaneous every 6 months On hold in U     Dextromethorphan-guaiFENesin (MUCINEX DM MAXIMUM STRENGTH PO) Take by mouth At Bedtime     DULoxetine (CYMBALTA) 60 MG capsule Take 120 mg by mouth daily       "famotidine (PEPCID) 40 MG tablet TAKE 1/2 TABLET BY MOUTH TWICE DAILY (Patient taking differently: Take 40 mg by mouth nightly as needed)     fluticasone (FLONASE) 50 MCG/ACT nasal spray Spray 2 sprays into both nostrils daily     ketoconazole (NIZORAL) 2 % cream Apply topically 2 times daily as needed      lactobacillus rhamnosus, GG, (CULTURELL) capsule Take 1 capsule by mouth 2 times daily     loperamide (IMODIUM A-D) 2 MG tablet Take 1 tablet (2 mg) by mouth every 6 hours as needed for diarrhea     losartan (COZAAR) 50 MG tablet Take 1 tablet (50 mg) by mouth daily     Melatonin 10 MG TABS Take 10 mg by mouth At Bedtime Changed to extended release     mirtazapine (REMERON) 30 MG tablet Take 75 mg by mouth At Bedtime      Multiple Vitamin (MULTIVITAMINS PO) Take 2 tablets by mouth every evening. WITH IRON     traZODone (DESYREL) 100 MG tablet Take 600 mg by mouth At Bedtime May split dose, 300mg @ HS and repeat x1 if needed      triamcinolone (ARISTOCORT HP) 0.5 % external cream Apply topically At Bedtime To hands     trospium (SANCTURA) 20 MG tablet Take 1 tablet (20 mg) by mouth 2 times daily (before meals)     verapamil ER (CALAN-SR) 120 MG CR tablet Take 120 mg by mouth 2 times daily     vitamin B-Complex Take 1 tablet by mouth daily      No current facility-administered medications for this visit.       ROS:  10 point ROS of systems including Constitutional, Eyes, Respiratory, Cardiovascular, Gastroenterology, Genitourinary, Integumentary, Musculoskeletal, Psychiatric were all negative except for pertinent positives noted in my HPI.    Vitals:  BP (!) 176/81   Pulse 70   Temp 97.9  F (36.6  C)   Resp 12   Ht 1.676 m (5' 6\")   Wt 128.9 kg (284 lb 3.2 oz)   LMP 12/10/2010   SpO2 92%   BMI 45.87 kg/m    Exam:  Obese female, lying in bed, appears comfortable  HEENT oral mucosa moist  No eye redness or drainage  Lungs clear  CV rrr  Abd soft, protuberant, non-tender  Extremities. Venous stasis changes " bilaterally, 1 + edema  Neuro: alert fully oriented  Generalized B LE weakness    Lab/Diagnostic data:    Most Recent 3 CBC's:Recent Labs   Lab Test 01/24/23  0919 01/23/23  1121 01/22/23  0918   WBC 10.1 10.4 8.9   HGB 12.5 11.6* 11.1*   MCV 93 93 92   * 94* 79*     Most Recent 3 BMP's:Recent Labs   Lab Test 01/24/23  0919 01/23/23  1633 01/23/23  1121 01/22/23  0918   *  --  132* 132*   POTASSIUM 3.6 3.6 3.4 3.4   CHLORIDE 95*  --  96* 97*   CO2 30*  --  26 28   BUN 7.9*  --  8.8 8.2   CR 0.53  --  0.49* 0.51   ANIONGAP 10  --  10 7   HEAVENLY 8.7*  --  8.5* 8.6*   *  --  102* 159*     7-Day Micro Results     No results found for the last 168 hours.        Most Recent Urinalysis:Recent Labs   Lab Test 01/19/23  0039   COLOR Yellow   APPEARANCE Slightly Cloudy*   URINEGLC Negative   URINEBILI Negative   URINEKETONE Negative   SG 1.031   UBLD Large*   URINEPH 5.5   PROTEIN 30*   NITRITE Negative   LEUKEST Large*   RBCU 99*   WBCU 95*           ASSESSMENT/PLAN:    N39.0) UTI  Comment: acute/ongoing, no change  Plan: PT and OT, cipro 250mg BID until f/u with urology, f/u with urology as OP   WBC 6.3 on 1/31/23     (G35) MS (multiple sclerosis) (H)  (N31.9) Neurogenic bladder  (R53.81) Physical deconditioning  Comment: acute/ongoing, no change  Plan: PT and OT, continue trospium 20mg BID,      (N20.0) Nephrolithiasis  Comment: acute/ongoing s/p elective left ureteroscopy, lithotripsy and left ureteral stent, no change  Plan: f/u as OP with urology for stent removal     (I10) Benign essential hypertension  Comment: BP remains elevatd  Plan:  continue verapamil and (recently increased) losartan  Monitor BP, BMP     (R19.7) Diarrhea, unspecified type  Comment: acute/ongoing, likely antibiotic related  C diff neg  Sl improved over the last few days  Plan: on  Culturelle, newly started questran, imodium  If persistent repeat C diff    (E87.1) Hyponatremia  Resolved      Shane Griffin MD

## 2023-02-09 ENCOUNTER — TRANSITIONAL CARE UNIT VISIT (OUTPATIENT)
Dept: GERIATRICS | Facility: CLINIC | Age: 68
End: 2023-02-09
Payer: COMMERCIAL

## 2023-02-09 VITALS
HEIGHT: 66 IN | DIASTOLIC BLOOD PRESSURE: 81 MMHG | TEMPERATURE: 97.9 F | RESPIRATION RATE: 12 BRPM | HEART RATE: 70 BPM | SYSTOLIC BLOOD PRESSURE: 176 MMHG | BODY MASS INDEX: 45.67 KG/M2 | OXYGEN SATURATION: 92 % | WEIGHT: 284.2 LBS

## 2023-02-09 DIAGNOSIS — N39.0 URINARY TRACT INFECTION WITHOUT HEMATURIA, SITE UNSPECIFIED: Primary | ICD-10-CM

## 2023-02-09 DIAGNOSIS — G35 MS (MULTIPLE SCLEROSIS) (H): ICD-10-CM

## 2023-02-09 DIAGNOSIS — R19.7 DIARRHEA, UNSPECIFIED TYPE: ICD-10-CM

## 2023-02-09 DIAGNOSIS — N31.9 NEUROGENIC BLADDER: ICD-10-CM

## 2023-02-09 PROCEDURE — 99305 1ST NF CARE MODERATE MDM 35: CPT | Performed by: INTERNAL MEDICINE

## 2023-02-09 NOTE — LETTER
2/9/2023        RE: Mili Padilla  [address]  [facility] [address]        [facility] GERIATRICS    PRIMARY CARE PROVIDER AND CLINIC:  Jasmyn Márquez MD, [address] [facility] [address] / [place] [address]  Chief Complaint   Patient presents with     Hospital F/U      [place] Medical Record Number:  [id number]  Place of Service where encounter took place:  [facility] (U)     Mili Padilla  is a 67 year old  (1955), admitted to the above facility from  [facility]. Hospital stay 1/18/23 through 1/25/23..     Hospital course was reviewed by me, is as per the hospital discharge summary and  NP note.    PMH of  obesity, MS with neurogenic bladder, HTN, HLD, depression, anxiety, GERD, nephrolithiasis who underwent an elective left ureteroscopy, lithotripsy and left ureteral stent placement.  Post op Pt developed sepsis picture (UC neg, but Pt was receiving cipro preop)  Course complicated by diarrhea, mild hyponatremia, thrombocytopenia.  C diff neg    Pt notes continue diarrhea, 2-3 times per day  She denies abd pain, nausea or vomiting  She has chronic urinary incontinence, denies dysuria, fevers, chills, cough, SOB  She has been walking short distances with walker in therapy      CODE STATUS/ADVANCE DIRECTIVES DISCUSSION:  Full Code  CPR/Full code   ALLERGIES:   Allergies   Allergen Reactions     Amoxicillin Hives     Augmentin      Sensitive,  Able to tolerate a few doses, otherwise hives on hands     Betaseron [Interferon Beta-1b]      Necrosis of skin at injection sites     Dust Mite Extract Unknown     Mold Unknown      PAST MEDICAL HISTORY:   Past Medical History:   Diagnosis Date     Depression, major, in partial remission (H)      Fibromyalgia syndrome      Gastro-oesophageal reflux disease      Hyperlipemia      Hypertension      Low serum sodium      Lymphedema      Mixed incontinence      Multiple sclerosis (H)     tremors with MS, all four limbs and  head     Multiple sclerosis, secondary progressive (H)      Neurogenic bladder      Obese      Osteoporosis      Sleep apnea      Vocal cord paralysis, unilateral complete       PAST SURGICAL HISTORY:   has a past surgical history that includes orthopedic surgery; ENT surgery; Laparoscopic gastric sleeve (05/30/2013); Laparoscopic appendectomy (05/30/2013); Laparoscopic Biopsy Liver (05/30/2013); Esophagoscopy, gastroscopy, duodenoscopy (EGD), combined (N/A, 12/16/2014); Esophagoscopy, gastroscopy, duodenoscopy (EGD), combined (N/A, 12/16/2014); Colonoscopy (N/A, 07/17/2017); Colonoscopy (N/A, 02/23/2018); gastric bypass (Bilateral); and Laser holmium lithotripsy ureter(s), insert stent, combined (Left, 1/18/2023).  FAMILY HISTORY: family history includes Breast Cancer in her maternal aunt, maternal aunt, maternal aunt, maternal aunt, and mother; Other Cancer in her father.  SOCIAL HISTORY:   reports that she quit smoking about 48 years ago. Her smoking use included cigarettes. She has never used smokeless tobacco. She reports that she does not currently use alcohol. She reports that she does not use drugs.  Patient's living condition: lives alone    Current medications were reviewed by me today.    Current Outpatient Medications   Medication Sig     acetaminophen (TYLENOL) 500 MG tablet Take 1,000 mg by mouth 3 times daily     ARIPiprazole (ABILIFY) 5 MG tablet Take 7.5 mg by mouth daily     atorvastatin (LIPITOR) 20 MG tablet Take 20 mg by mouth daily.     carBAMazepine (TEGRETOL) 200 MG tablet Take 200 mg by mouth 2 times daily     cholestyramine (QUESTRAN) 4 g packet Take 1 packet (4 g) by mouth 3 times daily     Cranberry 1000 MG CAPS Take by mouth daily     cyanocobalamin (VITAMIN B-12) 100 MCG tablet Take 1,000 mcg by mouth once a week     denosumab (PROLIA) 60 MG/ML SOLN injection Inject 60 mg Subcutaneous every 6 months On hold in TCU     Dextromethorphan-guaiFENesin (MUCINEX DM MAXIMUM STRENGTH PO) Take by  "mouth At Bedtime     DULoxetine (CYMBALTA) 60 MG capsule Take 120 mg by mouth daily      famotidine (PEPCID) 40 MG tablet TAKE 1/2 TABLET BY MOUTH TWICE DAILY (Patient taking differently: Take 40 mg by mouth nightly as needed)     fluticasone (FLONASE) 50 MCG/ACT nasal spray Spray 2 sprays into both nostrils daily     ketoconazole (NIZORAL) 2 % cream Apply topically 2 times daily as needed      lactobacillus rhamnosus, GG, (CULTURELL) capsule Take 1 capsule by mouth 2 times daily     loperamide (IMODIUM A-D) 2 MG tablet Take 1 tablet (2 mg) by mouth every 6 hours as needed for diarrhea     losartan (COZAAR) 50 MG tablet Take 1 tablet (50 mg) by mouth daily     Melatonin 10 MG TABS Take 10 mg by mouth At Bedtime Changed to extended release     mirtazapine (REMERON) 30 MG tablet Take 75 mg by mouth At Bedtime      Multiple Vitamin (MULTIVITAMINS PO) Take 2 tablets by mouth every evening. WITH IRON     traZODone (DESYREL) 100 MG tablet Take 600 mg by mouth At Bedtime May split dose, 300mg @ HS and repeat x1 if needed      triamcinolone (ARISTOCORT HP) 0.5 % external cream Apply topically At Bedtime To hands     trospium (SANCTURA) 20 MG tablet Take 1 tablet (20 mg) by mouth 2 times daily (before meals)     verapamil ER (CALAN-SR) 120 MG CR tablet Take 120 mg by mouth 2 times daily     vitamin B-Complex Take 1 tablet by mouth daily      No current facility-administered medications for this visit.       ROS:  10 point ROS of systems including Constitutional, Eyes, Respiratory, Cardiovascular, Gastroenterology, Genitourinary, Integumentary, Musculoskeletal, Psychiatric were all negative except for pertinent positives noted in my HPI.    Vitals:  BP (!) 176/81   Pulse 70   Temp 97.9  F (36.6  C)   Resp 12   Ht 1.676 m (5' 6\")   Wt 128.9 kg (284 lb 3.2 oz)   LMP 12/10/2010   SpO2 92%   BMI 45.87 kg/m    Exam:  Obese female, lying in bed, appears comfortable  HEENT oral mucosa moist  No eye redness or drainage  Lungs " clear  CV rrr  Abd soft, protuberant, non-tender  Extremities. Venous stasis changes bilaterally, 1 + edema  Neuro: alert fully oriented  Generalized B LE weakness    Lab/Diagnostic data:    Most Recent 3 CBC's:Recent Labs   Lab Test 01/24/23  0919 01/23/23  1121 01/22/23  0918   WBC 10.1 10.4 8.9   HGB 12.5 11.6* 11.1*   MCV 93 93 92   * 94* 79*     Most Recent 3 BMP's:Recent Labs   Lab Test 01/24/23  0919 01/23/23  1633 01/23/23  1121 01/22/23  0918   *  --  132* 132*   POTASSIUM 3.6 3.6 3.4 3.4   CHLORIDE 95*  --  96* 97*   CO2 30*  --  26 28   BUN 7.9*  --  8.8 8.2   CR 0.53  --  0.49* 0.51   ANIONGAP 10  --  10 7   HEAVENLY 8.7*  --  8.5* 8.6*   *  --  102* 159*     7-Day Micro Results     No results found for the last 168 hours.        Most Recent Urinalysis:Recent Labs   Lab Test 01/19/23  0039   COLOR Yellow   APPEARANCE Slightly Cloudy*   URINEGLC Negative   URINEBILI Negative   URINEKETONE Negative   SG 1.031   UBLD Large*   URINEPH 5.5   PROTEIN 30*   NITRITE Negative   LEUKEST Large*   RBCU 99*   WBCU 95*           ASSESSMENT/PLAN:    N39.0) UTI  Comment: acute/ongoing, no change  Plan: PT and OT, cipro 250mg BID until f/u with urology, f/u with urology as OP   WBC 6.3 on 1/31/23     (G35) MS (multiple sclerosis) (H)  (N31.9) Neurogenic bladder  (R53.81) Physical deconditioning  Comment: acute/ongoing, no change  Plan: PT and OT, continue trospium 20mg BID,      (N20.0) Nephrolithiasis  Comment: acute/ongoing s/p elective left ureteroscopy, lithotripsy and left ureteral stent, no change  Plan: f/u as OP with urology for stent removal     (I10) Benign essential hypertension  Comment: BP remains elevatd  Plan:  continue verapamil and (recently increased) losartan  Monitor BP, BMP     (R19.7) Diarrhea, unspecified type  Comment: acute/ongoing, likely antibiotic related  C diff neg  Sl improved over the last few days  Plan: on  Culturelle, newly started questran, imodium  If persistent repeat  C diff    (E87.1) Hyponatremia  Resolved      Shane Griffin MD          Sincerely,        Shane Griffin MD     A positive

## 2023-02-15 ENCOUNTER — TRANSITIONAL CARE UNIT VISIT (OUTPATIENT)
Dept: GERIATRICS | Facility: CLINIC | Age: 68
End: 2023-02-15
Payer: COMMERCIAL

## 2023-02-15 VITALS
SYSTOLIC BLOOD PRESSURE: 133 MMHG | HEIGHT: 66 IN | DIASTOLIC BLOOD PRESSURE: 79 MMHG | WEIGHT: 284.5 LBS | OXYGEN SATURATION: 95 % | TEMPERATURE: 98.1 F | HEART RATE: 75 BPM | RESPIRATION RATE: 18 BRPM | BODY MASS INDEX: 45.72 KG/M2

## 2023-02-15 DIAGNOSIS — G35 MS (MULTIPLE SCLEROSIS) (H): ICD-10-CM

## 2023-02-15 DIAGNOSIS — E87.1 HYPONATREMIA: ICD-10-CM

## 2023-02-15 DIAGNOSIS — M54.50 RIGHT-SIDED LOW BACK PAIN WITHOUT SCIATICA, UNSPECIFIED CHRONICITY: ICD-10-CM

## 2023-02-15 DIAGNOSIS — R19.7 DIARRHEA, UNSPECIFIED TYPE: ICD-10-CM

## 2023-02-15 DIAGNOSIS — N20.0 NEPHROLITHIASIS: ICD-10-CM

## 2023-02-15 DIAGNOSIS — D69.6 THROMBOCYTOPENIA (H): ICD-10-CM

## 2023-02-15 DIAGNOSIS — R53.81 PHYSICAL DECONDITIONING: ICD-10-CM

## 2023-02-15 DIAGNOSIS — I10 BENIGN ESSENTIAL HYPERTENSION: ICD-10-CM

## 2023-02-15 DIAGNOSIS — N39.0 URINARY TRACT INFECTION WITHOUT HEMATURIA, SITE UNSPECIFIED: Primary | ICD-10-CM

## 2023-02-15 DIAGNOSIS — N31.9 NEUROGENIC BLADDER: ICD-10-CM

## 2023-02-15 PROCEDURE — 99310 SBSQ NF CARE HIGH MDM 45: CPT | Performed by: NURSE PRACTITIONER

## 2023-02-15 NOTE — PROGRESS NOTES
"[facility] GERIATRICS    Chief Complaint   Patient presents with     RECHECK     HPI:  Mili Padilla is a 67 year old  (1955), who is being seen today for an episodic care visit at: [facility]) [25]. Today's concern is:   UTI/deconditioning denies dysuria, fever, chills, in therapy patient is making progress, walking up to 34 feet using a 4ww with CGA  MS: working on strengthening exercises in therapy, uses a w/c at baseline for mobility  HTN: /84, 133/79, 150/80 with HR 70-80 range, denies CP, palpitations or SOB  Nephrolithiasis: no c/o abdominal pain, N/V  Diarrhea states loose stools have improved, denies abdominal pain or discomfort  Thrombocytopenia: Plt 205K 1/31/23  Neurogenic bladder patient states she is urinating without difficulty  Hyponatremia: N 142 on 1/30/23  Low back pain: denies pain at time of exam    Allergies, and PMH/PSH reviewed in [facility] today.  REVIEW OF SYSTEMS:  10 point ROS of systems including Constitutional, Eyes, Respiratory, Cardiovascular, Gastroenterology, Genitourinary, Integumentary, Musculoskeletal, Psychiatric were all negative except for pertinent positives noted in my HPI.    Objective:   /79   Pulse 75   Temp 98.1  F (36.7  C)   Resp 18   Ht 1.676 m (5' 6\")   Wt 129 kg (284 lb 8 oz)   LMP 12/10/2010   SpO2 95%   BMI 45.92 kg/m    GENERAL APPEARANCE:  Alert, in no distress, morbidly obese  ENT:  Mouth and posterior oropharynx normal, moist mucous membranes, normal hearing acuity  EYES:  EOM, conjunctivae, lids, pupils and irises normal, PERRL  RESP:  respiratory effort and palpation of chest normal, lungs clear to auscultation , no respiratory distress  CV:  Palpation and auscultation of heart done , regular rate and rhythm, no murmur, rub, or gallop, peripheral edema 1+ in LE bilaterally  ABDOMEN:  normal bowel sounds, soft, nontender, no hepatosplenomegaly or other masses  M/S:   patient sitting up in w/c  SKIN:  Inspection of skin " and subcutaneous tissue baseline  NEURO:   speech wnl  PSYCH:  affect and mood normal    Recent labs in EPIC reviewed by me today.  and   Most Recent 3 CBC's:Recent Labs   Lab Test 01/24/23  0919 01/23/23  1121 01/22/23  0918   WBC 10.1 10.4 8.9   HGB 12.5 11.6* 11.1*   MCV 93 93 92   * 94* 79*     Most Recent 3 BMP's:Recent Labs   Lab Test 01/24/23  0919 01/23/23  1633 01/23/23  1121 01/22/23  0918   *  --  132* 132*   POTASSIUM 3.6 3.6 3.4 3.4   CHLORIDE 95*  --  96* 97*   CO2 30*  --  26 28   BUN 7.9*  --  8.8 8.2   CR 0.53  --  0.49* 0.51   ANIONGAP 10  --  10 7   HEAVENLY 8.7*  --  8.5* 8.6*   *  --  102* 159*       Assessment/Plan:  N39.0) UTI  Comment: acute/ongoing, no change  Plan: PT and OT, cipro 250mg BID until f/u with urology, f/u with urology as OP appt scheduled for 2/22/23  WBC 6.3 on 1/31/23     (G35) MS (multiple sclerosis) (H)  (N31.9) Neurogenic bladder  (R53.81) Physical deconditioning  Comment: acute/ongoing, no change  Plan: PT and OT, continue trospium 20mg BID,      (N20.0) Nephrolithiasis  Comment: acute/ongoing s/p elective left ureteroscopy, lithotripsy and left ureteral stent, no change  Plan: f/u as OP with urology for stent removal, appt is on 2/22/23     (I10) Benign essential hypertension  Comment: acute/ongoing, no change  Plan: BMP follow, continue verapamil 120mg BID, continue losartan to 50 mg BID     (R19.7) Diarrhea, unspecified type  Comment: acute/ongoing, no change  Stool cultures and C-diff cultures done 2/11/23 negative  Plan: continue culturelle 10bi BID  increase questran to 4g TID, change  imodium  To 2mg q 6 hours prn     (E87.1) Hyponatremia  Comment: acute/ongoing, no change  Plan: BMP follow, Na 142 wnl on 1/30/23     (D69.6) Thrombocytopenia (H)  Comment: ongoing, no change  Plan: CBC, follow  Plt 205K on 1/31/23     (M54.50) right sided low back pain without sciatica  Acute/ongoing, no change  Plan: schedule tylenol 1000mg TID, K-pad prn for  pain       MED REC REQUIRED  Post Medication Reconciliation Status: medication reconcilation previously completed during another office visit      Orders:  CBC and BMP on Monday      Electronically signed by: Tonya Lynn Haase, APRN CNP

## 2023-02-15 NOTE — LETTER
"    2/15/2023        RE: Mili Padilla  [address]  [facility] [address]        [facility]    Chief Complaint   Patient presents with     RECHECK     HPI:  Mili Padilla is a 67 year old  (1955), who is being seen today for an episodic care visit at: [facility]) [25]. Today's concern is:   UTI/deconditioning denies dysuria, fever, chills, in therapy patient is making progress, walking up to 34 feet using a 4ww with CGA  MS: working on strengthening exercises in therapy, uses a w/c at baseline for mobility  HTN: /84, 133/79, 150/80 with HR 70-80 range, denies CP, palpitations or SOB  Nephrolithiasis: no c/o abdominal pain, N/V  Diarrhea states loose stools have improved, denies abdominal pain or discomfort  Thrombocytopenia: Plt 205K 1/31/23  Neurogenic bladder patient states she is urinating without difficulty  Hyponatremia: N 142 on 1/30/23  Low back pain: denies pain at time of exam    Allergies, and PMH/PSH reviewed in EPIC today.  REVIEW OF SYSTEMS:  10 point ROS of systems including Constitutional, Eyes, Respiratory, Cardiovascular, Gastroenterology, Genitourinary, Integumentary, Musculoskeletal, Psychiatric were all negative except for pertinent positives noted in my HPI.    Objective:   /79   Pulse 75   Temp 98.1  F (36.7  C)   Resp 18   Ht 1.676 m (5' 6\")   Wt 129 kg (284 lb 8 oz)   LMP 12/10/2010   SpO2 95%   BMI 45.92 kg/m    GENERAL APPEARANCE:  Alert, in no distress, morbidly obese  ENT:  Mouth and posterior oropharynx normal, moist mucous membranes, normal hearing acuity  EYES:  EOM, conjunctivae, lids, pupils and irises normal, PERRL  RESP:  respiratory effort and palpation of chest normal, lungs clear to auscultation , no respiratory distress  CV:  Palpation and auscultation of heart done , regular rate and rhythm, no murmur, rub, or gallop, peripheral edema 1+ in LE bilaterally  ABDOMEN:  normal bowel sounds, soft, nontender, no " hepatosplenomegaly or other masses  M/S:   patient sitting up in w/c  SKIN:  Inspection of skin and subcutaneous tissue baseline  NEURO:   speech wnl  PSYCH:  affect and mood normal    Recent labs in [facility] reviewed by me today.  and   Most Recent 3 CBC's:Recent Labs   Lab Test 01/24/23  0919 01/23/23  1121 01/22/23  0918   WBC 10.1 10.4 8.9   HGB 12.5 11.6* 11.1*   MCV 93 93 92   * 94* 79*     Most Recent 3 BMP's:Recent Labs   Lab Test 01/24/23  0919 01/23/23  1633 01/23/23  1121 01/22/23  0918   *  --  132* 132*   POTASSIUM 3.6 3.6 3.4 3.4   CHLORIDE 95*  --  96* 97*   CO2 30*  --  26 28   BUN 7.9*  --  8.8 8.2   CR 0.53  --  0.49* 0.51   ANIONGAP 10  --  10 7   HEAVENLY 8.7*  --  8.5* 8.6*   *  --  102* 159*       Assessment/Plan:  N39.0) UTI  Comment: acute/ongoing, no change  Plan: PT and OT, cipro 250mg BID until f/u with urology, f/u with urology as OP appt scheduled for 2/22/23  WBC 6.3 on 1/31/23     (G35) MS (multiple sclerosis) (H)  (N31.9) Neurogenic bladder  (R53.81) Physical deconditioning  Comment: acute/ongoing, no change  Plan: PT and OT, continue trospium 20mg BID,      (N20.0) Nephrolithiasis  Comment: acute/ongoing s/p elective left ureteroscopy, lithotripsy and left ureteral stent, no change  Plan: f/u as OP with urology for stent removal, appt is on 2/22/23     (I10) Benign essential hypertension  Comment: acute/ongoing, no change  Plan: BMP follow, continue verapamil 120mg BID, continue losartan to 50 mg BID     (R19.7) Diarrhea, unspecified type  Comment: acute/ongoing, no change  Stool cultures and C-diff cultures done 2/11/23 negative  Plan: continue culturelle 10bi BID  increase questran to 4g TID, change  imodium  To 2mg q 6 hours prn     (E87.1) Hyponatremia  Comment: acute/ongoing, no change  Plan: BMP follow, Na 142 wnl on 1/30/23     (D69.6) Thrombocytopenia (H)  Comment: ongoing, no change  Plan: CBC, follow  Plt 205K on 1/31/23     (M54.50) right sided low back pain  without sciatica  Acute/ongoing, no change  Plan: schedule tylenol 1000mg TID, K-pad prn for pain       MED REC REQUIRED  Post Medication Reconciliation Status: medication reconcilation previously completed during another office visit      Orders:  CBC and BMP on Monday      Electronically signed by: Tonya Lynn Haase, APRN CNP             Sincerely,        Tonya Lynn Haase, APRN CNP

## 2023-02-17 NOTE — PROGRESS NOTES
No skilled speech services were warranted.    Patricia Serna M.M. (voice), M.A., CCC/SLP  Speech-Language Pathologist  [place] Trained Vocologist  [facility]  she/her  [email or web address]

## 2023-02-21 ENCOUNTER — DOCUMENTATION ONLY (OUTPATIENT)
Dept: GERIATRICS | Facility: CLINIC | Age: 68
End: 2023-02-21

## 2023-02-21 ENCOUNTER — TRANSITIONAL CARE UNIT VISIT (OUTPATIENT)
Dept: GERIATRICS | Facility: CLINIC | Age: 68
End: 2023-02-21
Payer: COMMERCIAL

## 2023-02-21 VITALS
HEART RATE: 75 BPM | DIASTOLIC BLOOD PRESSURE: 79 MMHG | OXYGEN SATURATION: 92 % | BODY MASS INDEX: 45.22 KG/M2 | RESPIRATION RATE: 16 BRPM | SYSTOLIC BLOOD PRESSURE: 138 MMHG | WEIGHT: 281.4 LBS | HEIGHT: 66 IN | TEMPERATURE: 98 F

## 2023-02-21 DIAGNOSIS — N39.0 URINARY TRACT INFECTION WITHOUT HEMATURIA, SITE UNSPECIFIED: Primary | ICD-10-CM

## 2023-02-21 DIAGNOSIS — I10 BENIGN ESSENTIAL HYPERTENSION: ICD-10-CM

## 2023-02-21 DIAGNOSIS — R19.7 DIARRHEA, UNSPECIFIED TYPE: ICD-10-CM

## 2023-02-21 DIAGNOSIS — E87.1 HYPONATREMIA: ICD-10-CM

## 2023-02-21 DIAGNOSIS — N31.9 NEUROGENIC BLADDER: ICD-10-CM

## 2023-02-21 DIAGNOSIS — R53.81 PHYSICAL DECONDITIONING: ICD-10-CM

## 2023-02-21 DIAGNOSIS — G35 MS (MULTIPLE SCLEROSIS) (H): ICD-10-CM

## 2023-02-21 DIAGNOSIS — M54.50 RIGHT-SIDED LOW BACK PAIN WITHOUT SCIATICA, UNSPECIFIED CHRONICITY: ICD-10-CM

## 2023-02-21 DIAGNOSIS — N20.0 NEPHROLITHIASIS: ICD-10-CM

## 2023-02-21 DIAGNOSIS — D69.6 THROMBOCYTOPENIA (H): ICD-10-CM

## 2023-02-21 PROCEDURE — 99310 SBSQ NF CARE HIGH MDM 45: CPT | Performed by: NURSE PRACTITIONER

## 2023-02-21 NOTE — LETTER
"    2/21/2023        RE: Mili Padilla  [address]  [facility] [address]        [facility]    Chief Complaint   Patient presents with     RECHECK     HPI:  Mili Padilla is a 67 year old  (1955), who is being seen today for an episodic care visit at: [facility]) [25]. Today's concern is:   UTI/deconditioning denies dysuria, fever, chills, in therapy patient is making progress, walking up to 55 feet using a 4ww with CGA  Patient will have stent removed tomorrow  MS: working on strengthening exercises in therapy, uses a w/c at baseline for mobility  HTN: /79, 139/78, 137/81 with HR 70range, denies CP, palpitations or SOB  Nephrolithiasis: no c/o abdominal pain, N/V  Diarrhea states loose stools have improved, she had a formed stool today,  denies abdominal pain or discomfort  Thrombocytopenia: Plt 182K 2/21/23  Neurogenic bladder patient states she is urinating without difficulty  Hyponatremia: N 137 on 2/21/23  Low back pain: denies pain at time of exam    Allergies, and PMH/PSH reviewed in EPIC today.  REVIEW OF SYSTEMS:  10 point ROS of systems including Constitutional, Eyes, Respiratory, Cardiovascular, Gastroenterology, Genitourinary, Integumentary, Musculoskeletal, Psychiatric were all negative except for pertinent positives noted in my HPI.    Objective:   /79   Pulse 75   Temp 98  F (36.7  C)   Resp 16   Ht 1.676 m (5' 5.98\")   Wt 127.6 kg (281 lb 6.4 oz)   LMP 12/10/2010   SpO2 92%   BMI 45.44 kg/m    GENERAL APPEARANCE:  Alert, in no distress, morbidly obese  ENT:  Mouth and posterior oropharynx normal, moist mucous membranes, normal hearing acuity  EYES:  EOM, conjunctivae, lids, pupils and irises normal, PERRL  RESP:  respiratory effort and palpation of chest normal, lungs clear to auscultation , no respiratory distress  CV:  Palpation and auscultation of heart done , regular rate and rhythm, no murmur, rub, or gallop, peripheral edema " 1+ in LE bilaterally  ABDOMEN:  normal bowel sounds, soft, nontender, no hepatosplenomegaly or other masses  M/S:   patient sitting up in w/c  SKIN:  Inspection of skin and subcutaneous tissue baseline  NEURO:   speech wnl  PSYCH:  affect and mood normal    Recent labs in Psychiatric reviewed by me today.  and   Most Recent 3 CBC's:Recent Labs   Lab Test 01/24/23  0919 01/23/23  1121 01/22/23  0918   WBC 10.1 10.4 8.9   HGB 12.5 11.6* 11.1*   MCV 93 93 92   * 94* 79*     Most Recent 3 BMP's:Recent Labs   Lab Test 01/24/23  0919 01/23/23  1633 01/23/23  1121 01/22/23  0918   *  --  132* 132*   POTASSIUM 3.6 3.6 3.4 3.4   CHLORIDE 95*  --  96* 97*   CO2 30*  --  26 28   BUN 7.9*  --  8.8 8.2   CR 0.53  --  0.49* 0.51   ANIONGAP 10  --  10 7   HEAVENLY 8.7*  --  8.5* 8.6*   *  --  102* 159*       Assessment/Plan:  N39.0) UTI  Comment: acute/ongoing  Plan: PT and OT, cipro 250mg BID until f/u with urology, f/u with urology as OP appt scheduled for 2/22/23 will have stent removed  WBC 7.2 on 2/21/23     (G35) MS (multiple sclerosis) (H)  (N31.9) Neurogenic bladder  (R53.81) Physical deconditioning  Comment: acute/ongoing, no change  Plan: PT and OT, continue trospium 20mg BID,      (N20.0) Nephrolithiasis  Comment: acute/ongoing s/p elective left ureteroscopy, lithotripsy and left ureteral stent,  Plan: f/u as OP with urology for stent removal tomorrow  Labs were done wnl CBC and BMP on 2/21/23     (I10) Benign essential hypertension  Comment: acute/ongoing, no change  Plan: BMP follow, continue verapamil 120mg BID, continue losartan to 50 mg BID     (R19.7) Diarrhea, unspecified type  Comment: improved, no change  Stool cultures and C-diff cultures done 2/11/23 negative  Plan: continue culturelle 10bi BID  increase questran to 4g TID, change  imodium  To 2mg q 6 hours prn     (E87.1) Hyponatremia  Comment: acute/ongoing, no change  Plan: BMP follow, Na 137 wnl on 2/21/23     (D69.6) Thrombocytopenia  (H)  Comment: ongoing, no change  Plan: CBC, follow  Plt 182K on 2/21/23     (M54.50) right sided low back pain without sciatica  Acute/ongoing, no change  Plan: schedule tylenol 1000mg TID, K-pad prn for pain    MED REC REQUIRED  Post Medication Reconciliation Status: medication reconcilation previously completed during another office visit      Orders:  No new orders      Electronically signed by: Tonya Lynn Haase, APRN CNP             Sincerely,        Tonya Lynn Haase, APRN CNP

## 2023-02-21 NOTE — PROGRESS NOTES
"[facility] GERIATRICS    Chief Complaint   Patient presents with     RECHECK     HPI:  Mili Padilla is a 67 year old  (1955), who is being seen today for an episodic care visit at: [facility]) [25]. Today's concern is:   UTI/deconditioning denies dysuria, fever, chills, in therapy patient is making progress, walking up to 55 feet using a 4ww with CGA  Patient will have stent removed tomorrow  MS: working on strengthening exercises in therapy, uses a w/c at baseline for mobility  HTN: /79, 139/78, 137/81 with HR 70range, denies CP, palpitations or SOB  Nephrolithiasis: no c/o abdominal pain, N/V  Diarrhea states loose stools have improved, she had a formed stool today,  denies abdominal pain or discomfort  Thrombocytopenia: Plt 182K 2/21/23  Neurogenic bladder patient states she is urinating without difficulty  Hyponatremia: N 137 on 2/21/23  Low back pain: denies pain at time of exam    Allergies, and PMH/PSH reviewed in [facility] today.  REVIEW OF SYSTEMS:  10 point ROS of systems including Constitutional, Eyes, Respiratory, Cardiovascular, Gastroenterology, Genitourinary, Integumentary, Musculoskeletal, Psychiatric were all negative except for pertinent positives noted in my HPI.    Objective:   /79   Pulse 75   Temp 98  F (36.7  C)   Resp 16   Ht 1.676 m (5' 5.98\")   Wt 127.6 kg (281 lb 6.4 oz)   LMP 12/10/2010   SpO2 92%   BMI 45.44 kg/m    GENERAL APPEARANCE:  Alert, in no distress, morbidly obese  ENT:  Mouth and posterior oropharynx normal, moist mucous membranes, normal hearing acuity  EYES:  EOM, conjunctivae, lids, pupils and irises normal, PERRL  RESP:  respiratory effort and palpation of chest normal, lungs clear to auscultation , no respiratory distress  CV:  Palpation and auscultation of heart done , regular rate and rhythm, no murmur, rub, or gallop, peripheral edema 1+ in LE bilaterally  ABDOMEN:  normal bowel sounds, soft, nontender, no hepatosplenomegaly or " other masses  M/S:   patient sitting up in w/c  SKIN:  Inspection of skin and subcutaneous tissue baseline  NEURO:   speech wnl  PSYCH:  affect and mood normal    Recent labs in [facility] reviewed by me today.  and   Most Recent 3 CBC's:Recent Labs   Lab Test 01/24/23  0919 01/23/23  1121 01/22/23  0918   WBC 10.1 10.4 8.9   HGB 12.5 11.6* 11.1*   MCV 93 93 92   * 94* 79*     Most Recent 3 BMP's:Recent Labs   Lab Test 01/24/23  0919 01/23/23  1633 01/23/23  1121 01/22/23  0918   *  --  132* 132*   POTASSIUM 3.6 3.6 3.4 3.4   CHLORIDE 95*  --  96* 97*   CO2 30*  --  26 28   BUN 7.9*  --  8.8 8.2   CR 0.53  --  0.49* 0.51   ANIONGAP 10  --  10 7   HEAVENLY 8.7*  --  8.5* 8.6*   *  --  102* 159*       Assessment/Plan:  N39.0) UTI  Comment: acute/ongoing  Plan: PT and OT, cipro 250mg BID until f/u with urology, f/u with urology as OP appt scheduled for 2/22/23 will have stent removed  WBC 7.2 on 2/21/23     (G35) MS (multiple sclerosis) (H)  (N31.9) Neurogenic bladder  (R53.81) Physical deconditioning  Comment: acute/ongoing, no change  Plan: PT and OT, continue trospium 20mg BID,      (N20.0) Nephrolithiasis  Comment: acute/ongoing s/p elective left ureteroscopy, lithotripsy and left ureteral stent,  Plan: f/u as OP with urology for stent removal tomorrow  Labs were done wnl CBC and BMP on 2/21/23     (I10) Benign essential hypertension  Comment: acute/ongoing, no change  Plan: BMP follow, continue verapamil 120mg BID, continue losartan to 50 mg BID     (R19.7) Diarrhea, unspecified type  Comment: improved, no change  Stool cultures and C-diff cultures done 2/11/23 negative  Plan: continue culturelle 10bi BID  increase questran to 4g TID, change  imodium  To 2mg q 6 hours prn     (E87.1) Hyponatremia  Comment: acute/ongoing, no change  Plan: BMP follow, Na 137 wnl on 2/21/23     (D69.6) Thrombocytopenia (H)  Comment: ongoing, no change  Plan: CBC, follow  Plt 182K on 2/21/23     (M54.50) right sided low back  pain without sciatica  Acute/ongoing, no change  Plan: schedule tylenol 1000mg TID, K-pad prn for pain    MED REC REQUIRED  Post Medication Reconciliation Status: medication reconcilation previously completed during another office visit      Orders:  No new orders      Electronically signed by: Tonya Lynn Haase, APRN CNP

## 2023-02-23 ENCOUNTER — TELEPHONE (OUTPATIENT)
Dept: OPHTHALMOLOGY | Facility: CLINIC | Age: 68
End: 2023-02-23
Payer: COMMERCIAL

## 2023-02-23 ENCOUNTER — VIRTUAL VISIT (OUTPATIENT)
Dept: PSYCHOLOGY | Facility: CLINIC | Age: 68
End: 2023-02-23
Payer: COMMERCIAL

## 2023-02-23 DIAGNOSIS — F33.0 MAJOR DEPRESSIVE DISORDER, RECURRENT EPISODE, MILD (H): ICD-10-CM

## 2023-02-23 DIAGNOSIS — F54 PSYCHOLOGICAL FACTORS AFFECTING MORBID OBESITY (H): Primary | ICD-10-CM

## 2023-02-23 DIAGNOSIS — E66.01 PSYCHOLOGICAL FACTORS AFFECTING MORBID OBESITY (H): Primary | ICD-10-CM

## 2023-02-23 PROCEDURE — 90832 PSYTX W PT 30 MINUTES: CPT | Mod: VID | Performed by: PSYCHOLOGIST

## 2023-02-23 NOTE — TELEPHONE ENCOUNTER
Patient reached out to reschedule surgery with . she is aware of above dates, will review new packet and reach out with any questions she may have    Anna C. Schoenecker on 2/23/2023 at 2:39 PM

## 2023-02-23 NOTE — PROGRESS NOTES
Health Psychology                     Department of Medicine  Izzy Montaño, Ph.D., L.P. [phone]                          [facility] Sherrie Crowe, Ph.D., L.P. [phone]                   [place] Mail Code 953   Ranulfo Whipple, Ph.D. [phone]        [address] [place] [address] Enriqueta Sorto, Ph.D., L.P. [phone]    [place] [address]           Ramin Olivo, Ph.D., A.B.P.P., L.P. [phone]        Kathie Galvan, Ph.D., L.P. [phone]  [address] Psychology Telemental Health Note    Ms. Padilla is a 66-year-old woman self-referred for psychological consultation because her therapist of the last 24 years retired.  She is seen for problem-solving and supportive therapy for depression and multiple health issues.     HISTORY OF PRESENTING CONCERN:  Ms. Padilla reported at intake (7/28/16) a  lengthy history of depression.  She sees a psychiatrist, Ban Coleman, at [facility], and is taking Abilify 7.5 mg, trazodone 500 mg, ripazepam 75 mg each day at bedtime, Tegretol 400 mg at bedtime and methylphenidate 10 mg b.i.d.  She discontineud ability and is now taking Rexulti 2 mg.  She has a history of hospitalizations including 3 at [facility] in the late 1990s, early 2000s when she had 2 courses of ECT lasting 7-10 sessions and approximately 3 other single session ECTs.  She stopped due to memory problems.  She kept with Dr. Coleman who she first met as an inpatient and has seen her for the past 18 years.  She had also been hospitalized psychiatrically at [facility] at age 23.  She previously worked with psychiatrist, Doug Sarabia for three years, stopping, in part, because she didn't feel he took her suicide attempt sufficiently seriously.  She reports her depression tends to vary.     MEDICAL HISTORY (at intake)  Ms. Padilla has a complex medical history.  She was diagnosed with  MS in 1985.  Her psychiatrist at [facility] in [place], Dr. 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  She has used a scooter since 1992, using it more  than she had previously.  She also can use a walker.  She was diagnosed with fibromyalgia in 1997.   She is seen at the [facility] for her eye care. During 8th grade, she fell on a ski lift, injuring her larynx.     WEIGHT not taken. iD.    2/23/23  Down to 281 lbs.     She resumed attending OA, then stopped during the winter.     Wt Readings from Last 4 Encounters:   02/21/23 127.6 kg (281 lb 6.4 oz)   02/15/23 129 kg (284 lb 8 oz)   02/09/23 128.9 kg (284 lb 3.2 oz)   02/08/23 128.9 kg (284 lb 3.2 oz)     Past Medical History:   Diagnosis Date     Depression, major, in partial remission (H)      Fibromyalgia syndrome      Gastro-oesophageal reflux disease      Hyperlipemia      Hypertension      Low serum sodium      Lymphedema      Mixed incontinence      Multiple sclerosis (H)     tremors with MS, all four limbs and head     Multiple sclerosis, secondary progressive (H)      Neurogenic bladder      Obese      Osteoporosis      Sleep apnea      Vocal cord paralysis, unilateral complete         Past Surgical History:   Procedure Laterality Date     COLONOSCOPY N/A 07/17/2017    Procedure: COMBINED COLONOSCOPY, SINGLE OR MULTIPLE BIOPSY/POLYPECTOMY BY BIOPSY;;  Surgeon: Wong Beaulieu MD;  Location: [facility]     COLONOSCOPY N/A 02/23/2018    Procedure: COMBINED COLONOSCOPY, SINGLE OR MULTIPLE BIOPSY/POLYPECTOMY BY BIOPSY;  COLONOSCOPY ;  Surgeon: Yessica Santana MD;  Location: [facility]     ENT SURGERY      throat 1969, vocal cord surgery with skin grafting     ESOPHAGOSCOPY, GASTROSCOPY, DUODENOSCOPY (EGD), COMBINED N/A 12/16/2014    Procedure: COMBINED ENDOSCOPIC ULTRASOUND, ESOPHAGOSCOPY, GASTROSCOPY, DUODENOSCOPY (EGD), FINE NEEDLE ASPIRATE/BIOPSY;  Surgeon: Yessica Santana MD;  Location: [facility]      ESOPHAGOSCOPY, GASTROSCOPY, DUODENOSCOPY (EGD), COMBINED N/A 12/16/2014    Procedure: COMBINED ESOPHAGOSCOPY, GASTROSCOPY, DUODENOSCOPY (EGD), BIOPSY SINGLE OR MULTIPLE;  Surgeon: Yessica Santana MD;  Location:  GI     GASTRIC BYPASS Bilateral      LAPAROSCOPIC APPENDECTOMY  05/30/2013    Procedure: LAPAROSCOPIC APPENDECTOMY;;  Surgeon: Salvador Morris MD;  Location:  OR     LAPAROSCOPIC BIOPSY LIVER  05/30/2013    Procedure: LAPAROSCOPIC BIOPSY LIVER;;  Surgeon: Salvador Morris MD;  Location:  OR     LAPAROSCOPIC GASTRIC SLEEVE  05/30/2013    Procedure: LAPAROSCOPIC GASTRIC SLEEVE;  LAPAROSCOPIC SLEEVE GASTRECTOMY/ LAPARSCOPIC  APPENDECTOMY /LIVER BIOPSIES/LIVER CYST DRAINAGE;  Surgeon: Salvador Morris MD;  Location:  OR     LASER HOLMIUM LITHOTRIPSY URETER(S), INSERT STENT, COMBINED Left 1/18/2023    Procedure: CYSTOSCOPY, LEFT URETEROSCOPY, HOLMIUM LASER  LITHOTRIPSY, LEFT   STENT PLACEMENT;  Surgeon: Mahendra Coles MD;  Location:  OR     ORTHOPEDIC SURGERY      R wrist 2010       Current Outpatient Medications   Medication     acetaminophen (TYLENOL) 500 MG tablet     ARIPiprazole (ABILIFY) 5 MG tablet     atorvastatin (LIPITOR) 20 MG tablet     carBAMazepine (TEGRETOL) 200 MG tablet     cholestyramine (QUESTRAN) 4 g packet     Cranberry 1000 MG CAPS     cyanocobalamin (VITAMIN B-12) 100 MCG tablet     denosumab (PROLIA) 60 MG/ML SOLN injection     Dextromethorphan-guaiFENesin (MUCINEX DM MAXIMUM STRENGTH PO)     DULoxetine (CYMBALTA) 60 MG capsule     famotidine (PEPCID) 40 MG tablet     fluticasone (FLONASE) 50 MCG/ACT nasal spray     ketoconazole (NIZORAL) 2 % cream     lactobacillus rhamnosus, GG, (CULTURELL) capsule     loperamide (IMODIUM A-D) 2 MG tablet     losartan (COZAAR) 50 MG tablet     Melatonin 10 MG TABS     mirtazapine (REMERON) 30 MG tablet     Multiple Vitamin (MULTIVITAMINS PO)     traZODone (DESYREL) 100 MG tablet     triamcinolone (ARISTOCORT HP) 0.5 %  external cream     trospium (SANCTURA) 20 MG tablet     verapamil ER (CALAN-SR) 120 MG CR tablet     vitamin B-Complex     No current facility-administered medications for this visit.     Medication: On Duloxetine and Mirtazpine and Trazodone and Ritalin. She discontinued Effexor and Abilify previously per Dr. Marquise Coleman, her psychiatrist.  On 4/10/19 she informed me that she started Abilify    PHQ-9 SCORE 9/15/2019 2022 11/3/2022   PHQ-9 Total Score MyChart 5 (Mild depression) 6 (Mild depression) 5 (Mild depression)   PHQ-9 Total Score 5 6 5     NAS-7 SCORE 2022 11/3/2022 2022   Total Score 3 (minimal anxiety) 3 (minimal anxiety) 4 (minimal anxiety)   Total Score 3 3 4     SOCIAL HISTORY (at [facility])  Ms. Padilla grew up in the [place] area and is the oldest of 5 children in her family of origin.  Her father was a dentist who she believes was bipolar.  He  of lung cancer at age 72.  Her mother  of sepsis at age 80.  She had been diagnosed with breast cancer when Ms. Padilla was 8.  She describes the marriage between her parents as misael.  She is 5 years older than her closest-aged sister and they are all within 4 years of each other.   Her daughter  12/3 and her mother .  She tends to feel more depressed in winter.      Ms. Padilla attended [employer] for a year and later went to night school at the [employer].  She felt that she could not continue her education to the point of graduation because she was working full time and raising her daughter.  Her daughter  in  at age 31 of liver failure secondary to an accidental Tylenol overdose.  She had been recovering from a hysterectomy.      Ms. 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  She had to retire at the time secondary to her MS.  She has never .  She has not  dated,and states that if she were she would date, she would be interested in a relationship with a woman.  There is no history of  service or legal problems.  She expresses concern about her increasing social isolation.  She has at least 1 friend, Jael, who she met at a therapy group in 1984.  She is active in facilitating groups for people with MS both in [place] and [place].  She lives alone and currently gets help from a home health aide, as well as home health nurse.     She had  seen Dr. Ban Coleman, psychiatrist   Dr. Coleman prescribed Cymbalta for it.  She feels she has been more depressed since the Fall, but doesn't think it is SAD.   She stated that she has recommended case management.     SESSION:  Mili participated in a telehealth session of psychotherapy. The depression varies and has worsened given her health issues.    She was hospitalized at [facility] for seven days. She  developed sepsis as part of recovery from surgery for a kidney stone.  She is now in the [facility]. Hope to go home 2/26, not the 12th as she had earlier thought  She will get PT and OT at home.     She thinks she will get more hours from PCA when she returns home.  She might increase from 2 to 3x/day.    Going to bed at 11; sleeps 8 hours.     She can now walk 130 feet,    She is walking more with walker than using scooter.  When she arrived at [facility], she couldn't stand or walk (after hospitalization at [facility]).     Recent stressors:     1.A niece, Shalini  (38) is starting to develop problems consistent with MS.  She is , has two daughters, 6 and 2,  and lives in [place].  She can't work.   Her mother is Guera, Mili's sister.   Still waiting on diagnosis.  First sx were numbness and vision, which are pretty typical for MS>  She is talking to her on/off.  She is back working again.  2. Her best friend, Jael, is going into hospice at her apartment.  Is on diuretics, feels tired.    Medications stopped working. She gets bloated if she drinks too much.  She seems more down.   She has a psychologist named Lester Bashir.   Trying to find a clniical trial.   3.  Finished up abx, was having diarrhea; using Imodium.    Weight Issues: he states she is down to 281 from 284.9.  She is eating yogurt   No longer having diarrhea.   She is trying to order lower calorie meals.  Reinforced returning to goal of 1600 every day when she returns home.  She plans to resume calorie counting them.She started a meal delivery servie at home, [employer], while living at home for a year.  She gets groceries delivered too.   Some concrete steps include having just 1 tangerine and purchasing smaller bananas.      She participated fully and appeared to derive benefit. Affect is positive.  Rapport was excellent.   This telehealth (telephone) service is appropriate and effective for delivering services in light of the necessity for social distancing to mitigate the COVID-19 epidemic and for conservation of PPE.     Patient has agreed to receiving telehealth services after being informed about it: Yes    Patient prefers video invitation/information to be sent by:   email    Time service started: 12:01  Time service ended:  12:32    Mode of transmission: Maozhao    Location of originating:  [facility] where  the patient is undergoing rehabilitation    Distance site:  Home office of provider for [facility]    The patient has been notified that:  Video visits will be conducted via a call with their psychologist to provide the care they need with a video conversation. Video visits may be billed at different rates depending on insurance coverage.  Patients are advised to please contact their insurance provider with any questions about their health insurance coverage. If during the course of a call the psychologist feels a video visit is not appropriate, patients will not be charged for this service  DIAGNOSES:   Major  depression, recurrent, oderate (F33.1).   Behavioral factors affecting morbid obesity (E66.01).     PLAN:  Ms. Padilla will return for video visit  3/22  @ 11 for problem-solving and supportive therapy consistent with treatment plan.  Goal per day now that she is tracking is  1600 consistently.   Anticipates discharge 2/26.  Preference for future meetings:             In-person   prefers, even if masked              Remote              Either  Last treatment plan signed:5/27/22/  Last Treatment plan review: 12/23/22  Treatment plan verbal Review due: 3/23/23  Treatment Review due: 5/27/23     Ramin Olivo, PhD, A.B.P.P., L.P.   Director, Health Psychology

## 2023-02-24 ENCOUNTER — DISCHARGE SUMMARY NURSING HOME (OUTPATIENT)
Dept: GERIATRICS | Facility: CLINIC | Age: 68
End: 2023-02-24
Payer: COMMERCIAL

## 2023-02-24 VITALS
WEIGHT: 281.4 LBS | RESPIRATION RATE: 16 BRPM | OXYGEN SATURATION: 94 % | DIASTOLIC BLOOD PRESSURE: 80 MMHG | SYSTOLIC BLOOD PRESSURE: 119 MMHG | BODY MASS INDEX: 45.22 KG/M2 | TEMPERATURE: 99.1 F | HEART RATE: 78 BPM | HEIGHT: 66 IN

## 2023-02-24 DIAGNOSIS — D69.6 THROMBOCYTOPENIA (H): ICD-10-CM

## 2023-02-24 DIAGNOSIS — M54.50 RIGHT-SIDED LOW BACK PAIN WITHOUT SCIATICA, UNSPECIFIED CHRONICITY: ICD-10-CM

## 2023-02-24 DIAGNOSIS — N31.9 NEUROGENIC BLADDER: ICD-10-CM

## 2023-02-24 DIAGNOSIS — G35 MS (MULTIPLE SCLEROSIS) (H): ICD-10-CM

## 2023-02-24 DIAGNOSIS — I10 BENIGN ESSENTIAL HYPERTENSION: ICD-10-CM

## 2023-02-24 DIAGNOSIS — N39.0 URINARY TRACT INFECTION WITHOUT HEMATURIA, SITE UNSPECIFIED: Primary | ICD-10-CM

## 2023-02-24 DIAGNOSIS — N20.0 NEPHROLITHIASIS: ICD-10-CM

## 2023-02-24 DIAGNOSIS — R19.7 DIARRHEA, UNSPECIFIED TYPE: ICD-10-CM

## 2023-02-24 DIAGNOSIS — R53.81 PHYSICAL DECONDITIONING: ICD-10-CM

## 2023-02-24 DIAGNOSIS — E87.1 HYPONATREMIA: ICD-10-CM

## 2023-02-24 PROCEDURE — 99316 NF DSCHRG MGMT 30 MIN+: CPT | Performed by: NURSE PRACTITIONER

## 2023-02-24 NOTE — LETTER
2/24/2023        RE: Mili Padilla  [address]  [facility] [id number]        [facility] GERIATRICS DISCHARGE SUMMARY  PATIENT'S NAME: Mili Padilla  YOB: 1955  MEDICAL RECORD NUMBER:  [id number]  [facility] encounter took [facility] [[facility]]    PRIMARY CARE PROVIDER AND CLINIC RESPONSIBLE AFTER TRANSFER:   Jasmyn Márquez MD, [address] / [place] [address]    [facility] Provider     Transferring providers: Tonya Lynn Haase, APRN CNP, Shane Griffin MD  Recent Hospitalization/ED:  [facility] Hospital stay 1/18/23 to 1/25/23.  Date of SNF Admission: January 25, 2023  Date of SNF (anticipated) Discharge: February 26, 2023  Discharged to: previous independent home  Cognitive Scores: BIMS: 15/15 and Short blessed: 0/28  Physical Function: Ambulating 60 ft with 4ww  DME: Wheelchair and Walker    CODE STATUS/ADVANCE DIRECTIVES DISCUSSION:  Full Code   ALLERGIES: Amoxicillin, Augmentin, Betaseron [interferon beta-1b], Dust mite extract, and Mold    NURSING FACILITY COURSE   Medication Changes/Rationale:     See assessment and plan    Summary of nursing facility stay:   Patient progressed to walking up to 60 feet using a 4ww independently, independent with aDL's, did have stent removed successfully on 2/22/23, Patient will DC to home with home PT and OT through [facility].     Discharge Medications:  MED REC REQUIRED  Post Medication Reconciliation Status: discharge medications reconciled and changed, per note/orders       Current Outpatient Medications   Medication Sig Dispense Refill     acetaminophen (TYLENOL) 500 MG tablet Take 1,000 mg by mouth 3 times daily as needed       ARIPiprazole (ABILIFY) 5 MG tablet Take 7.5 mg by mouth daily  2     atorvastatin (LIPITOR) 20 MG tablet Take 20 mg by mouth daily.       carBAMazepine (TEGRETOL) 200 MG tablet Take 200 mg by mouth 2 times daily       Cranberry 1000 MG CAPS Take by  mouth daily       cyanocobalamin (VITAMIN B-12) 100 MCG tablet Take 1,000 mcg by mouth once a week       denosumab (PROLIA) 60 MG/ML SOLN injection Inject 60 mg Subcutaneous every 6 months On hold in [facility]       Dextromethorphan-guaiFENesin (MUCINEX DM MAXIMUM STRENGTH PO) Take by mouth At Bedtime       DULoxetine (CYMBALTA) 60 MG capsule Take 120 mg by mouth daily        famotidine (PEPCID) 40 MG tablet TAKE 1/2 TABLET BY MOUTH TWICE DAILY (Patient taking differently: Take 40 mg by mouth nightly as needed) 90 tablet 0     fluticasone (FLONASE) 50 MCG/ACT nasal spray Spray 2 sprays into both nostrils daily       ketoconazole (NIZORAL) 2 % cream Apply topically 2 times daily as needed        lactobacillus rhamnosus, GG, (CULTURELL) capsule Take 1 capsule by mouth 2 times daily       loperamide (IMODIUM A-D) 2 MG tablet Take 1 tablet (2 mg) by mouth every 6 hours as needed for diarrhea       losartan (COZAAR) 50 MG tablet Take 1 tablet (50 mg) by mouth daily       Melatonin 10 MG TABS Take 10 mg by mouth At Bedtime Changed to extended release       mirtazapine (REMERON) 30 MG tablet Take 75 mg by mouth At Bedtime        Multiple Vitamin (MULTIVITAMINS PO) Take 2 tablets by mouth every evening. WITH IRON       traZODone (DESYREL) 100 MG tablet Take 600 mg by mouth At Bedtime May split dose, 300mg @ HS and repeat x1 if needed        triamcinolone (ARISTOCORT HP) 0.5 % external cream Apply topically At Bedtime To hands       trospium (SANCTURA) 20 MG tablet Take 1 tablet (20 mg) by mouth 2 times daily (before meals)       verapamil ER (CALAN-SR) 120 MG CR tablet Take 120 mg by mouth 2 times daily       vitamin B-Complex Take 1 tablet by mouth daily             Controlled medications:   not applicable/none     Past Medical History:   Past Medical History:   Diagnosis Date     Depression, major, in partial remission (H)      Fibromyalgia syndrome      Gastro-oesophageal reflux disease      Hyperlipemia      Hypertension       "Low serum sodium      Lymphedema      Mixed incontinence      Multiple sclerosis (H)     tremors with MS, all four limbs and head     Multiple sclerosis, secondary progressive (H)      Neurogenic bladder      Obese      Osteoporosis      Sleep apnea      Vocal cord paralysis, unilateral complete      Physical Exam:   Vitals: /80   Pulse 78   Temp 99.1  F (37.3  C)   Resp 16   Ht 1.676 m (5' 5.98\")   Wt 127.6 kg (281 lb 6.4 oz)   LMP 12/10/2010   SpO2 94%   BMI 45.44 kg/m    BMI: Body mass index is 45.44 kg/m .  GENERAL APPEARANCE:  Alert, in no distress, morbidly obese  ENT:  Mouth and posterior oropharynx normal, moist mucous membranes, normal hearing acuity  EYES:  EOM, conjunctivae, lids, pupils and irises normal, PERRL  RESP:  respiratory effort and palpation of chest normal, lungs clear to auscultation , no respiratory distress  CV:  Palpation and auscultation of heart done , regular rate and rhythm, no murmur, rub, or gallop, peripheral edema 1+ in LE bilaterally  ABDOMEN:  normal bowel sounds, soft, nontender, no hepatosplenomegaly or other masses  M/S:   patient sitting up in w/c  SKIN:  Inspection of skin and subcutaneous tissue baseline  NEURO:   speech wnl  PSYCH:  affect and mood normal     SNF labs: Recent labs in [facility] reviewed by me today.  and   Most Recent 3 CBC's:  Recent Labs   Lab Test 01/24/23  0919 01/23/23  1121 01/22/23  0918   WBC 10.1 10.4 8.9   HGB 12.5 11.6* 11.1*   MCV 93 93 92   * 94* 79*     Most Recent 3 BMP's:  Recent Labs   Lab Test 01/24/23  0919 01/23/23  1633 01/23/23  1121 01/22/23  0918   *  --  132* 132*   POTASSIUM 3.6 3.6 3.4 3.4   CHLORIDE 95*  --  96* 97*   CO2 30*  --  26 28   BUN 7.9*  --  8.8 8.2   CR 0.53  --  0.49* 0.51   ANIONGAP 10  --  10 7   HEAVENLY 8.7*  --  8.5* 8.6*   *  --  102* 159*     Assessment/Plan:  N39.0) UTI  Comment: resolved  Plan: DC home with home PT and OT through [facility],  Finished with cipro coarse (continued " until stent removal 2/22/23)   f/u with urology as directed  WBC 7.2 on 2/21/23     (G35) MS (multiple sclerosis) (H)  (N31.9) Neurogenic bladder  (R53.81) Physical deconditioning  Comment: ongoing  Plan: DC home with home PT and OT through [facility], patient is at baseline or better for functional level at discharge, continue trospium 20mg BID,      (N20.0) Nephrolithiasis  Comment: acute/ongoing s/p elective left ureteroscopy, lithotripsy and left ureteral stent,  Plan: stent removed 2/22/23, f/u with urology as directed  Labs were done wnl CBC and BMP on 2/21/23     (I10) Benign essential hypertension  Comment: ongoing  Plan: continue verapamil 120mg BID, continue losartan to 50 mg BID     (R19.7) Diarrhea, unspecified type  Comment: resolved now off cipro  Stool cultures and C-diff cultures done 2/11/23 negative  Plan: continue culturelle 10bi BID  DC questran at discharge, continue imodium prn at home     (E87.1) Hyponatremia  Comment: ongoing  Plan: f/u as OP Na 137 wnl on 2/21/23     (D69.6) Thrombocytopenia (H)  Comment: ongoing  Plan: f/u with PCP  Plt 182K on 2/21/23     (M54.50) right sided low back pain without sciatica  ongoing  Plan: continue  tylenol 1000mg TID prn       DISCHARGE PLAN:    Follow up labs: No labs orders/due    Medical Follow Up:      Follow up with primary care provider in 1-2 weeks    Current [facility] scheduled appointments:       Discharge Services: Home Care:  Occupational Therapy and Physical Therapy    Discharge Instructions Verbalized to Patient at Discharge:     None    TOTAL DISCHARGE TIME:   Greater than 30 minutes  Electronically signed by:  Tonya Lynn Haase, APRN CNP     Home care Face to Face documentation done in [facility] attached to Home care orders for [facility]. , Documentation of Face to Face and Certification for Home Health Services    I certify that patient: Mili Padilla is under my care and that I, or a nurse practitioner or physician's assistant working with  me, had a face-to-face encounter that meets the physician face-to-face encounter requirements with this patient on: 2/24/2023.    This encounter with the patient was in whole, or in part, for the following medical condition, which is the primary reason for home health care: MS, UTI    I certify that, based on my findings, the following services are medically necessary home health services: Occupational Therapy and Physical Therapy.    My clinical findings support the need for the above services because: Occupational Therapy Services are needed to assess and treat cognitive ability and address ADL safety due to impairment in ADL training and home safety. and Physical Therapy Services are needed to assess and treat the following functional impairments: strengthening, endurance, home safety.    Further, I certify that my clinical findings support that this patient is homebound (i.e. absences from home require considerable and taxing effort and are for medical reasons or [employer] services or infrequently or of short duration when for other reasons) because: Requires assistance of another person or specialized equipment to access medical services because patient: Is unable to walk greater than 60 feet without rest...    Based on the above findings. I certify that this patient is confined to the home and needs intermittent skilled nursing care, physical therapy and/or speech therapy.  The patient is under my care, and I have initiated the establishment of the plan of care.  This patient will be followed by a physician who will periodically review the plan of care.  Physician/Provider to provide follow up care: Jasmyn Márquez    Attending hospital physician (the Medicare certified [facility] provider): Tonya Lynn Haase, APRN CNP  Physician Signature: See electronic signature associated with these discharge orders.  Date: 2/24/2023                  Sincerely,        Tonya Lynn Haase, APRN CNP

## 2023-02-24 NOTE — PROGRESS NOTES
[facility] GERIATRICS DISCHARGE SUMMARY  PATIENT'S NAME: Mili Padilla  YOB: 1955  MEDICAL RECORD NUMBER:  [id number]  Place of Service where encounter took place:  [facility]) [25]    PRIMARY CARE PROVIDER AND CLINIC RESPONSIBLE AFTER TRANSFER:   Jasmyn Márquez MD, [address] / [place] [address]    [facility] Provider     Transferring providers: Tonya Lynn Haase, APRN CNP, Shane Griffin MD  Recent Hospitalization/ED:  [facility] Hospital stay 1/18/23 to 1/25/23.  Date of SNF Admission: January 25, 2023  Date of SNF (anticipated) Discharge: February 26, 2023  Discharged to: previous independent home  Cognitive Scores: BIMS: 15/15 and Short blessed: 0/28  Physical Function: Ambulating 60 ft with 4ww  DME: Wheelchair and Walker    CODE STATUS/ADVANCE DIRECTIVES DISCUSSION:  Full Code   ALLERGIES: Amoxicillin, Augmentin, Betaseron [interferon beta-1b], Dust mite extract, and Mold    NURSING FACILITY COURSE   Medication Changes/Rationale:     See assessment and plan    Summary of nursing facility stay:   Patient progressed to walking up to 60 feet using a 4ww independently, independent with aDL's, did have stent removed successfully on 2/22/23, Patient will DC to home with home PT and OT through [facility].     Discharge Medications:  MED REC REQUIRED  Post Medication Reconciliation Status: discharge medications reconciled and changed, per note/orders       Current Outpatient Medications   Medication Sig Dispense Refill     acetaminophen (TYLENOL) 500 MG tablet Take 1,000 mg by mouth 3 times daily as needed       ARIPiprazole (ABILIFY) 5 MG tablet Take 7.5 mg by mouth daily  2     atorvastatin (LIPITOR) 20 MG tablet Take 20 mg by mouth daily.       carBAMazepine (TEGRETOL) 200 MG tablet Take 200 mg by mouth 2 times daily       Cranberry 1000 MG CAPS Take by mouth daily       cyanocobalamin (VITAMIN B-12) 100 MCG tablet Take 1,000 mcg by mouth once a week        denosumab (PROLIA) 60 MG/ML SOLN injection Inject 60 mg Subcutaneous every 6 months On hold in [facility]       Dextromethorphan-guaiFENesin (MUCINEX DM MAXIMUM STRENGTH PO) Take by mouth At Bedtime       DULoxetine (CYMBALTA) 60 MG capsule Take 120 mg by mouth daily        famotidine (PEPCID) 40 MG tablet TAKE 1/2 TABLET BY MOUTH TWICE DAILY (Patient taking differently: Take 40 mg by mouth nightly as needed) 90 tablet 0     fluticasone (FLONASE) 50 MCG/ACT nasal spray Spray 2 sprays into both nostrils daily       ketoconazole (NIZORAL) 2 % cream Apply topically 2 times daily as needed        lactobacillus rhamnosus, GG, (CULTURELL) capsule Take 1 capsule by mouth 2 times daily       loperamide (IMODIUM A-D) 2 MG tablet Take 1 tablet (2 mg) by mouth every 6 hours as needed for diarrhea       losartan (COZAAR) 50 MG tablet Take 1 tablet (50 mg) by mouth daily       Melatonin 10 MG TABS Take 10 mg by mouth At Bedtime Changed to extended release       mirtazapine (REMERON) 30 MG tablet Take 75 mg by mouth At Bedtime        Multiple Vitamin (MULTIVITAMINS PO) Take 2 tablets by mouth every evening. WITH IRON       traZODone (DESYREL) 100 MG tablet Take 600 mg by mouth At Bedtime May split dose, 300mg @ HS and repeat x1 if needed        triamcinolone (ARISTOCORT HP) 0.5 % external cream Apply topically At Bedtime To hands       trospium (SANCTURA) 20 MG tablet Take 1 tablet (20 mg) by mouth 2 times daily (before meals)       verapamil ER (CALAN-SR) 120 MG CR tablet Take 120 mg by mouth 2 times daily       vitamin B-Complex Take 1 tablet by mouth daily             Controlled medications:   not applicable/none     Past Medical History:   Past Medical History:   Diagnosis Date     Depression, major, in partial remission (H)      Fibromyalgia syndrome      Gastro-oesophageal reflux disease      Hyperlipemia      Hypertension      Low serum sodium      Lymphedema      Mixed incontinence      Multiple sclerosis (H)     tremors  "with MS, all four limbs and head     Multiple sclerosis, secondary progressive (H)      Neurogenic bladder      Obese      Osteoporosis      Sleep apnea      Vocal cord paralysis, unilateral complete      Physical Exam:   Vitals: /80   Pulse 78   Temp 99.1  F (37.3  C)   Resp 16   Ht 1.676 m (5' 5.98\")   Wt 127.6 kg (281 lb 6.4 oz)   LMP 12/10/2010   SpO2 94%   BMI 45.44 kg/m    BMI: Body mass index is 45.44 kg/m .  GENERAL APPEARANCE:  Alert, in no distress, morbidly obese  ENT:  Mouth and posterior oropharynx normal, moist mucous membranes, normal hearing acuity  EYES:  EOM, conjunctivae, lids, pupils and irises normal, PERRL  RESP:  respiratory effort and palpation of chest normal, lungs clear to auscultation , no respiratory distress  CV:  Palpation and auscultation of heart done , regular rate and rhythm, no murmur, rub, or gallop, peripheral edema 1+ in LE bilaterally  ABDOMEN:  normal bowel sounds, soft, nontender, no hepatosplenomegaly or other masses  M/S:   patient sitting up in w/c  SKIN:  Inspection of skin and subcutaneous tissue baseline  NEURO:   speech wnl  PSYCH:  affect and mood normal     SNF labs: Recent labs in [facility] reviewed by me today.  and   Most Recent 3 CBC's:  Recent Labs   Lab Test 01/24/23  0919 01/23/23  1121 01/22/23  0918   WBC 10.1 10.4 8.9   HGB 12.5 11.6* 11.1*   MCV 93 93 92   * 94* 79*     Most Recent 3 BMP's:  Recent Labs   Lab Test 01/24/23  0919 01/23/23  1633 01/23/23  1121 01/22/23  0918   *  --  132* 132*   POTASSIUM 3.6 3.6 3.4 3.4   CHLORIDE 95*  --  96* 97*   CO2 30*  --  26 28   BUN 7.9*  --  8.8 8.2   CR 0.53  --  0.49* 0.51   ANIONGAP 10  --  10 7   HEAVENLY 8.7*  --  8.5* 8.6*   *  --  102* 159*     Assessment/Plan:  N39.0) UTI  Comment: resolved  Plan: DC home with home PT and OT through [facility],  Finished with cipro gustavo (continued until stent removal 2/22/23)   f/u with urology as directed  WBC 7.2 on 2/21/23     (G35) MS " (multiple sclerosis) (H)  (N31.9) Neurogenic bladder  (R53.81) Physical deconditioning  Comment: ongoing  Plan: DC home with home PT and OT through [facility], patient is at baseline or better for functional level at discharge, continue trospium 20mg BID,      (N20.0) Nephrolithiasis  Comment: acute/ongoing s/p elective left ureteroscopy, lithotripsy and left ureteral stent,  Plan: stent removed 2/22/23, f/u with urology as directed  Labs were done wnl CBC and BMP on 2/21/23     (I10) Benign essential hypertension  Comment: ongoing  Plan: continue verapamil 120mg BID, continue losartan to 50 mg BID     (R19.7) Diarrhea, unspecified type  Comment: resolved now off cipro  Stool cultures and C-diff cultures done 2/11/23 negative  Plan: continue culturelle 10bi BID  DC questran at discharge, continue imodium prn at home     (E87.1) Hyponatremia  Comment: ongoing  Plan: f/u as OP Na 137 wnl on 2/21/23     (D69.6) Thrombocytopenia (H)  Comment: ongoing  Plan: f/u with PCP  Plt 182K on 2/21/23     (M54.50) right sided low back pain without sciatica  ongoing  Plan: continue  tylenol 1000mg TID prn       DISCHARGE PLAN:    Follow up labs: No labs orders/due    Medical Follow Up:      Follow up with primary care provider in 1-2 weeks    [facility] scheduled appointments:       Discharge Services: Home Care:  Occupational Therapy and Physical Therapy    Discharge Instructions Verbalized to Patient at Discharge:     None    TOTAL DISCHARGE TIME:   Greater than 30 minutes  Electronically signed by:  Tonya Lynn Haase, APRN CNP     Home care Face to Face documentation done in [facility] attached to Home care orders for [facility]. , Documentation of Face to Face and Certification for Home Health Services    I certify that patient: Mili Padilla is under my care and that I, or a nurse practitioner or physician's assistant working with me, had a face-to-face encounter that meets the physician face-to-face encounter requirements  with this patient on: 2/24/2023.    This encounter with the patient was in whole, or in part, for the following medical condition, which is the primary reason for home health care: MS, UTI    I certify that, based on my findings, the following services are medically necessary home health services: Occupational Therapy and Physical Therapy.    My clinical findings support the need for the above services because: Occupational Therapy Services are needed to assess and treat cognitive ability and address ADL safety due to impairment in ADL training and home safety. and Physical Therapy Services are needed to assess and treat the following functional impairments: strengthening, endurance, home safety.    Further, I certify that my clinical findings support that this patient is homebound (i.e. absences from home require considerable and taxing effort and are for medical reasons or Pent[employer] services or infrequently or of short duration when for other reasons) because: Requires assistance of another person or specialized equipment to access medical services because patient: Is unable to walk greater than 60 feet without rest...    Based on the above findings. I certify that this patient is confined to the home and needs intermittent skilled nursing care, physical therapy and/or speech therapy.  The patient is under my care, and I have initiated the establishment of the plan of care.  This patient will be followed by a physician who will periodically review the plan of care.  Physician/Provider to provide follow up care: Jasmyn Márquez    Attending hospital physician (the Medicare certified [facility] provider): Tonya Lynn Haase, APRN CNP  Physician Signature: See electronic signature associated with these discharge orders.  Date: 2/24/2023

## 2023-03-22 ENCOUNTER — VIRTUAL VISIT (OUTPATIENT)
Dept: PSYCHOLOGY | Facility: CLINIC | Age: 68
End: 2023-03-22
Payer: COMMERCIAL

## 2023-03-22 DIAGNOSIS — F54 PSYCHOLOGICAL FACTORS AFFECTING MORBID OBESITY (H): ICD-10-CM

## 2023-03-22 DIAGNOSIS — F33.0 MAJOR DEPRESSIVE DISORDER, RECURRENT EPISODE, MILD (H): Primary | ICD-10-CM

## 2023-03-22 DIAGNOSIS — E66.01 PSYCHOLOGICAL FACTORS AFFECTING MORBID OBESITY (H): ICD-10-CM

## 2023-03-22 DIAGNOSIS — Z63.4 BEREAVEMENT: ICD-10-CM

## 2023-03-22 PROCEDURE — 90834 PSYTX W PT 45 MINUTES: CPT | Mod: VID | Performed by: PSYCHOLOGIST

## 2023-03-22 SDOH — SOCIAL STABILITY - SOCIAL INSECURITY: DISSAPEARANCE AND DEATH OF FAMILY MEMBER: Z63.4

## 2023-03-22 NOTE — PROGRESS NOTES
Health Psychology                     Department of Medicine  Izzy Montaño, Ph.D., L.P. [phone]                          [facility] Sherrie Crowe, Ph.D., L.P. [phone]                   [place] Mail Code 334   Ranulfo Whipple, Ph.D. [phone]        [address] [place] [address] Enriqueta Sorto, Ph.D., L.P. [phone]    [place] [address]           Ramin Olivo, Ph.D., A.B.P.P., L.P. [phone]        Kathie Galvan, Ph.D., L.P. [phone]  [address] Psychology Telemental Health Note      Demographics   Age 67 year old   Sex female   Race White   Ethnicity Not  or        Ms. Padilla is a  woman self-referred for psychological consultation because her therapist of the last 24 years retired.  She is seen for problem-solving and supportive therapy for depression and multiple health issues.     HISTORY OF PRESENTING CONCERN:  Ms. Padilla reported at intake (7/28/16) a  lengthy history of depression.  She sees a psychiatrist, Ban Coleman, at [facility], and is taking Abilify 7.5 mg, trazodone 500 mg, ripazepam 75 mg each day at bedtime, Tegretol 400 mg at bedtime and methylphenidate 10 mg b.i.d.  She discontineud ability and is now taking Rexulti 2 mg.  She has a history of hospitalizations including 3 at [facility] in the late 1990s, early 2000s when she had 2 courses of ECT lasting 7-10 sessions and approximately 3 other single session ECTs.  She stopped due to memory problems.  She kept with Dr. Coleman who she first met as an inpatient and has seen her for the past 18 years.  She had also been hospitalized psychiatrically at [facility] at age 23.  She previously worked with psychiatrist, Doug Sarabia for three years, stopping, in part, because she didn't feel he took her suicide attempt sufficiently seriously.  She reports her depression tends to vary.      MEDICAL HISTORY (at intake)  Ms. Padilla has a complex medical history.  She was diagnosed with MS in 1985.  Her psychiatrist at [facility] in [place], Dr. 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  She has used a scooter since 1992, using it more  than she had previously.  She also can use a walker.  She was diagnosed with fibromyalgia in 1997.   She is seen at the [facility] for her eye care. During 8th grade, she fell on a ski lift, injuring her larynx.     WEIGHT not taken. iD.    2/23/23  Down to 281 lbs.     She resumed attending OA, then stopped during the winter.     Wt Readings from Last 4 Encounters:   02/24/23 127.6 kg (281 lb 6.4 oz)   02/21/23 127.6 kg (281 lb 6.4 oz)   02/15/23 129 kg (284 lb 8 oz)   02/09/23 128.9 kg (284 lb 3.2 oz)     Past Medical History:   Diagnosis Date     Depression, major, in partial remission (H)      Fibromyalgia syndrome      Gastro-oesophageal reflux disease      Hyperlipemia      Hypertension      Low serum sodium      Lymphedema      Mixed incontinence      Multiple sclerosis (H)     tremors with MS, all four limbs and head     Multiple sclerosis, secondary progressive (H)      Neurogenic bladder      Obese      Osteoporosis      Sleep apnea      Vocal cord paralysis, unilateral complete         Past Surgical History:   Procedure Laterality Date     COLONOSCOPY N/A 07/17/2017    Procedure: COMBINED COLONOSCOPY, SINGLE OR MULTIPLE BIOPSY/POLYPECTOMY BY BIOPSY;;  Surgeon: Wong Beaulieu MD;  Location:  GI     COLONOSCOPY N/A 02/23/2018    Procedure: COMBINED COLONOSCOPY, SINGLE OR MULTIPLE BIOPSY/POLYPECTOMY BY BIOPSY;  COLONOSCOPY ;  Surgeon: Yessica Santana MD;  Location:  GI     ENT SURGERY      throat 1969, vocal cord surgery with skin grafting     ESOPHAGOSCOPY, GASTROSCOPY, DUODENOSCOPY (EGD), COMBINED N/A 12/16/2014    Procedure: COMBINED ENDOSCOPIC ULTRASOUND, ESOPHAGOSCOPY, GASTROSCOPY,  DUODENOSCOPY (EGD), FINE NEEDLE ASPIRATE/BIOPSY;  Surgeon: Yessica Santana MD;  Location:  GI     ESOPHAGOSCOPY, GASTROSCOPY, DUODENOSCOPY (EGD), COMBINED N/A 12/16/2014    Procedure: COMBINED ESOPHAGOSCOPY, GASTROSCOPY, DUODENOSCOPY (EGD), BIOPSY SINGLE OR MULTIPLE;  Surgeon: Yessica Santana MD;  Location:  GI     GASTRIC BYPASS Bilateral      LAPAROSCOPIC APPENDECTOMY  05/30/2013    Procedure: LAPAROSCOPIC APPENDECTOMY;;  Surgeon: Salvador Morris MD;  Location:  OR     LAPAROSCOPIC BIOPSY LIVER  05/30/2013    Procedure: LAPAROSCOPIC BIOPSY LIVER;;  Surgeon: Salvador Morris MD;  Location:  OR     LAPAROSCOPIC GASTRIC SLEEVE  05/30/2013    Procedure: LAPAROSCOPIC GASTRIC SLEEVE;  LAPAROSCOPIC SLEEVE GASTRECTOMY/ LAPARSCOPIC  APPENDECTOMY /LIVER BIOPSIES/LIVER CYST DRAINAGE;  Surgeon: Salvador Morris MD;  Location:  OR     LASER HOLMIUM LITHOTRIPSY URETER(S), INSERT STENT, COMBINED Left 1/18/2023    Procedure: CYSTOSCOPY, LEFT URETEROSCOPY, HOLMIUM LASER  LITHOTRIPSY, LEFT   STENT PLACEMENT;  Surgeon: Mahendra Coles MD;  Location:  OR     ORTHOPEDIC SURGERY      R wrist 2010       Current Outpatient Medications   Medication     acetaminophen (TYLENOL) 500 MG tablet     ARIPiprazole (ABILIFY) 5 MG tablet     atorvastatin (LIPITOR) 20 MG tablet     carBAMazepine (TEGRETOL) 200 MG tablet     Cranberry 1000 MG CAPS     cyanocobalamin (VITAMIN B-12) 100 MCG tablet     denosumab (PROLIA) 60 MG/ML SOLN injection     Dextromethorphan-guaiFENesin (MUCINEX DM MAXIMUM STRENGTH PO)     DULoxetine (CYMBALTA) 60 MG capsule     famotidine (PEPCID) 40 MG tablet     fluticasone (FLONASE) 50 MCG/ACT nasal spray     ketoconazole (NIZORAL) 2 % cream     lactobacillus rhamnosus, GG, (CULTURELL) capsule     loperamide (IMODIUM A-D) 2 MG tablet     losartan (COZAAR) 50 MG tablet     Melatonin 10 MG TABS     mirtazapine (REMERON) 30 MG tablet     Multiple Vitamin (MULTIVITAMINS PO)     traZODone  (DESYREL) 100 MG tablet     triamcinolone (ARISTOCORT HP) 0.5 % external cream     trospium (SANCTURA) 20 MG tablet     verapamil ER (CALAN-SR) 120 MG CR tablet     vitamin B-Complex     No current facility-administered medications for this visit.     Medication: On Duloxetine and Mirtazpine and Trazodone and Ritalin. She discontinued Effexor and Abilify previously per Dr. Marquise Coleman, her psychiatrist.  On 4/10/19 she informed me that she started Abilify    PHQ-9 SCORE 9/15/2019 2022 11/3/2022   PHQ-9 Total Score MyChart 5 (Mild depression) 6 (Mild depression) 5 (Mild depression)   PHQ-9 Total Score 5 6 5     NAS-7 SCORE 2022 11/3/2022 2022   Total Score 3 (minimal anxiety) 3 (minimal anxiety) 4 (minimal anxiety)   Total Score 3 3 4     SOCIAL HISTORY (at intake)  Ms. Padilla grew up in the [facility] and is the oldest of 5 children in her family of origin.  Her father was a dentist who she believes was bipolar.  He  of lung cancer at age 72.  Her mother  of sepsis at age 80.  She had been diagnosed with breast cancer when Ms. Padilla was 8.  She describes the marriage between her parents as misael.  She is 5 years older than her closest-aged sister and they are all within 4 years of each other.   Her daughter  12/3 and her mother .  She tends to feel more depressed in winter.      Ms. Padilla attended [employer] for a year and later went to night school at the [employer].  She felt that she could not continue her education to the point of graduation because she was working full time and raising her daughter.  Her daughter  in  at age 31 of liver failure secondary to an accidental Tylenol overdose.  She had been recovering from a hysterectomy.      Ms. 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  She had to retire at the  time secondary to her MS.  She has never .  She has not dated,and states that if she were she would date, she would be interested in a relationship with a woman.  There is no history of  service or legal problems.  She expresses concern about her increasing social isolation.  She has at least 1 friend, Jael, who she met at a therapy group in .  She is active in facilitating groups for people with MS both in [place] and [place].  She lives alone and currently gets help from a home health aide, as well as home health nurse.     She had  seen Dr. Ban Coleman, psychiatrist   Dr. Coleman prescribed Cymbalta for it.  She feels she has been more depressed since the Fall, but doesn't think it is SAD.   She stated that she has recommended case management.     SESSION:  Mili participated in a telehealth session of psychotherapy. The depression varies and has worsened given her health issues, but seems a bit better today despite various losses.    She was hospitalized at [facility] for seven days. She  developed sepsis as part of recovery from surgery for a kidney stone.  She was at the  [facility] =.  She reutned home 3.5 weeks ago.  She needed help getting into her recliner, had to call the fire department.  She will get PT and OT at home.   She is doing exercises daily to regain strength.     Fire department was there x3 the first week home  and a 4th time a few days ago.     She has had diarrhea, was taking immodium.     Her best  friend Jael  on 3/10; was with 3 cousins and a nephew at the time.   She had been fading fast.  She was not able to go to the [facility] or burial in [place].   They had talked daily since her dx 2 years ago. She misses talking with her.  They used to share a lot.   She had  Been a great source of support.   She was warm, loving.  They were friends for 43 years.    Her PCP and sisters are encouraging her to move to assisted living.  She feels it  would be a loss (e.g., neighbors, books, possessions)>     She applied for MA while in [facility].    Recent stressors:     1.A niece, Shalini  (38) is starting to develop problems consistent with MS.  She is , has two daughters, 6 and 2,  and lives in [place].  She can't work.   Her mother is Mili Lynne's sister.   Still waiting on diagnosis.  First sx were numbness and vision, which are pretty typical for MS>  She is talking to her on/off.  She is back working again. Not discussed today      Weight Issues: Weighed 281  At [facility] day before she left.  She is trying to follow a 1500 calorie/day diet (13/21 days = 54% of the time).  Discussed increasing her success rate and how it might increase chance of her to remain independent living.    She participated fully and appeared to derive benefit. Affect is positive.  Rapport was excellent.   This telehealth (telephone) service is appropriate and effective for delivering services in light of the necessity for social distancing to mitigate the COVID-19 epidemic and for conservation of PPE.     Patient has agreed to receiving telehealth services after being informed about it: Yes    Patient prefers video invitation/information to be sent by:   email    Time service started: 11:04  Time service ended:  11:51    Mode of transmission: myinfoQ    Location of originating:  [facility] where  the patient is undergoing rehabilitation    Distance site:  Home office of provider for [facility]    The patient has been notified that:  Video visits will be conducted via a call with their psychologist to provide the care they need with a video conversation. Video visits may be billed at different rates depending on insurance coverage.  Patients are advised to please contact their insurance provider with any questions about their health insurance coverage. If during the course of a call the psychologist feels a video visit is not appropriate, patients will not  be charged for this service  DIAGNOSES:   Major depression, recurrent, oderate (F33.1).   Behavioral factors affecting morbid obesity (E66.01).  Bereavement     PLAN:  Ms. Padilla will return for video visit  4/28 @ 2 for problem-solving and supportive therapy consistent with treatment plan.  Goal per day now that she is tracking is  1600 consistently.    Preference for future meetings:             In-person   prefers, even if masked              Remote              Either  Last treatment plan signed:5/27/22/  Last Treatment plan review: 12/23/22  Treatment plan verbal Review due: 3/23/23  Treatment Review due: 5/27/23     Ramin Olivo, PhD, A.B.P.P., L.P.   Director, Health Psychology

## 2023-05-09 NOTE — PROGRESS NOTES
"NUTRITION POST OP APPOINTMENT  DATE OF VISIT: May 9, 2023    Mili Padilla  1955  female  [id number]  67 year old     ASSESSMENT:    REASON FOR VISIT:  Mili is a 67 year old year old female presents today for 9 year PO nutrition follow-up appointment. Patient is accompanied by self.    DIAGNOSIS:  Status post gastric sleeve surgery.  Obesity Grade III BMI >40     ANTHROPOMETRICS:  Initial Weight: 304 lb   Height: 67\"    Current Weight: 283 lb per pt   BMI: 45.0  kg/(m^2).    VITAMINS AND MINERALS:  Vitamins are prescribed by weight loss provider   2 Multivitamin with Minerals (HS)  Stopped Calcium With Vitamin D in January (due to constipation)   3000 International units Vitamin D every other day  1000 mcg Vitamin B12 one time per month  1 Vitamin B Complex/Thiamine ( not sure of the amount of thiamine)- per psychologist  No iron needed      NUTRITION HISTORY:  Breakfast: 1/2 cup low-fat cottage cheese, cantaloupe  Lunch: 1 oz maple ham, 1 slice cheddar cheese, banana  Supper: all or may have tome leftover-[employer] entree (300-500 calories)- protein, 1-2 veggies, rice or pasta or quinoa, water melon  Snacks: light pop corn (400 calories) and 11 M and M's  Fluids consumed: 34 oz water, 20 oz Powerade Zero, 1 can diet pop,  Consuming liquid calories: No  Protein intake: 55-60 grams/day  Tolerate regular texture food: Yes  Any foods not tolerated details: No- but currently has diarrhea  Portion size: 1 cup or more  Take 20-30 minutes to consume each meal: Yes   Eat protein foods first: Yes  Fluids and meals separate by at least 30 minutes: No  Chew foods thoroughly: Yes  Tolerating diet: Yes  Drinking high protein supplements: No  Consuming snacks per day: 1  Additional Information: Patient switch to video visit due to having diarrhea and could not come to clinic. Keeps a daily food log in a note book. Patient has a hard time standing to check her weight. She ws pleased to learn her weight is down from her last " RD visit.        PHYSICAL ACTIVITY:  Type: seated-upper and lower body  Frequency (days per week): 7  Duration (min): 30    DIAGNOSIS:  Previous Nutrition Diagnosis: Altered gastrointestinal function related to alteration in gastrointestinal structure as evidenced by history of gastric sleeve surgery.- no change    Previous goals:  Continue to track intake with guidance from RD and psychologist-met  Replace crackers with a fruit or vegetable-improving  Trial of protein water instead of juice-not met    Current Nutrition Diagnosis: Altered gastrointestinal function related to alteration in gastrointestinal structure as evidenced by history of gastric sleeve surgery.    INTERVENTION:   Nutrition Prescription: Eat 3 meals a day at regular intervals. Consume 60-90 grams of protein daily. Follow post-surgical vitamin and mineral protocol.  Assessed learning needs and learning preferences. Will send Keeping Up Your Diet after Weight Loss Surgery through [facility]    GOALS:  Avoid fluids during meals and 30 minutes after meals  Restart calcium  Verify that vitamin B complex has at least 12 mg / serving  Eat 1/3-1/2 less pop corn      Implementation: Discussed progress toward previous goals; reinforced importance of following bariatric lifestyle changes.    NUTRITION MONITORING AND EVALUATION:  Anticipated compliance: fair  Verbalized fair-good understanding.    Follow up: Patient to follow up in 12 months.    TIME SPENT WITH PATIENT:  20 minutes  Ric Clement RD, LD  [facility]

## 2023-05-10 NOTE — PROGRESS NOTES
"Mili is a 67 year old who is being evaluated via a billable video visit.      The patient has been notified of following:     \"This video visit will be conducted via a call between you and your physician/provider. We have found that certain health care needs can be provided without the need for an in-person physical exam.  This service lets us provide the care you need with a video conversation.  If a prescription is necessary we can send it directly to your pharmacy.  If lab work is needed we can place an order for that and you can then stop by our lab to have the test done at a later time.    Video visits are billed at different rates depending on your insurance coverage.  Please reach out to your insurance provider with any questions.    If during the course of the call the physician/provider feels a video visit is not appropriate, you will not be charged for this service.\"    Patient has given verbal consent for Video visit? Yes    How would you like to obtain your AVS? MyChart    If the video visit is dropped, the invitation should be resent by: Text to cell phone: [phone]    Will anyone else be joining your video visit? No    I    Video-Visit Details    Type of service:  Video Visit    Video Start Time: 3:03 PM    Video End Time:3:19 PM    Originating Location (pt. Location): Home    Distant Location (provider location):  [facility] [place]     Platform used for Video Visit: [employer]        Assessment & Plan   Problem List Items Addressed This Visit     Bariatric surgery status - Primary     5/30/2013 RYGBS LEL         Morbid obesity with BMI of 45.0-49.9, adult (H)     Patient was congratulated on wt loss success thus far. Healthy habits to assist with further weight loss were discussed.          Postsurgical malabsorption     Continue taking recommended post-op vitamins. Add calcium back in.  Labs ordered per protocol.           Relevant Orders    Vitamin B12    Vitamin D " Screen    Parathyroid Hormone Intact    Iron and Iron Binding Capacity    Ferritin    GERD without esophagitis     Refilled famotidine 40 mg daily.  Symptom free.          Relevant Medications    psyllium (METAMUCIL/KONSYL) capsule    famotidine (PEPCID) 40 MG tablet        PATIENT INSTRUCTIONS:  Labs ordered. Call [phone] to schedule.  Continue your bariatric vitamins. Restart calcium citrate.  Consider nutritional intervention to managing Diarrhea: Banana flakes    FOLLOW-UP:  Call [phone] to schedule next visit annually.     31 minutes spent on the date of the encounter doing chart review, history and exam, review test results, counseling, developing plan of care, documentation, and further activities as noted above.    HPI: Pt presents today for her 9 yr follow-up appointment status post status laparoscopic gastric sleeve. Saw MARLYN Larios today.  Pt has been better.  I currently dealing with chronic diarrhea following viral injection and is seeing a GI specialist at [facility] on Wednesday.  Using Imodium 4 x a day.      WEIGHT METRICS:  Body mass index is 45 kg/m .   Current Weight: 283 lb (128.4 kg)  Last Visits Weight: 300 lb (136.1 kg)  Initial Weight (lbs): 305 lbs  Cumulative weight loss (lbs): 22  Weight Loss Percentage: 7.21%    Wt Readings from Last 10 Encounters:   05/15/23 283 lb (128.4 kg)   02/24/23 281 lb 6.4 oz (127.6 kg)   02/21/23 281 lb 6.4 oz (127.6 kg)   02/15/23 284 lb 8 oz (129 kg)   02/09/23 284 lb 3.2 oz (128.9 kg)   02/08/23 284 lb 3.2 oz (128.9 kg)   02/07/23 284 lb (128.8 kg)   01/30/23 297 lb 8 oz (134.9 kg)   01/25/23 297 lb 8 oz (134.9 kg)   01/20/23 295 lb 6.7 oz (134 kg)            Patient is taking the following bariatric postoperative vitamins:   2 Multivitamin with Minerals (HS)  Stopped Calcium With Vitamin D in January (due to constipation)   3000 International units Vitamin D every other day  1000 mcg Vitamin B12 one time per month  1 Vitamin B Complex/Thiamine ( not sure  of the amount of thiamine)- per psychologist  No iron needed    PHYSICAL ACTIVITY:  Type: seated-upper and lower body  Frequency (days per week): 7  Duration (min): 30  Pt is exercising by doing her rehab chair exercises daily.     SOCIAL HISTORY:  Pt denies smoking.  Pt denies alcohol use.  Avoids NSAIDS.      REVIEW OF SYSTEMS:  GI:  Nausea-none  Vomiting-none  Diarrhea-none, on Pepto bismol, and Imodium to control her diarrhea.  Constipation-none  Dysphagia-none  Abdominal Pain-none  Heartburn-none, Taking famotidine.  NO reflux on medication.      SKIN:  Intertriginous irritation-groin area.  Get cream from Dr. Márquez     PSYCH:  Depression-under control.  Sees long term therapist. Mood is getting low due to the diarrhea    LABS/IMAGING/MEDICAL RECORDS REVIEW:   Hemoglobin A1C   Date Value Ref Range Status   02/11/2013 5.9 4.3 - 6.0 % Final     Vitamin D, Total (25-Hydroxy)   Date Value Ref Range Status   07/22/2021 76 (H) 20 - 75 ug/L Final     Parathyroid Hormone Intact   Date Value Ref Range Status   07/22/2021 27 18 - 80 pg/mL Final     Vitamin B12   Date Value Ref Range Status   07/22/2021 1,161 (H) 193 - 986 pg/mL Final     Hemoglobin   Date Value Ref Range Status   01/24/2023 12.5 11.7 - 15.7 g/dL Final     Ferritin   Date Value Ref Range Status   07/22/2021 214 8 - 252 ng/mL Final     Iron   Date Value Ref Range Status   07/22/2021 55 35 - 180 ug/dL Final     Iron Binding Capacity   Date Value Ref Range Status   07/22/2021 235 (L) 240 - 430 ug/dL Final     Iron Saturation Index   Date Value Ref Range Status   07/13/2020 26 15 - 46 % Final   06/17/2019 22 15 - 46 % Final     Iron Sat Index   Date Value Ref Range Status   07/22/2021 23 15 - 46 % Final

## 2023-05-15 ENCOUNTER — VIRTUAL VISIT (OUTPATIENT)
Dept: SURGERY | Facility: CLINIC | Age: 68
End: 2023-05-15
Payer: COMMERCIAL

## 2023-05-15 VITALS — HEIGHT: 66 IN | WEIGHT: 283 LBS | BODY MASS INDEX: 45.48 KG/M2

## 2023-05-15 DIAGNOSIS — Z98.84 BARIATRIC SURGERY STATUS: Primary | ICD-10-CM

## 2023-05-15 DIAGNOSIS — E66.01 MORBID OBESITY WITH BMI OF 45.0-49.9, ADULT (H): ICD-10-CM

## 2023-05-15 DIAGNOSIS — K91.2 POSTSURGICAL MALABSORPTION: ICD-10-CM

## 2023-05-15 DIAGNOSIS — Z98.84 BARIATRIC SURGERY STATUS: ICD-10-CM

## 2023-05-15 DIAGNOSIS — E66.01 MORBID OBESITY (H): Primary | ICD-10-CM

## 2023-05-15 DIAGNOSIS — K21.9 GERD WITHOUT ESOPHAGITIS: ICD-10-CM

## 2023-05-15 PROCEDURE — 97803 MED NUTRITION INDIV SUBSEQ: CPT | Mod: VID

## 2023-05-15 PROCEDURE — 99214 OFFICE O/P EST MOD 30 MIN: CPT | Mod: VID | Performed by: PHYSICIAN ASSISTANT

## 2023-05-15 RX ORDER — FAMOTIDINE 40 MG/1
40 TABLET, FILM COATED ORAL DAILY
Qty: 90 TABLET | Refills: 3 | Status: SHIPPED | OUTPATIENT
Start: 2023-05-15

## 2023-05-15 NOTE — ASSESSMENT & PLAN NOTE
Patient was congratulated on wt loss success thus far. Healthy habits to assist with further weight loss were discussed.

## 2023-05-15 NOTE — PATIENT INSTRUCTIONS
"To ensure the quality you may receive a patient satisfaction survey. The greatest compliment you can give is \"Likely to Recommend\"    Nice to talk with you today. Thank you for allowing me the privilege of caring for you. Below is the plan discussed.-  SHARON Orona      Plan:  Labs ordered. Call [phone] to schedule.  Continue your bariatric vitamins. Restart calcium citrate.    Banatrol Plus Anti-Diarrheal Powder  Nutritional intervention to managing Diarrhea    The pectin in bananas works to solidify stools by absorbing excess water in the colon.   You can also get \"banana flakes\" instead of the brand name: Banatrol.      How to use:  Diarrhea:  1-2 scoops or 1 packet of Banatrol Plus three times daily. Titrate the dose as needed with a minimum of one packet/day and maximum of six packets/day. Continue until diarrhea is resolved.  Loose Stools:  1-2 scoops or 1 packet once or twice a day as needed.     Mix with four ounces of water, juice or add to your smoothies and soft foods like applesauce, yogurt, and oatmeal.    Where can you order it?  https://[employer]/  [employer]       FOLLOW-UP:  Call [phone] to schedule next visit annually.     Bariatric Post Op Guidelines  General:    To avoid marginal ulcers avoid all forms of tobacco, alcohol in excess, caffeine, and NSAIDS     Exercise is key for weight loss and weight maintenance. Aim for 30-60 minutes of physical activity most days.  Include cardiovascular and strength training.    Continue lifelong vitamins supplementation and annual lab follow up.  All  patients should supplement with the following bariatric postoperative vitamins:  2 Complete multivitamins with minerals (at different times than calcium)  Vitamin D 5000 Int Units/125 mg daily   Calcium 600 mg twice daily or 500 mg three times daily   Vitamin B12: 500 mcg sl daily or 1000 mcg Inj monthly  B complex daily or Thiamine 100 mg weekly  1 Iron/Vit C. Daily for females who menstruate and/or as " directed    The bariatric team should be aware and evaluate all GI symptoms which can be a sign of complications. Inability to tolerate textured solid food (chicken, steak, fish) may need to be evaluated by endoscopy.    There is a 10% increase of Alcohol Use Disorder in patients with bariatric surgery.   Most often occurring around 2 years post op.  Call if you feel alcohol is interfering in your daily life.  We can help.     Follow up annually lifelong. Obesity is a chronic disease.  Weight gain can be expected. The goal of follow-up visits is to ensure adequate vitamin and protein absorption, evaluate food intake behavior, review exercise/activity level, and assist with weight regain.    Nutritional:  Eat 3 meals per day  (No snacks between meals.)  Do not skip meals.  This can cause overeating at the next meal and will prevent adequate protein and nutritional intake.    Aim for 60-80 grams of protein per day.  Always eat your protein first. This assists with optimal nutrition and helps you stay full longer.    Eat your protein first, and then follow with fiber.    Add fiber by including fruits, vegetables, whole grains, and beans.     Portions should be about 1 cup per meal. Use measuring cups to be accurate.  Continue to use saucer/salad plates, infant/toddler silverware to keep portion sizes small and take small bites.    Eat S-L-O-W-L-Y to make each meal last 20-30 minutes. Always stop eating when satisfied.    Aim for 64 oz. of calorie-free fluids daily.    Avoid drinking 30 min before, during, and 30 min after meal    Avoid high sugar and high fat foods to prevent high calorie intake. This will reduce your rate of weight loss and can cause weight regain.   Check nutrition labels for less than 10 grams of sugar and less than 10 grams of fat per serving.

## 2023-05-15 NOTE — LETTER
May 15, 2023      PT:   Mili Padilla  [address]   [facility] [address]    Dear Dr. Márquez,  I had the pleasure of seeing Mili in our bariatric clinic for her annual follow up.  Here is a summary of our visit.     Sincerely,    Adwoa Hollins PA-C                                                        Assessment & Plan   Problem List Items Addressed This Visit       Bariatric surgery status - Primary     5/30/2013 RYGBS LEL         Morbid obesity with BMI of 45.0-49.9, adult (H)     Patient was congratulated on wt loss success thus far. Healthy habits to assist with further weight loss were discussed.          Postsurgical malabsorption     Continue taking recommended post-op vitamins. Add calcium back in.  Labs ordered per protocol.           Relevant Orders    Vitamin B12    Vitamin D Screen    Parathyroid Hormone Intact    Iron and Iron Binding Capacity    Ferritin    GERD without esophagitis     Refilled famotidine 40 mg daily.  Symptom free.          Relevant Medications    psyllium (METAMUCIL/KONSYL) capsule    famotidine (PEPCID) 40 MG tablet        PATIENT INSTRUCTIONS:  Labs ordered. Call [phone] to schedule.  Continue your bariatric vitamins. Restart calcium citrate.  Consider nutritional intervention to managing Diarrhea: Banana flakes    FOLLOW-UP:  Call [phone] to schedule next visit annually.     31 minutes spent on the date of the encounter doing chart review, history and exam, review test results, counseling, developing plan of care, documentation, and further activities as noted above.    HPI: Pt presents today for her 9 yr follow-up appointment status post status laparoscopic gastric sleeve. Saw MARLYN Larios today.  Pt has been better.  I currently dealing with chronic diarrhea following viral injection and is seeing a GI specialist at [facility] on Wednesday.  Using Imodium 4 x a day.      WEIGHT METRICS:  Body mass index is 45 kg/m .   Current Weight: 283 lb (128.4 kg)  Last  Visits Weight: 300 lb (136.1 kg)  Initial Weight (lbs): 305 lbs  Cumulative weight loss (lbs): 22  Weight Loss Percentage: 7.21%    Wt Readings from Last 10 Encounters:   05/15/23 283 lb (128.4 kg)   02/24/23 281 lb 6.4 oz (127.6 kg)   02/21/23 281 lb 6.4 oz (127.6 kg)   02/15/23 284 lb 8 oz (129 kg)   02/09/23 284 lb 3.2 oz (128.9 kg)   02/08/23 284 lb 3.2 oz (128.9 kg)   02/07/23 284 lb (128.8 kg)   01/30/23 297 lb 8 oz (134.9 kg)   01/25/23 297 lb 8 oz (134.9 kg)   01/20/23 295 lb 6.7 oz (134 kg)            Patient is taking the following bariatric postoperative vitamins:   2 Multivitamin with Minerals (HS)  Stopped Calcium With Vitamin D in January (due to constipation)   3000 International units Vitamin D every other day  1000 mcg Vitamin B12 one time per month  1 Vitamin B Complex/Thiamine ( not sure of the amount of thiamine)- per psychologist  No iron needed    PHYSICAL ACTIVITY:  Type: seated-upper and lower body  Frequency (days per week): 7  Duration (min): 30  Pt is exercising by doing her rehab chair exercises daily.     SOCIAL HISTORY:  Pt denies smoking.  Pt denies alcohol use.  Avoids NSAIDS.            REVIEW OF SYSTEMS:  GI:  Nausea-none  Vomiting-none  Diarrhea-none, on Pepto bismol, and Imodium to control her diarrhea.  Constipation-none  Dysphagia-none  Abdominal Pain-none  Heartburn-none, Taking famotidine.  NO reflux on medication.      SKIN:  Intertriginous irritation-groin area.  Get cream from Dr. Márquez     PSYCH:  Depression-under control.  Sees long term therapist. Mood is getting low due to the diarrhea

## 2023-05-18 DIAGNOSIS — H53.40 VISUAL FIELD DEFECT: Primary | ICD-10-CM

## 2023-05-19 ENCOUNTER — VIRTUAL VISIT (OUTPATIENT)
Dept: PSYCHOLOGY | Facility: CLINIC | Age: 68
End: 2023-05-19
Payer: COMMERCIAL

## 2023-05-19 DIAGNOSIS — Z63.4 BEREAVEMENT: ICD-10-CM

## 2023-05-19 DIAGNOSIS — F33.0 MAJOR DEPRESSIVE DISORDER, RECURRENT EPISODE, MILD (H): Primary | ICD-10-CM

## 2023-05-19 DIAGNOSIS — E66.01 PSYCHOLOGICAL FACTORS AFFECTING MORBID OBESITY (H): ICD-10-CM

## 2023-05-19 DIAGNOSIS — F54 PSYCHOLOGICAL FACTORS AFFECTING MORBID OBESITY (H): ICD-10-CM

## 2023-05-19 PROCEDURE — 90834 PSYTX W PT 45 MINUTES: CPT | Mod: 95 | Performed by: PSYCHOLOGIST

## 2023-05-19 SDOH — SOCIAL STABILITY - SOCIAL INSECURITY: DISSAPEARANCE AND DEATH OF FAMILY MEMBER: Z63.4

## 2023-05-19 NOTE — PROGRESS NOTES
Health Psychology                     Department of Medicine  Izzy Montaño, Ph.D., L.P. [phone]                          [facility] Sherrie Crowe, Ph.D., L.P. [phone]                   [place] Mail Code 577   Ranulfo Whipple, Ph.D. [phone]        [address] [place] [address] Enriqueta Sorto, Ph.D., L.P. [phone]    [place] [address]           Ramin Olivo, Ph.D., A.B.P.P., L.P. [phone]        Kathie Galvan, Ph.D., L.P. [phone]  [address] Psychology Telemental Health Note      Demographics   Age 67 year old   Sex female   Race White   Ethnicity Not  or        Ms. Padilla is a  woman self-referred for psychological consultation because her therapist of the last 24 years retired.  She is seen for problem-solving and supportive therapy for depression and multiple health issues.     HISTORY OF PRESENTING CONCERN:  Ms. Padilla reported at intake (7/28/16) a  lengthy history of depression.  She sees a psychiatrist, Ban Coleman, at [facility], and is taking Abilify 7.5 mg, trazodone 500 mg, ripazepam 75 mg each day at bedtime, Tegretol 400 mg at bedtime and methylphenidate 10 mg b.i.d.  She discontineud ability and is now taking Rexulti 2 mg.  She has a history of hospitalizations including 3 at [facility] in the late 1990s, early 2000s when she had 2 courses of ECT lasting 7-10 sessions and approximately 3 other single session ECTs.  She stopped due to memory problems.  She kept with Dr. Coleman who she first met as an inpatient and has seen her for the past 18 years.  She had also been hospitalized psychiatrically at [facility] at age 23.  She previously worked with psychiatrist, Doug Sarabia for three years, stopping, in part, because she didn't feel he took her suicide attempt sufficiently seriously.  She reports her depression tends to vary.      MEDICAL HISTORY (at intake)  Ms. Padilla has a complex medical history.  She was diagnosed with MS in 1985.  Her psychiatrist at [facility] in [place], Dr. 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  She has used a scooter since 1992, using it more  than she had previously.  She also can use a walker.  She was diagnosed with fibromyalgia in 1997.   She is seen at the [facility] for her eye care. During 8th grade, she fell on a ski lift, injuring her larynx.     WEIGHT not taken. iD.    2/23/23  Down to 281 lbs.     She resumed attending OA, then stopped during the winter.     Wt Readings from Last 4 Encounters:   05/15/23 128.4 kg (283 lb)   02/24/23 127.6 kg (281 lb 6.4 oz)   02/21/23 127.6 kg (281 lb 6.4 oz)   02/15/23 129 kg (284 lb 8 oz)     Past Medical History:   Diagnosis Date     Depression, major, in partial remission (H)      Fibromyalgia syndrome      Gastro-oesophageal reflux disease      Hyperlipemia      Hypertension      Low serum sodium      Lymphedema      Mixed incontinence      Multiple sclerosis (H)     tremors with MS, all four limbs and head     Multiple sclerosis, secondary progressive (H)      Neurogenic bladder      Obese      Osteoporosis      Sleep apnea      Vocal cord paralysis, unilateral complete         Past Surgical History:   Procedure Laterality Date     COLONOSCOPY N/A 07/17/2017    Procedure: COMBINED COLONOSCOPY, SINGLE OR MULTIPLE BIOPSY/POLYPECTOMY BY BIOPSY;;  Surgeon: Wong Beaulieu MD;  Location:  GI     COLONOSCOPY N/A 02/23/2018    Procedure: COMBINED COLONOSCOPY, SINGLE OR MULTIPLE BIOPSY/POLYPECTOMY BY BIOPSY;  COLONOSCOPY ;  Surgeon: Yessica Santana MD;  Location:  GI     ENT SURGERY      throat 1969, vocal cord surgery with skin grafting     ESOPHAGOSCOPY, GASTROSCOPY, DUODENOSCOPY (EGD), COMBINED N/A 12/16/2014    Procedure: COMBINED ENDOSCOPIC ULTRASOUND, ESOPHAGOSCOPY, GASTROSCOPY, DUODENOSCOPY  (EGD), FINE NEEDLE ASPIRATE/BIOPSY;  Surgeon: Yessica Santana MD;  Location:  GI     ESOPHAGOSCOPY, GASTROSCOPY, DUODENOSCOPY (EGD), COMBINED N/A 12/16/2014    Procedure: COMBINED ESOPHAGOSCOPY, GASTROSCOPY, DUODENOSCOPY (EGD), BIOPSY SINGLE OR MULTIPLE;  Surgeon: Yessica Santana MD;  Location:  GI     GASTRIC BYPASS Bilateral      LAPAROSCOPIC APPENDECTOMY  05/30/2013    Procedure: LAPAROSCOPIC APPENDECTOMY;;  Surgeon: Salvador Morris MD;  Location:  OR     LAPAROSCOPIC BIOPSY LIVER  05/30/2013    Procedure: LAPAROSCOPIC BIOPSY LIVER;;  Surgeon: Salvador Morris MD;  Location:  OR     LAPAROSCOPIC GASTRIC SLEEVE  05/30/2013    Procedure: LAPAROSCOPIC GASTRIC SLEEVE;  LAPAROSCOPIC SLEEVE GASTRECTOMY/ LAPARSCOPIC  APPENDECTOMY /LIVER BIOPSIES/LIVER CYST DRAINAGE;  Surgeon: Salvador Morris MD;  Location:  OR     LASER HOLMIUM LITHOTRIPSY URETER(S), INSERT STENT, COMBINED Left 1/18/2023    Procedure: CYSTOSCOPY, LEFT URETEROSCOPY, HOLMIUM LASER  LITHOTRIPSY, LEFT   STENT PLACEMENT;  Surgeon: Mahendra Coles MD;  Location:  OR     ORTHOPEDIC SURGERY      R wrist 2010       Current Outpatient Medications   Medication     acetaminophen (TYLENOL) 500 MG tablet     ARIPiprazole (ABILIFY) 5 MG tablet     atorvastatin (LIPITOR) 20 MG tablet     carBAMazepine (TEGRETOL) 200 MG tablet     cholecalciferol (VITAMIN D3) 25 mcg (1000 units) capsule     Cranberry 1000 MG CAPS     cyanocobalamin (VITAMIN B-12) 100 MCG tablet     denosumab (PROLIA) 60 MG/ML SOLN injection     Dextromethorphan-guaiFENesin (MUCINEX DM MAXIMUM STRENGTH PO)     DULoxetine (CYMBALTA) 60 MG capsule     famotidine (PEPCID) 40 MG tablet     fluticasone (FLONASE) 50 MCG/ACT nasal spray     ketoconazole (NIZORAL) 2 % cream     lactobacillus rhamnosus, GG, (CULTURELL) capsule     loperamide (IMODIUM A-D) 2 MG tablet     losartan (COZAAR) 50 MG tablet     Melatonin 10 MG TABS     mirtazapine (REMERON) 30 MG tablet     Multiple  Vitamin (MULTIVITAMINS PO)     psyllium (METAMUCIL/KONSYL) capsule     traZODone (DESYREL) 100 MG tablet     triamcinolone (ARISTOCORT HP) 0.5 % external cream     trospium (SANCTURA) 20 MG tablet     verapamil ER (CALAN-SR) 120 MG CR tablet     vitamin B-Complex     No current facility-administered medications for this visit.     Medication: On Duloxetine and Mirtazpine and Trazodone and Ritalin. She discontinued Effexor and Abilify previously per Dr. Marquise Coleman, her psychiatrist.  On 4/10/19 she informed me that she started Abilify        9/15/2019    10:10 AM 2022     3:40 PM 11/3/2022     6:06 PM   PHQ-9 SCORE   PHQ-9 Total Score MyChart 5 (Mild depression) 6 (Mild depression) 5 (Mild depression)   PHQ-9 Total Score 5 6 5         2022     4:16 PM 11/3/2022     6:05 PM 2022     6:30 PM   NAS-7 SCORE   Total Score 3 (minimal anxiety) 3 (minimal anxiety) 4 (minimal anxiety)   Total Score 3 3 4     SOCIAL HISTORY (at intake)  Ms. Padilla grew up in the [place] area and is the oldest of 5 children in her family of origin.  Her father was a dentist who she believes was bipolar.  He  of lung cancer at age 72.  Her mother  of sepsis at age 80.  She had been diagnosed with breast cancer when Ms. Padilla was 8.  She describes the marriage between her parents as misael.  She is 5 years older than her closest-aged sister and they are all within 4 years of each other.   Her daughter  12/3 and her mother .  She tends to feel more depressed in winter.      Ms. Padilla attended [employer] for a year and later went to night school at the [employer].  She felt that she could not continue her education to the point of graduation because she was working full time and raising her daughter.  Her daughter  in  at age 31 of liver failure secondary to an accidental Tylenol overdose.  She had been recovering from a hysterectomy.      Ms. 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  She had to retire at the time secondary to her MS.  She has never .  She has not dated,and states that if she were she would date, she would be interested in a relationship with a woman.  There is no history of  service or legal problems.  She expresses concern about her increasing social isolation.  She has at least 1 friend, Jael, who she met at a therapy group in .  She is active in facilitating groups for people with MS both in [place] and [place].  She lives alone and currently gets help from a home health aide, as well as home health nurse.     She had  seen Dr. Ban Coleman, psychiatrist   Dr. Coleman prescribed Cymbalta for it.  She feels she has been more depressed since the Fall, but doesn't think it is SAD.   She stated that she has recommended case management.     SESSION:  Mili participated in a telehealth session of psychotherapy. The depression varies and has worsened given her health issues. She cancelled last appointment and switched today's to virtual.    She hasn't been hospitalized since last session, but had cough, diarrhea.   She spoke with her GI physiciain and has been giving stool samples.She has had diarrhea since . She has been more depressed, sick, as w ell as bereaved.  No shower in 5 weeks.   Probably exacerbated by isolation at home.   Plans to shower tomorrow.    She hasn't fallen.  She is doing her exercises partially.    Her best  friend Jael  3/10/23.   She had been thinking of her late in the day; less often now.  She thought more about Demetrius on Mother's Day.    She has been doing only one of the two  sets of arm and leg exercises she has been encourage to do daily.  Discussed dong it.     Discussed her daughter at length  She  after a Tylenol overdose and  needed a liver transplant.  Had sz and her boyfriend  summoned ambulance.  She  day before she ws to have a transplant.   Her daughter  due to PCO presenting fertility.   She  in .  She was a  w/ 1 year of college.    She applied for MA while in [facility].    Recent stressors:     Niece, Shalini (38) was diagnosed  with MS, and now has a prescription.  She is , has two daughters, 6 and 2,  and lives in [place].  She can't work.   Her mother is Mili Lynne's sister.   Still waiting on diagnosis.  Not much communication.     Weight Issues: Weighed 281 at [facility] day before she left.  Hs not weighed self recently. She is trying to follow a 1500 calorie/day diet not most days. Not doing a good job of tracking,  Discussed increasing her success rate and how it might increase chance of her to remain independent living.    She participated fully and appeared to derive benefit. Affect is positive.  Rapport was excellent.   This telehealth (telephone) service is appropriate and effective for delivering services in light of the necessity for social distancing to mitigate the COVID-19 epidemic and for conservation of PPE.     Patient has agreed to receiving telehealth services after being informed about it: Yes    Patient prefers video invitation/information to be sent by:   email    Time service started: 11:01  Time service ended:  11:40    Mode of transmission: [employer]    Location of originating:  [facility] where  the patient is undergoing rehabilitation    Distance site:  Home office of provider for [facility]    The patient has been notified that:  Video visits will be conducted via a call with their psychologist to provide the care they need with a video conversation. Video visits may be billed at different rates depending on insurance coverage.  Patients are advised to please contact their insurance provider with any questions about their health insurance coverage. If during the course of a call the psychologist feels  a video visit is not appropriate, patients will not be charged for this service  DIAGNOSES:   Major depression, recurrent, oderate (F33.1).   Behavioral factors affecting morbid obesity (E66.01).  Bereavement     PLAN:  Ms. Padilla will return for video visit  4/28 @ 2 for problem-solving and supportive therapy consistent with treatment plan.  Goal per day now that she is tracking is  1600 consistently.    Preference for future meetings:             In-person   prefers, even if masked              Remote              Either  Last treatment plan signed:5/27/22/  Last Treatment plan review: 12/23/22  Treatment plan verbal Review due: 3/23/23  Treatment Review due: 5/27/23 (next session)     Ramin Olivo, PhD, A.B.P.P., L.P.   Director, Health Psychology

## 2023-05-25 ENCOUNTER — OFFICE VISIT (OUTPATIENT)
Dept: OPHTHALMOLOGY | Facility: CLINIC | Age: 68
End: 2023-05-25
Attending: OPHTHALMOLOGY
Payer: COMMERCIAL

## 2023-05-25 DIAGNOSIS — Z86.69 HISTORY OF OPTIC NEURITIS: ICD-10-CM

## 2023-05-25 DIAGNOSIS — G35 MS (MULTIPLE SCLEROSIS) (H): Primary | ICD-10-CM

## 2023-05-25 DIAGNOSIS — H53.40 VISUAL FIELD DEFECT: ICD-10-CM

## 2023-05-25 PROCEDURE — 92083 EXTENDED VISUAL FIELD XM: CPT | Performed by: OPHTHALMOLOGY

## 2023-05-25 PROCEDURE — 92014 COMPRE OPH EXAM EST PT 1/>: CPT | Mod: GC | Performed by: OPHTHALMOLOGY

## 2023-05-25 PROCEDURE — G0463 HOSPITAL OUTPT CLINIC VISIT: HCPCS | Performed by: OPHTHALMOLOGY

## 2023-05-25 PROCEDURE — 92133 CPTRZD OPH DX IMG PST SGM ON: CPT | Performed by: OPHTHALMOLOGY

## 2023-05-25 RX ORDER — LOSARTAN POTASSIUM 50 MG/1
50 TABLET ORAL 2 TIMES DAILY
COMMUNITY
End: 2023-07-14

## 2023-05-25 ASSESSMENT — VISUAL ACUITY
OD_PH_CC: 20/30
OS_CC+: -1
CORRECTION_TYPE: GLASSES
OD_CC: 20/40
METHOD: SNELLEN - LINEAR
OS_CC: 20/30
OD_PH_CC+: -2

## 2023-05-25 ASSESSMENT — REFRACTION_WEARINGRX
SPECS_TYPE: PAL
OD_CYLINDER: +1.75
OS_AXIS: 004
OS_ADD: +1.75
OD_ADD: +1.75
OS_SPHERE: -4.75
OD_SPHERE: -6.25
OS_CYLINDER: +0.50
OD_AXIS: 095

## 2023-05-25 ASSESSMENT — TONOMETRY
OD_IOP_MMHG: 15
IOP_METHOD: ICARE
OS_IOP_MMHG: 15

## 2023-05-25 ASSESSMENT — CONF VISUAL FIELD
OS_SUPERIOR_NASAL_RESTRICTION: 0
OS_INFERIOR_TEMPORAL_RESTRICTION: 0
OS_SUPERIOR_TEMPORAL_RESTRICTION: 0
METHOD: COUNTING FINGERS
OS_NORMAL: 1
OD_SUPERIOR_NASAL_RESTRICTION: 0
OD_INFERIOR_TEMPORAL_RESTRICTION: 0
OD_INFERIOR_NASAL_RESTRICTION: 0
OS_INFERIOR_NASAL_RESTRICTION: 0
OD_NORMAL: 1
OD_SUPERIOR_TEMPORAL_RESTRICTION: 0

## 2023-05-25 ASSESSMENT — CUP TO DISC RATIO
OS_RATIO: 0.5
OD_RATIO: 0.7

## 2023-05-25 ASSESSMENT — EXTERNAL EXAM - RIGHT EYE: OD_EXAM: NORMAL

## 2023-05-25 ASSESSMENT — SLIT LAMP EXAM - LIDS
COMMENTS: NORMAL
COMMENTS: NORMAL

## 2023-05-25 ASSESSMENT — EXTERNAL EXAM - LEFT EYE: OS_EXAM: NORMAL

## 2023-05-25 NOTE — NURSING NOTE
Chief Complaint(s) and History of Present Illness(es)     Follow Up    In both eyes.  Since onset it is stable.  Associated symptoms include Negative for double vision, eye pain and headache.  Pain was noted as 0/10.           Comments    Mili Padilla is a 67 year old year old female with the following diagnoses:  1. Multiple sclerosis, secondary progressive (H)   2. Visual field defect   3. Partial optic atrophy   4. Chalazion of right upper eyelid   5. Cortical age-related cataract of both eyes     Patient was last seen by Dr. Pak 05/12/22.  She is having bilateral cataract surgery this summer with Dr. Rg.  She reports vision has worsened, appears darker in both eyes.  Vision is also blurry.  MARIANN Esteves 5/25/2023 2:30 PM                          PHQ 2- patient dx with depression and anxiety.

## 2023-05-25 NOTE — PROGRESS NOTES
Mili Padilla is a 67 year old female with the following diagnoses:   1. MS (multiple sclerosis) (H)    2. Visual field defect    3. History of optic neuritis       Patient with history of optic neuritis OS (1987) follows up today for secondary progressive MS.    Since last visit 5/12/22 she notes her vision in both eyes has gradually become blurrier. She denies double vision or pain in the eyes. She is not on DMT.    Corrected distance visual acuity was 20/40 in the right eye and 20/30 -1 in the left eye. Intraocular pressure was 15 in the right eye and 15 in the left eye using ICare.  Color vision 11/11 right eye and 10/11 left eye.  Pupils isocoric.  Anterior segment exam with mixed cataracts OU.  Fundus exam with optic disc cupping OD>OS, pallor OD<OS.    GTOP remains full. OCT demonstrates stable RNFL thickness with optic atrophy OS.    It is my impression that patient has stable findings of optic neuritis OS from over 35 years ago. Return in 1 year.          Attending Physician Attestation:  Complete documentation of historical and exam elements from today's encounter can be found in the full encounter summary report (not reduplicated in this progress note).  I personally obtained the chief complaint(s) and history of present illness.  I confirmed and edited as necessary the review of systems, past medical/surgical history, family history, social history, and examination findings as documented by others; and I examined the patient myself.  I personally reviewed the relevant tests, images, and reports as documented above.  I formulated and edited as necessary the assessment and plan and discussed the findings and management plan with the patient and family. I personally reviewed the ophthalmic test(s) associated with this encounter, agree with the interpretation(s) as documented by the resident/fellow, and have edited the corresponding report(s) as necessary.  - Zach Gutierrez MD  PhD  Fellow, Neuro-Ophthalmology

## 2023-05-26 ENCOUNTER — TELEPHONE (OUTPATIENT)
Dept: OPHTHALMOLOGY | Facility: CLINIC | Age: 68
End: 2023-05-26
Payer: COMMERCIAL

## 2023-05-26 NOTE — TELEPHONE ENCOUNTER
"[facility] Call Center    Phone Message    May a detailed message be left on voicemail: yes     Reason for Call: Other: Pt states she can not reach Dr Pappas and has questions regarding her upcoming cataract surgery on 6/29/23 if she needs an appt prior. Pt also is wanting to mention she saw her eye Dr Pak yesterday and they have current \"eye\" information on her. Question re-pre-op appt which is on 5/31/23 will that be ok? Pt requests a call please to discuss matters. Thank you!     Action Taken: Message routed to:  [facility] EYE    Travel Screening: Not Applicable                                                                      "

## 2023-05-26 NOTE — TELEPHONE ENCOUNTER
Last visit with Dr. Pappas September 28th, 2022.    Pt scheduled 6- for cataract surgery.    Pt seen by Dr. Pak and stable optic neuritis history for 30 years per note and ok to f/u with Dr. Pak in a year    Note to Dr. Pappas for review of request if would need to see Dr. Pappas prior to cataract surgery June 29th    Reed York RN 11:00 AM 05/26/23

## 2023-05-30 ENCOUNTER — LAB (OUTPATIENT)
Dept: LAB | Facility: CLINIC | Age: 68
End: 2023-05-30
Payer: COMMERCIAL

## 2023-05-30 DIAGNOSIS — K91.2 POSTSURGICAL MALABSORPTION: ICD-10-CM

## 2023-05-30 LAB
FERRITIN SERPL-MCNC: 278 NG/ML (ref 11–328)
IRON BINDING CAPACITY (ROCHE): 211 UG/DL (ref 240–430)
IRON SATN MFR SERPL: 34 % (ref 15–46)
IRON SERPL-MCNC: 72 UG/DL (ref 37–145)
PTH-INTACT SERPL-MCNC: 16 PG/ML (ref 15–65)
VIT B12 SERPL-MCNC: 1170 PG/ML (ref 232–1245)

## 2023-05-30 PROCEDURE — 83550 IRON BINDING TEST: CPT

## 2023-05-30 PROCEDURE — 82306 VITAMIN D 25 HYDROXY: CPT

## 2023-05-30 PROCEDURE — 82607 VITAMIN B-12: CPT

## 2023-05-30 PROCEDURE — 82728 ASSAY OF FERRITIN: CPT

## 2023-05-30 PROCEDURE — 83970 ASSAY OF PARATHORMONE: CPT

## 2023-05-30 PROCEDURE — 83540 ASSAY OF IRON: CPT

## 2023-05-30 PROCEDURE — 36415 COLL VENOUS BLD VENIPUNCTURE: CPT

## 2023-05-31 LAB — DEPRECATED CALCIDIOL+CALCIFEROL SERPL-MC: 63 UG/L (ref 20–75)

## 2023-06-02 ENCOUNTER — TELEPHONE (OUTPATIENT)
Dept: OPHTHALMOLOGY | Facility: CLINIC | Age: 68
End: 2023-06-02

## 2023-06-02 NOTE — TELEPHONE ENCOUNTER
Returned Mili's voicemail and left voicemail with Charleen's callback number [phone]. Mili wanted to know the time of surgery to schedule her [employer]. Mili also would like to know if she needs to be seen in clinic again prior to surgery since she was last seen 09/2022.

## 2023-06-08 ENCOUNTER — TRANSFERRED RECORDS (OUTPATIENT)
Dept: MULTI SPECIALTY CLINIC | Facility: CLINIC | Age: 68
End: 2023-06-08

## 2023-06-08 ENCOUNTER — TRANSFERRED RECORDS (OUTPATIENT)
Dept: HEALTH INFORMATION MANAGEMENT | Facility: CLINIC | Age: 68
End: 2023-06-08
Payer: COMMERCIAL

## 2023-06-08 LAB
CREATININE (EXTERNAL): 0.61 MG/DL (ref 0.5–1.05)
GFR ESTIMATED (EXTERNAL): 97 ML/MIN/1.73M2
GLUCOSE (EXTERNAL): 100 MG/DL (ref 65–99)
POTASSIUM (EXTERNAL): 4.7 MMOL/L (ref 3.5–5.3)

## 2023-06-09 NOTE — TELEPHONE ENCOUNTER
Patient called in requesting a call back with questions to her upcoming surgery. Will route to the correct surgery coordinator.

## 2023-06-12 ENCOUNTER — HOSPITAL ENCOUNTER (OUTPATIENT)
Dept: MAMMOGRAPHY | Facility: CLINIC | Age: 68
Discharge: HOME | End: 2023-06-12
Attending: FAMILY MEDICINE | Admitting: FAMILY MEDICINE
Payer: COMMERCIAL

## 2023-06-12 DIAGNOSIS — Z12.31 VISIT FOR SCREENING MAMMOGRAM: ICD-10-CM

## 2023-06-12 PROCEDURE — 77067 SCR MAMMO BI INCL CAD: CPT

## 2023-06-19 ENCOUNTER — TELEPHONE (OUTPATIENT)
Dept: OPHTHALMOLOGY | Facility: CLINIC | Age: 68
End: 2023-06-19
Payer: COMMERCIAL

## 2023-06-19 NOTE — TELEPHONE ENCOUNTER
[facility] Call Center    Phone Message    May a detailed message be left on voicemail: yes     Reason for Call: Other: Pt has questions about upcoming surgery, per protocols TE sent. Please contact pt to discuss. Thank you!     Action Taken: Message routed to:  [facility] eye    Travel Screening: Not Applicable

## 2023-06-28 ENCOUNTER — ANESTHESIA EVENT (OUTPATIENT)
Dept: SURGERY | Facility: AMBULATORY SURGERY CENTER | Age: 68
End: 2023-06-28
Payer: COMMERCIAL

## 2023-06-28 NOTE — PROGRESS NOTES
Review of systems for the eyes was negative other than the pertinent positives/negatives listed in the HPI.      Assessment & Plan    HPI:  Mili Padilla is a 68 year old female with history of optic neuritis, MS, fibromyalgia, obesity, calculus of kidney, MDD, HLD, HTN, myopia with astigmatism and presbyopia presents from Dr. Pak for Postoperative day 0 right eye      Denies redness, tearing, flashes or floaters.    Initially optic neuritis 1986 and subsequently diagnosed with MS.     POHx: optic neuritis, myopia with astigmatism and presbyopia  PMHx: optic neuritis, MS, fibromyalgia, obesity, calculus of kidney, MDD, HLD, HTN  Current Medications: acetaminophen (TYLENOL) 500 MG tablet, Take 1,000 mg by mouth 3 times daily as needed  ARIPiprazole (ABILIFY) 5 MG tablet, Take 7.5 mg by mouth daily  atorvastatin (LIPITOR) 20 MG tablet, Take 20 mg by mouth daily.  bismuth subsalicylate (PEPTO BISMOL) 262 MG/15ML suspension, Take 15 mLs by mouth every 6 hours as needed  carBAMazepine (TEGRETOL) 200 MG tablet, Take 100 mg by mouth 2 times daily  cholecalciferol (VITAMIN D3) 25 mcg (1000 units) capsule, Take 1 capsule by mouth daily Every 2 days takes up to a total of 2500 per week  Cranberry 1000 MG CAPS, Take by mouth daily  cyanocobalamin (VITAMIN B-12) 100 MCG tablet, Take 1,000 mcg by mouth once a week  denosumab (PROLIA) 60 MG/ML SOLN injection, Inject 60 mg Subcutaneous every 6 months On hold in [facility]  Dextromethorphan-guaiFENesin (MUCINEX DM MAXIMUM STRENGTH PO), Take by mouth At Bedtime  DULoxetine (CYMBALTA) 60 MG capsule, Take 120 mg by mouth daily   famotidine (PEPCID) 40 MG tablet, Take 1 tablet (40 mg) by mouth daily  fluticasone (FLONASE) 50 MCG/ACT nasal spray, Spray 2 sprays into both nostrils daily  ketoconazole (NIZORAL) 2 % cream, Apply topically 2 times daily as needed   ketorolac (ACULAR) 0.5 % ophthalmic solution, Place 1 drop into the right eye 4 times daily Start 2 days prior to surgery four  times a day, after surgery: Four times a day x 1 week, three times a day x 1 week, twice a day x 1 week, daily x 1 week then stop  lactobacillus rhamnosus, GG, (CULTURELL) capsule, Take 1 capsule by mouth 2 times daily  loperamide (IMODIUM A-D) 2 MG tablet, Take 1 tablet (2 mg) by mouth every 6 hours as needed for diarrhea  losartan (COZAAR) 50 MG tablet, Take 50 mg by mouth 2 times daily  losartan (COZAAR) 50 MG tablet, Take 1 tablet (50 mg) by mouth daily  Melatonin 10 MG TABS, Take 10 mg by mouth At Bedtime Changed to extended release  mirtazapine (REMERON) 30 MG tablet, Take 75 mg by mouth At Bedtime   moxifloxacin (VIGAMOX) 0.5 % ophthalmic solution, Place 1 drop into the right eye 4 times daily Start 2 days prior to surgery four times a day, after surgery: Four times a day x 1 week  Multiple Vitamin (MULTIVITAMINS PO), Take 2 tablets by mouth every evening. WITH IRON  prednisoLONE acetate (PRED FORTE) 1 % ophthalmic suspension, Place 1-2 drops into the right eye 4 times daily after surgery: Four times a day x 1 week, three times a day x 1 week, twice a day x 1 week, daily x 1 week then stop  psyllium (METAMUCIL/KONSYL) capsule, Take 2 capsules by mouth daily  traZODone (DESYREL) 100 MG tablet, Take 600 mg by mouth At Bedtime May split dose, 300mg @ HS and repeat x1 if needed   triamcinolone (ARISTOCORT HP) 0.5 % external cream, Apply topically At Bedtime To hands  trospium (SANCTURA) 20 MG tablet, Take 20 mg by mouth 2 times daily (before meals)  UNABLE TO FIND, MEDICATION NAME: Nuun 1 tablet daily  verapamil ER (CALAN-SR) 120 MG CR tablet, Take 120 mg by mouth 2 times daily  vitamin B-Complex, Take 1 tablet by mouth daily     No current facility-administered medications on file prior to visit.    FHx: refractive error   PSHx: Cataract extraction/iol Pappas right eye 6/29/23       Current Eye Medications:  None    Assessment & Plan:  Pseudophakia, right eye, day 0  Pappas right eye  06/29/23   Doing  well  Keep patch in place at night for 7 days  Start post-operative drops and taper according to instructions  Post-operative do's and don'ts reviewed, questions answered    Recheck 1 week    (H25.013) Cortical age-related cataract of both eyes  (H25.813) Combined forms of age-related cataract of both eyes  Special equipment/needs:  Eye: left  Anesthesia:topical   Dilates to: 7mm  Iris expansion:  No  Pseudoexfoliation: No  Trypan Blue: No  Trauma: No    Able lay to flat: Yes  Blood Thinner: No   Tamsulosin: No  DM: No  Guttae: No    Dominant Eye: right    Plan: [place] right eye, -0.75 left eye     Proceed with CE/IOL  left eye .      (H35.341) Lamellar macular hole of right eye  Noted on OCT mac today and from 5/17/16 with minimal change  May affect final BEST CORRECTED VISUAL ACUITY  Repeat oct mac q6 months    (H52.13,  H52.203,  H52.4) Myopia of both eyes with astigmatism and presbyopia  Hold pending Cataract extraction/IOL    Multiple Sclerosis  Patient has a motorized scooter, is able to transfer  Sees Dr. Pak  Diagnosed 1986 with optic neuritis    Return for as scheduled.        Flaco Ppapas MD     Attending Physician Attestation:  Complete documentation of historical and exam elements from today's encounter can be found in the full encounter summary report (not reduplicated in this progress note).  I personally obtained the chief complaint(s) and history of present illness.  I confirmed and edited as necessary the review of systems, past medical/surgical history, family history, social history, and examination findings as documented by others; and I examined the patient myself.  I personally reviewed the relevant tests, images, and reports as documented above.  I formulated and edited as necessary the assessment and plan and discussed the findings and management plan with the patient and family. - Flaco Pappas MD

## 2023-06-29 ENCOUNTER — OFFICE VISIT (OUTPATIENT)
Dept: OPHTHALMOLOGY | Facility: CLINIC | Age: 68
End: 2023-06-29

## 2023-06-29 ENCOUNTER — HOSPITAL ENCOUNTER (OUTPATIENT)
Facility: AMBULATORY SURGERY CENTER | Age: 68
Discharge: HOME | End: 2023-06-29
Attending: OPHTHALMOLOGY
Payer: COMMERCIAL

## 2023-06-29 ENCOUNTER — ANESTHESIA (OUTPATIENT)
Dept: SURGERY | Facility: AMBULATORY SURGERY CENTER | Age: 68
End: 2023-06-29
Payer: COMMERCIAL

## 2023-06-29 VITALS
OXYGEN SATURATION: 97 % | HEART RATE: 64 BPM | TEMPERATURE: 96.8 F | BODY MASS INDEX: 45.48 KG/M2 | HEIGHT: 66 IN | SYSTOLIC BLOOD PRESSURE: 121 MMHG | RESPIRATION RATE: 14 BRPM | DIASTOLIC BLOOD PRESSURE: 64 MMHG | WEIGHT: 283 LBS

## 2023-06-29 DIAGNOSIS — Z98.890 POSTOPERATIVE EYE STATE: Primary | ICD-10-CM

## 2023-06-29 DIAGNOSIS — H25.811 COMBINED FORM OF AGE-RELATED CATARACT, RIGHT EYE: Primary | ICD-10-CM

## 2023-06-29 DIAGNOSIS — H25.813 COMBINED FORMS OF AGE-RELATED CATARACT OF BOTH EYES: ICD-10-CM

## 2023-06-29 PROCEDURE — 66984 XCAPSL CTRC RMVL W/O ECP: CPT | Mod: RT

## 2023-06-29 PROCEDURE — 66984 XCAPSL CTRC RMVL W/O ECP: CPT | Mod: RT | Performed by: OPHTHALMOLOGY

## 2023-06-29 PROCEDURE — 99024 POSTOP FOLLOW-UP VISIT: CPT | Mod: GC | Performed by: OPHTHALMOLOGY

## 2023-06-29 DEVICE — LENS CC60WF 15.0 CLAREON UV ASPHERIC BICONVEX IOL: Type: IMPLANTABLE DEVICE | Site: EYE | Status: FUNCTIONAL

## 2023-06-29 RX ORDER — PROPARACAINE HYDROCHLORIDE 5 MG/ML
1 SOLUTION/ DROPS OPHTHALMIC ONCE
Status: DISCONTINUED | OUTPATIENT
Start: 2023-06-29 | End: 2023-06-30 | Stop reason: HOSPADM

## 2023-06-29 RX ORDER — TETRACAINE HYDROCHLORIDE 5 MG/ML
SOLUTION OPHTHALMIC PRN
Status: DISCONTINUED | OUTPATIENT
Start: 2023-06-29 | End: 2023-06-29 | Stop reason: HOSPADM

## 2023-06-29 RX ORDER — SODIUM CHLORIDE, SODIUM LACTATE, POTASSIUM CHLORIDE, CALCIUM CHLORIDE 600; 310; 30; 20 MG/100ML; MG/100ML; MG/100ML; MG/100ML
INJECTION, SOLUTION INTRAVENOUS CONTINUOUS
Status: CANCELLED | OUTPATIENT
Start: 2023-06-29

## 2023-06-29 RX ORDER — BALANCED SALT SOLUTION 6.4; .75; .48; .3; 3.9; 1.7 MG/ML; MG/ML; MG/ML; MG/ML; MG/ML; MG/ML
SOLUTION OPHTHALMIC PRN
Status: DISCONTINUED | OUTPATIENT
Start: 2023-06-29 | End: 2023-06-29 | Stop reason: HOSPADM

## 2023-06-29 RX ORDER — PREDNISOLONE ACETATE 10 MG/ML
1-2 SUSPENSION/ DROPS OPHTHALMIC 4 TIMES DAILY
Qty: 10 ML | Refills: 0 | Status: SHIPPED | OUTPATIENT
Start: 2023-06-29 | End: 2023-08-30

## 2023-06-29 RX ORDER — ONDANSETRON 4 MG/1
4 TABLET, ORALLY DISINTEGRATING ORAL EVERY 30 MIN PRN
Status: DISCONTINUED | OUTPATIENT
Start: 2023-06-29 | End: 2023-06-30 | Stop reason: HOSPADM

## 2023-06-29 RX ORDER — CYCLOPENTOLAT/TROPIC/PHENYLEPH 1%-1%-2.5%
1 DROPS (EA) OPHTHALMIC (EYE)
Status: COMPLETED | OUTPATIENT
Start: 2023-06-29 | End: 2023-06-29

## 2023-06-29 RX ORDER — ACETAMINOPHEN 325 MG/1
975 TABLET ORAL ONCE
Status: COMPLETED | OUTPATIENT
Start: 2023-06-29 | End: 2023-06-29

## 2023-06-29 RX ORDER — MOXIFLOXACIN 5 MG/ML
1 SOLUTION/ DROPS OPHTHALMIC 4 TIMES DAILY
Qty: 3 ML | Refills: 0 | Status: SHIPPED | OUTPATIENT
Start: 2023-06-29 | End: 2023-08-30

## 2023-06-29 RX ORDER — LIDOCAINE HYDROCHLORIDE 10 MG/ML
INJECTION, SOLUTION EPIDURAL; INFILTRATION; INTRACAUDAL; PERINEURAL PRN
Status: DISCONTINUED | OUTPATIENT
Start: 2023-06-29 | End: 2023-06-29 | Stop reason: HOSPADM

## 2023-06-29 RX ORDER — ONDANSETRON 2 MG/ML
4 INJECTION INTRAMUSCULAR; INTRAVENOUS EVERY 30 MIN PRN
Status: DISCONTINUED | OUTPATIENT
Start: 2023-06-29 | End: 2023-06-30 | Stop reason: HOSPADM

## 2023-06-29 RX ORDER — MOXIFLOXACIN 5 MG/ML
1 SOLUTION/ DROPS OPHTHALMIC
Status: COMPLETED | OUTPATIENT
Start: 2023-06-29 | End: 2023-06-29

## 2023-06-29 RX ORDER — MOXIFLOXACIN IN NACL,ISO-OS/PF 0.3MG/0.3
SYRINGE (ML) INTRAOCULAR PRN
Status: DISCONTINUED | OUTPATIENT
Start: 2023-06-29 | End: 2023-06-29 | Stop reason: HOSPADM

## 2023-06-29 RX ORDER — SODIUM CHLORIDE, SODIUM LACTATE, POTASSIUM CHLORIDE, CALCIUM CHLORIDE 600; 310; 30; 20 MG/100ML; MG/100ML; MG/100ML; MG/100ML
INJECTION, SOLUTION INTRAVENOUS CONTINUOUS
Status: DISCONTINUED | OUTPATIENT
Start: 2023-06-29 | End: 2023-06-30 | Stop reason: HOSPADM

## 2023-06-29 RX ORDER — OXYCODONE HYDROCHLORIDE 5 MG/1
5 TABLET ORAL
Status: DISCONTINUED | OUTPATIENT
Start: 2023-06-29 | End: 2023-06-30 | Stop reason: HOSPADM

## 2023-06-29 RX ORDER — ONDANSETRON 2 MG/ML
INJECTION INTRAMUSCULAR; INTRAVENOUS PRN
Status: DISCONTINUED | OUTPATIENT
Start: 2023-06-29 | End: 2023-06-29

## 2023-06-29 RX ORDER — PROPARACAINE HYDROCHLORIDE 5 MG/ML
1 SOLUTION/ DROPS OPHTHALMIC
Status: DISCONTINUED | OUTPATIENT
Start: 2023-06-29 | End: 2023-06-30 | Stop reason: HOSPADM

## 2023-06-29 RX ORDER — OXYCODONE HYDROCHLORIDE 5 MG/1
10 TABLET ORAL
Status: DISCONTINUED | OUTPATIENT
Start: 2023-06-29 | End: 2023-06-30 | Stop reason: HOSPADM

## 2023-06-29 RX ORDER — DICLOFENAC SODIUM 1 MG/ML
1 SOLUTION/ DROPS OPHTHALMIC
Status: COMPLETED | OUTPATIENT
Start: 2023-06-29 | End: 2023-06-29

## 2023-06-29 RX ORDER — FENTANYL CITRATE 50 UG/ML
50 INJECTION, SOLUTION INTRAMUSCULAR; INTRAVENOUS EVERY 5 MIN PRN
Status: DISCONTINUED | OUTPATIENT
Start: 2023-06-29 | End: 2023-06-30 | Stop reason: HOSPADM

## 2023-06-29 RX ORDER — HYDROMORPHONE HYDROCHLORIDE 1 MG/ML
0.2 INJECTION, SOLUTION INTRAMUSCULAR; INTRAVENOUS; SUBCUTANEOUS EVERY 5 MIN PRN
Status: DISCONTINUED | OUTPATIENT
Start: 2023-06-29 | End: 2023-06-30 | Stop reason: HOSPADM

## 2023-06-29 RX ORDER — FENTANYL CITRATE 50 UG/ML
25 INJECTION, SOLUTION INTRAMUSCULAR; INTRAVENOUS EVERY 5 MIN PRN
Status: DISCONTINUED | OUTPATIENT
Start: 2023-06-29 | End: 2023-06-30 | Stop reason: HOSPADM

## 2023-06-29 RX ORDER — LIDOCAINE 40 MG/G
CREAM TOPICAL
Status: DISCONTINUED | OUTPATIENT
Start: 2023-06-29 | End: 2023-06-30 | Stop reason: HOSPADM

## 2023-06-29 RX ORDER — KETOROLAC TROMETHAMINE 5 MG/ML
1 SOLUTION OPHTHALMIC 4 TIMES DAILY
Qty: 10 ML | Refills: 0 | Status: SHIPPED | OUTPATIENT
Start: 2023-06-29 | End: 2023-08-30

## 2023-06-29 RX ORDER — HYDROMORPHONE HYDROCHLORIDE 1 MG/ML
0.4 INJECTION, SOLUTION INTRAMUSCULAR; INTRAVENOUS; SUBCUTANEOUS EVERY 5 MIN PRN
Status: DISCONTINUED | OUTPATIENT
Start: 2023-06-29 | End: 2023-06-30 | Stop reason: HOSPADM

## 2023-06-29 RX ADMIN — SODIUM CHLORIDE, SODIUM LACTATE, POTASSIUM CHLORIDE, CALCIUM CHLORIDE: 600; 310; 30; 20 INJECTION, SOLUTION INTRAVENOUS at 07:39

## 2023-06-29 RX ADMIN — Medication 1 DROP: at 07:28

## 2023-06-29 RX ADMIN — ONDANSETRON 4 MG: 2 INJECTION INTRAMUSCULAR; INTRAVENOUS at 08:06

## 2023-06-29 RX ADMIN — Medication 1 DROP: at 07:34

## 2023-06-29 RX ADMIN — Medication 1 DROP: at 07:22

## 2023-06-29 RX ADMIN — DICLOFENAC SODIUM 1 DROP: 1 SOLUTION/ DROPS OPHTHALMIC at 07:34

## 2023-06-29 RX ADMIN — MOXIFLOXACIN 1 DROP: 5 SOLUTION/ DROPS OPHTHALMIC at 07:27

## 2023-06-29 RX ADMIN — DICLOFENAC SODIUM 1 DROP: 1 SOLUTION/ DROPS OPHTHALMIC at 07:27

## 2023-06-29 RX ADMIN — MOXIFLOXACIN 1 DROP: 5 SOLUTION/ DROPS OPHTHALMIC at 07:34

## 2023-06-29 RX ADMIN — DICLOFENAC SODIUM 1 DROP: 1 SOLUTION/ DROPS OPHTHALMIC at 07:22

## 2023-06-29 RX ADMIN — PROPARACAINE HYDROCHLORIDE 1 DROP: 5 SOLUTION/ DROPS OPHTHALMIC at 07:21

## 2023-06-29 RX ADMIN — MOXIFLOXACIN 1 DROP: 5 SOLUTION/ DROPS OPHTHALMIC at 07:22

## 2023-06-29 RX ADMIN — ACETAMINOPHEN 975 MG: 325 TABLET ORAL at 07:19

## 2023-06-29 ASSESSMENT — EXTERNAL EXAM - RIGHT EYE: OD_EXAM: NORMAL

## 2023-06-29 ASSESSMENT — SLIT LAMP EXAM - LIDS: COMMENTS: NORMAL

## 2023-06-29 ASSESSMENT — TONOMETRY
IOP_METHOD: TONOPEN
OD_IOP_MMHG: 13

## 2023-06-29 ASSESSMENT — VISUAL ACUITY
OD_SC: 20/60
METHOD: SNELLEN - LINEAR

## 2023-06-29 NOTE — ANESTHESIA PREPROCEDURE EVALUATION
Anesthesia Pre-Procedure Evaluation    Patient: Mili Padilla   MRN: [id number] : 1955        Procedure : Procedure(s):  RIGHT EYE PHACOEMULSIFICATION CATARACT WITH INTRAOCULAR LENS IMPLANT          Past Medical History:   Diagnosis Date     Depression, major, in partial remission (H)      Fibromyalgia syndrome      Gastro-oesophageal reflux disease      Hyperlipemia      Hypertension      Low serum sodium      Lymphedema      Mixed incontinence      Multiple sclerosis (H)     tremors with MS, all four limbs and head     Multiple sclerosis, secondary progressive (H)      Neurogenic bladder      Obese      Osteoporosis      Sleep apnea      Vocal cord paralysis, unilateral complete       Past Surgical History:   Procedure Laterality Date     COLONOSCOPY N/A 2017    Procedure: COMBINED COLONOSCOPY, SINGLE OR MULTIPLE BIOPSY/POLYPECTOMY BY BIOPSY;;  Surgeon: Wong Beaulieu MD;  Location:  GI     COLONOSCOPY N/A 2018    Procedure: COMBINED COLONOSCOPY, SINGLE OR MULTIPLE BIOPSY/POLYPECTOMY BY BIOPSY;  COLONOSCOPY ;  Surgeon: Yessica Santana MD;  Location:  GI     ENT SURGERY      throat 1969, vocal cord surgery with skin grafting     ESOPHAGOSCOPY, GASTROSCOPY, DUODENOSCOPY (EGD), COMBINED N/A 2014    Procedure: COMBINED ENDOSCOPIC ULTRASOUND, ESOPHAGOSCOPY, GASTROSCOPY, DUODENOSCOPY (EGD), FINE NEEDLE ASPIRATE/BIOPSY;  Surgeon: Yessica Santana MD;  Location: [facility]     ESOPHAGOSCOPY, GASTROSCOPY, DUODENOSCOPY (EGD), COMBINED N/A 2014    Procedure: COMBINED ESOPHAGOSCOPY, GASTROSCOPY, DUODENOSCOPY (EGD), BIOPSY SINGLE OR MULTIPLE;  Surgeon: Yessica Santana MD;  Location:  GI     GASTRIC BYPASS Bilateral      LAPAROSCOPIC APPENDECTOMY  2013    Procedure: LAPAROSCOPIC APPENDECTOMY;;  Surgeon: Salvador Morris MD;  Location:  OR     LAPAROSCOPIC BIOPSY LIVER  2013    Procedure: LAPAROSCOPIC BIOPSY LIVER;;  Surgeon: Salvador Morris MD;   Location:  OR     LAPAROSCOPIC GASTRIC SLEEVE  2013    Procedure: LAPAROSCOPIC GASTRIC SLEEVE;  LAPAROSCOPIC SLEEVE GASTRECTOMY/ LAPARSCOPIC  APPENDECTOMY /LIVER BIOPSIES/LIVER CYST DRAINAGE;  Surgeon: Salvador Morris MD;  Location:  OR     LASER HOLMIUM LITHOTRIPSY URETER(S), INSERT STENT, COMBINED Left 2023    Procedure: CYSTOSCOPY, LEFT URETEROSCOPY, HOLMIUM LASER  LITHOTRIPSY, LEFT   STENT PLACEMENT;  Surgeon: Mahendra Coles MD;  Location:  OR     ORTHOPEDIC SURGERY      R wrist       Allergies   Allergen Reactions     Amoxicillin Hives     Amoxicillin-Pot Clavulanate      Sensitive,  Able to tolerate a few doses, otherwise hives on hands     Betaseron [Interferon Beta-1b]      Necrosis of skin at injection sites     Dust Mite Extract Unknown     Mold Unknown      Social History     Tobacco Use     Smoking status: Former     Types: Cigarettes     Quit date: 1974     Years since quittin.7     Smokeless tobacco: Never   Substance Use Topics     Alcohol use: Not Currently      Wt Readings from Last 1 Encounters:   23 128.4 kg (283 lb)        Anesthesia Evaluation            ROS/MED HX  ENT/Pulmonary:     (+) sleep apnea, doesn't use CPAP, vocal cord abnormalities (vocal cord paralysis following a ski accident) -  Hoarseness,     Neurologic:     (+) Multiple Sclerosis, limitations: leg weakness, balance issues.     Cardiovascular:     (+) hypertension-----    METS/Exercise Tolerance:     Hematologic:       Musculoskeletal:       GI/Hepatic:     (+) GERD,     Renal/Genitourinary:     (+) Nephrolithiasis ,     Endo:     (+) Obesity (s/p gastric bypass),     Psychiatric/Substance Use:     (+) psychiatric history depression     Infectious Disease:       Malignancy:       Other:            Physical Exam    Airway  airway exam normal      Mallampati: II       Respiratory Devices and Support         Dental       (+) Completely normal teeth      Cardiovascular   cardiovascular  exam normal          Pulmonary   pulmonary exam normal                OUTSIDE LABS:  CBC:   Lab Results   Component Value Date    WBC 10.1 01/24/2023    WBC 10.4 01/23/2023    HGB 12.5 01/24/2023    HGB 11.6 (L) 01/23/2023    HCT 36.9 01/24/2023    HCT 35.0 01/23/2023     (L) 01/24/2023    PLT 94 (L) 01/23/2023     BMP:   Lab Results   Component Value Date     (L) 01/24/2023     (L) 01/23/2023    POTASSIUM 3.6 01/24/2023    POTASSIUM 3.6 01/23/2023    CHLORIDE 95 (L) 01/24/2023    CHLORIDE 96 (L) 01/23/2023    CO2 30 (H) 01/24/2023    CO2 26 01/23/2023    BUN 7.9 (L) 01/24/2023    BUN 8.8 01/23/2023    CR 0.53 01/24/2023    CR 0.49 (L) 01/23/2023     (H) 01/24/2023     (H) 01/23/2023     COAGS:   Lab Results   Component Value Date    PTT 31 12/12/2010    INR 1.07 12/27/2020     POC:   Lab Results   Component Value Date     (H) 05/31/2013     HEPATIC:   Lab Results   Component Value Date    ALBUMIN 3.1 (L) 01/20/2023    PROTTOTAL 5.3 (L) 01/20/2023    ALT 32 01/20/2023    AST 38 (H) 01/20/2023    ALKPHOS 73 01/20/2023    BILITOTAL 0.9 01/20/2023     OTHER:   Lab Results   Component Value Date    LACT 1.1 01/19/2023    A1C 5.9 02/11/2013    HEAVENLY 8.7 (L) 01/24/2023    MAG 2.3 12/29/2020    LIPASE 227 12/24/2014    TSH 2.07 12/29/2020       Anesthesia Plan    ASA Status:  3   NPO Status:  NPO Appropriate    Anesthesia Type: MAC.     - Reason for MAC: immobility needed   Induction: Intravenous.   Maintenance: TIVA.        Consents    Anesthesia Plan(s) and associated risks, benefits, and realistic alternatives discussed. Questions answered and patient/representative(s) expressed understanding.    - Discussed:     - Discussed with:  Patient      - Extended Intubation/Ventilatory Support Discussed: No.      - Patient is DNR/DNI Status: No    Use of blood products discussed: No .     Postoperative Care    Pain management: IV analgesics.   PONV prophylaxis: Ondansetron (or other 5HT-3)      Comments:                Gumaro Tripathi MD

## 2023-06-29 NOTE — OP NOTE
PREOPERATIVE DIAGNOSIS:   1. Combined form of age-related cataract, right eye         Right eye   POSTOPERATIVE DIAGNOSIS: Same   PROCEDURES:   1. Cataract extraction with intraocular lens implant Right eye.  SURGEON: Flaco Pappas M.D.  ASSISTANT: Yeni Herrera  INDICATIONS: The patient Mili Padilla presented to the eye clinic with decreased vision secondary to cataract in the Right eye. The risks, benefits and alternatives to cataract extraction were discussed. The patient elected to proceed. All questions were answered to the patient's satisfaction.   DESCRIPTION OF PROCEDURE:   Prior to the procedure, appropriate cardiac and respiratory monitors were applied to the patient.  In the pre-operative holding area, a drop of topical tetracaine was placed in the operative eye.  The patient was brought to the operating room.  With adequate anesthesia, the Right eye was prepped and draped in the usual sterile fashion. A lid speculum was placed, and the operating microscope was rotated into position. A surgical pause was carried out to identify with all members of the surgical team the correct surgical site. A paracentesis was created.  Through this limbal paracentesis, the anterior chamber was filled with preservative-free lidocaine followed by viscoelastic.  A temporal wound was created at the limbus using a 2.6 mm blade. A capsulorrhexis was initiated using a bent 25-gauge needle and was completed in continuous and circular fashion using the capsulorrhexis forceps. The lens nucleus was hydrodissected using balanced salt solution.  The lens nucleus was rotated and removed using phacoemulsification in a divide and conquer technique.  Residual cortical material was removed using irrigation-aspiration.  The capsular bag was reinflated to its maximal extent with cohesive viscoelastic.  A 15.0 diopter [id number] was inserted into the capsular bag.  The lens power selected was reviewed using the intraocular lens power  measurements that were obtained preoperatively to confirm that the correct lens was selected for the desired post-operative refractive state. The residual viscoelastic was removed in its entirety, the wound were hydrated and found to be self-sealing.  Intracameral moxifloxacin was administered. Tactile pressure was confirmed to be in a normal range.  The lid speculum was removed and a shield was applied.  The patient tolerated the procedure well, and there were no complications.    Attending Physician Procedure Attestation: I was present for the entire procedure and was available to assist as needed-Flaco Pappas MD      PLAN: The patient will be discharged to home and will follow up today  EBL:  None  Complications:  None  Implant Name Type Inv. Item Serial No.  Lot No. LRB No. Used Action   LENS [id number].150 [employer] UV ASPHERIC BICONVEX IOL - [id number] Lens/Eye Implant LENS [id number].150 [employer] UV ASPHERIC BICONVEX IOL [id number] [employer]  Right 1 Implanted

## 2023-06-29 NOTE — ANESTHESIA POSTPROCEDURE EVALUATION
Patient: Mili Padilla    Procedure: Procedure(s):  RIGHT EYE PHACOEMULSIFICATION CATARACT WITH INTRAOCULAR LENS IMPLANT       Anesthesia Type:  MAC    Note:  Disposition: Outpatient   Postop Pain Control: Uneventful            Sign Out: Well controlled pain   PONV: No   Neuro/Psych: Uneventful            Sign Out: Acceptable/Baseline neuro status   Airway/Respiratory: Uneventful            Sign Out: Acceptable/Baseline resp. status   CV/Hemodynamics: Uneventful            Sign Out: Acceptable CV status; No obvious hypovolemia; No obvious fluid overload   Other NRE: NONE   DID A NON-ROUTINE EVENT OCCUR? No           Last vitals:  Vitals Value Taken Time   /64 06/29/23 0915   Temp 36  C (96.8  F) 06/29/23 0915   Pulse 64 06/29/23 0915   Resp 14 06/29/23 0915   SpO2 97 % 06/29/23 0915       Electronically Signed By: Gumaro Tripathi MD  June 29, 2023  9:40 AM

## 2023-06-29 NOTE — DISCHARGE INSTRUCTIONS
[facility]  Home Care Following Anesthesia  For 24 hours after surgery:  Get plenty of rest.  A responsible adult must stay with you for at least 24 hours after you leave the surgery center.  Do not drive or use heavy equipment.  If you have weakness or tingling, don't drive or use heavy equipment until this feeling goes away.   Do not drink alcohol.   Avoid strenuous or risky activities.  Ask for help when climbing stairs.  You may feel lightheaded.  IF so, sit for a few minutes before standing.  Have someone help you get up.   If you have nausea (feel sick to your stomach): Drink only clear liquids such as apple juice, ginger ale, broth or 7-Up.  Rest may also help.  Be sure to drink enough fluids.  Move to a regular diet as you feel able.   You may have a slight fever.  Call the doctor if your fever is over 100 F (37.7 C) (taken under the tongue) or lasts longer than 24 hours.  You may have a dry mouth, a sore throat, muscle aches or trouble sleeping. These should go away after 24 hours.  Do not make important or legal decisions.   It is recommended to avoid smoking.               Tips for taking pain medications  To get the best pain relief possible, remember these points:  Take pain medications as directed, before pain becomes severe.  Pain medication can upset your stomach: taking it with food may help.  Constipation is a common side effect of pain medication. Drink plenty of  fluids.  Eat foods high in fiber. Take a stool softener if recommended by your doctor or pharmacist.  Do not drink alcohol, drive or operate machinery while taking pain medications.  Ask about other ways to control pain, such as with heat, ice or relaxation.    Tylenol/Acetaminophen Consumption    If you feel your pain relief is insufficient, you may take Tylenol/Acetaminophen in addition to your narcotic pain medication.   Be careful not to exceed 4,000 mg of Tylenol/Acetaminophen in a 24 hour  period from all sources.  If you are taking extra strength Tylenol/acetaminophen (500 mg), the maximum dose is 8 tablets in 24 hours.  If you are taking regular strength acetaminophen (325 mg), the maximum dose is 12 tablets in 24 hours.    Call a doctor for any of the following:  Signs of infection (fever, growing tenderness at the surgery site, a large amount of drainage or bleeding, severe pain, foul-smelling drainage, redness, swelling).  It has been over 8 to 10 hours since surgery and you are still not able to urinate (pass water).  Headache for over 24 hours.  Numbness, tingling or weakness the day after surgery (if you had spinal anesthesia).  Signs of Covid-19 infection (temperature over 100 degrees, shortness of breath, cough, loss of taste/smell, generalized body aches, persistent headache, chills, sore throat, nausea/vomiting/diarrhea)  Your doctor is:  Dr. Flaco Pappas, Ophthalmology: [phone]                    Or dial [phone] and ask for the resident on call for:  Ophthalmology  For emergency care, call the:  [facility]:  [phone] (TTY for hearing impaired: [phone])

## 2023-06-29 NOTE — ANESTHESIA CARE TRANSFER NOTE
Patient: Mili Padilla    Procedure: Procedure(s):  RIGHT EYE PHACOEMULSIFICATION CATARACT WITH INTRAOCULAR LENS IMPLANT       Diagnosis: Combined forms of age-related cataract of both eyes [H25.813]  Diagnosis Additional Information: No value filed.    Anesthesia Type:   MAC     Note:    Oropharynx: oropharynx clear of all foreign objects and spontaneously breathing  Level of Consciousness: awake  Oxygen Supplementation: room air    Independent Airway: airway patency satisfactory and stable  Dentition: dentition unchanged  Vital Signs Stable: post-procedure vital signs reviewed and stable  Report to RN Given: handoff report given  Patient transferred to: Phase II    Handoff Report: Identifed the Patient, Identified the Reponsible Provider, Reviewed the pertinent medical history, Discussed the surgical course, Reviewed Intra-OP anesthesia mangement and issues during anesthesia, Set expectations for post-procedure period and Allowed opportunity for questions and acknowledgement of understanding      Vitals:  Vitals Value Taken Time   /57 06/29/23 0840   Temp 35.9  C (96.7  F) 06/29/23 0840   Pulse 72 06/29/23 0840   Resp 14 06/29/23 0840   SpO2 96 % 06/29/23 0840       Electronically Signed By: CHANCE Urena CRNA  June 29, 2023  8:45 AM

## 2023-06-30 ENCOUNTER — VIRTUAL VISIT (OUTPATIENT)
Dept: PSYCHOLOGY | Facility: CLINIC | Age: 68
End: 2023-06-30
Payer: COMMERCIAL

## 2023-06-30 DIAGNOSIS — F54 PSYCHOLOGICAL FACTORS AFFECTING MORBID OBESITY (H): ICD-10-CM

## 2023-06-30 DIAGNOSIS — F33.0 MAJOR DEPRESSIVE DISORDER, RECURRENT EPISODE, MILD (H): Primary | ICD-10-CM

## 2023-06-30 DIAGNOSIS — E66.01 PSYCHOLOGICAL FACTORS AFFECTING MORBID OBESITY (H): ICD-10-CM

## 2023-06-30 DIAGNOSIS — Z63.4 BEREAVEMENT: ICD-10-CM

## 2023-06-30 PROCEDURE — 90837 PSYTX W PT 60 MINUTES: CPT | Mod: 95 | Performed by: PSYCHOLOGIST

## 2023-06-30 SDOH — SOCIAL STABILITY - SOCIAL INSECURITY: DISSAPEARANCE AND DEATH OF FAMILY MEMBER: Z63.4

## 2023-06-30 NOTE — PROGRESS NOTES
Health Psychology                     Department of Medicine  Izzy Montaño, Ph.D., L.P. [phone]                          [facility] Sherrie Crowe, Ph.D., L.P. [phone]                   [place] Mail Code [address]   Ranulfo Whipple, Ph.D. [phone]        [address] [place] [address] Enriqueta Sorto, Ph.D., L.P. [phone]    [place] [address]           Ramin Olivo, Ph.D., A.B.P.P., L.P. [phone]        Kathie Galvan, Ph.D., L.P. [phone]  [address] Psychology Telemental Health Note      Demographics   Age 67 year old   Sex female   Race White   Ethnicity Not  or      Ms. Padilla is a  woman self-referred for psychological consultation because her therapist of the last 24 years retired.  She is seen for problem-solving and supportive therapy for depression and multiple health issues.     HISTORY OF PRESENTING CONCERN:  Ms. Padilla reported at intake (7/28/16) a  lengthy history of depression.  She was seeing a psychiatrist, Ban Coleman, at [facility], and is taking Abilify 7.5 mg, trazodone 500 mg, ripazepam 75 mg each day at bedtime, Tegretol 400 mg at bedtime and methylphenidate 10 mg b.i.d.  She discontineud ability and is now taking Rexulti 2 mg.  She has a history of hospitalizations including three at the [facility] in the late 1990s, early 2000s when she had 2 courses of ECT lasting 7-10 sessions and approximately 3 other single session ECTs.  She stopped due to memory problems.  She kept with Dr. Coleman who she first met as an inpatient and has seen her for the past 18 years.  She had also been hospitalized psychiatrically at [facility] at age 23.  She previously worked with psychiatrist, Doug Sarabia for three years, stopping, in part, because she didn't feel he took her suicide attempt sufficiently seriously.  She reports her depression tends to  vary.     MEDICAL HISTORY (at intake)  Ms. Padilla has a complex medical history.  She was diagnosed with MS in 1985.  Her psychiatrist at [facility] in [place], Dr. 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  She has used a scooter since 1992, using it more  than she had previously.  She also can use a walker.  She was diagnosed with fibromyalgia in 1997. She is seen at the [facility] for her eye care. During 8th grade, she fell on a ski lift, injuring her larynx.     WEIGHT not taken.     2/23/23  Down to 281 lbs.   Hard to weigh.  Not steady on her feet.    She has not been attending OA.     Wt Readings from Last 4 Encounters:   06/29/23 128.4 kg (283 lb)   05/15/23 128.4 kg (283 lb)   02/24/23 127.6 kg (281 lb 6.4 oz)   02/21/23 127.6 kg (281 lb 6.4 oz)     Past Medical History:   Diagnosis Date     Depression, major, in partial remission (H)      Fibromyalgia syndrome      Gastro-oesophageal reflux disease      Hyperlipemia      Hypertension      Low serum sodium      Lymphedema      Mixed incontinence      Multiple sclerosis (H)     tremors with MS, all four limbs and head     Multiple sclerosis, secondary progressive (H)      Neurogenic bladder      Obese      Osteoporosis      Sleep apnea      Vocal cord paralysis, unilateral complete         Past Surgical History:   Procedure Laterality Date     COLONOSCOPY N/A 07/17/2017    Procedure: COMBINED COLONOSCOPY, SINGLE OR MULTIPLE BIOPSY/POLYPECTOMY BY BIOPSY;;  Surgeon: Wong Beaulieu MD;  Location:  GI     COLONOSCOPY N/A 02/23/2018    Procedure: COMBINED COLONOSCOPY, SINGLE OR MULTIPLE BIOPSY/POLYPECTOMY BY BIOPSY;  COLONOSCOPY ;  Surgeon: Yessica Santana MD;  Location:  GI     ENT SURGERY      throat 1969, vocal cord surgery with skin grafting     ESOPHAGOSCOPY, GASTROSCOPY, DUODENOSCOPY (EGD), COMBINED N/A 12/16/2014    Procedure: COMBINED ENDOSCOPIC ULTRASOUND, ESOPHAGOSCOPY, GASTROSCOPY,  DUODENOSCOPY (EGD), FINE NEEDLE ASPIRATE/BIOPSY;  Surgeon: Yessica Santana MD;  Location:  GI     ESOPHAGOSCOPY, GASTROSCOPY, DUODENOSCOPY (EGD), COMBINED N/A 12/16/2014    Procedure: COMBINED ESOPHAGOSCOPY, GASTROSCOPY, DUODENOSCOPY (EGD), BIOPSY SINGLE OR MULTIPLE;  Surgeon: Yessica Santana MD;  Location:  GI     GASTRIC BYPASS Bilateral      LAPAROSCOPIC APPENDECTOMY  05/30/2013    Procedure: LAPAROSCOPIC APPENDECTOMY;;  Surgeon: Salvador Morris MD;  Location:  OR     LAPAROSCOPIC BIOPSY LIVER  05/30/2013    Procedure: LAPAROSCOPIC BIOPSY LIVER;;  Surgeon: Salvador Morris MD;  Location:  OR     LAPAROSCOPIC GASTRIC SLEEVE  05/30/2013    Procedure: LAPAROSCOPIC GASTRIC SLEEVE;  LAPAROSCOPIC SLEEVE GASTRECTOMY/ LAPARSCOPIC  APPENDECTOMY /LIVER BIOPSIES/LIVER CYST DRAINAGE;  Surgeon: Salvador Morris MD;  Location:  OR     LASER HOLMIUM LITHOTRIPSY URETER(S), INSERT STENT, COMBINED Left 1/18/2023    Procedure: CYSTOSCOPY, LEFT URETEROSCOPY, HOLMIUM LASER  LITHOTRIPSY, LEFT   STENT PLACEMENT;  Surgeon: Mahendra Coles MD;  Location:  OR     ORTHOPEDIC SURGERY      R wrist 2010     PHACOEMULSIFICATION CLEAR CORNEA WITH STANDARD INTRAOCULAR LENS IMPLANT Right 6/29/2023    Procedure: RIGHT EYE PHACOEMULSIFICATION CATARACT WITH INTRAOCULAR LENS IMPLANT;  Surgeon: Flaco Pappas MD;  [place] [facility] [place]       Current Outpatient Medications   Medication     acetaminophen (TYLENOL) 500 MG tablet     ARIPiprazole (ABILIFY) 5 MG tablet     atorvastatin (LIPITOR) 20 MG tablet     bismuth subsalicylate (PEPTO BISMOL) 262 MG/15ML suspension     carBAMazepine (TEGRETOL) 200 MG tablet     cholecalciferol (VITAMIN D3) 25 mcg (1000 units) capsule     Cranberry 1000 MG CAPS     cyanocobalamin (VITAMIN B-12) 100 MCG tablet     denosumab (PROLIA) 60 MG/ML SOLN injection     Dextromethorphan-guaiFENesin (MUCINEX DM MAXIMUM STRENGTH PO)     DULoxetine (CYMBALTA) 60 MG capsule     famotidine  (PEPCID) 40 MG tablet     fluticasone (FLONASE) 50 MCG/ACT nasal spray     ketoconazole (NIZORAL) 2 % cream     ketorolac (ACULAR) 0.5 % ophthalmic solution     lactobacillus rhamnosus, GG, (CULTURELL) capsule     loperamide (IMODIUM A-D) 2 MG tablet     losartan (COZAAR) 50 MG tablet     losartan (COZAAR) 50 MG tablet     Melatonin 10 MG TABS     mirtazapine (REMERON) 30 MG tablet     moxifloxacin (VIGAMOX) 0.5 % ophthalmic solution     Multiple Vitamin (MULTIVITAMINS PO)     prednisoLONE acetate (PRED FORTE) 1 % ophthalmic suspension     psyllium (METAMUCIL/KONSYL) capsule     traZODone (DESYREL) 100 MG tablet     triamcinolone (ARISTOCORT HP) 0.5 % external cream     trospium (SANCTURA) 20 MG tablet     UNABLE TO FIND     verapamil ER (CALAN-SR) 120 MG CR tablet     vitamin B-Complex     No current facility-administered medications for this visit.     Medication: On Duloxetine and Mirtazpine and Trazodone and Ritalin. She discontinued Effexor and Abilify previously per Dr. Marquise Coleman, her psychiatrist.  On 4/10/19 she informed me that she started Abilify        9/15/2019    10:10 AM 2022     3:40 PM 11/3/2022     6:06 PM   PHQ-9 SCORE   PHQ-9 Total Score MyChart 5 (Mild depression) 6 (Mild depression) 5 (Mild depression)   PHQ-9 Total Score 5 6 5         2022     4:16 PM 11/3/2022     6:05 PM 2022     6:30 PM   NAS-7 SCORE   Total Score 3 (minimal anxiety) 3 (minimal anxiety) 4 (minimal anxiety)   Total Score 3 3 4     SOCIAL HISTORY (at intake)  Ms. Padilla grew up in the [place] area and is the oldest of 5 children in her family of origin.  Her father was a dentist who she believes was bipolar.  He  of lung cancer at age 72.  Her mother  of sepsis at age 80.  She had been diagnosed with breast cancer when Ms. Padilla was 8.  She describes the marriage between her parents as misael.  She is 5 years older than her closest-aged sister and they are all within 4 years of each other.   Her  daughter  12/3 and her mother .  She tends to feel more depressed in winter.      Ms. Padilla attended [employer] for a year and later went to night school at the [employer].  She felt that she could not continue her education to the point of graduation because she was working full time and raising her daughter.  Her daughter  in  at age 31 of liver failure secondary to an accidental Tylenol overdose.  She had been recovering from a hysterectomy.      Ms. 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  She had to retire at the time secondary to her MS.  She has never .  She has not dated,and states that if she were she would date, she would be interested in a relationship with a woman.  There is no history of  service or legal problems.  She expresses concern about her increasing social isolation.  She has at least 1 friend, Jael, who she met at a therapy group in .  She is active in facilitating groups for people with MS both in [place] and [place].  She lives alone and currently gets help from a home health aide, as well as home health nurse.     She had  seen Dr. Ban Coleman, psychiatrist   Dr. Coleman prescribed Cymbalta for it.  She feels she has been more depressed since the Fall, but doesn't think it is SAD.   She stated that she has recommended case management.     SESSION:  Mili participated in a telehealth session of psychotherapy. The depression varies and has worsened given her health issues/.  Not as bad today. She changed today's appointment to virtual as she had cataract surgery yesterday..     She is doing her exercises partially.  Sent exercise information.  She has been doing only one of the two  sets of arm and leg exercises she has been encourage to do daily.  Discussed doing more, and she is interested in  exploring an MS aquatics program    Recent stressors:     Shalini Pritchard (37) was diagnosed  with MS, and now has a prescription.  She is , has two daughters, 6 and 2,  and lives in [place].  She can't work.   Her mother is Mili Lynne's sister. No relapse.  Not much communication.     Still grieving her friend Jael, with whom she used to speak daily at 4.    Weight Issues: Weighed 281 at [facility] [facility] before she left.  Has not weighed self recently. She is trying to follow a 1750 calorie/day diet not most days. Doing a good job of tracking.  Discussed increasing her success rate and how it might increase er chance of her to remain independent living.  Discussed Radames Fuentes, said before meals. eat until you are eight parts out of ten full.  Eating mindfully and slowly in this way means that [place] take the time to think about what and how they're consuming their food.    Since May, only 3 days at 1800 calories. The rest were above including  3 binge days of 3000. Sent her weight loss resources.    She continues to lead two MS groups.   Every other Saturday.   One for leaders is quarterly (in person x2/year).     She participated fully and appeared to derive benefit. Affect is positive.  Rapport was excellent.   A treatment plan was completed.  This telehealth (telephone) service is appropriate and effective for delivering services in light of the necessity for social distancing to mitigate the COVID-19 epidemic and for conservation of PPE.     Patient has agreed to receiving telehealth services after being informed about it: Yes    Patient prefers video invitation/information to be sent by:   email    Time service started: 12:04  Time service ended:  12:57    Mode of transmission: [employer]    Location of originating:  Home of patient    Distance site:  Home office of provider for [facility]    The patient has been notified that:  Video visits will be conducted via a call with their psychologist to provide  the care they need with a video conversation. Video visits may be billed at different rates depending on insurance coverage.  Patients are advised to please contact their insurance provider with any questions about their health insurance coverage. 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   Behavioral factors affecting morbid obesity (E66.01).  Bereavement     PLAN:  Ms. Padilla will return for in person visit  8/18 @ 1 for eclectic, supportive therapy consistent with treatment plan.  Goal per day now that she is tracking is 1750 consistently.    Preference for future meetings:             In-person   prefers, even if masked              Remote              Either  Last treatment plan signed: 6/30/23  Last Treatment plan review: 6/30/23  Treatment plan verbal Review due: 9/30/23  Treatment Review due: 6/30/24     Ramin Olivo, PhD, A.B.P.P., L.P.   Director, Health Psychology

## 2023-07-05 ENCOUNTER — OFFICE VISIT (OUTPATIENT)
Dept: OPHTHALMOLOGY | Facility: CLINIC | Age: 68
End: 2023-07-05
Attending: OPHTHALMOLOGY
Payer: COMMERCIAL

## 2023-07-05 DIAGNOSIS — Z96.1 PSEUDOPHAKIA, RIGHT EYE: Primary | ICD-10-CM

## 2023-07-05 PROCEDURE — 99024 POSTOP FOLLOW-UP VISIT: CPT | Performed by: OPHTHALMOLOGY

## 2023-07-05 PROCEDURE — G0463 HOSPITAL OUTPT CLINIC VISIT: HCPCS | Performed by: OPHTHALMOLOGY

## 2023-07-05 ASSESSMENT — TONOMETRY
OD_IOP_MMHG: 17
OS_IOP_MMHG: 19
IOP_METHOD: TONOPEN

## 2023-07-05 ASSESSMENT — VISUAL ACUITY
OD_SC: 20/300
CORRECTION_TYPE: GLASSES
METHOD: SNELLEN - LINEAR
OS_CC+: -3
METHOD_MR: DX PURPOSES
OS_CC: 20/25

## 2023-07-05 ASSESSMENT — REFRACTION_MANIFEST
OD_CYLINDER: +0.50
OD_AXIS: 060
OD_ADD: +2.50
OD_SPHERE: -3.00

## 2023-07-05 ASSESSMENT — REFRACTION_WEARINGRX
OD_AXIS: 095
OD_ADD: +1.75
OS_SPHERE: -4.75
OD_SPHERE: -6.25
SPECS_TYPE: PAL
OS_CYLINDER: +0.50
OS_ADD: +1.75
OD_CYLINDER: +1.75
OS_AXIS: 004

## 2023-07-05 ASSESSMENT — EXTERNAL EXAM - LEFT EYE: OS_EXAM: NORMAL

## 2023-07-05 ASSESSMENT — SLIT LAMP EXAM - LIDS
COMMENTS: NORMAL
COMMENTS: NORMAL

## 2023-07-05 ASSESSMENT — EXTERNAL EXAM - RIGHT EYE: OD_EXAM: NORMAL

## 2023-07-05 NOTE — PROGRESS NOTES
HPI     Post Op (Ophthalmology) Right Eye    In right eye.  Associated symptoms include burning (when instilling ketorolac).  Negative for eye pain, flashes and floaters.  Treatments tried include eye drops.           Comments    Here for 1 week post op right eye. The eye is doing well. Some burning sensation when using ketorolac. No flashes or floaters. No eye pain.    Nicholas Aguillon COT 7:47 AM July 5, 2023             Last edited by Nicholas Aguillon on 7/5/2023  7:47 AM.         Review of systems for the eyes was negative other than the pertinent positives/negatives listed in the HPI.      Assessment & Plan    HPI:  Mili Padilla is a 68 year old female with history of optic neuritis, MS, fibromyalgia, obesity, calculus of kidney, MDD, HLD, HTN, myopia with astigmatism and presbyopia presents from Dr. Pak for Postoperative week 1 right eye      Initially optic neuritis 1986 and subsequently diagnosed with MS.     POHx: optic neuritis, myopia with astigmatism and presbyopia  PMHx: optic neuritis, MS, fibromyalgia, obesity, calculus of kidney, MDD, HLD, HTN  Current Medications: acetaminophen (TYLENOL) 500 MG tablet, Take 1,000 mg by mouth 3 times daily as needed  ARIPiprazole (ABILIFY) 5 MG tablet, Take 7.5 mg by mouth daily  atorvastatin (LIPITOR) 20 MG tablet, Take 20 mg by mouth daily.  bismuth subsalicylate (PEPTO BISMOL) 262 MG/15ML suspension, Take 15 mLs by mouth every 6 hours as needed  carBAMazepine (TEGRETOL) 200 MG tablet, Take 100 mg by mouth 2 times daily  cholecalciferol (VITAMIN D3) 25 mcg (1000 units) capsule, Take 1 capsule by mouth daily Every 2 days takes up to a total of 2500 per week  Cranberry 1000 MG CAPS, Take by mouth daily  cyanocobalamin (VITAMIN B-12) 100 MCG tablet, Take 1,000 mcg by mouth once a week  denosumab (PROLIA) 60 MG/ML SOLN injection, Inject 60 mg Subcutaneous every 6 months On hold in [facility]  Dextromethorphan-guaiFENesin (MUCINEX DM MAXIMUM STRENGTH PO), Take by mouth At  Bedtime  DULoxetine (CYMBALTA) 60 MG capsule, Take 120 mg by mouth daily   famotidine (PEPCID) 40 MG tablet, Take 1 tablet (40 mg) by mouth daily  fluticasone (FLONASE) 50 MCG/ACT nasal spray, Spray 2 sprays into both nostrils daily  ketoconazole (NIZORAL) 2 % cream, Apply topically 2 times daily as needed   ketorolac (ACULAR) 0.5 % ophthalmic solution, Place 1 drop into the right eye 4 times daily Start 2 days prior to surgery four times a day, after surgery: Four times a day x 1 week, three times a day x 1 week, twice a day x 1 week, daily x 1 week then stop  lactobacillus rhamnosus, GG, (CULTURELL) capsule, Take 1 capsule by mouth 2 times daily  loperamide (IMODIUM A-D) 2 MG tablet, Take 1 tablet (2 mg) by mouth every 6 hours as needed for diarrhea  losartan (COZAAR) 50 MG tablet, Take 50 mg by mouth 2 times daily  losartan (COZAAR) 50 MG tablet, Take 1 tablet (50 mg) by mouth daily  Melatonin 10 MG TABS, Take 10 mg by mouth At Bedtime Changed to extended release  mirtazapine (REMERON) 30 MG tablet, Take 75 mg by mouth At Bedtime   moxifloxacin (VIGAMOX) 0.5 % ophthalmic solution, Place 1 drop into the right eye 4 times daily Start 2 days prior to surgery four times a day, after surgery: Four times a day x 1 week  Multiple Vitamin (MULTIVITAMINS PO), Take 2 tablets by mouth every evening. WITH IRON  prednisoLONE acetate (PRED FORTE) 1 % ophthalmic suspension, Place 1-2 drops into the right eye 4 times daily after surgery: Four times a day x 1 week, three times a day x 1 week, twice a day x 1 week, daily x 1 week then stop  psyllium (METAMUCIL/KONSYL) capsule, Take 2 capsules by mouth daily  traZODone (DESYREL) 100 MG tablet, Take 600 mg by mouth At Bedtime May split dose, 300mg @ HS and repeat x1 if needed   triamcinolone (ARISTOCORT HP) 0.5 % external cream, Apply topically At Bedtime To hands  trospium (SANCTURA) 20 MG tablet, Take 20 mg by mouth 2 times daily (before meals)  UNABLE TO FIND, MEDICATION NAME:  Nuun 1 tablet daily  verapamil ER (CALAN-SR) 120 MG CR tablet, Take 120 mg by mouth 2 times daily  vitamin B-Complex, Take 1 tablet by mouth daily     No current facility-administered medications on file prior to visit.    FHx: refractive error   PSHx: Cataract extraction/iol Mian right eye 6/29/23       Current Eye Medications:  Moxifloxacin/pred forte/ketorolac four times a day  Right eye     Assessment & Plan:  Pseudophakia, right eye  Pappas right eye  06/29/23   Doing well      (H25.013) Cortical age-related cataract of both eyes  (H25.813) Combined forms of age-related cataract of both eyes  Special equipment/needs:  Eye: left  Anesthesia:topical   Dilates to: 7mm  Iris expansion:  No  Pseudoexfoliation: No  Trypan Blue: No  Trauma: No    Able lay to flat: Yes  Blood Thinner: No   Tamsulosin: No  DM: No  Guttae: No    Dominant Eye: right    Plan: -2.50 both eyes  Proceed with CE/IOL  left eye .      (H35.341) Lamellar macular hole of right eye  Noted on OCT mac today and from 5/17/16 with minimal change  May affect final BEST CORRECTED VISUAL ACUITY  Repeat oct mac q6 months    (H52.13,  H52.203,  H52.4) Myopia of both eyes with astigmatism and presbyopia  Hold pending Cataract extraction/IOL    Multiple Sclerosis  Patient has a motorized scooter, is able to transfer  Sees Dr. Pak  Diagnosed 1986 with optic neuritis    Return for as scheduled.        Flaco Pappas MD     Attending Physician Attestation:  Complete documentation of historical and exam elements from today's encounter can be found in the full encounter summary report (not reduplicated in this progress note).  I personally obtained the chief complaint(s) and history of present illness.  I confirmed and edited as necessary the review of systems, past medical/surgical history, family history, social history, and examination findings as documented by others; and I examined the patient myself.  I personally reviewed the relevant tests,  images, and reports as documented above.  I formulated and edited as necessary the assessment and plan and discussed the findings and management plan with the patient and family. - Flaco Pappas MD

## 2023-07-14 ENCOUNTER — TRANSFERRED RECORDS (OUTPATIENT)
Dept: MULTI SPECIALTY CLINIC | Facility: CLINIC | Age: 68
End: 2023-07-14

## 2023-07-14 ENCOUNTER — TRANSFERRED RECORDS (OUTPATIENT)
Dept: HEALTH INFORMATION MANAGEMENT | Facility: CLINIC | Age: 68
End: 2023-07-14
Payer: COMMERCIAL

## 2023-07-14 LAB
CREATININE (EXTERNAL): 0.64 MG/DL (ref 0.5–1.05)
GFR ESTIMATED (EXTERNAL): 96 ML/MIN/1.73M2
GLUCOSE (EXTERNAL): 90 MG/DL (ref 65–99)
POTASSIUM (EXTERNAL): 4.3 MMOL/L (ref 3.5–5.3)

## 2023-07-19 ENCOUNTER — ANESTHESIA EVENT (OUTPATIENT)
Dept: SURGERY | Facility: AMBULATORY SURGERY CENTER | Age: 68
End: 2023-07-19
Payer: COMMERCIAL

## 2023-07-20 ENCOUNTER — ANESTHESIA (OUTPATIENT)
Dept: SURGERY | Facility: AMBULATORY SURGERY CENTER | Age: 68
End: 2023-07-20
Payer: COMMERCIAL

## 2023-07-20 ENCOUNTER — OFFICE VISIT (OUTPATIENT)
Dept: OPHTHALMOLOGY | Facility: CLINIC | Age: 68
End: 2023-07-20

## 2023-07-20 ENCOUNTER — HOSPITAL ENCOUNTER (OUTPATIENT)
Facility: AMBULATORY SURGERY CENTER | Age: 68
Discharge: HOME | End: 2023-07-20
Attending: OPHTHALMOLOGY
Payer: COMMERCIAL

## 2023-07-20 VITALS
TEMPERATURE: 97.2 F | HEIGHT: 66 IN | WEIGHT: 283 LBS | BODY MASS INDEX: 45.48 KG/M2 | HEART RATE: 115 BPM | RESPIRATION RATE: 20 BRPM | OXYGEN SATURATION: 98 % | SYSTOLIC BLOOD PRESSURE: 134 MMHG | DIASTOLIC BLOOD PRESSURE: 62 MMHG

## 2023-07-20 DIAGNOSIS — Z98.890 POSTOPERATIVE EYE STATE: Primary | ICD-10-CM

## 2023-07-20 DIAGNOSIS — Z96.1 PSEUDOPHAKIA, LEFT EYE: ICD-10-CM

## 2023-07-20 DIAGNOSIS — H25.811 COMBINED FORM OF AGE-RELATED CATARACT, RIGHT EYE: ICD-10-CM

## 2023-07-20 DIAGNOSIS — H25.812 COMBINED FORM OF AGE-RELATED CATARACT, LEFT EYE: Primary | ICD-10-CM

## 2023-07-20 PROCEDURE — 66984 XCAPSL CTRC RMVL W/O ECP: CPT | Mod: 79 | Performed by: OPHTHALMOLOGY

## 2023-07-20 PROCEDURE — 66984 XCAPSL CTRC RMVL W/O ECP: CPT | Mod: LT

## 2023-07-20 PROCEDURE — 99024 POSTOP FOLLOW-UP VISIT: CPT | Mod: GC | Performed by: OPHTHALMOLOGY

## 2023-07-20 DEVICE — LENS CC60WF 15.5 CLAREON UV ASPHERIC BICONVEX IOL: Type: IMPLANTABLE DEVICE | Site: EYE | Status: FUNCTIONAL

## 2023-07-20 RX ORDER — HALOPERIDOL 5 MG/ML
1 INJECTION INTRAMUSCULAR
Status: DISCONTINUED | OUTPATIENT
Start: 2023-07-20 | End: 2023-07-20 | Stop reason: HOSPADM

## 2023-07-20 RX ORDER — KETOROLAC TROMETHAMINE 30 MG/ML
15 INJECTION, SOLUTION INTRAMUSCULAR; INTRAVENOUS
Status: DISCONTINUED | OUTPATIENT
Start: 2023-07-20 | End: 2023-07-20 | Stop reason: HOSPADM

## 2023-07-20 RX ORDER — ONDANSETRON 4 MG/1
4 TABLET, ORALLY DISINTEGRATING ORAL EVERY 30 MIN PRN
Status: DISCONTINUED | OUTPATIENT
Start: 2023-07-20 | End: 2023-07-20 | Stop reason: HOSPADM

## 2023-07-20 RX ORDER — SODIUM CHLORIDE, SODIUM LACTATE, POTASSIUM CHLORIDE, CALCIUM CHLORIDE 600; 310; 30; 20 MG/100ML; MG/100ML; MG/100ML; MG/100ML
INJECTION, SOLUTION INTRAVENOUS CONTINUOUS
Status: DISCONTINUED | OUTPATIENT
Start: 2023-07-20 | End: 2023-07-20 | Stop reason: HOSPADM

## 2023-07-20 RX ORDER — PREDNISOLONE ACETATE 10 MG/ML
1-2 SUSPENSION/ DROPS OPHTHALMIC 4 TIMES DAILY
Qty: 10 ML | Refills: 0 | Status: SHIPPED | OUTPATIENT
Start: 2023-07-20 | End: 2023-08-30

## 2023-07-20 RX ORDER — FENTANYL CITRATE 50 UG/ML
25 INJECTION, SOLUTION INTRAMUSCULAR; INTRAVENOUS EVERY 5 MIN PRN
Status: DISCONTINUED | OUTPATIENT
Start: 2023-07-20 | End: 2023-07-20 | Stop reason: HOSPADM

## 2023-07-20 RX ORDER — HYDROMORPHONE HYDROCHLORIDE 1 MG/ML
0.2 INJECTION, SOLUTION INTRAMUSCULAR; INTRAVENOUS; SUBCUTANEOUS EVERY 5 MIN PRN
Status: DISCONTINUED | OUTPATIENT
Start: 2023-07-20 | End: 2023-07-20 | Stop reason: HOSPADM

## 2023-07-20 RX ORDER — CYCLOPENTOLAT/TROPIC/PHENYLEPH 1%-1%-2.5%
1 DROPS (EA) OPHTHALMIC (EYE)
Status: COMPLETED | OUTPATIENT
Start: 2023-07-20 | End: 2023-07-20

## 2023-07-20 RX ORDER — KETOROLAC TROMETHAMINE 5 MG/ML
1 SOLUTION OPHTHALMIC 4 TIMES DAILY
Qty: 10 ML | Refills: 0 | Status: SHIPPED | OUTPATIENT
Start: 2023-07-20 | End: 2023-08-30

## 2023-07-20 RX ORDER — BALANCED SALT SOLUTION 6.4; .75; .48; .3; 3.9; 1.7 MG/ML; MG/ML; MG/ML; MG/ML; MG/ML; MG/ML
SOLUTION OPHTHALMIC PRN
Status: DISCONTINUED | OUTPATIENT
Start: 2023-07-20 | End: 2023-07-20 | Stop reason: HOSPADM

## 2023-07-20 RX ORDER — HYDROMORPHONE HYDROCHLORIDE 1 MG/ML
0.4 INJECTION, SOLUTION INTRAMUSCULAR; INTRAVENOUS; SUBCUTANEOUS EVERY 5 MIN PRN
Status: DISCONTINUED | OUTPATIENT
Start: 2023-07-20 | End: 2023-07-20 | Stop reason: HOSPADM

## 2023-07-20 RX ORDER — SODIUM CHLORIDE, SODIUM LACTATE, POTASSIUM CHLORIDE, CALCIUM CHLORIDE 600; 310; 30; 20 MG/100ML; MG/100ML; MG/100ML; MG/100ML
INJECTION, SOLUTION INTRAVENOUS CONTINUOUS
Status: DISCONTINUED | OUTPATIENT
Start: 2023-07-20 | End: 2023-07-21 | Stop reason: HOSPADM

## 2023-07-20 RX ORDER — HYDROXYZINE HYDROCHLORIDE 10 MG/1
10 TABLET, FILM COATED ORAL EVERY 6 HOURS PRN
Status: DISCONTINUED | OUTPATIENT
Start: 2023-07-20 | End: 2023-07-20 | Stop reason: HOSPADM

## 2023-07-20 RX ORDER — FENTANYL CITRATE 50 UG/ML
25 INJECTION, SOLUTION INTRAMUSCULAR; INTRAVENOUS
Status: DISCONTINUED | OUTPATIENT
Start: 2023-07-20 | End: 2023-07-21 | Stop reason: HOSPADM

## 2023-07-20 RX ORDER — DEXAMETHASONE SODIUM PHOSPHATE 10 MG/ML
4 INJECTION, SOLUTION INTRAMUSCULAR; INTRAVENOUS
Status: DISCONTINUED | OUTPATIENT
Start: 2023-07-20 | End: 2023-07-20 | Stop reason: HOSPADM

## 2023-07-20 RX ORDER — PROPARACAINE HYDROCHLORIDE 5 MG/ML
1 SOLUTION/ DROPS OPHTHALMIC ONCE
Status: COMPLETED | OUTPATIENT
Start: 2023-07-20 | End: 2023-07-20

## 2023-07-20 RX ORDER — PROPARACAINE HYDROCHLORIDE 5 MG/ML
1 SOLUTION/ DROPS OPHTHALMIC
Status: DISCONTINUED | OUTPATIENT
Start: 2023-07-20 | End: 2023-07-20 | Stop reason: HOSPADM

## 2023-07-20 RX ORDER — FENTANYL CITRATE 50 UG/ML
50 INJECTION, SOLUTION INTRAMUSCULAR; INTRAVENOUS EVERY 5 MIN PRN
Status: DISCONTINUED | OUTPATIENT
Start: 2023-07-20 | End: 2023-07-20 | Stop reason: HOSPADM

## 2023-07-20 RX ORDER — ONDANSETRON 2 MG/ML
4 INJECTION INTRAMUSCULAR; INTRAVENOUS EVERY 30 MIN PRN
Status: DISCONTINUED | OUTPATIENT
Start: 2023-07-20 | End: 2023-07-20 | Stop reason: HOSPADM

## 2023-07-20 RX ORDER — MOXIFLOXACIN 5 MG/ML
1 SOLUTION/ DROPS OPHTHALMIC
Status: COMPLETED | OUTPATIENT
Start: 2023-07-20 | End: 2023-07-20

## 2023-07-20 RX ORDER — SULFAMETHOXAZOLE AND TRIMETHOPRIM 400; 80 MG/1; MG/1
1 TABLET ORAL 2 TIMES DAILY
COMMUNITY
End: 2023-09-12

## 2023-07-20 RX ORDER — ONDANSETRON 4 MG/1
4 TABLET, ORALLY DISINTEGRATING ORAL EVERY 30 MIN PRN
Status: DISCONTINUED | OUTPATIENT
Start: 2023-07-20 | End: 2023-07-21 | Stop reason: HOSPADM

## 2023-07-20 RX ORDER — MEPERIDINE HYDROCHLORIDE 25 MG/ML
12.5 INJECTION INTRAMUSCULAR; INTRAVENOUS; SUBCUTANEOUS EVERY 5 MIN PRN
Status: DISCONTINUED | OUTPATIENT
Start: 2023-07-20 | End: 2023-07-20 | Stop reason: HOSPADM

## 2023-07-20 RX ORDER — ALBUTEROL SULFATE 0.83 MG/ML
2.5 SOLUTION RESPIRATORY (INHALATION) EVERY 4 HOURS PRN
Status: DISCONTINUED | OUTPATIENT
Start: 2023-07-20 | End: 2023-07-20 | Stop reason: HOSPADM

## 2023-07-20 RX ORDER — ACETAMINOPHEN 325 MG/1
975 TABLET ORAL ONCE
Status: DISCONTINUED | OUTPATIENT
Start: 2023-07-20 | End: 2023-07-20 | Stop reason: HOSPADM

## 2023-07-20 RX ORDER — LIDOCAINE 40 MG/G
CREAM TOPICAL
Status: DISCONTINUED | OUTPATIENT
Start: 2023-07-20 | End: 2023-07-20 | Stop reason: HOSPADM

## 2023-07-20 RX ORDER — TETRACAINE HYDROCHLORIDE 5 MG/ML
SOLUTION OPHTHALMIC PRN
Status: DISCONTINUED | OUTPATIENT
Start: 2023-07-20 | End: 2023-07-20 | Stop reason: HOSPADM

## 2023-07-20 RX ORDER — DICLOFENAC SODIUM 1 MG/ML
1 SOLUTION/ DROPS OPHTHALMIC
Status: COMPLETED | OUTPATIENT
Start: 2023-07-20 | End: 2023-07-20

## 2023-07-20 RX ORDER — ONDANSETRON 2 MG/ML
4 INJECTION INTRAMUSCULAR; INTRAVENOUS EVERY 30 MIN PRN
Status: DISCONTINUED | OUTPATIENT
Start: 2023-07-20 | End: 2023-07-21 | Stop reason: HOSPADM

## 2023-07-20 RX ORDER — OXYCODONE HYDROCHLORIDE 5 MG/1
10 TABLET ORAL
Status: DISCONTINUED | OUTPATIENT
Start: 2023-07-20 | End: 2023-07-21 | Stop reason: HOSPADM

## 2023-07-20 RX ORDER — MOXIFLOXACIN IN NACL,ISO-OS/PF 0.3MG/0.3
SYRINGE (ML) INTRAOCULAR PRN
Status: DISCONTINUED | OUTPATIENT
Start: 2023-07-20 | End: 2023-07-20 | Stop reason: HOSPADM

## 2023-07-20 RX ORDER — LIDOCAINE HYDROCHLORIDE 10 MG/ML
INJECTION, SOLUTION EPIDURAL; INFILTRATION; INTRACAUDAL; PERINEURAL PRN
Status: DISCONTINUED | OUTPATIENT
Start: 2023-07-20 | End: 2023-07-20 | Stop reason: HOSPADM

## 2023-07-20 RX ORDER — DIAZEPAM 10 MG/2ML
2.5 INJECTION, SOLUTION INTRAMUSCULAR; INTRAVENOUS
Status: DISCONTINUED | OUTPATIENT
Start: 2023-07-20 | End: 2023-07-20 | Stop reason: HOSPADM

## 2023-07-20 RX ORDER — OXYCODONE HYDROCHLORIDE 5 MG/1
5 TABLET ORAL
Status: DISCONTINUED | OUTPATIENT
Start: 2023-07-20 | End: 2023-07-21 | Stop reason: HOSPADM

## 2023-07-20 RX ORDER — LABETALOL HYDROCHLORIDE 5 MG/ML
10 INJECTION, SOLUTION INTRAVENOUS
Status: DISCONTINUED | OUTPATIENT
Start: 2023-07-20 | End: 2023-07-20 | Stop reason: HOSPADM

## 2023-07-20 RX ORDER — MOXIFLOXACIN 5 MG/ML
1 SOLUTION/ DROPS OPHTHALMIC 4 TIMES DAILY
Qty: 3 ML | Refills: 0 | Status: SHIPPED | OUTPATIENT
Start: 2023-07-20 | End: 2023-08-30

## 2023-07-20 RX ADMIN — DICLOFENAC SODIUM 1 DROP: 1 SOLUTION/ DROPS OPHTHALMIC at 11:12

## 2023-07-20 RX ADMIN — Medication 1 DROP: at 11:08

## 2023-07-20 RX ADMIN — Medication 1 DROP: at 11:13

## 2023-07-20 RX ADMIN — MOXIFLOXACIN 1 DROP: 5 SOLUTION/ DROPS OPHTHALMIC at 11:16

## 2023-07-20 RX ADMIN — MOXIFLOXACIN 1 DROP: 5 SOLUTION/ DROPS OPHTHALMIC at 11:11

## 2023-07-20 RX ADMIN — SODIUM CHLORIDE, SODIUM LACTATE, POTASSIUM CHLORIDE, CALCIUM CHLORIDE: 600; 310; 30; 20 INJECTION, SOLUTION INTRAVENOUS at 11:08

## 2023-07-20 RX ADMIN — MOXIFLOXACIN 1 DROP: 5 SOLUTION/ DROPS OPHTHALMIC at 11:06

## 2023-07-20 RX ADMIN — Medication 1 DROP: at 11:18

## 2023-07-20 RX ADMIN — DICLOFENAC SODIUM 1 DROP: 1 SOLUTION/ DROPS OPHTHALMIC at 11:17

## 2023-07-20 RX ADMIN — DICLOFENAC SODIUM 1 DROP: 1 SOLUTION/ DROPS OPHTHALMIC at 11:07

## 2023-07-20 RX ADMIN — PROPARACAINE HYDROCHLORIDE 1 DROP: 5 SOLUTION/ DROPS OPHTHALMIC at 11:02

## 2023-07-20 ASSESSMENT — VISUAL ACUITY
METHOD: SNELLEN - LINEAR
OS_SC: 20/100

## 2023-07-20 ASSESSMENT — EXTERNAL EXAM - LEFT EYE: OS_EXAM: NORMAL

## 2023-07-20 ASSESSMENT — TONOMETRY
IOP_METHOD: TONOPEN
OS_IOP_MMHG: 21

## 2023-07-20 ASSESSMENT — SLIT LAMP EXAM - LIDS: COMMENTS: NORMAL

## 2023-07-20 NOTE — OP NOTE
PREOPERATIVE DIAGNOSIS:   1. Combined form of age-related cataract, left eye         Left eye   POSTOPERATIVE DIAGNOSIS: Same   PROCEDURES:   1. Cataract extraction with intraocular lens implant Left eye.  SURGEON: Flaco Pappas M.D.  ASSISTANT: Eligio Buitrago MD  INDICATIONS: The patient Mili Padilla presented to the eye clinic with decreased vision secondary to cataract in the Left eye. The risks, benefits and alternatives to cataract extraction were discussed. The patient elected to proceed. All questions were answered to the patient's satisfaction.   DESCRIPTION OF PROCEDURE:   Prior to the procedure, appropriate cardiac and respiratory monitors were applied to the patient.  In the pre-operative holding area, a drop of topical tetracaine was placed in the operative eye.  The patient was brought to the operating room.  With adequate anesthesia, the Left eye was prepped and draped in the usual sterile fashion. A lid speculum was placed, and the operating microscope was rotated into position. A surgical pause was carried out to identify with all members of the surgical team the correct surgical site. A paracentesis was created.  Through this limbal paracentesis, the anterior chamber was filled with preservative-free lidocaine followed by viscoelastic.  A temporal wound was created at the limbus using a 2.6 mm blade. A capsulorrhexis was initiated using a bent 25-gauge needle and was completed in continuous and circular fashion using the capsulorrhexis forceps. The lens nucleus was hydrodissected using balanced salt solution.  The lens nucleus was rotated and removed using phacoemulsification in a divide and conquer technique.  Residual cortical material was removed using irrigation-aspiration.  The capsular bag was reinflated to its maximal extent with cohesive viscoelastic.  A 15.5 diopter [id number] was inserted into the capsular bag.  The lens power selected was reviewed using the intraocular lens power  measurements that were obtained preoperatively to confirm that the correct lens was selected for the desired post-operative refractive state. The residual viscoelastic was removed in its entirety, the wound were hydrated and found to be self-sealing.  Intracameral moxifloxacin was administered. Tactile pressure was confirmed to be in a normal range.  The lid speculum was removed and a shield was applied.  The patient tolerated the procedure well, and there were no complications.    PLAN: The patient will be discharged to home and will follow up today  EBL:  None  Complications:  None  Implant Name Type Inv. Item Serial No.  Lot No. LRB No. Used Action   LENS [id number] [employer] UV ASPHERIC BICONVEX IOL - [id number] Lens/Eye Implant LENS [id number] [employer] UV ASPHERIC BICONVEX IOL [id number] [employer]  Left 1 Implanted

## 2023-07-20 NOTE — ANESTHESIA CARE TRANSFER NOTE
Patient: Mili Padilla    Procedure: Procedure(s):  LEFT EYE PHACOEMULSIFICATION, CATARACT, WITH INTRAOCULAR LENS IMPLANT       Diagnosis: Combined forms of age-related cataract of both eyes [H25.813]  Diagnosis Additional Information: No value filed.    Anesthesia Type:   MAC     Note:    Oropharynx: oropharynx clear of all foreign objects and spontaneously breathing  Level of Consciousness: awake  Oxygen Supplementation: room air    Independent Airway: airway patency satisfactory and stable  Dentition: dentition unchanged  Vital Signs Stable: post-procedure vital signs reviewed and stable  Report to RN Given: handoff report given  Patient transferred to: Phase II    Handoff Report: Identifed the Patient, Identified the Reponsible Provider, Reviewed the pertinent medical history, Discussed the surgical course, Reviewed Intra-OP anesthesia mangement and issues during anesthesia, Set expectations for post-procedure period and Allowed opportunity for questions and acknowledgement of understanding      Vitals:  Vitals Value Taken Time   /78 07/20/23 1337   Temp 36.2  C (97.2  F) 07/20/23 1337   Pulse 110    Resp 16 07/20/23 1337   SpO2 99 % 07/20/23 1337       Electronically Signed By: JUAN ALVAREZ  July 20, 2023  1:45 PM

## 2023-07-20 NOTE — ANESTHESIA PREPROCEDURE EVALUATION
Anesthesia Pre-Procedure Evaluation    Patient: Mili Padilla   MRN: [id number] : 1955        Procedure : Procedure(s):  LEFT EYE PHACOEMULSIFICATION, CATARACT, WITH INTRAOCULAR LENS IMPLANT          Past Medical History:   Diagnosis Date     Depression, major, in partial remission (H)      Fibromyalgia syndrome      Gastro-oesophageal reflux disease      Hyperlipemia      Hypertension      Low serum sodium      Lymphedema      Mixed incontinence      Multiple sclerosis (H)     tremors with MS, all four limbs and head     Multiple sclerosis, secondary progressive (H)      Neurogenic bladder      Obese      Osteoporosis      Sleep apnea      Vocal cord paralysis, unilateral complete       Past Surgical History:   Procedure Laterality Date     COLONOSCOPY N/A 2017    Procedure: COMBINED COLONOSCOPY, SINGLE OR MULTIPLE BIOPSY/POLYPECTOMY BY BIOPSY;;  Surgeon: Wong Beaulieu MD;  Location:  GI     COLONOSCOPY N/A 2018    Procedure: COMBINED COLONOSCOPY, SINGLE OR MULTIPLE BIOPSY/POLYPECTOMY BY BIOPSY;  COLONOSCOPY ;  Surgeon: Yessica Santana MD;  Location:  GI     ENT SURGERY      throat 1969, vocal cord surgery with skin grafting     ESOPHAGOSCOPY, GASTROSCOPY, DUODENOSCOPY (EGD), COMBINED N/A 2014    Procedure: COMBINED ENDOSCOPIC ULTRASOUND, ESOPHAGOSCOPY, GASTROSCOPY, DUODENOSCOPY (EGD), FINE NEEDLE ASPIRATE/BIOPSY;  Surgeon: Yessica Santana MD;  Location:  GI     ESOPHAGOSCOPY, GASTROSCOPY, DUODENOSCOPY (EGD), COMBINED N/A 2014    Procedure: COMBINED ESOPHAGOSCOPY, GASTROSCOPY, DUODENOSCOPY (EGD), BIOPSY SINGLE OR MULTIPLE;  Surgeon: Yessica Santana MD;  Location:  GI     GASTRIC BYPASS Bilateral      LAPAROSCOPIC APPENDECTOMY  2013    Procedure: LAPAROSCOPIC APPENDECTOMY;;  Surgeon: Salvador Morris MD;  Location:  OR     LAPAROSCOPIC BIOPSY LIVER  2013    Procedure: LAPAROSCOPIC BIOPSY LIVER;;  Surgeon: Salvador Morris MD;   Location:  OR     LAPAROSCOPIC GASTRIC SLEEVE  2013    Procedure: LAPAROSCOPIC GASTRIC SLEEVE;  LAPAROSCOPIC SLEEVE GASTRECTOMY/ LAPARSCOPIC  APPENDECTOMY /LIVER BIOPSIES/LIVER CYST DRAINAGE;  Surgeon: Salvador Morris MD;  Location:  OR     LASER HOLMIUM LITHOTRIPSY URETER(S), INSERT STENT, COMBINED Left 2023    Procedure: CYSTOSCOPY, LEFT URETEROSCOPY, HOLMIUM LASER  LITHOTRIPSY, LEFT   STENT PLACEMENT;  Surgeon: Mahendra Coles MD;  Location:  OR     ORTHOPEDIC SURGERY      R wrist      PHACOEMULSIFICATION CLEAR CORNEA WITH STANDARD INTRAOCULAR LENS IMPLANT Right 2023    Procedure: RIGHT EYE PHACOEMULSIFICATION CATARACT WITH INTRAOCULAR LENS IMPLANT;  Surgeon: Flaco Pappas MD;  Location: [facility] [place]      Allergies   Allergen Reactions     Amoxicillin Hives     Amoxicillin-Pot Clavulanate      Sensitive,  Able to tolerate a few doses, otherwise hives on hands     Betaseron [Interferon Beta-1b]      Necrosis of skin at injection sites     Dust Mite Extract Unknown     Mold Unknown      Social History     Tobacco Use     Smoking status: Former     Types: Cigarettes     Quit date: 1974     Years since quittin.8     Smokeless tobacco: Never   Substance Use Topics     Alcohol use: Not Currently      Wt Readings from Last 1 Encounters:   23 128.4 kg (283 lb)        Anesthesia Evaluation            ROS/MED HX  ENT/Pulmonary:     (+) sleep apnea, doesn't use CPAP, vocal cord abnormalities (vocal cord paralysis following a ski accident) -  Hoarseness,     Neurologic:     (+) Multiple Sclerosis, limitations: leg weakness, balance issues.     Cardiovascular:     (+) hypertension-----    METS/Exercise Tolerance:     Hematologic:       Musculoskeletal:       GI/Hepatic:     (+) GERD,     Renal/Genitourinary:     (+) Nephrolithiasis ,     Endo:     (+) Obesity (s/p gastric bypass),     Psychiatric/Substance Use:     (+) psychiatric history depression     Infectious  Disease:       Malignancy:       Other:            Physical Exam    Airway  airway exam normal      Mallampati: II   TM distance: > 3 FB   Neck ROM: full   Mouth opening: > 3 cm    Respiratory Devices and Support         Dental       (+) Completely normal teeth      Cardiovascular   cardiovascular exam normal          Pulmonary   pulmonary exam normal                OUTSIDE LABS:  CBC:   Lab Results   Component Value Date    WBC 10.1 01/24/2023    WBC 10.4 01/23/2023    HGB 12.5 01/24/2023    HGB 11.6 (L) 01/23/2023    HCT 36.9 01/24/2023    HCT 35.0 01/23/2023     (L) 01/24/2023    PLT 94 (L) 01/23/2023     BMP:   Lab Results   Component Value Date     (L) 01/24/2023     (L) 01/23/2023    POTASSIUM 3.6 01/24/2023    POTASSIUM 3.6 01/23/2023    CHLORIDE 95 (L) 01/24/2023    CHLORIDE 96 (L) 01/23/2023    CO2 30 (H) 01/24/2023    CO2 26 01/23/2023    BUN 7.9 (L) 01/24/2023    BUN 8.8 01/23/2023    CR 0.53 01/24/2023    CR 0.49 (L) 01/23/2023     (H) 01/24/2023     (H) 01/23/2023     COAGS:   Lab Results   Component Value Date    PTT 31 12/12/2010    INR 1.07 12/27/2020     POC:   Lab Results   Component Value Date     (H) 05/31/2013     HEPATIC:   Lab Results   Component Value Date    ALBUMIN 3.1 (L) 01/20/2023    PROTTOTAL 5.3 (L) 01/20/2023    ALT 32 01/20/2023    AST 38 (H) 01/20/2023    ALKPHOS 73 01/20/2023    BILITOTAL 0.9 01/20/2023     OTHER:   Lab Results   Component Value Date    LACT 1.1 01/19/2023    A1C 5.9 02/11/2013    HEAVENLY 8.7 (L) 01/24/2023    MAG 2.3 12/29/2020    LIPASE 227 12/24/2014    TSH 2.07 12/29/2020       Anesthesia Plan    ASA Status:  3   NPO Status:  NPO Appropriate    Anesthesia Type: MAC.     - Reason for MAC: straight local not clinically adequate   Induction: Intravenous.   Maintenance: TIVA.        Consents    Anesthesia Plan(s) and associated risks, benefits, and realistic alternatives discussed. Questions answered and patient/representative(s)  expressed understanding.    - Discussed:     - Discussed with:  Patient      - Extended Intubation/Ventilatory Support Discussed: No.      - Patient is DNR/DNI Status: No    Use of blood products discussed: No .     Postoperative Care    Pain management: Multi-modal analgesia.   PONV prophylaxis: Ondansetron (or other 5HT-3)     Comments:                Yash Schaefer MD

## 2023-07-20 NOTE — DISCHARGE INSTRUCTIONS
[facility]  Home Care Following Anesthesia  For 24 hours after surgery:  Get plenty of rest.  A responsible adult must stay with you for at least 24 hours after you leave the surgery center.  Do not drive or use heavy equipment.  If you have weakness or tingling, don't drive or use heavy equipment until this feeling goes away.   Do not drink alcohol.   Avoid strenuous or risky activities.  Ask for help when climbing stairs.  You may feel lightheaded.  IF so, sit for a few minutes before standing.  Have someone help you get up.   If you have nausea (feel sick to your stomach): Drink only clear liquids such as apple juice, ginger ale, broth or 7-Up.  Rest may also help.  Be sure to drink enough fluids.  Move to a regular diet as you feel able.   You may have a slight fever.  Call the doctor if your fever is over 100 F (37.7 C) (taken under the tongue) or lasts longer than 24 hours.  You may have a dry mouth, a sore throat, muscle aches or trouble sleeping. These should go away after 24 hours.  Do not make important or legal decisions.   It is recommended to avoid smoking.               Tips for taking pain medications  To get the best pain relief possible, remember these points:  Take pain medications as directed, before pain becomes severe.  Pain medication can upset your stomach: taking it with food may help.  Constipation is a common side effect of pain medication. Drink plenty of  fluids.  Eat foods high in fiber. Take a stool softener if recommended by your doctor or pharmacist.  Do not drink alcohol, drive or operate machinery while taking pain medications.  Ask about other ways to control pain, such as with heat, ice or relaxation.    Tylenol/Acetaminophen Consumption    If you feel your pain relief is insufficient, you may take Tylenol/Acetaminophen in addition to your narcotic pain medication.   Be careful not to exceed 4,000 mg of Tylenol/Acetaminophen in a 24 hour  period from all sources.  If you are taking extra strength Tylenol/acetaminophen (500 mg), the maximum dose is 8 tablets in 24 hours.  If you are taking regular strength acetaminophen (325 mg), the maximum dose is 12 tablets in 24 hours.    Call a doctor for any of the following:  Signs of infection (fever, growing tenderness at the surgery site, a large amount of drainage or bleeding, severe pain, foul-smelling drainage, redness, swelling).  It has been over 8 to 10 hours since surgery and you are still not able to urinate (pass water).  Headache for over 24 hours.  Numbness, tingling or weakness the day after surgery (if you had spinal anesthesia).  Signs of Covid-19 infection (temperature over 100 degrees, shortness of breath, cough, loss of taste/smell, generalized body aches, persistent headache, chills, sore throat, nausea/vomiting/diarrhea)  Your doctor is:  Dr. Flaco Pappas, Ophthalmology: [phone]                    Or dial [phone] and ask for the resident on call for:  Ophthalmology  For emergency care, call the:  [facility]:  [phone] (TTY for hearing impaired: [phone])

## 2023-07-20 NOTE — ANESTHESIA POSTPROCEDURE EVALUATION
Patient: Mili Padilla    Procedure: Procedure(s):  LEFT EYE PHACOEMULSIFICATION, CATARACT, WITH INTRAOCULAR LENS IMPLANT       Anesthesia Type:  MAC    Note:  Disposition: Outpatient   Postop Pain Control: Uneventful            Sign Out: Well controlled pain   PONV: No   Neuro/Psych: Uneventful            Sign Out: Acceptable/Baseline neuro status   Airway/Respiratory: Uneventful            Sign Out: Acceptable/Baseline resp. status   CV/Hemodynamics: Uneventful            Sign Out: Acceptable CV status; No obvious hypovolemia; No obvious fluid overload   Other NRE:    DID A NON-ROUTINE EVENT OCCUR?            Last vitals:  Vitals Value Taken Time   BP     Temp     Pulse     Resp     SpO2         Electronically Signed By: Yash Schaefer MD  July 20, 2023  1:38 PM

## 2023-07-20 NOTE — PROGRESS NOTES
Chief Complaint(s) and History of Present Illness(es)    No visit information to display     Review of systems for the eyes was negative other than the pertinent positives/negatives listed in the HPI.      Assessment & Plan      Mili Padilla is a 68 year old female with the following diagnoses:   1. Postoperative eye state    2. Pseudophakia, left eye         PostOp, left eye, day 0    Doing well  Keep patch in place at night for 5 days  Start post-operative drops and taper according to instructions  Post-operative do's and don'ts reviewed, questions answered    Recheck as scheduled in one week.    Patient disposition:   Return for as scheduled.           Eligio Buitrago MD  PGY-4  Ophthalmology   [facility]    Attending Physician Attestation:  Complete documentation of historical and exam elements from today's encounter can be found in the full encounter summary report (not reduplicated in this progress note).  I personally obtained the chief complaint(s) and history of present illness.  I confirmed and edited as necessary the review of systems, past medical/surgical history, family history, social history, and examination findings as documented by others; and I examined the patient myself.  I personally reviewed the relevant tests, images, and reports as documented above.  I formulated and edited as necessary the assessment and plan and discussed the findings and management plan with the patient and family.  - Flaco Pappas MD

## 2023-07-20 NOTE — INTERVAL H&P NOTE
"I have reviewed the surgical (or preoperative) H&P that is linked to this encounter, and examined the patient. There are no significant changes    Clinical Conditions Present on Arrival:  Clinically Significant Risk Factors Present on Admission                  # Severe Obesity: Estimated body mass index is 45.68 kg/m  as calculated from the following:    Height as of this encounter: 1.676 m (5' 6\").    Weight as of this encounter: 128.4 kg (283 lb).       "

## 2023-07-26 ENCOUNTER — OFFICE VISIT (OUTPATIENT)
Dept: OPHTHALMOLOGY | Facility: CLINIC | Age: 68
End: 2023-07-26
Attending: OPHTHALMOLOGY
Payer: COMMERCIAL

## 2023-07-26 DIAGNOSIS — Z96.1 PSEUDOPHAKIA OF BOTH EYES: Primary | ICD-10-CM

## 2023-07-26 PROCEDURE — 99024 POSTOP FOLLOW-UP VISIT: CPT | Performed by: OPHTHALMOLOGY

## 2023-07-26 PROCEDURE — G0463 HOSPITAL OUTPT CLINIC VISIT: HCPCS | Performed by: OPHTHALMOLOGY

## 2023-07-26 ASSESSMENT — VISUAL ACUITY
OS_PH_SC: 20/30
OS_SC: 20/100
CORRECTION_TYPE: GLASSES
METHOD: SNELLEN - LINEAR
OS_PH_SC+: -1
OD_CC: 20/80

## 2023-07-26 ASSESSMENT — REFRACTION_WEARINGRX
OS_ADD: +1.75
OS_CYLINDER: +0.50
OS_AXIS: 004
OS_SPHERE: -4.75
OD_CYLINDER: +1.75
OD_ADD: +1.75
OD_AXIS: 095
OD_SPHERE: -6.25
SPECS_TYPE: PAL

## 2023-07-26 ASSESSMENT — TONOMETRY
OS_IOP_MMHG: 08
OD_IOP_MMHG: 10
IOP_METHOD: TONOPEN

## 2023-07-26 ASSESSMENT — SLIT LAMP EXAM - LIDS
COMMENTS: NORMAL
COMMENTS: NORMAL

## 2023-07-26 ASSESSMENT — EXTERNAL EXAM - LEFT EYE: OS_EXAM: NORMAL

## 2023-07-26 ASSESSMENT — REFRACTION_MANIFEST
OS_AXIS: 130
OS_SPHERE: -2.50
OD_CYLINDER: SPHERE
OS_CYLINDER: +0.25
OD_SPHERE: -3.00

## 2023-07-26 ASSESSMENT — EXTERNAL EXAM - RIGHT EYE: OD_EXAM: NORMAL

## 2023-07-26 NOTE — PROGRESS NOTES
HPI       Post Op (Ophthalmology) Left Eye     Additional comments: LEFT EYE PHACOEMULSIFICATION, CATARACT, WITH INTRAOCULAR LENS IMPLANT 7/20/23          Last edited by Tika Rg on 7/26/2023  7:30 AM.         Review of systems for the eyes was negative other than the pertinent positives/negatives listed in the HPI.      Assessment & Plan    HPI:  Mili Padilla is a 68 year old female with history of optic neuritis, MS, fibromyalgia, obesity, calculus of kidney, MDD, HLD, HTN, myopia with astigmatism and presbyopia presents from Dr. Pak for Postoperative week 1 left eye      Initially optic neuritis 1986 and subsequently diagnosed with MS.     POHx: optic neuritis, myopia with astigmatism and presbyopia  PMHx: optic neuritis, MS, fibromyalgia, obesity, calculus of kidney, MDD, HLD, HTN  Current Medications: acetaminophen (TYLENOL) 500 MG tablet, Take 1,000 mg by mouth 3 times daily as needed  ARIPiprazole (ABILIFY) 5 MG tablet, Take 7.5 mg by mouth daily  atorvastatin (LIPITOR) 20 MG tablet, Take 20 mg by mouth daily.  bismuth subsalicylate (PEPTO BISMOL) 262 MG/15ML suspension, Take 15 mLs by mouth every 6 hours as needed  carBAMazepine (TEGRETOL) 200 MG tablet, Take 100 mg by mouth 2 times daily  cholecalciferol (VITAMIN D3) 25 mcg (1000 units) capsule, Take 1 capsule by mouth daily Every 2 days takes up to a total of 2500 per week  Cranberry 1000 MG CAPS, Take by mouth daily  cyanocobalamin (VITAMIN B-12) 100 MCG tablet, Take 1,000 mcg by mouth once a week  denosumab (PROLIA) 60 MG/ML SOLN injection, Inject 60 mg Subcutaneous every 6 months On hold in TCU  Dextromethorphan-guaiFENesin (MUCINEX DM MAXIMUM STRENGTH PO), Take by mouth At Bedtime  DULoxetine (CYMBALTA) 60 MG capsule, Take 120 mg by mouth daily   famotidine (PEPCID) 40 MG tablet, Take 1 tablet (40 mg) by mouth daily  fluticasone (FLONASE) 50 MCG/ACT nasal spray, Spray 2 sprays into both nostrils daily  ketoconazole (NIZORAL) 2 % cream, Apply  topically 2 times daily as needed   ketorolac (ACULAR) 0.5 % ophthalmic solution, Place 1 drop Into the left eye 4 times daily Start 2 days prior to surgery four times a day, after surgery: Four times a day x 1 week, three times a day x 1 week, twice a day x 1 week, daily x 1 week then stop  ketorolac (ACULAR) 0.5 % ophthalmic solution, Place 1 drop into the right eye 4 times daily Start 2 days prior to surgery four times a day, after surgery: Four times a day x 1 week, three times a day x 1 week, twice a day x 1 week, daily x 1 week then stop  lactobacillus rhamnosus, GG, (CULTURELL) capsule, Take 1 capsule by mouth 2 times daily  loperamide (IMODIUM A-D) 2 MG tablet, Take 1 tablet (2 mg) by mouth every 6 hours as needed for diarrhea  losartan (COZAAR) 50 MG tablet, Take 1 tablet (50 mg) by mouth daily (Patient taking differently: Take 50 mg by mouth 2 times daily)  Melatonin 10 MG TABS, Take 10 mg by mouth At Bedtime Changed to extended release  mirtazapine (REMERON) 30 MG tablet, Take 75 mg by mouth At Bedtime   moxifloxacin (VIGAMOX) 0.5 % ophthalmic solution, Place 1 drop Into the left eye 4 times daily Start 2 days prior to surgery four times a day. After Surgery: four times a day  X 1 week  moxifloxacin (VIGAMOX) 0.5 % ophthalmic solution, Place 1 drop into the right eye 4 times daily Start 2 days prior to surgery four times a day, after surgery: Four times a day x 1 week  Multiple Vitamin (MULTIVITAMINS PO), Take 2 tablets by mouth every evening. WITH IRON  prednisoLONE acetate (PRED FORTE) 1 % ophthalmic suspension, Place 1-2 drops Into the left eye 4 times daily After surgery: Four times a day x 1 week, three times a day x 1 week, twice a day x 1 week, daily x 1 week then stop  prednisoLONE acetate (PRED FORTE) 1 % ophthalmic suspension, Place 1-2 drops into the right eye 4 times daily after surgery: Four times a day x 1 week, three times a day x 1 week, twice a day x 1 week, daily x 1 week then  stop  psyllium (METAMUCIL/KONSYL) capsule, Take 2 capsules by mouth daily  sulfamethoxazole-trimethoprim (BACTRIM) 400-80 MG tablet, Take 1 tablet by mouth 2 times daily  traZODone (DESYREL) 100 MG tablet, Take 600 mg by mouth At Bedtime May split dose, 300mg @ HS and repeat x1 if needed   triamcinolone (ARISTOCORT HP) 0.5 % external cream, Apply topically At Bedtime To hands  trospium (SANCTURA) 20 MG tablet, Take 20 mg by mouth 2 times daily (before meals)  UNABLE TO FIND, Take 1 tablet by mouth daily MEDICATION NAME: Nuun Electrolyte hydration  Tabs  verapamil ER (CALAN-SR) 120 MG CR tablet, Take 120 mg by mouth 2 times daily  vitamin B-Complex, Take 1 tablet by mouth daily     No current facility-administered medications on file prior to visit.    FHx: refractive error   PSHx: Cataract extraction/iol Pappas right eye 6/29/23, left eye 7/20/23       Current Eye Medications:  Moxifloxacin/pred forte/ketorolac four times a day  left eye   pred forte/ketorolac once daily a day  right eye    Assessment & Plan:  (Z96.1) Pseudophakia of both eyes  (primary encounter diagnosis)  Pappas right eye  06/29/23   Pappas left eye 7/20/23  Doing well    (H35.341) Lamellar macular hole of right eye  Noted on OCT mac today and from 5/17/16 with minimal change  May affect final BEST CORRECTED VISUAL ACUITY  Repeat oct mac next visit    (H52.13,  H52.203,  H52.4) Myopia of both eyes with astigmatism and presbyopia  Hold pending Cataract extraction/IOL    Multiple Sclerosis  Patient has a motorized scooter, is able to transfer  Sees Dr. Pak  Diagnosed 1986 with optic neuritis    Return in about 2 weeks (around 8/9/2023) for final post op-v/t/d/MRx, OCT Macula.        Flaco Pappas MD     Attending Physician Attestation:  Complete documentation of historical and exam elements from today's encounter can be found in the full encounter summary report (not reduplicated in this progress note).  I personally obtained the  chief complaint(s) and history of present illness.  I confirmed and edited as necessary the review of systems, past medical/surgical history, family history, social history, and examination findings as documented by others; and I examined the patient myself.  I personally reviewed the relevant tests, images, and reports as documented above.  I formulated and edited as necessary the assessment and plan and discussed the findings and management plan with the patient and family. - Flaco Pappas MD

## 2023-08-07 DIAGNOSIS — Z98.890 POSTOPERATIVE EYE STATE: Primary | ICD-10-CM

## 2023-08-07 DIAGNOSIS — H35.341 LAMELLAR MACULAR HOLE OF RIGHT EYE: ICD-10-CM

## 2023-08-14 NOTE — PROGRESS NOTES
Health Psychology                  Clinic    Department of Medicine  Sherrie Crowe, Ph.D., L.P. [phone]                          Clinics and [facility] Center  [facility] Vin Ward, Ph.D.,  L.P. [phone]                 3rd [facility] Mail Code 741   Ramin Olivo, Ph.D., WALTER, L.P. [phone]     [address] [place] [address] Kathie Galvan, Ph.D., L.P. [phone]  [place] [address]       Health Psychology Follow-Up Note     Ms. Padilla is a 62-year-old woman self-referred for psychological consultation because her therapist of the last 24 years retired.  She is seen for problem-solving and supportive therapy for depression and multiple health issues.     HISTORY OF PRESENTING CONCERN:  Ms. Padilla reports a lengthy history of depression.  She currently sees a psychiatrist, Ban Coleman at [facility], and is taking Abilify 7.5 mg, trazodone 500 mg, ripazepam 75 mg each day at bedtime, Tegretol 400 mg at bedtime and methylphenidate 10 mg b.i.d.  She discontineud ability and is now taking Rexulti 2 mg.  She has a history of hospitalizations including 3 at [facility] in the late 1990s, early 2000s when she had 2 courses of ECT lasting 7-10 sessions and approximately 3 other single session ECTs.  She stopped due to memory problems.  She kept with Dr. Coleman who she first met as an inpatient and has seen her for the past 18 years.  She had also been hospitalized psychiatrically at [facility] at age 23.  She previously worked with psychiatrist, Doug Sarabia for three years, stopping, in part, because she didn't feel he took her suicide attempt sufficiently seriously.  She reports her depression tends to vary.  Currently it is moderate, though it was more severe within the past year especially when she was having additional health problems.      MEDICAL HISTORY:  Ms.  Call to parent, no answer, left message to call back    Forms complete. In Peds phone room.    Per Dr. Lara, \"Has upcoming Allergy evaluation - keep this for formal PFTs to determine if her symptoms are truly from asthma or other etiology (see WCE)\"    Please also see Telephone Encounter regarding form in sibling's chart.       Randy has a complex medical history.  She was diagnosed with MS in 1985.  Her psychiatrist at [facility] in [place], Dr. 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  She has used a scooter since 1992, using it more in the last 6 months than she had previously.  She also can use a walker.  She was diagnosed with fibromyalgia in 1997.   She is also seen at the [facility] for her eye care.  She began to see an eating disorder therapist weekly and has been somewhat erratically losing weight.    WEIGH Today is 291.2 down from  294.9 last session with me.     She is attending OA.    Wt Readings from Last 4 Encounters:   02/07/18 132.1 kg (291 lb 4.8 oz)   01/31/18 131.2 kg (289 lb 3.2 oz)   01/10/18 131.5 kg (290 lb)   01/03/18 132.1 kg (291 lb 3.2 oz)     Past Medical History:   Diagnosis Date     Depression, major, in partial remission (H)      Fibromyalgia syndrome      Gastro-oesophageal reflux disease      Hyperlipemia      Lymphedema      Multiple sclerosis (H)     tremors with MS, all four limbs and head     Multiple sclerosis, secondary progressive (H)      Sleep apnea      Vocal cord paralysis, unilateral complete         Past Surgical History:   Procedure Laterality Date     COLONOSCOPY N/A 7/17/2017    Procedure: COMBINED COLONOSCOPY, SINGLE OR MULTIPLE BIOPSY/POLYPECTOMY BY BIOPSY;;  Surgeon: Wong Beaulieu MD;  Location:  GI     ENT SURGERY      throat 1969, vocal cord surgery with skin grafting     ESOPHAGOSCOPY, GASTROSCOPY, DUODENOSCOPY (EGD), COMBINED N/A 12/16/2014    Procedure: COMBINED ENDOSCOPIC ULTRASOUND, ESOPHAGOSCOPY, GASTROSCOPY, DUODENOSCOPY (EGD), FINE NEEDLE ASPIRATE/BIOPSY;  Surgeon: Yessica Santana MD;  Location:  GI     ESOPHAGOSCOPY, GASTROSCOPY, DUODENOSCOPY (EGD), COMBINED N/A 12/16/2014    Procedure: COMBINED ESOPHAGOSCOPY, GASTROSCOPY, DUODENOSCOPY (EGD), BIOPSY SINGLE OR MULTIPLE;  Surgeon: Yessica Santana MD;   Location:  GI     LAPAROSCOPIC APPENDECTOMY  2013    Procedure: LAPAROSCOPIC APPENDECTOMY;;  Surgeon: Salvador Morris MD;  Location:  OR     LAPAROSCOPIC BIOPSY LIVER  2013    Procedure: LAPAROSCOPIC BIOPSY LIVER;;  Surgeon: Salvador Morris MD;  Location:  OR     LAPAROSCOPIC GASTRIC SLEEVE  2013    Procedure: LAPAROSCOPIC GASTRIC SLEEVE;  LAPAROSCOPIC SLEEVE GASTRECTOMY/ LAPARSCOPIC  APPENDECTOMY /LIVER BIOPSIES/LIVER CYST DRAINAGE;  Surgeon: Salvador Morris MD;  Location:  OR     ORTHOPEDIC SURGERY      R wrist      Current Outpatient Prescriptions   Medication     brexpiprazole (REXULTI) 3 MG tablet     MINOCYCLINE HCL PO     triamcinolone (KENALOG) 0.5 % OINT ointment     trospium (SANCTURA) 20 MG tablet     cyabnocobalamin (VITAMIN B-12) 2500 MCG sublingual tablet     ranitidine (ZANTAC) 150 MG tablet     metroNIDAZOLE (METROGEL) 1 % gel     ketoconazole (NIZORAL) 2 % cream     Dextromethorphan-Guaifenesin (MUCINEX DM PO)     Melatonin 10 MG TABS     docusate sodium 100 MG tablet     methylphenidate (RITALIN) 10 MG tablet     carBAMazepine (TEGRETOL) 200 MG tablet     carBAMazepine (TEGRETOL) 200 MG tablet     Calcium Citrate-Vitamin D (CALCITRATE/VITAMIN D PO)     Cholecalciferol (VITAMIN D3 PO)     Cholecalciferol (VITAMIN D3 PO)     Mirtazapine (REMERON PO)     TRAZODONE HCL PO     Multiple Vitamin (MULTIVITAMINS PO)     atorvastatin (LIPITOR) 20 MG tablet     escitalopram (LEXAPRO) 10 MG tablet     No current facility-administered medications for this visit.      Facility-Administered Medications Ordered in Other Visits   Medication     ondansetron (ZOFRAN) injection     New medication: Effexor 300 mon 17  per Dr. Marquise Coleman, her psychiatrist.  She discontinued Lexapro 17.    SOCIAL HISTORY:  Ms. Padilla grew up in the [facility] and is the oldest of 5 children in her family of origin.  Her father was a dentist who she believes was bipolar.  He  of lung cancer  at age 72.  Her mother  of sepsis at age 80.  She had been diagnosed with breast cancer when Ms. Padilla was 8.  She describes the marriage between her parents as misael.  She is 5 years older than her closest-aged sister and they are all within 4 years of each other.   Her daughter  12/3 and her mother   She tends to feel more depressed in winter.      Ms. Padilla attended [employer] for a year and later went to [email or web address] at the [employer].  She felt that she could not continue her education to the point of graduation because she was working full time and raising her daughter.  Her daughter  in  at age 31 of liver failure secondary to an accidental Tylenol overdose.  She had been recovering from a hysterectomy.      Ms. 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  She had to retire at the time secondary to her MS.  She has never .  She has not dated,  is interested in dating, and states that if she were she would be interested in a relationship with a woman.  There is no history of  service or legal problems.  She expresses concern about her increasing social isolation.  She does have at least 1 friend, Jael, who she met at a therapy group in .  She is active in facilitating groups for people with MS both in [place] and [place].  She lives alone and currently gets help from a home health aide, as well as home health nurse.     She sees Dr. Coleman, psychiatrist and is trying to figure out best antidepressant regimen.  She brought in her genetic testing.Effexor reduced to 300 per Dr. Coleman.     SESSION:  Mili arrived punctually for today's meeting. She is  concerned today about her 59 year old sister who was just diagnosed with breast cancer, had a lumpectomy.  We discussed resources for her and ways she can be supportive.   She plans to get genetic testing.  She has still been falling asleep watching tv, and then misses her meds and sleeps in a chair, was having difficulty falling asleep with a higher level of Effexor so changed it. We discussed her weight loss, focusing on cutting out wheat things, nut thins, and chocolate.  She remains motivated.  The goal is 1500 calories/day while using [employer]. We reviewed her self-sabotaging.    She participated fully and appeared to derive benefit.  Rapport was excellent.  Extended session due to complexity of case and length of interval.  Extended session due to complexity of case and length of interval.  Time in:  2:05  Time out: 3:00     DIAGNOSES:   Major depression, recurrent, mild (F33.o).   Behavioral factors affecting morbid obesity (E66.01).     PLAN:  Ms. Padilla will return to clinic on  3/7 @ 2  for problem-solving and supportive therapy consistent with treatment plan..   Tx plan 9/27/17;  Next review due  4/2/1     Ramin Olivo, PhD, A.B.P.P., L.P.   Director, Health Psychology

## 2023-08-18 ENCOUNTER — VIRTUAL VISIT (OUTPATIENT)
Dept: PSYCHOLOGY | Facility: CLINIC | Age: 68
End: 2023-08-18
Payer: COMMERCIAL

## 2023-08-18 DIAGNOSIS — Z03.89 NO DIAGNOSIS ON AXIS I: Primary | ICD-10-CM

## 2023-08-18 PROCEDURE — 99207 PR NO CHARGE LOS: CPT | Performed by: PSYCHOLOGIST

## 2023-08-23 ENCOUNTER — LAB REQUISITION (OUTPATIENT)
Dept: LAB | Facility: CLINIC | Age: 68
End: 2023-08-23
Payer: COMMERCIAL

## 2023-08-23 DIAGNOSIS — S82.852D DISPLACED TRIMALLEOLAR FRACTURE OF LEFT LOWER LEG, SUBSEQUENT ENCOUNTER FOR CLOSED FRACTURE WITH ROUTINE HEALING: ICD-10-CM

## 2023-08-23 DIAGNOSIS — I10 ESSENTIAL (PRIMARY) HYPERTENSION: ICD-10-CM

## 2023-08-24 LAB
ANION GAP SERPL CALCULATED.3IONS-SCNC: 10 MMOL/L (ref 7–15)
BUN SERPL-MCNC: 9.2 MG/DL (ref 8–23)
CALCIUM SERPL-MCNC: 9.2 MG/DL (ref 8.8–10.2)
CHLORIDE SERPL-SCNC: 101 MMOL/L (ref 98–107)
CREAT SERPL-MCNC: 0.62 MG/DL (ref 0.51–0.95)
DEPRECATED HCO3 PLAS-SCNC: 27 MMOL/L (ref 22–29)
GFR SERPL CREATININE-BSD FRML MDRD: >90 ML/MIN/1.73M2
GLUCOSE SERPL-MCNC: 95 MG/DL (ref 70–99)
POTASSIUM SERPL-SCNC: 3.7 MMOL/L (ref 3.4–5.3)
SODIUM SERPL-SCNC: 138 MMOL/L (ref 136–145)

## 2023-08-24 PROCEDURE — 80048 BASIC METABOLIC PNL TOTAL CA: CPT

## 2023-08-24 PROCEDURE — P9604 ONE-WAY ALLOW PRORATED TRIP: HCPCS

## 2023-08-24 PROCEDURE — 36415 COLL VENOUS BLD VENIPUNCTURE: CPT

## 2023-08-28 DIAGNOSIS — H35.341 LAMELLAR MACULAR HOLE OF RIGHT EYE: ICD-10-CM

## 2023-08-28 DIAGNOSIS — Z98.890 POSTOPERATIVE EYE STATE: Primary | ICD-10-CM

## 2023-08-30 ENCOUNTER — OFFICE VISIT (OUTPATIENT)
Dept: OPHTHALMOLOGY | Facility: CLINIC | Age: 68
End: 2023-08-30
Attending: OPHTHALMOLOGY
Payer: COMMERCIAL

## 2023-08-30 DIAGNOSIS — G35 MS (MULTIPLE SCLEROSIS) (H): ICD-10-CM

## 2023-08-30 DIAGNOSIS — H52.203 MYOPIA OF BOTH EYES WITH ASTIGMATISM AND PRESBYOPIA: ICD-10-CM

## 2023-08-30 DIAGNOSIS — Z96.1 PSEUDOPHAKIA OF BOTH EYES: ICD-10-CM

## 2023-08-30 DIAGNOSIS — H52.4 MYOPIA OF BOTH EYES WITH ASTIGMATISM AND PRESBYOPIA: ICD-10-CM

## 2023-08-30 DIAGNOSIS — H35.341 LAMELLAR MACULAR HOLE OF RIGHT EYE: ICD-10-CM

## 2023-08-30 DIAGNOSIS — H04.123 DRY EYE SYNDROME OF BOTH EYES: Primary | ICD-10-CM

## 2023-08-30 DIAGNOSIS — H52.13 MYOPIA OF BOTH EYES WITH ASTIGMATISM AND PRESBYOPIA: ICD-10-CM

## 2023-08-30 PROCEDURE — G0463 HOSPITAL OUTPT CLINIC VISIT: HCPCS | Performed by: OPHTHALMOLOGY

## 2023-08-30 PROCEDURE — 92015 DETERMINE REFRACTIVE STATE: CPT

## 2023-08-30 PROCEDURE — 99024 POSTOP FOLLOW-UP VISIT: CPT | Performed by: OPHTHALMOLOGY

## 2023-08-30 RX ORDER — ASPIRIN 81 MG/1
81 TABLET ORAL DAILY
COMMUNITY
End: 2023-09-12

## 2023-08-30 ASSESSMENT — REFRACTION_WEARINGRX
OD_ADD: +1.75
OS_CYLINDER: +0.50
SPECS_TYPE: PAL
OD_CYLINDER: +1.75
OS_AXIS: 004
OS_SPHERE: -4.75
OS_ADD: +1.75
OD_AXIS: 095
OD_SPHERE: -6.25

## 2023-08-30 ASSESSMENT — VISUAL ACUITY
OD_SC: 20/150
OS_SC: 20/125
OS_SC+: -1
OD_PH_SC: 20/30
OS_PH_SC: 20/30
METHOD: SNELLEN - LINEAR

## 2023-08-30 ASSESSMENT — CONF VISUAL FIELD
OS_SUPERIOR_NASAL_RESTRICTION: 0
OD_SUPERIOR_NASAL_RESTRICTION: 0
OS_INFERIOR_NASAL_RESTRICTION: 0
OS_NORMAL: 1
OS_INFERIOR_TEMPORAL_RESTRICTION: 0
METHOD: COUNTING FINGERS
OD_NORMAL: 1
OD_SUPERIOR_TEMPORAL_RESTRICTION: 0
OD_INFERIOR_NASAL_RESTRICTION: 0
OS_SUPERIOR_TEMPORAL_RESTRICTION: 0
OD_INFERIOR_TEMPORAL_RESTRICTION: 0

## 2023-08-30 ASSESSMENT — SLIT LAMP EXAM - LIDS
COMMENTS: NORMAL
COMMENTS: NORMAL

## 2023-08-30 ASSESSMENT — REFRACTION_MANIFEST
OD_CYLINDER: +1.25
OD_AXIS: 080
OS_AXIS: 150
OS_ADD: +2.50
OD_SPHERE: -3.25
OS_SPHERE: -2.75
OS_CYLINDER: +0.50
OD_ADD: +2.50

## 2023-08-30 ASSESSMENT — CUP TO DISC RATIO
OS_RATIO: 0.5
OD_RATIO: 0.7

## 2023-08-30 ASSESSMENT — TONOMETRY
IOP_METHOD: TONOPEN
OD_IOP_MMHG: 11
OS_IOP_MMHG: 12

## 2023-08-30 ASSESSMENT — EXTERNAL EXAM - LEFT EYE: OS_EXAM: NORMAL

## 2023-08-30 ASSESSMENT — EXTERNAL EXAM - RIGHT EYE: OD_EXAM: NORMAL

## 2023-08-30 NOTE — LETTER
8/30/2023       RE: Mili Padilla  [address]  [facility] [address]     Dear Dr. Pak,    Thank you for referring your patient, Mili Padilla, for cataract surgery. He underwent uneventful surgery both eyes with goal of -2.50 both eyes. She will return to you for annual exams. Please see a copy of my visit note below.    HPI       Post Op (Ophthalmology) Both Eyes     Additional comments: S/p CE/IOL BE             Comments    Pt states vision has been good up close. Vision is blurry with out glasses.   No eye pain today. No new flashes or floaters.  No new redness. Dryness in RE that comes and goes, pt does not use AT's.    JOHNNA Leach August 30, 2023 12:25 PM                Last edited by Jhon Parnell COMT on 8/30/2023 12:26 PM.         Review of systems for the eyes was negative other than the pertinent positives/negatives listed in the HPI.      Assessment & Plan    HPI:  Mili Padilla is a 68 year old female with history of optic neuritis, MS, fibromyalgia, obesity, calculus of kidney, MDD, HLD, HTN, myopia with astigmatism and presbyopia presents from Dr. Pak for final Postop  both eyes     Initially optic neuritis 1986 and subsequently diagnosed with MS.     POHx: optic neuritis, myopia with astigmatism and presbyopia  PMHx: optic neuritis, MS, fibromyalgia, obesity, calculus of kidney, MDD, HLD, HTN  Current Medications: acetaminophen (TYLENOL) 500 MG tablet, Take 1,000 mg by mouth 3 times daily as needed  ARIPiprazole (ABILIFY) 5 MG tablet, Take 7.5 mg by mouth daily  aspirin 81 MG EC tablet, Take 81 mg by mouth daily  atorvastatin (LIPITOR) 20 MG tablet, Take 20 mg by mouth daily.  Banana Flakes (BANATROL PO),   bismuth subsalicylate (PEPTO BISMOL) 262 MG/15ML suspension, Take 15 mLs by mouth every 6 hours as needed  carBAMazepine (TEGRETOL) 200 MG tablet, Take 100 mg by mouth 2 times daily  cholecalciferol (VITAMIN D3) 25 mcg (1000 units) capsule, Take 1 capsule by mouth  daily Every 2 days takes up to a total of 2500 per week  Cranberry 1000 MG CAPS, Take by mouth daily  cyanocobalamin (VITAMIN B-12) 100 MCG tablet, Take 1,000 mcg by mouth once a week  denosumab (PROLIA) 60 MG/ML SOLN injection, Inject 60 mg Subcutaneous every 6 months On hold in [facility]  Dextromethorphan-guaiFENesin (MUCINEX DM MAXIMUM STRENGTH PO), Take by mouth At Bedtime  DULoxetine (CYMBALTA) 60 MG capsule, Take 120 mg by mouth daily   famotidine (PEPCID) 40 MG tablet, Take 1 tablet (40 mg) by mouth daily  fluticasone (FLONASE) 50 MCG/ACT nasal spray, Spray 2 sprays into both nostrils daily  ketoconazole (NIZORAL) 2 % cream, Apply topically 2 times daily as needed   lactobacillus rhamnosus, GG, (CULTURELL) capsule, Take 1 capsule by mouth 2 times daily  loperamide (IMODIUM A-D) 2 MG tablet, Take 1 tablet (2 mg) by mouth every 6 hours as needed for diarrhea  losartan (COZAAR) 50 MG tablet, Take 1 tablet (50 mg) by mouth daily (Patient taking differently: Take 50 mg by mouth 2 times daily)  Melatonin 10 MG TABS, Take 10 mg by mouth At Bedtime Changed to extended release  mirtazapine (REMERON) 30 MG tablet, Take 75 mg by mouth At Bedtime   Multiple Vitamin (MULTIVITAMINS PO), Take 2 tablets by mouth every evening. WITH IRON  psyllium (METAMUCIL/KONSYL) capsule, Take 2 capsules by mouth daily  sulfamethoxazole-trimethoprim (BACTRIM) 400-80 MG tablet, Take 1 tablet by mouth 2 times daily  traZODone (DESYREL) 100 MG tablet, Take 600 mg by mouth At Bedtime May split dose, 300mg @ HS and repeat x1 if needed   triamcinolone (ARISTOCORT HP) 0.5 % external cream, Apply topically At Bedtime To hands  trospium (SANCTURA) 20 MG tablet, Take 20 mg by mouth 2 times daily (before meals)  UNABLE TO FIND, Take 1 tablet by mouth daily MEDICATION NAME: Nuun Electrolyte hydration  Tabs  verapamil ER (CALAN-SR) 120 MG CR tablet, Take 120 mg by mouth 2 times daily  vitamin B-Complex, Take 1 tablet by mouth daily     No current  facility-administered medications on file prior to visit.    FHx: refractive error   PSHx: Cataract extraction/iol Pappas right eye 6/29/23, left eye 7/20/23       Current Eye Medications:  None    Assessment & Plan:  (Z96.1) Pseudophakia of both eyes   Pappas right eye  06/29/23   Pappas left eye 7/20/23  Doing well    (H35.341) Lamellar macular hole of right eye  Noted on OCT mac today and from 5/17/16 with minimal change  May affect final BEST CORRECTED VISUAL ACUITY  Best corrected visual acuity 20/20-    (H52.13,  H52.203,  H52.4) Myopia of both eyes with astigmatism and presbyopia  Patient has minimal change in myopia but a copy of today's glasses prescription was given.  The patient may wish to update the glasses if the lenses are scratched or the frames are too small.  Presbyopia is difficulty seeing up close and is treated with bifocals or over the counter reading glasses    Multiple Sclerosis  Patient has a motorized scooter, is able to transfer  Sees Dr. Pak  Diagnosed 1986 with optic neuritis    (H04.123) Dry eye syndrome of both eyes    Encouraged artificial tears four times a day        Return for Annual Visit-v/t/d/MRx, OCT RNFL to see Dr. Pak or myself.        Flaco Pappas MD     Attending Physician Attestation:  Complete documentation of historical and exam elements from today's encounter can be found in the full encounter summary report (not reduplicated in this progress note).  I personally obtained the chief complaint(s) and history of present illness.  I confirmed and edited as necessary the review of systems, past medical/surgical history, family history, social history, and examination findings as documented by others; and I examined the patient myself.  I personally reviewed the relevant tests, images, and reports as documented above.  I formulated and edited as necessary the assessment and plan and discussed the findings and management plan with the patient and family. -  Flaco Pappas MD       Again, thank you for allowing me to participate in the care of your patient.      Sincerely,    Flaco Pappas MD

## 2023-08-30 NOTE — PROGRESS NOTES
HPI       Post Op (Ophthalmology) Both Eyes     Additional comments: S/p CE/IOL BE             Comments    Pt states vision has been good up close. Vision is blurry with out glasses.   No eye pain today. No new flashes or floaters.  No new redness. Dryness in RE that comes and goes, pt does not use AT's.    JOHNNA Leach August 30, 2023 12:25 PM                Last edited by Jhon Parnell COMT on 8/30/2023 12:26 PM.         Review of systems for the eyes was negative other than the pertinent positives/negatives listed in the HPI.      Assessment & Plan    HPI:  Mili Padilla is a 68 year old female with history of optic neuritis, MS, fibromyalgia, obesity, calculus of kidney, MDD, HLD, HTN, myopia with astigmatism and presbyopia presents from Dr. Pak for final Postop  both eyes     Initially optic neuritis 1986 and subsequently diagnosed with MS.     POHx: optic neuritis, myopia with astigmatism and presbyopia  PMHx: optic neuritis, MS, fibromyalgia, obesity, calculus of kidney, MDD, HLD, HTN  Current Medications: acetaminophen (TYLENOL) 500 MG tablet, Take 1,000 mg by mouth 3 times daily as needed  ARIPiprazole (ABILIFY) 5 MG tablet, Take 7.5 mg by mouth daily  aspirin 81 MG EC tablet, Take 81 mg by mouth daily  atorvastatin (LIPITOR) 20 MG tablet, Take 20 mg by mouth daily.  Banana Flakes (BANATROL PO),   bismuth subsalicylate (PEPTO BISMOL) 262 MG/15ML suspension, Take 15 mLs by mouth every 6 hours as needed  carBAMazepine (TEGRETOL) 200 MG tablet, Take 100 mg by mouth 2 times daily  cholecalciferol (VITAMIN D3) 25 mcg (1000 units) capsule, Take 1 capsule by mouth daily Every 2 days takes up to a total of 2500 per week  Cranberry 1000 MG CAPS, Take by mouth daily  cyanocobalamin (VITAMIN B-12) 100 MCG tablet, Take 1,000 mcg by mouth once a week  denosumab (PROLIA) 60 MG/ML SOLN injection, Inject 60 mg Subcutaneous every 6 months On hold in U  Dextromethorphan-guaiFENesin (MUCINEX DM MAXIMUM  STRENGTH PO), Take by mouth At Bedtime  DULoxetine (CYMBALTA) 60 MG capsule, Take 120 mg by mouth daily   famotidine (PEPCID) 40 MG tablet, Take 1 tablet (40 mg) by mouth daily  fluticasone (FLONASE) 50 MCG/ACT nasal spray, Spray 2 sprays into both nostrils daily  ketoconazole (NIZORAL) 2 % cream, Apply topically 2 times daily as needed   lactobacillus rhamnosus, GG, (CULTURELL) capsule, Take 1 capsule by mouth 2 times daily  loperamide (IMODIUM A-D) 2 MG tablet, Take 1 tablet (2 mg) by mouth every 6 hours as needed for diarrhea  losartan (COZAAR) 50 MG tablet, Take 1 tablet (50 mg) by mouth daily (Patient taking differently: Take 50 mg by mouth 2 times daily)  Melatonin 10 MG TABS, Take 10 mg by mouth At Bedtime Changed to extended release  mirtazapine (REMERON) 30 MG tablet, Take 75 mg by mouth At Bedtime   Multiple Vitamin (MULTIVITAMINS PO), Take 2 tablets by mouth every evening. WITH IRON  psyllium (METAMUCIL/KONSYL) capsule, Take 2 capsules by mouth daily  sulfamethoxazole-trimethoprim (BACTRIM) 400-80 MG tablet, Take 1 tablet by mouth 2 times daily  traZODone (DESYREL) 100 MG tablet, Take 600 mg by mouth At Bedtime May split dose, 300mg @ HS and repeat x1 if needed   triamcinolone (ARISTOCORT HP) 0.5 % external cream, Apply topically At Bedtime To hands  trospium (SANCTURA) 20 MG tablet, Take 20 mg by mouth 2 times daily (before meals)  UNABLE TO FIND, Take 1 tablet by mouth daily MEDICATION NAME: Nuun Electrolyte hydration  Tabs  verapamil ER (CALAN-SR) 120 MG CR tablet, Take 120 mg by mouth 2 times daily  vitamin B-Complex, Take 1 tablet by mouth daily     No current facility-administered medications on file prior to visit.    FHx: refractive error   PSHx: Cataract extraction/iol Pappas right eye 6/29/23, left eye 7/20/23       Current Eye Medications:  None    Assessment & Plan:  (Z96.1) Pseudophakia of both eyes   Pappas right eye  06/29/23   Pappas left eye 7/20/23  Doing well    (H35.341)  Lamellar macular hole of right eye  Noted on OCT mac today and from 5/17/16 with minimal change  May affect final BEST CORRECTED VISUAL ACUITY  Best corrected visual acuity 20/20-    (H52.13,  H52.203,  H52.4) Myopia of both eyes with astigmatism and presbyopia  Patient has minimal change in myopia but a copy of today's glasses prescription was given.  The patient may wish to update the glasses if the lenses are scratched or the frames are too small.  Presbyopia is difficulty seeing up close and is treated with bifocals or over the counter reading glasses    Multiple Sclerosis  Patient has a motorized scooter, is able to transfer  Sees Dr. Pak  Diagnosed 1986 with optic neuritis    (H04.123) Dry eye syndrome of both eyes    Encouraged artificial tears four times a day        Return for Annual Visit-v/t/d/MRx, OCT RNFL to see Dr. Pak or myself.        Flaco Pappas MD     Attending Physician Attestation:  Complete documentation of historical and exam elements from today's encounter can be found in the full encounter summary report (not reduplicated in this progress note).  I personally obtained the chief complaint(s) and history of present illness.  I confirmed and edited as necessary the review of systems, past medical/surgical history, family history, social history, and examination findings as documented by others; and I examined the patient myself.  I personally reviewed the relevant tests, images, and reports as documented above.  I formulated and edited as necessary the assessment and plan and discussed the findings and management plan with the patient and family. - Flaco Pappas MD

## 2023-08-30 NOTE — NURSING NOTE
Chief Complaints and History of Present Illnesses   Patient presents with    Post Op (Ophthalmology) Both Eyes     S/p CE/IOL BE     Chief Complaint(s) and History of Present Illness(es)       Post Op (Ophthalmology) Both Eyes              Comments: S/p CE/IOL BE              Comments    Pt states vision has been good up close. Vision is blurry with out glasses.   No eye pain today. No new flashes or floaters.  No new redness. Dryness in RE that comes and goes, pt does not use AT's.    JOHNNA Leach August 30, 2023 12:25 PM

## 2023-09-07 ENCOUNTER — VIRTUAL VISIT (OUTPATIENT)
Dept: PSYCHOLOGY | Facility: CLINIC | Age: 68
End: 2023-09-07
Payer: COMMERCIAL

## 2023-09-07 DIAGNOSIS — F54 PSYCHOLOGICAL FACTORS AFFECTING MORBID OBESITY (H): ICD-10-CM

## 2023-09-07 DIAGNOSIS — Z63.4 BEREAVEMENT: ICD-10-CM

## 2023-09-07 DIAGNOSIS — F33.0 MAJOR DEPRESSIVE DISORDER, RECURRENT EPISODE, MILD (H): Primary | ICD-10-CM

## 2023-09-07 DIAGNOSIS — E66.01 PSYCHOLOGICAL FACTORS AFFECTING MORBID OBESITY (H): ICD-10-CM

## 2023-09-07 PROCEDURE — 90834 PSYTX W PT 45 MINUTES: CPT | Mod: VID | Performed by: PSYCHOLOGIST

## 2023-09-07 SDOH — SOCIAL STABILITY - SOCIAL INSECURITY: DISSAPEARANCE AND DEATH OF FAMILY MEMBER: Z63.4

## 2023-09-07 NOTE — PROGRESS NOTES
Health Psychology                     Department of Medicine  Izzy Montaño, Ph.D., L.P. [phone]                          [facility] Sherrie Crowe, Ph.D., L.P. [phone]                   [place] Mail Code 756   Ranulfo Whipple, Ph.D. [phone]        [address] [place] [address] Enriqueta Sorto, Ph.D., L.P. [phone]    [place] [address]           Ramin Olivo, Ph.D., A.B.P.P., L.P. [phone]        Kathie Galvan, Ph.D., L.P. [phone]  [address] Psychology Telemental Health Note    Demographics   Age 67 year old   Sex female   Race White   Ethnicity Not  or      Ms. Padilla is a  woman self-referred for psychological consultation because her therapist of the last 24 years retired.  She is seen for problem-solving and supportive therapy for depression and multiple health issues.     HISTORY OF PRESENTING CONCERN:  Ms. Padilla reported at intake (7/28/16) a  lengthy history of depression.  She was seeing a psychiatrist, Ban Coleman, at [facility], and is taking Abilify 7.5 mg, trazodone 500 mg, ripazepam 75 mg each day at bedtime, Tegretol 400 mg at bedtime and methylphenidate 10 mg b.i.d.  She discontineud ability and is now taking Rexulti 2 mg.  She has a history of hospitalizations including three at the [facility] in the late 1990s, early 2000s when she had 2 courses of ECT lasting 7-10 sessions and approximately 3 other single session ECTs.  She stopped due to memory problems.  She kept with Dr. Coleman who she first met as an inpatient and has seen her for the past 18 years.  She had also been hospitalized psychiatrically at [facility] at age 23.  She previously worked with psychiatrist, Doug Sarabia for three years, stopping, in part, because she didn't feel he took her suicide attempt sufficiently seriously.  She reports her depression tends to vary.      MEDICAL HISTORY (at intake)  Ms. Padilla has a complex medical history.  She was diagnosed with MS in 1985.  Her psychiatrist at [facility] in [place], Dr. 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  She has used a scooter since 1992, using it more  than she had previously.  She also can use a walker.  She was diagnosed with fibromyalgia in 1997. She is seen at the [facility] for her eye care. During 8th grade, she fell on a ski lift, injuring her larynx.     WEIGHT : self report    2/23/23  Down to 281 lbs.   Hard to weigh.  Not steady on her feet.  9/7/23    281    She has not been attending OA.     Wt Readings from Last 4 Encounters:   06/29/23 128.4 kg (283 lb)   05/15/23 128.4 kg (283 lb)   02/24/23 127.6 kg (281 lb 6.4 oz)   02/21/23 127.6 kg (281 lb 6.4 oz)     Past Medical History:   Diagnosis Date    Depression, major, in partial remission (H)     Fibromyalgia syndrome     Gastro-oesophageal reflux disease     Hyperlipemia     Hypertension     Low serum sodium     Lymphedema     Mixed incontinence     Multiple sclerosis (H)     tremors with MS, all four limbs and head    Multiple sclerosis, secondary progressive (H)     Neurogenic bladder     Obese     Osteoporosis     Sleep apnea     Vocal cord paralysis, unilateral complete         Past Surgical History:   Procedure Laterality Date    COLONOSCOPY N/A 07/17/2017    Procedure: COMBINED COLONOSCOPY, SINGLE OR MULTIPLE BIOPSY/POLYPECTOMY BY BIOPSY;;  Surgeon: Wong Beaulieu MD;  Location:  GI    COLONOSCOPY N/A 02/23/2018    Procedure: COMBINED COLONOSCOPY, SINGLE OR MULTIPLE BIOPSY/POLYPECTOMY BY BIOPSY;  COLONOSCOPY ;  Surgeon: Yessica Santana MD;  Location:  GI    ENT SURGERY      throat 1969, vocal cord surgery with skin grafting    ESOPHAGOSCOPY, GASTROSCOPY, DUODENOSCOPY (EGD), COMBINED N/A 12/16/2014    Procedure: COMBINED ENDOSCOPIC ULTRASOUND, ESOPHAGOSCOPY, GASTROSCOPY, DUODENOSCOPY  (EGD), FINE NEEDLE ASPIRATE/BIOPSY;  Surgeon: Yessica Satnana MD;  Location:  GI    ESOPHAGOSCOPY, GASTROSCOPY, DUODENOSCOPY (EGD), COMBINED N/A 12/16/2014    Procedure: COMBINED ESOPHAGOSCOPY, GASTROSCOPY, DUODENOSCOPY (EGD), BIOPSY SINGLE OR MULTIPLE;  Surgeon: Yessica Santana MD;  Location:  GI    GASTRIC BYPASS Bilateral     LAPAROSCOPIC APPENDECTOMY  05/30/2013    Procedure: LAPAROSCOPIC APPENDECTOMY;;  Surgeon: Salvador Morris MD;  Location:  OR    LAPAROSCOPIC BIOPSY LIVER  05/30/2013    Procedure: LAPAROSCOPIC BIOPSY LIVER;;  Surgeon: Salvador Morris MD;  Location:  OR    LAPAROSCOPIC GASTRIC SLEEVE  05/30/2013    Procedure: LAPAROSCOPIC GASTRIC SLEEVE;  LAPAROSCOPIC SLEEVE GASTRECTOMY/ LAPARSCOPIC  APPENDECTOMY /LIVER BIOPSIES/LIVER CYST DRAINAGE;  Surgeon: Salvador Morris MD;  Location:  OR    LASER HOLMIUM LITHOTRIPSY URETER(S), INSERT STENT, COMBINED Left 1/18/2023    Procedure: CYSTOSCOPY, LEFT URETEROSCOPY, HOLMIUM LASER  LITHOTRIPSY, LEFT   STENT PLACEMENT;  Surgeon: Mahendra Coles MD;  Location:  OR    ORTHOPEDIC SURGERY      R wrist 2010    PHACOEMULSIFICATION CLEAR CORNEA WITH STANDARD INTRAOCULAR LENS IMPLANT Right 6/29/2023    Procedure: RIGHT EYE PHACOEMULSIFICATION CATARACT WITH INTRAOCULAR LENS IMPLANT;  Surgeon: Flaco Pappas MD;  [place] [facility] [place]       Current Outpatient Medications   Medication    acetaminophen (TYLENOL) 500 MG tablet    ARIPiprazole (ABILIFY) 5 MG tablet    atorvastatin (LIPITOR) 20 MG tablet    bismuth subsalicylate (PEPTO BISMOL) 262 MG/15ML suspension    carBAMazepine (TEGRETOL) 200 MG tablet    cholecalciferol (VITAMIN D3) 25 mcg (1000 units) capsule    Cranberry 1000 MG CAPS    cyanocobalamin (VITAMIN B-12) 100 MCG tablet    denosumab (PROLIA) 60 MG/ML SOLN injection    Dextromethorphan-guaiFENesin (MUCINEX DM MAXIMUM STRENGTH PO)    DULoxetine (CYMBALTA) 60 MG capsule    famotidine (PEPCID) 40 MG tablet     fluticasone (FLONASE) 50 MCG/ACT nasal spray    ketoconazole (NIZORAL) 2 % cream    ketorolac (ACULAR) 0.5 % ophthalmic solution    lactobacillus rhamnosus, GG, (CULTURELL) capsule    loperamide (IMODIUM A-D) 2 MG tablet    losartan (COZAAR) 50 MG tablet    losartan (COZAAR) 50 MG tablet    Melatonin 10 MG TABS    mirtazapine (REMERON) 30 MG tablet    moxifloxacin (VIGAMOX) 0.5 % ophthalmic solution    Multiple Vitamin (MULTIVITAMINS PO)    prednisoLONE acetate (PRED FORTE) 1 % ophthalmic suspension    psyllium (METAMUCIL/KONSYL) capsule    traZODone (DESYREL) 100 MG tablet    triamcinolone (ARISTOCORT HP) 0.5 % external cream    trospium (SANCTURA) 20 MG tablet    UNABLE TO FIND    verapamil ER (CALAN-SR) 120 MG CR tablet    vitamin B-Complex     No current facility-administered medications for this visit.     Medication: On Duloxetine and Mirtazpine and Trazodone and Ritalin. She discontinued Effexor and Abilify previously per Dr. Marquise Coleman, her psychiatrist.  On 4/10/19 she informed me that she started Abilify        9/15/2019    10:10 AM 2022     3:40 PM 11/3/2022     6:06 PM   PHQ-9 SCORE   PHQ-9 Total Score MyChart 5 (Mild depression) 6 (Mild depression) 5 (Mild depression)   PHQ-9 Total Score 5 6 5         2022     4:16 PM 11/3/2022     6:05 PM 2022     6:30 PM   NAS-7 SCORE   Total Score 3 (minimal anxiety) 3 (minimal anxiety) 4 (minimal anxiety)   Total Score 3 3 4     SOCIAL HISTORY (at intake)  Ms. Padilla grew up in the [place] area and is the oldest of 5 children in her family of origin.  Her father was a dentist who she believes was bipolar.  He  of lung cancer at age 72.  Her mother  of sepsis at age 80.  She had been diagnosed with breast cancer when Ms. Padilla was 8.  She described the marriage between her parents as misael.  She is 5 years older than her closest-aged sister and they are all within 4 years of each other.   Her daughter  12/3 and her mother .  She  "tends to feel more depressed in winter.      Ms. Padilla attended [employer] for a year and later went to night school at the [employer].  She felt that she could not continue her education to the point of graduation because she was working full time and raising her daughter.  Her daughter  in  at age 31 of liver failure secondary to an accidental Tylenol overdose.  She had been recovering from a hysterectomy.      Ms. 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  She had to retire at the time secondary to her MS.  She has never .  She has not dated,and states that if she were she would date, she would be interested in a relationship with a woman.  There is no history of  service or legal problems.  She expresses concern about her increasing social isolation.  She had at least 1 friend, Jael, who she met at a therapy group in .  She is active in facilitating groups for people with MS both in [place] and [place].  She lived alone and currently gets help from a home health aide, as well as home health nurse.     She had  seen Dr. Ban Coleman, psychiatrist   Dr. Coleman prescribed Cymbalta for it.  She feels she has been more depressed since the Fall, but doesn't think it is SAD.   She stated that she has recommended case management.     SESSION:  Mili ]arrived punctually for today's video psychotherapy session.The depression varies and has worsened given her health issues.  Not as bad today.     \"My legs are weaker than my arms.\" \"I've lost my ability to walk over the years.\"   She can use her arms to push herself up.     Before our last session she had fallen, broken her ankle in 3 places, had surgery at [facility], and is now at [facility]. Not in pain. She is getting PT 1-2X/day.  It is sometimes in the AM.  She had been looking " for assisted living options the day she isabell it and is unsure how good her ambulation might be in the future, but still hopig to function well enough to stay in her apartment. .     She is doing exercises partially.  Discussed exercise information.  She has been doingarm and leg exercises  andshe has been encourage to do daily.  Working with  a PT @ [facility]- feels pushed beyond wht her MS may allow     Still grieving her friend Jael, with whom she used to speak daily at 4.    Weight Issues: Discussed 1750 calorie limit/day and need for exercise.  Not tracking her intake.   Encouraged her to resume tracking.  Discussed decreasing frequency of desserts at [facility].    Since May, only 3 days at 1800 calories. The rest were above including  3 binge days of 3000. Sent her weight loss resources. Plan is to resume tracking.     She continues to lead two MS groups.   Every other Saturday.   One for leaders is quarterly (in person x2/year). Not dong it while at the [facility].     She participated fully and appeared to derive benefit. Affect is positive.  Rapport was excellent.   This telehealth (telephone) service is appropriate and effective for delivering services in light of the necessity for social distancing to mitigate the COVID-19 epidemic and for conservation of PPE.     Patient has agreed to receiving telehealth services after being informed about it: Yes    Patient prefers video invitation/information to be sent by:   email    Time service started: 11:01  Time service ended:  11:45    Mode of transmission: Bag of Ice    [facility]  Phone: [phone]  / [phone]    Distance site:  Home office of the provider    The patient has been notified that:  Video visits will be conducted via a call with their psychologist to provide the care they need with a video conversation. Video visits may be billed at different rates depending on insurance coverage.  Patients are advised to  please contact their insurance provider with any questions about their health insurance coverage. 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   Behavioral factors affecting morbid obesity (E66.01).  Bereavement     PLAN:  Ms. Padilla will return for in video visit 10/11 @ 10 for eclectic, supportive therapy consistent with treatment plan.  Goal per day now that she is tracking is 1750 consistently.    Preference for future meetings:             In-person   prefers, even if masked              Remote              Either  Last treatment plan signed: 6/30/23  Last Treatment plan review: 6/30/23  Treatment plan verbal Review due: 9/30/23 (next session)  Treatment Review due: 6/30/24     Ramin Olivo, PhD, A.B.P.P., L.P.   Director, Health Psychology

## 2023-09-11 ENCOUNTER — MEDICAL CORRESPONDENCE (OUTPATIENT)
Dept: HEALTH INFORMATION MANAGEMENT | Facility: CLINIC | Age: 68
End: 2023-09-11

## 2023-09-12 ENCOUNTER — TRANSITIONAL CARE UNIT VISIT (OUTPATIENT)
Dept: GERIATRICS | Facility: CLINIC | Age: 68
End: 2023-09-12
Payer: COMMERCIAL

## 2023-09-12 VITALS
SYSTOLIC BLOOD PRESSURE: 150 MMHG | RESPIRATION RATE: 16 BRPM | OXYGEN SATURATION: 96 % | HEIGHT: 67 IN | HEART RATE: 80 BPM | TEMPERATURE: 97.7 F | BODY MASS INDEX: 44.36 KG/M2 | WEIGHT: 282.6 LBS | DIASTOLIC BLOOD PRESSURE: 49 MMHG

## 2023-09-12 DIAGNOSIS — F32.A DEPRESSION, UNSPECIFIED DEPRESSION TYPE: ICD-10-CM

## 2023-09-12 DIAGNOSIS — S82.892D CLOSED FRACTURE OF LEFT ANKLE WITH ROUTINE HEALING, SUBSEQUENT ENCOUNTER: Primary | ICD-10-CM

## 2023-09-12 DIAGNOSIS — I10 BENIGN ESSENTIAL HYPERTENSION: ICD-10-CM

## 2023-09-12 DIAGNOSIS — G35 MULTIPLE SCLEROSIS (H): ICD-10-CM

## 2023-09-12 DIAGNOSIS — K21.00 GASTROESOPHAGEAL REFLUX DISEASE WITH ESOPHAGITIS WITHOUT HEMORRHAGE: ICD-10-CM

## 2023-09-12 DIAGNOSIS — R19.5 LOOSE STOOLS: ICD-10-CM

## 2023-09-12 PROCEDURE — 99309 SBSQ NF CARE MODERATE MDM 30: CPT | Performed by: NURSE PRACTITIONER

## 2023-09-12 RX ORDER — LACTOBACILLUS RHAMNOSUS GG 10B CELL
1 CAPSULE ORAL 2 TIMES DAILY
COMMUNITY

## 2023-09-12 RX ORDER — DULOXETIN HYDROCHLORIDE 60 MG/1
120 CAPSULE, DELAYED RELEASE ORAL DAILY
COMMUNITY

## 2023-09-12 RX ORDER — HYDROXYZINE HYDROCHLORIDE 25 MG/1
25 TABLET, FILM COATED ORAL EVERY 6 HOURS PRN
COMMUNITY

## 2023-09-12 RX ORDER — ARIPIPRAZOLE 5 MG/1
7.5 TABLET ORAL DAILY
COMMUNITY
End: 2023-09-12

## 2023-09-12 RX ORDER — CARBAMAZEPINE 200 MG/1
200 TABLET, EXTENDED RELEASE ORAL 2 TIMES DAILY
COMMUNITY

## 2023-09-12 RX ORDER — TRIAMCINOLONE ACETONIDE 5 MG/G
CREAM TOPICAL EVERY 8 HOURS PRN
COMMUNITY

## 2023-09-12 RX ORDER — CARBOXYMETHYLCELLULOSE SODIUM 5 MG/ML
1 SOLUTION/ DROPS OPHTHALMIC 4 TIMES DAILY
COMMUNITY

## 2023-09-12 RX ORDER — MIRTAZAPINE 30 MG/1
75 TABLET, FILM COATED ORAL AT BEDTIME
COMMUNITY

## 2023-09-12 NOTE — PROGRESS NOTES
[facility] GERIATRICS        Visit Type: Establish Care     Facility:   [facility] ([facility]) [[id number]]         History of Present Illness: Mili Padilla is a 68 year old female.  She developed MS at the age of 29 and retired due to MS at 34 years old.  She is single.      Her past medical history includes    Closed trimalleolar ankle fracture after a fall   Hyponatremia   Multiple sclerosis   Depression   HTN   GERD  Vocal cord paralysis    On 8/7/23 she was admitted to [facility] after a mechanical fall resulting in left ankle fracture s/p TTC nail 8/9/23.   She was discharged with  x 4 weeks for VTE ppx, short leg splint clean, dry ,and intact until follow-up, Up with assist. WBAT LLE in splint with cast.   [facility]. Has a follow up with Orthopedics in clinic on 8/22/23.     Today patient was seen in her room sitting in a wheelchair.  She has a red short cast on her left leg.  She is having loose stools x2 during the day.  She sees psychiatry (Dr. Hill).  Denies chest pain, shortness of breath, dizziness, lightheadedness, and a poor appetite.     In reviewing point click care, VS stable.        Current Outpatient Medications   Medication Sig Dispense Refill    Acetaminophen 325 MG CHEW Take 650 mg by mouth every 4 hours as needed for mild pain      ARIPiprazole (ABILIFY) 5 MG tablet Take 7.5 mg by mouth daily      ARIPiprazole (ABILIFY) 5 MG tablet Take 7.5 mg by mouth daily  2    atorvastatin (LIPITOR) 20 MG tablet Take 20 mg by mouth daily.      bismuth subsalicylate 262 MG TABS Take 1 tablet by mouth 2 times daily      carBAMazepine (TEGRETOL XR) 200 MG 12 hr tablet Take 200 mg by mouth 2 times daily      carboxymethylcellulose PF (REFRESH PLUS) 0.5 % ophthalmic solution Place 1 drop into both eyes 4 times daily      DULoxetine (CYMBALTA) 60 MG capsule Take 120 mg  by mouth daily      famotidine (PEPCID) 40 MG tablet Take 1 tablet (40 mg) by mouth daily 90 tablet 3    fluticasone (FLONASE) 50 MCG/ACT nasal spray Spray 2 sprays into both nostrils daily      hydrOXYzine (ATARAX) 25 MG tablet Take 25 mg by mouth every 6 hours as needed for anxiety      lactobacillus rhamnosus, GG, (CULTURELL) capsule Take 1 capsule by mouth 2 times daily      loperamide (IMODIUM A-D) 2 MG tablet Take 1 tablet (2 mg) by mouth every 6 hours as needed for diarrhea (Patient taking differently: Take 2 mg by mouth 2 times daily)      losartan (COZAAR) 50 MG tablet Take 1 tablet (50 mg) by mouth daily (Patient taking differently: Take 50 mg by mouth 2 times daily)      mirtazapine (REMERON) 30 MG tablet Take 75 mg by mouth At Bedtime      triamcinolone (ARISTOCORT HP) 0.5 % external cream Apply topically every 8 hours as needed (itching)      verapamil ER (CALAN-SR) 120 MG CR tablet Take 120 mg by mouth 2 times daily         Review of Systems   All other systems reviewed and are negative.         Physical Exam  Vitals reviewed.   Cardiovascular:      Rate and Rhythm: Normal rate.      Pulses: Normal pulses.   Abdominal:      Palpations: Abdomen is soft.   Musculoskeletal:      Comments: Left short cast on leg     Neurological:      Mental Status: She is alert and oriented to person, place, and time.   Psychiatric:         Mood and Affect: Mood normal.         Behavior: Behavior normal.         Thought Content: Thought content normal.         Judgment: Judgment normal.           Labs:        Assessment/Plan:  (S84.420P) Closed fracture of left ankle with routine healing, subsequent encounter  (primary encounter diagnosis)  Comment: improving. Has a short cast on left leg Working with therapy.   Was on aspirin which is now finished  Plan: follows up with ortho on 9/19    (G35) Multiple sclerosis (H)  Comment: Chronic.  Dx at age 29.    Plan: Continue with plan of care no changes at this time, adjustment  as needed      (F32.A) Depression, unspecified depression type  Comment: chronic.  Follows with Dr. Yariel Hill.  Takes mirtazapine and Abilify and hydroxyzine.   Plan: follow with dr. Hill after discharge    (I10) Benign essential hypertension  Comment: chronic. Controlled with verapamil and losartan  Plan: Continue with plan of care no changes at this time, adjustment as needed      (K21.00) Gastroesophageal reflux disease with esophagitis without hemorrhage  Comment: Chronic.   Plan: Continue with plan of care no changes at this time, adjustment as needed      (R19.5) Loose stools  Comment: having 2 loose stools per day despite banana flakes and imodium  Plan: consider test for c-diff.           Electronically signed by:CHANCE Meek CNP

## 2023-09-12 NOTE — LETTER
9/12/2023        RE: Mili Padilla  [address]  [facility] [address]                                                                                           [facility]        Visit Type: Establish Care     Facility:   [facility] ([facility]) [[id number]]         History of Present Illness: Mili Padilla is a 68 year old female.  She developed MS at the age of 29 and retired due to MS at 34 years old.  She is single.      Her past medical history includes    Closed trimalleolar ankle fracture after a fall   Hyponatremia   Multiple sclerosis   Depression   HTN   GERD  Vocal cord paralysis    On 8/7/23 she was admitted to [facility] after a mechanical fall resulting in left ankle fracture s/p TTC nail 8/9/23.   She was discharged with  x 4 weeks for VTE ppx, short leg splint clean, dry ,and intact until follow-up, Up with assist. WBAT LLE in splint with cast.   She was discharged to [facility] for rehab on 8/11/23. Has a follow up with Orthopedics in clinic on 8/22/23.     Today patient was seen in her room sitting in a wheelchair.  She has a red short cast on her left leg.  She is having loose stools x2 during the day.  She sees psychiatry (Dr. Hill).  Denies chest pain, shortness of breath, dizziness, lightheadedness, and a poor appetite.     In reviewing point click care, VS stable.        Current Outpatient Medications   Medication Sig Dispense Refill     Acetaminophen 325 MG CHEW Take 650 mg by mouth every 4 hours as needed for mild pain       ARIPiprazole (ABILIFY) 5 MG tablet Take 7.5 mg by mouth daily       ARIPiprazole (ABILIFY) 5 MG tablet Take 7.5 mg by mouth daily  2     atorvastatin (LIPITOR) 20 MG tablet Take 20 mg by mouth daily.       bismuth subsalicylate 262 MG TABS Take 1 tablet by mouth 2 times daily       carBAMazepine (TEGRETOL XR) 200 MG 12 hr tablet Take 200 mg by mouth 2 times daily       carboxymethylcellulose PF (REFRESH PLUS) 0.5 % ophthalmic  solution Place 1 drop into both eyes 4 times daily       DULoxetine (CYMBALTA) 60 MG capsule Take 120 mg by mouth daily       famotidine (PEPCID) 40 MG tablet Take 1 tablet (40 mg) by mouth daily 90 tablet 3     fluticasone (FLONASE) 50 MCG/ACT nasal spray Spray 2 sprays into both nostrils daily       hydrOXYzine (ATARAX) 25 MG tablet Take 25 mg by mouth every 6 hours as needed for anxiety       lactobacillus rhamnosus, GG, (CULTURELL) capsule Take 1 capsule by mouth 2 times daily       loperamide (IMODIUM A-D) 2 MG tablet Take 1 tablet (2 mg) by mouth every 6 hours as needed for diarrhea (Patient taking differently: Take 2 mg by mouth 2 times daily)       losartan (COZAAR) 50 MG tablet Take 1 tablet (50 mg) by mouth daily (Patient taking differently: Take 50 mg by mouth 2 times daily)       mirtazapine (REMERON) 30 MG tablet Take 75 mg by mouth At Bedtime       triamcinolone (ARISTOCORT HP) 0.5 % external cream Apply topically every 8 hours as needed (itching)       verapamil ER (CALAN-SR) 120 MG CR tablet Take 120 mg by mouth 2 times daily         Review of Systems   All other systems reviewed and are negative.         Physical Exam  Vitals reviewed.   Cardiovascular:      Rate and Rhythm: Normal rate.      Pulses: Normal pulses.   Abdominal:      Palpations: Abdomen is soft.   Musculoskeletal:      Comments: Left short cast on leg     Neurological:      Mental Status: She is alert and oriented to person, place, and time.   Psychiatric:         Mood and Affect: Mood normal.         Behavior: Behavior normal.         Thought Content: Thought content normal.         Judgment: Judgment normal.           Labs:        Assessment/Plan:  (S84.928X) Closed fracture of left ankle with routine healing, subsequent encounter  (primary encounter diagnosis)  Comment: improving. Has a short cast on left leg Working with therapy.   Was on aspirin which is now finished  Plan: follows up with ortho on 9/19    (G35) Multiple  sclerosis (H)  Comment: Chronic.  Dx at age 29.    Plan: Continue with plan of care no changes at this time, adjustment as needed      (F32.A) Depression, unspecified depression type  Comment: chronic.  Follows with Dr. Yariel Hill.  Takes mirtazapine and Abilify and hydroxyzine.   Plan: follow with dr. Hill after discharge    (I10) Benign essential hypertension  Comment: chronic. Controlled with verapamil and losartan  Plan: Continue with plan of care no changes at this time, adjustment as needed      (K21.00) Gastroesophageal reflux disease with esophagitis without hemorrhage  Comment: Chronic.   Plan: Continue with plan of care no changes at this time, adjustment as needed      (R19.5) Loose stools  Comment: having 2 loose stools per day despite banana flakes and imodium  Plan: consider test for c-diff.           Electronically signed by:CHANCE Meek CNP                Sincerely,        Malou Eric, CHANCE CNP

## 2023-09-15 ENCOUNTER — LAB REQUISITION (OUTPATIENT)
Dept: LAB | Facility: CLINIC | Age: 68
End: 2023-09-15
Payer: COMMERCIAL

## 2023-09-15 ENCOUNTER — TRANSITIONAL CARE UNIT VISIT (OUTPATIENT)
Dept: GERIATRICS | Facility: CLINIC | Age: 68
End: 2023-09-15
Payer: COMMERCIAL

## 2023-09-15 VITALS
HEART RATE: 69 BPM | HEIGHT: 67 IN | TEMPERATURE: 97.7 F | SYSTOLIC BLOOD PRESSURE: 150 MMHG | DIASTOLIC BLOOD PRESSURE: 70 MMHG | OXYGEN SATURATION: 96 % | RESPIRATION RATE: 18 BRPM | BODY MASS INDEX: 44.36 KG/M2 | WEIGHT: 282.6 LBS

## 2023-09-15 DIAGNOSIS — K21.00 GASTROESOPHAGEAL REFLUX DISEASE WITH ESOPHAGITIS WITHOUT HEMORRHAGE: ICD-10-CM

## 2023-09-15 DIAGNOSIS — R19.7 DIARRHEA, UNSPECIFIED: ICD-10-CM

## 2023-09-15 DIAGNOSIS — S82.892D CLOSED FRACTURE OF LEFT ANKLE WITH ROUTINE HEALING, SUBSEQUENT ENCOUNTER: Primary | ICD-10-CM

## 2023-09-15 DIAGNOSIS — R19.5 LOOSE STOOLS: ICD-10-CM

## 2023-09-15 PROCEDURE — 99309 SBSQ NF CARE MODERATE MDM 30: CPT | Performed by: NURSE PRACTITIONER

## 2023-09-15 NOTE — PROGRESS NOTES
[facility] GERIATRICS        Visit Type: No chief complaint on file.     Facility:   No question data found.         History of Present Illness: Mili Padilla is a 68 year old female     Her past medical history includes     Closed trimalleolar ankle fracture after a fall   Hyponatremia   Multiple sclerosis   Depression - follows with Dr. Yariel Hill  HTN   GERD  Vocal cord paralysis    On 8[place]7/23 [place] [facility] [place] resulting in left ankle fracture s/p TTC nail 8/9/23.       Having loose stools x BID despite interventions.   had a an incontinence episode of liquid watery stool.  Has hx of loose stools.  Baseline incontinent of stool  C-diff pending  VS stable.   Feeling better today.  Less loose stool    Current Outpatient Medications   Medication Sig Dispense Refill    Acetaminophen 325 MG CHEW Take 650 mg by mouth every 4 hours as needed for mild pain      ARIPiprazole (ABILIFY) 5 MG tablet Take 7.5 mg by mouth daily  2    atorvastatin (LIPITOR) 20 MG tablet Take 20 mg by mouth daily.      Banana Flakes (BANATROL PO) Take by mouth 2 times daily      bismuth subsalicylate 262 MG TABS Take 1 tablet by mouth 2 times daily      carBAMazepine (TEGRETOL XR) 200 MG 12 hr tablet Take 200 mg by mouth 2 times daily      carboxymethylcellulose PF (REFRESH PLUS) 0.5 % ophthalmic solution Place 1 drop into both eyes 4 times daily      DULoxetine (CYMBALTA) 60 MG capsule Take 120 mg by mouth daily      famotidine (PEPCID) 40 MG tablet Take 1 tablet (40 mg) by mouth daily 90 tablet 3    fluticasone (FLONASE) 50 MCG/ACT nasal spray Spray 2 sprays into both nostrils daily      hydrOXYzine (ATARAX) 25 MG tablet Take 25 mg by mouth every 6 hours as needed for anxiety      lactobacillus rhamnosus, GG, (CULTURELL) capsule Take 1 capsule by mouth 2 times daily      loperamide (IMODIUM A-D) 2 MG tablet Take 1 tablet (2  mg) by mouth every 6 hours as needed for diarrhea (Patient taking differently: Take 2 mg by mouth 2 times daily)      losartan (COZAAR) 50 MG tablet Take 1 tablet (50 mg) by mouth daily (Patient taking differently: Take 50 mg by mouth 2 times daily)      mirtazapine (REMERON) 30 MG tablet Take 75 mg by mouth At Bedtime      triamcinolone (ARISTOCORT HP) 0.5 % external cream Apply topically every 8 hours as needed (itching)      verapamil ER (CALAN-SR) 120 MG CR tablet Take 120 mg by mouth 2 times daily         Review of Systems   All other systems reviewed and are negative.         Physical Exam  Vitals and nursing note reviewed.   Constitutional:       Appearance: She is obese.      Comments: In wheelchair   Pulmonary:      Effort: Pulmonary effort is normal.   Neurological:      Mental Status: She is alert.   Psychiatric:         Mood and Affect: Mood normal.         Behavior: Behavior normal.         Thought Content: Thought content normal.         Judgment: Judgment normal.           Labs:  Most Recent 3 CBC's:  Recent Labs   Lab Test 01/24/23  0919 01/23/23  1121 01/22/23  0918   WBC 10.1 10.4 8.9   HGB 12.5 11.6* 11.1*   MCV 93 93 92   * 94* 79*     Most Recent 3 BMP's:  Recent Labs   Lab Test 08/24/23  0549 01/24/23  0919 01/23/23  1633 01/23/23  1121    135*  --  132*   POTASSIUM 3.7 3.6 3.6 3.4   CHLORIDE 101 95*  --  96*   CO2 27 30*  --  26   BUN 9.2 7.9*  --  8.8   CR 0.62 0.53  --  0.49*   ANIONGAP 10 10  --  10   HEAVENLY 9.2 8.7*  --  8.5*   GLC 95 118*  --  102*         Assessment/Plan:  (R19.5) Loose stools  (K21.00) Gastroesophageal reflux disease with esophagitis without hemorrhage   Comment: having 2 loose stools per day despite banana flakes and imodium.  test for c-diff. Pending.  Incontinent at baseline  Plan:  awaiting c-diff results  Bmp and CBC       (V14.701J) Closed fracture of left ankle with routine healing, subsequent encounter  Comment: improving. Has a short cast on left leg  Working with therapy.   Was on aspirin which is now finished  Plan: goal to walk 75 ft by 9/15  follows up with ortho on 9/19        Electronically signed by:    CHANCE Meek CNP on 9/15/2023 at 4:01 PM

## 2023-09-15 NOTE — LETTER
9/15/2023        RE: Mili Padilla  [address]  [facility] [address]                                                                                           [facility] GERIATRICS        Visit Type: No chief complaint on file.     Facility:   No question data found.         History of Present Illness: Mili Padilla is a 68 year old female     Her past medical history includes     Closed trimalleolar ankle fracture after a fall   Hyponatremia   Multiple sclerosis   Depression - follows with Dr. Yariel Hill  HTN   GERD  Vocal cord paralysis    On 8/7/23 she was admitted to [facility] [place] after a mechanical fall resulting in left ankle fracture s/p TTC nail 8/9/23.       Having loose stools x BID despite interventions.   had a an incontinence episode of liquid watery stool.  Has hx of loose stools.  Baseline incontinent of stool  C-diff pending  VS stable.   Feeling better today.  Less loose stool    Current Outpatient Medications   Medication Sig Dispense Refill     Acetaminophen 325 MG CHEW Take 650 mg by mouth every 4 hours as needed for mild pain       ARIPiprazole (ABILIFY) 5 MG tablet Take 7.5 mg by mouth daily  2     atorvastatin (LIPITOR) 20 MG tablet Take 20 mg by mouth daily.       Banana Flakes (BANATROL PO) Take by mouth 2 times daily       bismuth subsalicylate 262 MG TABS Take 1 tablet by mouth 2 times daily       carBAMazepine (TEGRETOL XR) 200 MG 12 hr tablet Take 200 mg by mouth 2 times daily       carboxymethylcellulose PF (REFRESH PLUS) 0.5 % ophthalmic solution Place 1 drop into both eyes 4 times daily       DULoxetine (CYMBALTA) 60 MG capsule Take 120 mg by mouth daily       famotidine (PEPCID) 40 MG tablet Take 1 tablet (40 mg) by mouth daily 90 tablet 3     fluticasone (FLONASE) 50 MCG/ACT nasal spray Spray 2 sprays into both nostrils daily       hydrOXYzine (ATARAX) 25 MG tablet Take 25 mg by mouth every 6 hours as needed for anxiety       lactobacillus rhamnosus, GG,  (CULTURELL) capsule Take 1 capsule by mouth 2 times daily       loperamide (IMODIUM A-D) 2 MG tablet Take 1 tablet (2 mg) by mouth every 6 hours as needed for diarrhea (Patient taking differently: Take 2 mg by mouth 2 times daily)       losartan (COZAAR) 50 MG tablet Take 1 tablet (50 mg) by mouth daily (Patient taking differently: Take 50 mg by mouth 2 times daily)       mirtazapine (REMERON) 30 MG tablet Take 75 mg by mouth At Bedtime       triamcinolone (ARISTOCORT HP) 0.5 % external cream Apply topically every 8 hours as needed (itching)       verapamil ER (CALAN-SR) 120 MG CR tablet Take 120 mg by mouth 2 times daily         Review of Systems   All other systems reviewed and are negative.         Physical Exam  Vitals and nursing note reviewed.   Constitutional:       Appearance: She is obese.      Comments: In wheelchair   Pulmonary:      Effort: Pulmonary effort is normal.   Neurological:      Mental Status: She is alert.   Psychiatric:         Mood and Affect: Mood normal.         Behavior: Behavior normal.         Thought Content: Thought content normal.         Judgment: Judgment normal.           Labs:  Most Recent 3 CBC's:  Recent Labs   Lab Test 01/24/23  0919 01/23/23  1121 01/22/23  0918   WBC 10.1 10.4 8.9   HGB 12.5 11.6* 11.1*   MCV 93 93 92   * 94* 79*     Most Recent 3 BMP's:  Recent Labs   Lab Test 08/24/23  0549 01/24/23  0919 01/23/23  1633 01/23/23  1121    135*  --  132*   POTASSIUM 3.7 3.6 3.6 3.4   CHLORIDE 101 95*  --  96*   CO2 27 30*  --  26   BUN 9.2 7.9*  --  8.8   CR 0.62 0.53  --  0.49*   ANIONGAP 10 10  --  10   HEAVENLY 9.2 8.7*  --  8.5*   GLC 95 118*  --  102*         Assessment/Plan:  (R19.5) Loose stools  (K21.00) Gastroesophageal reflux disease with esophagitis without hemorrhage   Comment: having 2 loose stools per day despite banana flakes and imodium.  test for c-diff. Pending.  Incontinent at baseline  Plan:  awaiting c-diff results  Bmp and CBC       (A47.716X)  Closed fracture of left ankle with routine healing, subsequent encounter  Comment: improving. Has a short cast on left leg Working with therapy.   Was on aspirin which is now finished  Plan: goal to walk 75 ft by 9/15  follows up with ortho on 9/19        Electronically signed by:    CHANCE Meek CNP on 9/15/2023 at 4:01 PM              Sincerely,        CHANCE Meek CNP

## 2023-09-18 LAB
ANION GAP SERPL CALCULATED.3IONS-SCNC: 9 MMOL/L (ref 7–15)
BUN SERPL-MCNC: 11.4 MG/DL (ref 8–23)
CALCIUM SERPL-MCNC: 9.6 MG/DL (ref 8.8–10.2)
CHLORIDE SERPL-SCNC: 94 MMOL/L (ref 98–107)
CREAT SERPL-MCNC: 0.63 MG/DL (ref 0.51–0.95)
DEPRECATED HCO3 PLAS-SCNC: 29 MMOL/L (ref 22–29)
EGFRCR SERPLBLD CKD-EPI 2021: >90 ML/MIN/1.73M2
ERYTHROCYTE [DISTWIDTH] IN BLOOD BY AUTOMATED COUNT: 12.5 % (ref 10–15)
GLUCOSE SERPL-MCNC: 100 MG/DL (ref 70–99)
HCT VFR BLD AUTO: 34.4 % (ref 35–47)
HGB BLD-MCNC: 10.9 G/DL (ref 11.7–15.7)
MCH RBC QN AUTO: 30.8 PG (ref 26.5–33)
MCHC RBC AUTO-ENTMCNC: 31.7 G/DL (ref 31.5–36.5)
MCV RBC AUTO: 97 FL (ref 78–100)
PLATELET # BLD AUTO: 209 10E3/UL (ref 150–450)
POTASSIUM SERPL-SCNC: 4.5 MMOL/L (ref 3.4–5.3)
RBC # BLD AUTO: 3.54 10E6/UL (ref 3.8–5.2)
SODIUM SERPL-SCNC: 132 MMOL/L (ref 136–145)
WBC # BLD AUTO: 7.9 10E3/UL (ref 4–11)

## 2023-09-18 PROCEDURE — 36415 COLL VENOUS BLD VENIPUNCTURE: CPT | Performed by: NURSE PRACTITIONER

## 2023-09-18 PROCEDURE — P9604 ONE-WAY ALLOW PRORATED TRIP: HCPCS | Performed by: NURSE PRACTITIONER

## 2023-09-18 PROCEDURE — 85027 COMPLETE CBC AUTOMATED: CPT | Performed by: NURSE PRACTITIONER

## 2023-09-18 PROCEDURE — 82374 ASSAY BLOOD CARBON DIOXIDE: CPT | Performed by: NURSE PRACTITIONER

## 2023-09-19 ENCOUNTER — TRANSITIONAL CARE UNIT VISIT (OUTPATIENT)
Dept: GERIATRICS | Facility: CLINIC | Age: 68
End: 2023-09-19
Payer: COMMERCIAL

## 2023-09-19 VITALS
TEMPERATURE: 97.5 F | HEART RATE: 85 BPM | BODY MASS INDEX: 44.36 KG/M2 | SYSTOLIC BLOOD PRESSURE: 129 MMHG | RESPIRATION RATE: 18 BRPM | WEIGHT: 282.6 LBS | DIASTOLIC BLOOD PRESSURE: 54 MMHG | OXYGEN SATURATION: 97 % | HEIGHT: 67 IN

## 2023-09-19 DIAGNOSIS — D64.9 ANEMIA, UNSPECIFIED TYPE: ICD-10-CM

## 2023-09-19 DIAGNOSIS — K21.00 GASTROESOPHAGEAL REFLUX DISEASE WITH ESOPHAGITIS WITHOUT HEMORRHAGE: ICD-10-CM

## 2023-09-19 DIAGNOSIS — F32.A DEPRESSION, UNSPECIFIED DEPRESSION TYPE: ICD-10-CM

## 2023-09-19 DIAGNOSIS — E87.1 HYPONATREMIA: ICD-10-CM

## 2023-09-19 DIAGNOSIS — R19.5 LOOSE STOOLS: Primary | ICD-10-CM

## 2023-09-19 PROCEDURE — 99309 SBSQ NF CARE MODERATE MDM 30: CPT | Performed by: NURSE PRACTITIONER

## 2023-09-19 NOTE — PROGRESS NOTES
[facility] GERIATRICS      Code Status:  FULL CODE  Visit Type: RECHECK     Facility:   [facility] [[id number]]         History of Present Illness: Mili Padilla is a 68 year old female     Her past medical history includes     Closed trimalleolar ankle fracture after a fall   Hyponatremia   Multiple sclerosis   Depression - follows with Dr. Yariel Hill  HTN   GERD  Vocal cord paralysis     On 8/7/23 she was admitted to [facility] [place] after a mechanical fall resulting in left ankle fracture s/p TTC nail 8/9/23.      Loose stools have improved.   VS stable   Was at orthopedic today and received a new cast..In 2 weeks transition to crow boot from [employer] with same activity. Can wear edema sock with crow boot every shift      Current Outpatient Medications   Medication Sig Dispense Refill    Acetaminophen 325 MG CHEW Take 650 mg by mouth every 4 hours as needed for mild pain      ARIPiprazole (ABILIFY) 5 MG tablet Take 7.5 mg by mouth daily  2    atorvastatin (LIPITOR) 20 MG tablet Take 20 mg by mouth daily.      Banana Flakes (BANATROL PO) Take by mouth 2 times daily      bismuth subsalicylate 262 MG TABS Take 1 tablet by mouth 2 times daily      carBAMazepine (TEGRETOL XR) 200 MG 12 hr tablet Take 200 mg by mouth 2 times daily      carboxymethylcellulose PF (REFRESH PLUS) 0.5 % ophthalmic solution Place 1 drop into both eyes 4 times daily      DULoxetine (CYMBALTA) 60 MG capsule Take 120 mg by mouth daily      famotidine (PEPCID) 40 MG tablet Take 1 tablet (40 mg) by mouth daily 90 tablet 3    fluticasone (FLONASE) 50 MCG/ACT nasal spray Spray 2 sprays into both nostrils daily      hydrOXYzine (ATARAX) 25 MG tablet Take 25 mg by mouth every 6 hours as needed for anxiety      lactobacillus rhamnosus, GG, (CULTURELL) capsule Take 1 capsule by mouth 2 times daily      loperamide (IMODIUM A-D) 2 MG tablet Take 1 tablet (2 mg) by mouth every 6 hours as needed for diarrhea (Patient taking differently: Take 2  "mg by mouth 2 times daily)      losartan (COZAAR) 50 MG tablet Take 1 tablet (50 mg) by mouth daily (Patient taking differently: Take 50 mg by mouth 2 times daily)      mirtazapine (REMERON) 30 MG tablet Take 75 mg by mouth At Bedtime      triamcinolone (ARISTOCORT HP) 0.5 % external cream Apply topically every 8 hours as needed (itching)      verapamil ER (CALAN-SR) 120 MG CR tablet Take 120 mg by mouth 2 times daily         ALLERGIES:Amoxicillin, Amoxicillin-pot clavulanate, Betaseron [interferon beta-1b], Dust mite extract, and Mold    Vitals:  /54   Pulse 85   Temp 97.5  F (36.4  C)   Resp 18   Ht 1.702 m (5' 7\")   Wt 128.2 kg (282 lb 9.6 oz)   LMP 12/10/2010   SpO2 97%   BMI 44.26 kg/m    Body mass index is 44.26 kg/m .    Review of Systems   All other systems reviewed and are negative.         Physical Exam  Vitals reviewed.   Constitutional:       Appearance: She is obese.   Pulmonary:      Effort: Pulmonary effort is normal.   Musculoskeletal:      Comments: Left lower extremity cast     Neurological:      Mental Status: She is alert.           Labs:     Most Recent 3 CBC's:  Recent Labs   Lab Test 09/18/23  0555 01/24/23  0919 01/23/23  1121   WBC 7.9 10.1 10.4   HGB 10.9* 12.5 11.6*   MCV 97 93 93    120* 94*     Most Recent 3 BMP's:  Recent Labs   Lab Test 09/18/23  0555 08/24/23  0549 01/24/23  0919   * 138 135*   POTASSIUM 4.5 3.7 3.6   CHLORIDE 94* 101 95*   CO2 29 27 30*   BUN 11.4 9.2 7.9*   CR 0.63 0.62 0.53   ANIONGAP 9 10 10   HEAVENLY 9.6 9.2 8.7*   * 95 118*     Most Recent 2 LFT's:  Recent Labs   Lab Test 01/20/23  0503 01/18/23  2113   AST 38* 43*   ALT 32 33   ALKPHOS 73 64   BILITOTAL 0.9 1.4*                Assessment/Plan:  (R19.5) Loose stools  (K21.00) Gastroesophageal reflux disease with esophagitis without hemorrhage   Comment: having 2 loose stools per day despite banana flakes and imodium.  test for c-diff. Pending.  Incontinent at baseline.   Not able to " obtain c-diff sample due to incontinence.   Improving.   Plan:    Consider Decreasing imodium to once daily    (F32.A) Depression, unspecified depression type  (E87.1) hyponatremia  Comment: chronic.  Follows with Dr. Yariel Hill.  Takes mirtazapine and Abilify and hydroxyzine.   Plan: follow with dr. Hill after discharge and trend sodium.        (D64.9) anemia  Comment: improved since hospital.  Now 10.9 was 8.2 (8/2023)  Plan: Continue with plan of care no changes at this time, adjustment as needed      Electronically signed by:CHANCE Meek CNP    MEDICATIONS:  MED REC REQUIRED  Post Medication Reconciliation Status:  Discharge medications reconciled, continue medications without change

## 2023-09-19 NOTE — LETTER
9/19/2023        RE: Mili Padilla  [address]  [facility] [address]         [facility] GERIATRICS      Code Status:  FULL CODE  Visit Type: RECHECK     Facility:   [facility] [[id number]]         History of Present Illness: Mili Padilla is a 68 year old female     Her past medical history includes     Closed trimalleolar ankle fracture after a fall   Hyponatremia   Multiple sclerosis   Depression - follows with Dr. Yariel Hill  HTN   GERD  Vocal cord paralysis     On 8/7/23 she was admitted to [facility] [place] after a mechanical fall resulting in left ankle fracture s/p TTC nail 8/9/23.      Loose stools have improved.   VS stable   Was at orthopedic today and received a new cast..[facility]. Can wear edema sock with crow boot every shift      Current Outpatient Medications   Medication Sig Dispense Refill     Acetaminophen 325 MG CHEW Take 650 mg by mouth every 4 hours as needed for mild pain       ARIPiprazole (ABILIFY) 5 MG tablet Take 7.5 mg by mouth daily  2     atorvastatin (LIPITOR) 20 MG tablet Take 20 mg by mouth daily.       Banana Flakes (BANATROL PO) Take by mouth 2 times daily       bismuth subsalicylate 262 MG TABS Take 1 tablet by mouth 2 times daily       carBAMazepine (TEGRETOL XR) 200 MG 12 hr tablet Take 200 mg by mouth 2 times daily       carboxymethylcellulose PF (REFRESH PLUS) 0.5 % ophthalmic solution Place 1 drop into both eyes 4 times daily       DULoxetine (CYMBALTA) 60 MG capsule Take 120 mg by mouth daily       famotidine (PEPCID) 40 MG tablet Take 1 tablet (40 mg) by mouth daily 90 tablet 3     fluticasone (FLONASE) 50 MCG/ACT nasal spray Spray 2 sprays into both nostrils daily       hydrOXYzine (ATARAX) 25 MG tablet Take 25 mg by mouth every 6 hours as needed for anxiety       lactobacillus rhamnosus, GG, (CULTURELL) capsule Take 1 capsule by mouth 2 times daily       loperamide (IMODIUM A-D) 2 MG  "tablet Take 1 tablet (2 mg) by mouth every 6 hours as needed for diarrhea (Patient taking differently: Take 2 mg by mouth 2 times daily)       losartan (COZAAR) 50 MG tablet Take 1 tablet (50 mg) by mouth daily (Patient taking differently: Take 50 mg by mouth 2 times daily)       mirtazapine (REMERON) 30 MG tablet Take 75 mg by mouth At Bedtime       triamcinolone (ARISTOCORT HP) 0.5 % external cream Apply topically every 8 hours as needed (itching)       verapamil ER (CALAN-SR) 120 MG CR tablet Take 120 mg by mouth 2 times daily         ALLERGIES:Amoxicillin, Amoxicillin-pot clavulanate, Betaseron [interferon beta-1b], Dust mite extract, and Mold    Vitals:  /54   Pulse 85   Temp 97.5  F (36.4  C)   Resp 18   Ht 1.702 m (5' 7\")   Wt 128.2 kg (282 lb 9.6 oz)   LMP 12/10/2010   SpO2 97%   BMI 44.26 kg/m    Body mass index is 44.26 kg/m .    Review of Systems   All other systems reviewed and are negative.         Physical Exam  Vitals reviewed.   Constitutional:       Appearance: She is obese.   Pulmonary:      Effort: Pulmonary effort is normal.   Musculoskeletal:      Comments: Left lower extremity cast     Neurological:      Mental Status: She is alert.           Labs:     Most Recent 3 CBC's:  Recent Labs   Lab Test 09/18/23  0555 01/24/23  0919 01/23/23  1121   WBC 7.9 10.1 10.4   HGB 10.9* 12.5 11.6*   MCV 97 93 93    120* 94*     Most Recent 3 BMP's:  Recent Labs   Lab Test 09/18/23  0555 08/24/23  0549 01/24/23  0919   * 138 135*   POTASSIUM 4.5 3.7 3.6   CHLORIDE 94* 101 95*   CO2 29 27 30*   BUN 11.4 9.2 7.9*   CR 0.63 0.62 0.53   ANIONGAP 9 10 10   HEAVENLY 9.6 9.2 8.7*   * 95 118*     Most Recent 2 LFT's:  Recent Labs   Lab Test 01/20/23  0503 01/18/23  2113   AST 38* 43*   ALT 32 33   ALKPHOS 73 64   BILITOTAL 0.9 1.4*                Assessment/Plan:  (R19.5) Loose stools  (K21.00) Gastroesophageal reflux disease with esophagitis without hemorrhage   Comment: having 2 loose " stools per day despite banana flakes and imodium.  test for c-diff. Pending.  Incontinent at baseline.   Not able to obtain c-diff sample due to incontinence.   Improving.   Plan:    Consider Decreasing imodium to once daily    (F32.A) Depression, unspecified depression type  (E87.1) hyponatremia  Comment: chronic.  Follows with Dr. Yariel Hill.  Takes mirtazapine and Abilify and hydroxyzine.   Plan: follow with dr. Hill after discharge and trend sodium.        (D64.9) anemia  Comment: improved since hospital.  Now 10.9 was 8.2 (8/2023)  Plan: Continue with plan of care no changes at this time, adjustment as needed      Electronically signed by:CHANCE Meek CNP    MEDICATIONS:  MED REC REQUIRED  Post Medication Reconciliation Status:  Discharge medications reconciled, continue medications without change              Sincerely,        CHANCE Meek CNP

## 2023-09-20 RX ORDER — TROSPIUM CHLORIDE 20 MG/1
20 TABLET, FILM COATED ORAL 2 TIMES DAILY
COMMUNITY

## 2023-09-20 RX ORDER — TRAZODONE HYDROCHLORIDE 300 MG/1
600 TABLET ORAL AT BEDTIME
COMMUNITY

## 2023-09-20 RX ORDER — LOPERAMIDE HYDROCHLORIDE 2 MG/1
2 TABLET ORAL 2 TIMES DAILY
Start: 2023-09-20

## 2023-09-20 RX ORDER — LOSARTAN POTASSIUM 50 MG/1
50 TABLET ORAL 2 TIMES DAILY
COMMUNITY
Start: 2023-09-20

## 2023-10-02 VITALS
SYSTOLIC BLOOD PRESSURE: 132 MMHG | HEART RATE: 127 BPM | DIASTOLIC BLOOD PRESSURE: 62 MMHG | WEIGHT: 281.9 LBS | RESPIRATION RATE: 20 BRPM | HEIGHT: 67 IN | BODY MASS INDEX: 44.25 KG/M2 | TEMPERATURE: 97.7 F | OXYGEN SATURATION: 94 %

## 2023-10-03 ENCOUNTER — TRANSITIONAL CARE UNIT VISIT (OUTPATIENT)
Dept: GERIATRICS | Facility: CLINIC | Age: 68
End: 2023-10-03
Payer: COMMERCIAL

## 2023-10-03 DIAGNOSIS — F33.1 MAJOR DEPRESSIVE DISORDER, RECURRENT EPISODE, MODERATE (H): ICD-10-CM

## 2023-10-03 DIAGNOSIS — I10 BENIGN ESSENTIAL HYPERTENSION: ICD-10-CM

## 2023-10-03 DIAGNOSIS — E66.01 PSYCHOLOGICAL FACTORS AFFECTING MORBID OBESITY (H): ICD-10-CM

## 2023-10-03 DIAGNOSIS — E87.1 HYPONATREMIA: ICD-10-CM

## 2023-10-03 DIAGNOSIS — S82.892D CLOSED FRACTURE OF LEFT ANKLE WITH ROUTINE HEALING, SUBSEQUENT ENCOUNTER: Primary | ICD-10-CM

## 2023-10-03 DIAGNOSIS — D64.9 ANEMIA, UNSPECIFIED TYPE: ICD-10-CM

## 2023-10-03 DIAGNOSIS — M79.7 FIBROMYALGIA SYNDROME: ICD-10-CM

## 2023-10-03 DIAGNOSIS — F54 PSYCHOLOGICAL FACTORS AFFECTING MORBID OBESITY (H): ICD-10-CM

## 2023-10-03 PROCEDURE — 99305 1ST NF CARE MODERATE MDM 35: CPT | Performed by: INTERNAL MEDICINE

## 2023-10-03 NOTE — LETTER
10/3/2023        RE: Mili Padilla  [address]  [facility] [address]        Mili Padilla is a 68 year old female seen October 6, 2023 at [facility] where she was admitted after [facility] hospitalization 8/7-11 for a left comminuted trimalleolar ankle fracture she suffered in a fall   Also found to have nondisplaced fractures of the 4th and 5th metatarsals.     She underwent ORIF on 8/9, and transferred to TCU for ongoing recovery and Rehab      Today pt is seen in her room up to , states she is feeling okay.   Able to ambulate short distances only with walker and assist.  She is continuing to work with therapies, although reported to be self-limiting.       By chart review, pt has been followed by Psychiatrist at [facility] for a long history of depression, with h/o inpatient stays and ECT.    Dx'd with secondary progressive MS in 1985, followed by Neurology.   Last flare in 1996, but mobility has remained impaired         Past Medical History:   Diagnosis Date     Depression, major, in partial remission (H)      Fibromyalgia syndrome      Gastro-oesophageal reflux disease      Hyperlipemia      Hypertension      Low serum sodium      Lymphedema      Mixed incontinence      Multiple sclerosis (H)     tremors with MS, all four limbs and head     Multiple sclerosis, secondary progressive (H)      Neurogenic bladder      Obese      Osteoporosis      Sleep apnea      Vocal cord paralysis, unilateral complete        Past Surgical History:   Procedure Laterality Date     COLONOSCOPY N/A 07/17/2017    Procedure: COMBINED COLONOSCOPY, SINGLE OR MULTIPLE BIOPSY/POLYPECTOMY BY BIOPSY;;  Surgeon: Wong Beaulieu MD;  Location:  GI     COLONOSCOPY N/A 02/23/2018    Procedure: COMBINED COLONOSCOPY, SINGLE OR MULTIPLE BIOPSY/POLYPECTOMY BY BIOPSY;  COLONOSCOPY ;  Surgeon: Yessica Santana MD;  Location:  GI     ENT SURGERY      throat 1969, vocal cord surgery with skin grafting     ESOPHAGOSCOPY,  GASTROSCOPY, DUODENOSCOPY (EGD), COMBINED N/A 2014    Procedure: COMBINED ENDOSCOPIC ULTRASOUND, ESOPHAGOSCOPY, GASTROSCOPY, DUODENOSCOPY (EGD), FINE NEEDLE ASPIRATE/BIOPSY;  Surgeon: Yessica Santana MD;  Location:  GI     ESOPHAGOSCOPY, GASTROSCOPY, DUODENOSCOPY (EGD), COMBINED N/A 2014    Procedure: COMBINED ESOPHAGOSCOPY, GASTROSCOPY, DUODENOSCOPY (EGD), BIOPSY SINGLE OR MULTIPLE;  Surgeon: Yessica Santana MD;  Location:  GI     GASTRIC BYPASS Bilateral      LAPAROSCOPIC APPENDECTOMY  2013    Procedure: LAPAROSCOPIC APPENDECTOMY;;  Surgeon: Salvador Morris MD;  Location:  OR     LAPAROSCOPIC BIOPSY LIVER  2013    Procedure: LAPAROSCOPIC BIOPSY LIVER;;  Surgeon: Salvador Morris MD;  Location:  OR     LAPAROSCOPIC GASTRIC SLEEVE  2013    Procedure: LAPAROSCOPIC GASTRIC SLEEVE;  LAPAROSCOPIC SLEEVE GASTRECTOMY/ LAPARSCOPIC  APPENDECTOMY /LIVER BIOPSIES/LIVER CYST DRAINAGE;  Surgeon: Salvador Morris MD;  Location:  OR     LASER HOLMIUM LITHOTRIPSY URETER(S), INSERT STENT, COMBINED Left 2023    Procedure: CYSTOSCOPY, LEFT URETEROSCOPY, HOLMIUM LASER  LITHOTRIPSY, LEFT   STENT PLACEMENT;  Surgeon: Mahendra Coles MD;  Location:  OR     ORTHOPEDIC SURGERY      R wrist      PHACOEMULSIFICATION CLEAR CORNEA WITH STANDARD INTRAOCULAR LENS IMPLANT Right 2023    Procedure: RIGHT EYE PHACOEMULSIFICATION CATARACT WITH INTRAOCULAR LENS IMPLANT;  Surgeon: Flaco Pappas MD;  [place] [facility] [place]     PHACOEMULSIFICATION CLEAR CORNEA WITH STANDARD INTRAOCULAR LENS IMPLANT Left 2023    Procedure: LEFT EYE PHACOEMULSIFICATION, CATARACT, WITH INTRAOCULAR LENS IMPLANT;  Surgeon: Flaco Pappas MD;  Location: [facility] [place]     :  Single, lived alone, [place] apartment [place]  Had HHA and C nurse assistance   Pt had a daughter who  in    Her sister Guera is first contact    Disabled since  secondary to MS   Smoker  "distant past        ROS:  Pt was renting a power WC at home     SLUMS 30/30    Wt Readings from Last 5 Encounters:   10/02/23 127.9 kg (281 lb 14.4 oz)   09/19/23 128.2 kg (282 lb 9.6 oz)   09/15/23 128.2 kg (282 lb 9.6 oz)   09/12/23 128.2 kg (282 lb 9.6 oz)   07/20/23 128.4 kg (283 lb)      EXAM: NAD, up to WC  /62   Pulse (!) 127   Temp 97.7  F (36.5  C)   Resp 20   Ht 1.702 m (5' 7\")   Wt 127.9 kg (281 lb 14.4 oz)   LMP 12/10/2010   SpO2 94%   BMI 44.15 kg/m     Neck supple without adenopathy  Lungs clear bilaterally with fair air movement  Heart RRR s1s2 distant   Abd obese, soft, NT, no distention or guarding, +BS  Ext: wearing a sparkly blue knee-high cast on RLS, with a cast shoe.   Toes are warm and freely mobile    Neuro: no focal findings  Psych: a little flat     Lab Results   Component Value Date     (L) 09/18/2023    POTASSIUM 4.5 09/18/2023    CHLORIDE 94 (L) 09/18/2023    CO2 29 09/18/2023    ANIONGAP 9 09/18/2023     (H) 09/18/2023    BUN 11.4 09/18/2023    CR 0.63 09/18/2023    GFRESTIMATED >90 09/18/2023    HEAVENLY 9.6 09/18/2023     Lab Results   Component Value Date    WBC 7.9 09/18/2023      HGB 10.9 09/18/2023      MCV 97 09/8/2023       09/18/2023       IMP/PLAN:  (S83.408H) Closed fracture of left ankle with routine healing, subsequent encounter  (primary encounter diagnosis)  Comment: 2 months out from initial injury and surgery   Has been working with therapies, but limited mobility   Plan: Discharge pt up to [facility] 4th floor for ongoing assist with med admin, meals, activity and ADLs.     PHYSICAL THERAPY / OCCUPATIONAL THERAPY for strengthening and gait.   Pt's stated discharge goal continues to be return to her home alone, with [facility]    Pain management with PRN acetaminophen   Follow up with Orthopedics, plan to change to a Crow brace     (D64.9) Anemia, unspecified type  Comment: post operative     Plan: famotidine 40 mg/day   Follow hgb    (E87.1) " Hyponatremia  Comment: mild, likely medication induced   Plan: follow Na    (I10) Benign essential hypertension  Comment:   BP Readings from Last 3 Encounters:   10/02/23 132/62   09/19/23 129/54   09/15/23 (!) 150/70      Plan: verapamil 120 mg bid, losartan 50 mg bid   Follow bps and BMP     (F33.1) Major depressive disorder, recurrent episode, moderate (H)  (M79.7) Fibromyalgia syndrome  (E66.01,  F54) Psychological factors affecting morbid obesity (H)  Comment: followed by ACP  Plan: mirtazapine 75 mg /HS, trazodone 600 mg/ HS, duloxetine 120 mg/day, carbamazepine 200 mg bid, aripiprazole 7.5 mg/ day,  and PRN hydroxyzine    Will need close follow up with Psychiatry      Brittany Naik MD       Sincerely,        Brittany Naik MD

## 2023-10-11 ENCOUNTER — VIRTUAL VISIT (OUTPATIENT)
Dept: PSYCHOLOGY | Facility: CLINIC | Age: 68
End: 2023-10-11
Payer: COMMERCIAL

## 2023-10-11 DIAGNOSIS — F54 PSYCHOLOGICAL FACTORS AFFECTING MORBID OBESITY (H): ICD-10-CM

## 2023-10-11 DIAGNOSIS — F33.0 MAJOR DEPRESSIVE DISORDER, RECURRENT EPISODE, MILD (H): Primary | ICD-10-CM

## 2023-10-11 DIAGNOSIS — E66.01 PSYCHOLOGICAL FACTORS AFFECTING MORBID OBESITY (H): ICD-10-CM

## 2023-10-11 DIAGNOSIS — Z63.4 BEREAVEMENT: ICD-10-CM

## 2023-10-11 PROCEDURE — 90837 PSYTX W PT 60 MINUTES: CPT | Mod: 95 | Performed by: PSYCHOLOGIST

## 2023-10-11 SDOH — SOCIAL STABILITY - SOCIAL INSECURITY: DISSAPEARANCE AND DEATH OF FAMILY MEMBER: Z63.4

## 2023-10-11 NOTE — PROGRESS NOTES
Health Psychology                     Department of Medicine  Izzy Montaño, Ph.D., L.P. [phone]                          [facility] Sherrie Crowe, Ph.D., L.P. [phone]                   [place] Mail Code 461   Ranulfo Whipple, Ph.D. [phone]        [address] [place] [address] Enriqueta Sorto, Ph.D., L.P. [phone]    [place] [address]           Ramin Olivo, Ph.D., A.B.P.P., L.P. [phone]        Kathie Galvan, Ph.D., L.P. [phone]  [address] Psychology Telemental Health Note    Demographics   Age 67 year old   Sex female   Race White   Ethnicity Not  or      Ms. Padilla is a  woman self-referred for psychological consultation because her therapist of the last 24 years retired.  She is seen for problem-solving and supportive therapy for depression and multiple health issues.     HISTORY OF PRESENTING CONCERN:  Ms. Padilla reported at intake (7/28/16) a  lengthy history of depression.  She was seeing a psychiatrist, Ban Coleman, at [facility], and is taking Abilify 7.5 mg, trazodone 500 mg, ripazepam 75 mg each day at bedtime, Tegretol 400 mg at bedtime and methylphenidate 10 mg b.i.d.  She discontineud ability and is now taking Rexulti 2 mg.  She has a history of hospitalizations including three at the [facility] in the late 1990s, early 2000s when she had 2 courses of ECT lasting 7-10 sessions and approximately 3 other single session ECTs.  She stopped due to memory problems.  She kept with Dr. Coleman who she first met as an inpatient and has seen her for the past 18 years.  She had also been hospitalized psychiatrically at [facility] at age 23.  She previously worked with psychiatrist, Doug Sarabia for three years, stopping, in part, because she didn't feel he took her suicide attempt sufficiently seriously.  She reports her depression tends to vary.      MEDICAL HISTORY (at intake)  Ms. Padilla has a complex medical history.  She was diagnosed with MS in 1985.  Her psychiatrist at [facility] in [place], Dr. 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  She has used a scooter since 1992, using it more  than she had previously.  She also could use a walker.  She was diagnosed with fibromyalgia in 1997. She is seen at the [facility] for her eye care. During 8th grade, she fell on a ski lift, injuring her larynx.     WEIGHT : self report    2/23/23  Down to 281 lbs.   Hard to weigh.  Not steady on her feet.  9/7/23    281  9/30/23  281    She has not been attending OA.     Wt Readings from Last 4 Encounters:   10/02/23 127.9 kg (281 lb 14.4 oz)   09/19/23 128.2 kg (282 lb 9.6 oz)   09/15/23 128.2 kg (282 lb 9.6 oz)   09/12/23 128.2 kg (282 lb 9.6 oz)     Past Medical History:   Diagnosis Date    Depression, major, in partial remission (H)     Fibromyalgia syndrome     Gastro-oesophageal reflux disease     Hyperlipemia     Hypertension     Low serum sodium     Lymphedema     Mixed incontinence     Multiple sclerosis (H)     tremors with MS, all four limbs and head    Multiple sclerosis, secondary progressive (H)     Neurogenic bladder     Obese     Osteoporosis     Sleep apnea     Vocal cord paralysis, unilateral complete         Past Surgical History:   Procedure Laterality Date    COLONOSCOPY N/A 07/17/2017    Procedure: COMBINED COLONOSCOPY, SINGLE OR MULTIPLE BIOPSY/POLYPECTOMY BY BIOPSY;;  Surgeon: Wong Beaulieu MD;  Location:  GI    COLONOSCOPY N/A 02/23/2018    Procedure: COMBINED COLONOSCOPY, SINGLE OR MULTIPLE BIOPSY/POLYPECTOMY BY BIOPSY;  COLONOSCOPY ;  Surgeon: Yessica Santana MD;  Location:  GI    ENT SURGERY      throat 1969, vocal cord surgery with skin grafting    ESOPHAGOSCOPY, GASTROSCOPY, DUODENOSCOPY (EGD), COMBINED N/A 12/16/2014    Procedure: COMBINED ENDOSCOPIC ULTRASOUND,  ESOPHAGOSCOPY, GASTROSCOPY, DUODENOSCOPY (EGD), FINE NEEDLE ASPIRATE/BIOPSY;  Surgeon: Yessica Santana MD;  Location:  GI    ESOPHAGOSCOPY, GASTROSCOPY, DUODENOSCOPY (EGD), COMBINED N/A 12/16/2014    Procedure: COMBINED ESOPHAGOSCOPY, GASTROSCOPY, DUODENOSCOPY (EGD), BIOPSY SINGLE OR MULTIPLE;  Surgeon: Yessica Santana MD;  Location:  GI    GASTRIC BYPASS Bilateral     LAPAROSCOPIC APPENDECTOMY  05/30/2013    Procedure: LAPAROSCOPIC APPENDECTOMY;;  Surgeon: Salvador Morris MD;  Location:  OR    LAPAROSCOPIC BIOPSY LIVER  05/30/2013    Procedure: LAPAROSCOPIC BIOPSY LIVER;;  Surgeon: Salvador Morris MD;  Location:  OR    LAPAROSCOPIC GASTRIC SLEEVE  05/30/2013    Procedure: LAPAROSCOPIC GASTRIC SLEEVE;  LAPAROSCOPIC SLEEVE GASTRECTOMY/ LAPARSCOPIC  APPENDECTOMY /LIVER BIOPSIES/LIVER CYST DRAINAGE;  Surgeon: Salvador Morris MD;  Location:  OR    LASER HOLMIUM LITHOTRIPSY URETER(S), INSERT STENT, COMBINED Left 1/18/2023    Procedure: CYSTOSCOPY, LEFT URETEROSCOPY, HOLMIUM LASER  LITHOTRIPSY, LEFT   STENT PLACEMENT;  Surgeon: Mahendra Coles MD;  Location:  OR    ORTHOPEDIC SURGERY      R wrist 2010    PHACOEMULSIFICATION CLEAR CORNEA WITH STANDARD INTRAOCULAR LENS IMPLANT Right 6/29/2023    Procedure: RIGHT EYE PHACOEMULSIFICATION CATARACT WITH INTRAOCULAR LENS IMPLANT;  Surgeon: Flaco Pappas MD;  [place] [facility] [place]    PHACOEMULSIFICATION CLEAR CORNEA WITH STANDARD INTRAOCULAR LENS IMPLANT Left 7/20/2023    Procedure: LEFT EYE PHACOEMULSIFICATION, CATARACT, WITH INTRAOCULAR LENS IMPLANT;  Surgeon: Flaco Pappas MD;  [place] [facility] [place]       Current Outpatient Medications   Medication    Acetaminophen 325 MG CHEW    ARIPiprazole (ABILIFY) 5 MG tablet    atorvastatin (LIPITOR) 20 MG tablet    Banana Flakes (BANATROL PO)    bismuth subsalicylate 262 MG TABS    carBAMazepine (TEGRETOL XR) 200 MG 12 hr tablet    carboxymethylcellulose PF (REFRESH PLUS) 0.5 %  ophthalmic solution    DULoxetine (CYMBALTA) 60 MG capsule    famotidine (PEPCID) 40 MG tablet    fluticasone (FLONASE) 50 MCG/ACT nasal spray    hydrOXYzine (ATARAX) 25 MG tablet    lactobacillus rhamnosus, GG, (CULTURELL) capsule    loperamide (IMODIUM A-D) 2 MG tablet    losartan (COZAAR) 50 MG tablet    mirtazapine (REMERON) 30 MG tablet    traZODone HCl 300 MG TABS    triamcinolone (ARISTOCORT HP) 0.5 % external cream    trospium (SANCTURA) 20 MG tablet    verapamil ER (CALAN-SR) 120 MG CR tablet     No current facility-administered medications for this visit.     Medication: On Duloxetine and Mirtazpine and Trazodone and Ritalin. She discontinued Effexor and Abilify previously per Dr. Marquise Coleman, her psychiatrist.  On 4/10/19 she informed me that she started Abilify        9/15/2019    10:10 AM 2022     3:40 PM 11/3/2022     6:06 PM   PHQ-9 SCORE   PHQ-9 Total Score MyChart 5 (Mild depression) 6 (Mild depression) 5 (Mild depression)   PHQ-9 Total Score 5 6 5         11/3/2022     6:05 PM 2022     6:30 PM 8/15/2023    11:12 AM   NAS-7 SCORE   Total Score 3 (minimal anxiety) 4 (minimal anxiety) 3 (minimal anxiety)   Total Score 3 4 3     SOCIAL HISTORY (at intake)  Ms. Padilla grew up in the [place] area and is the oldest of 5 children in her family of origin.  Her father was a dentist who she believes was bipolar.  He  of lung cancer at age 72.  Her mother  of sepsis at age 80.  She had been diagnosed with breast cancer when Ms. Padilla was 8.  She described the marriage between her parents as misael.  She is 5 years older than her closest-aged sister and they are all within 4 years of each other.   Her daughter  12/3 and her mother .  She tends to feel more depressed in winter.      Ms. Padilla attended [employer] for a year and later went to night school at the [employer].  She felt that she could not continue her education to the point of graduation because  "she was working full time and raising her daughter.  Her daughter  in  at age 31 of liver failure secondary to an accidental Tylenol overdose.  She had been recovering from a hysterectomy.      Ms. 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  She had to retire at the time secondary to her MS.  She has never .  She has not dated,and states that if she were she would date, she would be interested in a relationship with a woman.  There is no history of  service or legal problems.  She expresses concern about her increasing social isolation.  She had at least 1 friend, Jael, who she met at a therapy group in .  She is active in facilitating groups for people with MS both in [place] and [place].  She lived alone and currently gets help from a home health aide, as well as home health nurse.     She had  seen Dr. Ban Coleman, psychiatrist   Dr. Coleman prescribed Cymbalta for it.  She feels she has been more depressed since the Fall, but doesn't think it is SAD.   She stated that she has recommended case management.     SESSION:  Mili arrived punctually for today's video psychotherapy session.The depression varies and has worsened given her health issues.  Not as bad today.     She is now in longterm care now c.f. transitional care at [facility].  It is more intensive; in terms of PT and OT (hoping for 4x/week).  She is \"not feeling part of it yet\" in the new unit.  It is taking her more time to recover.    She is now covered by [facility], Medicare, and most recently getting on Medical Assistance.  I asked her to inform [facility] of it and of potential address changes.    \"My legs are weaker than my arms.\" \"I've lost my ability to walk over the years.\"  She can use her arms to push herself up.     She is still recovering from her broken left ankle (3 places) and 2 bones in " her foot, had surgery at [facility] [place], and is now at [facility]. Not in pain. She is getting PT 1-2X/day. She got her leg brace Friday.  She had been looking for assisted living options the day she broke it and is unsure how good her ambulation might be in the future, but still hopig to function well enough to stay in her apartment.  She can no longer flex her foot.    Still grieving her friend Jael, with whom she used to speak daily.  She has been distracted by her health issues.    Housing:   She is unsure whether she needs assisted living (600 square feet) or a care suite (200 square feet).  She has been in her apartment since 1990.  Devyn was down for 2 months in the TCU; couldn't send out mail. She couldn't respond to emails directly- had to be phone.  She has not been able to get to her MS groups.  She is exploring housing with .     Weight Issues: Weight is stable 281.9 on 9/30.  Discussed cutting in half portions with the dietitian.     Since May, goal had been [id number] calories. Plan to resume tracking is on hold.     Her family is supportive.  Sister is in [place] for two months.     She participated fully and appeared to derive benefit. Affect is positive.  Rapport was excellent.  The treatment plan was reviewed with the patient at today s visit. The treatment plan remains current based on the patient s status and progress to date.  This telehealth (telephone) service is appropriate and effective for delivering services in light of the necessity for social distancing to mitigate the COVID-19 epidemic and for conservation of PPE.     Patient has agreed to receiving telehealth services after being informed about it: Yes    Patient prefers video invitation/information to be sent by:   email    Time service started: 9:59  Time service ended:  10:52    Mode of transmission: Dubb    Location of originating:  [facility]  Phone: [phone]  / [phone]    Distance site:  Home  office of the provider    The patient has been notified that:  Video visits will be conducted via a call with their psychologist to provide the care they need with a video conversation. Video visits may be billed at different rates depending on insurance coverage.  Patients are advised to please contact their insurance provider with any questions about their health insurance coverage. If during the course of a call the psychologist feels a video visit is not appropriate, patients will not be charged for this service  DIAGNOSES:   Major depression, recurrent, moderate (F33.1).   Behavioral factors affecting morbid obesity (E66.01).  Bereavement     PLAN:  Ms. Padilla will return for in in person visit  11/10 @ 3 for eclectic, supportive therapy consistent with treatment plan.  Goal per day now that she is tracking is 1750 consistently.    Preference for future meetings:             In-person   prefers, even if masked              Remote              Either  Last treatment plan signed: 6/30/23  Last Treatment plan review: 10/11/23  Treatment plan verbal Review due: 1/11/24  Treatment Review due: 6/30/24     Ramin Olivo, PhD, A.B.P.P., L.P.   Director, Health Psychology

## 2023-10-16 NOTE — PROGRESS NOTES
Mili Padilla is a 68 year old female seen October 6, 2023 at [facility] TCU where she was admitted after [facility] hospitalization 8/7-11 for a left comminuted trimalleolar ankle fracture she suffered in a fall   Also found to have nondisplaced fractures of the 4th and 5th metatarsals.     She underwent ORIF on 8/9, and transferred to TCU for ongoing recovery and Rehab      Today pt is seen in her room up to , states she is feeling okay.   Able to ambulate short distances only with walker and assist.  She is continuing to work with therapies, although reported to be self-limiting.       By chart review, pt has been followed by Psychiatrist at [facility] for a long history of depression, with h/o inpatient stays and ECT.    Dx'd with secondary progressive MS in 1985, followed by Neurology.   Last flare in 1996, but mobility has remained impaired         Past Medical History:   Diagnosis Date    Depression, major, in partial remission (H)     Fibromyalgia syndrome     Gastro-oesophageal reflux disease     Hyperlipemia     Hypertension     Low serum sodium     Lymphedema     Mixed incontinence     Multiple sclerosis (H)     tremors with MS, all four limbs and head    Multiple sclerosis, secondary progressive (H)     Neurogenic bladder     Obese     Osteoporosis     Sleep apnea     Vocal cord paralysis, unilateral complete        Past Surgical History:   Procedure Laterality Date    COLONOSCOPY N/A 07/17/2017    Procedure: COMBINED COLONOSCOPY, SINGLE OR MULTIPLE BIOPSY/POLYPECTOMY BY BIOPSY;;  Surgeon: Wong Beaulieu MD;  Location:  GI    COLONOSCOPY N/A 02/23/2018    Procedure: COMBINED COLONOSCOPY, SINGLE OR MULTIPLE BIOPSY/POLYPECTOMY BY BIOPSY;  COLONOSCOPY ;  Surgeon: Yessica Santana MD;  Location:  GI    ENT SURGERY      throat 1969, vocal cord surgery with skin grafting    ESOPHAGOSCOPY, GASTROSCOPY, DUODENOSCOPY (EGD), COMBINED N/A 12/16/2014    Procedure: COMBINED ENDOSCOPIC ULTRASOUND, ESOPHAGOSCOPY,  GASTROSCOPY, DUODENOSCOPY (EGD), FINE NEEDLE ASPIRATE/BIOPSY;  Surgeon: Yessica Santana MD;  Location:  GI    ESOPHAGOSCOPY, GASTROSCOPY, DUODENOSCOPY (EGD), COMBINED N/A 2014    Procedure: COMBINED ESOPHAGOSCOPY, GASTROSCOPY, DUODENOSCOPY (EGD), BIOPSY SINGLE OR MULTIPLE;  Surgeon: Yessica Santana MD;  Location:  GI    GASTRIC BYPASS Bilateral     LAPAROSCOPIC APPENDECTOMY  2013    Procedure: LAPAROSCOPIC APPENDECTOMY;;  Surgeon: Salvador Morris MD;  Location:  OR    LAPAROSCOPIC BIOPSY LIVER  2013    Procedure: LAPAROSCOPIC BIOPSY LIVER;;  Surgeon: Salvador Morris MD;  Location:  OR    LAPAROSCOPIC GASTRIC SLEEVE  2013    Procedure: LAPAROSCOPIC GASTRIC SLEEVE;  LAPAROSCOPIC SLEEVE GASTRECTOMY/ LAPARSCOPIC  APPENDECTOMY /LIVER BIOPSIES/LIVER CYST DRAINAGE;  Surgeon: Salvador Morris MD;  Location:  OR    LASER HOLMIUM LITHOTRIPSY URETER(S), INSERT STENT, COMBINED Left 2023    Procedure: CYSTOSCOPY, LEFT URETEROSCOPY, HOLMIUM LASER  LITHOTRIPSY, LEFT   STENT PLACEMENT;  Surgeon: Mahendra Coles MD;  Location:  OR    ORTHOPEDIC SURGERY      R wrist     PHACOEMULSIFICATION CLEAR CORNEA WITH STANDARD INTRAOCULAR LENS IMPLANT Right 2023    Procedure: RIGHT EYE PHACOEMULSIFICATION CATARACT WITH INTRAOCULAR LENS IMPLANT;  Surgeon: Flaco Pappas MD;  Location: [facility] OR    PHACOEMULSIFICATION CLEAR CORNEA WITH STANDARD INTRAOCULAR LENS IMPLANT Left 2023    Procedure: LEFT EYE PHACOEMULSIFICATION, CATARACT, WITH INTRAOCULAR LENS IMPLANT;  Surgeon: Flaco Pappas MD;  Location: [facility] OR     :  Single, lived alone, [place] apartment in [facility]  Had HHA and C nurse assistance   Pt had a daughter who  in    Her sister Guera is first contact    Disabled since  secondary to MS   Smoker distant past        ROS:  Pt was renting a power WC at home     SLUMS 30/30    Wt Readings from Last 5 Encounters:   10/02/23 127.9  "kg (281 lb 14.4 oz)   09/19/23 128.2 kg (282 lb 9.6 oz)   09/15/23 128.2 kg (282 lb 9.6 oz)   09/12/23 128.2 kg (282 lb 9.6 oz)   07/20/23 128.4 kg (283 lb)      EXAM: NAD, up to WC  /62   Pulse (!) 127   Temp 97.7  F (36.5  C)   Resp 20   Ht 1.702 m (5' 7\")   Wt 127.9 kg (281 lb 14.4 oz)   LMP 12/10/2010   SpO2 94%   BMI 44.15 kg/m     Neck supple without adenopathy  Lungs clear bilaterally with fair air movement  Heart RRR s1s2 distant   Abd obese, soft, NT, no distention or guarding, +BS  Ext: wearing a sparkly blue knee-high cast on RLS, with a cast shoe.   Toes are warm and freely mobile    Neuro: no focal findings  Psych: a little flat     Lab Results   Component Value Date     (L) 09/18/2023    POTASSIUM 4.5 09/18/2023    CHLORIDE 94 (L) 09/18/2023    CO2 29 09/18/2023    ANIONGAP 9 09/18/2023     (H) 09/18/2023    BUN 11.4 09/18/2023    CR 0.63 09/18/2023    GFRESTIMATED >90 09/18/2023    HEAVENLY 9.6 09/18/2023     Lab Results   Component Value Date    WBC 7.9 09/18/2023      HGB 10.9 09/18/2023      MCV 97 09/8/2023       09/18/2023       IMP/PLAN:  (S82.899A) Closed fracture of left ankle with routine healing, subsequent encounter  (primary encounter diagnosis)  Comment: 2 months out from initial injury and surgery   Has been working with therapies, but limited mobility   Plan: Discharge pt up to [facility] 4th floor for ongoing assist with med admin, meals, activity and ADLs.     PHYSICAL THERAPY / OCCUPATIONAL THERAPY for strengthening and gait.   Pt's stated discharge goal continues to be return to her home alone, with [facility]    Pain management with PRN acetaminophen   Follow up with Orthopedics, plan to change to a Crow brace     (D64.9) Anemia, unspecified type  Comment: post operative     Plan: famotidine 40 mg/day   Follow hgb    (E87.1) Hyponatremia  Comment: mild, likely medication induced   Plan: follow Na    (I10) Benign essential hypertension  Comment:   BP Readings from " Last 3 Encounters:   10/02/23 132/62   09/19/23 129/54   09/15/23 (!) 150/70      Plan: verapamil 120 mg bid, losartan 50 mg bid   Follow bps and BMP     (F33.1) Major depressive disorder, recurrent episode, moderate (H)  (M79.7) Fibromyalgia syndrome  (E66.01,  F54) Psychological factors affecting morbid obesity (H)  Comment: followed by ACP  Plan: mirtazapine 75 mg /HS, trazodone 600 mg/ HS, duloxetine 120 mg/day, carbamazepine 200 mg bid, aripiprazole 7.5 mg/ day,  and PRN hydroxyzine    Will need close follow up with Psychiatry      Brittany Naik MD

## 2023-11-10 ENCOUNTER — OFFICE VISIT (OUTPATIENT)
Dept: PSYCHOLOGY | Facility: CLINIC | Age: 68
End: 2023-11-10
Payer: COMMERCIAL

## 2023-11-10 DIAGNOSIS — E66.01 PSYCHOLOGICAL FACTORS AFFECTING MORBID OBESITY (H): ICD-10-CM

## 2023-11-10 DIAGNOSIS — F54 PSYCHOLOGICAL FACTORS AFFECTING MORBID OBESITY (H): ICD-10-CM

## 2023-11-10 DIAGNOSIS — Z63.4 BEREAVEMENT: ICD-10-CM

## 2023-11-10 DIAGNOSIS — F33.0 MAJOR DEPRESSIVE DISORDER, RECURRENT EPISODE, MILD (H): Primary | ICD-10-CM

## 2023-11-10 PROCEDURE — 90834 PSYTX W PT 45 MINUTES: CPT | Performed by: PSYCHOLOGIST

## 2023-11-10 SDOH — SOCIAL STABILITY - SOCIAL INSECURITY: DISSAPEARANCE AND DEATH OF FAMILY MEMBER: Z63.4

## 2023-11-10 NOTE — PROGRESS NOTES
Health Psychology                     Department of Medicine  Izzy Montaño, Ph.D., L.P. [phone]                          [facility] Sherrie Crowe, Ph.D., L.P. [phone]                   [place] Mail Code 943   Ranulfo Whipple, Ph.D. [phone]        [address] [place] [address] Enriqueta Sorto, Ph.D., L.P. [phone]    [place] [address]           Ramin Olivo, Ph.D., A.B.P.P., L.P. [phone]        Kathie Galvan, Ph.D., L.P. [phone]  [address]      Health Psychology Progress Note    Demographics   Age 68 year old   Sex female   Race White   Ethnicity Not  or      Ms. Padilla is a  woman self-referred for psychological consultation because her therapist of the last 24 years retired.  She is seen for problem-solving and supportive therapy for depression and multiple health issues.     HISTORY OF PRESENTING CONCERN:  Ms. Padilla reported at intake (7/28/16) a  lengthy history of depression.  She was seeing a psychiatrist, Ban Coleman, at [facility], and is taking Abilify 7.5 mg, trazodone 500 mg, ripazepam 75 mg each day at bedtime, Tegretol 400 mg at bedtime and methylphenidate 10 mg b.i.d.  She discontineud ability and is now taking Rexulti 2 mg.  She has a history of hospitalizations including three at the [facility] in the late 1990s, early 2000s when she had 2 courses of ECT lasting 7-10 sessions and approximately 3 other single session ECTs.  She stopped due to memory problems.  She kept with Dr. Coleman who she first met as an inpatient and has seen her for the past 18 years.  She had also been hospitalized psychiatrically at [facility] at age 23.  She previously worked with psychiatrist, Doug Sarabia for three years, stopping, in part, because she didn't feel he took her suicide attempt sufficiently seriously.  She reports her depression tends to vary.      MEDICAL HISTORY (at intake)  Ms. Padilla has a complex medical history.  She was diagnosed with MS in 1985.  Her psychiatrist at [facility] in [place], Dr. 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  She has used a scooter since 1992, using it more  than she had previously.  She also could use a walker.  She was diagnosed with fibromyalgia in 1997. She is seen at the [place] for her eye care. During 8th grade, she fell on a ski lift, injuring her larynx.     WEIGHT : self report    2/23/23  Down to 281 lbs.   Hard to weigh.  Not steady on her feet.  9/7/23    281  9/30/23  281  11/10/23  289.1  (taken 11/1)      Wt Readings from Last 4 Encounters:   10/02/23 127.9 kg (281 lb 14.4 oz)   09/19/23 128.2 kg (282 lb 9.6 oz)   09/15/23 128.2 kg (282 lb 9.6 oz)   09/12/23 128.2 kg (282 lb 9.6 oz)     Past Medical History:   Diagnosis Date    Depression, major, in partial remission (H)     Fibromyalgia syndrome     Gastro-oesophageal reflux disease     Hyperlipemia     Hypertension     Low serum sodium     Lymphedema     Mixed incontinence     Multiple sclerosis (H)     tremors with MS, all four limbs and head    Multiple sclerosis, secondary progressive (H)     Neurogenic bladder     Obese     Osteoporosis     Sleep apnea     Vocal cord paralysis, unilateral complete         Past Surgical History:   Procedure Laterality Date    COLONOSCOPY N/A 07/17/2017    Procedure: COMBINED COLONOSCOPY, SINGLE OR MULTIPLE BIOPSY/POLYPECTOMY BY BIOPSY;;  Surgeon: Wong Beaulieu MD;  Location:  GI    COLONOSCOPY N/A 02/23/2018    Procedure: COMBINED COLONOSCOPY, SINGLE OR MULTIPLE BIOPSY/POLYPECTOMY BY BIOPSY;  COLONOSCOPY ;  Surgeon: Yessica Santana MD;  Location:  GI    ENT SURGERY      throat 1969, vocal cord surgery with skin grafting    ESOPHAGOSCOPY, GASTROSCOPY, DUODENOSCOPY (EGD), COMBINED N/A 12/16/2014    Procedure: COMBINED ENDOSCOPIC ULTRASOUND,  ESOPHAGOSCOPY, GASTROSCOPY, DUODENOSCOPY (EGD), FINE NEEDLE ASPIRATE/BIOPSY;  Surgeon: Yessica Santana MD;  Location:  GI    ESOPHAGOSCOPY, GASTROSCOPY, DUODENOSCOPY (EGD), COMBINED N/A 12/16/2014    Procedure: COMBINED ESOPHAGOSCOPY, GASTROSCOPY, DUODENOSCOPY (EGD), BIOPSY SINGLE OR MULTIPLE;  Surgeon: Yessica Santana MD;  Location:  GI    GASTRIC BYPASS Bilateral     LAPAROSCOPIC APPENDECTOMY  05/30/2013    Procedure: LAPAROSCOPIC APPENDECTOMY;;  Surgeon: Salvador Morris MD;  Location:  OR    LAPAROSCOPIC BIOPSY LIVER  05/30/2013    Procedure: LAPAROSCOPIC BIOPSY LIVER;;  Surgeon: Salvador Morris MD;  Location:  OR    LAPAROSCOPIC GASTRIC SLEEVE  05/30/2013    Procedure: LAPAROSCOPIC GASTRIC SLEEVE;  LAPAROSCOPIC SLEEVE GASTRECTOMY/ LAPARSCOPIC  APPENDECTOMY /LIVER BIOPSIES/LIVER CYST DRAINAGE;  Surgeon: Salvador Morris MD;  Location:  OR    LASER HOLMIUM LITHOTRIPSY URETER(S), INSERT STENT, COMBINED Left 1/18/2023    Procedure: CYSTOSCOPY, LEFT URETEROSCOPY, HOLMIUM LASER  LITHOTRIPSY, LEFT   STENT PLACEMENT;  Surgeon: Mahendra Coles MD;  Location:  OR    ORTHOPEDIC SURGERY      R wrist 2010    PHACOEMULSIFICATION CLEAR CORNEA WITH STANDARD INTRAOCULAR LENS IMPLANT Right 6/29/2023    Procedure: RIGHT EYE PHACOEMULSIFICATION CATARACT WITH INTRAOCULAR LENS IMPLANT;  Surgeon: Flaco Pappas MD;  [place] [facility] [place]    PHACOEMULSIFICATION CLEAR CORNEA WITH STANDARD INTRAOCULAR LENS IMPLANT Left 7/20/2023    Procedure: LEFT EYE PHACOEMULSIFICATION, CATARACT, WITH INTRAOCULAR LENS IMPLANT;  Surgeon: Flaco Pappas MD;  [place] [facility] [place]       Current Outpatient Medications   Medication    Acetaminophen 325 MG CHEW    ARIPiprazole (ABILIFY) 5 MG tablet    atorvastatin (LIPITOR) 20 MG tablet    Banana Flakes (BANATROL PO)    bismuth subsalicylate 262 MG TABS    carBAMazepine (TEGRETOL XR) 200 MG 12 hr tablet    carboxymethylcellulose PF (REFRESH PLUS) 0.5 %  ophthalmic solution    DULoxetine (CYMBALTA) 60 MG capsule    famotidine (PEPCID) 40 MG tablet    fluticasone (FLONASE) 50 MCG/ACT nasal spray    hydrOXYzine (ATARAX) 25 MG tablet    lactobacillus rhamnosus, GG, (CULTURELL) capsule    loperamide (IMODIUM A-D) 2 MG tablet    losartan (COZAAR) 50 MG tablet    mirtazapine (REMERON) 30 MG tablet    traZODone HCl 300 MG TABS    triamcinolone (ARISTOCORT HP) 0.5 % external cream    trospium (SANCTURA) 20 MG tablet    verapamil ER (CALAN-SR) 120 MG CR tablet     No current facility-administered medications for this visit.     Medication: On Duloxetine and Mirtazpine and Trazodone and Ritalin. She discontinued Effexor and Abilify previously per Dr. Marquise Coleman, her psychiatrist.  On 4/10/19 she informed me that she started Abilify        9/15/2019    10:10 AM 2022     3:40 PM 11/3/2022     6:06 PM   PHQ-9 SCORE   PHQ-9 Total Score MyChart 5 (Mild depression) 6 (Mild depression) 5 (Mild depression)   PHQ-9 Total Score 5 6 5         11/3/2022     6:05 PM 2022     6:30 PM 8/15/2023    11:12 AM   NAS-7 SCORE   Total Score 3 (minimal anxiety) 4 (minimal anxiety) 3 (minimal anxiety)   Total Score 3 4 3     SOCIAL HISTORY (at intake)  Ms. Padilla grew up in the [place] area and is the oldest of 5 children in her family of origin.  Her father was a dentist who she believes was bipolar.  He  of lung cancer at age 72.  Her mother  of sepsis at age 80.  She had been diagnosed with breast cancer when Ms. Padilla was 8.  She described the marriage between her parents as misael.  She is 5 years older than her closest-aged sister and they are all within 4 years of each other.   Her daughter  12/3 and her mother .  She tends to feel more depressed in winter.      Ms. Padilla attended [employer] for a year and later went to night school at the [employer].  She felt that she could not continue her education to the point of graduation because  she was working full time and raising her daughter.  Her daughter  in  at age 31 of liver failure secondary to an accidental Tylenol overdose.  She had been recovering from a hysterectomy.      Ms. 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  She had to retire at the time secondary to her MS.  She has never .  She has not dated,and states that if she were she would date, she would be interested in a relationship with a woman.  There is no history of  service or legal problems.  She expresses concern about her increasing social isolation.  She had at least 1 friend, Jael, who she met at a therapy group in .  She is active in facilitating groups for people with MS both in [place] and [place].  She lived alone and currently gets help from a home health aide, as well as home health nurse.     She had  seen Dr. Ban Coleman, psychiatrist   Dr. Coleman prescribed Cymbalta for it.  She feels she has been more depressed since the Fall, but doesn't think it is SAD.   She stated that she has recommended case management.     SESSION:  Mili arrived late or today's video psychotherapy session due to [employer] inefficiency. The depression varies and has worsened given her health issues.      She is now covered by [facility], Medicare, and most recently getting on Medical Assistance.  I asked her to inform [facility] of it and of potential address changes.    She is still recovering from her broken left ankle (3 places) and 2 bones in her foot, [place] [facility] and is now at [facility]. Not in pain in leg, but has some in her side. She is weaker than she thinks she should be, wonders if also having MS flare.  She is reluctant to get 3 CTs or MRIs as repositioning is hard for her.      She is getting PT four days/week. She got her leg brace Friday.  She had been looking  for assisted living options the day she broke it and is unsure how good her ambulation might be in the future, but still hopig to function well enough to stay in her apartment.  She can no longer flex her foot.  She will have a care conference next week to explore living options.     Began Wellbutrin today (150 mg) per Dr. Yariel Hill ([facility]); has seen him x2 so far.     Weight Issues: Weight is increasing. Discussed cutting in half portions with the dietitian, however, that plan is not being followed.   We printed out a reminder for her to use.   She thinks overall she should be getting 1200 janice/day.She has not been attending OA. Since May, goal had been [id number] calories. Plan to resume tracking is on hold.   I asked team at [facility] to limit intake per dietitian's instructions.    Her family is supportive.  Sister is in [place] for two months.     She participated fully and appeared to derive benefit. Affect is positive.  Rapport was excellent.  Time service started: 3:12  Time service ended:  3:50    Mode of transmission: Vanessa    [facility]  Phone: [phone]  / [phone]; room  [facility] [address] [place] [address]    DIAGNOSES:   Major depression, recurrent, moderate (F33.1).   Behavioral factors affecting morbid obesity (E66.01).  Bereavement     PLAN:  Ms. Padilla will return for in in person visit  12/15 @ 3 for eclectic, supportive therapy consistent with treatment plan.    Preference for future meetings:             In-person   prefers, even if masked              Remote              Either  Last treatment plan signed: 6/30/23  Last Treatment plan review: 10/11/23  Treatment plan verbal Review due: 1/11/24  Treatment Review due: 6/30/24     Ramin Olivo, PhD, A.B.P.P., L.P.   Director, Health Psychology

## 2023-12-10 ENCOUNTER — LAB REQUISITION (OUTPATIENT)
Dept: LAB | Facility: CLINIC | Age: 68
End: 2023-12-10
Payer: COMMERCIAL

## 2023-12-10 DIAGNOSIS — R30.0 DYSURIA: ICD-10-CM

## 2023-12-10 LAB
BACTERIA #/AREA URNS HPF: ABNORMAL /HPF
MUCOUS THREADS #/AREA URNS LPF: PRESENT /LPF
RBC URINE: >182 /HPF
SQUAMOUS EPITHELIAL: 1 /HPF
WBC CLUMPS #/AREA URNS HPF: PRESENT /HPF
WBC URINE: 85 /HPF

## 2023-12-10 PROCEDURE — 81001 URINALYSIS AUTO W/SCOPE: CPT | Mod: ORL | Performed by: NURSE PRACTITIONER

## 2023-12-10 PROCEDURE — 81015 MICROSCOPIC EXAM OF URINE: CPT | Mod: ORL | Performed by: NURSE PRACTITIONER

## 2023-12-10 PROCEDURE — 87086 URINE CULTURE/COLONY COUNT: CPT | Mod: ORL | Performed by: NURSE PRACTITIONER

## 2023-12-11 LAB
ALBUMIN UR-MCNC: 300 MG/DL
APPEARANCE UR: CLEAR
BACTERIA #/AREA URNS HPF: ABNORMAL /HPF
BILIRUB UR QL STRIP: NEGATIVE
COLOR UR AUTO: ABNORMAL
GLUCOSE UR STRIP-MCNC: NEGATIVE MG/DL
HGB UR QL STRIP: ABNORMAL
KETONES UR STRIP-MCNC: NEGATIVE MG/DL
LEUKOCYTE ESTERASE UR QL STRIP: ABNORMAL
MUCOUS THREADS #/AREA URNS LPF: PRESENT /LPF
NITRATE UR QL: POSITIVE
PH UR STRIP: 6.5 [PH] (ref 5–7)
RBC URINE: >182 /HPF
SP GR UR STRIP: 1.01 (ref 1–1.03)
SQUAMOUS EPITHELIAL: 1 /HPF
UROBILINOGEN UR STRIP-MCNC: NORMAL MG/DL
WBC CLUMPS #/AREA URNS HPF: PRESENT /HPF
WBC URINE: 85 /HPF

## 2023-12-13 LAB
BACTERIA UR CULT: ABNORMAL

## 2023-12-15 ENCOUNTER — OFFICE VISIT (OUTPATIENT)
Dept: PSYCHOLOGY | Facility: CLINIC | Age: 68
End: 2023-12-15
Payer: COMMERCIAL

## 2023-12-15 DIAGNOSIS — E66.01 PSYCHOLOGICAL FACTORS AFFECTING MORBID OBESITY (H): ICD-10-CM

## 2023-12-15 DIAGNOSIS — Z63.4 BEREAVEMENT: ICD-10-CM

## 2023-12-15 DIAGNOSIS — F33.0 MAJOR DEPRESSIVE DISORDER, RECURRENT EPISODE, MILD (H): Primary | ICD-10-CM

## 2023-12-15 DIAGNOSIS — F54 PSYCHOLOGICAL FACTORS AFFECTING MORBID OBESITY (H): ICD-10-CM

## 2023-12-15 PROCEDURE — 90837 PSYTX W PT 60 MINUTES: CPT | Mod: 95 | Performed by: PSYCHOLOGIST

## 2023-12-15 SDOH — SOCIAL STABILITY - SOCIAL INSECURITY: DISSAPEARANCE AND DEATH OF FAMILY MEMBER: Z63.4

## 2023-12-15 NOTE — PROGRESS NOTES
Health Psychology                     Department of Medicine  Izzy Montaño, Ph.D., L.P. [phone]                          [facility] Sherrie Crowe, Ph.D., L.P. [phone]                   [facility] Mail Code 018   Ranulfo Whipple, Ph.D. [phone]        [address] [place] [address] Enriqueta Sorto, Ph.D., L.P. [phone]    [place] [address]           Ramin Olivo, Ph.D., A.B.P.P., L.P. [phone]        Kathie Galvan, Ph.D., L.P. [phone]  [address]      Health Psychology Progress Note    Demographics   Age 68 year old   Sex female   Race White   Ethnicity Not  or      Ms. Padilla is a  woman self-referred for psychological consultation because her therapist of the last 24 years retired.  She is seen for problem-solving and supportive therapy for depression and multiple health issues.     HISTORY OF PRESENTING CONCERN:  Ms. Padilla reported at intake (7/28/16) a  lengthy history of depression.  She was seeing a psychiatrist, Ban Coleman, at [facility], and is taking Abilify 7.5 mg, trazodone 500 mg, ripazepam 75 mg each day at bedtime, Tegretol 400 mg at bedtime and methylphenidate 10 mg b.i.d.  She discontineud ability and is now taking Rexulti 2 mg.  She has a history of hospitalizations including three at the [facility] in the late 1990s, early 2000s when she had 2 courses of ECT lasting 7-10 sessions and approximately 3 other single session ECTs.  She stopped due to memory problems.  She kept with Dr. Coleman who she first met as an inpatient and has seen her for the past 18 years.  She had also been hospitalized psychiatrically at [facility] at age 23.  She previously worked with psychiatrist, Doug Sarabia for three years, stopping, in part, because she didn't feel he took her suicide attempt sufficiently seriously.  She reports her depression tends to vary.      MEDICAL HISTORY (at intake)  Ms. Padilla has a complex medical history.  She was diagnosed with MS in 1985.  Her psychiatrist at [facility] in [place], Dr. 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  She has used a scooter since 1992, using it more  than she had previously.  She also could use a walker.  She was diagnosed with fibromyalgia in 1997. She is seen at the [facility] for her eye care. During 8th grade, she fell on a ski lift, injuring her larynx.     WEIGHT : self report    2/23/23  Down to 281 lbs.   Hard to weigh.  Not steady on her feet.  9/7/23    281  9/30/23  281  11/10/23  289.1  (taken 11/1)  12/14/23   286.5 (taken 12/6/23)      Wt Readings from Last 4 Encounters:   10/02/23 127.9 kg (281 lb 14.4 oz)   09/19/23 128.2 kg (282 lb 9.6 oz)   09/15/23 128.2 kg (282 lb 9.6 oz)   09/12/23 128.2 kg (282 lb 9.6 oz)     Past Medical History:   Diagnosis Date    Depression, major, in partial remission (H)     Fibromyalgia syndrome     Gastro-oesophageal reflux disease     Hyperlipemia     Hypertension     Low serum sodium     Lymphedema     Mixed incontinence     Multiple sclerosis (H)     tremors with MS, all four limbs and head    Multiple sclerosis, secondary progressive (H)     Neurogenic bladder     Obese     Osteoporosis     Sleep apnea     Vocal cord paralysis, unilateral complete         Past Surgical History:   Procedure Laterality Date    COLONOSCOPY N/A 07/17/2017    Procedure: COMBINED COLONOSCOPY, SINGLE OR MULTIPLE BIOPSY/POLYPECTOMY BY BIOPSY;;  Surgeon: Wong Beaulieu MD;  Location:  GI    COLONOSCOPY N/A 02/23/2018    Procedure: COMBINED COLONOSCOPY, SINGLE OR MULTIPLE BIOPSY/POLYPECTOMY BY BIOPSY;  COLONOSCOPY ;  Surgeon: Yessica Santana MD;  Location:  GI    ENT SURGERY      throat 1969, vocal cord surgery with skin grafting    ESOPHAGOSCOPY, GASTROSCOPY, DUODENOSCOPY (EGD), COMBINED N/A 12/16/2014    Procedure: COMBINED  ENDOSCOPIC ULTRASOUND, ESOPHAGOSCOPY, GASTROSCOPY, DUODENOSCOPY (EGD), FINE NEEDLE ASPIRATE/BIOPSY;  Surgeon: Yessica Santana MD;  Location:  GI    ESOPHAGOSCOPY, GASTROSCOPY, DUODENOSCOPY (EGD), COMBINED N/A 12/16/2014    Procedure: COMBINED ESOPHAGOSCOPY, GASTROSCOPY, DUODENOSCOPY (EGD), BIOPSY SINGLE OR MULTIPLE;  Surgeon: Yessica Santana MD;  Location:  GI    GASTRIC BYPASS Bilateral     LAPAROSCOPIC APPENDECTOMY  05/30/2013    Procedure: LAPAROSCOPIC APPENDECTOMY;;  Surgeon: Salvador Morris MD;  Location:  OR    LAPAROSCOPIC BIOPSY LIVER  05/30/2013    Procedure: LAPAROSCOPIC BIOPSY LIVER;;  Surgeon: Salvador Morris MD;  Location:  OR    LAPAROSCOPIC GASTRIC SLEEVE  05/30/2013    Procedure: LAPAROSCOPIC GASTRIC SLEEVE;  LAPAROSCOPIC SLEEVE GASTRECTOMY/ LAPARSCOPIC  APPENDECTOMY /LIVER BIOPSIES/LIVER CYST DRAINAGE;  Surgeon: Salvador Morris MD;  Location:  OR    LASER HOLMIUM LITHOTRIPSY URETER(S), INSERT STENT, COMBINED Left 1/18/2023    Procedure: CYSTOSCOPY, LEFT URETEROSCOPY, HOLMIUM LASER  LITHOTRIPSY, LEFT   STENT PLACEMENT;  Surgeon: Mahendra Coles MD;  Location:  OR    ORTHOPEDIC SURGERY      R wrist 2010    PHACOEMULSIFICATION CLEAR CORNEA WITH STANDARD INTRAOCULAR LENS IMPLANT Right 6/29/2023    Procedure: RIGHT EYE PHACOEMULSIFICATION CATARACT WITH INTRAOCULAR LENS IMPLANT;  Surgeon: Flaco Pappas MD;  Location: [facility] OR    PHACOEMULSIFICATION CLEAR CORNEA WITH STANDARD INTRAOCULAR LENS IMPLANT Left 7/20/2023    Procedure: LEFT EYE PHACOEMULSIFICATION, CATARACT, WITH INTRAOCULAR LENS IMPLANT;  Surgeon: Flaco Pappas MD;  Location: [facility] OR       Current Outpatient Medications   Medication    Acetaminophen 325 MG CHEW    ARIPiprazole (ABILIFY) 5 MG tablet    atorvastatin (LIPITOR) 20 MG tablet    Banana Flakes (BANATROL PO)    bismuth subsalicylate 262 MG TABS    carBAMazepine (TEGRETOL XR) 200 MG 12 hr tablet    carboxymethylcellulose PF  (REFRESH PLUS) 0.5 % ophthalmic solution    DULoxetine (CYMBALTA) 60 MG capsule    famotidine (PEPCID) 40 MG tablet    fluticasone (FLONASE) 50 MCG/ACT nasal spray    hydrOXYzine (ATARAX) 25 MG tablet    lactobacillus rhamnosus, GG, (CULTURELL) capsule    loperamide (IMODIUM A-D) 2 MG tablet    losartan (COZAAR) 50 MG tablet    mirtazapine (REMERON) 30 MG tablet    traZODone HCl 300 MG TABS    triamcinolone (ARISTOCORT HP) 0.5 % external cream    trospium (SANCTURA) 20 MG tablet    verapamil ER (CALAN-SR) 120 MG CR tablet     No current facility-administered medications for this visit.     Medication: On Duloxetine and Mirtazpine and Trazodone and Ritalin. She discontinued Effexor and Abilify previously per Dr. Marquise Coleman, her psychiatrist.  On 4/10/19 she informed me that she started Abilify        9/15/2019    10:10 AM 2022     3:40 PM 11/3/2022     6:06 PM   PHQ-9 SCORE   PHQ-9 Total Score MyChart 5 (Mild depression) 6 (Mild depression) 5 (Mild depression)   PHQ-9 Total Score 5 6 5         11/3/2022     6:05 PM 2022     6:30 PM 8/15/2023    11:12 AM   NAS-7 SCORE   Total Score 3 (minimal anxiety) 4 (minimal anxiety) 3 (minimal anxiety)   Total Score 3 4 3     SOCIAL HISTORY (at intake)  Ms. Padilla grew up in the [facility] and is the oldest of 5 children in her family of origin.  Her father was a dentist who she believes was bipolar.  He  of lung cancer at age 72.  Her mother  of sepsis at age 80.  She had been diagnosed with breast cancer when Ms. Padilla was 8.  She described the marriage between her parents as misael.  She is 5 years older than her closest-aged sister and they are all within 4 years of each other.   Her daughter  12/3 and her mother .  She tends to feel more depressed in winter.      Ms. Padilla attended [employer] for a year and later went to night school at the [employer].  She felt that she could not continue her education to the point  of graduation because she was working full time and raising her daughter.  Her daughter  in  at age 31 of liver failure secondary to an accidental Tylenol overdose.  She had been recovering from a hysterectomy.      Ms. 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  She had to retire at the time secondary to her MS.  She has never .  She has not dated,and states that if she were she would date, she would be interested in a relationship with a woman.  There is no history of  service or legal problems.  She expresses concern about her increasing social isolation.  She had at least 1 friend, Jael, who she met at a therapy group in .  She is active in facilitating groups for people with MS both in [place] and [place].  She lived alone and currently gets help from a home health aide, as well as home health nurse.     She had  seen Dr. Ban Coleman, psychiatrist   Dr. Coleman prescribed Cymbalta for it.  She feels she has been more depressed since the Fall, but doesn't think it is SAD.   She stated that she has recommended case management.     SESSION:  Mili arrived late or today's video psychotherapy session due to [employer] inefficiency. The depression varies and has worsened given her health issues.      She continues to lose weight gradually.     She is now covered by [facility], Medicare, and most recently getting on Medical Assistance.  I asked her to inform [facility] of it and of potential address changes.    Now at [facility] since , [facility] from [facility] to Longterm care.  She is cleaning out her apartment, which is bittersweet.  It has been exhausting.  Getting help from her sisters.  Deaccessioning to fit smaller space.  She had been in her old apartment> 30 years.     She is still recovering from her broken left ankle (3 places) and 2 bones in  her foot, had surgery at [facility], and is now at [facility]. Not in pain in leg, but has some in her side. She is weaker than she thinks she should be, wonders if also having MS flare.  .     She will be getting PT 3days/week, but not currently due to efforts to move. . She got her leg brace Friday.  She had been looking for assisted living options the day she broke it and is unsure how good her ambulation might be in the future.  She is not able to get to sister's home as unable to get up steps currently.   She will have a care conference next week to explore living options.     Began Wellbutrin today (150 mg) per Dr. Yariel Hill ([facility]); has seen him x2 so far.     Weight Issues: Weight is increasing. Discussed cutting in half portions with the dietitian, however, that plan is not being followed.   We printed out a reminder for her to use.   She thinks overall she should be getting 1200 janice/day.She has not been attending OA. Since May, goal had been 2335-9890 calories. Plan to resume tracking is on hold.       Her family is supportive.    She participated fully and appeared to derive benefit. Affect is positive.  Rapport was excellent.  Time service started: 3:03  Time service ended:  3:56    Mode of transmission: Vanessa    [facility]  Phone:[phone] room 414 @ [facility] [address] [place] [address]    DIAGNOSES:   Major depression, recurrent, moderate (F33.1).   Behavioral factors affecting morbid obesity (E66.01).  Bereavement     PLAN:  Ms. Padilla will return for in in person visit  1/30 @ 3 for eclectic, supportive therapy consistent with treatment plan.    Preference for future meetings:             In-person   prefers, even if masked              Remote              Either  Last treatment plan signed: 6/30/23  Last Treatment plan review: 10/11/23  Treatment plan verbal Review due: 1/11/24 (next session)  Treatment Review due: 6/30/24     Ramin Olivo,  PhD, A.B.P.P., L.P.   Director, Health Psychology

## 2024-01-13 ENCOUNTER — HEALTH MAINTENANCE LETTER (OUTPATIENT)
Age: 69
End: 2024-01-13

## 2024-01-30 ENCOUNTER — OFFICE VISIT (OUTPATIENT)
Dept: PSYCHOLOGY | Facility: CLINIC | Age: 69
End: 2024-01-30
Payer: COMMERCIAL

## 2024-01-30 DIAGNOSIS — E66.01 PSYCHOLOGICAL FACTORS AFFECTING MORBID OBESITY (H): ICD-10-CM

## 2024-01-30 DIAGNOSIS — F33.0 MAJOR DEPRESSIVE DISORDER, RECURRENT EPISODE, MILD (H): Primary | ICD-10-CM

## 2024-01-30 DIAGNOSIS — F54 PSYCHOLOGICAL FACTORS AFFECTING MORBID OBESITY (H): ICD-10-CM

## 2024-01-30 DIAGNOSIS — Z63.4 BEREAVEMENT: ICD-10-CM

## 2024-01-30 PROCEDURE — 90837 PSYTX W PT 60 MINUTES: CPT | Performed by: PSYCHOLOGIST

## 2024-01-30 SDOH — SOCIAL STABILITY - SOCIAL INSECURITY: DISSAPEARANCE AND DEATH OF FAMILY MEMBER: Z63.4

## 2024-01-30 NOTE — PROGRESS NOTES
Health Psychology                     Department of Medicine  Izzy Montaño, Ph.D., L.P. [phone]                          [facility] Sherrie Crowe, Ph.D., L.P. [phone]                   [place] Mail Code 515   Ranulfo Whipple, Ph.D. [phone]        [address] [place] [address] Enriqueta Sorto, Ph.D., L.P. [phone]    [place] [address]           Ramin Olivo, Ph.D., A.B.P.P., L.P. [phone]        Kathie Galvan, Ph.D., L.P. [phone]  [address]      Health Psychology Progress Note    Demographics   Age 68 year old   Sex female   Race White   Ethnicity Not  or      Ms. Padilla is a  woman self-referred for psychological consultation because her therapist of the last 24 years retired.  She is seen for problem-solving and supportive therapy for depression and multiple health issues.     HISTORY OF PRESENTING CONCERN:  Ms. Padilla reported at intake (7/28/16) a  lengthy history of depression.  She was seeing a psychiatrist, Ban Coleman, at [facility], and is taking Abilify 7.5 mg, trazodone 500 mg, ripazepam 75 mg each day at bedtime, Tegretol 400 mg at bedtime and methylphenidate 10 mg b.i.d.  She discontineud ability and is now taking Rexulti 2 mg.  She has a history of hospitalizations including three at the [facility] in the late 1990s, early 2000s when she had 2 courses of ECT lasting 7-10 sessions and approximately 3 other single session ECTs.  She stopped due to memory problems.  She kept with Dr. Coleman who she first met as an inpatient and has seen her for the past 18 years.  She had also been hospitalized psychiatrically at [facility] at age 23.  She previously worked with psychiatrist, Doug Sarabia for three years, stopping, in part, because she didn't feel he took her suicide attempt sufficiently seriously.  She reports her depression tends to vary.      MEDICAL HISTORY (at intake)  Ms. Padilla has a complex medical history.  She was diagnosed with MS in 1985.  Her psychiatrist at [facility] in [place], Dr. 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  She has used a scooter since 1992, using it more  than she had previously.  She also could use a walker.  She was diagnosed with fibromyalgia in 1997. She is seen at the [facility] for her eye care. During 8th grade, she fell on a ski lift, injuring her larynx.     WEIGHT : self report    2/23/23  Down to 281 lbs.   Hard to weigh.  Not steady on her feet.  9/7/23    281  9/30/23  281  11/10/23  289.1  (taken 11/1)  12/14/23   286.5 (taken 12/6/23)  1/30/24  -not sure      Wt Readings from Last 4 Encounters:   10/02/23 127.9 kg (281 lb 14.4 oz)   09/19/23 128.2 kg (282 lb 9.6 oz)   09/15/23 128.2 kg (282 lb 9.6 oz)   09/12/23 128.2 kg (282 lb 9.6 oz)     Past Medical History:   Diagnosis Date    Depression, major, in partial remission (H)     Fibromyalgia syndrome     Gastro-oesophageal reflux disease     Hyperlipemia     Hypertension     Low serum sodium     Lymphedema     Mixed incontinence     Multiple sclerosis (H)     tremors with MS, all four limbs and head    Multiple sclerosis, secondary progressive (H)     Neurogenic bladder     Obese     Osteoporosis     Sleep apnea     Vocal cord paralysis, unilateral complete         Past Surgical History:   Procedure Laterality Date    COLONOSCOPY N/A 07/17/2017    Procedure: COMBINED COLONOSCOPY, SINGLE OR MULTIPLE BIOPSY/POLYPECTOMY BY BIOPSY;;  Surgeon: Wong Beaulieu MD;  Location:  GI    COLONOSCOPY N/A 02/23/2018    Procedure: COMBINED COLONOSCOPY, SINGLE OR MULTIPLE BIOPSY/POLYPECTOMY BY BIOPSY;  COLONOSCOPY ;  Surgeon: Yessica Santana MD;  Location:  GI    ENT SURGERY      throat 1969, vocal cord surgery with skin grafting    ESOPHAGOSCOPY, GASTROSCOPY, DUODENOSCOPY (EGD), COMBINED N/A 12/16/2014     Procedure: COMBINED ENDOSCOPIC ULTRASOUND, ESOPHAGOSCOPY, GASTROSCOPY, DUODENOSCOPY (EGD), FINE NEEDLE ASPIRATE/BIOPSY;  Surgeon: Yessica Santana MD;  Location:  GI    ESOPHAGOSCOPY, GASTROSCOPY, DUODENOSCOPY (EGD), COMBINED N/A 12/16/2014    Procedure: COMBINED ESOPHAGOSCOPY, GASTROSCOPY, DUODENOSCOPY (EGD), BIOPSY SINGLE OR MULTIPLE;  Surgeon: Yessica Santana MD;  Location:  GI    GASTRIC BYPASS Bilateral     LAPAROSCOPIC APPENDECTOMY  05/30/2013    Procedure: LAPAROSCOPIC APPENDECTOMY;;  Surgeon: Salvador Morris MD;  Location:  OR    LAPAROSCOPIC BIOPSY LIVER  05/30/2013    Procedure: LAPAROSCOPIC BIOPSY LIVER;;  Surgeon: Salvador Morris MD;  Location:  OR    LAPAROSCOPIC GASTRIC SLEEVE  05/30/2013    Procedure: LAPAROSCOPIC GASTRIC SLEEVE;  LAPAROSCOPIC SLEEVE GASTRECTOMY/ LAPARSCOPIC  APPENDECTOMY /LIVER BIOPSIES/LIVER CYST DRAINAGE;  Surgeon: Salvador Morris MD;  Location:  OR    LASER HOLMIUM LITHOTRIPSY URETER(S), INSERT STENT, COMBINED Left 1/18/2023    Procedure: CYSTOSCOPY, LEFT URETEROSCOPY, HOLMIUM LASER  LITHOTRIPSY, LEFT   STENT PLACEMENT;  Surgeon: Mahendra Coles MD;  Location:  OR    ORTHOPEDIC SURGERY      R wrist 2010    PHACOEMULSIFICATION CLEAR CORNEA WITH STANDARD INTRAOCULAR LENS IMPLANT Right 6/29/2023    Procedure: RIGHT EYE PHACOEMULSIFICATION CATARACT WITH INTRAOCULAR LENS IMPLANT;  Surgeon: Flaco Pappas MD;  [place] [facility] [place]    PHACOEMULSIFICATION CLEAR CORNEA WITH STANDARD INTRAOCULAR LENS IMPLANT Left 7/20/2023    Procedure: LEFT EYE PHACOEMULSIFICATION, CATARACT, WITH INTRAOCULAR LENS IMPLANT;  Surgeon: Flaco Pappas MD;  [place] [facility] [place]       Current Outpatient Medications   Medication    Acetaminophen 325 MG CHEW    ARIPiprazole (ABILIFY) 5 MG tablet    atorvastatin (LIPITOR) 20 MG tablet    Banana Flakes (BANATROL PO)    bismuth subsalicylate 262 MG TABS    carBAMazepine (TEGRETOL XR) 200 MG 12 hr tablet     carboxymethylcellulose PF (REFRESH PLUS) 0.5 % ophthalmic solution    DULoxetine (CYMBALTA) 60 MG capsule    famotidine (PEPCID) 40 MG tablet    fluticasone (FLONASE) 50 MCG/ACT nasal spray    hydrOXYzine (ATARAX) 25 MG tablet    lactobacillus rhamnosus, GG, (CULTURELL) capsule    loperamide (IMODIUM A-D) 2 MG tablet    losartan (COZAAR) 50 MG tablet    mirtazapine (REMERON) 30 MG tablet    traZODone HCl 300 MG TABS    triamcinolone (ARISTOCORT HP) 0.5 % external cream    trospium (SANCTURA) 20 MG tablet    verapamil ER (CALAN-SR) 120 MG CR tablet     No current facility-administered medications for this visit.     Medication: On Duloxetine and Mirtazpine and Trazodone and Ritalin. She discontinued Effexor and Abilify previously per Dr. Marquise Coleman, her psychiatrist.  On 4/10/19 she informed me that she started Abilify        9/15/2019    10:10 AM 2022     3:40 PM 11/3/2022     6:06 PM   PHQ-9 SCORE   PHQ-9 Total Score MyChart 5 (Mild depression) 6 (Mild depression) 5 (Mild depression)   PHQ-9 Total Score 5 6 5         11/3/2022     6:05 PM 2022     6:30 PM 8/15/2023    11:12 AM   NAS-7 SCORE   Total Score 3 (minimal anxiety) 4 (minimal anxiety) 3 (minimal anxiety)   Total Score 3 4 3     SOCIAL HISTORY (at intake)  Ms. Padilla grew up in the [place] area and is the oldest of 5 children in her family of origin.  Her father was a dentist who she believes was bipolar.  He  of lung cancer at age 72.  Her mother  of sepsis at age 80.  She had been diagnosed with breast cancer when Ms. Padilla was 8.  She described the marriage between her parents as misael.  She is 5 years older than her closest-aged sister and they are all within 4 years of each other.   Her daughter  12/3 and her mother .  She tends to feel more depressed in winter.      Ms. Padilla attended [employer] for a year and later went to night school at the [employer].  She felt that she could not continue  her education to the point of graduation because she was working full time and raising her daughter.  Her daughter  in  at age 31 of liver failure secondary to an accidental Tylenol overdose.  She had been recovering from a hysterectomy.      Ms. 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  She had to retire at the time secondary to her MS.  She has never .  She has not dated,and states that if she were she would date, she would be interested in a relationship with a woman.  There is no history of  service or legal problems.  She expresses concern about her increasing social isolation.  She had at least 1 friend, Jael, who she met at a therapy group in .  She is active in facilitating groups for people with MS both in [place] and [place].  She lived alone and currently gets help from a home health aide, as well as home health nurse.     She had  seen Dr. Ban Coleman, psychiatrist.  Dr. Coleman prescribed Cymbalta for it. She feels she has been more depressed since the Fall, but doesn't think it is SAD.   She stated that she has recommended case management.     SESSION:  Mili arrived late or today's video psychotherapy session due to [employer] inefficiency. The depression varies and has worsened given her health issues.      She continues to lose weight gradually.     She is now covered by [facility], Medicare, and most recently getting on Medical Assistance.  I asked her to inform [facility] of it and of potential address changes.    Now at [facility] ([address] [place], [place] [address]; [phone])  since , moved up from [facility] to Longterm care.  She is cleaning out her apartment, which is bittersweet.  It has been exhausting.  Getting help from her sisters.  Deaccessioning to fit smaller space.  She had been in her old apartment> 30 years.  Unpacking as well.   Starting to make friends n building and envisioning social activities. 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     She thinks she is developing a UTI. The staff has not been  responsive to getting a smaple, which needs to be done via catheter.  She is enourage to push for it.  I wrote about it in her communication record that she is now starting to bring.    She will be getting PT 3 days/week, but not currently due to efforts to move. . She got her leg brace Friday.  She had been looking for assisted living options the day she broke it and is unsure how good her ambulation might be in the future.  She is not able to get to sister's home as unable to get up steps currently.   She will have a care conference next week to explore living options.     Began Wellbutrin 11/10 (150 mg) per Dr. Yariel Hill ([facility]); has seen him x2 so far.   Feeling more depressed.  She doesn't think it is working.  Encouraged her to contact to explore dose or medication change.  Encouraged her to reach out to Dr. Hill.  Informed her she is still at a relatively low dose.     Weight Issues: Weight is increasing. Discussed cutting in half portions with the dietitian, however, that plan is not being followed.   We printed out a reminder for her to use.   She thinks overall she should be getting 1200 janice/day.She has not been attending OA. Since May, goal had been 1346-1094 calories. Plan to resume tracking is on hold.   She is not weighed regularly  at [facility].   She will ask to be weighed weekly.  She brought in menus for today, which appeared to be in range for losing weight.    Her family is supportive.    She participated fully and appeared to derive benefit. Affect is positive.  Rapport was excellent.  The treatment plan was reviewed with the patient at today s visit. The treatment plan remains current based on the patient s status and progress to  date.  Time service started: 3:02  Time service ended:  3:55    [facility]  Phone:[phone] room 414 @ [facility] [address]    DIAGNOSES:   Major depression, recurrent, moderate (F33.1).   Behavioral factors affecting morbid obesity (E66.01).  Bereavement     PLAN:  Ms. Padilla will return for in in person visit  3/1 @ 3 for eclectic, supportive therapy consistent with treatment plan.      Last treatment plan signed: 6/30/23  Last Treatment plan review: 1/30/24  Treatment plan verbal Review due: 4/30/24  Treatment Review due: 6/30/24     Ramin Olivo, PhD, A.B.P.P., L.P.   Director, Health Psychology

## 2024-02-06 ENCOUNTER — LAB REQUISITION (OUTPATIENT)
Dept: LAB | Facility: CLINIC | Age: 69
End: 2024-02-06
Payer: COMMERCIAL

## 2024-02-06 DIAGNOSIS — I10 ESSENTIAL (PRIMARY) HYPERTENSION: ICD-10-CM

## 2024-02-07 LAB
ANION GAP SERPL CALCULATED.3IONS-SCNC: 9 MMOL/L (ref 7–15)
BUN SERPL-MCNC: 28.9 MG/DL (ref 8–23)
CALCIUM SERPL-MCNC: 9.8 MG/DL (ref 8.8–10.2)
CHLORIDE SERPL-SCNC: 99 MMOL/L (ref 98–107)
CREAT SERPL-MCNC: 1.41 MG/DL (ref 0.51–0.95)
DEPRECATED HCO3 PLAS-SCNC: 28 MMOL/L (ref 22–29)
EGFRCR SERPLBLD CKD-EPI 2021: 40 ML/MIN/1.73M2
GLUCOSE SERPL-MCNC: 94 MG/DL (ref 70–99)
POTASSIUM SERPL-SCNC: 5 MMOL/L (ref 3.4–5.3)
SODIUM SERPL-SCNC: 136 MMOL/L (ref 135–145)

## 2024-02-07 PROCEDURE — 80048 BASIC METABOLIC PNL TOTAL CA: CPT | Mod: ORL | Performed by: NURSE PRACTITIONER

## 2024-02-07 PROCEDURE — 36415 COLL VENOUS BLD VENIPUNCTURE: CPT | Mod: ORL | Performed by: NURSE PRACTITIONER

## 2024-02-11 NOTE — PROGRESS NOTES
HISTORY OF PRESENT ILLNESS: Mili Padilla is a 68 year old female with a history of trauma to the larynx at age 13 from a skiing accident.  She was reconstructed by Dr. Hammer in the past and subsequently followed by Dr. Day.  I had seen her initially in 2008 and I have been following her since then.  Her airway has been stable since I last seen her on 2/4/2020 in clinic.  We did have a video visit on February 23, 2021.  She has had no new difficulties since her last visit.  She notes that she swallows liquids without any difficulties.  Solid and dry foods sometimes will get slightly increased difficulty but all her swallowing well.  She has no changes in her voice no new breathing issues.  She has a long-standing left vocal fold immobility. She has had some increase in dysphagia over the last 5-6 years. 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  On her last exam on February 23, 2021 we discussed alternating between video exams and laryngeal exams.  I saw her on a video exam on 2/8/2022.  At that time her airway was stable.  She would get short of breath with some activity but had no stridor or upper airway turbulence with deep breathing.  She also had some difficulties with swallowing but primarily with dry foods.   She was seen in clinic on 2/7/2023 for office examination of her airway.  Prior to that she had had a significant illness and rehab involving sepsis but unrelated to her upper airway.  She continued to breathe comfortably.  Her voice and swallowing are stable.  Laryngeal exam showed evidence of the left arytenoid having been surgically removed in the remote past.  The aryepiglottic fold was draped over the larynx.  There was an adequate airway.  Phonation was with closure of the right arytenoid and false vocal fold approximating the left aryepiglottic fold.    Since our last visit she did have a fall and break her left  ankle which has caused some difficulty with mobility.  She notices no change in her voice, swallow or airway in the past year.    Last 2 Scores for Patient-Answered VHI Questionnaire      11/26/2016     4:43 PM 2/7/2023     9:42 AM   VHI Total Score   VHI Total Score 21 25       Last 2 Scores for Patient-Answered CSI Questionnaire      2/4/2020    11:17 AM 2/7/2023     9:42 AM   CSI Total Score   CSI Total Score 8 10         Last 2 Scores for Patient-Answered EAT Questionnaire      2/4/2020    11:17 AM 2/7/2023     9:42 AM   EAT Total Score   EAT Total Score Incomplete 8           PAST MEDICAL HISTORY:   Past Medical History:   Diagnosis Date    Depression, major, in partial remission (H)     Fibromyalgia syndrome     Gastro-oesophageal reflux disease     Hyperlipemia     Hypertension     Low serum sodium     Lymphedema     Mixed incontinence     Multiple sclerosis (H)     tremors with MS, all four limbs and head    Multiple sclerosis, secondary progressive (H)     Neurogenic bladder     Obese     Osteoporosis     Sleep apnea     Vocal cord paralysis, unilateral complete        PAST SURGICAL HISTORY:   Past Surgical History:   Procedure Laterality Date    COLONOSCOPY N/A 07/17/2017    Procedure: COMBINED COLONOSCOPY, SINGLE OR MULTIPLE BIOPSY/POLYPECTOMY BY BIOPSY;;  Surgeon: Wong Beaulieu MD;  Location:  GI    COLONOSCOPY N/A 02/23/2018    Procedure: COMBINED COLONOSCOPY, SINGLE OR MULTIPLE BIOPSY/POLYPECTOMY BY BIOPSY;  COLONOSCOPY ;  Surgeon: Yessica Santana MD;  Location:  GI    ENT SURGERY      throat 1969, vocal cord surgery with skin grafting    ESOPHAGOSCOPY, GASTROSCOPY, DUODENOSCOPY (EGD), COMBINED N/A 12/16/2014    Procedure: COMBINED ENDOSCOPIC ULTRASOUND, ESOPHAGOSCOPY, GASTROSCOPY, DUODENOSCOPY (EGD), FINE NEEDLE ASPIRATE/BIOPSY;  Surgeon: Yessica Santana MD;  Location:  GI    ESOPHAGOSCOPY, GASTROSCOPY, DUODENOSCOPY (EGD), COMBINED N/A 12/16/2014    Procedure: COMBINED  ESOPHAGOSCOPY, GASTROSCOPY, DUODENOSCOPY (EGD), BIOPSY SINGLE OR MULTIPLE;  Surgeon: Yessica Santana MD;  Location:  GI    GASTRIC BYPASS Bilateral     LAPAROSCOPIC APPENDECTOMY  2013    Procedure: LAPAROSCOPIC APPENDECTOMY;;  Surgeon: Salvador Morris MD;  Location:  OR    LAPAROSCOPIC BIOPSY LIVER  2013    Procedure: LAPAROSCOPIC BIOPSY LIVER;;  Surgeon: Salvador Morris MD;  Location:  OR    LAPAROSCOPIC GASTRIC SLEEVE  2013    Procedure: LAPAROSCOPIC GASTRIC SLEEVE;  LAPAROSCOPIC SLEEVE GASTRECTOMY/ LAPARSCOPIC  APPENDECTOMY /LIVER BIOPSIES/LIVER CYST DRAINAGE;  Surgeon: Salvador Morris MD;  Location:  OR    LASER HOLMIUM LITHOTRIPSY URETER(S), INSERT STENT, COMBINED Left 2023    Procedure: CYSTOSCOPY, LEFT URETEROSCOPY, HOLMIUM LASER  LITHOTRIPSY, LEFT   STENT PLACEMENT;  Surgeon: Mahendra Coles MD;  Location:  OR    ORTHOPEDIC SURGERY      R wrist     PHACOEMULSIFICATION CLEAR CORNEA WITH STANDARD INTRAOCULAR LENS IMPLANT Right 2023    Procedure: RIGHT EYE PHACOEMULSIFICATION CATARACT WITH INTRAOCULAR LENS IMPLANT;  Surgeon: Flaco Pappas MD;  [place] [facility] [place]    PHACOEMULSIFICATION CLEAR CORNEA WITH STANDARD INTRAOCULAR LENS IMPLANT Left 2023    Procedure: LEFT EYE PHACOEMULSIFICATION, CATARACT, WITH INTRAOCULAR LENS IMPLANT;  Surgeon: Flaco Pappas MD;  [place] [facility] [place]       FAMILY HISTORY:   Family History   Problem Relation Age of Onset    Breast Cancer Mother     Other Cancer Father     Breast Cancer Maternal Aunt     Breast Cancer Maternal Aunt     Breast Cancer Maternal Aunt     Breast Cancer Maternal Aunt     Glaucoma No family hx of     Macular Degeneration No family hx of     Amblyopia No family hx of        SOCIAL HISTORY:   Social History     Tobacco Use    Smoking status: Former     Types: Cigarettes     Quit date: 1974     Years since quittin.4    Smokeless tobacco: Never   Substance Use Topics     Alcohol use: Not Currently       REVIEW OF SYSTEMS: Ten point review of systems was performed and is negative except for:       2/7/2023     9:13 AM   [facility] ENT ROS   Constitutional Weight loss   Neurology Numbness        ALLERGIES: Amoxicillin, Amoxicillin-pot clavulanate, Betaseron [interferon beta-1b], Dust mite extract, and Mold    MEDICATIONS:   Current Outpatient Medications   Medication Sig Dispense Refill    Acetaminophen 325 MG CHEW Take 650 mg by mouth every 4 hours as needed for mild pain      ARIPiprazole (ABILIFY) 5 MG tablet Take 7.5 mg by mouth daily  2    atorvastatin (LIPITOR) 20 MG tablet Take 20 mg by mouth daily.      Banana Flakes (BANATROL PO) Take by mouth 2 times daily      bismuth subsalicylate 262 MG TABS Take 1 tablet by mouth 2 times daily      carBAMazepine (TEGRETOL XR) 200 MG 12 hr tablet Take 200 mg by mouth 2 times daily      carboxymethylcellulose PF (REFRESH PLUS) 0.5 % ophthalmic solution Place 1 drop into both eyes 4 times daily      DULoxetine (CYMBALTA) 60 MG capsule Take 120 mg by mouth daily      famotidine (PEPCID) 40 MG tablet Take 1 tablet (40 mg) by mouth daily 90 tablet 3    fluticasone (FLONASE) 50 MCG/ACT nasal spray Spray 2 sprays into both nostrils daily      hydrOXYzine (ATARAX) 25 MG tablet Take 25 mg by mouth every 6 hours as needed for anxiety      lactobacillus rhamnosus, GG, (CULTURELL) capsule Take 1 capsule by mouth 2 times daily      loperamide (IMODIUM A-D) 2 MG tablet Take 1 tablet (2 mg) by mouth 2 times daily      losartan (COZAAR) 50 MG tablet Take 1 tablet (50 mg) by mouth 2 times daily      mirtazapine (REMERON) 30 MG tablet Take 75 mg by mouth At Bedtime      traZODone HCl 300 MG TABS Take 600 mg by mouth At Bedtime      triamcinolone (ARISTOCORT HP) 0.5 % external cream Apply topically every 8 hours as needed (itching)      trospium (SANCTURA) 20 MG tablet Take 20 mg by mouth 2 times daily      verapamil ER (CALAN-SR) 120 MG CR tablet Take 120 mg by  mouth 2 times daily           PHYSICAL EXAMINATION:  She  is awake, alert and in no apparent distress.    Her tympanic membranes are clear and intact bilaterally. External auditory canals are clear.  Nasal exam shows a mild septal deviation without obstruction.  Examination of the oral cavity shows no suspicious lesions.  There is symmetric movement of the tongue and soft palate.    The oropharynx is clear.  Her neck is supple without significant adenopathy.  Pulse is regular.  Upper airway is clear.  Cranial nerves II-XII are grossly intact.       PROCEDURE: A flexible laryngoscopy  was performed.  Informed consent was obtained and a time out was performed. 2% lidocaine and 0.025% oxymetazoline was sprayed into the nasal cavity and allowed 3 to 5 minutes for effect. The scope was passed through the right sided nostril.  Examination shows the right vocal fold to be fully mobile.  The left arytenoid and much of the vocal fold has been surgically removed in the remote past.  The left aryepiglottic fold is draped over the larynx.  The airway is adequate without excessive stridor.  No nodules polyps or ulcerations are seen.  There is mild inflammation at the posterior commissure.   The vocal folds show mild inflammation. With phonation there is severe contraction of the supraglottic larynx.  Phonation occurs primarily with the false vocal fold on the right side approximating the medially placed left aryepiglottic fold.  Hypopharynx is clear and well-visualized.  No suspicious lesions are present.  The hypopharynx is otherwise clear as is the subglottis.      2/13/2024 Exam      Phonation      IMPRESSION/PLAN:  Her airway, swallowing and voice have been stable since I first met her in 2008 and prior to that time.  She would like to continue to follow-up and have a physician aware of her airway status.  I will have her follow-up in 2 years with Dr. Joy or Dr. Nuñez.

## 2024-02-13 ENCOUNTER — LAB REQUISITION (OUTPATIENT)
Dept: LAB | Facility: CLINIC | Age: 69
End: 2024-02-13
Payer: COMMERCIAL

## 2024-02-13 ENCOUNTER — OFFICE VISIT (OUTPATIENT)
Dept: OTOLARYNGOLOGY | Facility: CLINIC | Age: 69
End: 2024-02-13
Payer: COMMERCIAL

## 2024-02-13 VITALS
OXYGEN SATURATION: 100 % | HEART RATE: 89 BPM | SYSTOLIC BLOOD PRESSURE: 114 MMHG | DIASTOLIC BLOOD PRESSURE: 50 MMHG | TEMPERATURE: 96.2 F

## 2024-02-13 DIAGNOSIS — I10 ESSENTIAL (PRIMARY) HYPERTENSION: ICD-10-CM

## 2024-02-13 DIAGNOSIS — J38.01 VOCAL CORD PARALYSIS, UNILATERAL COMPLETE: Primary | ICD-10-CM

## 2024-02-13 PROCEDURE — 99213 OFFICE O/P EST LOW 20 MIN: CPT | Mod: 25 | Performed by: OTOLARYNGOLOGY

## 2024-02-13 PROCEDURE — 31575 DIAGNOSTIC LARYNGOSCOPY: CPT | Performed by: OTOLARYNGOLOGY

## 2024-02-13 RX ORDER — PREDNISOLONE ACETATE 10 MG/ML
SUSPENSION/ DROPS OPHTHALMIC
COMMUNITY
End: 2024-05-28

## 2024-02-13 RX ORDER — LANOLIN ALCOHOL/MO/W.PET/CERES
CREAM (GRAM) TOPICAL
COMMUNITY
Start: 2024-01-26

## 2024-02-13 RX ORDER — LACTOBACILLUS RHAMNOSUS GG 10B CELL
CAPSULE ORAL
COMMUNITY
Start: 2023-09-19

## 2024-02-13 RX ORDER — BENZOCAINE/MENTHOL 6 MG-10 MG
LOZENGE MUCOUS MEMBRANE
COMMUNITY
Start: 2023-12-29

## 2024-02-13 RX ORDER — PSYLLIUM HUSK 0.4 G
25 CAPSULE ORAL
COMMUNITY

## 2024-02-13 RX ORDER — BUPROPION HYDROCHLORIDE 150 MG/1
TABLET ORAL
COMMUNITY

## 2024-02-13 RX ORDER — NYSTATIN 100000 [USP'U]/G
POWDER TOPICAL
COMMUNITY
Start: 2023-11-22

## 2024-02-13 RX ORDER — CALCIUM CITRATE/VITAMIN D3 315MG-6.25
TABLET ORAL
COMMUNITY
Start: 2024-01-26

## 2024-02-13 ASSESSMENT — PAIN SCALES - GENERAL: PAINLEVEL: MILD PAIN (3)

## 2024-02-13 NOTE — PATIENT INSTRUCTIONS
1.  You were seen in the ENT Clinic today by . If you have any questions or concerns after your appointment, please call [phone]. Press option #1 for scheduling related needs. Press option #3 for Nurse advice.    2.  Plan is to return to clinic in 2 years. Follow up with Dr.Misono Doris Pulido, LPN  [phone]  [facility] - Otolaryngology

## 2024-02-13 NOTE — LETTER
2/13/2024       RE: Mili Padilla  [address] [facility]  Room [address] [place] [id number]     Dear Colleague,    Thank you for referring your patient, Mili Padilla, to the [facility] EAR NOSE AND THROAT CLINIC [place] at [facility]. Please see a copy of my visit note below.    HISTORY OF PRESENT ILLNESS: Mili Padilla is a 68 year old female with a history of trauma to the larynx at age 13 from a skiing accident.  She was reconstructed by Dr. Hammer in the past and subsequently followed by Dr. Day.  I had seen her initially in 2008 and I have been following her since then.  Her airway has been stable since I last seen her on 2/4/2020 in clinic.  We did have a video visit on February 23, 2021.  She has had no new difficulties since her last visit.  She notes that she swallows liquids without any difficulties.  Solid and dry foods sometimes will get slightly increased difficulty but all her swallowing well.  She has no changes in her voice no new breathing issues.  She has a long-standing left vocal fold immobility. She has had some increase in dysphagia over the last 5-6 years. 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  On her last exam on February 23, 2021 we discussed alternating between video exams and laryngeal exams.  I saw her on a video exam on 2/8/2022.  At that time her airway was stable.  She would get short of breath with some activity but had no stridor or upper airway turbulence with deep breathing.  She also had some difficulties with swallowing but primarily with dry foods.   She was seen in clinic on 2/7/2023 for office examination of her airway.  Prior to that she had had a significant illness and rehab involving sepsis but unrelated to her upper airway.  She continued to breathe comfortably.  Her voice and swallowing are stable.  Laryngeal exam showed evidence  of the left arytenoid having been surgically removed in the remote past.  The aryepiglottic fold was draped over the larynx.  There was an adequate airway.  Phonation was with closure of the right arytenoid and false vocal fold approximating the left aryepiglottic fold.    Since our last visit she did have a fall and break her left ankle which has caused some difficulty with mobility.  She notices no change in her voice, swallow or airway in the past year.    Last 2 Scores for Patient-Answered VHI Questionnaire      11/26/2016     4:43 PM 2/7/2023     9:42 AM   VHI Total Score   VHI Total Score 21 25       Last 2 Scores for Patient-Answered CSI Questionnaire      2/4/2020    11:17 AM 2/7/2023     9:42 AM   CSI Total Score   CSI Total Score 8 10         Last 2 Scores for Patient-Answered EAT Questionnaire      2/4/2020    11:17 AM 2/7/2023     9:42 AM   EAT Total Score   EAT Total Score Incomplete 8           PAST MEDICAL HISTORY:   Past Medical History:   Diagnosis Date    Depression, major, in partial remission (H)     Fibromyalgia syndrome     Gastro-oesophageal reflux disease     Hyperlipemia     Hypertension     Low serum sodium     Lymphedema     Mixed incontinence     Multiple sclerosis (H)     tremors with MS, all four limbs and head    Multiple sclerosis, secondary progressive (H)     Neurogenic bladder     Obese     Osteoporosis     Sleep apnea     Vocal cord paralysis, unilateral complete        PAST SURGICAL HISTORY:   Past Surgical History:   Procedure Laterality Date    COLONOSCOPY N/A 07/17/2017    Procedure: COMBINED COLONOSCOPY, SINGLE OR MULTIPLE BIOPSY/POLYPECTOMY BY BIOPSY;;  Surgeon: Wong Beaulieu MD;  Location:  GI    COLONOSCOPY N/A 02/23/2018    Procedure: COMBINED COLONOSCOPY, SINGLE OR MULTIPLE BIOPSY/POLYPECTOMY BY BIOPSY;  COLONOSCOPY ;  Surgeon: Yessica Santana MD;  Location:  GI    ENT SURGERY      throat 1969, vocal cord surgery with skin grafting    ESOPHAGOSCOPY,  GASTROSCOPY, DUODENOSCOPY (EGD), COMBINED N/A 12/16/2014    Procedure: COMBINED ENDOSCOPIC ULTRASOUND, ESOPHAGOSCOPY, GASTROSCOPY, DUODENOSCOPY (EGD), FINE NEEDLE ASPIRATE/BIOPSY;  Surgeon: Yessica Santana MD;  Location:  GI    ESOPHAGOSCOPY, GASTROSCOPY, DUODENOSCOPY (EGD), COMBINED N/A 12/16/2014    Procedure: COMBINED ESOPHAGOSCOPY, GASTROSCOPY, DUODENOSCOPY (EGD), BIOPSY SINGLE OR MULTIPLE;  Surgeon: Yessica Santana MD;  Location:  GI    GASTRIC BYPASS Bilateral     LAPAROSCOPIC APPENDECTOMY  05/30/2013    Procedure: LAPAROSCOPIC APPENDECTOMY;;  Surgeon: Salvador Morris MD;  Location:  OR    LAPAROSCOPIC BIOPSY LIVER  05/30/2013    Procedure: LAPAROSCOPIC BIOPSY LIVER;;  Surgeon: Salvador Morris MD;  Location:  OR    LAPAROSCOPIC GASTRIC SLEEVE  05/30/2013    Procedure: LAPAROSCOPIC GASTRIC SLEEVE;  LAPAROSCOPIC SLEEVE GASTRECTOMY/ LAPARSCOPIC  APPENDECTOMY /LIVER BIOPSIES/LIVER CYST DRAINAGE;  Surgeon: Salvador Morris MD;  Location:  OR    LASER HOLMIUM LITHOTRIPSY URETER(S), INSERT STENT, COMBINED Left 1/18/2023    Procedure: CYSTOSCOPY, LEFT URETEROSCOPY, HOLMIUM LASER  LITHOTRIPSY, LEFT   STENT PLACEMENT;  Surgeon: Mahendra Coles MD;  Location:  OR    ORTHOPEDIC SURGERY      R wrist 2010    PHACOEMULSIFICATION CLEAR CORNEA WITH STANDARD INTRAOCULAR LENS IMPLANT Right 6/29/2023    Procedure: RIGHT EYE PHACOEMULSIFICATION CATARACT WITH INTRAOCULAR LENS IMPLANT;  Surgeon: Flaco Pappas MD;  Location: [facility] OR    PHACOEMULSIFICATION CLEAR CORNEA WITH STANDARD INTRAOCULAR LENS IMPLANT Left 7/20/2023    Procedure: LEFT EYE PHACOEMULSIFICATION, CATARACT, WITH INTRAOCULAR LENS IMPLANT;  Surgeon: Flaco Pappas MD;  Location: [facility] OR       FAMILY HISTORY:   Family History   Problem Relation Age of Onset    Breast Cancer Mother     Other Cancer Father     Breast Cancer Maternal Aunt     Breast Cancer Maternal Aunt     Breast Cancer Maternal Aunt      Breast Cancer Maternal Aunt     Glaucoma No family hx of     Macular Degeneration No family hx of     Amblyopia No family hx of        SOCIAL HISTORY:   Social History     Tobacco Use    Smoking status: Former     Types: Cigarettes     Quit date: 1974     Years since quittin.4    Smokeless tobacco: Never   Substance Use Topics    Alcohol use: Not Currently       REVIEW OF SYSTEMS: Ten point review of systems was performed and is negative except for:       2023     9:13 AM   [facility] ROS   Constitutional Weight loss   Neurology Numbness        ALLERGIES: Amoxicillin, Amoxicillin-pot clavulanate, Betaseron [interferon beta-1b], Dust mite extract, and Mold    MEDICATIONS:   Current Outpatient Medications   Medication Sig Dispense Refill    Acetaminophen 325 MG CHEW Take 650 mg by mouth every 4 hours as needed for mild pain      ARIPiprazole (ABILIFY) 5 MG tablet Take 7.5 mg by mouth daily  2    atorvastatin (LIPITOR) 20 MG tablet Take 20 mg by mouth daily.      Banana Flakes (BANATROL PO) Take by mouth 2 times daily      bismuth subsalicylate 262 MG TABS Take 1 tablet by mouth 2 times daily      carBAMazepine (TEGRETOL XR) 200 MG 12 hr tablet Take 200 mg by mouth 2 times daily      carboxymethylcellulose PF (REFRESH PLUS) 0.5 % ophthalmic solution Place 1 drop into both eyes 4 times daily      DULoxetine (CYMBALTA) 60 MG capsule Take 120 mg by mouth daily      famotidine (PEPCID) 40 MG tablet Take 1 tablet (40 mg) by mouth daily 90 tablet 3    fluticasone (FLONASE) 50 MCG/ACT nasal spray Spray 2 sprays into both nostrils daily      hydrOXYzine (ATARAX) 25 MG tablet Take 25 mg by mouth every 6 hours as needed for anxiety      lactobacillus rhamnosus, GG, (CULTURELL) capsule Take 1 capsule by mouth 2 times daily      loperamide (IMODIUM A-D) 2 MG tablet Take 1 tablet (2 mg) by mouth 2 times daily      losartan (COZAAR) 50 MG tablet Take 1 tablet (50 mg) by mouth 2 times daily      mirtazapine (REMERON) 30 MG  tablet Take 75 mg by mouth At Bedtime      traZODone HCl 300 MG TABS Take 600 mg by mouth At Bedtime      triamcinolone (ARISTOCORT HP) 0.5 % external cream Apply topically every 8 hours as needed (itching)      trospium (SANCTURA) 20 MG tablet Take 20 mg by mouth 2 times daily      verapamil ER (CALAN-SR) 120 MG CR tablet Take 120 mg by mouth 2 times daily           PHYSICAL EXAMINATION:  She  is awake, alert and in no apparent distress.    Her tympanic membranes are clear and intact bilaterally. External auditory canals are clear.  Nasal exam shows a mild septal deviation without obstruction.  Examination of the oral cavity shows no suspicious lesions.  There is symmetric movement of the tongue and soft palate.    The oropharynx is clear.  Her neck is supple without significant adenopathy.  Pulse is regular.  Upper airway is clear.  Cranial nerves II-XII are grossly intact.       PROCEDURE: A flexible laryngoscopy  was performed.  Informed consent was obtained and a time out was performed. 2% lidocaine and 0.025% oxymetazoline was sprayed into the nasal cavity and allowed 3 to 5 minutes for effect. The scope was passed through the right sided nostril.  Examination shows the right vocal fold to be fully mobile.  The left arytenoid and much of the vocal fold has been surgically removed in the remote past.  The left aryepiglottic fold is draped over the larynx.  The airway is adequate without excessive stridor.  No nodules polyps or ulcerations are seen.  There is mild inflammation at the posterior commissure.   The vocal folds show mild inflammation. With phonation there is severe contraction of the supraglottic larynx.  Phonation occurs primarily with the false vocal fold on the right side approximating the medially placed left aryepiglottic fold.  Hypopharynx is clear and well-visualized.  No suspicious lesions are present.  The hypopharynx is otherwise clear as is the subglottis.      2/13/2024  Exam      Phonation      IMPRESSION/PLAN:  Her airway, swallowing and voice have been stable since I first met her in 2008 and prior to that time.  She would like to continue to follow-up and have a physician aware of her airway status.  I will have her follow-up in 2 years with Dr. Joy or Dr. Nuñez.            Again, thank you for allowing me to participate in the care of your patient.      Sincerely,    Clint Still MD

## 2024-02-13 NOTE — NURSING NOTE
Chief Complaint   Patient presents with    RECHECK     Yearly follow up .Unable to take weight . Patient uses motorized wheel chair      Blood pressure 114/50, pulse 89, temperature (!) 96.2  F (35.7  C), last menstrual period 12/10/2010, SpO2 100%, not currently breastfeeding.  Tyrone Reyes LPN

## 2024-02-14 LAB
ANION GAP SERPL CALCULATED.3IONS-SCNC: 10 MMOL/L (ref 7–15)
BUN SERPL-MCNC: 26 MG/DL (ref 8–23)
CALCIUM SERPL-MCNC: 9.7 MG/DL (ref 8.8–10.2)
CHLORIDE SERPL-SCNC: 98 MMOL/L (ref 98–107)
CREAT SERPL-MCNC: 1.37 MG/DL (ref 0.51–0.95)
DEPRECATED HCO3 PLAS-SCNC: 28 MMOL/L (ref 22–29)
EGFRCR SERPLBLD CKD-EPI 2021: 42 ML/MIN/1.73M2
GLUCOSE SERPL-MCNC: 110 MG/DL (ref 70–99)
POTASSIUM SERPL-SCNC: 4.4 MMOL/L (ref 3.4–5.3)
SODIUM SERPL-SCNC: 136 MMOL/L (ref 135–145)

## 2024-02-14 PROCEDURE — 80048 BASIC METABOLIC PNL TOTAL CA: CPT | Mod: ORL | Performed by: NURSE PRACTITIONER

## 2024-02-14 PROCEDURE — P9604 ONE-WAY ALLOW PRORATED TRIP: HCPCS | Mod: ORL | Performed by: NURSE PRACTITIONER

## 2024-02-14 PROCEDURE — 36415 COLL VENOUS BLD VENIPUNCTURE: CPT | Mod: ORL | Performed by: NURSE PRACTITIONER

## 2024-02-19 RX ORDER — BISMUTH SUBSALICYLATE 262 MG/1
TABLET, CHEWABLE ORAL
Qty: 60 TABLET | Refills: 11 | OUTPATIENT
Start: 2024-02-19

## 2024-02-29 ENCOUNTER — TELEPHONE (OUTPATIENT)
Dept: PSYCHOLOGY | Facility: CLINIC | Age: 69
End: 2024-02-29
Payer: COMMERCIAL

## 2024-03-01 ENCOUNTER — LAB REQUISITION (OUTPATIENT)
Dept: LAB | Facility: CLINIC | Age: 69
End: 2024-03-01
Payer: COMMERCIAL

## 2024-03-01 ENCOUNTER — VIRTUAL VISIT (OUTPATIENT)
Dept: PSYCHOLOGY | Facility: CLINIC | Age: 69
End: 2024-03-01
Payer: COMMERCIAL

## 2024-03-01 DIAGNOSIS — I50.9 HEART FAILURE, UNSPECIFIED (H): ICD-10-CM

## 2024-03-01 DIAGNOSIS — F54 PSYCHOLOGICAL FACTORS AFFECTING MORBID OBESITY (H): ICD-10-CM

## 2024-03-01 DIAGNOSIS — Z63.4 BEREAVEMENT: ICD-10-CM

## 2024-03-01 DIAGNOSIS — F33.0 MAJOR DEPRESSIVE DISORDER, RECURRENT EPISODE, MILD (H): Primary | ICD-10-CM

## 2024-03-01 DIAGNOSIS — G40.909 EPILEPSY, UNSPECIFIED, NOT INTRACTABLE, WITHOUT STATUS EPILEPTICUS (H): ICD-10-CM

## 2024-03-01 DIAGNOSIS — E66.01 PSYCHOLOGICAL FACTORS AFFECTING MORBID OBESITY (H): ICD-10-CM

## 2024-03-01 PROCEDURE — 90834 PSYTX W PT 45 MINUTES: CPT | Mod: 95 | Performed by: PSYCHOLOGIST

## 2024-03-01 SDOH — SOCIAL STABILITY - SOCIAL INSECURITY: DISSAPEARANCE AND DEATH OF FAMILY MEMBER: Z63.4

## 2024-03-01 NOTE — PROGRESS NOTES
Health Psychology                     Department of Medicine  Izzy Montaño, Ph.D., L.P. [phone]                          [facility] Sherrie Crowe, Ph.D., L.P. [phone]                   [place] Mail [address]   Ranulfo Whipple, Ph.D. [phone]        [address] [place] [address] Enriqueta Sorto, Ph.D., L.P. [phone]    [place] [address]           Ramin Olivo, Ph.D., A.B.P.P., L.P. [phone]        Kathie Galvan, Ph.D., L.P. [phone]  [address]      Health Psychology Progress Note    Demographics   Age 68 year old   Sex female   Race White   Ethnicity Not  or      Ms. Padilla is a  woman self-referred for psychological consultation because her therapist of the last 24 years retired.  She is seen for problem-solving and supportive therapy for depression and multiple health issues.     HISTORY OF PRESENTING CONCERN:  Ms. Padilla reported at intake (7/28/16) a  lengthy history of depression.  She was seeing a psychiatrist, Ban Coleman, at [facility], and is taking Abilify 7.5 mg, trazodone 500 mg, ripazepam 75 mg each day at bedtime, Tegretol 400 mg at bedtime and methylphenidate 10 mg b.i.d.  She discontineud ability and is now taking Rexulti 2 mg.  She has a history of hospitalizations including three at the [facility] in the late 1990s, early 2000s when she had 2 courses of ECT lasting 7-10 sessions and approximately 3 other single session ECTs.  She stopped due to memory problems.  She kept with Dr. Coleman who she first met as an inpatient and has seen her for the past 18 years.  She had also been hospitalized psychiatrically at [facility] at age 23.  She previously worked with psychiatrist, Doug Sarabia for three years, stopping, in part, because she didn't feel he took her suicide attempt sufficiently seriously.  She reports her depression tends to vary.      MEDICAL HISTORY (at intake)  Ms. Padilla has a complex medical history.  She was diagnosed with MS in 1985.  Her psychiatrist at [facility] in [place], Dr. 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  She has used a scooter since 1992, using it more  than she had previously.  She also could use a walker.  She was diagnosed with fibromyalgia in 1997. She is seen at the [facility] for her eye care. During 8th grade, she fell on a ski lift, injuring her larynx.     WEIGHT : self report    2/23/23  Down to 281 lbs.   Hard to weigh.  Not steady on her feet.  9/7/23    281  9/30/23  281  11/10/23  289.1  (taken 11/1)  12/14/23   286.5 (taken 12/6/23)  1/30/24  -not sure   3/1 she estimates  283      Wt Readings from Last 4 Encounters:   10/02/23 127.9 kg (281 lb 14.4 oz)   09/19/23 128.2 kg (282 lb 9.6 oz)   09/15/23 128.2 kg (282 lb 9.6 oz)   09/12/23 128.2 kg (282 lb 9.6 oz)     Past Medical History:   Diagnosis Date    Depression, major, in partial remission (H24)     Fibromyalgia syndrome     Gastro-oesophageal reflux disease     Hyperlipemia     Hypertension     Low serum sodium     Lymphedema     Mixed incontinence     Multiple sclerosis (H)     tremors with MS, all four limbs and head    Multiple sclerosis, secondary progressive (H)     Neurogenic bladder     Obese     Osteoporosis     Sleep apnea     Vocal cord paralysis, unilateral complete         Past Surgical History:   Procedure Laterality Date    COLONOSCOPY N/A 07/17/2017    Procedure: COMBINED COLONOSCOPY, SINGLE OR MULTIPLE BIOPSY/POLYPECTOMY BY BIOPSY;;  Surgeon: Wong Beaulieu MD;  Location:  GI    COLONOSCOPY N/A 02/23/2018    Procedure: COMBINED COLONOSCOPY, SINGLE OR MULTIPLE BIOPSY/POLYPECTOMY BY BIOPSY;  COLONOSCOPY ;  Surgeon: Yessica Santana MD;  Location:  GI    ENT SURGERY      throat 1969, vocal cord surgery with skin grafting    ESOPHAGOSCOPY, GASTROSCOPY, DUODENOSCOPY (EGD),  COMBINED N/A 12/16/2014    Procedure: COMBINED ENDOSCOPIC ULTRASOUND, ESOPHAGOSCOPY, GASTROSCOPY, DUODENOSCOPY (EGD), FINE NEEDLE ASPIRATE/BIOPSY;  Surgeon: Yessica Santana MD;  Location:  GI    ESOPHAGOSCOPY, GASTROSCOPY, DUODENOSCOPY (EGD), COMBINED N/A 12/16/2014    Procedure: COMBINED ESOPHAGOSCOPY, GASTROSCOPY, DUODENOSCOPY (EGD), BIOPSY SINGLE OR MULTIPLE;  Surgeon: Yessica Santana MD;  Location:  GI    GASTRIC BYPASS Bilateral     LAPAROSCOPIC APPENDECTOMY  05/30/2013    Procedure: LAPAROSCOPIC APPENDECTOMY;;  Surgeon: Salvador Morris MD;  Location:  OR    LAPAROSCOPIC BIOPSY LIVER  05/30/2013    Procedure: LAPAROSCOPIC BIOPSY LIVER;;  Surgeon: Salvador Morris MD;  Location:  OR    LAPAROSCOPIC GASTRIC SLEEVE  05/30/2013    Procedure: LAPAROSCOPIC GASTRIC SLEEVE;  LAPAROSCOPIC SLEEVE GASTRECTOMY/ LAPARSCOPIC  APPENDECTOMY /LIVER BIOPSIES/LIVER CYST DRAINAGE;  Surgeon: Salvador Morris MD;  Location:  OR    LASER HOLMIUM LITHOTRIPSY URETER(S), INSERT STENT, COMBINED Left 1/18/2023    Procedure: CYSTOSCOPY, LEFT URETEROSCOPY, HOLMIUM LASER  LITHOTRIPSY, LEFT   STENT PLACEMENT;  Surgeon: Mahendra Coles MD;  Location:  OR    ORTHOPEDIC SURGERY      R wrist 2010    PHACOEMULSIFICATION CLEAR CORNEA WITH STANDARD INTRAOCULAR LENS IMPLANT Right 6/29/2023    Procedure: RIGHT EYE PHACOEMULSIFICATION CATARACT WITH INTRAOCULAR LENS IMPLANT;  Surgeon: Flaco Pappas MD;  Location: [facility] OR    PHACOEMULSIFICATION CLEAR CORNEA WITH STANDARD INTRAOCULAR LENS IMPLANT Left 7/20/2023    Procedure: LEFT EYE PHACOEMULSIFICATION, CATARACT, WITH INTRAOCULAR LENS IMPLANT;  Surgeon: Flaco Pappas MD;  Location: [facility] OR       Current Outpatient Medications   Medication    Acetaminophen 325 MG CHEW    ARIPiprazole (ABILIFY) 5 MG tablet    atorvastatin (LIPITOR) 20 MG tablet    Banana Flakes (BANATROL PO)    bismuth subsalicylate 262 MG TABS    buPROPion (WELLBUTRIN XL) 150  MG 24 hr tablet    carBAMazepine (TEGRETOL XR) 200 MG 12 hr tablet    carboxymethylcellulose PF (REFRESH PLUS) 0.5 % ophthalmic solution    cyanocobalamin (VITAMIN B-12) 1000 MCG tablet    DULoxetine (CYMBALTA) 60 MG capsule    famotidine (PEPCID) 40 MG tablet    fluticasone (FLONASE) 50 MCG/ACT nasal spray    GNP CALCIUM CITRATE +D3 315-6.25 MG-MCG TABS    hydrocortisone (CORTAID) 1 % external cream    hydrOXYzine (ATARAX) 25 MG tablet    lactobacillus rhamnosus, GG, (CULTURELL) capsule    Lactobacillus-Inulin (CULTURELLE DIGESTIVE [facility]) CAPS    loperamide (IMODIUM A-D) 2 MG tablet    losartan (COZAAR) 50 MG tablet    mirtazapine (REMERON) 30 MG tablet    nystatin (MYCOSTATIN) 177313 UNIT/GM external powder    prednisoLONE acetate (PRED FORTE) 1 % ophthalmic suspension    traZODone HCl 300 MG TABS    triamcinolone (ARISTOCORT HP) 0.5 % external cream    trospium (SANCTURA) 20 MG tablet    verapamil ER (CALAN-SR) 120 MG CR tablet    VITAMIN D-1000 MAX ST 25 MCG (1000 UT) tablet     No current facility-administered medications for this visit.     Medication: On Duloxetine and Mirtazpine and Trazodone and Ritalin. She discontinued Effexor and Abilify previously per Dr. Marquise Coleman, her psychiatrist.  On 4/10/19 she informed me that she started Abilify        9/15/2019    10:10 AM 2022     3:40 PM 11/3/2022     6:06 PM   PHQ-9 SCORE   PHQ-9 Total Score MyChart 5 (Mild depression) 6 (Mild depression) 5 (Mild depression)   PHQ-9 Total Score 5 6 5         11/3/2022     6:05 PM 2022     6:30 PM 8/15/2023    11:12 AM   NAS-7 SCORE   Total Score 3 (minimal anxiety) 4 (minimal anxiety) 3 (minimal anxiety)   Total Score 3 4 3     SOCIAL HISTORY (at intake)  Ms. Padilla grew up in the [place] and is the oldest of 5 children in her family of origin.  Her father was a dentist who she believes was bipolar.  He  of lung cancer at age 72.  Her mother  of sepsis at age 80.  She had been diagnosed with breast  cancer when Ms. Padilla was 8.  She described the marriage between her parents as misael.  She is 5 years older than her closest-aged sister and they are all within 4 years of each other.   Her daughter  12/3 and her mother .  She tends to feel more depressed in winter.      Ms. Padilla attended [employer] for a year and later went to night school at the [employer].  She felt that she could not continue her education to the point of graduation because she was working full time and raising her daughter.  Her daughter  in  at age 31 of liver failure secondary to an accidental Tylenol overdose.  She had been recovering from a hysterectomy.      Ms. 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  She had to retire at the time secondary to her MS.  She has never .  She has not dated,and states that if she were she would date, she would be interested in a relationship with a woman.  There is no history of  service or legal problems.  She expresses concern about her increasing social isolation.  She had at least 1 friend, Jael, who she met at a therapy group in .  She is active in facilitating groups for people with MS both in [place] and [place].  She lived alone and currently gets help from a home health aide, as well as home health nurse.     She had  seen Dr. Ban Coleman, psychiatrist.  Dr. Coleman prescribed Cymbalta for it. She feels she has been more depressed since the Fall, but doesn't think it is SAD.   She stated that she has recommended case management.     SESSION:  Mili arrived punctually for video visit.  The depression varies and has worsened given her health issues.     Health:  She has pressure sore on LLE.  Discussed need to attend to it.  May consider wound healing clinic if the person who sees her is not already  part of one. Discussed need to decrease pressure there.  Discussed role of weight there and in terms of theresa eof her walking challenges.     She is now covered by [employer], Medicare, and most recently getting on Medical Assistance. She is concerned about MA limitations and costs, feels she is  underwater $500/month.  Encouraged her to speak with her  with the omi hernandez as family. She has an estimated $5k  credit card debt through a credit union  I encouraged her to meet with a banker there..     Now at [facility] ([address]; [phone])  since 8/12.23, moved up from [facility] to Longterm care.  She is cleaning out her apartment, which is bittersweet.  It has been exhausting.  Getting help from her sisters.  Deaccessioning to fit smaller space.  She had been in her old apartment> 30 years.  Unpackingl. With help from sister, gradually. Starting to make friends n building and envisioning social activities. 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     Weight Issues: She thinks she gained weight.  They are not weighing her.  Discussed cutting in half portions with the dietitian, however, that plan is not being followed.   We printed out a reminder for her to use.  She has not been attending OA. Since May, goal had been 0766-2509 calories without tracking.  States she could commit to 1750.  Plans to speak to a nutritionist.. Plan to resume tracking is on hold, but recommended [employer].  She is not weighed regularly  at [facility].   She will ask to be weighed weekly.  She states she is eating half portions.  Discussed need to get the weight tracking going.  Encouraged her to speak to the nurse manager to get the weekly weigh ins.     Her family is supportive.    She participated fully and appeared to derive benefit. Affect is positive.  Rapport was excellent    This telehealth service is appropriate and effective for delivering services in  light of the necessity for social distancing to mitigate the COVID-19 epidemic and for conservation of PPE.     Patient has agreed to receiving telehealth services after being informed about it: Yes    Patient prefers video invitation/information to be sent by:   email    Time service started: 3:02  Time service ended:  3:48    Mode of transmission: US PREVENTIVE MEDICINE    Location of originating:  Home of the patient    Distance site:  Home office of provider for [facility]    The patient has been notified that:  Video visits will be conducted via a call with their psychologist to provide the care they need with a video conversation. Video visits may be billed at different rates depending on insurance coverage.  Patients are advised to please contact their insurance provider with any questions about their health insurance coverage. If during the course of a call the psychologist feels a video visit is not appropriate, patients will not be charged for this service.      [facility]  Phone:[phone] room 414 @ [facility] [address] [place] [address]    DIAGNOSES:   Major depression, recurrent, moderate (F33.1).   Behavioral factors affecting morbid obesity (E66.01).  Bereavement     PLAN:  Ms. Padilla will return for in in person visit  4/9 @ 3 for eclectic, supportive therapy consistent with treatment plan.      Last treatment plan signed: 6/30/23  Last Treatment plan review: 1/30/24  Treatment plan verbal Review due: 4/30/24  Treatment Review due: 6/30/24     Ramin Olivo, PhD, A.B.P.P., L.P.   Director, Health Psychology

## 2024-03-04 LAB
CARBAMAZEPINE SERPL-MCNC: 5.1 UG/ML (ref 4–12)
NT-PROBNP SERPL-MCNC: 1068 PG/ML (ref 0–900)
SODIUM SERPL-SCNC: 141 MMOL/L (ref 135–145)

## 2024-03-04 PROCEDURE — P9604 ONE-WAY ALLOW PRORATED TRIP: HCPCS | Mod: ORL | Performed by: NURSE PRACTITIONER

## 2024-03-04 PROCEDURE — 80156 ASSAY CARBAMAZEPINE TOTAL: CPT | Mod: ORL | Performed by: NURSE PRACTITIONER

## 2024-03-04 PROCEDURE — 84295 ASSAY OF SERUM SODIUM: CPT | Mod: ORL | Performed by: NURSE PRACTITIONER

## 2024-03-04 PROCEDURE — 83880 ASSAY OF NATRIURETIC PEPTIDE: CPT | Mod: ORL | Performed by: NURSE PRACTITIONER

## 2024-03-04 PROCEDURE — 36415 COLL VENOUS BLD VENIPUNCTURE: CPT | Mod: ORL | Performed by: NURSE PRACTITIONER

## 2024-03-14 ENCOUNTER — LAB REQUISITION (OUTPATIENT)
Dept: LAB | Facility: CLINIC | Age: 69
End: 2024-03-14
Payer: COMMERCIAL

## 2024-03-14 DIAGNOSIS — R30.0 DYSURIA: ICD-10-CM

## 2024-03-14 PROCEDURE — 81001 URINALYSIS AUTO W/SCOPE: CPT | Mod: ORL | Performed by: NURSE PRACTITIONER

## 2024-03-15 ENCOUNTER — LAB REQUISITION (OUTPATIENT)
Dept: LAB | Facility: CLINIC | Age: 69
End: 2024-03-15
Payer: COMMERCIAL

## 2024-03-15 DIAGNOSIS — I10 ESSENTIAL (PRIMARY) HYPERTENSION: ICD-10-CM

## 2024-03-15 DIAGNOSIS — R30.0 DYSURIA: ICD-10-CM

## 2024-03-15 DIAGNOSIS — G35 MULTIPLE SCLEROSIS (H): ICD-10-CM

## 2024-03-15 LAB
ALBUMIN UR-MCNC: NEGATIVE MG/DL
APPEARANCE UR: ABNORMAL
BACTERIA #/AREA URNS HPF: ABNORMAL /HPF
BILIRUB UR QL STRIP: NEGATIVE
COLOR UR AUTO: ABNORMAL
GLUCOSE UR STRIP-MCNC: NEGATIVE MG/DL
HGB UR QL STRIP: ABNORMAL
HYALINE CASTS: 1 /LPF
KETONES UR STRIP-MCNC: NEGATIVE MG/DL
LEUKOCYTE ESTERASE UR QL STRIP: ABNORMAL
NITRATE UR QL: NEGATIVE
PH UR STRIP: 5.5 [PH] (ref 5–7)
RBC URINE: 1 /HPF
SP GR UR STRIP: 1 (ref 1–1.03)
SQUAMOUS EPITHELIAL: 2 /HPF
UROBILINOGEN UR STRIP-MCNC: NORMAL MG/DL
WBC CLUMPS #/AREA URNS HPF: PRESENT /HPF
WBC URINE: 8 /HPF

## 2024-03-18 LAB
ALBUMIN SERPL BCG-MCNC: 3 G/DL (ref 3.5–5.2)
ALP SERPL-CCNC: 86 U/L (ref 40–150)
ALT SERPL W P-5'-P-CCNC: 11 U/L (ref 0–50)
AST SERPL W P-5'-P-CCNC: 26 U/L (ref 0–45)
BASOPHILS # BLD AUTO: 0 10E3/UL (ref 0–0.2)
BASOPHILS NFR BLD AUTO: 0 %
BILIRUB DIRECT SERPL-MCNC: <0.2 MG/DL (ref 0–0.3)
BILIRUB SERPL-MCNC: 0.2 MG/DL
EOSINOPHIL # BLD AUTO: 0.3 10E3/UL (ref 0–0.7)
EOSINOPHIL NFR BLD AUTO: 4 %
ERYTHROCYTE [DISTWIDTH] IN BLOOD BY AUTOMATED COUNT: 13.4 % (ref 10–15)
HCT VFR BLD AUTO: 25.1 % (ref 35–47)
HGB BLD-MCNC: 8 G/DL (ref 11.7–15.7)
IMM GRANULOCYTES # BLD: 0 10E3/UL
IMM GRANULOCYTES NFR BLD: 1 %
LYMPHOCYTES # BLD AUTO: 1.3 10E3/UL (ref 0.8–5.3)
LYMPHOCYTES NFR BLD AUTO: 17 %
MCH RBC QN AUTO: 30.2 PG (ref 26.5–33)
MCHC RBC AUTO-ENTMCNC: 31.9 G/DL (ref 31.5–36.5)
MCV RBC AUTO: 95 FL (ref 78–100)
MONOCYTES # BLD AUTO: 0.5 10E3/UL (ref 0–1.3)
MONOCYTES NFR BLD AUTO: 7 %
NEUTROPHILS # BLD AUTO: 5.3 10E3/UL (ref 1.6–8.3)
NEUTROPHILS NFR BLD AUTO: 71 %
NRBC # BLD AUTO: 0 10E3/UL
NRBC BLD AUTO-RTO: 0 /100
PLATELET # BLD AUTO: 165 10E3/UL (ref 150–450)
PROT SERPL-MCNC: 6.1 G/DL (ref 6.4–8.3)
RBC # BLD AUTO: 2.65 10E6/UL (ref 3.8–5.2)
TSH SERPL DL<=0.005 MIU/L-ACNC: 2.06 UIU/ML (ref 0.3–4.2)
WBC # BLD AUTO: 7.4 10E3/UL (ref 4–11)

## 2024-03-18 PROCEDURE — 36415 COLL VENOUS BLD VENIPUNCTURE: CPT | Mod: ORL | Performed by: NURSE PRACTITIONER

## 2024-03-18 PROCEDURE — 84443 ASSAY THYROID STIM HORMONE: CPT | Mod: ORL | Performed by: NURSE PRACTITIONER

## 2024-03-18 PROCEDURE — P9604 ONE-WAY ALLOW PRORATED TRIP: HCPCS | Mod: ORL | Performed by: NURSE PRACTITIONER

## 2024-03-18 PROCEDURE — 85025 COMPLETE CBC W/AUTO DIFF WBC: CPT | Mod: ORL | Performed by: NURSE PRACTITIONER

## 2024-03-18 PROCEDURE — 80076 HEPATIC FUNCTION PANEL: CPT | Mod: ORL | Performed by: NURSE PRACTITIONER

## 2024-03-20 ENCOUNTER — LAB REQUISITION (OUTPATIENT)
Dept: LAB | Facility: CLINIC | Age: 69
End: 2024-03-20
Payer: COMMERCIAL

## 2024-03-20 DIAGNOSIS — D64.9 ANEMIA, UNSPECIFIED: ICD-10-CM

## 2024-03-21 LAB — HGB BLD-MCNC: 8.1 G/DL (ref 11.7–15.7)

## 2024-03-21 PROCEDURE — 85018 HEMOGLOBIN: CPT | Mod: ORL | Performed by: NURSE PRACTITIONER

## 2024-03-21 PROCEDURE — P9604 ONE-WAY ALLOW PRORATED TRIP: HCPCS | Mod: ORL | Performed by: NURSE PRACTITIONER

## 2024-03-21 PROCEDURE — 36415 COLL VENOUS BLD VENIPUNCTURE: CPT | Mod: ORL | Performed by: NURSE PRACTITIONER

## 2024-03-24 ENCOUNTER — LAB REQUISITION (OUTPATIENT)
Dept: LAB | Facility: CLINIC | Age: 69
End: 2024-03-24
Payer: COMMERCIAL

## 2024-03-24 DIAGNOSIS — R06.02 SHORTNESS OF BREATH: ICD-10-CM

## 2024-03-24 DIAGNOSIS — R53.1 WEAKNESS: ICD-10-CM

## 2024-03-24 DIAGNOSIS — R30.0 DYSURIA: ICD-10-CM

## 2024-03-24 PROCEDURE — 81001 URINALYSIS AUTO W/SCOPE: CPT | Mod: ORL

## 2024-03-24 PROCEDURE — 87086 URINE CULTURE/COLONY COUNT: CPT | Mod: ORL

## 2024-03-24 PROCEDURE — 87637 SARSCOV2&INF A&B&RSV AMP PRB: CPT | Mod: ORL

## 2024-03-24 PROCEDURE — 87186 SC STD MICRODIL/AGAR DIL: CPT | Mod: ORL,XU

## 2024-03-25 LAB
ALBUMIN UR-MCNC: 100 MG/DL
AMORPH CRY #/AREA URNS HPF: ABNORMAL /HPF
APPEARANCE UR: ABNORMAL
BACTERIA #/AREA URNS HPF: ABNORMAL /HPF
BILIRUB UR QL STRIP: NEGATIVE
COLOR UR AUTO: YELLOW
FLUAV RNA SPEC QL NAA+PROBE: NEGATIVE
FLUBV RNA RESP QL NAA+PROBE: NEGATIVE
GLUCOSE UR STRIP-MCNC: NEGATIVE MG/DL
HGB UR QL STRIP: ABNORMAL
KETONES UR STRIP-MCNC: NEGATIVE MG/DL
LEUKOCYTE ESTERASE UR QL STRIP: ABNORMAL
MUCOUS THREADS #/AREA URNS LPF: PRESENT /LPF
NITRATE UR QL: NEGATIVE
PH UR STRIP: 6 [PH] (ref 5–7)
RBC URINE: 7 /HPF
RSV RNA SPEC NAA+PROBE: NEGATIVE
SARS-COV-2 RNA RESP QL NAA+PROBE: NEGATIVE
SP GR UR STRIP: 1.01 (ref 1–1.03)
SQUAMOUS EPITHELIAL: 1 /HPF
UROBILINOGEN UR STRIP-MCNC: 2 MG/DL
WBC CLUMPS #/AREA URNS HPF: PRESENT /HPF
WBC URINE: >182 /HPF

## 2024-03-27 LAB
BACTERIA UR CULT: ABNORMAL
BACTERIA UR CULT: ABNORMAL

## 2024-04-05 ENCOUNTER — LAB REQUISITION (OUTPATIENT)
Dept: LAB | Facility: CLINIC | Age: 69
End: 2024-04-05
Payer: COMMERCIAL

## 2024-04-05 DIAGNOSIS — I10 ESSENTIAL (PRIMARY) HYPERTENSION: ICD-10-CM

## 2024-04-09 ENCOUNTER — TELEPHONE (OUTPATIENT)
Dept: PSYCHOLOGY | Facility: CLINIC | Age: 69
End: 2024-04-09
Payer: COMMERCIAL

## 2024-04-09 NOTE — TELEPHONE ENCOUNTER
Health Psychology                    Department of Medicine  Izzy Montaño, Ph.D., L.P. [phone]                         [facility] Sherrie Crowe, Ph.D., L.P. [phone]                     [place] Mail Code 808   SoleRanulfo, Ph.D. [phone]      [address] [place] [address] Ramin Olivo, Ph.D., HERNESTO.B.P.P., L.P. [phone]              [place] [address]           Kathie Galvan, Ph.D., L.P. [phone]     Carmel Farnsworth, Ph.D., A.B.P.P., L.P. [phone]    [facility]   Reno Luna, Ph.D. ([email or web address])   [phone]   [address] [place] [address]              Telephone Note    Mili failed today's appointment.  When I called her cell phone, there iWA no answer.  I then called not all of that, where she lives.  I was informed that she was at [facility].  When I called [facility] there was no answer.  I did leave a message on her cell phone voicemail asking that she call me about rescheduling.    Ramin Olivo, Ph.D., A.B.P.P., L.P.  Director, Health Psychology  [phone]

## 2024-04-19 ENCOUNTER — LAB REQUISITION (OUTPATIENT)
Dept: LAB | Facility: CLINIC | Age: 69
End: 2024-04-19
Payer: COMMERCIAL

## 2024-04-19 DIAGNOSIS — D64.9 ANEMIA, UNSPECIFIED: ICD-10-CM

## 2024-04-22 LAB — HGB BLD-MCNC: 8.1 G/DL (ref 11.7–15.7)

## 2024-04-22 PROCEDURE — 36415 COLL VENOUS BLD VENIPUNCTURE: CPT | Mod: ORL | Performed by: NURSE PRACTITIONER

## 2024-04-22 PROCEDURE — P9604 ONE-WAY ALLOW PRORATED TRIP: HCPCS | Mod: ORL | Performed by: NURSE PRACTITIONER

## 2024-04-22 PROCEDURE — 85018 HEMOGLOBIN: CPT | Mod: ORL | Performed by: NURSE PRACTITIONER

## 2024-04-24 ENCOUNTER — LAB REQUISITION (OUTPATIENT)
Dept: LAB | Facility: CLINIC | Age: 69
End: 2024-04-24
Payer: COMMERCIAL

## 2024-04-24 DIAGNOSIS — D64.9 ANEMIA, UNSPECIFIED: ICD-10-CM

## 2024-04-25 LAB — HGB BLD-MCNC: 8.4 G/DL (ref 11.7–15.7)

## 2024-04-25 PROCEDURE — 85018 HEMOGLOBIN: CPT | Mod: ORL | Performed by: NURSE PRACTITIONER

## 2024-04-25 PROCEDURE — 36415 COLL VENOUS BLD VENIPUNCTURE: CPT | Mod: ORL | Performed by: NURSE PRACTITIONER

## 2024-04-25 PROCEDURE — P9603 ONE-WAY ALLOW PRORATED MILES: HCPCS | Mod: ORL | Performed by: NURSE PRACTITIONER

## 2024-05-10 ENCOUNTER — LAB REQUISITION (OUTPATIENT)
Dept: LAB | Facility: CLINIC | Age: 69
End: 2024-05-10
Payer: COMMERCIAL

## 2024-05-10 DIAGNOSIS — I10 ESSENTIAL (PRIMARY) HYPERTENSION: ICD-10-CM

## 2024-05-10 DIAGNOSIS — G35 MULTIPLE SCLEROSIS (H): ICD-10-CM

## 2024-05-13 LAB
ALBUMIN SERPL BCG-MCNC: 3.6 G/DL (ref 3.5–5.2)
ALP SERPL-CCNC: 135 U/L (ref 40–150)
ALT SERPL W P-5'-P-CCNC: 23 U/L (ref 0–50)
ANION GAP SERPL CALCULATED.3IONS-SCNC: 12 MMOL/L (ref 7–15)
AST SERPL W P-5'-P-CCNC: 38 U/L (ref 0–45)
BASOPHILS # BLD AUTO: 0.1 10E3/UL (ref 0–0.2)
BASOPHILS NFR BLD AUTO: 0 %
BILIRUB SERPL-MCNC: 0.3 MG/DL
BUN SERPL-MCNC: 9.3 MG/DL (ref 8–23)
CALCIUM SERPL-MCNC: 9.6 MG/DL (ref 8.8–10.2)
CHLORIDE SERPL-SCNC: 95 MMOL/L (ref 98–107)
CREAT SERPL-MCNC: 0.81 MG/DL (ref 0.51–0.95)
DEPRECATED HCO3 PLAS-SCNC: 27 MMOL/L (ref 22–29)
EGFRCR SERPLBLD CKD-EPI 2021: 78 ML/MIN/1.73M2
EOSINOPHIL # BLD AUTO: 0.3 10E3/UL (ref 0–0.7)
EOSINOPHIL NFR BLD AUTO: 2 %
ERYTHROCYTE [DISTWIDTH] IN BLOOD BY AUTOMATED COUNT: 14.4 % (ref 10–15)
GLUCOSE SERPL-MCNC: 105 MG/DL (ref 70–99)
HCT VFR BLD AUTO: 30.2 % (ref 35–47)
HGB BLD-MCNC: 9.6 G/DL (ref 11.7–15.7)
IMM GRANULOCYTES # BLD: 0.1 10E3/UL
IMM GRANULOCYTES NFR BLD: 1 %
LYMPHOCYTES # BLD AUTO: 2.1 10E3/UL (ref 0.8–5.3)
LYMPHOCYTES NFR BLD AUTO: 17 %
MCH RBC QN AUTO: 30.9 PG (ref 26.5–33)
MCHC RBC AUTO-ENTMCNC: 31.8 G/DL (ref 31.5–36.5)
MCV RBC AUTO: 97 FL (ref 78–100)
MONOCYTES # BLD AUTO: 0.6 10E3/UL (ref 0–1.3)
MONOCYTES NFR BLD AUTO: 5 %
NEUTROPHILS # BLD AUTO: 8.9 10E3/UL (ref 1.6–8.3)
NEUTROPHILS NFR BLD AUTO: 75 %
NRBC # BLD AUTO: 0 10E3/UL
NRBC BLD AUTO-RTO: 0 /100
PLATELET # BLD AUTO: 264 10E3/UL (ref 150–450)
POTASSIUM SERPL-SCNC: 4 MMOL/L (ref 3.4–5.3)
PROT SERPL-MCNC: 6.6 G/DL (ref 6.4–8.3)
RBC # BLD AUTO: 3.11 10E6/UL (ref 3.8–5.2)
SODIUM SERPL-SCNC: 134 MMOL/L (ref 135–145)
WBC # BLD AUTO: 12 10E3/UL (ref 4–11)

## 2024-05-13 PROCEDURE — 36415 COLL VENOUS BLD VENIPUNCTURE: CPT | Mod: ORL | Performed by: NURSE PRACTITIONER

## 2024-05-13 PROCEDURE — 85025 COMPLETE CBC W/AUTO DIFF WBC: CPT | Mod: ORL | Performed by: NURSE PRACTITIONER

## 2024-05-13 PROCEDURE — 80053 COMPREHEN METABOLIC PANEL: CPT | Mod: ORL | Performed by: NURSE PRACTITIONER

## 2024-05-13 PROCEDURE — P9604 ONE-WAY ALLOW PRORATED TRIP: HCPCS | Mod: ORL | Performed by: NURSE PRACTITIONER

## 2024-05-15 NOTE — PROGRESS NOTES
"NUTRITION POST OP APPOINTMENT  DATE OF VISIT: May 15, 2024    Mili Padilla  1955  female  [id number]  68 year old     ASSESSMENT:    REASON FOR VISIT:  Mili is a 68 year old year old female presents today for 11 year PO nutrition follow-up appointment. Patient is accompanied by self.    DIAGNOSIS:  Status post gastric sleeve surgery.  Obesity Grade II BMI 35-39.9     ANTHROPOMETRICS:  Initial Weight: 304 lb   Height: 66\"  (change from 67\")  Current Weight: 243 lb    BMI: 39.22  kg/(m^2).    VITAMINS AND MINERALS:   2 Multivitamin with Minerals-Tab-A-Renae oral tablet (HS)  315 mg calcium citrate 1X per day-not sure of time   3000 International units Vitamin D every other day  1000 mcg Vitamin B12 daily  Patient brought in list of medication from [facility]    No iron needed- not getting periods    NUTRITION HISTORY:  Eats ~50% of what is served  Breakfast: scrambled eggs, fruit, sometimes sausage, sometimes toast  Lunch: protein, starch, non- starchy vegetable, dessert  Supper: same as lunch  Snacks: occasional cookie  Fluids consumed: ~60 oz water, orange and cranberry juice, 12-16 oz skim milk some days, occasional root beer, no ETOH   Consuming liquid calories: Yes  Protein intake: 60-70 grams/day  Tolerate regular texture food: Yes  Any foods not tolerated details: Yes  If any food not tolerated: green beans  Portion size: 1 cup or more  Take 20-30 minutes to consume each meal: Yes   Eat protein foods first: Yes  Fluids and meals separate by at least 30 minutes: No  Chew foods thoroughly: Yes  Tolerating diet: Yes  Drinking high protein supplements: No  Consuming snacks per day: 0-1  Additional Information: Had unintentional weight loss while in the hospital ([facility]) for ~ 3 weeks. Currently patient is  living at [facility] ([facility] Throat is dry so drinks 4 oz with meals.  Eats about 50% of food provided due to getting full or does not taste good.      PHYSICAL ACTIVITY:  Type: finished " PT/OT last Tuesday- doing PT exercises  Frequency (days per week): 3-4  Duration (min): 30    DIAGNOSIS:  Previous Nutrition Diagnosis: Altered gastrointestinal function related to alteration in gastrointestinal structure as evidenced by history of gastric sleeve surgery.- no change    Previous goals:  Avoid fluids during meals and 30 minutes after meals-not met  Restart calcium- met, but not getting recommended 3066-8815 mg per day  Verify that vitamin B complex has at least 12 mg / serving-not met  Eat 1/3-1/2 less pop corn-met       Current Nutrition Diagnosis: Altered gastrointestinal function related to alteration in gastrointestinal structure as evidenced by history of gastric sleeve surgery.    INTERVENTION:   Nutrition Prescription: Eat 3 meals a day at regular intervals. Consume 60-90 grams of protein daily. Follow post-surgical vitamin and mineral protocol.  Assessed learning needs and learning preferences.    GOALS:  Start 100 mg Thiamin 1 time per week  Increase calcium to 2 tablets (315 mg) 2 times per day and take at least 2 hours away from multivitamin/ mineral  Limit fruit juice to 4 oz + 4 oz water  If hungry between meals have glass of skim milk      Implementation: Discussed progress toward previous goals; reinforced importance of following bariatric lifestyle changes.    NUTRITION MONITORING AND EVALUATION:  Anticipated compliance: fair-good  Verbalized good understanding.    Follow up: Patient to follow up in 12 months.    TIME SPENT WITH PATIENT:  28 minutes  Ric Clement RD, ONEL  [facility]

## 2024-05-17 ENCOUNTER — LAB REQUISITION (OUTPATIENT)
Dept: LAB | Facility: CLINIC | Age: 69
End: 2024-05-17
Payer: COMMERCIAL

## 2024-05-17 DIAGNOSIS — I10 ESSENTIAL (PRIMARY) HYPERTENSION: ICD-10-CM

## 2024-05-20 ENCOUNTER — OFFICE VISIT (OUTPATIENT)
Dept: SURGERY | Facility: CLINIC | Age: 69
End: 2024-05-20
Payer: COMMERCIAL

## 2024-05-20 VITALS
OXYGEN SATURATION: 96 % | HEIGHT: 66 IN | WEIGHT: 243 LBS | HEART RATE: 100 BPM | DIASTOLIC BLOOD PRESSURE: 63 MMHG | BODY MASS INDEX: 39.05 KG/M2 | SYSTOLIC BLOOD PRESSURE: 110 MMHG

## 2024-05-20 DIAGNOSIS — E66.9 OBESITY: ICD-10-CM

## 2024-05-20 DIAGNOSIS — Z98.84 BARIATRIC SURGERY STATUS: Primary | ICD-10-CM

## 2024-05-20 DIAGNOSIS — K91.2 POSTSURGICAL MALABSORPTION: ICD-10-CM

## 2024-05-20 LAB
ANION GAP SERPL CALCULATED.3IONS-SCNC: 10 MMOL/L (ref 7–15)
BUN SERPL-MCNC: 14.2 MG/DL (ref 8–23)
CALCIUM SERPL-MCNC: 9.4 MG/DL (ref 8.8–10.2)
CHLORIDE SERPL-SCNC: 96 MMOL/L (ref 98–107)
CREAT SERPL-MCNC: 0.86 MG/DL (ref 0.51–0.95)
DEPRECATED HCO3 PLAS-SCNC: 27 MMOL/L (ref 22–29)
EGFRCR SERPLBLD CKD-EPI 2021: 73 ML/MIN/1.73M2
ERYTHROCYTE [DISTWIDTH] IN BLOOD BY AUTOMATED COUNT: 14.1 % (ref 10–15)
GLUCOSE SERPL-MCNC: 94 MG/DL (ref 70–99)
HCT VFR BLD AUTO: 27.3 % (ref 35–47)
HGB BLD-MCNC: 8.8 G/DL (ref 11.7–15.7)
MCH RBC QN AUTO: 30.8 PG (ref 26.5–33)
MCHC RBC AUTO-ENTMCNC: 32.2 G/DL (ref 31.5–36.5)
MCV RBC AUTO: 96 FL (ref 78–100)
PLATELET # BLD AUTO: 181 10E3/UL (ref 150–450)
POTASSIUM SERPL-SCNC: 4.3 MMOL/L (ref 3.4–5.3)
RBC # BLD AUTO: 2.86 10E6/UL (ref 3.8–5.2)
SODIUM SERPL-SCNC: 133 MMOL/L (ref 135–145)
WBC # BLD AUTO: 6.3 10E3/UL (ref 4–11)

## 2024-05-20 PROCEDURE — 99203 OFFICE O/P NEW LOW 30 MIN: CPT | Performed by: PHYSICIAN ASSISTANT

## 2024-05-20 PROCEDURE — P9604 ONE-WAY ALLOW PRORATED TRIP: HCPCS | Mod: ORL | Performed by: NURSE PRACTITIONER

## 2024-05-20 PROCEDURE — 80048 BASIC METABOLIC PNL TOTAL CA: CPT | Mod: ORL | Performed by: NURSE PRACTITIONER

## 2024-05-20 PROCEDURE — 36415 COLL VENOUS BLD VENIPUNCTURE: CPT | Mod: ORL | Performed by: NURSE PRACTITIONER

## 2024-05-20 PROCEDURE — 97803 MED NUTRITION INDIV SUBSEQ: CPT

## 2024-05-20 PROCEDURE — 85027 COMPLETE CBC AUTOMATED: CPT | Mod: ORL | Performed by: NURSE PRACTITIONER

## 2024-05-20 NOTE — PATIENT INSTRUCTIONS
Nice to talk with you today.  Below is the plan discussed.-  . Neetu Schulz PA-C    Plan:  Labs ordered. Call [phone] to schedule.  Continue your bariatric vitamins - we will be in touch on MyChart for any changes.  Please add 100 mg thiamine (B1) once weekly.  Anticipate needing to increase the calcium to 600 mg twice daily. Likely will need to increase vitamin D and decrease B12 but wait to make changes until labs result.     FOLLOW-UP:  Call [phone] to schedule next visit annually.     Bariatric Post Op Guidelines  General:  Follow up annually lifelong.   Obesity is a chronic disease.  Weight gain can be expected. The goal of follow-up visits is to ensure adequate vitamin and protein absorption, evaluate food intake behavior, review exercise/activity level, and assist with weight regain.    To avoid marginal ulcers avoid all forms of tobacco, alcohol in excess, caffeine, and NSAIDS     Exercise is key for weight loss and weight maintenance. Aim for 30-60 minutes of physical activity most days.  Include cardiovascular and strength training.    Continue lifelong vitamins supplementation and annual lab follow up.  All patients should supplement with the following bariatric postoperative vitamins:  2 Complete multivitamins with minerals (at different times than calcium)  Vitamin D 5000 Int Units/125 mg daily   Calcium 600 mg twice daily or 500 mg three times daily   Vitamin B12: 500 mcg sl daily or 1000 mcg Inj monthly  B complex daily or Thiamine 100 mg weekly  1 Iron/Vit C. Daily for females who menstruate and/or as directed    Relay and GI symptoms which can be a sign of complications. Inability to tolerate solid food (chicken, steak, fish) should by need to be evaluated.    There is a 10% increase of Alcohol Use Disorder in patients with bariatric surgery.   Most often occurring around 2 years post op.  Call if you feel alcohol is interfering in your daily life.  We can help.     Nutritional:  Eat 3  meals per day  (No snacks between meals.)  Do not skip meals.  This can cause overeating at the next meal and will prevent adequate protein and nutritional intake.    Aim for 60-80 grams of protein per day.  Always eat your protein first. This assists with optimal nutrition and helps you stay full longer.    Eat your protein first, and then follow with fiber.    Add fiber by including fruits, vegetables, whole grains, and beans.     Portions should be 1 cup per meal.   Continue to use saucer/salad plates, infant/toddler silverware to keep portion sizes small and take small bites.  Make each meal last 20-30 minutes. Always stop eating when satisfied.    Aim for 64 oz. of calorie-free fluids daily.    Avoid drinking 30 min before, during, and 30 min after meal    Avoid high sugar and high fat foods to prevent high calorie intake.   Check nutrition labels for less than 10 grams of sugar and less than 10 grams of fat per serving.

## 2024-05-20 NOTE — ASSESSMENT & PLAN NOTE
sleeve gastrectomy in 2013  Continue taking recommended post-op vitamins - add vitamin B1 once weekly. Discussed typically needing greater calcium supplementation. Reports hx of kidney stones and prefers to stay at 315 mg daily and repeat labs. Understands increased osteoporosis risk w/malabsorption of calcium/vitamin D  Labs ordered per protocol.

## 2024-05-20 NOTE — PROGRESS NOTES
Return Bariatric Surgery Note    RE: Mili Padilla  MR#: [id number]  : 1955  VISIT DATE: 2024    Dear Jasmyn Márquez,    I had the pleasure of seeing your patient, Mili Padilla, in my post-bariatric surgery assessment clinic.    Assessment & Plan   Problem List Items Addressed This Visit       Bariatric surgery status - Primary    Relevant Orders    CBC with platelets    Vitamin B12    Vitamin D Screen    Parathyroid Hormone Intact    Iron and Iron Binding Capacity    Ferritin    Postsurgical malabsorption     sleeve gastrectomy in   Continue taking recommended post-op vitamins - add vitamin B1 once weekly. Discussed typically needing greater calcium supplementation. Reports hx of kidney stones and prefers to stay at 315 mg daily and repeat labs. Understands increased osteoporosis risk w/malabsorption of calcium/vitamin D  Labs ordered per protocol.           Relevant Orders    CBC with platelets    Vitamin B12    Vitamin D Screen    Parathyroid Hormone Intact    Iron and Iron Binding Capacity    Ferritin    Vitamin A    Vitamin B1 whole blood    Comprehensive metabolic panel        PATIENT INSTRUCTIONS:  Plan:  Labs ordered. Call [phone] to schedule.  Continue your bariatric vitamins - we will be in touch on MyChart for any changes.  Please add 100 mg thiamine (B1) once weekly.  Anticipate needing to increase the calcium to 600 mg twice daily. Likely will need to increase vitamin D and decrease B12 but wait to make changes until labs result.     FOLLOW-UP:  Call [phone] to schedule next visit annually.     35 minutes spent on the date of the encounter doing chart review, history and exam, result review, counseling, developing plan of care, documentation, and further activities as noted        CHIEF COMPLAINT: Post-bariatric surgery follow-up for sleeve gastrectomy in .  Last seen for annual visit 5/15/2023 with Adwoa Hollins PA-C    At that time was noted to be dealing with chronic  diarrhea, using Imodium 4 times a day, and was going to be seeing a GI specialist with [facility].    HISTORY OF PRESENT ILLNESS:      5/17/2024     3:04 PM   Questions Regarding Prior Weight Loss Surgery Reviewed With Patient   I had the following weight loss procedure Sleeve Gastrectomy   What year was your surgery? 2013   How has your weight changed since your last visit? I have lost weight   Do you currently have any of the following Sleep Apnea    Heartburn, acid reflux, or GERD (acid reflux disease)?    Hypertension (high blood pressure)?    Hyperlipidemia (high cholesterol, triglycerides)?   Do you have any concerns today? No       Weight History:      5/17/2024     3:04 PM   --   What is your highest lifetime weight? 315   What is your lowest weight since surgery? (In pounds) 243     Initial Weight (lbs): 305 lbs  Weight: 243 lb (110.2 kg)  Last Visits Weight: 283 lb (128.4 kg)  Cumulative weight loss (lbs): 62  Weight Loss Percentage: 20.33%      Patient is taking the following bariatric postoperative vitamins from MD visit 5/2023:   2 Multivitamin with Minerals (HS)  Calcium citrate +D 315-6.25 mg-mcg once daily   1000 International units Vitamin D every other day  1000 mcg Vitamin B12 once daily  No B complex or thiamine currently  No iron needed            5/17/2024     3:04 PM   Questions Regarding Co-Morbidities and Health Concerns Reviewed With Patient   Pre-diabetes Never   Diabetes II Never   High Blood Pressure Improved   High cholesterol Stayed the same   Heartburn/Reflux Improved   Sleep apnea Stayed the same   PCOS Never   Back pain Stayed the same   Joint pain Stayed the same   Lower leg swelling Stayed the same           5/17/2024     3:04 PM   Eating Habits   How many meals do you eat per day? 3   Do you snack between meals? Sometimes   How much food are you eating at each meal? 1/2 cup to 1 cup   Are you able to separate your meals and liquids by at least 30 minutes? Sometimes   Are you  able to avoid liquid calories? Yes           5/17/2024     3:04 PM   Exercise Questions Reviewed With Patient   How often do you exercise? Daily   What is the duration of your exercise (in minutes)? 20 Minutes   What types of exercise do you do? other   What keeps you from being more active? I am as active as I can possbily be    My ability to walk or move around is limited   Was working with PT - medicare coverage stopped on the 17th so working on own exercises. From both OT/PT.     Social History:      5/17/2024     3:04 PM   --   Are you smoking? No   Are you drinking alcohol? No       Medications:  Current Outpatient Medications   Medication Sig Dispense Refill    Acetaminophen 325 MG CHEW Take 650 mg by mouth every 4 hours as needed for mild pain      ARIPiprazole (ABILIFY) 5 MG tablet Take 7.5 mg by mouth daily  2    atorvastatin (LIPITOR) 20 MG tablet Take 20 mg by mouth daily.      buPROPion (WELLBUTRIN XL) 150 MG 24 hr tablet       carBAMazepine (TEGRETOL XR) 200 MG 12 hr tablet Take 200 mg by mouth 2 times daily      carboxymethylcellulose PF (REFRESH PLUS) 0.5 % ophthalmic solution Place 1 drop into both eyes 4 times daily      cyanocobalamin (VITAMIN B-12) 1000 MCG tablet       DULoxetine (CYMBALTA) 60 MG capsule Take 120 mg by mouth daily      famotidine (PEPCID) 40 MG tablet Take 1 tablet (40 mg) by mouth daily 90 tablet 3    fluticasone (FLONASE) 50 MCG/ACT nasal spray Spray 2 sprays into both nostrils daily      GNP CALCIUM CITRATE +D3 315-6.25 MG-MCG TABS       hydrOXYzine (ATARAX) 25 MG tablet Take 25 mg by mouth every 6 hours as needed for anxiety      lactobacillus rhamnosus, GG, (CULTURELL) capsule Take 1 capsule by mouth 2 times daily      Lactobacillus-Inulin (CULTURELLE DIGESTIVE HEALTH) CAPS TAKE 1 CAP BY MOUTH TWICE A DAY      loperamide (IMODIUM A-D) 2 MG tablet Take 1 tablet (2 mg) by mouth 2 times daily      losartan (COZAAR) 50 MG tablet Take 1 tablet (50 mg) by mouth 2 times daily       mirtazapine (REMERON) 30 MG tablet Take 75 mg by mouth At Bedtime      nystatin (MYCOSTATIN) 814613 UNIT/GM external powder       prednisoLONE acetate (PRED FORTE) 1 % ophthalmic suspension       traZODone HCl 300 MG TABS Take 600 mg by mouth At Bedtime      triamcinolone (ARISTOCORT HP) 0.5 % external cream Apply topically every 8 hours as needed (itching)      trospium (SANCTURA) 20 MG tablet Take 20 mg by mouth 2 times daily      Banana Flakes (BANATROL PO) Take by mouth 2 times daily (Patient not taking: Reported on 5/20/2024)      bismuth subsalicylate 262 MG TABS Take 1 tablet by mouth 2 times daily (Patient not taking: Reported on 5/20/2024)      hydrocortisone (CORTAID) 1 % external cream  (Patient not taking: Reported on 5/20/2024)      verapamil ER (CALAN-SR) 120 MG CR tablet Take 120 mg by mouth 2 times daily      VITAMIN D-1000 MAX ST 25 MCG (1000 UT) tablet Take 25 mcg by mouth       No current facility-administered medications for this visit.         5/17/2024     3:04 PM   --   Do you avoid NSAIDs such as (Ibuprofen, Aleve, Naproxen, Advil)? Yes       ROS:  GI:       5/17/2024     3:04 PM   --   Vomiting No   Diarrhea Yes   Constipation Yes   Swallowing trouble No   Abdominal pain No   Heartburn No   Denies reflux, things stuck - diarrhea/constipation is a long standing issue.     Skin:       5/17/2024     3:04 PM   BAR RBS ROS - SKIN   Rash in skin folds Yes     Psych:       5/17/2024     3:04 PM   --   Depression Yes   Anxiety No     Female Only:       5/17/2024     3:04 PM   BAR RBS ROS -    Female only Post-menopausal   Stress urinary incontinence Yes   With a urinary catheter now - had two UTI's/sepsis this year.     LABS/IMAGING/MEDICAL RECORDS REVIEW:   Reviewed    Surgical labs from last year reviewed and looking good.    DEXA scan ordered in 2017 and not done - reports was just walking around apartment and fell and broke foot in three places. Knows she's had a DEXA scan  through primary  "(which isn't through [facility] so can't see results).     Is on Prolia injections for osteoporosis from [facility] physician. So would defer to them for following DEXA scans since already being treated.     PHYSICAL EXAMINATION:  /63   Pulse 100   Ht 5' 6\" (1.676 m)   Wt 243 lb (110.2 kg)   LMP 12/10/2010   SpO2 96%   BMI 39.22 kg/m    GENERAL: Healthy, alert and no distress. In powder wheel chair.   RESP: No audible wheeze, cough, or visible cyanosis.  No visible retractions or increased work of breathing.    SKIN: Visible skin clear. No significant rash, abnormal pigmentation or lesions.  PSYCH: Mentation appears normal, affect normal/bright, judgement and insight intact    COUNSELING PROVIDED in AVS:  We reviewed the important post op bariatric recommendations:  eating 3 meals daily  eating protein first, getting >60gm protein daily  eating slowly, chewing food well  avoiding/limiting calorie containing beverages  avoiding fluids 30 minutes before, during, and after meals    Pt reminded to avoid marginal ulcers she should avoid tobacco at all, alcohol in excess, caffeine, and NSAIDS (unless indicated for cardioprotection or otherwise and opposed by a PPI).  Pt encouraged to establish and maintain a consistent physical activity routine as well as possible  Pt counseled on the importance of life long vitamin supplementation and life long follow up.    Neetu Schulz PA-C   "

## 2024-05-20 NOTE — PATIENT INSTRUCTIONS
Casper Garces-   It was great to visit with you and learn about your progress. Below are the goals we discussed.  GOALS:  Start 100 mg Thiamin 1 time per week  Increase calcium to 2 tablets (315 mg) 2 times per day and take at least 2 hours away from multivitamin/ mineral  Limit fruit juice to 4 oz + 4 oz water  If hungry between meals have glass of skim milk    Please call [phone] to schedule your next visits with a Dietitian/ Provider (PA or MD) in year.  Thanks!  Ric Clement RD, LD  [facility]

## 2024-05-21 ENCOUNTER — LAB REQUISITION (OUTPATIENT)
Dept: LAB | Facility: CLINIC | Age: 69
End: 2024-05-21
Payer: COMMERCIAL

## 2024-05-21 ENCOUNTER — TELEPHONE (OUTPATIENT)
Dept: SURGERY | Facility: CLINIC | Age: 69
End: 2024-05-21
Payer: COMMERCIAL

## 2024-05-21 DIAGNOSIS — D64.9 ANEMIA, UNSPECIFIED: ICD-10-CM

## 2024-05-21 DIAGNOSIS — I10 ESSENTIAL (PRIMARY) HYPERTENSION: ICD-10-CM

## 2024-05-21 NOTE — TELEPHONE ENCOUNTER
ERIK Adam from [facility] called.    Stated can see pt was told to start B1 but no dosage or frequency.    Informed that per SHARON De Anda: pt to take 100 mg B1 weekly.    Also asked what labs needed to be drawn.  States [facility] comes to [facility] and draws them so should be able to see them in the system once resulted.    Informed that  all labs are on pt's AVS from yesterday's visit.  Kia stated will look at AVS and contact clinic if questions.  Lesa Angulo, MS, RD, RN

## 2024-05-22 ENCOUNTER — TELEPHONE (OUTPATIENT)
Dept: SURGERY | Facility: CLINIC | Age: 69
End: 2024-05-22

## 2024-05-22 ENCOUNTER — LAB REQUISITION (OUTPATIENT)
Dept: LAB | Facility: CLINIC | Age: 69
End: 2024-05-22
Payer: COMMERCIAL

## 2024-05-22 DIAGNOSIS — Z98.84 BARIATRIC SURGERY STATUS: ICD-10-CM

## 2024-05-22 LAB
ANION GAP SERPL CALCULATED.3IONS-SCNC: 9 MMOL/L (ref 7–15)
BUN SERPL-MCNC: 15.6 MG/DL (ref 8–23)
CALCIUM SERPL-MCNC: 9.4 MG/DL (ref 8.8–10.2)
CHLORIDE SERPL-SCNC: 97 MMOL/L (ref 98–107)
CREAT SERPL-MCNC: 0.9 MG/DL (ref 0.51–0.95)
DEPRECATED HCO3 PLAS-SCNC: 27 MMOL/L (ref 22–29)
EGFRCR SERPLBLD CKD-EPI 2021: 69 ML/MIN/1.73M2
ERYTHROCYTE [DISTWIDTH] IN BLOOD BY AUTOMATED COUNT: 14.1 % (ref 10–15)
GLUCOSE SERPL-MCNC: 101 MG/DL (ref 70–99)
HCT VFR BLD AUTO: 26.1 % (ref 35–47)
HGB BLD-MCNC: 8.4 G/DL (ref 11.7–15.7)
MCH RBC QN AUTO: 30.8 PG (ref 26.5–33)
MCHC RBC AUTO-ENTMCNC: 32.2 G/DL (ref 31.5–36.5)
MCV RBC AUTO: 96 FL (ref 78–100)
PLATELET # BLD AUTO: 190 10E3/UL (ref 150–450)
POTASSIUM SERPL-SCNC: 4.3 MMOL/L (ref 3.4–5.3)
RBC # BLD AUTO: 2.73 10E6/UL (ref 3.8–5.2)
SODIUM SERPL-SCNC: 133 MMOL/L (ref 135–145)
WBC # BLD AUTO: 7.1 10E3/UL (ref 4–11)

## 2024-05-22 PROCEDURE — P9604 ONE-WAY ALLOW PRORATED TRIP: HCPCS | Mod: ORL | Performed by: NURSE PRACTITIONER

## 2024-05-22 PROCEDURE — 36415 COLL VENOUS BLD VENIPUNCTURE: CPT | Mod: ORL | Performed by: NURSE PRACTITIONER

## 2024-05-22 PROCEDURE — 80048 BASIC METABOLIC PNL TOTAL CA: CPT | Mod: ORL | Performed by: NURSE PRACTITIONER

## 2024-05-22 PROCEDURE — 85027 COMPLETE CBC AUTOMATED: CPT | Mod: ORL | Performed by: NURSE PRACTITIONER

## 2024-05-22 NOTE — TELEPHONE ENCOUNTER
Returned call to [facility] Sheyla re: labs.    No answer. Asked to call clinic, NL number provided.    Sylvie ERIC RN

## 2024-05-22 NOTE — TELEPHONE ENCOUNTER
Sheyla returned call and was inquiring which labs were needed by provider.    Read Sheyla the list of vitamins Neetu HERRERA would like drawn and gave our secure fax number to send results to.    Sylvie ERIC RN

## 2024-05-22 NOTE — TELEPHONE ENCOUNTER
General Call      Reason for Call:  call back      What are your questions or concerns:  Antoine Carrion [phone] would like a call back regarding labs

## 2024-05-23 LAB
ALBUMIN SERPL BCG-MCNC: 3.4 G/DL (ref 3.5–5.2)
ALP SERPL-CCNC: 124 U/L (ref 40–150)
ALT SERPL W P-5'-P-CCNC: 23 U/L (ref 0–50)
ANION GAP SERPL CALCULATED.3IONS-SCNC: 11 MMOL/L (ref 7–15)
AST SERPL W P-5'-P-CCNC: 28 U/L (ref 0–45)
BILIRUB SERPL-MCNC: 0.2 MG/DL
BUN SERPL-MCNC: 14 MG/DL (ref 8–23)
CALCIUM SERPL-MCNC: 9.5 MG/DL (ref 8.8–10.2)
CHLORIDE SERPL-SCNC: 95 MMOL/L (ref 98–107)
CREAT SERPL-MCNC: 0.83 MG/DL (ref 0.51–0.95)
DEPRECATED HCO3 PLAS-SCNC: 26 MMOL/L (ref 22–29)
EGFRCR SERPLBLD CKD-EPI 2021: 76 ML/MIN/1.73M2
FERRITIN SERPL-MCNC: 347 NG/ML (ref 11–328)
GLUCOSE SERPL-MCNC: 104 MG/DL (ref 70–99)
HOLD SPECIMEN: NORMAL
IRON BINDING CAPACITY (ROCHE): 180 UG/DL (ref 240–430)
IRON SATN MFR SERPL: 22 % (ref 15–46)
IRON SERPL-MCNC: 39 UG/DL (ref 37–145)
POTASSIUM SERPL-SCNC: 4.4 MMOL/L (ref 3.4–5.3)
PROT SERPL-MCNC: 6.3 G/DL (ref 6.4–8.3)
SODIUM SERPL-SCNC: 132 MMOL/L (ref 135–145)
VIT B12 SERPL-MCNC: 1322 PG/ML (ref 232–1245)
VIT D+METAB SERPL-MCNC: 50 NG/ML (ref 20–50)

## 2024-05-23 PROCEDURE — 80053 COMPREHEN METABOLIC PANEL: CPT | Mod: ORL | Performed by: NURSE PRACTITIONER

## 2024-05-23 PROCEDURE — 83550 IRON BINDING TEST: CPT | Mod: ORL | Performed by: NURSE PRACTITIONER

## 2024-05-23 PROCEDURE — 82607 VITAMIN B-12: CPT | Mod: ORL | Performed by: NURSE PRACTITIONER

## 2024-05-23 PROCEDURE — 36415 COLL VENOUS BLD VENIPUNCTURE: CPT | Mod: ORL | Performed by: NURSE PRACTITIONER

## 2024-05-23 PROCEDURE — 82728 ASSAY OF FERRITIN: CPT | Mod: ORL | Performed by: NURSE PRACTITIONER

## 2024-05-23 PROCEDURE — 82306 VITAMIN D 25 HYDROXY: CPT | Mod: ORL | Performed by: NURSE PRACTITIONER

## 2024-05-23 PROCEDURE — P9604 ONE-WAY ALLOW PRORATED TRIP: HCPCS | Mod: ORL | Performed by: NURSE PRACTITIONER

## 2024-05-28 ENCOUNTER — OFFICE VISIT (OUTPATIENT)
Dept: OPHTHALMOLOGY | Facility: CLINIC | Age: 69
End: 2024-05-28
Attending: OPHTHALMOLOGY
Payer: COMMERCIAL

## 2024-05-28 DIAGNOSIS — Z86.69 HISTORY OF OPTIC NEURITIS: Primary | ICD-10-CM

## 2024-05-28 DIAGNOSIS — H47.20 OPTIC ATROPHY: ICD-10-CM

## 2024-05-28 DIAGNOSIS — H53.40 VISUAL FIELD DEFECT: Primary | ICD-10-CM

## 2024-05-28 PROCEDURE — 92083 EXTENDED VISUAL FIELD XM: CPT | Performed by: OPHTHALMOLOGY

## 2024-05-28 PROCEDURE — 92133 CPTRZD OPH DX IMG PST SGM ON: CPT | Performed by: OPHTHALMOLOGY

## 2024-05-28 PROCEDURE — G0463 HOSPITAL OUTPT CLINIC VISIT: HCPCS | Performed by: OPHTHALMOLOGY

## 2024-05-28 PROCEDURE — 92014 COMPRE OPH EXAM EST PT 1/>: CPT | Mod: GC | Performed by: OPHTHALMOLOGY

## 2024-05-28 ASSESSMENT — VISUAL ACUITY
METHOD: SNELLEN - LINEAR
OD_CC: 20/25
OS_CC: 20/25
CORRECTION_TYPE: GLASSES
OD_CC+: -1

## 2024-05-28 ASSESSMENT — REFRACTION_WEARINGRX
OS_ADD: +2.50
OD_SPHERE: -3.25
OD_AXIS: 080
OS_AXIS: 150
OS_CYLINDER: +0.50
OD_ADD: +2.50
OD_CYLINDER: +1.25
OS_SPHERE: -2.75

## 2024-05-28 ASSESSMENT — CONF VISUAL FIELD
OS_INFERIOR_NASAL_RESTRICTION: 0
OD_SUPERIOR_NASAL_RESTRICTION: 0
OD_NORMAL: 1
OS_SUPERIOR_NASAL_RESTRICTION: 0
OS_NORMAL: 1
OD_INFERIOR_NASAL_RESTRICTION: 0
OS_INFERIOR_TEMPORAL_RESTRICTION: 0
OD_INFERIOR_TEMPORAL_RESTRICTION: 0
METHOD: COUNTING FINGERS
OS_SUPERIOR_TEMPORAL_RESTRICTION: 0
OD_SUPERIOR_TEMPORAL_RESTRICTION: 0

## 2024-05-28 ASSESSMENT — SLIT LAMP EXAM - LIDS
COMMENTS: NORMAL
COMMENTS: NORMAL

## 2024-05-28 ASSESSMENT — CUP TO DISC RATIO
OD_RATIO: 0.7
OS_RATIO: 0.65

## 2024-05-28 ASSESSMENT — TONOMETRY
IOP_METHOD: TONOPEN
OS_IOP_MMHG: 15
OD_IOP_MMHG: 15

## 2024-05-28 ASSESSMENT — EXTERNAL EXAM - LEFT EYE: OS_EXAM: NORMAL

## 2024-05-28 ASSESSMENT — EXTERNAL EXAM - RIGHT EYE: OD_EXAM: NORMAL

## 2024-05-28 NOTE — PROGRESS NOTES
Mili Padilla is a 67 year old female with the following diagnoses:   1. History of optic neuritis    2. 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      Since last visit 5/25/23 Patient denies any changes in vision since her last visit. She has had cataract surgery in both eyes so she feels like her vision is better after surgery. She denies double vision or pain in the eyes. She is not on any DMT to treat her MS.     Corrected distance visual acuity was 20/25 in the right eye and 20/25 in the left eye. Color vision 11/11 right eye and 11/11 left eye.  Pupils isocoric.  Anterior segment exam with mixed cataracts OU.  Fundus exam with optic disc cupping OD>OS, pallor OD<OS.    GTOP remains full each eye. OCT demonstrates stable RNFL thickness with optic atrophy OS and normal RNFL thickness OD.     It is my impression that patient has stable findings of optic neuritis OS from over 35 years ago. Return in 1 year.         Stefani Mandel MD   Fellow, Neuro-Ophthalmology      Attending Physician Attestation:  Complete documentation of historical and exam elements from today's encounter can be found in the full encounter summary report (not reduplicated in this progress note).  I personally obtained the chief complaint(s) and history of present illness.  I confirmed and edited as necessary the review of systems, past medical/surgical history, family history, social history, and examination findings as documented by others; and I examined the patient myself.  I personally reviewed the relevant tests, images, and reports as documented above.  I formulated and edited as necessary the assessment and plan and discussed the findings and management plan with the patient and family. I personally reviewed the ophthalmic test(s) associated with this encounter, agree with the interpretation(s) as documented by the resident/fellow, and have edited  the corresponding report(s) as necessary.  - Zach Pak MD

## 2024-05-28 NOTE — NURSING NOTE
Chief Complaints and History of Present Illnesses   Patient presents with    Follow Up     1 year follow up MS     Chief Complaint(s) and History of Present Illness(es)       Follow Up              Comments: 1 year follow up MS              Comments    Pt states vision is the same as last visit. No new flashes or floaters. No redness.  Dryness in each eye, relief with drops.    JOHNNA Leach May 28, 2024 2:51 PM

## 2024-06-03 DIAGNOSIS — K91.2 POSTSURGICAL MALABSORPTION: Primary | ICD-10-CM

## 2024-06-20 ASSESSMENT — ANXIETY QUESTIONNAIRES
GAD7 TOTAL SCORE: 1
5. BEING SO RESTLESS THAT IT IS HARD TO SIT STILL: NOT AT ALL
2. NOT BEING ABLE TO STOP OR CONTROL WORRYING: NOT AT ALL
6. BECOMING EASILY ANNOYED OR IRRITABLE: NOT AT ALL
GAD7 TOTAL SCORE: 1
4. TROUBLE RELAXING: NOT AT ALL
1. FEELING NERVOUS, ANXIOUS, OR ON EDGE: NOT AT ALL
GAD7 TOTAL SCORE: 1
8. IF YOU CHECKED OFF ANY PROBLEMS, HOW DIFFICULT HAVE THESE MADE IT FOR YOU TO DO YOUR WORK, TAKE CARE OF THINGS AT HOME, OR GET ALONG WITH OTHER PEOPLE?: SOMEWHAT DIFFICULT
IF YOU CHECKED OFF ANY PROBLEMS ON THIS QUESTIONNAIRE, HOW DIFFICULT HAVE THESE PROBLEMS MADE IT FOR YOU TO DO YOUR WORK, TAKE CARE OF THINGS AT HOME, OR GET ALONG WITH OTHER PEOPLE: SOMEWHAT DIFFICULT
3. WORRYING TOO MUCH ABOUT DIFFERENT THINGS: SEVERAL DAYS
7. FEELING AFRAID AS IF SOMETHING AWFUL MIGHT HAPPEN: NOT AT ALL
7. FEELING AFRAID AS IF SOMETHING AWFUL MIGHT HAPPEN: NOT AT ALL

## 2024-06-20 ASSESSMENT — PATIENT HEALTH QUESTIONNAIRE - PHQ9
SUM OF ALL RESPONSES TO PHQ QUESTIONS 1-9: 4
SUM OF ALL RESPONSES TO PHQ QUESTIONS 1-9: 4
10. IF YOU CHECKED OFF ANY PROBLEMS, HOW DIFFICULT HAVE THESE PROBLEMS MADE IT FOR YOU TO DO YOUR WORK, TAKE CARE OF THINGS AT HOME, OR GET ALONG WITH OTHER PEOPLE: SOMEWHAT DIFFICULT

## 2024-06-21 ENCOUNTER — OFFICE VISIT (OUTPATIENT)
Dept: PSYCHOLOGY | Facility: CLINIC | Age: 69
End: 2024-06-21
Payer: COMMERCIAL

## 2024-06-21 DIAGNOSIS — E66.01 PSYCHOLOGICAL FACTORS AFFECTING MORBID OBESITY (H): ICD-10-CM

## 2024-06-21 DIAGNOSIS — F33.0 MAJOR DEPRESSIVE DISORDER, RECURRENT EPISODE, MILD (H): Primary | ICD-10-CM

## 2024-06-21 DIAGNOSIS — F54 PSYCHOLOGICAL FACTORS AFFECTING MORBID OBESITY (H): ICD-10-CM

## 2024-06-21 PROCEDURE — 90837 PSYTX W PT 60 MINUTES: CPT | Performed by: PSYCHOLOGIST

## 2024-06-21 NOTE — PROGRESS NOTES
Health Psychology                     Department of Medicine  Izzy Montaño, Ph.D., L.P. [phone]                          [facility] Sherrie Crowe, Ph.D., L.P. [phone]                   [place] Mail [address]   Ranulfo Whipple, Ph.D. [phone]        [address] [place] [address] Enriqueta Sorto, Ph.D., L.P. [phone]    [place] [address]           Ramin Olivo, Ph.D., A.B.P.P., L.P. [phone]        Kathie Galvan, Ph.D., L.P. [phone]  [address]      Health Psychology Progress Note    Demographics   Age 68 year old   Sex female   Race White   Ethnicity Not  or      Ms. Padilla is a  woman self-referred for psychological consultation because her therapist of the last 24 years retired.  She is seen for problem-solving and supportive therapy for depression and multiple health issues.     HISTORY OF PRESENTING CONCERN:  Ms. Padilla reported at intake (7/28/16) a  lengthy history of depression.  She was seeing a psychiatrist, Ban Coleman, at [facility], and is taking Abilify 7.5 mg, trazodone 500 mg, ripazepam 75 mg each day at bedtime, Tegretol 400 mg at bedtime and methylphenidate 10 mg b.i.d.  She discontineud ability and is now taking Rexulti 2 mg.  She has a history of hospitalizations including three at the [facility] in the late 1990s, early 2000s when she had 2 courses of ECT lasting 7-10 sessions and approximately 3 other single session ECTs.  She stopped due to memory problems.  She kept with Dr. Coleman who she first met as an inpatient and has seen her for the past 18 years.  She had also been hospitalized psychiatrically at [facility] at age 23.  She previously worked with psychiatrist, Doug Sarabia for three years, stopping, in part, because she didn't feel he took her suicide attempt sufficiently seriously.  She reports her depression tends to vary.      MEDICAL HISTORY (at intake)  Ms. Padilla has a complex medical history.  She was diagnosed with MS in 1985.  Her psychiatrist at [facility] in [place], Dr. 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  She has used a scooter since 1992, using it more  than she had previously.  She also could use a walker.  She was diagnosed with fibromyalgia in 1997. She is seen at the [place] for her eye care. During 8th grade, she fell on a ski lift, injuring her larynx.     WEIGHT : self report    2/23/23  Down to 281 lbs.   Hard to weigh.  Not steady on her feet.  9/7/23    281  9/30/23  281  11/10/23  289.1  (taken 11/1)  12/14/23   286.5 (taken 12/6/23)  1/30/24  -not sure  3/1 she estimates  283  6/21/24  244.2 (as of 6/11)      Wt Readings from Last 4 Encounters:   05/20/24 110.2 kg (243 lb)   10/02/23 127.9 kg (281 lb 14.4 oz)   09/19/23 128.2 kg (282 lb 9.6 oz)   09/15/23 128.2 kg (282 lb 9.6 oz)     Past Medical History:   Diagnosis Date    Depression, major, in partial remission (H24)     Fibromyalgia syndrome     Gastro-oesophageal reflux disease     Hyperlipemia     Hypertension     Low serum sodium     Lymphedema     Mixed incontinence     Multiple sclerosis (H)     tremors with MS, all four limbs and head    Multiple sclerosis, secondary progressive (H)     Neurogenic bladder     Obese     Osteoporosis     Sleep apnea     Vocal cord paralysis, unilateral complete         Past Surgical History:   Procedure Laterality Date    COLONOSCOPY N/A 07/17/2017    Procedure: COMBINED COLONOSCOPY, SINGLE OR MULTIPLE BIOPSY/POLYPECTOMY BY BIOPSY;;  Surgeon: Wong Beaulieu MD;  Location:  GI    COLONOSCOPY N/A 02/23/2018    Procedure: COMBINED COLONOSCOPY, SINGLE OR MULTIPLE BIOPSY/POLYPECTOMY BY BIOPSY;  COLONOSCOPY ;  Surgeon: Yessica Santana MD;  Location:  GI    ENT SURGERY      throat 1969, vocal cord surgery with skin grafting    ESOPHAGOSCOPY, GASTROSCOPY,  DUODENOSCOPY (EGD), COMBINED N/A 12/16/2014    Procedure: COMBINED ENDOSCOPIC ULTRASOUND, ESOPHAGOSCOPY, GASTROSCOPY, DUODENOSCOPY (EGD), FINE NEEDLE ASPIRATE/BIOPSY;  Surgeon: Yessica Santana MD;  Location:  GI    ESOPHAGOSCOPY, GASTROSCOPY, DUODENOSCOPY (EGD), COMBINED N/A 12/16/2014    Procedure: COMBINED ESOPHAGOSCOPY, GASTROSCOPY, DUODENOSCOPY (EGD), BIOPSY SINGLE OR MULTIPLE;  Surgeon: Yessica Santana MD;  Location:  GI    GASTRIC BYPASS Bilateral     LAPAROSCOPIC APPENDECTOMY  05/30/2013    Procedure: LAPAROSCOPIC APPENDECTOMY;;  Surgeon: Salvador Morris MD;  Location:  OR    LAPAROSCOPIC BIOPSY LIVER  05/30/2013    Procedure: LAPAROSCOPIC BIOPSY LIVER;;  Surgeon: Salvador Morris MD;  Location:  OR    LAPAROSCOPIC GASTRIC SLEEVE  05/30/2013    Procedure: LAPAROSCOPIC GASTRIC SLEEVE;  LAPAROSCOPIC SLEEVE GASTRECTOMY/ LAPARSCOPIC  APPENDECTOMY /LIVER BIOPSIES/LIVER CYST DRAINAGE;  Surgeon: Salvador Morris MD;  Location:  OR    LASER HOLMIUM LITHOTRIPSY URETER(S), INSERT STENT, COMBINED Left 1/18/2023    Procedure: CYSTOSCOPY, LEFT URETEROSCOPY, HOLMIUM LASER  LITHOTRIPSY, LEFT   STENT PLACEMENT;  Surgeon: Mahendra Coles MD;  Location:  OR    ORTHOPEDIC SURGERY      R wrist 2010    PHACOEMULSIFICATION CLEAR CORNEA WITH STANDARD INTRAOCULAR LENS IMPLANT Right 6/29/2023    Procedure: RIGHT EYE PHACOEMULSIFICATION CATARACT WITH INTRAOCULAR LENS IMPLANT;  Surgeon: Flaco Pappas MD;  Location: [facility] OR    PHACOEMULSIFICATION CLEAR CORNEA WITH STANDARD INTRAOCULAR LENS IMPLANT Left 7/20/2023    Procedure: LEFT EYE PHACOEMULSIFICATION, CATARACT, WITH INTRAOCULAR LENS IMPLANT;  Surgeon: Flaco Pappas MD;  Location: [facility] OR       Current Outpatient Medications   Medication Sig Dispense Refill    Acetaminophen 325 MG CHEW Take 650 mg by mouth every 4 hours as needed for mild pain      ARIPiprazole (ABILIFY) 5 MG tablet Take 7.5 mg by mouth daily  2     atorvastatin (LIPITOR) 20 MG tablet Take 20 mg by mouth daily.      buPROPion (WELLBUTRIN XL) 150 MG 24 hr tablet       carBAMazepine (TEGRETOL XR) 200 MG 12 hr tablet Take 200 mg by mouth 2 times daily      carboxymethylcellulose PF (REFRESH PLUS) 0.5 % ophthalmic solution Place 1 drop into both eyes 4 times daily      cyanocobalamin (VITAMIN B-12) 1000 MCG tablet       DULoxetine (CYMBALTA) 60 MG capsule Take 120 mg by mouth daily      famotidine (PEPCID) 40 MG tablet Take 1 tablet (40 mg) by mouth daily 90 tablet 3    fluticasone (FLONASE) 50 MCG/ACT nasal spray Spray 2 sprays into both nostrils daily      GNP CALCIUM CITRATE +D3 315-6.25 MG-MCG TABS       hydrocortisone (CORTAID) 1 % external cream       hydrOXYzine (ATARAX) 25 MG tablet Take 25 mg by mouth every 6 hours as needed for anxiety      lactobacillus rhamnosus, GG, (CULTURELL) capsule Take 1 capsule by mouth 2 times daily      Lactobacillus-Inulin ([facility]) CAPS TAKE 1 CAP BY MOUTH TWICE A DAY      loperamide (IMODIUM A-D) 2 MG tablet Take 1 tablet (2 mg) by mouth 2 times daily      losartan (COZAAR) 50 MG tablet Take 1 tablet (50 mg) by mouth 2 times daily      mirtazapine (REMERON) 30 MG tablet Take 75 mg by mouth At Bedtime      nystatin (MYCOSTATIN) 887553 UNIT/GM external powder       traZODone HCl 300 MG TABS Take 600 mg by mouth At Bedtime      triamcinolone (ARISTOCORT HP) 0.5 % external cream Apply topically every 8 hours as needed (itching)      trospium (SANCTURA) 20 MG tablet Take 20 mg by mouth 2 times daily      verapamil ER (CALAN-SR) 120 MG CR tablet Take 120 mg by mouth 2 times daily      VITAMIN D-1000 MAX ST 25 MCG (1000 UT) tablet Take 25 mcg by mouth       No current facility-administered medications for this visit.     Medication: On Duloxetine and Mirtazpine and Trazodone and Ritalin. She discontinued Effexor and Abilify previously per Dr. Marquise Coleman, her psychiatrist.  On 4/10/19 she informed me that she  started Abilify        2022     3:40 PM 11/3/2022     6:06 PM 2024     5:48 PM   PHQ-9 SCORE   PHQ-9 Total Score MyChart 6 (Mild depression) 5 (Mild depression) 4 (Minimal depression)   PHQ-9 Total Score 6 5 4         2022     6:30 PM 8/15/2023    11:12 AM 2024     5:49 PM   NAS-7 SCORE   Total Score 4 (minimal anxiety) 3 (minimal anxiety) 1 (minimal anxiety)   Total Score 4 3 1     SOCIAL HISTORY (at intake)  Ms. Padilla grew up in the [place] area and is the oldest of 5 children in her family of origin.  Her father was a dentist who she believes was bipolar.  He  of lung cancer at age 72.  Her mother  of sepsis at age 80.  She had been diagnosed with breast cancer when Ms. Padilla was 8.  She described the marriage between her parents as misael.  She is 5 years older than her closest-aged sister and they are all within 4 years of each other.   Her daughter  12/3 and her mother .  She tends to feel more depressed in winter.      Ms. Padilla attended [employer] for a year and later went to night school at the [employer].  She felt that she could not continue her education to the point of graduation because she was working full time and raising her daughter.  Her daughter  in  at age 31 of liver failure secondary to an accidental Tylenol overdose.  She had been recovering from a hysterectomy.      Ms. 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  She had to retire at the time secondary to her MS.  She has never .  She has not dated,and states that if she were she would date, she would be interested in a relationship with a woman.  There is no history of  service or legal problems.  She expresses concern about her increasing social isolation.  She had at least 1 friend, Jael, who she met at a therapy group in  1984.  She is active in facilitating groups for people with MS both in [place] and [place].  She lived alone and currently gets help from a home health aide, as well as home health nurse.     She had  seen Dr. Ban Coleman, psychiatrist.  Dr. Coleman prescribed Cymbalta for it. She feels she has been more depressed since the Fall, but doesn't think it is SAD.   She stated that she has recommended case management.     SESSION:  Mili arrived punctually for psychotherapy session visit more than 3 months since last seen..  The depression varies and has worsened given her health issues.     Health:  She was hospitalized twice at [facility] with sepsis after UTIs.  She was in for considerable length of time and lost about 40 pounds during that experience.  She reports that she had been delirious 1 point.  Since then, she has been back and not all of it.  Unfortunately, she had an accident, driving her leg into a toilet, resulting in a wound that is now slowly healing.    Now at [facility] ([address] [facility]; [phone])  since 8/12.23, Longterm care.    She is adapting increasingly well to being at [facility] on [facility].  She reports having some friends.  She generally feels care has been good, though has an issue with one of her age and will be addressing it with the nurse manager.      Weight Issues: She is pleased that she has lost weight.  We discussed the importance of maintaining the loss and making further progress in her weight management.  She is relatively motivated to continue to address it.    Her family is supportive.    She participated fully and appeared to derive benefit. Affect is positive.  Rapport was excellent and she was pleased to return.  The treatment plan was reviewed with the patient at today s visit. The treatment plan remains current based on the patient s status and progress to date.    Time service started: 12:01  Time service ended:   12:54    [facility] Phone:[phone] room 414  @ [facility] [address]    DIAGNOSES:   Major depression, recurrent, moderate (F33.1).   Behavioral factors affecting morbid obesity (E66.01).  Bereavement     PLAN:  Ms. Padilla will return for in in person visit  4/9 @ 3 for eclectic, supportive therapy consistent with treatment plan.      Last treatment plan signed: 6/30/23  Last Treatment plan review: 6/21/24  Treatment plan verbal Review due: 6/30/24  Treatment Review due: 6/30/24 next session     Ramin Olivo, PhD, A.B.P.P., L.P.   Director, Health Psychology

## 2024-07-19 ENCOUNTER — OFFICE VISIT (OUTPATIENT)
Dept: PSYCHOLOGY | Facility: CLINIC | Age: 69
End: 2024-07-19
Payer: COMMERCIAL

## 2024-07-19 DIAGNOSIS — F54 PSYCHOLOGICAL FACTORS AFFECTING MORBID OBESITY (H): ICD-10-CM

## 2024-07-19 DIAGNOSIS — Z63.4 BEREAVEMENT: ICD-10-CM

## 2024-07-19 DIAGNOSIS — F33.0 MAJOR DEPRESSIVE DISORDER, RECURRENT EPISODE, MILD (H): Primary | ICD-10-CM

## 2024-07-19 DIAGNOSIS — E66.01 PSYCHOLOGICAL FACTORS AFFECTING MORBID OBESITY (H): ICD-10-CM

## 2024-07-19 PROCEDURE — 90837 PSYTX W PT 60 MINUTES: CPT | Performed by: PSYCHOLOGIST

## 2024-07-19 SDOH — SOCIAL STABILITY - SOCIAL INSECURITY: DISSAPEARANCE AND DEATH OF FAMILY MEMBER: Z63.4

## 2024-07-19 ASSESSMENT — ANXIETY QUESTIONNAIRES
4. TROUBLE RELAXING: NOT AT ALL
7. FEELING AFRAID AS IF SOMETHING AWFUL MIGHT HAPPEN: NOT AT ALL
3. WORRYING TOO MUCH ABOUT DIFFERENT THINGS: SEVERAL DAYS
5. BEING SO RESTLESS THAT IT IS HARD TO SIT STILL: NOT AT ALL
2. NOT BEING ABLE TO STOP OR CONTROL WORRYING: SEVERAL DAYS
7. FEELING AFRAID AS IF SOMETHING AWFUL MIGHT HAPPEN: NOT AT ALL
8. IF YOU CHECKED OFF ANY PROBLEMS, HOW DIFFICULT HAVE THESE MADE IT FOR YOU TO DO YOUR WORK, TAKE CARE OF THINGS AT HOME, OR GET ALONG WITH OTHER PEOPLE?: SOMEWHAT DIFFICULT
GAD7 TOTAL SCORE: 2
6. BECOMING EASILY ANNOYED OR IRRITABLE: NOT AT ALL
IF YOU CHECKED OFF ANY PROBLEMS ON THIS QUESTIONNAIRE, HOW DIFFICULT HAVE THESE PROBLEMS MADE IT FOR YOU TO DO YOUR WORK, TAKE CARE OF THINGS AT HOME, OR GET ALONG WITH OTHER PEOPLE: SOMEWHAT DIFFICULT
1. FEELING NERVOUS, ANXIOUS, OR ON EDGE: NOT AT ALL
GAD7 TOTAL SCORE: 2

## 2024-07-19 NOTE — PROGRESS NOTES
Health Psychology                     Department of Medicine  Izzy Montaño, Ph.D., L.P. [phone]                          [facility] Sherrie Crowe, Ph.D., L.P. [phone]                   [place] Mail Code 865   Ranulfo Whipple, Ph.D. [phone]        [address] [place] [address] Enriqueta Sorto, Ph.D., L.P. [phone]    [place] [address]           Ramin Olivo, Ph.D., A.B.P.P., L.P. [phone]        Kathie Galvan, Ph.D., L.P. [phone]  [address]      Health Psychology Progress Note    Demographics   Age 68 year old   Sex female   Race White   Ethnicity Not  or      Ms. Padilla is a  woman self-referred for psychological consultation because her therapist of the last 24 years retired.  She is seen for problem-solving and supportive therapy for depression and multiple health issues.     HISTORY OF PRESENTING CONCERN:  Ms. Padilla reported at intake (7/28/16) a  lengthy history of depression.  She was seeing a psychiatrist, Ban Coleman, at [facility], and is taking Abilify 7.5 mg, trazodone 500 mg, ripazepam 75 mg each day at bedtime, Tegretol 400 mg at bedtime and methylphenidate 10 mg b.i.d.  She discontineud ability and is now taking Rexulti 2 mg.  She has a history of hospitalizations including three at the [facility] in the late 1990s, early 2000s when she had 2 courses of ECT lasting 7-10 sessions and approximately 3 other single session ECTs.  She stopped due to memory problems.  She kept with Dr. Coleman who she first met as an inpatient and has seen her for the past 18 years.  She had also been hospitalized psychiatrically at [facility] at age 23.  She previously worked with psychiatrist, Doug Sarabia for three years, stopping, in part, because she didn't feel he took her suicide attempt sufficiently seriously.  She reports her depression tends to vary.      MEDICAL HISTORY (at intake)  Ms. Padilla has a complex medical history.  She was diagnosed with MS in 1985.  Her psychiatrist at [facility] in [place], Dr. 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  She has used a scooter since 1992, using it more  than she had previously.  She also could use a walker.  She was diagnosed with fibromyalgia in 1997. She is seen at the [facility] for her eye care. During 8th grade, she fell on a ski lift, injuring her larynx.     WEIGHT : self report    2/23/23  Down to 281 lbs.   Hard to weigh.  Not steady on her feet.  9/7/23    281  9/30/23  281  11/10/23  289.1  (taken 11/1)  12/14/23   286.5 (taken 12/6/23)  1/30/24  -not sure  3/1 she estimates  283  6/21/24  244.2 (as of 6/11)  7/14/24   247      Wt Readings from Last 4 Encounters:   05/20/24 110.2 kg (243 lb)   10/02/23 127.9 kg (281 lb 14.4 oz)   09/19/23 128.2 kg (282 lb 9.6 oz)   09/15/23 128.2 kg (282 lb 9.6 oz)     Past Medical History:   Diagnosis Date    Depression, major, in partial remission (H24)     Fibromyalgia syndrome     Gastro-oesophageal reflux disease     Hyperlipemia     Hypertension     Low serum sodium     Lymphedema     Mixed incontinence     Multiple sclerosis (H)     tremors with MS, all four limbs and head    Multiple sclerosis, secondary progressive (H)     Neurogenic bladder     Obese     Osteoporosis     Sleep apnea     Vocal cord paralysis, unilateral complete         Past Surgical History:   Procedure Laterality Date    COLONOSCOPY N/A 07/17/2017    Procedure: COMBINED COLONOSCOPY, SINGLE OR MULTIPLE BIOPSY/POLYPECTOMY BY BIOPSY;;  Surgeon: Wong Beaulieu MD;  Location:  GI    COLONOSCOPY N/A 02/23/2018    Procedure: COMBINED COLONOSCOPY, SINGLE OR MULTIPLE BIOPSY/POLYPECTOMY BY BIOPSY;  COLONOSCOPY ;  Surgeon: Yessica Santana MD;  Location:  GI    ENT SURGERY      throat 1969, vocal cord surgery with skin grafting     ESOPHAGOSCOPY, GASTROSCOPY, DUODENOSCOPY (EGD), COMBINED N/A 12/16/2014    Procedure: COMBINED ENDOSCOPIC ULTRASOUND, ESOPHAGOSCOPY, GASTROSCOPY, DUODENOSCOPY (EGD), FINE NEEDLE ASPIRATE/BIOPSY;  Surgeon: Yessica Santana MD;  Location:  GI    ESOPHAGOSCOPY, GASTROSCOPY, DUODENOSCOPY (EGD), COMBINED N/A 12/16/2014    Procedure: COMBINED ESOPHAGOSCOPY, GASTROSCOPY, DUODENOSCOPY (EGD), BIOPSY SINGLE OR MULTIPLE;  Surgeon: Yessica Santana MD;  Location:  GI    GASTRIC BYPASS Bilateral     LAPAROSCOPIC APPENDECTOMY  05/30/2013    Procedure: LAPAROSCOPIC APPENDECTOMY;;  Surgeon: Salvador Morris MD;  Location:  OR    LAPAROSCOPIC BIOPSY LIVER  05/30/2013    Procedure: LAPAROSCOPIC BIOPSY LIVER;;  Surgeon: Salvador Morris MD;  Location:  OR    LAPAROSCOPIC GASTRIC SLEEVE  05/30/2013    Procedure: LAPAROSCOPIC GASTRIC SLEEVE;  LAPAROSCOPIC SLEEVE GASTRECTOMY/ LAPARSCOPIC  APPENDECTOMY /LIVER BIOPSIES/LIVER CYST DRAINAGE;  Surgeon: Salvador Morris MD;  Location:  OR    LASER HOLMIUM LITHOTRIPSY URETER(S), INSERT STENT, COMBINED Left 1/18/2023    Procedure: CYSTOSCOPY, LEFT URETEROSCOPY, HOLMIUM LASER  LITHOTRIPSY, LEFT   STENT PLACEMENT;  Surgeon: Mahendra Coles MD;  Location:  OR    ORTHOPEDIC SURGERY      R wrist 2010    PHACOEMULSIFICATION CLEAR CORNEA WITH STANDARD INTRAOCULAR LENS IMPLANT Right 6/29/2023    Procedure: RIGHT EYE PHACOEMULSIFICATION CATARACT WITH INTRAOCULAR LENS IMPLANT;  Surgeon: Flaco Pappas MD;  Location: [facility] OR    PHACOEMULSIFICATION CLEAR CORNEA WITH STANDARD INTRAOCULAR LENS IMPLANT Left 7/20/2023    Procedure: LEFT EYE PHACOEMULSIFICATION, CATARACT, WITH INTRAOCULAR LENS IMPLANT;  Surgeon: Flaco Pappas MD;  Location: [facility] OR       Current Outpatient Medications   Medication Sig Dispense Refill    Acetaminophen 325 MG CHEW Take 650 mg by mouth every 4 hours as needed for mild pain      ARIPiprazole (ABILIFY) 5 MG tablet Take 7.5  mg by mouth daily  2    atorvastatin (LIPITOR) 20 MG tablet Take 20 mg by mouth daily.      buPROPion (WELLBUTRIN XL) 150 MG 24 hr tablet       carBAMazepine (TEGRETOL XR) 200 MG 12 hr tablet Take 200 mg by mouth 2 times daily      carboxymethylcellulose PF (REFRESH PLUS) 0.5 % ophthalmic solution Place 1 drop into both eyes 4 times daily      cyanocobalamin (VITAMIN B-12) 1000 MCG tablet       DULoxetine (CYMBALTA) 60 MG capsule Take 120 mg by mouth daily      famotidine (PEPCID) 40 MG tablet Take 1 tablet (40 mg) by mouth daily 90 tablet 3    fluticasone (FLONASE) 50 MCG/ACT nasal spray Spray 2 sprays into both nostrils daily      GNP CALCIUM CITRATE +D3 315-6.25 MG-MCG TABS       hydrocortisone (CORTAID) 1 % external cream       hydrOXYzine (ATARAX) 25 MG tablet Take 25 mg by mouth every 6 hours as needed for anxiety      lactobacillus rhamnosus, GG, (CULTURELL) capsule Take 1 capsule by mouth 2 times daily      Lactobacillus-Inulin ([facility]IGEST[facility]) CAPS TAKE 1 CAP BY MOUTH TWICE A DAY      loperamide (IMODIUM A-D) 2 MG tablet Take 1 tablet (2 mg) by mouth 2 times daily      losartan (COZAAR) 50 MG tablet Take 1 tablet (50 mg) by mouth 2 times daily      mirtazapine (REMERON) 30 MG tablet Take 75 mg by mouth At Bedtime      nystatin (MYCOSTATIN) 796929 UNIT/GM external powder       traZODone HCl 300 MG TABS Take 600 mg by mouth At Bedtime      triamcinolone (ARISTOCORT HP) 0.5 % external cream Apply topically every 8 hours as needed (itching)      trospium (SANCTURA) 20 MG tablet Take 20 mg by mouth 2 times daily      verapamil ER (CALAN-SR) 120 MG CR tablet Take 120 mg by mouth 2 times daily      VITAMIN D-1000 MAX ST 25 MCG (1000 UT) tablet Take 25 mcg by mouth       No current facility-administered medications for this visit.     Medication: On Duloxetine and Mirtazpine and Trazodone and Ritalin. She discontinued Effexor and Abilify previously per Dr. Marquise Coleman, her psychiatrist.  On 4/10/19  she informed me that she started Abilify        2022     3:40 PM 11/3/2022     6:06 PM 2024     5:48 PM   PHQ-9 SCORE   PHQ-9 Total Score MyChart 6 (Mild depression) 5 (Mild depression) 4 (Minimal depression)   PHQ-9 Total Score 6 5 4         8/15/2023    11:12 AM 2024     5:49 PM 2024     9:06 AM   NAS-7 SCORE   Total Score 3 (minimal anxiety) 1 (minimal anxiety) 2 (minimal anxiety)   Total Score 3 1 2     SOCIAL HISTORY (at intake)  Ms. Padilla grew up in the [place] area and is the oldest of 5 children in her family of origin.  Her father was a dentist who she believes was bipolar.  He  of lung cancer at age 72.  Her mother  of sepsis at age 80.  She had been diagnosed with breast cancer when Ms. Padilla was 8.  She described the marriage between her parents as misael.  She is 5 years older than her closest-aged sister and they are all within 4 years of each other.   Her daughter  12/3 and her mother .  She tends to feel more depressed in winter.      Ms. Padilla attended [employer] for a year and later went to night school at the [employer].  She felt that she could not continue her education to the point of graduation because she was working full time and raising her daughter.  Her daughter  in  at age 31 of liver failure secondary to an accidental Tylenol overdose.  She had been recovering from a hysterectomy.      Ms. 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  She had to retire at the time secondary to her MS.  She has never .  She has not dated,and states that if she were she would date, she would be interested in a relationship with a woman.  There is no history of  service or legal problems.  She expresses concern about her increasing social isolation.  She had at least 1 friend, Jael, who she  met at a therapy group in 1984.  She is active in facilitating groups for people with MS both in [place] and [place].  She lived alone and currently gets help from a home health aide, as well as home health nurse.     She had  seen Dr. Ban Coleman, psychiatrist.  Dr. Coleman prescribed Cymbalta for it. She feels she has been more depressed since the Fall, but doesn't think it is SAD.   She stated that she has recommended case management.     SESSION:  Mili arrived punctually for psychotherapy session visit more than 3 months since last seen..  The depression varie. Engaged her in supportive listening and cognitive processing of situations discussed.    Health:  She was hospitalized twice at [facility] with sepsis after UTIs.  She was in for considerable length of time and lost about 40 pounds during that experience. Gained about 5 lbs since last session. Discussed ways of avoiding some snacks (e.g., cookies) and  increasing the salience of her requests for half portions.    Doing well  at [facility] ([address] [facility]; [phone])  since 8/12.23, Longterm care.    She is adapting increasingly well to being at [facility].  She reports having some friends. Participates in activities, e.g., playing cards.  She generally feels care has been good, though has an issue with one of her age and will be addressing it with the nurse manager.      Weight Issues: She is pleased that she has lost weight overall despite recent gain  We discussed the importance of maintaining the loss and making further progress in her weight management.  She is relatively motivated to continue to address it. Reinforced her cutting out the salty snacks.    Her family is supportive.    She participated fully and appeared to derive benefit. Affect is positive.  Rapport was excellent and she was pleased to return.  A treatment plan was completed.    Time service started: 2:01  Time service ended:   2:54    [facility]  Phone:[phone] room 414 @ [facility] [address]    DIAGNOSES:   Major depression, recurrent, moderate (F33.1).   Behavioral factors affecting morbid obesity (E66.01).  Bereavement     PLAN:  Ms. Padilla will return for in in person visit  4/9 @ 3 for eclectic, supportive therapy consistent with treatment plan.      Last treatment plan signed:7/19/24  Last Treatment plan review: 7/19/24  Treatment plan verbal Review due:10/19/24  Treatment Review due: 7/19/25     Ramin Olivo, PhD, A.B.P.P., L.P.   Director, Health Psychology

## 2024-07-29 ASSESSMENT — ANXIETY QUESTIONNAIRES
6. BECOMING EASILY ANNOYED OR IRRITABLE: NOT AT ALL
2. NOT BEING ABLE TO STOP OR CONTROL WORRYING: SEVERAL DAYS
8. IF YOU CHECKED OFF ANY PROBLEMS, HOW DIFFICULT HAVE THESE MADE IT FOR YOU TO DO YOUR WORK, TAKE CARE OF THINGS AT HOME, OR GET ALONG WITH OTHER PEOPLE?: SOMEWHAT DIFFICULT
IF YOU CHECKED OFF ANY PROBLEMS ON THIS QUESTIONNAIRE, HOW DIFFICULT HAVE THESE PROBLEMS MADE IT FOR YOU TO DO YOUR WORK, TAKE CARE OF THINGS AT HOME, OR GET ALONG WITH OTHER PEOPLE: SOMEWHAT DIFFICULT
7. FEELING AFRAID AS IF SOMETHING AWFUL MIGHT HAPPEN: NOT AT ALL
5. BEING SO RESTLESS THAT IT IS HARD TO SIT STILL: NOT AT ALL
3. WORRYING TOO MUCH ABOUT DIFFERENT THINGS: SEVERAL DAYS
GAD7 TOTAL SCORE: 2
7. FEELING AFRAID AS IF SOMETHING AWFUL MIGHT HAPPEN: NOT AT ALL
4. TROUBLE RELAXING: NOT AT ALL
1. FEELING NERVOUS, ANXIOUS, OR ON EDGE: NOT AT ALL
GAD7 TOTAL SCORE: 2

## 2024-08-02 ENCOUNTER — OFFICE VISIT (OUTPATIENT)
Dept: PSYCHOLOGY | Facility: CLINIC | Age: 69
End: 2024-08-02
Payer: COMMERCIAL

## 2024-08-02 DIAGNOSIS — F54 PSYCHOLOGICAL FACTORS AFFECTING MORBID OBESITY (H): ICD-10-CM

## 2024-08-02 DIAGNOSIS — E66.01 PSYCHOLOGICAL FACTORS AFFECTING MORBID OBESITY (H): ICD-10-CM

## 2024-08-02 DIAGNOSIS — F33.0 MAJOR DEPRESSIVE DISORDER, RECURRENT EPISODE, MILD (H): Primary | ICD-10-CM

## 2024-08-02 PROCEDURE — 90834 PSYTX W PT 45 MINUTES: CPT | Performed by: PSYCHOLOGIST

## 2024-08-02 NOTE — PROGRESS NOTES
Health Psychology                     Department of Medicine  Izzy Montaño, Ph.D., L.P. [phone]                          [facility] Shrerie Crowe, Ph.D., L.P. [phone]                   [place] Mail Code [address]   Ranulfo Whipple, Ph.D. [phone]        [address] [place] [address] Enriqueta Sorto, Ph.D., L.P. [phone]    [place] [address]           Ramin Olivo, Ph.D., A.B.P.P., L.P. [phone]        Kathie Galvan, Ph.D., L.P. [phone]  [address]      Health Psychology Progress Note    Demographics   Age 68 year old   Sex female   Race White   Ethnicity Not  or      Ms. Padilla is a  woman self-referred for psychological consultation because her therapist of the last 24 years retired.  She is seen for problem-solving and supportive therapy for depression and multiple health issues.     HISTORY OF PRESENTING CONCERN:  Ms. Padilla reported at intake (7/28/16) a  lengthy history of depression.  She was seeing a psychiatrist, Ban Coleman, at [facility], and is taking Abilify 7.5 mg, trazodone 500 mg, ripazepam 75 mg each day at bedtime, Tegretol 400 mg at bedtime and methylphenidate 10 mg b.i.d.  She discontineud ability and is now taking Rexulti 2 mg.  She has a history of hospitalizations including three at the [facility] in the late 1990s, early 2000s when she had 2 courses of ECT lasting 7-10 sessions and approximately 3 other single session ECTs.  She stopped due to memory problems.  She kept with Dr. Coleman who she first met as an inpatient and has seen her for the past 18 years.  She had also been hospitalized psychiatrically at [facility] at age 23.  She previously worked with psychiatrist, Doug Sarabia for three years, stopping, in part, because she didn't feel he took her suicide attempt sufficiently seriously.  She reports her depression tends to vary.      MEDICAL HISTORY (at intake)  Ms. Padilla has a complex medical history.  She was diagnosed with MS in 1985.  Her psychiatrist at [facility] in [place], Dr. 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  She has used a scooter since 1992, using it more  than she had previously.  She also could use a walker.  She was diagnosed with fibromyalgia in 1997. She is seen at the [facility] for her eye care. During 8th grade, she fell on a ski lift, injuring her larynx.     WEIGHT : self report    2/23/23  Down to 281 lbs.   Hard to weigh.  Not steady on her feet.  9/7/23    281  9/30/23  281  11/10/23  289.1  (taken 11/1)  12/14/23   286.5 (taken 12/6/23)  1/30/24  -not sure  3/1 she estimates  283  6/21/24  244.2 (as of 6/11)  7/14/24   247  8/2/24     244.5      Wt Readings from Last 4 Encounters:   05/20/24 110.2 kg (243 lb)   10/02/23 127.9 kg (281 lb 14.4 oz)   09/19/23 128.2 kg (282 lb 9.6 oz)   09/15/23 128.2 kg (282 lb 9.6 oz)     Past Medical History:   Diagnosis Date    Depression, major, in partial remission (H24)     Fibromyalgia syndrome     Gastro-oesophageal reflux disease     Hyperlipemia     Hypertension     Low serum sodium     Lymphedema     Mixed incontinence     Multiple sclerosis (H)     tremors with MS, all four limbs and head    Multiple sclerosis, secondary progressive (H)     Neurogenic bladder     Obese     Osteoporosis     Sleep apnea     Vocal cord paralysis, unilateral complete         Past Surgical History:   Procedure Laterality Date    COLONOSCOPY N/A 07/17/2017    Procedure: COMBINED COLONOSCOPY, SINGLE OR MULTIPLE BIOPSY/POLYPECTOMY BY BIOPSY;;  Surgeon: Wong Beaulieu MD;  Location:  GI    COLONOSCOPY N/A 02/23/2018    Procedure: COMBINED COLONOSCOPY, SINGLE OR MULTIPLE BIOPSY/POLYPECTOMY BY BIOPSY;  COLONOSCOPY ;  Surgeon: Yessica Santana MD;  Location:  GI    ENT SURGERY      throat 1969, vocal cord surgery with skin  grafting    ESOPHAGOSCOPY, GASTROSCOPY, DUODENOSCOPY (EGD), COMBINED N/A 12/16/2014    Procedure: COMBINED ENDOSCOPIC ULTRASOUND, ESOPHAGOSCOPY, GASTROSCOPY, DUODENOSCOPY (EGD), FINE NEEDLE ASPIRATE/BIOPSY;  Surgeon: Yessica Santana MD;  Location:  GI    ESOPHAGOSCOPY, GASTROSCOPY, DUODENOSCOPY (EGD), COMBINED N/A 12/16/2014    Procedure: COMBINED ESOPHAGOSCOPY, GASTROSCOPY, DUODENOSCOPY (EGD), BIOPSY SINGLE OR MULTIPLE;  Surgeon: Yessica Santana MD;  Location:  GI    GASTRIC BYPASS Bilateral     LAPAROSCOPIC APPENDECTOMY  05/30/2013    Procedure: LAPAROSCOPIC APPENDECTOMY;;  Surgeon: Salvador Morris MD;  Location:  OR    LAPAROSCOPIC BIOPSY LIVER  05/30/2013    Procedure: LAPAROSCOPIC BIOPSY LIVER;;  Surgeon: Salvador Morris MD;  Location:  OR    LAPAROSCOPIC GASTRIC SLEEVE  05/30/2013    Procedure: LAPAROSCOPIC GASTRIC SLEEVE;  LAPAROSCOPIC SLEEVE GASTRECTOMY/ LAPARSCOPIC  APPENDECTOMY /LIVER BIOPSIES/LIVER CYST DRAINAGE;  Surgeon: Salvador Morris MD;  Location:  OR    LASER HOLMIUM LITHOTRIPSY URETER(S), INSERT STENT, COMBINED Left 1/18/2023    Procedure: CYSTOSCOPY, LEFT URETEROSCOPY, HOLMIUM LASER  LITHOTRIPSY, LEFT   STENT PLACEMENT;  Surgeon: Mahendra Coles MD;  Location:  OR    ORTHOPEDIC SURGERY      R wrist 2010    PHACOEMULSIFICATION CLEAR CORNEA WITH STANDARD INTRAOCULAR LENS IMPLANT Right 6/29/2023    Procedure: RIGHT EYE PHACOEMULSIFICATION CATARACT WITH INTRAOCULAR LENS IMPLANT;  Surgeon: Flaco Pappas MD;  Location: [facility] OR    PHACOEMULSIFICATION CLEAR CORNEA WITH STANDARD INTRAOCULAR LENS IMPLANT Left 7/20/2023    Procedure: LEFT EYE PHACOEMULSIFICATION, CATARACT, WITH INTRAOCULAR LENS IMPLANT;  Surgeon: Flaco Pappas MD;  Location: [facility] OR     Melatonin 10 mg at bedtime  Mirtazpine  15 mg or as below?  Current Outpatient Medications   Medication Sig Dispense Refill    Acetaminophen 325 MG CHEW Take 650 mg by mouth every 4 hours as  needed for mild pain      ARIPiprazole (ABILIFY) 5 MG tablet Take 7.5 mg by mouth daily  2    atorvastatin (LIPITOR) 20 MG tablet Take 20 mg by mouth daily.      buPROPion (WELLBUTRIN XL) 150 MG 24 hr tablet       carBAMazepine (TEGRETOL XR) 200 MG 12 hr tablet Take 200 mg by mouth 2 times daily      carboxymethylcellulose PF (REFRESH PLUS) 0.5 % ophthalmic solution Place 1 drop into both eyes 4 times daily      cyanocobalamin (VITAMIN B-12) 1000 MCG tablet       DULoxetine (CYMBALTA) 60 MG capsule Take 120 mg by mouth daily      famotidine (PEPCID) 40 MG tablet Take 1 tablet (40 mg) by mouth daily 90 tablet 3    fluticasone (FLONASE) 50 MCG/ACT nasal spray Spray 2 sprays into both nostrils daily      GNP CALCIUM CITRATE +D3 315-6.25 MG-MCG TABS       hydrocortisone (CORTAID) 1 % external cream       hydrOXYzine (ATARAX) 25 MG tablet Take 25 mg by mouth every 6 hours as needed for anxiety      lactobacillus rhamnosus, GG, (CULTURELL) capsule Take 1 capsule by mouth 2 times daily      Lactobacillus-Inulin (CULTURELLE DIGESTIVE [facility]) CAPS TAKE 1 CAP BY MOUTH TWICE A DAY      loperamide (IMODIUM A-D) 2 MG tablet Take 1 tablet (2 mg) by mouth 2 times daily      losartan (COZAAR) 50 MG tablet Take 1 tablet (50 mg) by mouth 2 times daily      mirtazapine (REMERON) 30 MG tablet Take 75 mg by mouth At Bedtime      nystatin (MYCOSTATIN) 507510 UNIT/GM external powder       traZODone HCl 300 MG TABS Take 600 mg by mouth At Bedtime      triamcinolone (ARISTOCORT HP) 0.5 % external cream Apply topically every 8 hours as needed (itching)      trospium (SANCTURA) 20 MG tablet Take 20 mg by mouth 2 times daily      verapamil ER (CALAN-SR) 120 MG CR tablet Take 120 mg by mouth 2 times daily      VITAMIN D-1000 MAX ST 25 MCG (1000 UT) tablet Take 25 mcg by mouth       No current facility-administered medications for this visit.     Medication: On Duloxetine and Mirtazpine and Trazodone and Ritalin. She discontinued Effexor and  Abilify previously per Dr. Marquise Coleman, her psychiatrist.  On 4/10/19 she informed me that she started Abilify        2022     3:40 PM 11/3/2022     6:06 PM 2024     5:48 PM   PHQ-9 SCORE   PHQ-9 Total Score MyChart 6 (Mild depression) 5 (Mild depression) 4 (Minimal depression)   PHQ-9 Total Score 6 5 4         2024     5:49 PM 2024     9:06 AM 2024     7:35 AM   NAS-7 SCORE   Total Score 1 (minimal anxiety) 2 (minimal anxiety) 2 (minimal anxiety)   Total Score 1 2 2     SOCIAL HISTORY (at intake)  Ms. Padilla grew up in the [place] area and is the oldest of 5 children in her family of origin.  Her father was a dentist who she believes was bipolar.  He  of lung cancer at age 72.  Her mother  of sepsis at age 80.  She had been diagnosed with breast cancer when Ms. Padilla was 8.  She described the marriage between her parents as misael.  She is 5 years older than her closest-aged sister and they are all within 4 years of each other.   Her daughter  12/3 and her mother .  She tends to feel more depressed in winter.      Ms. Padilla attended [employer] for a year and later went to night school at the [employer].  She felt that she could not continue her education to the point of graduation because she was working full time and raising her daughter.  Her daughter  in  at age 31 of liver failure secondary to an accidental Tylenol overdose.  She had been recovering from a hysterectomy.      Ms. 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  She had to retire at the time secondary to her MS.  She has never .  She has not dated,and states that if she were she would date, she would be interested in a relationship with a woman.  There is no history of  service or legal problems.  She expresses concern about her  increasing social isolation.  She had at least 1 friend, Jael, who she met at a therapy group in 1984.  She is active in facilitating groups for people with MS both in [place] and [place].  She lived alone and currently gets help from a home health aide, as well as home health nurse.     She had  seen Dr. Ban Coleman, psychiatrist.  Dr. Coleman prescribed Cymbalta for it. She feels she has been more depressed since the Fall, but doesn't think it is SAD.   She stated that she has recommended case management.     SESSION:  Mili arrived punctually for psychotherapy session visit.  The depression varies. Engaged her in supportive listening and cognitive processing of situations discussed.  She is doing well overall  She has 14 friends, is participating in [facility] [place].     Still leads MS group, but as it dwindles is wondering about whether ornot to continue to lead it.     Discussed feelings re: somebody else at the building.     Health:   Lymphedema appears to be healing.    Doing well  at [facility] ([address] [facility]; [phone])  since 8/12.23, Longterm care.    She is adapting increasingly well to being at [facility] on the.  She reports having some friends. Participates in activities, e.g., playing cards.  She generally feels care has been good, though has an issue with one of her age and will be addressing it with the nurse manager.    Weight Issues: She is pleased that she has lost weight overall.  We discussed the importance of maintaining the loss and making further progress in her weight management.  She is relatively motivated to continue to address it. Reinforced her cutting out the salty snacks.  Still eating cookies and snacks.  Hasn't  implemented earlier discussion to  increase likelihood of half portions. Discussed ways of avoiding some snacks (e.g., cookies) and  increasing the salience of her requests for half portions.     Her family is supportive.    She  participated fully and appeared to derive benefit. Affect is positive.  Rapport was excellent and she was pleased to return.    Time service started: 3:02  Time service ended:  3:49    Antoine Lott Phone:[phone] room 414 @ [facility] [address]    DIAGNOSES:   Major depression, recurrent, moderate (F33.1).   Behavioral factors affecting morbid obesity (E66.01).     PLAN:  Ms. Padilla will return for in in person visit  8/23 @  4  for eclectic, supportive therapy consistent with treatment plan.      Last treatment plan signed:7/19/24  Last Treatment plan review: 7/19/24   Treatment plan verbal Review due:10/19/24  Treatment Review due: 7/19/25     Ramin Olivo, PhD, A.B.P.P., L.P.   Director, Health Psychology

## 2024-08-30 ENCOUNTER — OFFICE VISIT (OUTPATIENT)
Dept: PSYCHOLOGY | Facility: CLINIC | Age: 69
End: 2024-08-30
Payer: COMMERCIAL

## 2024-08-30 DIAGNOSIS — F54 PSYCHOLOGICAL FACTORS AFFECTING MORBID OBESITY (H): ICD-10-CM

## 2024-08-30 DIAGNOSIS — E66.01 PSYCHOLOGICAL FACTORS AFFECTING MORBID OBESITY (H): ICD-10-CM

## 2024-08-30 DIAGNOSIS — F33.0 MAJOR DEPRESSIVE DISORDER, RECURRENT EPISODE, MILD (H): Primary | ICD-10-CM

## 2024-08-30 PROCEDURE — 90837 PSYTX W PT 60 MINUTES: CPT | Performed by: PSYCHOLOGIST

## 2024-08-30 NOTE — PROGRESS NOTES
Health Psychology                     Department of Medicine  Izzy Montaño, Ph.D., L.P. [phone]                          [facility] Sherrie Crowe, Ph.D., L.P. [phone]                   [place] Mail Code 753   Ranulfo Whipple, Ph.D. [phone]        [address] [place] [address] Enriqueta Sorto, Ph.D., L.P. [phone]    [place] [address]           Ramin Olivo, Ph.D., A.B.P.P., L.P. [phone]        Kathie Galvan, Ph.D., L.P. [phone]  [address]      Health Psychology Progress Note    Demographics   Age 68 year old   Sex female   Race White   Ethnicity Not  or      Ms. Padilla is a  woman self-referred for psychological consultation because her therapist of the last 24 years retired.  She is seen for problem-solving and supportive therapy for depression and multiple health issues.     HISTORY OF PRESENTING CONCERN:  Ms. Padilla reported at intake (7/28/16) a  lengthy history of depression.  She was seeing a psychiatrist, Ban Coleman, at [facility], and is taking Abilify 7.5 mg, trazodone 500 mg, ripazepam 75 mg each day at bedtime, Tegretol 400 mg at bedtime and methylphenidate 10 mg b.i.d.  She discontineud ability and is now taking Rexulti 2 mg.  She has a history of hospitalizations including three at the [facility] in the late 1990s, early 2000s when she had 2 courses of ECT lasting 7-10 sessions and approximately 3 other single session ECTs.  She stopped due to memory problems.  She kept with Dr. Coleman who she first met as an inpatient and has seen her for the past 18 years.  She had also been hospitalized psychiatrically at [facility] at age 23.  She previously worked with psychiatrist, Doug Sarabia for three years, stopping, in part, because she didn't feel he took her suicide attempt sufficiently seriously.  She reports her depression tends to vary.      MEDICAL HISTORY (at intake)  Ms. Padilla has a complex medical history.  She was diagnosed with MS in 1985.  Her psychiatrist at [facility] in [place], Dr. 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  She has used a scooter since 1992, using it more  than she had previously.  She also could use a walker.  She was diagnosed with fibromyalgia in 1997. She is seen at the [facility] for her eye care. During 8th grade, she fell on a ski lift, injuring her larynx.     WEIGHT : self report    2/23/23  Down to 281 lbs.   Hard to weigh.  Not steady on her feet.  9/7/23    281  9/30/23  281  11/10/23  289.1  (taken 11/1)  12/14/23   286.5 (taken 12/6/23)  1/30/24  -not sure  3/1 she estimates  283  6/21/24  244.2 (as of 6/11)  7/14/24   247  8/2/24     244.5  8/30/24   242.2      Wt Readings from Last 4 Encounters:   05/20/24 110.2 kg (243 lb)   10/02/23 127.9 kg (281 lb 14.4 oz)   09/19/23 128.2 kg (282 lb 9.6 oz)   09/15/23 128.2 kg (282 lb 9.6 oz)     Past Medical History:   Diagnosis Date    Depression, major, in partial remission (H24)     Fibromyalgia syndrome     Gastro-oesophageal reflux disease     Hyperlipemia     Hypertension     Low serum sodium     Lymphedema     Mixed incontinence     Multiple sclerosis (H)     tremors with MS, all four limbs and head    Multiple sclerosis, secondary progressive (H)     Neurogenic bladder     Obese     Osteoporosis     Sleep apnea     Vocal cord paralysis, unilateral complete         Past Surgical History:   Procedure Laterality Date    COLONOSCOPY N/A 07/17/2017    Procedure: COMBINED COLONOSCOPY, SINGLE OR MULTIPLE BIOPSY/POLYPECTOMY BY BIOPSY;;  Surgeon: Wong Beaulieu MD;  Location:  GI    COLONOSCOPY N/A 02/23/2018    Procedure: COMBINED COLONOSCOPY, SINGLE OR MULTIPLE BIOPSY/POLYPECTOMY BY BIOPSY;  COLONOSCOPY ;  Surgeon: Yessica Santana MD;  Location:  GI    ENT SURGERY      throat 1969, vocal cord surgery  with skin grafting    ESOPHAGOSCOPY, GASTROSCOPY, DUODENOSCOPY (EGD), COMBINED N/A 12/16/2014    Procedure: COMBINED ENDOSCOPIC ULTRASOUND, ESOPHAGOSCOPY, GASTROSCOPY, DUODENOSCOPY (EGD), FINE NEEDLE ASPIRATE/BIOPSY;  Surgeon: Yessica Santana MD;  Location:  GI    ESOPHAGOSCOPY, GASTROSCOPY, DUODENOSCOPY (EGD), COMBINED N/A 12/16/2014    Procedure: COMBINED ESOPHAGOSCOPY, GASTROSCOPY, DUODENOSCOPY (EGD), BIOPSY SINGLE OR MULTIPLE;  Surgeon: Yessica Santana MD;  Location:  GI    GASTRIC BYPASS Bilateral     LAPAROSCOPIC APPENDECTOMY  05/30/2013    Procedure: LAPAROSCOPIC APPENDECTOMY;;  Surgeon: Salvador Morris MD;  Location:  OR    LAPAROSCOPIC BIOPSY LIVER  05/30/2013    Procedure: LAPAROSCOPIC BIOPSY LIVER;;  Surgeon: Salvador Morris MD;  Location:  OR    LAPAROSCOPIC GASTRIC SLEEVE  05/30/2013    Procedure: LAPAROSCOPIC GASTRIC SLEEVE;  LAPAROSCOPIC SLEEVE GASTRECTOMY/ LAPARSCOPIC  APPENDECTOMY /LIVER BIOPSIES/LIVER CYST DRAINAGE;  Surgeon: Salvador Morris MD;  Location:  OR    LASER HOLMIUM LITHOTRIPSY URETER(S), INSERT STENT, COMBINED Left 1/18/2023    Procedure: CYSTOSCOPY, LEFT URETEROSCOPY, HOLMIUM LASER  LITHOTRIPSY, LEFT   STENT PLACEMENT;  Surgeon: Mahendra Coles MD;  Location:  OR    ORTHOPEDIC SURGERY      R wrist 2010    PHACOEMULSIFICATION CLEAR CORNEA WITH STANDARD INTRAOCULAR LENS IMPLANT Right 6/29/2023    Procedure: RIGHT EYE PHACOEMULSIFICATION CATARACT WITH INTRAOCULAR LENS IMPLANT;  Surgeon: Flaco Pappas MD;  Location: [facility] OR    PHACOEMULSIFICATION CLEAR CORNEA WITH STANDARD INTRAOCULAR LENS IMPLANT Left 7/20/2023    Procedure: LEFT EYE PHACOEMULSIFICATION, CATARACT, WITH INTRAOCULAR LENS IMPLANT;  Surgeon: Flaco Pappas MD;  Location: [facility] OR     Melatonin 10 mg at bedtime  Mirtazpine  15 mg or as below?  Current Outpatient Medications   Medication Sig Dispense Refill    Acetaminophen 325 MG CHEW Take 650 mg by mouth every 4  hours as needed for mild pain      ARIPiprazole (ABILIFY) 5 MG tablet Take 7.5 mg by mouth daily  2    atorvastatin (LIPITOR) 20 MG tablet Take 20 mg by mouth daily.      buPROPion (WELLBUTRIN XL) 150 MG 24 hr tablet       carBAMazepine (TEGRETOL XR) 200 MG 12 hr tablet Take 200 mg by mouth 2 times daily      carboxymethylcellulose PF (REFRESH PLUS) 0.5 % ophthalmic solution Place 1 drop into both eyes 4 times daily      cyanocobalamin (VITAMIN B-12) 1000 MCG tablet       DULoxetine (CYMBALTA) 60 MG capsule Take 120 mg by mouth daily      famotidine (PEPCID) 40 MG tablet Take 1 tablet (40 mg) by mouth daily 90 tablet 3    fluticasone (FLONASE) 50 MCG/ACT nasal spray Spray 2 sprays into both nostrils daily      GNP CALCIUM CITRATE +D3 315-6.25 MG-MCG TABS       hydrocortisone (CORTAID) 1 % external cream       hydrOXYzine (ATARAX) 25 MG tablet Take 25 mg by mouth every 6 hours as needed for anxiety      lactobacillus rhamnosus, GG, (CULTURELL) capsule Take 1 capsule by mouth 2 times daily      Lactobacillus-Inulin (CULTURELLE DIGESTIVE [facility]) CAPS TAKE 1 CAP BY MOUTH TWICE A DAY      loperamide (IMODIUM A-D) 2 MG tablet Take 1 tablet (2 mg) by mouth 2 times daily      losartan (COZAAR) 50 MG tablet Take 1 tablet (50 mg) by mouth 2 times daily      mirtazapine (REMERON) 30 MG tablet Take 75 mg by mouth At Bedtime      nystatin (MYCOSTATIN) 458187 UNIT/GM external powder       traZODone HCl 300 MG TABS Take 600 mg by mouth At Bedtime      triamcinolone (ARISTOCORT HP) 0.5 % external cream Apply topically every 8 hours as needed (itching)      trospium (SANCTURA) 20 MG tablet Take 20 mg by mouth 2 times daily      verapamil ER (CALAN-SR) 120 MG CR tablet Take 120 mg by mouth 2 times daily      VITAMIN D-1000 MAX ST 25 MCG (1000 UT) tablet Take 25 mcg by mouth       No current facility-administered medications for this visit.     Medication: On Duloxetine and Mirtazpine and Trazodone and Ritalin. She discontinued  Effexor and Abilify previously per Dr. Marquise Coleman, her psychiatrist.  On 4/10/19 she informed me that she started Abilify        2022     3:40 PM 11/3/2022     6:06 PM 2024     5:48 PM   PHQ-9 SCORE   PHQ-9 Total Score MyChart 6 (Mild depression) 5 (Mild depression) 4 (Minimal depression)   PHQ-9 Total Score 6 5 4         2024     5:49 PM 2024     9:06 AM 2024     7:35 AM   NAS-7 SCORE   Total Score 1 (minimal anxiety) 2 (minimal anxiety) 2 (minimal anxiety)   Total Score 1 2 2     SOCIAL HISTORY (at intake)  Ms. Padilla grew up in the [place] area and is the oldest of 5 children in her family of origin.  Her father was a dentist who she believes was bipolar.  He  of lung cancer at age 72.  Her mother  of sepsis at age 80.  She had been diagnosed with breast cancer when Ms. Padilla was 8.  She described the marriage between her parents as misael.  She is 5 years older than her closest-aged sister and they are all within 4 years of each other.   Her daughter  12/3 and her mother .  She tends to feel more depressed in winter.      Ms. Padilla attended [employer] for a year and later went to night school at the [employer].  She felt that she could not continue her education to the point of graduation because she was working full time and raising her daughter.  Her daughter  in  at age 31 of liver failure secondary to an accidental Tylenol overdose.  She had been recovering from a hysterectomy.      Ms. 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  She had to retire at the time secondary to her MS.  She has never .  She has not dated,and states that if she were she would date, she would be interested in a relationship with a woman.  There is no history of  service or legal problems.  She expresses concern  about her increasing social isolation.  She had at least 1 friend, Jael, who she met at a therapy group in 1984.  She is active in facilitating groups for people with MS both in [place] and [place].  She lived alone and currently gets help from a home health aide, as well as home health nurse.     She had  seen Dr. Ban Coleman, psychiatrist.  Dr. Coleman prescribed Cymbalta for it. She feels she has been more depressed since the Fall, but doesn't think it is SAD.   She stated that she has recommended case management.     SESSION:  Mili arrived punctually for psychotherapy session visit.  The depression varies; currently improving.. Engaged her in supportive listening and cognitive processing of situations discussed.  She is doing well overall  She has 15 friends, is participating in [facility] recreational and social acitvities.     Still leads MS group, but as it dwindles is wondering about whether ornot to continue to lead it.     Discussed feelings re: somebody else at the building.  That staff person left, which is a loss for her.      Health:   Lymphedema appears to be healing.    Doing well  at [facility] ([id number]; [phone])  since 8/12.23, Longterm care.    She is adapting increasingly well to being there.   She reports having some friends. Participates in activities, e.g., playing cards.  She generally feels care has been good, though has an issue with one of her age and will be addressing it with the nurse manager.    Weight Issues: She is pleased that she has lost weight overall., but realizes she needs to make more concerted efforts as she heats 1/3 bag of peanuts, a [employer] almond bar and a chocolate chip cookie daily.  We discussed the importance of maintaining the loss and making further progress in her weight management.  She is relatively motivated to continue to address it. Reinforced her cutting out the salty and sweet snacks., or at lest decreasing frequency and  portion size ()e.g. to 5-10 peanuts/day).   Still eating cookies and snacks.  Hasn't  implemented earlier discussion to  increase likelihood of half portions.   Her family is supportive.    She participated fully and appeared to derive benefit. Affect is positive.  Rapport was excellent and she was pleased to return.    Time service started: 3:04  Time service ended:  3:57    [facility] Phone:[phone] room 414 @ [facility] [address] [place] [address]    DIAGNOSES:   Major depression, recurrent, moderate (F33.1).   Behavioral factors affecting morbid obesity (E66.01).     PLAN:  Ms. Padilla will return for in in person visit  9/27 @ 2  for eclectic, supportive therapy consistent with treatment plan.      Last treatment plan signed:7/19/24  Last Treatment plan review: 7/19/24   Treatment plan verbal Review due:10/19/24  Treatment Review due: 7/19/25     Ramin Olivo, PhD, A.B.P.P., L.P.   Director, Health Psychology

## 2024-09-27 ENCOUNTER — OFFICE VISIT (OUTPATIENT)
Dept: PSYCHOLOGY | Facility: CLINIC | Age: 69
End: 2024-09-27
Payer: COMMERCIAL

## 2024-09-27 DIAGNOSIS — F33.0 MAJOR DEPRESSIVE DISORDER, RECURRENT EPISODE, MILD (H): Primary | ICD-10-CM

## 2024-09-27 DIAGNOSIS — F54 PSYCHOLOGICAL FACTORS AFFECTING MORBID OBESITY (H): ICD-10-CM

## 2024-09-27 DIAGNOSIS — E66.01 PSYCHOLOGICAL FACTORS AFFECTING MORBID OBESITY (H): ICD-10-CM

## 2024-09-27 ASSESSMENT — ANXIETY QUESTIONNAIRES
GAD7 TOTAL SCORE: 0
7. FEELING AFRAID AS IF SOMETHING AWFUL MIGHT HAPPEN: NOT AT ALL

## 2024-09-27 ASSESSMENT — PATIENT HEALTH QUESTIONNAIRE - PHQ9
10. IF YOU CHECKED OFF ANY PROBLEMS, HOW DIFFICULT HAVE THESE PROBLEMS MADE IT FOR YOU TO DO YOUR WORK, TAKE CARE OF THINGS AT HOME, OR GET ALONG WITH OTHER PEOPLE: SOMEWHAT DIFFICULT
SUM OF ALL RESPONSES TO PHQ QUESTIONS 1-9: 7
SUM OF ALL RESPONSES TO PHQ QUESTIONS 1-9: 7

## 2024-09-27 NOTE — PROGRESS NOTES
Health Psychology                     Department of Medicine  Izzy Montaño, Ph.D., L.P. [phone]                          [facility] Sherrie Crowe, Ph.D., L.P. [phone]                   [place] Mail [address]   Ranulfo Whipple, Ph.D. [phone]        [address] [place] [address] Enriqueta Sorto, Ph.D., L.P. [phone]    [place] [address]           Ramin Olivo, Ph.D., A.B.P.P., L.P. [phone]        Kathie Galvan, Ph.D., L.P. [phone]  [address]      Health Psychology Progress Note    Demographics   Age 68 year old   Sex female   Race White   Ethnicity Not  or      Ms. Padilla is a  woman self-referred for psychological consultation because her therapist of the last 24 years retired.  She is seen for problem-solving and supportive therapy for depression and multiple health issues.     HISTORY OF PRESENTING CONCERN:  Ms. Padilla reported at intake (7/28/16) a  lengthy history of depression.  She was seeing a psychiatrist, Ban Coleman, at [facility], and is taking Abilify 7.5 mg, trazodone 500 mg, ripazepam 75 mg each day at bedtime, Tegretol 400 mg at bedtime and methylphenidate 10 mg b.i.d.  She discontineud ability and is now taking Rexulti 2 mg.  She has a history of hospitalizations including three at the [facility] in the late 1990s, early 2000s when she had 2 courses of ECT lasting 7-10 sessions and approximately 3 other single session ECTs.  She stopped due to memory problems.  She kept with Dr. Coleman who she first met as an inpatient and has seen her for the past 18 years.  She had also been hospitalized psychiatrically at [facility] at age 23.  She previously worked with psychiatrist, Doug Sarabia for three years, stopping, in part, because she didn't feel he took her suicide attempt sufficiently seriously.  She reports her depression tends to vary.      MEDICAL HISTORY (at intake)  Ms. Padilla has a complex medical history.  She was diagnosed with MS in 1985.  Her psychiatrist at [facility] in [place], Dr. 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  She has used a scooter since 1992, using it more  than she had previously.  She also could use a walker.  She was diagnosed with fibromyalgia in 1997. [facility]. During 8th grade, she fell on a ski lift, injuring her larynx.     WEIGHT : self report    2/23/23  Down to 281 lbs.   Hard to weigh.  Not steady on her feet.  9/7/23    281  9/30/23  281  11/10/23  289.1  (taken 11/1)  12/14/23   286.5 (taken 12/6/23)  1/30/24  -not sure  3/1 she estimates  283  6/21/24  244.2 (as of 6/11)  7/14/24   247  8/2/24     244.5  8/30/24   242.2  9/27/24   240      Wt Readings from Last 4 Encounters:   05/20/24 110.2 kg (243 lb)   10/02/23 127.9 kg (281 lb 14.4 oz)   09/19/23 128.2 kg (282 lb 9.6 oz)   09/15/23 128.2 kg (282 lb 9.6 oz)     Past Medical History:   Diagnosis Date    Depression, major, in partial remission (H)     Fibromyalgia syndrome     Gastro-oesophageal reflux disease     Hyperlipemia     Hypertension     Low serum sodium     Lymphedema     Mixed incontinence     Multiple sclerosis (H)     tremors with MS, all four limbs and head    Multiple sclerosis, secondary progressive (H)     Neurogenic bladder     Obese     Osteoporosis     Sleep apnea     Vocal cord paralysis, unilateral complete         Past Surgical History:   Procedure Laterality Date    COLONOSCOPY N/A 07/17/2017    Procedure: COMBINED COLONOSCOPY, SINGLE OR MULTIPLE BIOPSY/POLYPECTOMY BY BIOPSY;;  Surgeon: Wong Beaulieu MD;  Location:  GI    COLONOSCOPY N/A 02/23/2018    Procedure: COMBINED COLONOSCOPY, SINGLE OR MULTIPLE BIOPSY/POLYPECTOMY BY BIOPSY;  COLONOSCOPY ;  Surgeon: Yessica Santana MD;  Location:  GI    ENT SURGERY      throat 1969, vocal  cord surgery with skin grafting    ESOPHAGOSCOPY, GASTROSCOPY, DUODENOSCOPY (EGD), COMBINED N/A 12/16/2014    Procedure: COMBINED ENDOSCOPIC ULTRASOUND, ESOPHAGOSCOPY, GASTROSCOPY, DUODENOSCOPY (EGD), FINE NEEDLE ASPIRATE/BIOPSY;  Surgeon: Yessica Santana MD;  Location:  GI    ESOPHAGOSCOPY, GASTROSCOPY, DUODENOSCOPY (EGD), COMBINED N/A 12/16/2014    Procedure: COMBINED ESOPHAGOSCOPY, GASTROSCOPY, DUODENOSCOPY (EGD), BIOPSY SINGLE OR MULTIPLE;  Surgeon: Yessica Santana MD;  Location:  GI    GASTRIC BYPASS Bilateral     LAPAROSCOPIC APPENDECTOMY  05/30/2013    Procedure: LAPAROSCOPIC APPENDECTOMY;;  Surgeon: aSlvador Morris MD;  Location:  OR    LAPAROSCOPIC BIOPSY LIVER  05/30/2013    Procedure: LAPAROSCOPIC BIOPSY LIVER;;  Surgeon: Salvador Morris MD;  Location:  OR    LAPAROSCOPIC GASTRIC SLEEVE  05/30/2013    Procedure: LAPAROSCOPIC GASTRIC SLEEVE;  LAPAROSCOPIC SLEEVE GASTRECTOMY/ LAPARSCOPIC  APPENDECTOMY /LIVER BIOPSIES/LIVER CYST DRAINAGE;  Surgeon: Salvador Morris MD;  Location:  OR    LASER HOLMIUM LITHOTRIPSY URETER(S), INSERT STENT, COMBINED Left 1/18/2023    Procedure: CYSTOSCOPY, LEFT URETEROSCOPY, HOLMIUM LASER  LITHOTRIPSY, LEFT   STENT PLACEMENT;  Surgeon: Mahendra Coles MD;  Location:  OR    ORTHOPEDIC SURGERY      R wrist 2010    PHACOEMULSIFICATION CLEAR CORNEA WITH STANDARD INTRAOCULAR LENS IMPLANT Right 6/29/2023    Procedure: RIGHT EYE PHACOEMULSIFICATION CATARACT WITH INTRAOCULAR LENS IMPLANT;  Surgeon: Flaco Pappas MD;  Location: [facility] [place]    PHACOEMULSIFICATION CLEAR CORNEA WITH STANDARD INTRAOCULAR LENS IMPLANT Left 7/20/2023    Procedure: LEFT EYE PHACOEMULSIFICATION, CATARACT, WITH INTRAOCULAR LENS IMPLANT;  Surgeon: Flaco Pappas MD;  Location: [facility] [place]     Melatonin 10 mg at bedtime  Mirtazpine  15 mg or as below?  Current Outpatient Medications   Medication Sig Dispense Refill    Acetaminophen 325 MG CHEW Take 650 mg by  mouth every 4 hours as needed for mild pain      ARIPiprazole (ABILIFY) 5 MG tablet Take 7.5 mg by mouth daily  2    atorvastatin (LIPITOR) 20 MG tablet Take 20 mg by mouth daily.      buPROPion (WELLBUTRIN XL) 150 MG 24 hr tablet       carBAMazepine (TEGRETOL XR) 200 MG 12 hr tablet Take 200 mg by mouth 2 times daily      carboxymethylcellulose PF (REFRESH PLUS) 0.5 % ophthalmic solution Place 1 drop into both eyes 4 times daily      cyanocobalamin (VITAMIN B-12) 1000 MCG tablet       DULoxetine (CYMBALTA) 60 MG capsule Take 120 mg by mouth daily      famotidine (PEPCID) 40 MG tablet Take 1 tablet (40 mg) by mouth daily 90 tablet 3    fluticasone (FLONASE) 50 MCG/ACT nasal spray Spray 2 sprays into both nostrils daily      GNP CALCIUM CITRATE +D3 315-6.25 MG-MCG TABS       hydrocortisone (CORTAID) 1 % external cream       hydrOXYzine (ATARAX) 25 MG tablet Take 25 mg by mouth every 6 hours as needed for anxiety      lactobacillus rhamnosus, GG, (CULTURELL) capsule Take 1 capsule by mouth 2 times daily      Lactobacillus-Inulin (CULTURELLE DIGESTIVE HEALTH) CAPS TAKE 1 CAP BY MOUTH TWICE A DAY      loperamide (IMODIUM A-D) 2 MG tablet Take 1 tablet (2 mg) by mouth 2 times daily      losartan (COZAAR) 50 MG tablet Take 1 tablet (50 mg) by mouth 2 times daily      mirtazapine (REMERON) 30 MG tablet Take 75 mg by mouth At Bedtime      nystatin (MYCOSTATIN) 443580 UNIT/GM external powder       traZODone HCl 300 MG TABS Take 600 mg by mouth At Bedtime      triamcinolone (ARISTOCORT HP) 0.5 % external cream Apply topically every 8 hours as needed (itching)      trospium (SANCTURA) 20 MG tablet Take 20 mg by mouth 2 times daily      verapamil ER (CALAN-SR) 120 MG CR tablet Take 120 mg by mouth 2 times daily      VITAMIN D-1000 MAX ST 25 MCG (1000 UT) tablet Take 25 mcg by mouth       No current facility-administered medications for this visit.     Medication: On Duloxetine and Mirtazpine and Trazodone and Ritalin. She  discontinued Effexor and Abilify previously per Dr. Marquise Coleman, her psychiatrist.  On 4/10/19 she informed me that she started Abilify        11/3/2022     6:06 PM 2024     5:48 PM 2024    10:07 AM   PHQ-9 SCORE   PHQ-9 Total Score MyChart 5 (Mild depression) 4 (Minimal depression) 7 (Mild depression)   PHQ-9 Total Score 5 4 7         2024     9:06 AM 2024     7:35 AM 2024    10:08 AM   NAS-7 SCORE   Total Score 2 (minimal anxiety) 2 (minimal anxiety) 0 (minimal anxiety)   Total Score 2 2 0     SOCIAL HISTORY (at intake)  Ms. Padilla grew up in the [place] area and is the oldest of 5 children in her family of origin.  Her father was a dentist who she believes was bipolar.  He  of lung cancer at age 72.  Her mother  of sepsis at age 80.  She had been diagnosed with breast cancer when Ms. Padilla was 8.  She described the marriage between her parents as misael.  She is 5 years older than her closest-aged sister and they are all within 4 years of each other.   Her daughter  12/3 and her mother .  She tends to feel more depressed in winter.      Ms. Padilla attended [employer] for a year and later went to night school at the [employer].  She felt that she could not continue her education to the point of graduation because she was working full time and raising her daughter.  Her daughter  in  at age 31 of liver failure secondary to an accidental Tylenol overdose.  She had been recovering from a hysterectomy.      Ms. 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  She had to retire at the time secondary to her MS.  She has never .  She has not dated,and states that if she were she would date, she would be interested in a relationship with a woman.  There is no history of  service or legal problems.  She  expresses concern about her increasing social isolation.  She had at least 1 friend, Jael, who she met at a therapy group in 1984.  She is active in facilitating groups for people with MS both in [employer] and [employer].  She lived alone and currently gets help from a home health aide, as well as home health nurse.     She had  seen Dr. Ban Coleman, psychiatrist.  Dr. Coleman prescribed Cymbalta for it. She feels she has been more depressed since the Fall, but doesn't think it is SAD.   She stated that she has recommended case management.     SESSION:  Mili arrived punctually for psychotherapy session visit. gaged her in supportive listening and cognitive processing of situations discussed.     Still leads MS group, but as it dwindles is wondering about whether ornot to continue to lead it.     Mood:  The depression varies; currently improving. Trying to regulate her medications so as to help better with sleep.   Noticed discrepancy  last time she was here.     Health:   Lymphedema appears to be healing.  She generally feels care has been good, though has an issue with one of her nurses and will be addressing it with the nurse manager, I.e., put adhesive dressing on her wound.     Social:  She is doing well overall  She has 15 friends, is participating in [facility] recreational and social acitvities.   Doing well  at [facility] ([address] [place], [place] [address]ok; [phone])  since 8/12.23, Longterm care.  Participates in activities, e.g., playing cards, scrabble, and about to play other games. Was asked to help people with six new games.   Became reacquainted with friends from childhood who are now visiting.  She is feeling more connected. Her family is supportive.    Weight Issues: She is losing weight (2 lb/month) and is reinforced. She eats fish x2/week.  She doesn't eat the potatoes a that are served all of the time.  She cut out cookies, but still has snacks. She is pleased that she has lost weight overall  Discussed cutting out cheese puffs .Hasn't  implemented earlier discussion to  increase likelihood of half portions.     She participated fully and appeared to derive benefit. Affect is positive.  Rapport was excellent and she was pleased to return.  Time service started: 2:02  Time service ended:  2:55    [facility] Phone:[phone] room 414 @ [facility] [address] [place] [address]    DIAGNOSES:   Major depression, recurrent, moderate (F33.1).   Behavioral factors affecting morbid obesity (E66.01).     PLAN:  Ms. Padilla will return for in in person visit  10/25 @ 3 for eclectic, supportive therapy consistent with treatment plan.      Last treatment plan signed:7/19/24  Last Treatment plan review: 7/19/24   Treatment plan verbal Review due:10/19/24  Treatment Review due: 7/19/25     Ramin Olivo, PhD, A.B.P.P., L.P.   Director, Health Psychology

## 2024-10-01 ENCOUNTER — LAB REQUISITION (OUTPATIENT)
Dept: LAB | Facility: CLINIC | Age: 69
End: 2024-10-01
Payer: COMMERCIAL

## 2024-10-01 DIAGNOSIS — D64.9 ANEMIA, UNSPECIFIED: ICD-10-CM

## 2024-10-02 LAB — HGB BLD-MCNC: 9.9 G/DL (ref 11.7–15.7)

## 2024-10-02 PROCEDURE — P9604 ONE-WAY ALLOW PRORATED TRIP: HCPCS | Mod: ORL | Performed by: NURSE PRACTITIONER

## 2024-10-02 PROCEDURE — 36415 COLL VENOUS BLD VENIPUNCTURE: CPT | Mod: ORL | Performed by: NURSE PRACTITIONER

## 2024-10-02 PROCEDURE — 85018 HEMOGLOBIN: CPT | Mod: ORL | Performed by: NURSE PRACTITIONER

## 2024-10-04 ENCOUNTER — TELEPHONE (OUTPATIENT)
Dept: OPHTHALMOLOGY | Facility: CLINIC | Age: 69
End: 2024-10-04
Payer: COMMERCIAL

## 2024-10-09 ENCOUNTER — TELEPHONE (OUTPATIENT)
Dept: OPHTHALMOLOGY | Facility: CLINIC | Age: 69
End: 2024-10-09
Payer: COMMERCIAL

## 2024-10-25 ENCOUNTER — VIRTUAL VISIT (OUTPATIENT)
Dept: PSYCHOLOGY | Facility: CLINIC | Age: 69
End: 2024-10-25
Payer: COMMERCIAL

## 2024-10-25 DIAGNOSIS — E66.01 PSYCHOLOGICAL FACTORS AFFECTING MORBID OBESITY (H): ICD-10-CM

## 2024-10-25 DIAGNOSIS — F54 PSYCHOLOGICAL FACTORS AFFECTING MORBID OBESITY (H): ICD-10-CM

## 2024-10-25 DIAGNOSIS — F33.0 MAJOR DEPRESSIVE DISORDER, RECURRENT EPISODE, MILD (H): Primary | ICD-10-CM

## 2024-10-25 PROCEDURE — 90832 PSYTX W PT 30 MINUTES: CPT | Mod: 95 | Performed by: PSYCHOLOGIST

## 2024-10-25 ASSESSMENT — ANXIETY QUESTIONNAIRES
2. NOT BEING ABLE TO STOP OR CONTROL WORRYING: SEVERAL DAYS
3. WORRYING TOO MUCH ABOUT DIFFERENT THINGS: SEVERAL DAYS
5. BEING SO RESTLESS THAT IT IS HARD TO SIT STILL: NOT AT ALL
GAD7 TOTAL SCORE: 3
GAD7 TOTAL SCORE: 3
1. FEELING NERVOUS, ANXIOUS, OR ON EDGE: SEVERAL DAYS
6. BECOMING EASILY ANNOYED OR IRRITABLE: NOT AT ALL
GAD7 TOTAL SCORE: 3
7. FEELING AFRAID AS IF SOMETHING AWFUL MIGHT HAPPEN: NOT AT ALL
7. FEELING AFRAID AS IF SOMETHING AWFUL MIGHT HAPPEN: NOT AT ALL
4. TROUBLE RELAXING: NOT AT ALL

## 2024-10-25 NOTE — PROGRESS NOTES
Health Psychology                     Department of Medicine  Izzy Montaño, Ph.D., L.P. [phone]                          [facility] Sherrie Crowe, Ph.D., L.P. [phone]                   [place] [address]   Ranulfo Whipple, Ph.D. [phone]        [address] [place] [address] Enriqueta Sorto, Ph.D., L.P. [phone]    [place] [address]           Ramin Olivo, Ph.D., A.B.P.P., L.P. [phone]        Kathie Galvan, Ph.D., L.P. [phone]  [address]      Health Psychology Progress Note    Demographics   Age 68 year old   Sex female   Race White   Ethnicity Not  or      Ms. Padilla is a  woman self-referred for psychological consultation because her therapist of the last 24 years retired.  She is seen for problem-solving and supportive therapy for depression and multiple health issues.     HISTORY OF PRESENTING CONCERN:  Ms. Padilla reported at intake (7/28/16) a  lengthy history of depression.  She was seeing a psychiatrist, Ban Coleman, at [facility], and is taking Abilify 7.5 mg, trazodone 500 mg, ripazepam 75 mg each day at bedtime, Tegretol 400 mg at bedtime and methylphenidate 10 mg b.i.d.  She discontineud ability and is now taking Rexulti 2 mg.  She has a history of hospitalizations including three at the [facility] in the late 1990s, early 2000s when she had 2 courses of ECT lasting 7-10 sessions and approximately 3 other single session ECTs.  She stopped due to memory problems.  She kept with Dr. Coleman who she first met as an inpatient and has seen her for the past 18 years.  She had also been hospitalized psychiatrically at [facility] at age 23.  She previously worked with psychiatrist, Doug Sarabia for three years, stopping, in part, because she didn't feel he took her suicide attempt sufficiently seriously.  She reports her depression tends to vary.      MEDICAL HISTORY (at intake)  Ms. Padilla has a complex medical history.  She was diagnosed with MS in 1985.Her psychiatrist at [facility] in [place], Dr. 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  She has used a scooter since 1992, using it more  than she had previously.  She also could use a walker.  She was diagnosed with fibromyalgia in 1997. She is seen at the [facility] for her eye care. During 8th grade, she fell on a ski lift, injuring her larynx.     WEIGHT : self report    2/23/23  Down to 281 lbs.   Hard to weigh.  Not steady on her feet.  9/7/23    281  9/30/23  281  11/10/23  289.1  (taken 11/1)  12/14/23   286.5 (taken 12/6/23)  1/30/24  -not sure  3/1 she estimates  283  6/21/24  244.2 (as of 6/11)  7/14/24   247  8/2/24     244.5  8/30/24   242.2  9/27/24   240  10/25/24 238.1 (based on last week- she forgot to this week)      Wt Readings from Last 4 Encounters:   05/20/24 110.2 kg (243 lb)   10/02/23 127.9 kg (281 lb 14.4 oz)   09/19/23 128.2 kg (282 lb 9.6 oz)   09/15/23 128.2 kg (282 lb 9.6 oz)     Past Medical History:   Diagnosis Date    Depression, major, in partial remission (H)     Fibromyalgia syndrome     Gastro-oesophageal reflux disease     Hyperlipemia     Hypertension     Low serum sodium     Lymphedema     Mixed incontinence     Multiple sclerosis (H)     tremors with MS, all four limbs and head    Multiple sclerosis, secondary progressive (H)     Neurogenic bladder     Obese     Osteoporosis     Sleep apnea     Vocal cord paralysis, unilateral complete         Past Surgical History:   Procedure Laterality Date    COLONOSCOPY N/A 07/17/2017    Procedure: COMBINED COLONOSCOPY, SINGLE OR MULTIPLE BIOPSY/POLYPECTOMY BY BIOPSY;;  Surgeon: Wong Beaulieu MD;  Location:  GI    COLONOSCOPY N/A 02/23/2018    Procedure: COMBINED COLONOSCOPY, SINGLE OR MULTIPLE BIOPSY/POLYPECTOMY BY BIOPSY;  COLONOSCOPY ;  Surgeon: Yessica Santana,  MD;  Location:  GI    ENT SURGERY      throat 1969, vocal cord surgery with skin grafting    ESOPHAGOSCOPY, GASTROSCOPY, DUODENOSCOPY (EGD), COMBINED N/A 12/16/2014    Procedure: COMBINED ENDOSCOPIC ULTRASOUND, ESOPHAGOSCOPY, GASTROSCOPY, DUODENOSCOPY (EGD), FINE NEEDLE ASPIRATE/BIOPSY;  Surgeon: Yessica Santana MD;  Location:  GI    ESOPHAGOSCOPY, GASTROSCOPY, DUODENOSCOPY (EGD), COMBINED N/A 12/16/2014    Procedure: COMBINED ESOPHAGOSCOPY, GASTROSCOPY, DUODENOSCOPY (EGD), BIOPSY SINGLE OR MULTIPLE;  Surgeon: Yessica Santana MD;  Location:  GI    GASTRIC BYPASS Bilateral     LAPAROSCOPIC APPENDECTOMY  05/30/2013    Procedure: LAPAROSCOPIC APPENDECTOMY;;  Surgeon: Salvador Morris MD;  Location:  OR    LAPAROSCOPIC BIOPSY LIVER  05/30/2013    Procedure: LAPAROSCOPIC BIOPSY LIVER;;  Surgeon: Salvador Morris MD;  Location:  OR    LAPAROSCOPIC GASTRIC SLEEVE  05/30/2013    Procedure: LAPAROSCOPIC GASTRIC SLEEVE;  LAPAROSCOPIC SLEEVE GASTRECTOMY/ LAPARSCOPIC  APPENDECTOMY /LIVER BIOPSIES/LIVER CYST DRAINAGE;  Surgeon: Salvador Morris MD;  Location:  OR    LASER HOLMIUM LITHOTRIPSY URETER(S), INSERT STENT, COMBINED Left 1/18/2023    Procedure: CYSTOSCOPY, LEFT URETEROSCOPY, HOLMIUM LASER  LITHOTRIPSY, LEFT   STENT PLACEMENT;  Surgeon: Mahednra Coles MD;  Location:  OR    ORTHOPEDIC SURGERY      R wrist 2010    PHACOEMULSIFICATION CLEAR CORNEA WITH STANDARD INTRAOCULAR LENS IMPLANT Right 6/29/2023    Procedure: RIGHT EYE PHACOEMULSIFICATION CATARACT WITH INTRAOCULAR LENS IMPLANT;  Surgeon: Flaco Pappas MD;  [place] [facility] [place]    PHACOEMULSIFICATION CLEAR CORNEA WITH STANDARD INTRAOCULAR LENS IMPLANT Left 7/20/2023    Procedure: LEFT EYE PHACOEMULSIFICATION, CATARACT, WITH INTRAOCULAR LENS IMPLANT;  Surgeon: Flaco Pappas MD;  [place] [facility] [place]     Melatonin 10 mg at bedtime  Mirtazpine  15 mg or as below?  Current Outpatient Medications   Medication Sig  Dispense Refill    Acetaminophen 325 MG CHEW Take 650 mg by mouth every 4 hours as needed for mild pain      ARIPiprazole (ABILIFY) 5 MG tablet Take 7.5 mg by mouth daily  2    atorvastatin (LIPITOR) 20 MG tablet Take 20 mg by mouth daily.      buPROPion (WELLBUTRIN XL) 150 MG 24 hr tablet       carBAMazepine (TEGRETOL XR) 200 MG 12 hr tablet Take 200 mg by mouth 2 times daily      carboxymethylcellulose PF (REFRESH PLUS) 0.5 % ophthalmic solution Place 1 drop into both eyes 4 times daily      cyanocobalamin (VITAMIN B-12) 1000 MCG tablet       DULoxetine (CYMBALTA) 60 MG capsule Take 120 mg by mouth daily      famotidine (PEPCID) 40 MG tablet Take 1 tablet (40 mg) by mouth daily 90 tablet 3    fluticasone (FLONASE) 50 MCG/ACT nasal spray Spray 2 sprays into both nostrils daily      GNP CALCIUM CITRATE +D3 315-6.25 MG-MCG TABS       hydrocortisone (CORTAID) 1 % external cream       hydrOXYzine (ATARAX) 25 MG tablet Take 25 mg by mouth every 6 hours as needed for anxiety      lactobacillus rhamnosus, GG, (CULTURELL) capsule Take 1 capsule by mouth 2 times daily      Lactobacillus-Inulin (CULTURELLE DIGESTIVE [facility]) CAPS TAKE 1 CAP BY MOUTH TWICE A DAY      loperamide (IMODIUM A-D) 2 MG tablet Take 1 tablet (2 mg) by mouth 2 times daily      losartan (COZAAR) 50 MG tablet Take 1 tablet (50 mg) by mouth 2 times daily      mirtazapine (REMERON) 30 MG tablet Take 75 mg by mouth At Bedtime      nystatin (MYCOSTATIN) 683270 UNIT/GM external powder       traZODone HCl 300 MG TABS Take 600 mg by mouth At Bedtime      triamcinolone (ARISTOCORT HP) 0.5 % external cream Apply topically every 8 hours as needed (itching)      trospium (SANCTURA) 20 MG tablet Take 20 mg by mouth 2 times daily      verapamil ER (CALAN-SR) 120 MG CR tablet Take 120 mg by mouth 2 times daily      VITAMIN D-1000 MAX ST 25 MCG (1000 UT) tablet Take 25 mcg by mouth       No current facility-administered medications for this visit.     Medication: On  Duloxetine and Mirtazpine and Trazodone and Ritalin. She discontinued Effexor and Abilify previously per Dr. Marquise Coleman, her psychiatrist.  On 4/10/19 she informed me that she started Abilify        11/3/2022     6:06 PM 2024     5:48 PM 2024    10:07 AM   PHQ-9 SCORE   PHQ-9 Total Score MyChart 5 (Mild depression) 4 (Minimal depression) 7 (Mild depression)   PHQ-9 Total Score 5 4 7         2024     7:35 AM 2024    10:08 AM 10/25/2024     2:48 PM   NAS-7 SCORE   Total Score 2 (minimal anxiety) 0 (minimal anxiety) 3 (minimal anxiety)   Total Score 2 0 3        Patient-reported     SOCIAL HISTORY (at intake)  Ms. Padilla grew up in the [place] area and is the oldest of 5 children in her family of origin.  Her father was a dentist who she believes was bipolar.  He  of lung cancer at age 72.  Her mother  of sepsis at age 80.  She had been diagnosed with breast cancer when Ms. Padilla was 8.  She described the marriage between her parents as misael.  She is 5 years older than her closest-aged sister and they are all within 4 years of each other.   Her daughter  12/3 and her mother .  She tends to feel more depressed in winter.      Ms. Padilla attended [employer] for a year and later went to night school at the [employer].  She felt that she could not continue her education to the point of graduation because she was working full time and raising her daughter.  Her daughter  in  at age 31 of liver failure secondary to an accidental Tylenol overdose.  She had been recovering from a hysterectomy.      Ms. 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  She had to retire at the time secondary to her MS.  She has never .  She has not dated,and states that if she were she would date, she would be interested in a relationship  with a woman.  There is no history of  service or legal problems.  She expresses concern about her increasing social isolation.  She had at least 1 friend, Jael, who she met at a therapy group in 1984.  She is active in facilitating groups for people with MS both in [place] and [place].  She lived alone and currently gets help from a home health aide, as well as home health nurse.     PSYCHIATRIC HISTORY: She had  seen Dr. Ban Coleman, psychiatrist.  Dr. Coleman prescribed Cymbalta for it. She feels she has been more depressed since the Fall, but doesn't think it is SAD.   She stated that she had recommended case management.  She now sees Dr. Yariel Hill at [facility].     SESSION:  Mili arrived punctually for psychotherapy session visit. gaged her in supportive listening and cognitive processing of situations discussed. Still leads MS group, but as it dwindles is wondering about whether ornot to continue to lead it.     Mood:  The depression varies; currently improving- not as bad. Trying to regulate her medications so as to help better with sleep.   She feels more on top of things.  Recent increase in trazodone to 425 mg for sleep.     Health: Lymphedema appears to be improving. Addressed getting dressings on her wound. Waiting to hear results of thyroid nodule.   Got a shot Prolia for her bone strength.    Social:  She is doing well overall  She has 15 friends, is participating in [facility] recreational and social activities.  Doing well at [facility] ([address] [place] [address]; [phone])  since 8/12.23, Longterm care. Participates in activities, e.g., playing cards, scrabble, and about to play other games. Was asked to help people with nine new games, but it hasn't really happened. Became reacquainted with friends from childhood who are now visiting.  She is feeling more connected. Her family is supportive.    She is still running the MS group.She has been thinking of cutting back as few people  attend (recently 3). She joined it in 1985. It began in 1981. Discussed her ambivalene about running such a  small group.     Weight Issues: She is losing weight (2 lb/month) and is reinforced. She is pleased that she has been, making progress with losing weight overall Discussed cutting out cheese puffs     She participated fully and appeared to derive benefit. Affect is positive.  Rapport was excellent and she was pleased to return.  The treatment plan was reviewed with the patient at today s visit. The treatment plan remains current based on the patient s status and progress to date.  Time service started:3:03  Time service ended: 3:40    [facility] Phone:[phone] room 414 @ [facility]    DIAGNOSES:   Major depression, recurrent, moderate (F33.1).   Behavioral factors affecting morbid obesity (E66.01).     PLAN:  Ms. Padilla will return for in in person visit 12/3 @ 2 for eclectic, supportive therapy consistent with treatment plan.      Last treatment plan signed:7/19/24  Last Treatment plan review: 10/25/24   Treatment plan verbal Review due:1/25/25  Treatment Review due: 7/19/25     Ramin Olivo, PhD, A.B.P.P., L.P.   Director, Health Psychology

## 2024-11-01 DIAGNOSIS — H91.90 HOH (HARD OF HEARING): Primary | ICD-10-CM

## 2024-12-03 ENCOUNTER — OFFICE VISIT (OUTPATIENT)
Dept: PSYCHOLOGY | Facility: CLINIC | Age: 69
End: 2024-12-03
Payer: COMMERCIAL

## 2024-12-03 DIAGNOSIS — F54 PSYCHOLOGICAL FACTORS AFFECTING MORBID OBESITY (H): ICD-10-CM

## 2024-12-03 DIAGNOSIS — F33.0 MAJOR DEPRESSIVE DISORDER, RECURRENT EPISODE, MILD (H): Primary | ICD-10-CM

## 2024-12-03 DIAGNOSIS — E66.01 PSYCHOLOGICAL FACTORS AFFECTING MORBID OBESITY (H): ICD-10-CM

## 2024-12-03 DIAGNOSIS — Z63.4 BEREAVEMENT: ICD-10-CM

## 2024-12-03 SDOH — SOCIAL STABILITY - SOCIAL INSECURITY: DISSAPEARANCE AND DEATH OF FAMILY MEMBER: Z63.4

## 2024-12-03 ASSESSMENT — ANXIETY QUESTIONNAIRES
GAD7 TOTAL SCORE: 4
4. TROUBLE RELAXING: SEVERAL DAYS
GAD7 TOTAL SCORE: 4
5. BEING SO RESTLESS THAT IT IS HARD TO SIT STILL: NOT AT ALL
7. FEELING AFRAID AS IF SOMETHING AWFUL MIGHT HAPPEN: NOT AT ALL
GAD7 TOTAL SCORE: 4
6. BECOMING EASILY ANNOYED OR IRRITABLE: NOT AT ALL
2. NOT BEING ABLE TO STOP OR CONTROL WORRYING: SEVERAL DAYS
8. IF YOU CHECKED OFF ANY PROBLEMS, HOW DIFFICULT HAVE THESE MADE IT FOR YOU TO DO YOUR WORK, TAKE CARE OF THINGS AT HOME, OR GET ALONG WITH OTHER PEOPLE?: SOMEWHAT DIFFICULT
IF YOU CHECKED OFF ANY PROBLEMS ON THIS QUESTIONNAIRE, HOW DIFFICULT HAVE THESE PROBLEMS MADE IT FOR YOU TO DO YOUR WORK, TAKE CARE OF THINGS AT HOME, OR GET ALONG WITH OTHER PEOPLE: SOMEWHAT DIFFICULT
1. FEELING NERVOUS, ANXIOUS, OR ON EDGE: SEVERAL DAYS
3. WORRYING TOO MUCH ABOUT DIFFERENT THINGS: SEVERAL DAYS
7. FEELING AFRAID AS IF SOMETHING AWFUL MIGHT HAPPEN: NOT AT ALL

## 2024-12-03 NOTE — PROGRESS NOTES
Health Psychology                     Department of Medicine  Izzy Montaño, Ph.D., L.P. [phone]                          [facility] Sherrie Crowe, Ph.D., L.P. [phone]                   [place] [address]   Ranulfo Whipple, Ph.D. [phone]        [address] [place] [address] Enriqueta Sorto, Ph.D., L.P. [phone]    [place] [address]           Ramin Olivo, Ph.D., A.B.P.P., L.P. [phone]        Kathie Galvan, Ph.D., L.P. [phone]  [address]      Health Psychology Progress Note    Demographics   Age 68 year old   Sex female   Race White   Ethnicity Not  or      Ms. Padilla is a  woman self-referred for psychological consultation because her therapist of the last 24 years retired.  She is seen for problem-solving and supportive therapy for depression and multiple health issues.     HISTORY OF PRESENTING CONCERN:  Ms. Padilla reported at intake (7/28/16) a  lengthy history of depression.  She was seeing a psychiatrist, Ban Coleman, at [facility], and is taking Abilify 7.5 mg, trazodone 500 mg, ripazepam 75 mg each day at bedtime, Tegretol 400 mg at bedtime and methylphenidate 10 mg b.i.d.  She discontineud ability and is now taking Rexulti 2 mg.  She has a history of hospitalizations including three at the [facility] in the late 1990s, early 2000s when she had 2 courses of ECT lasting 7-10 sessions and approximately 3 other single session ECTs.  She stopped due to memory problems.  She kept with Dr. Coleman who she first met as an inpatient and has seen her for the past 18 years.  She had also been hospitalized psychiatrically at [facility] at age 23.  She previously worked with psychiatrist, Doug Sarabia for three years, stopping, in part, because she didn't feel he took her suicide attempt sufficiently seriously.  She reports her depression tends to vary.      MEDICAL HISTORY (at intake)  Ms. Padilla has a complex medical history.  She was diagnosed with MS in 1985.Her psychiatrist at [facility] in [place], Dr. 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  She has used a scooter since 1992, using it more  than she had previously.  She also could use a walker.  She was diagnosed with fibromyalgia in 1997. She is seen at the [facility] for her eye care. During 8th grade, she fell on a ski lift, injuring her larynx.     WEIGHT : self report    2/23/23  Down to 281 lbs.   Hard to weigh.  Not steady on her feet.  9/7/23    281  9/30/23  281  11/10/23  289.1  (taken 11/1)  12/14/23   286.5 (taken 12/6/23)  1/30/24  -not sure  3/1 she estimates  283  6/21/24  244.2 (as of 6/11)  7/14/24   247  8/2/24     244.5  8/30/24   242.2  9/27/24   240  10/25/24 238.1 (based on last week- she forgot to this week)  12/23/24 238.5    Wt Readings from Last 4 Encounters:   05/20/24 110.2 kg (243 lb)   10/02/23 127.9 kg (281 lb 14.4 oz)   09/19/23 128.2 kg (282 lb 9.6 oz)   09/15/23 128.2 kg (282 lb 9.6 oz)     Past Medical History:   Diagnosis Date    Depression, major, in partial remission (H)     Fibromyalgia syndrome     Gastro-oesophageal reflux disease     Hyperlipemia     Hypertension     Low serum sodium     Lymphedema     Mixed incontinence     Multiple sclerosis (H)     tremors with MS, all four limbs and head    Multiple sclerosis, secondary progressive (H)     Neurogenic bladder     Obese     Osteoporosis     Sleep apnea     Vocal cord paralysis, unilateral complete         Past Surgical History:   Procedure Laterality Date    COLONOSCOPY N/A 07/17/2017    Procedure: COMBINED COLONOSCOPY, SINGLE OR MULTIPLE BIOPSY/POLYPECTOMY BY BIOPSY;;  Surgeon: Wong Beaulieu MD;  Location:  GI    COLONOSCOPY N/A 02/23/2018    Procedure: COMBINED COLONOSCOPY, SINGLE OR MULTIPLE BIOPSY/POLYPECTOMY BY BIOPSY;  COLONOSCOPY ;  Surgeon: Max  Yessica Rollins MD;  Location:  GI    ENT SURGERY      throat 1969, vocal cord surgery with skin grafting    ESOPHAGOSCOPY, GASTROSCOPY, DUODENOSCOPY (EGD), COMBINED N/A 12/16/2014    Procedure: COMBINED ENDOSCOPIC ULTRASOUND, ESOPHAGOSCOPY, GASTROSCOPY, DUODENOSCOPY (EGD), FINE NEEDLE ASPIRATE/BIOPSY;  Surgeon: Yessica Santana MD;  Location:  GI    ESOPHAGOSCOPY, GASTROSCOPY, DUODENOSCOPY (EGD), COMBINED N/A 12/16/2014    Procedure: COMBINED ESOPHAGOSCOPY, GASTROSCOPY, DUODENOSCOPY (EGD), BIOPSY SINGLE OR MULTIPLE;  Surgeon: Yessica Santana MD;  Location:  GI    GASTRIC BYPASS Bilateral     LAPAROSCOPIC APPENDECTOMY  05/30/2013    Procedure: LAPAROSCOPIC APPENDECTOMY;;  Surgeon: Salvador Morris MD;  Location:  OR    LAPAROSCOPIC BIOPSY LIVER  05/30/2013    Procedure: LAPAROSCOPIC BIOPSY LIVER;;  Surgeon: Salvador Morris MD;  Location:  OR    LAPAROSCOPIC GASTRIC SLEEVE  05/30/2013    Procedure: LAPAROSCOPIC GASTRIC SLEEVE;  LAPAROSCOPIC SLEEVE GASTRECTOMY/ LAPARSCOPIC  APPENDECTOMY /LIVER BIOPSIES/LIVER CYST DRAINAGE;  Surgeon: Salvador Morris MD;  Location:  OR    LASER HOLMIUM LITHOTRIPSY URETER(S), INSERT STENT, COMBINED Left 1/18/2023    Procedure: CYSTOSCOPY, LEFT URETEROSCOPY, HOLMIUM LASER  LITHOTRIPSY, LEFT   STENT PLACEMENT;  Surgeon: Mahendra Coles MD;  Location:  OR    ORTHOPEDIC SURGERY      R wrist 2010    PHACOEMULSIFICATION CLEAR CORNEA WITH STANDARD INTRAOCULAR LENS IMPLANT Right 6/29/2023    Procedure: RIGHT EYE PHACOEMULSIFICATION CATARACT WITH INTRAOCULAR LENS IMPLANT;  Surgeon: Flaco Pappas MD;  Location: [facility] OR    PHACOEMULSIFICATION CLEAR CORNEA WITH STANDARD INTRAOCULAR LENS IMPLANT Left 7/20/2023    Procedure: LEFT EYE PHACOEMULSIFICATION, CATARACT, WITH INTRAOCULAR LENS IMPLANT;  Surgeon: Flaco Pappas MD;  Location: [facility] OR     Melatonin 10 mg at bedtime  Mirtazpine  15 mg or as below?  Current Outpatient Medications    Medication Sig Dispense Refill    Acetaminophen 325 MG CHEW Take 650 mg by mouth every 4 hours as needed for mild pain      ARIPiprazole (ABILIFY) 5 MG tablet Take 7.5 mg by mouth daily  2    atorvastatin (LIPITOR) 20 MG tablet Take 20 mg by mouth daily.      buPROPion (WELLBUTRIN XL) 150 MG 24 hr tablet       carBAMazepine (TEGRETOL XR) 200 MG 12 hr tablet Take 200 mg by mouth 2 times daily      carboxymethylcellulose PF (REFRESH PLUS) 0.5 % ophthalmic solution Place 1 drop into both eyes 4 times daily      cyanocobalamin (VITAMIN B-12) 1000 MCG tablet       DULoxetine (CYMBALTA) 60 MG capsule Take 120 mg by mouth daily      famotidine (PEPCID) 40 MG tablet Take 1 tablet (40 mg) by mouth daily 90 tablet 3    fluticasone (FLONASE) 50 MCG/ACT nasal spray Spray 2 sprays into both nostrils daily      GNP CALCIUM CITRATE +D3 315-6.25 MG-MCG TABS       hydrocortisone (CORTAID) 1 % external cream       hydrOXYzine (ATARAX) 25 MG tablet Take 25 mg by mouth every 6 hours as needed for anxiety      lactobacillus rhamnosus, GG, (CULTURELL) capsule Take 1 capsule by mouth 2 times daily      Lactobacillus-Inulin (CULTURELLE DIGESTIVE HEALTH) CAPS TAKE 1 CAP BY MOUTH TWICE A DAY      loperamide (IMODIUM A-D) 2 MG tablet Take 1 tablet (2 mg) by mouth 2 times daily      losartan (COZAAR) 50 MG tablet Take 1 tablet (50 mg) by mouth 2 times daily      mirtazapine (REMERON) 30 MG tablet Take 75 mg by mouth At Bedtime      nystatin (MYCOSTATIN) 807988 UNIT/GM external powder       traZODone HCl 300 MG TABS Take 600 mg by mouth At Bedtime      triamcinolone (ARISTOCORT HP) 0.5 % external cream Apply topically every 8 hours as needed (itching)      trospium (SANCTURA) 20 MG tablet Take 20 mg by mouth 2 times daily      verapamil ER (CALAN-SR) 120 MG CR tablet Take 120 mg by mouth 2 times daily      VITAMIN D-1000 MAX ST 25 MCG (1000 UT) tablet Take 25 mcg by mouth       No current facility-administered medications for this visit.      Medication: On Duloxetine and Mirtazpine and Trazodone and Ritalin. She discontinued Effexor and Abilify previously per Dr. Marquise Coleman, her psychiatrist.  On 4/10/19 she informed me that she started Abilify        11/3/2022     6:06 PM 2024     5:48 PM 2024    10:07 AM   PHQ-9 SCORE   PHQ-9 Total Score MyChart 5 (Mild depression) 4 (Minimal depression) 7 (Mild depression)   PHQ-9 Total Score 5 4 7         2024    10:08 AM 10/25/2024     2:48 PM 12/3/2024    11:36 AM   NAS-7 SCORE   Total Score 0 (minimal anxiety) 3 (minimal anxiety) 4 (minimal anxiety)   Total Score 0 3  4        Patient-reported     SOCIAL HISTORY (at intake)  Ms. Padilla grew up in the [place] area and is the oldest of 5 children in her family of origin.  Her father was a dentist who she believes was bipolar.  He  of lung cancer at age 72.  Her mother  of sepsis at age 80.  She had been diagnosed with breast cancer when Ms. Padilla was 8.  She described the marriage between her parents as misael.  She is 5 years older than her closest-aged sister and they are all within 4 years of each other.   Her daughter  12/3 and her mother .  She tends to feel more depressed in winter.      Ms. Padilla attended [employer] for a year and later went to night school at the [employer].  She felt that she could not continue her education to the point of graduation because she was working full time and raising her daughter.  Her daughter  in  at age 31 of liver failure secondary to an accidental Tylenol overdose. She had been recovering from a hysterectomy.      Ms. 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  She had to retire at the time secondary to her MS.  She has never .  She has not dated,and states that if she were she would date, she would be interested  in a relationship with a woman.  There is no history of  service or legal problems.  She expresses concern about her increasing social isolation.  She had at least 1 friend, Jael, who she met at a therapy group in 1984.  She is active in facilitating groups for people with MS both in [place] and [place].  She lived alone and currently gets help from a home health aide, as well as home health nurse.     PSYCHIATRIC HISTORY: She had  seen Dr. Ban Coleman, psychiatrist.  Dr. Coleman prescribed Cymbalta for it. She feels she has been more depressed since the Fall, but doesn't think it is SAD.   She stated that she had recommended case management.  She now sees Dr. Yariel Hill at [facility].     SESSION:  Mili arrived punctually for psychotherapy session visit. engaged her in supportive listening and cognitive processing of situations discussed. Still leads MS group, but as it dwindles is wondering about whether ornot to continue to lead it.     Mood:  The depression varies; currently improving- not as bad.  Appears happier. TShe feels more on top of things.  Recent increase in trazodone to 425 mg for sleep- not working.  Discussed not watching TV at night.  She has been waking up around 2, watching tv, which likely delays return to sleep.     Health: Lymphedema appears to be improving. Pleased to hear benign results of thyroid nodule. Getting shots of Prolia for her bone strength last few years.    Social:  She is doing well overall  She has 15 friends, is participating in [facility] recreational and social activities.  Doing well at [facility] ([address] [place] [address]; [phone])  since 8/12.23, Longterm care. Participates in activities, e.g., playing cards, scrabble, and about to play other games. Was asked to help people with nine new games, but it hasn't really happened because of time issues on weekends.   Encouraged her to keep exploring.She is feeling more connected. Her family is supportive, but she  has been missing them more..    She is still running the MS group.She has been thinking of cutting back as few people attend (recently 3). She joined it in 1985. It began in 1981. Discussed her ambivalene about running such a  small group.     Weight Issues: She was losing weight (2 lb/month); recently stalled. She was pleased that she has been, making progress with losing weight overall Discussed cutting out cheese puffs, popcorn, and chocolate.    She participated fully and appeared to derive benefit. Affect is positive.  Rapport was excellent and she was pleased to return.  Time service started:2:05  Time service ended:2:54    [facility] Phone:[phone] room 414 @ [facility] [place]    DIAGNOSES:   Major depression, recurrent, moderate (F33.1).   Behavioral factors affecting morbid obesity (E66.01).     PLAN:  Ms. Padilla will return for in in person visit 12/3 @ 2 for eclectic, supportive therapy consistent with treatment plan.      Last treatment plan signed:7/19/24  Last Treatment plan review: 10/25/24   Treatment plan verbal Review due:1/25/25  Treatment Review due: 7/19/25     Ramin Olivo, PhD, A.B.P.P., L.P.   Director, Health Psychology

## 2024-12-04 NOTE — TELEPHONE ENCOUNTER
Called patient to schedule surgery with Dr. Pappas    Date of Surgery: 3/2,3/16    Location of surgery: [facility]    Pre-Op H&P: PCP    Pre/Post Imaging:  No    Discussed COVID-19 Testing: Yes    Post-Op Appt Date: 3/8,3/22,34/5,4/12    Surgery Packet Mailed: yes      Additional comments: Spoke with patient, she is aware of above dates, will review packet and call with any questions           Anna C. Schoenecker on 9/29/2022 at 10:21 AM   SUBJECTIVE:    Patient is a 78y old Male who presents with a chief complaint of seizure (04 Dec 2024 11:41)    Currently admitted to medicine with the primary diagnosis of Acute UTI       Today is hospital day 3d.     PAST MEDICAL & SURGICAL HISTORY  HTN (hypertension)    Alcohol abuse      ALLERGIES:  No Known Allergies    MEDICATIONS:  STANDING MEDICATIONS  busPIRone 15 milliGRAM(s) Oral two times a day  cefTRIAXone   IVPB 2000 milliGRAM(s) IV Intermittent every 24 hours  divalproex  milliGRAM(s) Oral daily  donepezil 5 milliGRAM(s) Oral at bedtime  enoxaparin Injectable 40 milliGRAM(s) SubCutaneous every 24 hours  folic acid 1 milliGRAM(s) Oral daily  melatonin 5 milliGRAM(s) Oral at bedtime  memantine 5 milliGRAM(s) Oral two times a day  metoprolol tartrate 25 milliGRAM(s) Oral two times a day  multivitamin 1 Tablet(s) Oral daily  NIFEdipine XL 60 milliGRAM(s) Oral daily  rosuvastatin 20 milliGRAM(s) Oral at bedtime  traZODone 100 milliGRAM(s) Oral at bedtime    PRN MEDICATIONS  acetaminophen     Tablet .. 650 milliGRAM(s) Oral every 6 hours PRN  LORazepam   Injectable 1 milliGRAM(s) IV Push every 2 hours PRN  LORazepam   Injectable 1 milliGRAM(s) IV Push every 1 hour PRN    VITALS:   T(F): 97.3  HR: 66  BP: 147/79  RR: 18  SpO2: 96%    LABS:                        10.2   10.00 )-----------( 208      ( 04 Dec 2024 08:27 )             30.5                     Culture - Urine (collected 02 Dec 2024 14:04)  Source: Clean Catch Clean Catch (Midstream)  Final Report (03 Dec 2024 15:57):    No growth    Urinalysis with Rflx Culture (collected 01 Dec 2024 18:40)    Culture - Blood (collected 01 Dec 2024 18:00)  Source: .Blood BLOOD  Preliminary Report (04 Dec 2024 01:01):    No growth at 48 Hours    Culture - Blood (collected 01 Dec 2024 18:00)  Source: .Blood BLOOD  Preliminary Report (04 Dec 2024 01:01):    No growth at 48 Hours          RADIOLOGY:    PHYSICAL EXAM:  GEN: No acute distress  LUNGS: Clear to auscultation bilaterally   HEART: S1/S2 present. RRR.   ABD/ GI: Soft, non-tender, non-distended. Bowel sounds present  EXT: NC/NC/NE/2+PP/PITT  NEURO: AAOX3

## 2024-12-17 ENCOUNTER — TRANSFERRED RECORDS (OUTPATIENT)
Dept: HEALTH INFORMATION MANAGEMENT | Facility: CLINIC | Age: 69
End: 2024-12-17

## 2025-01-25 ENCOUNTER — HEALTH MAINTENANCE LETTER (OUTPATIENT)
Age: 70
End: 2025-01-25

## 2025-02-01 ENCOUNTER — LAB REQUISITION (OUTPATIENT)
Dept: LAB | Facility: CLINIC | Age: 70
End: 2025-02-01
Payer: COMMERCIAL

## 2025-02-03 LAB
ANION GAP SERPL CALCULATED.3IONS-SCNC: 10 MMOL/L (ref 7–15)
BUN SERPL-MCNC: 23.2 MG/DL (ref 8–23)
CALCIUM SERPL-MCNC: 9 MG/DL (ref 8.8–10.4)
CHLORIDE SERPL-SCNC: 102 MMOL/L (ref 98–107)
CREAT SERPL-MCNC: 0.85 MG/DL (ref 0.51–0.95)
EGFRCR SERPLBLD CKD-EPI 2021: 73 ML/MIN/1.73M2
GLUCOSE SERPL-MCNC: 103 MG/DL (ref 70–99)
HCO3 SERPL-SCNC: 27 MMOL/L (ref 22–29)
HGB BLD-MCNC: 9.9 G/DL (ref 11.7–15.7)
POTASSIUM SERPL-SCNC: 4.3 MMOL/L (ref 3.4–5.3)
SODIUM SERPL-SCNC: 139 MMOL/L (ref 135–145)

## 2025-03-05 ENCOUNTER — VIRTUAL VISIT (OUTPATIENT)
Dept: PSYCHOLOGY | Facility: CLINIC | Age: 70
End: 2025-03-05
Payer: COMMERCIAL

## 2025-03-05 DIAGNOSIS — F54 PSYCHOLOGICAL FACTORS AFFECTING MORBID OBESITY (H): ICD-10-CM

## 2025-03-05 DIAGNOSIS — F33.0 MAJOR DEPRESSIVE DISORDER, RECURRENT EPISODE, MILD: Primary | ICD-10-CM

## 2025-03-05 DIAGNOSIS — E66.01 PSYCHOLOGICAL FACTORS AFFECTING MORBID OBESITY (H): ICD-10-CM

## 2025-03-05 ASSESSMENT — ANXIETY QUESTIONNAIRES
GAD7 TOTAL SCORE: 5
4. TROUBLE RELAXING: SEVERAL DAYS
3. WORRYING TOO MUCH ABOUT DIFFERENT THINGS: SEVERAL DAYS
6. BECOMING EASILY ANNOYED OR IRRITABLE: NOT AT ALL
8. IF YOU CHECKED OFF ANY PROBLEMS, HOW DIFFICULT HAVE THESE MADE IT FOR YOU TO DO YOUR WORK, TAKE CARE OF THINGS AT HOME, OR GET ALONG WITH OTHER PEOPLE?: SOMEWHAT DIFFICULT
GAD7 TOTAL SCORE: 5
7. FEELING AFRAID AS IF SOMETHING AWFUL MIGHT HAPPEN: SEVERAL DAYS
7. FEELING AFRAID AS IF SOMETHING AWFUL MIGHT HAPPEN: SEVERAL DAYS
GAD7 TOTAL SCORE: 5
5. BEING SO RESTLESS THAT IT IS HARD TO SIT STILL: NOT AT ALL
1. FEELING NERVOUS, ANXIOUS, OR ON EDGE: SEVERAL DAYS
2. NOT BEING ABLE TO STOP OR CONTROL WORRYING: SEVERAL DAYS
IF YOU CHECKED OFF ANY PROBLEMS ON THIS QUESTIONNAIRE, HOW DIFFICULT HAVE THESE PROBLEMS MADE IT FOR YOU TO DO YOUR WORK, TAKE CARE OF THINGS AT HOME, OR GET ALONG WITH OTHER PEOPLE: SOMEWHAT DIFFICULT

## 2025-03-05 NOTE — PROGRESS NOTES
Health Psychology                    Department of Medicine  Izzy Montaño, Ph.D., L.P. [phone]  [facility] Sherrie Crowe, Ph.D., L.P. [phone]   [place] Mail Code [address] Cheryl, Ph.D., L.P.  [phone]  [address] [place] [address] Ranulfo Whipple, Ph.D., L.P. [phone]  [place] [address]  Ramin Olivo, Ph.D., L.P. [phone]              Kathie Galvan, Ph.D., L.P. [phone]  [facility]   Carmel Farnsworth, Ph.D., A.B.P.P., L.P. [phone]    [address] [place] [address]               Health Psychology Progress Note     Demographics   Age 69 year old   Sex female   Race White   Ethnicity Not  or      Ms. Padilla is a  woman self-referred for psychological consultation because her therapist of the last 24 years retired.  She is seen for problem-solving and supportive therapy for depression and multiple health issues.     HISTORY OF PRESENTING CONCERN:  Ms. Padilla reported at intake (7/28/16) a  lengthy history of depression.  She was seeing a psychiatrist, Ban Coleman, at [facility], and is taking Abilify 7.5 mg, trazodone 500 mg, ripazepam 75 mg each day at bedtime, Tegretol 400 mg at bedtime and methylphenidate 10 mg b.i.d.  She discontineud ability and is now taking Rexulti 2 mg.  She has a history of hospitalizations including three at the [facility] in the late 1990s, early 2000s when she had 2 courses of ECT lasting 7-10 sessions and approximately 3 other single session ECTs.  She stopped due to memory problems.  She kept with Dr. Coleman who she first met as an inpatient and has seen her for the past 18 years.  She had also been hospitalized psychiatrically at [facility] at age 23.  She previously worked with psychiatrist, Doug Sarabia for three years, stopping, in part, because she didn't feel he took her suicide attempt sufficiently seriously.  She reports her depression tends to vary.      MEDICAL HISTORY (at intake)  Ms. Padilla has a complex medical history.  She was diagnosed with MS in 1985.Her psychiatrist at [facility] in [place], Dr. 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  She has used a scooter since 1992, using it more  than she had previously.  She also could use a walker.  She was diagnosed with fibromyalgia in 1997. She is seen at the [facility] for her eye care. During 8th grade, she fell on a ski lift, injuring her larynx.     WEIGHT : self report    2/23/23  Down to 281 lbs.   Hard to weigh.  Not steady on her feet.  9/7/23    281  9/30/23  281  11/10/23  289.1  (taken 11/1)  12/14/23   286.5 (taken 12/6/23)  1/30/24  -not sure  3/1 she estimates  283  6/21/24  244.2 (as of 6/11)  7/14/24   247  8/2/24     244.5  8/30/24   242.2  9/27/24   240  10/25/24 238.1 (based on last week- she forgot to this week)  12/23/24 238.5  1/17/25 247 (last week when weighed)  2/3/25   243.2  3/5/25   246.2(despite GI/diarrhea issues)    Wt Readings from Last 4 Encounters:   05/20/24 110.2 kg (243 lb)   10/02/23 127.9 kg (281 lb 14.4 oz)   09/19/23 128.2 kg (282 lb 9.6 oz)   09/15/23 128.2 kg (282 lb 9.6 oz)     Past Medical History:   Diagnosis Date    Depression, major, in partial remission     Fibromyalgia syndrome     Gastro-oesophageal reflux disease     Hyperlipemia     Hypertension     Low serum sodium     Lymphedema     Mixed incontinence     Multiple sclerosis (H)     tremors with MS, all four limbs and head    Multiple sclerosis, secondary progressive (H)     Neurogenic bladder     Obese     Osteoporosis     Sleep apnea     Vocal cord paralysis, unilateral complete         Past Surgical History:   Procedure Laterality Date    COLONOSCOPY N/A 07/17/2017    Procedure: COMBINED COLONOSCOPY, SINGLE OR MULTIPLE BIOPSY/POLYPECTOMY BY BIOPSY;;  Surgeon: Wong Beaulieu MD;  Location:  GI    COLONOSCOPY N/A 02/23/2018    Procedure: COMBINED  COLONOSCOPY, SINGLE OR MULTIPLE BIOPSY/POLYPECTOMY BY BIOPSY;  COLONOSCOPY ;  Surgeon: Yessica Santana MD;  Location:  GI    ENT SURGERY      throat 1969, vocal cord surgery with skin grafting    ESOPHAGOSCOPY, GASTROSCOPY, DUODENOSCOPY (EGD), COMBINED N/A 12/16/2014    Procedure: COMBINED ENDOSCOPIC ULTRASOUND, ESOPHAGOSCOPY, GASTROSCOPY, DUODENOSCOPY (EGD), FINE NEEDLE ASPIRATE/BIOPSY;  Surgeon: Yessica Santana MD;  Location:  GI    ESOPHAGOSCOPY, GASTROSCOPY, DUODENOSCOPY (EGD), COMBINED N/A 12/16/2014    Procedure: COMBINED ESOPHAGOSCOPY, GASTROSCOPY, DUODENOSCOPY (EGD), BIOPSY SINGLE OR MULTIPLE;  Surgeon: Yessica Santana MD;  Location:  GI    GASTRIC BYPASS Bilateral     LAPAROSCOPIC APPENDECTOMY  05/30/2013    Procedure: LAPAROSCOPIC APPENDECTOMY;;  Surgeon: Salvador Morris MD;  Location:  OR    LAPAROSCOPIC BIOPSY LIVER  05/30/2013    Procedure: LAPAROSCOPIC BIOPSY LIVER;;  Surgeon: Salvador Morris MD;  Location:  OR    LAPAROSCOPIC GASTRIC SLEEVE  05/30/2013    Procedure: LAPAROSCOPIC GASTRIC SLEEVE;  LAPAROSCOPIC SLEEVE GASTRECTOMY/ LAPARSCOPIC  APPENDECTOMY /LIVER BIOPSIES/LIVER CYST DRAINAGE;  Surgeon: Salvador Morris MD;  Location:  OR    LASER HOLMIUM LITHOTRIPSY URETER(S), INSERT STENT, COMBINED Left 1/18/2023    Procedure: CYSTOSCOPY, LEFT URETEROSCOPY, HOLMIUM LASER  LITHOTRIPSY, LEFT   STENT PLACEMENT;  Surgeon: Mahendra Coles MD;  Location:  OR    ORTHOPEDIC SURGERY      R wrist 2010    PHACOEMULSIFICATION CLEAR CORNEA WITH STANDARD INTRAOCULAR LENS IMPLANT Right 6/29/2023    Procedure: RIGHT EYE PHACOEMULSIFICATION CATARACT WITH INTRAOCULAR LENS IMPLANT;  Surgeon: Flaco Pappas MD;  [facility]    PHACOEMULSIFICATION CLEAR CORNEA WITH STANDARD INTRAOCULAR LENS IMPLANT Left 7/20/2023    Procedure: LEFT EYE PHACOEMULSIFICATION, CATARACT, WITH INTRAOCULAR LENS IMPLANT;  Surgeon: Flaco Pappas MD;  [facility] [facility] [place]      Melatonin 10 mg at bedtime  Mirtazpine  15 mg or as below?  Current Outpatient Medications   Medication Sig Dispense Refill    Acetaminophen 325 MG CHEW Take 650 mg by mouth every 4 hours as needed for mild pain      ARIPiprazole (ABILIFY) 5 MG tablet Take 7.5 mg by mouth daily  2    atorvastatin (LIPITOR) 20 MG tablet Take 20 mg by mouth daily.      buPROPion (WELLBUTRIN XL) 150 MG 24 hr tablet       carBAMazepine (TEGRETOL XR) 200 MG 12 hr tablet Take 200 mg by mouth 2 times daily      carboxymethylcellulose PF (REFRESH PLUS) 0.5 % ophthalmic solution Place 1 drop into both eyes 4 times daily      cyanocobalamin (VITAMIN B-12) 1000 MCG tablet       DULoxetine (CYMBALTA) 60 MG capsule Take 120 mg by mouth daily      famotidine (PEPCID) 40 MG tablet Take 1 tablet (40 mg) by mouth daily 90 tablet 3    fluticasone (FLONASE) 50 MCG/ACT nasal spray Spray 2 sprays into both nostrils daily      GNP CALCIUM CITRATE +D3 315-6.25 MG-MCG TABS       hydrocortisone (CORTAID) 1 % external cream       hydrOXYzine (ATARAX) 25 MG tablet Take 25 mg by mouth every 6 hours as needed for anxiety      lactobacillus rhamnosus, GG, (CULTURELL) capsule Take 1 capsule by mouth 2 times daily      Lactobacillus-Inulin ([facility] DIGESTIVE [facility]) CAPS TAKE 1 CAP BY MOUTH TWICE A DAY      loperamide (IMODIUM A-D) 2 MG tablet Take 1 tablet (2 mg) by mouth 2 times daily      losartan (COZAAR) 50 MG tablet Take 1 tablet (50 mg) by mouth 2 times daily      mirtazapine (REMERON) 30 MG tablet Take 75 mg by mouth At Bedtime      nystatin (MYCOSTATIN) 620828 UNIT/GM external powder       traZODone HCl 300 MG TABS Take 600 mg by mouth At Bedtime      triamcinolone (ARISTOCORT HP) 0.5 % external cream Apply topically every 8 hours as needed (itching)      trospium (SANCTURA) 20 MG tablet Take 20 mg by mouth 2 times daily      verapamil ER (CALAN-SR) 120 MG CR tablet Take 120 mg by mouth 2 times daily      VITAMIN D-1000 MAX ST 25 MCG (1000 UT)  tablet Take 25 mcg by mouth       No current facility-administered medications for this visit.     Medication: On Duloxetine and Mirtazpine and Trazodone and Ritalin. She discontinued Effexor and Abilify previously per Dr. Marquise Coleman, her psychiatrist.  On 4/10/19 she informed me that she started Abilify        2024     5:48 PM 2024    10:07 AM 2025     5:06 PM   PHQ-9 SCORE   PHQ-9 Total Score MyChart 4 (Minimal depression) 7 (Mild depression) 6 (Mild depression)   PHQ-9 Total Score 4 7 6        Patient-reported         12/3/2024    11:36 AM 2025     5:07 PM 3/5/2025     4:06 PM   NAS-7 SCORE   Total Score 4 (minimal anxiety) 2 (minimal anxiety) 5 (mild anxiety)   Total Score 4  2  5        Patient-reported     SOCIAL HISTORY (at intake)  Ms. Padilla grew up in the [place] area and is the oldest of 5 children in her family of origin.  Her father was a dentist who she believes was bipolar.  He  of lung cancer at age 72.  Her mother  of sepsis at age 80.  She had been diagnosed with breast cancer when Ms. Padilla was 8.  She described the marriage between her parents as misael.  She is 5 years older than her closest-aged sister and they are all within 4 years of each other.   Her daughter  12/3 and her mother .  She tends to feel more depressed in winter.      Ms. Padilla attended [employer] for a year and later went to night school at the [employer].  She felt that she could not continue her education to the point of graduation because she was working full time and raising her daughter.  Her daughter  in  at age 31 of liver failure secondary to an accidental Tylenol overdose. She had been recovering from a hysterectomy.      Ms. 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  She had to retire at the time secondary to her  MS.  She has never .  She has not dated,and states that if she were she would date, she would be interested in a relationship with a woman.  There is no history of  service or legal problems.  She expresses concern about her increasing social isolation.  She had at least 1 friend, Jael, who she met at a therapy group in 1984.  She is active in facilitating groups for people with MS both in [place] and [place].  She lived alone and currently gets help from a home health aide, as well as home health nurse.     PSYCHIATRIC HISTORY: She had seen Dr. Ban Coleman, psychiatrist. Dr. Coleman prescribed Cymbalta for it. She feels she has been more depressed since the Fall, but doesn't think it is SAD. She stated that she had recommended case management.  She now sees Dr. Yariel Hill at [facility].     SESSION:  Mili arrived late for psychotherapy session visit.  She cancelled yesterday's session due to GI illness. Engaged her in supportive listening and cognitive processing of situations discussed.        Mood:  The depression varies; overall better than she had been years ago.  Recent increase in trazodone to 425 mg for sleep-it is helping.  She has been waking up around 2, watching tv, which likely delays return to sleep. Decreased Abilify to 1.5 mg in the morning. Discussed her improvement relative to when Abilify was started, when living alone, and possibility she might not need it any longer.     Health:   Diarrhea x > 1 month.     Social:  She is doing well overall, engaged in activities. She has 15 friends, is participating in [facility] recreational and social activities.  Doing well at [facility] ([address] [place] [address]; [phone])  since 8/12.23, Longterm care. Participates in activities.   Leads MS groups, but as attendance dwindles is wondering about whether or not to continue to lead it.    Weight Issues:She continues to have two 4 oz. Chocoate ice creams every night when she awakens and  watched tv.  Discussed stopping it. She gained 3 lbs in interval.  She reported eating popcorn, chocolate candy. Discussed needing to change  these patterns.  She can see it is counterproductive and she would like to get back to losing weight.  She was pleased that she has been, making progress with losing weight overall.       She participated fully and appeared to derive benefit. Affect is positive despite her feeling il.  She wanted to stop a bit early as she was not feeling well. .  Rapport was excellent and she was pleased to return  Time service started: 4:06  Time service ended: 4:43    [facility] Phone:[phone] room 414 @ [facility] [address] [place] [address]    DIAGNOSES:   Major depression, recurrent, moderate (F33.1).   Behavioral factors affecting morbid obesity (E66.01).     PLAN:  Ms. Padilla will return for virtual visit 3/28 @ 2 for eclectic, supportive therapy consistent with treatment plan.      Last treatment plan signed:7/19/24  Treatment plan verbal Review due:7/19/25   Treatment Review due: 7/19/25     Ramin Olivo, Ph.D., L.P.  Director, Health Psychology  [phone]

## 2025-03-20 NOTE — PROGRESS NOTES
Subjective     REFERRING PROVIDER  Cadence Kimball PA-C    REASON FOR VISIT  Neurogenic bladder and discussion of video UDS    HISTORY OF PRESENT ILLNESS  Ms. Padilla is a pleasant 69 year old female with a past medical history significant for secondary progressive MS, fibromyalgia, obesity, GERD, nephrolithiasis, and depression who presents today for establishing visit to complete video urodynamics.  I personally reviewed the outside urology documentation from 1/2/2025 in preparation for today's visit.    It appears that Mili has now had multiple incidences of urinary retention with hydronephrosis and urosepsis requiring catheterization.  Her outside urology team was concerned about a neurogenic component, so underwent urodynamic study with there department which showed a small capacity overactive bladder without significant noncompliance.  However, it was recommended that she complete a video urodynamics given her history of hydroureter and hydronephrosis.  Referred specifically to get on the calendar for this.    Today:  Confirms the above history  States that prior to ever needing a catheter she was regularly in diapers due to urinary changes because of MS  Unsure what the long-term plan will be in regards to her bladder outside of a chronic catheter and possible suprapubic tube    Objective     PHYSICAL EXAM  General: Alert, oriented, no acute distress    Assessment & Plan   History of urinary retention with hydronephrosis and urosepsis  Concern for neurogenic bladder in the setting of MS    It was my pleasure to meet with Ms. Padilla in the office today in regards to her referral for video urodynamics.  After reviewing her clinical history, we discussed that the need for video urodynamics is a reasonable 1 given her multiple episodes of bilateral hydronephrosis and urosepsis in the setting of urinary retention with MS.  Ultimately, the reason we would consider doing this is to consider whether or not she  would benefit from further bladder intervention like anticholinergics or bladder Botox if there is an excessive amount of overactivity or lack of compliance.  For now, the plan will be to get her on the calendar for first available video urodynamic study and then she will plan to follow-up with her outside urology team afterwards for discussion of next steps.  We are happy to be involved if necessary.    Ms. Padilla expressed understanding and agreement to the above discussion and plan and all of her questions were answered to her satisfaction.     PLAN  First available video urodynamics with follow-up with outside urology team    Signed by:    Zhang Valadez PA-C

## 2025-03-25 ENCOUNTER — OFFICE VISIT (OUTPATIENT)
Dept: UROLOGY | Facility: CLINIC | Age: 70
End: 2025-03-25
Attending: PHYSICIAN ASSISTANT
Payer: COMMERCIAL

## 2025-03-25 VITALS — OXYGEN SATURATION: 96 % | DIASTOLIC BLOOD PRESSURE: 67 MMHG | SYSTOLIC BLOOD PRESSURE: 102 MMHG | HEART RATE: 74 BPM

## 2025-03-25 DIAGNOSIS — R33.9 RETENTION OF URINE, UNSPECIFIED: ICD-10-CM

## 2025-03-25 PROCEDURE — 99204 OFFICE O/P NEW MOD 45 MIN: CPT | Performed by: STUDENT IN AN ORGANIZED HEALTH CARE EDUCATION/TRAINING PROGRAM

## 2025-03-25 PROCEDURE — 3074F SYST BP LT 130 MM HG: CPT | Performed by: STUDENT IN AN ORGANIZED HEALTH CARE EDUCATION/TRAINING PROGRAM

## 2025-03-25 PROCEDURE — 3078F DIAST BP <80 MM HG: CPT | Performed by: STUDENT IN AN ORGANIZED HEALTH CARE EDUCATION/TRAINING PROGRAM

## 2025-03-25 RX ORDER — NUTRITIONAL SUPPLEMENT
2 PACKET (EA) ORAL DAILY
COMMUNITY

## 2025-03-25 RX ORDER — GUAIFENESIN 200 MG/1
200 TABLET ORAL EVERY 4 HOURS PRN
COMMUNITY

## 2025-03-25 NOTE — LETTER
3/25/2025       RE: Mili Padilla  [address] [facility] [address]  [place] [address]     Dear Colleague,    Thank you for referring your patient, Mili Padilla, to the [facility] [place] at [facility]. Please see a copy of my visit note below.    Subjective    REFERRING PROVIDER  Cadence Kimball PA-C    REASON FOR VISIT  Neurogenic bladder and discussion of video UDS    HISTORY OF PRESENT ILLNESS  Ms. Padilla is a pleasant 69 year old female with a past medical history significant for secondary progressive MS, fibromyalgia, obesity, GERD, nephrolithiasis, and depression who presents today for establishing visit to complete video urodynamics.  I personally reviewed the outside urology documentation from 1/2/2025 in preparation for today's visit.    It appears that Mili has now had multiple incidences of urinary retention with hydronephrosis and urosepsis requiring catheterization.  Her outside urology team was concerned about a neurogenic component, so underwent urodynamic study with there department which showed a small capacity overactive bladder without significant noncompliance.  However, it was recommended that she complete a video urodynamics given her history of hydroureter and hydronephrosis.  Referred specifically to get on the calendar for this.    Today:  Confirms the above history  States that prior to ever needing a catheter she was regularly in diapers due to urinary changes because of MS  Unsure what the long-term plan will be in regards to her bladder outside of a chronic catheter and possible suprapubic tube    Objective    PHYSICAL EXAM  General: Alert, oriented, no acute distress    Assessment & Plan  History of urinary retention with hydronephrosis and urosepsis  Concern for neurogenic bladder in the setting of MS    It was my pleasure to meet with Ms. Padilla in the office today in regards to her referral for video  urodynamics.  After reviewing her clinical history, we discussed that the need for video urodynamics is a reasonable 1 given her multiple episodes of bilateral hydronephrosis and urosepsis in the setting of urinary retention with MS.  Ultimately, the reason we would consider doing this is to consider whether or not she would benefit from further bladder intervention like anticholinergics or bladder Botox if there is an excessive amount of overactivity or lack of compliance.  For now, the plan will be to get her on the calendar for first available video urodynamic study and then she will plan to follow-up with her outside urology team afterwards for discussion of next steps.  We are happy to be involved if necessary.    Ms. Padilla expressed understanding and agreement to the above discussion and plan and all of her questions were answered to her satisfaction.     PLAN  First available video urodynamics with follow-up with outside urology team    Signed by:    Zhang Valadez PA-C      Again, thank you for allowing me to participate in the care of your patient.      Sincerely,    Zhang Valadez PA-C

## 2025-03-25 NOTE — PATIENT INSTRUCTIONS
Please follow up next available for urodynamics.     It was a pleasure meeting with you today.  Thank you for allowing me and my team the privilege of caring for you today.  YOU are the reason we are here, and I truly hope we provided you with the excellent service you deserve.  Please let us know if there is anything else we can do for you so that we can be sure you are leaving completely satisfied with your care experience.

## 2025-03-25 NOTE — NURSING NOTE
Chief Complaint   Patient presents with    Consult       Blood pressure 102/67, pulse 74, last menstrual period 12/10/2010, SpO2 96%, not currently breastfeeding. There is no height or weight on file to calculate BMI.    Patient Active Problem List   Diagnosis    Multiple sclerosis, secondary progressive (H)    Fibromyalgia syndrome    Depression, major, in partial remission    Vocal cord paralysis, unilateral complete    Bariatric surgery status    Candidiasis, intertrigo    Psychological factors affecting morbid obesity (H)    Major depressive disorder, recurrent episode, moderate (H)    Morbid obesity with BMI of 45.0-49.9, adult (H)    Other dysphagia    Acute pain of right shoulder    Calculus of kidney    Cough    Fever and chills    MS (multiple sclerosis) (H)    Generalized muscle weakness    Sepsis due to urinary tract infection (H)    Compression fracture of L1 vertebra, initial encounter (H)    Combined forms of age-related cataract of both eyes    Postsurgical malabsorption    GERD without esophagitis    History of optic neuritis       Allergies   Allergen Reactions    Amoxicillin Hives    Amoxicillin-Pot Clavulanate      Sensitive,  Able to tolerate a few doses, otherwise hives on hands    Betaseron [Interferon Beta-1b]      Necrosis of skin at injection sites    Dust Mite Extract Unknown    Mold Unknown       Current Outpatient Medications   Medication Sig Dispense Refill    Acetaminophen 325 MG CHEW Take 650 mg by mouth every 4 hours as needed for mild pain      arginine (ARGINAID) PACK Take 2 packets by mouth daily.      ARIPiprazole (ABILIFY) 5 MG tablet Take 1.5 mg by mouth daily.  2    atorvastatin (LIPITOR) 20 MG tablet Take 20 mg by mouth daily.      buPROPion (WELLBUTRIN XL) 150 MG 24 hr tablet       carBAMazepine (TEGRETOL XR) 200 MG 12 hr tablet Take 100 mg by mouth 2 times daily.      carboxymethylcellulose PF (REFRESH PLUS) 0.5 % ophthalmic solution Place 1 drop into both eyes 4 times daily       cyanocobalamin (VITAMIN B-12) 1000 MCG tablet Take 1,000 mcg by mouth every 30 days.      DULoxetine (CYMBALTA) 60 MG capsule Take 120 mg by mouth daily      fluticasone (FLONASE) 50 MCG/ACT nasal spray Spray 2 sprays into both nostrils daily      GNP CALCIUM CITRATE +D3 315-6.25 MG-MCG TABS       guaiFENesin (ORGANIDIN) 200 MG tablet Take 200 mg by mouth every 4 hours as needed for cough.      lactobacillus rhamnosus, GG, (CULTURELL) capsule Take 1 capsule by mouth 2 times daily      Lactobacillus-Inulin (CULTURELLE DIGESTIVE HEALTH) CAPS TAKE 1 CAP BY MOUTH TWICE A DAY      loperamide (IMODIUM A-D) 2 MG tablet Take 1 tablet (2 mg) by mouth 2 times daily      losartan (COZAAR) 50 MG tablet Take 1 tablet (50 mg) by mouth 2 times daily      traZODone HCl 300 MG TABS Take 600 mg by mouth At Bedtime (Patient taking differently: Take 450 mg by mouth at bedtime.)      trospium (SANCTURA) 20 MG tablet Take 20 mg by mouth 2 times daily      verapamil ER (CALAN-SR) 120 MG CR tablet Take 120 mg by mouth 2 times daily      VITAMIN D-1000 MAX ST 25 MCG (1000 UT) tablet Take 25 mcg by mouth      famotidine (PEPCID) 40 MG tablet Take 1 tablet (40 mg) by mouth daily (Patient not taking: Reported on 3/25/2025) 90 tablet 3    hydrocortisone (CORTAID) 1 % external cream  (Patient not taking: Reported on 3/25/2025)      hydrOXYzine (ATARAX) 25 MG tablet Take 25 mg by mouth every 6 hours as needed for anxiety (Patient not taking: Reported on 3/25/2025)      mirtazapine (REMERON) 30 MG tablet Take 15 mg by mouth at bedtime.      nystatin (MYCOSTATIN) 202981 UNIT/GM external powder  (Patient not taking: Reported on 3/25/2025)      triamcinolone (ARISTOCORT HP) 0.5 % external cream Apply topically every 8 hours as needed (itching) (Patient not taking: Reported on 3/25/2025)         Social History     Tobacco Use    Smoking status: Former     Current packs/day: 0.00     Types: Cigarettes     Quit date: 9/27/1974     Years since  quittin.5    Smokeless tobacco: Never   Vaping Use    Vaping status: Never Used   Substance Use Topics    Alcohol use: Not Currently    Drug use: No       Alonzo Brown  3/25/2025  11:30 AM

## 2025-03-26 ENCOUNTER — PRE VISIT (OUTPATIENT)
Dept: UROLOGY | Facility: CLINIC | Age: 70
End: 2025-03-26
Payer: COMMERCIAL

## 2025-03-26 ENCOUNTER — TELEPHONE (OUTPATIENT)
Dept: UROLOGY | Facility: CLINIC | Age: 70
End: 2025-03-26
Payer: COMMERCIAL

## 2025-03-26 DIAGNOSIS — R33.9 URINARY RETENTION: Primary | ICD-10-CM

## 2025-03-26 DIAGNOSIS — G35 MS (MULTIPLE SCLEROSIS) (H): ICD-10-CM

## 2025-03-26 DIAGNOSIS — N13.30 HYDRONEPHROSIS, UNSPECIFIED HYDRONEPHROSIS TYPE: ICD-10-CM

## 2025-03-26 NOTE — TELEPHONE ENCOUNTER
.Writer called pt to discuss scheduling a urinalysis prior to their upcoming Urodynamics appointment.    Pt didn't  the phone, writer did leave a voicemail for the pt.     Writer left information regarding pt scheduling a UA/UC prior to their urodynamics appointment.     Marjorie Wilkins, EMT

## 2025-03-26 NOTE — TELEPHONE ENCOUNTER
Reason for visit: VUDS    Study scheduled because: concern for neurogenic bladder in the setting of MS    Relevant information: hx urinary retention with hydronephrosis and urosepsis. Pt uses a devante lift, brings her own sling.     Records/imaging/labs/orders: all records available    UA/UC needed: yes    Marjorie Wilkins  3/26/2025  10:03 AM

## 2025-04-13 ENCOUNTER — LAB REQUISITION (OUTPATIENT)
Dept: LAB | Facility: CLINIC | Age: 70
End: 2025-04-13
Payer: COMMERCIAL

## 2025-04-13 DIAGNOSIS — N39.0 URINARY TRACT INFECTION, SITE NOT SPECIFIED: ICD-10-CM

## 2025-04-13 LAB
ALBUMIN UR-MCNC: NEGATIVE MG/DL
APPEARANCE UR: ABNORMAL
BACTERIA #/AREA URNS HPF: ABNORMAL /HPF
BILIRUB UR QL STRIP: NEGATIVE
COLOR UR AUTO: YELLOW
GLUCOSE UR STRIP-MCNC: NEGATIVE MG/DL
HGB UR QL STRIP: ABNORMAL
KETONES UR STRIP-MCNC: NEGATIVE MG/DL
LEUKOCYTE ESTERASE UR QL STRIP: ABNORMAL
NITRATE UR QL: POSITIVE
PH UR STRIP: 5.5 [PH] (ref 5–7)
RBC URINE: 5 /HPF
SP GR UR STRIP: 1.01 (ref 1–1.03)
UROBILINOGEN UR STRIP-MCNC: NORMAL MG/DL
WBC CLUMPS #/AREA URNS HPF: PRESENT /HPF
WBC URINE: 145 /HPF

## 2025-04-13 PROCEDURE — 87086 URINE CULTURE/COLONY COUNT: CPT | Mod: ORL | Performed by: FAMILY MEDICINE

## 2025-04-13 PROCEDURE — 81001 URINALYSIS AUTO W/SCOPE: CPT | Mod: ORL | Performed by: FAMILY MEDICINE

## 2025-04-14 ENCOUNTER — PRE VISIT (OUTPATIENT)
Dept: UROLOGY | Facility: CLINIC | Age: 70
End: 2025-04-14
Payer: COMMERCIAL

## 2025-04-14 LAB — BACTERIA UR CULT: NORMAL

## 2025-04-14 NOTE — TELEPHONE ENCOUNTER
Reason for visit: VUDS     Study scheduled because: concern for neurogenic bladder in the setting of MS     Relevant information: hx urinary retention with hydronephrosis and urosepsis. Pt uses a devante lift, brings her own sling.      Records/imaging/labs/orders: all records available     UA/UC needed: yes    Marjorie Wilkins  4/14/2025  12:58 PM

## 2025-05-16 ENCOUNTER — LAB REQUISITION (OUTPATIENT)
Dept: LAB | Facility: CLINIC | Age: 70
End: 2025-05-16
Payer: COMMERCIAL

## 2025-05-16 DIAGNOSIS — R30.0 DYSURIA: ICD-10-CM

## 2025-05-16 LAB
ALBUMIN UR-MCNC: 100 MG/DL
APPEARANCE UR: ABNORMAL
BACTERIA #/AREA URNS HPF: ABNORMAL /HPF
BILIRUB UR QL STRIP: NEGATIVE
COLOR UR AUTO: YELLOW
GLUCOSE UR STRIP-MCNC: NEGATIVE MG/DL
HGB UR QL STRIP: ABNORMAL
KETONES UR STRIP-MCNC: NEGATIVE MG/DL
LEUKOCYTE ESTERASE UR QL STRIP: ABNORMAL
MUCOUS THREADS #/AREA URNS LPF: PRESENT /LPF
NITRATE UR QL: POSITIVE
PH UR STRIP: 5.5 [PH] (ref 5–7)
RBC URINE: 22 /HPF
SP GR UR STRIP: 1.02 (ref 1–1.03)
TRANSITIONAL EPI: 1 /HPF
UROBILINOGEN UR STRIP-MCNC: NORMAL MG/DL
WBC CLUMPS #/AREA URNS HPF: PRESENT /HPF
WBC URINE: >182 /HPF

## 2025-05-16 PROCEDURE — 81001 URINALYSIS AUTO W/SCOPE: CPT | Mod: ORL | Performed by: FAMILY MEDICINE

## 2025-05-16 PROCEDURE — 87086 URINE CULTURE/COLONY COUNT: CPT | Mod: ORL | Performed by: FAMILY MEDICINE

## 2025-05-19 LAB
BACTERIA UR CULT: ABNORMAL

## 2025-06-14 ENCOUNTER — LAB REQUISITION (OUTPATIENT)
Dept: LAB | Facility: CLINIC | Age: 70
End: 2025-06-14
Payer: COMMERCIAL

## 2025-06-14 DIAGNOSIS — R30.0 DYSURIA: ICD-10-CM

## 2025-06-14 LAB
ALBUMIN UR-MCNC: 100 MG/DL
APPEARANCE UR: ABNORMAL
BACTERIA #/AREA URNS HPF: ABNORMAL /HPF
BILIRUB UR QL STRIP: NEGATIVE
COLOR UR AUTO: YELLOW
GLUCOSE UR STRIP-MCNC: NEGATIVE MG/DL
HGB UR QL STRIP: ABNORMAL
KETONES UR STRIP-MCNC: NEGATIVE MG/DL
LEUKOCYTE ESTERASE UR QL STRIP: ABNORMAL
MUCOUS THREADS #/AREA URNS LPF: PRESENT /LPF
NITRATE UR QL: POSITIVE
PH UR STRIP: 6 [PH] (ref 5–7)
RBC URINE: >182 /HPF
SP GR UR STRIP: 1.02 (ref 1–1.03)
UROBILINOGEN UR STRIP-MCNC: NORMAL MG/DL
WBC CLUMPS #/AREA URNS HPF: PRESENT /HPF
WBC URINE: >182 /HPF

## 2025-06-14 PROCEDURE — 81001 URINALYSIS AUTO W/SCOPE: CPT | Mod: ORL | Performed by: NURSE PRACTITIONER

## 2025-06-14 PROCEDURE — 87186 SC STD MICRODIL/AGAR DIL: CPT | Mod: ORL | Performed by: NURSE PRACTITIONER

## 2025-06-18 LAB
BACTERIA UR CULT: ABNORMAL

## 2025-06-24 ENCOUNTER — LAB REQUISITION (OUTPATIENT)
Dept: LAB | Facility: CLINIC | Age: 70
End: 2025-06-24
Payer: COMMERCIAL

## 2025-06-24 DIAGNOSIS — I10 ESSENTIAL (PRIMARY) HYPERTENSION: ICD-10-CM

## 2025-06-25 LAB — MAGNESIUM SERPL-MCNC: 2.2 MG/DL (ref 1.7–2.3)

## 2025-06-25 PROCEDURE — 36415 COLL VENOUS BLD VENIPUNCTURE: CPT | Mod: ORL | Performed by: NURSE PRACTITIONER

## 2025-06-25 PROCEDURE — P9603 ONE-WAY ALLOW PRORATED MILES: HCPCS | Mod: ORL | Performed by: NURSE PRACTITIONER

## 2025-06-25 PROCEDURE — 83735 ASSAY OF MAGNESIUM: CPT | Mod: ORL | Performed by: NURSE PRACTITIONER

## 2025-07-08 ENCOUNTER — OFFICE VISIT (OUTPATIENT)
Dept: PSYCHOLOGY | Facility: CLINIC | Age: 70
End: 2025-07-08
Payer: COMMERCIAL

## 2025-07-08 DIAGNOSIS — E66.01 PSYCHOLOGICAL FACTORS AFFECTING MORBID OBESITY (H): ICD-10-CM

## 2025-07-08 DIAGNOSIS — F33.0 MAJOR DEPRESSIVE DISORDER, RECURRENT EPISODE, MILD: Primary | ICD-10-CM

## 2025-07-08 DIAGNOSIS — F54 PSYCHOLOGICAL FACTORS AFFECTING MORBID OBESITY (H): ICD-10-CM

## 2025-07-08 PROCEDURE — 90834 PSYTX W PT 45 MINUTES: CPT | Performed by: PSYCHOLOGIST

## 2025-07-08 ASSESSMENT — ANXIETY QUESTIONNAIRES
6. BECOMING EASILY ANNOYED OR IRRITABLE: NOT AT ALL
IF YOU CHECKED OFF ANY PROBLEMS ON THIS QUESTIONNAIRE, HOW DIFFICULT HAVE THESE PROBLEMS MADE IT FOR YOU TO DO YOUR WORK, TAKE CARE OF THINGS AT HOME, OR GET ALONG WITH OTHER PEOPLE: NOT DIFFICULT AT ALL
1. FEELING NERVOUS, ANXIOUS, OR ON EDGE: NOT AT ALL
8. IF YOU CHECKED OFF ANY PROBLEMS, HOW DIFFICULT HAVE THESE MADE IT FOR YOU TO DO YOUR WORK, TAKE CARE OF THINGS AT HOME, OR GET ALONG WITH OTHER PEOPLE?: NOT DIFFICULT AT ALL
3. WORRYING TOO MUCH ABOUT DIFFERENT THINGS: NOT AT ALL
5. BEING SO RESTLESS THAT IT IS HARD TO SIT STILL: SEVERAL DAYS
7. FEELING AFRAID AS IF SOMETHING AWFUL MIGHT HAPPEN: NOT AT ALL
4. TROUBLE RELAXING: NOT AT ALL
7. FEELING AFRAID AS IF SOMETHING AWFUL MIGHT HAPPEN: NOT AT ALL
GAD7 TOTAL SCORE: 1
GAD7 TOTAL SCORE: 1
2. NOT BEING ABLE TO STOP OR CONTROL WORRYING: NOT AT ALL
GAD7 TOTAL SCORE: 1

## 2025-07-08 NOTE — PROGRESS NOTES
Health Psychology                    Department of Medicine  Izzy Montaño, Ph.D., L.P. [phone]  [facility] Sherrie Crowe, Ph.D., L.P. [phone]   [place] Mail Code [address] [place] Cheryl, Ph.D., L.P.  [phone]  [address] [place] [address] Ranulfo Whipple, Ph.D., L.P. [phone]  [place] [address]  Ramin Olivo, Ph.D., L.P. [phone]              Kathie Galvan, Ph.D., L.P. [phone]  [facility]   Carmel Farnsworth, Ph.D., A.B.P.P., L.P. [phone]    [address] [place] [address]               Health Psychology Progress Note     Demographics   Age 70 year old   Sex female   Race White   Ethnicity Not  or      Ms. Padilla is a  woman self-referred for psychological consultation because her therapist of the last 24 years retired.  She is seen for problem-solving and supportive therapy for depression and multiple health issues.     HISTORY OF PRESENTING CONCERN:  Ms. Padilla reported at intake (7/28/16) a  lengthy history of depression.  She was seeing a psychiatrist, Ban Coleman, at [facility], and is taking Abilify 7.5 mg, trazodone 500 mg, ripazepam 75 mg each day at bedtime, Tegretol 400 mg at bedtime and methylphenidate 10 mg b.i.d.  She discontineud ability and is now taking Rexulti 2 mg.  She has a history of hospitalizations including three at the [facility] in the late 1990s, early 2000s when she had 2 courses of ECT lasting 7-10 sessions and approximately 3 other single session ECTs.  She stopped due to memory problems.  She kept with Dr. Coleman who she first met as an inpatient and has seen her for the past 18 years.  She had also been hospitalized psychiatrically at [facility] at age 23.  She previously worked with psychiatrist, Doug Sarabia for three years, stopping, in part, because she didn't feel he took her suicide attempt sufficiently seriously.  She reports her depression tends to vary.      MEDICAL HISTORY (at intake)  Ms. Padilla has a complex medical history.  She was diagnosed with MS in 1985.Her psychiatrist at [facility] in [place], Dr. 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  She has used a scooter since 1992, using it more  than she had previously.  She also could use a walker.  She was diagnosed with fibromyalgia in 1997. She is seen at the [facility] for her eye care. During 8th grade, she fell on a ski lift, injuring her larynx.     WEIGHT: self report    2/23/23  Down to 281 lbs.   Hard to weigh.  Not steady on her feet.  9/7/23    281  9/30/23  281  11/10/23  289.1  (taken 11/1)  12/14/23   286.5 (taken 12/6/23)  1/30/24  -not sure  3/1 she estimates  283  6/21/24  244.2 (as of 6/11)  7/14/24   247  8/2/24     244.5  8/30/24   242.2  9/27/24   240  10/25/24 238.1 (based on last week- she forgot to this week)  12/23/24 238.5  1/17/25 247 (last week when weighed)  2/3/25   243.2  3/5/25   246.2 (despite GI/diarrhea issues)  3/28/25 249.4  4/18/25 241.1   7/8/25    253    Wt Readings from Last 4 Encounters:   05/20/24 110.2 kg (243 lb)   10/02/23 127.9 kg (281 lb 14.4 oz)   09/19/23 128.2 kg (282 lb 9.6 oz)   09/15/23 128.2 kg (282 lb 9.6 oz)     Past Medical History:   Diagnosis Date    Depression, major, in partial remission     Fibromyalgia syndrome     Gastro-oesophageal reflux disease     Hyperlipemia     Hypertension     Low serum sodium     Lymphedema     Mixed incontinence     Multiple sclerosis (H)     tremors with MS, all four limbs and head    Multiple sclerosis, secondary progressive (H)     Neurogenic bladder     Obese     Osteoporosis     Sleep apnea     Vocal cord paralysis, unilateral complete         Past Surgical History:   Procedure Laterality Date    COLONOSCOPY N/A 07/17/2017    Procedure: COMBINED COLONOSCOPY, SINGLE OR MULTIPLE BIOPSY/POLYPECTOMY BY BIOPSY;;  Surgeon: Wong Beaulieu MD;  Location:  GI     COLONOSCOPY N/A 02/23/2018    Procedure: COMBINED COLONOSCOPY, SINGLE OR MULTIPLE BIOPSY/POLYPECTOMY BY BIOPSY;  COLONOSCOPY ;  Surgeon: Yessica Santana MD;  Location:  GI    ENT SURGERY      throat 1969, vocal cord surgery with skin grafting    ESOPHAGOSCOPY, GASTROSCOPY, DUODENOSCOPY (EGD), COMBINED N/A 12/16/2014    Procedure: COMBINED ENDOSCOPIC ULTRASOUND, ESOPHAGOSCOPY, GASTROSCOPY, DUODENOSCOPY (EGD), FINE NEEDLE ASPIRATE/BIOPSY;  Surgeon: Yessica Santana MD;  Location:  GI    ESOPHAGOSCOPY, GASTROSCOPY, DUODENOSCOPY (EGD), COMBINED N/A 12/16/2014    Procedure: COMBINED ESOPHAGOSCOPY, GASTROSCOPY, DUODENOSCOPY (EGD), BIOPSY SINGLE OR MULTIPLE;  Surgeon: Yessica Santana MD;  Location:  GI    GASTRIC BYPASS Bilateral     LAPAROSCOPIC APPENDECTOMY  05/30/2013    Procedure: LAPAROSCOPIC APPENDECTOMY;;  Surgeon: Salvador Morris MD;  Location:  OR    LAPAROSCOPIC BIOPSY LIVER  05/30/2013    Procedure: LAPAROSCOPIC BIOPSY LIVER;;  Surgeon: Salvador Morris MD;  Location:  OR    LAPAROSCOPIC GASTRIC SLEEVE  05/30/2013    Procedure: LAPAROSCOPIC GASTRIC SLEEVE;  LAPAROSCOPIC SLEEVE GASTRECTOMY/ LAPARSCOPIC  APPENDECTOMY /LIVER BIOPSIES/LIVER CYST DRAINAGE;  Surgeon: Salvador Morris MD;  Location:  OR    LASER HOLMIUM LITHOTRIPSY URETER(S), INSERT STENT, COMBINED Left 1/18/2023    Procedure: CYSTOSCOPY, LEFT URETEROSCOPY, HOLMIUM LASER  LITHOTRIPSY, LEFT   STENT PLACEMENT;  Surgeon: Mahendra Coles MD;  Location:  OR    ORTHOPEDIC SURGERY      R wrist 2010    PHACOEMULSIFICATION CLEAR CORNEA WITH STANDARD INTRAOCULAR LENS IMPLANT Right 6/29/2023    Procedure: RIGHT EYE PHACOEMULSIFICATION CATARACT WITH INTRAOCULAR LENS IMPLANT;  Surgeon: Flaco Pappas MD;  [place] [facility] [place]    PHACOEMULSIFICATION CLEAR CORNEA WITH STANDARD INTRAOCULAR LENS IMPLANT Left 7/20/2023    Procedure: LEFT EYE PHACOEMULSIFICATION, CATARACT, WITH INTRAOCULAR LENS IMPLANT;  Surgeon:  Flaco Pappas MD;  [place] [facility] [place]     Melatonin 10 mg at bedtime She sees Yariel Hill MD as [facility]  Mirtazpine  15 mg or as below?  Current Outpatient Medications   Medication Sig Dispense Refill    Acetaminophen 325 MG CHEW Take 650 mg by mouth every 4 hours as needed for mild pain      arginine (ARGINAID) PACK Take 2 packets by mouth daily.      ARIPiprazole (ABILIFY) 5 MG tablet Take 1.5 mg by mouth daily.  2    atorvastatin (LIPITOR) 20 MG tablet Take 20 mg by mouth daily.      buPROPion (WELLBUTRIN XL) 150 MG 24 hr tablet       carBAMazepine (TEGRETOL XR) 200 MG 12 hr tablet Take 100 mg by mouth 2 times daily.      carboxymethylcellulose PF (REFRESH PLUS) 0.5 % ophthalmic solution Place 1 drop into both eyes 4 times daily      cyanocobalamin (VITAMIN B-12) 1000 MCG tablet Take 1,000 mcg by mouth every 30 days.      DULoxetine (CYMBALTA) 60 MG capsule Take 120 mg by mouth daily      famotidine (PEPCID) 40 MG tablet Take 1 tablet (40 mg) by mouth daily (Patient not taking: Reported on 3/25/2025) 90 tablet 3    fluticasone (FLONASE) 50 MCG/ACT nasal spray Spray 2 sprays into both nostrils daily      GNP CALCIUM CITRATE +D3 315-6.25 MG-MCG TABS       guaiFENesin (ORGANIDIN) 200 MG tablet Take 200 mg by mouth every 4 hours as needed for cough.      hydrocortisone (CORTAID) 1 % external cream  (Patient not taking: Reported on 3/25/2025)      hydrOXYzine (ATARAX) 25 MG tablet Take 25 mg by mouth every 6 hours as needed for anxiety (Patient not taking: Reported on 3/25/2025)      lactobacillus rhamnosus, GG, (CULTURELL) capsule Take 1 capsule by mouth 2 times daily      Lactobacillus-Inulin (CULTURELLE DIGESTIVE HEALTH) CAPS TAKE 1 CAP BY MOUTH TWICE A DAY      loperamide (IMODIUM A-D) 2 MG tablet Take 1 tablet (2 mg) by mouth 2 times daily      losartan (COZAAR) 50 MG tablet Take 1 tablet (50 mg) by mouth 2 times daily      mirtazapine (REMERON) 30 MG tablet Take 15 mg by  mouth at bedtime.      nystatin (MYCOSTATIN) 010029 UNIT/GM external powder  (Patient not taking: Reported on 3/25/2025)      traZODone HCl 300 MG TABS Take 600 mg by mouth At Bedtime (Patient taking differently: Take 450 mg by mouth at bedtime.)      triamcinolone (ARISTOCORT HP) 0.5 % external cream Apply topically every 8 hours as needed (itching) (Patient not taking: Reported on 3/25/2025)      trospium (SANCTURA) 20 MG tablet Take 20 mg by mouth 2 times daily      verapamil ER (CALAN-SR) 120 MG CR tablet Take 120 mg by mouth 2 times daily      VITAMIN D-1000 MAX ST 25 MCG (1000 UT) tablet Take 25 mcg by mouth       No current facility-administered medications for this visit.     Medication: On Duloxetine and Mirtazpine and Trazodone and Ritalin. She discontinued Effexor and Abilify previously per Dr. Marquise Coleman, her psychiatrist.  On 4/10/19 she informed me that she started Abilify        2024    10:07 AM 2025     5:06 PM 2025     9:15 AM   PHQ-9 SCORE   PHQ-9 Total Score MyChart 7 (Mild depression) 6 (Mild depression) 5 (Mild depression)   PHQ-9 Total Score 7 6  5        Patient-reported         3/25/2025     9:11 AM 2025     9:17 AM 2025    12:01 PM   NAS-7 SCORE   Total Score 1 (minimal anxiety) 3 (minimal anxiety) 1 (minimal anxiety)   Total Score 1  3  1        Patient-reported     SOCIAL HISTORY (at intake) . Ms. Padilla grew up in the [facility] and is the oldest of 5 children in her family of origin.  Her father was a dentist who she believes was bipolar.  He  of lung cancer at age 72.  Her mother  of sepsis at age 80.  She had been diagnosed with breast cancer when Ms. Padilla was 8.  She described the marriage between her parents as misael.  She is 5 years older than her closest-aged sister and they are all within 4 years of each other.   Her daughter  12/3 and her mother .  She tends to feel more depressed in winter.      Ms. Padilla attended [employer]  "[employer] for a year and later went to night school at the [employer].  She felt that she could not continue her education to the point of graduation because she was working full time and raising her daughter.  Her daughter  in  at age 31 of liver failure secondary to an accidental Tylenol overdose. She had been recovering from a hysterectomy.      Ms. 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  She had to retire at the time secondary to her MS.  She has never .  She has not dated,and states that if she were she would date, she would be interested in a relationship with a woman.  There is no history of  service or legal problems.  She expresses concern about her increasing social isolation.  She had at least 1 friend, Jael, who she met at a therapy group in .  She is active in facilitating groups for people with MS both in [place] and [place].  She lived alone and currently gets help from a home health aide, as well as home health nurse.     PSYCHIATRIC HISTORY: She had seen Dr. Ban Coleman, psychiatrist. Dr. Coleman prescribed Cymbalta for it. She feels she has been more depressed since the Fall, but doesn't think it is SAD. She stated that she had recommended case management.  She now sees Dr. Yariel Hill at [facility].     SESSION:  Mili arrived late for her psychotherapy session visit. Engaged her in supportive listening and cognitive processing of situations discussed.      Mood:  The depression  has been improved markedly.\"I'm not depressed.\"  Overall better than she had been years ago. Recent increase in trazodone to 425 mg for sleep-it is helping. Decreased Abilify to 1.5 mg in the morning. Discussed her improvement relative to when Abilify was started, when living alone, and possibility she might not need it any longer.     Health:   " Diarrhea improved.  Wounds healed.  Stopped a medication that seemed to be contributing to the GI issues.l    Social:  She is doing well overall, engaged in activities. She has 15 friends, is participating in [facility] recreational and social activities.  Doing well at [facility] ([id number]; [phone])  since 8/12.23, Longterm care. Participates in activities.  Leads MS groups, but as attendance dwindles is wondering about whether or not to continue to lead it.  She is enjoying her friendships.     Weight Issues: Significant weight  gain in interval.   She discontinued Chocoate ice cream at night and cut back on amount of popcorn but overall resumed snacks.  Discussed redoubling her efforts.    She is upset about current political situation.      She participated fully and appeared to derive benefit. Affect is positive despite her feeling il.  She wanted to stop a bit early as she was not feeling well.  Rapport was excellent and she was pleased to return.  Time service started: 3:18  Time service ended: 3:59  Extended session due to complexity of case and length of interval.    [facility] Phone:[phone] room 414 @ [facility] [address]    DIAGNOSES:   Major depression, recurrent, moderate (F33.1).   Psychological factors affecting morbid obesity (E66.01).     PLAN:  Ms. Padilla will return for virtual visit 5/23 @ 2 for eclectic, supportive therapy consistent with treatment plan.      Last treatment plan signed:7/19/24  Treatment plan verbal Review due:7/19/25   Treatment Review due: 7/19/25 next session     Ramin Olivo, Ph.D., L.P.  Director, Health Psychology  [phone]

## 2025-07-11 NOTE — PROGRESS NOTES
Mili Padilla is a 70 year old female with the following diagnoses:   1. Optic atrophy    2. History of optic neuritis         Patient follows for optic neuritis of left eye that was diagnosed in 1986. Since last visit on 5/28/24, patient reports no changes in her vision. She periodically gets headaches, localized to her frontal scalp. Denies any vision changes with the headaches. Had noted some floaters. She notes she has been tilting her head back to see smaller letters when she is reading.     Exam:  Visual acuity 20/30-2 right eye 20/20-3 left eye.  Color vision is full in both eyes.  Pupils: no APD, round and reactive.  Intraocular pressure 13 right eye and 12 left eye.  Anterior segment exam unremarkalbe.  Fundus exam was remarkable for pallor of both optic discs and optic disc cupping.     Tests ordered and interpreted today:    OCT RNFL:  Right eye: reliable, mean thickness 88 (from 90 in 5/2024)   Left eye: reliable, mean thickness 67 (from 71 in 5/2024)     OCT Optic Nerve RNFL Spectralis OU (both eyes)          Performed by: wt   .     Right Eye  Reliability of the test: Good   . Interpretation: Normal   . Plan: Monitor   . Interval: Same   .     Left Eye  Reliability of the test: Good   . Interpretation: Nasal loss   . Plan: Monitor   . Interval: Same   .          Virtual Vision VF OU          24-2 Fast. Reliability of the test: Good   . Findings: Visual fields normal   . Interpretation: Normal   . Interval: Same   .          It is my impression that patient has stable findings of optic neuritis of left eye that has been long standing. Return for follow up in 1 year. New prescription given for  glasses.           Attending Physician Attestation:  Complete documentation of historical and exam elements from today's encounter can be found in the full encounter summary report (not reduplicated in this progress note).  I personally obtained the chief complaint(s) and history of present illness.  I  confirmed and edited as necessary the review of systems, past medical/surgical history, family history, social history, and examination findings as documented by others; and I examined the patient myself.  I personally reviewed the relevant tests, images, and reports as documented above.  I formulated and edited as necessary the assessment and plan and discussed the findings and management plan with the patient and family. I was present with the medical student who participated in the service and in the documentation of this note. - Zach Pak MD       Precharting:  Milena Dubois, MS4  [facility]

## 2025-07-15 ENCOUNTER — OFFICE VISIT (OUTPATIENT)
Dept: OPHTHALMOLOGY | Facility: CLINIC | Age: 70
End: 2025-07-15
Attending: OPHTHALMOLOGY
Payer: COMMERCIAL

## 2025-07-15 DIAGNOSIS — H47.20 OPTIC ATROPHY: Primary | ICD-10-CM

## 2025-07-15 DIAGNOSIS — Z86.69 HISTORY OF OPTIC NEURITIS: ICD-10-CM

## 2025-07-15 PROCEDURE — 92014 COMPRE OPH EXAM EST PT 1/>: CPT | Performed by: OPHTHALMOLOGY

## 2025-07-15 PROCEDURE — 92133 CPTRZD OPH DX IMG PST SGM ON: CPT | Performed by: OPHTHALMOLOGY

## 2025-07-15 PROCEDURE — 92083 EXTENDED VISUAL FIELD XM: CPT | Performed by: OPHTHALMOLOGY

## 2025-07-15 PROCEDURE — G0463 HOSPITAL OUTPT CLINIC VISIT: HCPCS | Performed by: OPHTHALMOLOGY

## 2025-07-15 RX ORDER — MELATONIN/PYRIDOXINE HCL (B6) 10 MG-10MG
TABLET,IMMED, EXTENDED RELEASE, BIPHASIC ORAL
COMMUNITY
Start: 2024-04-15

## 2025-07-15 RX ORDER — AMLODIPINE BESYLATE 10 MG/1
10 TABLET ORAL DAILY
COMMUNITY
Start: 2024-04-15

## 2025-07-15 RX ORDER — POLYETHYLENE GLYCOL 3350 17 G/17G
1 POWDER, FOR SOLUTION ORAL EVERY 24 HOURS
COMMUNITY
Start: 2025-07-01

## 2025-07-15 RX ORDER — TRAMADOL HYDROCHLORIDE 50 MG/1
TABLET ORAL EVERY 6 HOURS
COMMUNITY
Start: 2025-06-26

## 2025-07-15 RX ORDER — THERA TABS 400 MCG
TAB ORAL
COMMUNITY
Start: 2024-04-15

## 2025-07-15 RX ORDER — PSEUDOEPHEDRINE HCL 30 MG
TABLET ORAL
COMMUNITY

## 2025-07-15 RX ORDER — BISACODYL 10 MG
1 SUPPOSITORY, RECTAL RECTAL
COMMUNITY
Start: 2025-07-01

## 2025-07-15 ASSESSMENT — SLIT LAMP EXAM - LIDS
COMMENTS: NORMAL
COMMENTS: NORMAL

## 2025-07-15 ASSESSMENT — VISUAL ACUITY
CORRECTION_TYPE: GLASSES
OS_CC+: -3
METHOD: SNELLEN - LINEAR
OS_CC: 20/20
OD_CC: 20/30
OD_CC+: -2

## 2025-07-15 ASSESSMENT — CONF VISUAL FIELD
METHOD: COUNTING FINGERS
OD_SUPERIOR_NASAL_RESTRICTION: 0
OD_SUPERIOR_TEMPORAL_RESTRICTION: 0
OS_SUPERIOR_NASAL_RESTRICTION: 0
OS_NORMAL: 1
OD_NORMAL: 1
OD_INFERIOR_TEMPORAL_RESTRICTION: 0
OS_INFERIOR_NASAL_RESTRICTION: 0
OD_INFERIOR_NASAL_RESTRICTION: 0
OS_INFERIOR_TEMPORAL_RESTRICTION: 0
OS_SUPERIOR_TEMPORAL_RESTRICTION: 0

## 2025-07-15 ASSESSMENT — REFRACTION_MANIFEST
OS_SPHERE: -2.75
OS_ADD: +2.50
OS_AXIS: 150
OD_AXIS: 080
OS_CYLINDER: +0.50
OD_CYLINDER: +1.25
OD_SPHERE: -3.25
OD_ADD: +2.50

## 2025-07-15 ASSESSMENT — TONOMETRY
OS_IOP_MMHG: 12
IOP_METHOD: ICARE
OD_IOP_MMHG: 13

## 2025-07-15 ASSESSMENT — EXTERNAL EXAM - LEFT EYE: OS_EXAM: NORMAL

## 2025-07-15 ASSESSMENT — CUP TO DISC RATIO
OS_RATIO: 0.65
OD_RATIO: 0.7

## 2025-07-15 ASSESSMENT — EXTERNAL EXAM - RIGHT EYE: OD_EXAM: NORMAL

## 2025-07-28 PROCEDURE — 81001 URINALYSIS AUTO W/SCOPE: CPT | Mod: ORL | Performed by: FAMILY MEDICINE

## 2025-07-28 PROCEDURE — 87106 FUNGI IDENTIFICATION YEAST: CPT | Mod: ORL | Performed by: FAMILY MEDICINE

## 2025-07-29 ENCOUNTER — LAB REQUISITION (OUTPATIENT)
Dept: LAB | Facility: CLINIC | Age: 70
End: 2025-07-29
Payer: COMMERCIAL

## 2025-07-29 DIAGNOSIS — N39.0 URINARY TRACT INFECTION, SITE NOT SPECIFIED: ICD-10-CM

## 2025-07-29 LAB
ALBUMIN UR-MCNC: 20 MG/DL
APPEARANCE UR: ABNORMAL
BACTERIA #/AREA URNS HPF: ABNORMAL /HPF
BILIRUB UR QL STRIP: NEGATIVE
CAOX CRY #/AREA URNS HPF: ABNORMAL /HPF
COLOR UR AUTO: YELLOW
GLUCOSE UR STRIP-MCNC: NEGATIVE MG/DL
HGB UR QL STRIP: ABNORMAL
KETONES UR STRIP-MCNC: NEGATIVE MG/DL
LEUKOCYTE ESTERASE UR QL STRIP: ABNORMAL
MUCOUS THREADS #/AREA URNS LPF: PRESENT /LPF
NITRATE UR QL: NEGATIVE
PH UR STRIP: 5.5 [PH] (ref 5–7)
RBC URINE: 24 /HPF
SP GR UR STRIP: 1.02 (ref 1–1.03)
SQUAMOUS EPITHELIAL: 1 /HPF
TRANSITIONAL EPI: <1 /HPF
URATE CRY #/AREA URNS HPF: ABNORMAL /HPF
UROBILINOGEN UR STRIP-MCNC: NORMAL MG/DL
WBC CLUMPS #/AREA URNS HPF: PRESENT /HPF
WBC URINE: >182 /HPF
YEAST #/AREA URNS HPF: ABNORMAL /HPF

## 2025-07-30 ENCOUNTER — LAB REQUISITION (OUTPATIENT)
Dept: LAB | Facility: CLINIC | Age: 70
End: 2025-07-30
Payer: COMMERCIAL

## 2025-07-30 DIAGNOSIS — E87.6 HYPOKALEMIA: ICD-10-CM

## 2025-07-31 LAB
ANION GAP SERPL CALCULATED.3IONS-SCNC: 10 MMOL/L (ref 7–15)
BACTERIA UR CULT: ABNORMAL
BACTERIA UR CULT: ABNORMAL
BUN SERPL-MCNC: 11.6 MG/DL (ref 8–23)
CALCIUM SERPL-MCNC: 8.6 MG/DL (ref 8.8–10.4)
CHLORIDE SERPL-SCNC: 107 MMOL/L (ref 98–107)
CREAT SERPL-MCNC: 0.67 MG/DL (ref 0.51–0.95)
EGFRCR SERPLBLD CKD-EPI 2021: >90 ML/MIN/1.73M2
GLUCOSE SERPL-MCNC: 111 MG/DL (ref 70–99)
HCO3 SERPL-SCNC: 28 MMOL/L (ref 22–29)
POTASSIUM SERPL-SCNC: 3.4 MMOL/L (ref 3.4–5.3)
SODIUM SERPL-SCNC: 145 MMOL/L (ref 135–145)

## 2025-07-31 PROCEDURE — P9604 ONE-WAY ALLOW PRORATED TRIP: HCPCS | Mod: ORL | Performed by: NURSE PRACTITIONER

## 2025-07-31 PROCEDURE — 80048 BASIC METABOLIC PNL TOTAL CA: CPT | Mod: ORL | Performed by: NURSE PRACTITIONER

## 2025-07-31 PROCEDURE — 36415 COLL VENOUS BLD VENIPUNCTURE: CPT | Mod: ORL | Performed by: NURSE PRACTITIONER

## 2025-08-08 ENCOUNTER — HOSPITAL ENCOUNTER (OUTPATIENT)
Dept: MAMMOGRAPHY | Facility: CLINIC | Age: 70
Discharge: HOME | End: 2025-08-08
Attending: FAMILY MEDICINE | Admitting: FAMILY MEDICINE
Payer: COMMERCIAL

## 2025-08-08 DIAGNOSIS — Z12.31 VISIT FOR SCREENING MAMMOGRAM: ICD-10-CM

## 2025-08-08 PROCEDURE — 77067 SCR MAMMO BI INCL CAD: CPT

## 2025-08-14 ENCOUNTER — OFFICE VISIT (OUTPATIENT)
Dept: SURGERY | Facility: CLINIC | Age: 70
End: 2025-08-14
Payer: COMMERCIAL

## 2025-08-14 VITALS
OXYGEN SATURATION: 95 % | BODY MASS INDEX: 40.85 KG/M2 | HEIGHT: 66 IN | SYSTOLIC BLOOD PRESSURE: 120 MMHG | WEIGHT: 254.2 LBS | HEART RATE: 72 BPM | DIASTOLIC BLOOD PRESSURE: 72 MMHG

## 2025-08-14 DIAGNOSIS — Z98.84 BARIATRIC SURGERY STATUS: Primary | ICD-10-CM

## 2025-08-14 DIAGNOSIS — K91.2 POSTSURGICAL MALABSORPTION: ICD-10-CM

## 2025-08-14 DIAGNOSIS — E66.813 CLASS 3 OBESITY (H): ICD-10-CM

## 2025-08-14 DIAGNOSIS — K90.9 INTESTINAL MALABSORPTION, UNSPECIFIED TYPE: ICD-10-CM

## 2025-08-14 RX ORDER — PROPRANOLOL HYDROCHLORIDE 10 MG/1
10 TABLET ORAL
COMMUNITY
Start: 2025-07-22

## 2025-08-19 ENCOUNTER — LAB (OUTPATIENT)
Dept: LAB | Facility: CLINIC | Age: 70
End: 2025-08-19
Payer: COMMERCIAL

## 2025-08-19 DIAGNOSIS — K91.2 POSTSURGICAL MALABSORPTION: ICD-10-CM

## 2025-08-19 DIAGNOSIS — E66.813 CLASS 3 OBESITY (H): ICD-10-CM

## 2025-08-19 DIAGNOSIS — K90.9 INTESTINAL MALABSORPTION, UNSPECIFIED TYPE: ICD-10-CM

## 2025-08-19 LAB
ALBUMIN SERPL BCG-MCNC: 3.4 G/DL (ref 3.5–5.2)
ALP SERPL-CCNC: 98 U/L (ref 40–150)
ALT SERPL W P-5'-P-CCNC: 21 U/L (ref 0–50)
ANION GAP SERPL CALCULATED.3IONS-SCNC: 10 MMOL/L (ref 7–15)
AST SERPL W P-5'-P-CCNC: 29 U/L (ref 0–45)
BILIRUB SERPL-MCNC: 0.2 MG/DL
BUN SERPL-MCNC: 9.2 MG/DL (ref 8–23)
CALCIUM SERPL-MCNC: 9.3 MG/DL (ref 8.8–10.4)
CHLORIDE SERPL-SCNC: 100 MMOL/L (ref 98–107)
CREAT SERPL-MCNC: 0.93 MG/DL (ref 0.51–0.95)
EGFRCR SERPLBLD CKD-EPI 2021: 65 ML/MIN/1.73M2
ERYTHROCYTE [DISTWIDTH] IN BLOOD BY AUTOMATED COUNT: 13.4 % (ref 10–15)
FERRITIN SERPL-MCNC: 166 NG/ML (ref 11–328)
GLUCOSE SERPL-MCNC: 128 MG/DL (ref 70–99)
HCO3 SERPL-SCNC: 31 MMOL/L (ref 22–29)
HCT VFR BLD AUTO: 35 % (ref 35–47)
HGB BLD-MCNC: 11.2 G/DL (ref 11.7–15.7)
IRON BINDING CAPACITY (ROCHE): 191 UG/DL (ref 240–430)
IRON SATN MFR SERPL: 24 % (ref 15–46)
IRON SERPL-MCNC: 45 UG/DL (ref 37–145)
MCH RBC QN AUTO: 30.1 PG (ref 26.5–33)
MCHC RBC AUTO-ENTMCNC: 32 G/DL (ref 31.5–36.5)
MCV RBC AUTO: 94.1 FL (ref 78–100)
PLATELET # BLD AUTO: 183 10E3/UL (ref 150–450)
POTASSIUM SERPL-SCNC: 3.3 MMOL/L (ref 3.4–5.3)
PROT SERPL-MCNC: 6.7 G/DL (ref 6.4–8.3)
PTH-INTACT SERPL-MCNC: 35 PG/ML (ref 15–65)
RBC # BLD AUTO: 3.72 10E6/UL (ref 3.8–5.2)
SODIUM SERPL-SCNC: 141 MMOL/L (ref 135–145)
VIT B12 SERPL-MCNC: 1260 PG/ML (ref 232–1245)
VIT D+METAB SERPL-MCNC: 45 NG/ML (ref 20–50)
WBC # BLD AUTO: 9.45 10E3/UL (ref 4–11)

## 2025-08-19 PROCEDURE — 82728 ASSAY OF FERRITIN: CPT

## 2025-08-19 PROCEDURE — 83970 ASSAY OF PARATHORMONE: CPT

## 2025-08-19 PROCEDURE — 84630 ASSAY OF ZINC: CPT | Mod: 90

## 2025-08-19 PROCEDURE — 80053 COMPREHEN METABOLIC PANEL: CPT

## 2025-08-19 PROCEDURE — 83540 ASSAY OF IRON: CPT

## 2025-08-19 PROCEDURE — 82306 VITAMIN D 25 HYDROXY: CPT

## 2025-08-19 PROCEDURE — 85027 COMPLETE CBC AUTOMATED: CPT

## 2025-08-19 PROCEDURE — 36415 COLL VENOUS BLD VENIPUNCTURE: CPT

## 2025-08-19 PROCEDURE — 84590 ASSAY OF VITAMIN A: CPT | Mod: 90

## 2025-08-19 PROCEDURE — 83550 IRON BINDING TEST: CPT

## 2025-08-19 PROCEDURE — 82607 VITAMIN B-12: CPT

## 2025-08-19 PROCEDURE — 99000 SPECIMEN HANDLING OFFICE-LAB: CPT

## 2025-08-20 LAB — ZINC SERPL-MCNC: 49.9 UG/DL

## 2025-08-21 ENCOUNTER — LAB REQUISITION (OUTPATIENT)
Dept: LAB | Facility: CLINIC | Age: 70
End: 2025-08-21
Payer: COMMERCIAL

## 2025-08-21 DIAGNOSIS — N39.0 URINARY TRACT INFECTION, SITE NOT SPECIFIED: ICD-10-CM

## 2025-08-21 LAB
ALBUMIN UR-MCNC: NEGATIVE MG/DL
APPEARANCE UR: CLEAR
BACTERIA #/AREA URNS HPF: ABNORMAL /HPF
BILIRUB UR QL STRIP: NEGATIVE
COLOR UR AUTO: ABNORMAL
GLUCOSE UR STRIP-MCNC: NEGATIVE MG/DL
HGB UR QL STRIP: ABNORMAL
KETONES UR STRIP-MCNC: NEGATIVE MG/DL
LEUKOCYTE ESTERASE UR QL STRIP: ABNORMAL
MUCOUS THREADS #/AREA URNS LPF: PRESENT /LPF
NITRATE UR QL: NEGATIVE
PH UR STRIP: 6 [PH] (ref 5–7)
RBC URINE: 1 /HPF
SP GR UR STRIP: 1 (ref 1–1.03)
SQUAMOUS EPITHELIAL: <1 /HPF
UROBILINOGEN UR STRIP-MCNC: NORMAL MG/DL
WBC CLUMPS #/AREA URNS HPF: PRESENT /HPF
WBC URINE: 30 /HPF

## 2025-08-24 LAB — BACTERIA UR CULT: NORMAL

## 2025-08-29 ENCOUNTER — PRE VISIT (OUTPATIENT)
Dept: INFECTIOUS DISEASES | Facility: CLINIC | Age: 70
End: 2025-08-29
Payer: COMMERCIAL

## 2025-09-01 ENCOUNTER — LAB REQUISITION (OUTPATIENT)
Dept: LAB | Facility: CLINIC | Age: 70
End: 2025-09-01
Payer: COMMERCIAL

## 2025-09-01 DIAGNOSIS — N39.0 URINARY TRACT INFECTION, SITE NOT SPECIFIED: ICD-10-CM

## 2025-09-02 ENCOUNTER — LAB REQUISITION (OUTPATIENT)
Dept: LAB | Facility: CLINIC | Age: 70
End: 2025-09-02
Payer: COMMERCIAL

## 2025-09-02 DIAGNOSIS — N39.0 URINARY TRACT INFECTION, SITE NOT SPECIFIED: ICD-10-CM

## 2025-09-02 LAB
ALBUMIN UR-MCNC: 20 MG/DL
APPEARANCE UR: ABNORMAL
BILIRUB UR QL STRIP: NEGATIVE
COLOR UR AUTO: ABNORMAL
GLUCOSE UR STRIP-MCNC: NEGATIVE MG/DL
HGB UR QL STRIP: ABNORMAL
KETONES UR STRIP-MCNC: NEGATIVE MG/DL
LEUKOCYTE ESTERASE UR QL STRIP: ABNORMAL
NITRATE UR QL: NEGATIVE
PH UR STRIP: 6.5 [PH] (ref 5–7)
RBC URINE: 2 /HPF
SP GR UR STRIP: 1 (ref 1–1.03)
UROBILINOGEN UR STRIP-MCNC: NORMAL MG/DL
WBC CLUMPS #/AREA URNS HPF: PRESENT /HPF
WBC URINE: 35 /HPF

## 2025-09-04 LAB
BACTERIA UR CULT: ABNORMAL
BACTERIA UR CULT: ABNORMAL

## (undated) DEVICE — EYE CANN IRR 27GA ANTERIOR CHAMBER 581280

## (undated) DEVICE — ENDO SEAL BX PORT BPS-A

## (undated) DEVICE — PACK CYSTOSCOPY SBA15CYFSI

## (undated) DEVICE — LINEN TOWEL PACK X5 5464

## (undated) DEVICE — EYE KNIFE SLIT XSTAR VISITEC 2.6MM 45DEG 373726

## (undated) DEVICE — EYE CANN IRR 25GA CYSTOTOME 581610

## (undated) DEVICE — SOL WATER IRRIG 500ML BOTTLE 2F7113

## (undated) DEVICE — EYE PACK CUSTOM ANTERIOR 30DEG TIP CENTURION PPK6682-04

## (undated) DEVICE — PACK CATARACT CUSTOM ASC SEY15CPUMC

## (undated) DEVICE — GLOVE BIOGEL PI MICRO SZ 7.5 48575

## (undated) DEVICE — EYE TIP IRRIGATION & ASPIRATION POLYMER 35D BENT 8065751511

## (undated) DEVICE — GUIDEWIRE ZIPWIRE STD STR .035"X150CM M006630205B0

## (undated) DEVICE — EYE SHIELD PLASTIC

## (undated) DEVICE — LASER FIBER HOLMIUM MOSES 200 D/F/L AC-10030100

## (undated) DEVICE — EYE KNIFE STILETTO VISITEC 1.1MM ANG 45DEG SIDEPORT 376620

## (undated) DEVICE — BAG DRAIN URO FOR SIEMENS 8MM ADAPTER NS CC164NS-A

## (undated) DEVICE — RAD RX ISOVUE 300 (50ML) 61% IOPAMIDOL CHARGE PER ML

## (undated) DEVICE — SOL WATER IRRIG 1000ML BOTTLE 2F7114

## (undated) DEVICE — PAD CHUX UNDERPAD 23X24" 7136

## (undated) DEVICE — SOL WATER IRRIG 3000ML BAG 2B7117

## (undated) RX ORDER — CIPROFLOXACIN 2 MG/ML
INJECTION, SOLUTION INTRAVENOUS
Status: DISPENSED
Start: 2023-01-18

## (undated) RX ORDER — FENTANYL CITRATE 50 UG/ML
INJECTION, SOLUTION INTRAMUSCULAR; INTRAVENOUS
Status: DISPENSED
Start: 2017-07-17

## (undated) RX ORDER — ONDANSETRON 2 MG/ML
INJECTION INTRAMUSCULAR; INTRAVENOUS
Status: DISPENSED
Start: 2023-06-29

## (undated) RX ORDER — FENTANYL CITRATE 50 UG/ML
INJECTION, SOLUTION INTRAMUSCULAR; INTRAVENOUS
Status: DISPENSED
Start: 2023-01-18

## (undated) RX ORDER — FENTANYL CITRATE 50 UG/ML
INJECTION, SOLUTION INTRAMUSCULAR; INTRAVENOUS
Status: DISPENSED
Start: 2017-09-25

## (undated) RX ORDER — ACETAMINOPHEN 500 MG
TABLET ORAL
Status: DISPENSED
Start: 2023-01-18

## (undated) RX ORDER — ACETAMINOPHEN 325 MG/1
TABLET ORAL
Status: DISPENSED
Start: 2023-06-29

## (undated) RX ORDER — FENTANYL CITRATE 50 UG/ML
INJECTION, SOLUTION INTRAMUSCULAR; INTRAVENOUS
Status: DISPENSED
Start: 2018-02-23

## (undated) RX ORDER — PROPOFOL 10 MG/ML
INJECTION, EMULSION INTRAVENOUS
Status: DISPENSED
Start: 2023-01-18